# Patient Record
Sex: FEMALE | Race: WHITE | Employment: OTHER | URBAN - METROPOLITAN AREA
[De-identification: names, ages, dates, MRNs, and addresses within clinical notes are randomized per-mention and may not be internally consistent; named-entity substitution may affect disease eponyms.]

---

## 2017-03-03 ENCOUNTER — HOSPITAL ENCOUNTER (OUTPATIENT)
Age: 67
Setting detail: SPECIMEN
Discharge: HOME | End: 2017-03-03
Payer: MEDICARE

## 2017-03-03 LAB
ALBUMIN SERPL-MCNC: 4 G/DL (ref 3.5–5.2)
ALBUMIN/GLOBULIN RATIO: 1.1 (ref 1–2.5)
ALP BLD-CCNC: 161 U/L (ref 35–104)
ALT SERPL-CCNC: 22 U/L (ref 5–33)
ANION GAP SERPL CALCULATED.3IONS-SCNC: 20 MMOL/L (ref 9–17)
AST SERPL-CCNC: 31 U/L
BILIRUB SERPL-MCNC: 0.81 MG/DL (ref 0.3–1.2)
BUN BLDV-MCNC: 26 MG/DL (ref 8–23)
BUN/CREAT BLD: ABNORMAL (ref 9–20)
CALCIUM SERPL-MCNC: 9.8 MG/DL (ref 8.6–10.4)
CHLORIDE BLD-SCNC: 93 MMOL/L (ref 98–107)
CO2: 24 MMOL/L (ref 20–31)
CREAT SERPL-MCNC: 1.89 MG/DL (ref 0.5–0.9)
FOLATE: >20 NG/ML
GFR AFRICAN AMERICAN: 32 ML/MIN
GFR NON-AFRICAN AMERICAN: 26 ML/MIN
GFR SERPL CREATININE-BSD FRML MDRD: ABNORMAL ML/MIN/{1.73_M2}
GFR SERPL CREATININE-BSD FRML MDRD: ABNORMAL ML/MIN/{1.73_M2}
GLUCOSE BLD-MCNC: 158 MG/DL (ref 70–99)
POTASSIUM SERPL-SCNC: 4 MMOL/L (ref 3.7–5.3)
SODIUM BLD-SCNC: 137 MMOL/L (ref 135–144)
TOTAL PROTEIN: 7.6 G/DL (ref 6.4–8.3)
TSH SERPL DL<=0.05 MIU/L-ACNC: 2.68 MIU/L (ref 0.3–5)
VITAMIN B-12: 389 PG/ML (ref 211–946)

## 2017-03-28 ENCOUNTER — HOSPITAL ENCOUNTER (OUTPATIENT)
Age: 67
Setting detail: SPECIMEN
Discharge: HOME | End: 2017-03-28
Payer: MEDICARE

## 2017-03-28 LAB
-: NORMAL
ABSOLUTE EOS #: 0.3 K/UL (ref 0–0.4)
ABSOLUTE LYMPH #: 1.2 K/UL (ref 1–4.8)
ABSOLUTE MONO #: 1.3 K/UL (ref 0.1–1.2)
ALBUMIN SERPL-MCNC: 3.6 G/DL (ref 3.5–5.2)
ALBUMIN SERPL-MCNC: 3.7 G/DL (ref 3.5–5.2)
ALBUMIN/GLOBULIN RATIO: 1 (ref 1–2.5)
ALP BLD-CCNC: 141 U/L (ref 35–104)
ALT SERPL-CCNC: 18 U/L (ref 5–33)
AMORPHOUS: NORMAL
ANION GAP SERPL CALCULATED.3IONS-SCNC: 17 MMOL/L (ref 9–17)
ANION GAP SERPL CALCULATED.3IONS-SCNC: 18 MMOL/L (ref 9–17)
AST SERPL-CCNC: 25 U/L
BACTERIA: NORMAL
BASOPHILS # BLD: 1 % (ref 0–2)
BASOPHILS ABSOLUTE: 0.1 K/UL (ref 0–0.2)
BILIRUB SERPL-MCNC: 0.69 MG/DL (ref 0.3–1.2)
BILIRUBIN URINE: NEGATIVE
BNP INTERPRETATION: ABNORMAL
BUN BLDV-MCNC: 15 MG/DL (ref 8–23)
BUN BLDV-MCNC: 15 MG/DL (ref 8–23)
BUN/CREAT BLD: ABNORMAL (ref 9–20)
BUN/CREAT BLD: ABNORMAL (ref 9–20)
CALCIUM IONIZED: 1.2 MMOL/L (ref 1.13–1.33)
CALCIUM SERPL-MCNC: 8.7 MG/DL (ref 8.6–10.4)
CALCIUM SERPL-MCNC: 9 MG/DL (ref 8.6–10.4)
CASTS UA: NORMAL /LPF (ref 0–8)
CHLORIDE BLD-SCNC: 100 MMOL/L (ref 98–107)
CHLORIDE BLD-SCNC: 100 MMOL/L (ref 98–107)
CHOLESTEROL/HDL RATIO: 4.2
CHOLESTEROL: 183 MG/DL
CO2: 21 MMOL/L (ref 20–31)
CO2: 22 MMOL/L (ref 20–31)
COLOR: YELLOW
COMMENT UA: ABNORMAL
CREAT SERPL-MCNC: 1.5 MG/DL (ref 0.5–0.9)
CREAT SERPL-MCNC: 1.55 MG/DL (ref 0.5–0.9)
CREATININE URINE: 93.4 MG/DL (ref 28–217)
CRYSTALS, UA: NORMAL /HPF
DIFFERENTIAL TYPE: ABNORMAL
EOSINOPHILS RELATIVE PERCENT: 4 % (ref 1–4)
EPITHELIAL CELLS UA: NORMAL /HPF (ref 0–5)
ESTIMATED AVERAGE GLUCOSE: 131 MG/DL
GFR AFRICAN AMERICAN: 40 ML/MIN
GFR AFRICAN AMERICAN: 42 ML/MIN
GFR NON-AFRICAN AMERICAN: 33 ML/MIN
GFR NON-AFRICAN AMERICAN: 35 ML/MIN
GFR SERPL CREATININE-BSD FRML MDRD: ABNORMAL ML/MIN/{1.73_M2}
GLUCOSE BLD-MCNC: 178 MG/DL (ref 70–99)
GLUCOSE BLD-MCNC: 179 MG/DL (ref 70–99)
GLUCOSE URINE: NEGATIVE
HBA1C MFR BLD: 6.2 % (ref 4–6)
HCT VFR BLD CALC: 36.7 % (ref 36–46)
HCT VFR BLD CALC: 36.7 % (ref 36–46)
HDLC SERPL-MCNC: 44 MG/DL
HEMOGLOBIN: 12.4 G/DL (ref 12–16)
HEMOGLOBIN: 12.4 G/DL (ref 12–16)
INR BLD: 2
IRON SATURATION: 30 % (ref 20–55)
IRON: 74 UG/DL (ref 37–145)
KETONES, URINE: NEGATIVE
LDL CHOLESTEROL: 109 MG/DL (ref 0–130)
LEUKOCYTE ESTERASE, URINE: ABNORMAL
LYMPHOCYTES # BLD: 13 % (ref 24–44)
MAGNESIUM: 2.2 MG/DL (ref 1.6–2.6)
MCH RBC QN AUTO: 35 PG (ref 26–34)
MCH RBC QN AUTO: 35 PG (ref 26–34)
MCHC RBC AUTO-ENTMCNC: 34 G/DL (ref 31–37)
MCHC RBC AUTO-ENTMCNC: 34 G/DL (ref 31–37)
MCV RBC AUTO: 103 FL (ref 80–100)
MCV RBC AUTO: 103 FL (ref 80–100)
MICROALBUMIN/CREAT 24H UR: <12 MG/L
MICROALBUMIN/CREAT UR-RTO: 13 MCG/MG CREAT
MONOCYTES # BLD: 15 % (ref 2–11)
MUCUS: NORMAL
MYOGLOBIN: 41 NG/ML (ref 25–58)
NITRITE, URINE: NEGATIVE
OTHER OBSERVATIONS UA: NORMAL
PDW BLD-RTO: 16.9 % (ref 12.5–15.4)
PDW BLD-RTO: 16.9 % (ref 12.5–15.4)
PH UA: 6.5 (ref 5–8)
PHOSPHORUS: 2.6 MG/DL (ref 2.6–4.5)
PLATELET # BLD: 246 K/UL (ref 140–450)
PLATELET # BLD: 246 K/UL (ref 140–450)
PLATELET ESTIMATE: ABNORMAL
PMV BLD AUTO: 9.3 FL (ref 6–12)
PMV BLD AUTO: 9.3 FL (ref 6–12)
POTASSIUM SERPL-SCNC: 4.4 MMOL/L (ref 3.7–5.3)
POTASSIUM SERPL-SCNC: 4.6 MMOL/L (ref 3.7–5.3)
PRO-BNP: 339 PG/ML
PROTEIN UA: NEGATIVE
PROTHROMBIN TIME: 21.2 SEC (ref 9.4–12.6)
PTH INTACT: 136.9 PG/ML (ref 15–65)
RBC # BLD: 3.56 M/UL (ref 4–5.2)
RBC # BLD: 3.56 M/UL (ref 4–5.2)
RBC # BLD: ABNORMAL 10*6/UL
RBC UA: NORMAL /HPF (ref 0–4)
RENAL EPITHELIAL, UA: NORMAL /HPF
SEG NEUTROPHILS: 67 % (ref 36–66)
SEGMENTED NEUTROPHILS ABSOLUTE COUNT: 6 K/UL (ref 1.8–7.7)
SODIUM BLD-SCNC: 139 MMOL/L (ref 135–144)
SODIUM BLD-SCNC: 139 MMOL/L (ref 135–144)
SPECIFIC GRAVITY UA: 1.01 (ref 1–1.03)
THYROXINE, FREE: 1.34 NG/DL (ref 0.93–1.7)
TOTAL CK: 45 U/L (ref 26–192)
TOTAL IRON BINDING CAPACITY: 250 UG/DL (ref 250–450)
TOTAL PROTEIN: 7.3 G/DL (ref 6.4–8.3)
TRICHOMONAS: NORMAL
TRIGL SERPL-MCNC: 149 MG/DL
TSH SERPL DL<=0.05 MIU/L-ACNC: 1.62 MIU/L (ref 0.3–5)
TURBIDITY: CLEAR
UNSATURATED IRON BINDING CAPACITY: 176 UG/DL (ref 112–347)
URINE HGB: NEGATIVE
UROBILINOGEN, URINE: NORMAL
VITAMIN B-12: 375 PG/ML (ref 211–946)
VITAMIN D 25-HYDROXY: 48.3 NG/ML (ref 30–100)
VITAMIN D 25-HYDROXY: 48.7 NG/ML (ref 30–100)
VLDLC SERPL CALC-MCNC: NORMAL MG/DL (ref 1–30)
WBC # BLD: 8.9 K/UL (ref 3.5–11)
WBC # BLD: 8.9 K/UL (ref 3.5–11)
WBC # BLD: ABNORMAL 10*3/UL
WBC UA: NORMAL /HPF (ref 0–5)
YEAST: NORMAL

## 2017-04-05 ENCOUNTER — HOSPITAL ENCOUNTER (OUTPATIENT)
Dept: PHARMACY | Age: 67
Setting detail: THERAPIES SERIES
Discharge: HOME | End: 2017-04-05
Payer: MEDICARE

## 2017-04-05 LAB
INR BLD: 3.1
PROTIME: 37.2 SECONDS

## 2017-04-05 PROCEDURE — 85610 PROTHROMBIN TIME: CPT

## 2017-04-05 PROCEDURE — G0463 HOSPITAL OUTPT CLINIC VISIT: HCPCS

## 2017-05-03 ENCOUNTER — HOSPITAL ENCOUNTER (OUTPATIENT)
Dept: PHARMACY | Age: 67
Setting detail: THERAPIES SERIES
Discharge: HOME | End: 2017-05-03
Payer: MEDICARE

## 2017-05-03 DIAGNOSIS — I48.91 ATRIAL FIBRILLATION, UNSPECIFIED TYPE (HCC): ICD-10-CM

## 2017-05-03 LAB
INR BLD: 2.1
PROTIME: 25.6 SECONDS

## 2017-05-03 PROCEDURE — 85610 PROTHROMBIN TIME: CPT

## 2017-05-03 PROCEDURE — 99211 OFF/OP EST MAY X REQ PHY/QHP: CPT

## 2017-05-13 ENCOUNTER — APPOINTMENT (OUTPATIENT)
Dept: GENERAL RADIOLOGY | Age: 67
End: 2017-05-13
Payer: MEDICARE

## 2017-05-13 ENCOUNTER — HOSPITAL ENCOUNTER (EMERGENCY)
Age: 67
Discharge: HOME | End: 2017-05-13
Attending: EMERGENCY MEDICINE
Payer: MEDICARE

## 2017-05-13 ENCOUNTER — APPOINTMENT (OUTPATIENT)
Dept: CT IMAGING | Age: 67
End: 2017-05-13
Payer: MEDICARE

## 2017-05-13 VITALS
HEIGHT: 62 IN | RESPIRATION RATE: 20 BRPM | BODY MASS INDEX: 44.72 KG/M2 | DIASTOLIC BLOOD PRESSURE: 53 MMHG | HEART RATE: 86 BPM | WEIGHT: 243 LBS | TEMPERATURE: 98.1 F | OXYGEN SATURATION: 95 % | SYSTOLIC BLOOD PRESSURE: 149 MMHG

## 2017-05-13 DIAGNOSIS — M79.18 MUSCULOSKELETAL PAIN: Primary | ICD-10-CM

## 2017-05-13 LAB
ABSOLUTE EOS #: 0.29 K/UL (ref 0–0.4)
ABSOLUTE LYMPH #: 1.46 K/UL (ref 1–4.8)
ABSOLUTE MONO #: 0.97 K/UL (ref 0.2–0.8)
ANION GAP SERPL CALCULATED.3IONS-SCNC: 16 MMOL/L (ref 9–17)
BASOPHILS # BLD: 0 %
BASOPHILS ABSOLUTE: 0 K/UL (ref 0–0.2)
BUN BLDV-MCNC: 15 MG/DL (ref 8–23)
BUN/CREAT BLD: 9 (ref 9–20)
CALCIUM SERPL-MCNC: 9.2 MG/DL (ref 8.6–10.4)
CHLORIDE BLD-SCNC: 97 MMOL/L (ref 98–107)
CO2: 26 MMOL/L (ref 20–31)
CREAT SERPL-MCNC: 1.74 MG/DL (ref 0.5–0.9)
DIFFERENTIAL TYPE: ABNORMAL
EOSINOPHILS RELATIVE PERCENT: 3 %
GFR AFRICAN AMERICAN: 35 ML/MIN
GFR NON-AFRICAN AMERICAN: 29 ML/MIN
GFR SERPL CREATININE-BSD FRML MDRD: ABNORMAL ML/MIN/{1.73_M2}
GFR SERPL CREATININE-BSD FRML MDRD: ABNORMAL ML/MIN/{1.73_M2}
GLUCOSE BLD-MCNC: 117 MG/DL (ref 70–99)
HCT VFR BLD CALC: 40.4 % (ref 36–46)
HEMOGLOBIN: 13.6 G/DL (ref 12–16)
INR BLD: 2.7
LYMPHOCYTES # BLD: 15 %
MCH RBC QN AUTO: 34.3 PG (ref 26–34)
MCHC RBC AUTO-ENTMCNC: 33.7 G/DL (ref 31–37)
MCV RBC AUTO: 101.9 FL (ref 80–100)
MONOCYTES # BLD: 10 %
MORPHOLOGY: ABNORMAL
MYOGLOBIN: 46 NG/ML (ref 25–58)
PARTIAL THROMBOPLASTIN TIME: 34.2 SEC (ref 23–31)
PDW BLD-RTO: 14.7 % (ref 11.5–14.5)
PLATELET # BLD: 227 K/UL (ref 130–400)
PLATELET ESTIMATE: ABNORMAL
PMV BLD AUTO: ABNORMAL FL (ref 6–12)
POTASSIUM SERPL-SCNC: 4.2 MMOL/L (ref 3.7–5.3)
PROTHROMBIN TIME: 29.3 SEC (ref 9.7–11.6)
RBC # BLD: 3.97 M/UL (ref 4–5.2)
RBC # BLD: ABNORMAL 10*6/UL
SEG NEUTROPHILS: 72 %
SEGMENTED NEUTROPHILS ABSOLUTE COUNT: 6.98 K/UL (ref 1.8–7.7)
SODIUM BLD-SCNC: 139 MMOL/L (ref 135–144)
TROPONIN INTERP: NORMAL
TROPONIN T: <0.03 NG/ML
WBC # BLD: 9.7 K/UL (ref 3.5–11)
WBC # BLD: ABNORMAL 10*3/UL

## 2017-05-13 PROCEDURE — 85025 COMPLETE CBC W/AUTO DIFF WBC: CPT

## 2017-05-13 PROCEDURE — 72125 CT NECK SPINE W/O DYE: CPT

## 2017-05-13 PROCEDURE — 80048 BASIC METABOLIC PNL TOTAL CA: CPT

## 2017-05-13 PROCEDURE — 85610 PROTHROMBIN TIME: CPT

## 2017-05-13 PROCEDURE — 85730 THROMBOPLASTIN TIME PARTIAL: CPT

## 2017-05-13 PROCEDURE — 93005 ELECTROCARDIOGRAM TRACING: CPT

## 2017-05-13 PROCEDURE — 71010 XR CHEST PORTABLE: CPT

## 2017-05-13 PROCEDURE — 99284 EMERGENCY DEPT VISIT MOD MDM: CPT

## 2017-05-13 PROCEDURE — 84484 ASSAY OF TROPONIN QUANT: CPT

## 2017-05-13 PROCEDURE — 70450 CT HEAD/BRAIN W/O DYE: CPT

## 2017-05-13 PROCEDURE — 83874 ASSAY OF MYOGLOBIN: CPT

## 2017-05-13 RX ORDER — METHOCARBAMOL 750 MG/1
750 TABLET, FILM COATED ORAL 3 TIMES DAILY
Qty: 30 TABLET | Refills: 0 | Status: SHIPPED | OUTPATIENT
Start: 2017-05-13 | End: 2018-01-31 | Stop reason: ALTCHOICE

## 2017-05-13 RX ORDER — ROSUVASTATIN CALCIUM 40 MG/1
40 TABLET, COATED ORAL DAILY
COMMUNITY
End: 2018-12-19 | Stop reason: ALTCHOICE

## 2017-05-13 RX ORDER — NEBIVOLOL 5 MG/1
5 TABLET ORAL DAILY
Status: ON HOLD | COMMUNITY
End: 2018-03-27 | Stop reason: HOSPADM

## 2017-05-13 RX ORDER — HYDROCODONE BITARTRATE AND ACETAMINOPHEN 5; 325 MG/1; MG/1
1 TABLET ORAL EVERY 6 HOURS PRN
Qty: 20 TABLET | Refills: 0 | Status: SHIPPED | OUTPATIENT
Start: 2017-05-13 | End: 2018-01-31 | Stop reason: ALTCHOICE

## 2017-05-13 RX ORDER — CALCITRIOL 0.25 UG/1
0.25 CAPSULE, LIQUID FILLED ORAL
COMMUNITY
End: 2018-12-19 | Stop reason: ALTCHOICE

## 2017-05-13 RX ORDER — METOCLOPRAMIDE 5 MG/1
10 TABLET ORAL 2 TIMES DAILY
Status: ON HOLD | COMMUNITY
End: 2021-11-13 | Stop reason: SDUPTHER

## 2017-05-13 RX ORDER — GABAPENTIN 400 MG/1
400 CAPSULE ORAL 2 TIMES DAILY
COMMUNITY
End: 2017-05-15

## 2017-05-13 ASSESSMENT — ENCOUNTER SYMPTOMS
PHOTOPHOBIA: 0
SHORTNESS OF BREATH: 0
NAUSEA: 1
VOMITING: 0
SINUS PRESSURE: 0
SORE THROAT: 0
RHINORRHEA: 0
COUGH: 0
ABDOMINAL PAIN: 0
EYE PAIN: 0
COLOR CHANGE: 0
DIARRHEA: 0

## 2017-05-13 ASSESSMENT — PAIN SCALES - GENERAL: PAINLEVEL_OUTOF10: 4

## 2017-05-15 ENCOUNTER — TELEPHONE (OUTPATIENT)
Dept: PHARMACY | Age: 67
End: 2017-05-15

## 2017-05-15 RX ORDER — GABAPENTIN 400 MG/1
400 CAPSULE ORAL 2 TIMES DAILY
COMMUNITY
End: 2018-07-25 | Stop reason: ALTCHOICE

## 2017-05-15 RX ORDER — VALACYCLOVIR HYDROCHLORIDE 1 G/1
2000 TABLET, FILM COATED ORAL DAILY PRN
COMMUNITY
End: 2021-11-08

## 2017-05-16 LAB
EKG ATRIAL RATE: 77 BPM
EKG P AXIS: 55 DEGREES
EKG P-R INTERVAL: 150 MS
EKG Q-T INTERVAL: 392 MS
EKG QRS DURATION: 74 MS
EKG QTC CALCULATION (BAZETT): 443 MS
EKG R AXIS: 61 DEGREES
EKG T AXIS: 35 DEGREES
EKG VENTRICULAR RATE: 77 BPM

## 2017-05-31 ENCOUNTER — HOSPITAL ENCOUNTER (OUTPATIENT)
Dept: PHARMACY | Age: 67
Setting detail: THERAPIES SERIES
Discharge: HOME | End: 2017-05-31
Payer: MEDICARE

## 2017-05-31 DIAGNOSIS — I48.91 ATRIAL FIBRILLATION, UNSPECIFIED TYPE (HCC): ICD-10-CM

## 2017-05-31 LAB
INR BLD: 3.5
PROTIME: 41.5 SECONDS

## 2017-05-31 PROCEDURE — 99211 OFF/OP EST MAY X REQ PHY/QHP: CPT

## 2017-05-31 PROCEDURE — 85610 PROTHROMBIN TIME: CPT

## 2017-06-13 ENCOUNTER — HOSPITAL ENCOUNTER (OUTPATIENT)
Dept: PHARMACY | Age: 67
Setting detail: THERAPIES SERIES
Discharge: HOME | End: 2017-06-13
Payer: MEDICARE

## 2017-06-13 DIAGNOSIS — I48.91 ATRIAL FIBRILLATION, UNSPECIFIED TYPE (HCC): ICD-10-CM

## 2017-06-13 LAB
INR BLD: 2.2
PROTIME: 26.6 SECONDS

## 2017-06-13 PROCEDURE — 99211 OFF/OP EST MAY X REQ PHY/QHP: CPT

## 2017-06-13 PROCEDURE — 85610 PROTHROMBIN TIME: CPT

## 2017-06-26 ENCOUNTER — TELEPHONE (OUTPATIENT)
Dept: PHARMACY | Age: 67
End: 2017-06-26

## 2017-06-26 DIAGNOSIS — I48.91 ATRIAL FIBRILLATION, UNSPECIFIED TYPE (HCC): ICD-10-CM

## 2017-07-03 ENCOUNTER — HOSPITAL ENCOUNTER (OUTPATIENT)
Age: 67
Setting detail: SPECIMEN
Discharge: HOME | End: 2017-07-03
Payer: MEDICARE

## 2017-07-03 LAB
-: NORMAL
ALBUMIN SERPL-MCNC: 3.6 G/DL (ref 3.5–5.2)
AMORPHOUS: NORMAL
ANION GAP SERPL CALCULATED.3IONS-SCNC: 17 MMOL/L (ref 9–17)
BACTERIA: NORMAL
BILIRUBIN URINE: NEGATIVE
BUN BLDV-MCNC: 16 MG/DL (ref 8–23)
BUN/CREAT BLD: ABNORMAL (ref 9–20)
CALCIUM IONIZED: 1.28 MMOL/L (ref 1.13–1.33)
CALCIUM SERPL-MCNC: 9.2 MG/DL (ref 8.6–10.4)
CASTS UA: NORMAL /LPF (ref 0–8)
CHLORIDE BLD-SCNC: 105 MMOL/L (ref 98–107)
CO2: 20 MMOL/L (ref 20–31)
COLOR: YELLOW
COMMENT UA: ABNORMAL
CREAT SERPL-MCNC: 1.35 MG/DL (ref 0.5–0.9)
CRYSTALS, UA: NORMAL /HPF
EPITHELIAL CELLS UA: NORMAL /HPF (ref 0–5)
GFR AFRICAN AMERICAN: 47 ML/MIN
GFR NON-AFRICAN AMERICAN: 39 ML/MIN
GFR SERPL CREATININE-BSD FRML MDRD: ABNORMAL ML/MIN/{1.73_M2}
GFR SERPL CREATININE-BSD FRML MDRD: ABNORMAL ML/MIN/{1.73_M2}
GLUCOSE BLD-MCNC: 85 MG/DL (ref 70–99)
GLUCOSE URINE: NEGATIVE
HCT VFR BLD CALC: 38 % (ref 36–46)
HEMOGLOBIN: 12.8 G/DL (ref 12–16)
KETONES, URINE: NEGATIVE
LEUKOCYTE ESTERASE, URINE: ABNORMAL
MAGNESIUM: 2.2 MG/DL (ref 1.6–2.6)
MCH RBC QN AUTO: 34.2 PG (ref 26–34)
MCHC RBC AUTO-ENTMCNC: 33.7 G/DL (ref 31–37)
MCV RBC AUTO: 101.7 FL (ref 80–100)
MUCUS: NORMAL
NITRITE, URINE: NEGATIVE
OTHER OBSERVATIONS UA: NORMAL
PDW BLD-RTO: 14.9 % (ref 12.5–15.4)
PH UA: 6.5 (ref 5–8)
PHOSPHORUS: 2.7 MG/DL (ref 2.6–4.5)
PLATELET # BLD: 210 K/UL (ref 140–450)
PMV BLD AUTO: 9.6 FL (ref 6–12)
POTASSIUM SERPL-SCNC: 4.6 MMOL/L (ref 3.7–5.3)
PROTEIN UA: NEGATIVE
PTH INTACT: 74.01 PG/ML (ref 15–65)
RBC # BLD: 3.74 M/UL (ref 4–5.2)
RBC UA: NORMAL /HPF (ref 0–4)
RENAL EPITHELIAL, UA: NORMAL /HPF
SODIUM BLD-SCNC: 142 MMOL/L (ref 135–144)
SPECIFIC GRAVITY UA: 1.02 (ref 1–1.03)
TRICHOMONAS: NORMAL
TURBIDITY: CLEAR
URINE HGB: NEGATIVE
UROBILINOGEN, URINE: NORMAL
VITAMIN D 25-HYDROXY: 55.2 NG/ML (ref 30–100)
WBC # BLD: 8 K/UL (ref 3.5–11)
WBC UA: NORMAL /HPF (ref 0–5)
YEAST: NORMAL

## 2017-07-04 LAB
CREATININE URINE: 81.6 MG/DL (ref 28–217)
MICROALBUMIN/CREAT 24H UR: <12 MG/L
MICROALBUMIN/CREAT UR-RTO: 15 MCG/MG CREAT

## 2017-07-11 ENCOUNTER — HOSPITAL ENCOUNTER (OUTPATIENT)
Dept: PHARMACY | Age: 67
Setting detail: THERAPIES SERIES
Discharge: HOME | End: 2017-07-11
Payer: MEDICARE

## 2017-07-11 LAB
INR BLD: 1.7
PROTIME: 20.2 SECONDS

## 2017-07-11 PROCEDURE — 99211 OFF/OP EST MAY X REQ PHY/QHP: CPT

## 2017-07-11 PROCEDURE — 85610 PROTHROMBIN TIME: CPT

## 2017-07-25 ENCOUNTER — HOSPITAL ENCOUNTER (OUTPATIENT)
Dept: PHARMACY | Age: 67
Setting detail: THERAPIES SERIES
Discharge: HOME | End: 2017-07-25
Payer: MEDICARE

## 2017-07-25 LAB
INR BLD: 1.2
PROTIME: 14.5 SECONDS

## 2017-07-25 PROCEDURE — 99211 OFF/OP EST MAY X REQ PHY/QHP: CPT

## 2017-07-25 PROCEDURE — 85610 PROTHROMBIN TIME: CPT

## 2017-08-01 ENCOUNTER — HOSPITAL ENCOUNTER (OUTPATIENT)
Dept: PHARMACY | Age: 67
Setting detail: THERAPIES SERIES
Discharge: HOME | End: 2017-08-01
Payer: MEDICARE

## 2017-08-01 LAB
INR BLD: 1.9
PROTIME: 22.2 SECONDS

## 2017-08-01 PROCEDURE — 99211 OFF/OP EST MAY X REQ PHY/QHP: CPT

## 2017-08-01 PROCEDURE — 85610 PROTHROMBIN TIME: CPT

## 2017-08-21 ENCOUNTER — TELEPHONE (OUTPATIENT)
Dept: PHARMACY | Age: 67
End: 2017-08-21

## 2017-08-21 DIAGNOSIS — I48.91 ATRIAL FIBRILLATION, UNSPECIFIED TYPE (HCC): ICD-10-CM

## 2017-08-23 ENCOUNTER — HOSPITAL ENCOUNTER (OUTPATIENT)
Dept: PHYSICAL THERAPY | Facility: CLINIC | Age: 67
Setting detail: THERAPIES SERIES
Discharge: HOME | End: 2017-08-23
Payer: MEDICARE

## 2017-08-23 PROCEDURE — 97161 PT EVAL LOW COMPLEX 20 MIN: CPT

## 2017-08-23 PROCEDURE — G8979 MOBILITY GOAL STATUS: HCPCS

## 2017-08-23 PROCEDURE — 97110 THERAPEUTIC EXERCISES: CPT

## 2017-08-23 PROCEDURE — 97530 THERAPEUTIC ACTIVITIES: CPT

## 2017-08-23 PROCEDURE — G8978 MOBILITY CURRENT STATUS: HCPCS

## 2017-08-30 ENCOUNTER — HOSPITAL ENCOUNTER (OUTPATIENT)
Dept: PHARMACY | Age: 67
Setting detail: THERAPIES SERIES
Discharge: HOME | End: 2017-08-30
Payer: MEDICARE

## 2017-08-30 DIAGNOSIS — I48.91 ATRIAL FIBRILLATION, UNSPECIFIED TYPE (HCC): ICD-10-CM

## 2017-08-30 LAB
INR BLD: 1.8
PROTIME: 21.1 SECONDS

## 2017-08-30 PROCEDURE — 85610 PROTHROMBIN TIME: CPT

## 2017-08-30 PROCEDURE — 99211 OFF/OP EST MAY X REQ PHY/QHP: CPT

## 2017-08-31 ENCOUNTER — HOSPITAL ENCOUNTER (OUTPATIENT)
Dept: PHYSICAL THERAPY | Facility: CLINIC | Age: 67
Setting detail: THERAPIES SERIES
Discharge: HOME | End: 2017-08-31
Payer: MEDICARE

## 2017-08-31 PROCEDURE — 97110 THERAPEUTIC EXERCISES: CPT

## 2017-08-31 PROCEDURE — 97112 NEUROMUSCULAR REEDUCATION: CPT

## 2017-09-13 ENCOUNTER — HOSPITAL ENCOUNTER (OUTPATIENT)
Dept: PHARMACY | Age: 67
Setting detail: THERAPIES SERIES
Discharge: HOME | End: 2017-09-13
Payer: MEDICARE

## 2017-09-13 LAB
INR BLD: 2.1
PROTIME: 25.7 SECONDS

## 2017-09-13 PROCEDURE — 85610 PROTHROMBIN TIME: CPT

## 2017-09-13 PROCEDURE — 99211 OFF/OP EST MAY X REQ PHY/QHP: CPT

## 2017-09-14 ENCOUNTER — HOSPITAL ENCOUNTER (OUTPATIENT)
Dept: PHYSICAL THERAPY | Facility: CLINIC | Age: 67
Setting detail: THERAPIES SERIES
Discharge: HOME | End: 2017-09-14
Payer: MEDICARE

## 2017-09-14 ENCOUNTER — TELEPHONE (OUTPATIENT)
Dept: PHARMACY | Age: 67
End: 2017-09-14

## 2017-09-14 DIAGNOSIS — I48.91 ATRIAL FIBRILLATION, UNSPECIFIED TYPE (HCC): ICD-10-CM

## 2017-09-14 PROCEDURE — 97110 THERAPEUTIC EXERCISES: CPT

## 2017-09-14 PROCEDURE — 97112 NEUROMUSCULAR REEDUCATION: CPT

## 2017-09-16 ENCOUNTER — HOSPITAL ENCOUNTER (OUTPATIENT)
Dept: ULTRASOUND IMAGING | Age: 67
Discharge: HOME | End: 2017-09-16
Payer: MEDICARE

## 2017-09-16 DIAGNOSIS — K74.60 CIRRHOSIS OF LIVER WITHOUT ASCITES, UNSPECIFIED HEPATIC CIRRHOSIS TYPE (HCC): ICD-10-CM

## 2017-09-16 PROCEDURE — 76705 ECHO EXAM OF ABDOMEN: CPT

## 2017-09-25 ENCOUNTER — HOSPITAL ENCOUNTER (OUTPATIENT)
Age: 67
Setting detail: SPECIMEN
Discharge: HOME | End: 2017-09-25
Payer: MEDICARE

## 2017-09-25 ENCOUNTER — HOSPITAL ENCOUNTER (OUTPATIENT)
Dept: PHYSICAL THERAPY | Facility: CLINIC | Age: 67
Setting detail: THERAPIES SERIES
Discharge: HOME | End: 2017-09-25
Payer: MEDICARE

## 2017-09-25 LAB
ALBUMIN SERPL-MCNC: 4 G/DL (ref 3.5–5.2)
ALBUMIN/GLOBULIN RATIO: 1.3 (ref 1–2.5)
ALP BLD-CCNC: 125 U/L (ref 35–104)
ALT SERPL-CCNC: 20 U/L (ref 5–33)
ANION GAP SERPL CALCULATED.3IONS-SCNC: 18 MMOL/L (ref 9–17)
AST SERPL-CCNC: 21 U/L
BILIRUB SERPL-MCNC: 0.56 MG/DL (ref 0.3–1.2)
BNP INTERPRETATION: NORMAL
BUN BLDV-MCNC: 18 MG/DL (ref 8–23)
BUN/CREAT BLD: ABNORMAL (ref 9–20)
CALCIUM SERPL-MCNC: 10.2 MG/DL (ref 8.6–10.4)
CHLORIDE BLD-SCNC: 97 MMOL/L (ref 98–107)
CO2: 23 MMOL/L (ref 20–31)
CREAT SERPL-MCNC: 1.84 MG/DL (ref 0.5–0.9)
ESTIMATED AVERAGE GLUCOSE: 120 MG/DL
GFR AFRICAN AMERICAN: 33 ML/MIN
GFR NON-AFRICAN AMERICAN: 27 ML/MIN
GFR SERPL CREATININE-BSD FRML MDRD: ABNORMAL ML/MIN/{1.73_M2}
GFR SERPL CREATININE-BSD FRML MDRD: ABNORMAL ML/MIN/{1.73_M2}
GLUCOSE BLD-MCNC: 127 MG/DL (ref 70–99)
HBA1C MFR BLD: 5.8 % (ref 4–6)
HCT VFR BLD CALC: 41 % (ref 36–46)
HEMOGLOBIN: 13.7 G/DL (ref 12–16)
IRON SATURATION: 49 % (ref 20–55)
IRON: 131 UG/DL (ref 37–145)
MCH RBC QN AUTO: 33.3 PG (ref 26–34)
MCHC RBC AUTO-ENTMCNC: 33.5 G/DL (ref 31–37)
MCV RBC AUTO: 99.3 FL (ref 80–100)
MYOGLOBIN: 35 NG/ML (ref 25–58)
PDW BLD-RTO: 15.2 % (ref 12.5–15.4)
PLATELET # BLD: 248 K/UL (ref 140–450)
PMV BLD AUTO: 9.7 FL (ref 6–12)
POTASSIUM SERPL-SCNC: 4.6 MMOL/L (ref 3.7–5.3)
PRO-BNP: 159 PG/ML
RBC # BLD: 4.13 M/UL (ref 4–5.2)
SODIUM BLD-SCNC: 138 MMOL/L (ref 135–144)
THYROXINE, FREE: 1.54 NG/DL (ref 0.93–1.7)
TOTAL CK: 39 U/L (ref 26–192)
TOTAL IRON BINDING CAPACITY: 270 UG/DL (ref 250–450)
TOTAL PROTEIN: 7.2 G/DL (ref 6.4–8.3)
TSH SERPL DL<=0.05 MIU/L-ACNC: 1.44 MIU/L (ref 0.3–5)
UNSATURATED IRON BINDING CAPACITY: 139 UG/DL (ref 112–347)
VITAMIN B-12: 295 PG/ML (ref 211–946)
WBC # BLD: 7.9 K/UL (ref 3.5–11)

## 2017-09-25 PROCEDURE — G8985 CARRY GOAL STATUS: HCPCS

## 2017-09-25 PROCEDURE — G8984 CARRY CURRENT STATUS: HCPCS

## 2017-09-25 PROCEDURE — 97140 MANUAL THERAPY 1/> REGIONS: CPT

## 2017-09-25 PROCEDURE — 97162 PT EVAL MOD COMPLEX 30 MIN: CPT

## 2017-09-28 ENCOUNTER — HOSPITAL ENCOUNTER (OUTPATIENT)
Dept: PHYSICAL THERAPY | Facility: CLINIC | Age: 67
Setting detail: THERAPIES SERIES
Discharge: HOME | End: 2017-09-28
Payer: MEDICARE

## 2017-09-28 PROCEDURE — 97140 MANUAL THERAPY 1/> REGIONS: CPT

## 2017-10-02 ENCOUNTER — HOSPITAL ENCOUNTER (OUTPATIENT)
Dept: PHYSICAL THERAPY | Facility: CLINIC | Age: 67
Setting detail: THERAPIES SERIES
Discharge: HOME | End: 2017-10-02
Payer: MEDICARE

## 2017-10-02 PROCEDURE — 97110 THERAPEUTIC EXERCISES: CPT

## 2017-10-02 PROCEDURE — 97140 MANUAL THERAPY 1/> REGIONS: CPT

## 2017-10-02 NOTE — FLOWSHEET NOTE
[] [facility]       Outpatient Physical        Therapy       [address]       Phone: [phone]       Fax: [phone] [x] [facility] at [address]       Phone: [phone]       Fax: [phone] [] Bre. [facility] Promotion  [address] [place] [address]   Phone: [phone]   Fax:  [phone]     Physical Therapy Daily Treatment Note    Date:  10/2/2017  Patient Name:  Ligia Ding    :  1950  MRN: [id number]  Physician: Christina Garcia MD                                   Insurance: Medicare  Medical Diagnosis: R RC injury                                         Rehab Codes: Y10.595; R32.784; M54.2; M79.1  Onset Date: 2015                                   Next 's appt: 17  Visit# / total visits: 3/ 12 Cancels/No Shows: 0    Subjective:    Pain:  [x] Yes  [] No Location: R UE Pain Rating: (0-10 scale) 0/10 at rest with arm supported; 6/10 with moving the arm. Pain altered Tx:  [x] No  [] Yes  Action:  Comments: feels like she is sleeping much better; still unable to let arm hang at her side; patient with lasting relief; patient with improved neck symptoms; still pain with reaching across the body. Patient doing pulleys at home;  Patient had injection on Friday R shoulder.     Objective:   General listening: forward and L; quiet in sitting;   Modalities:   Precautions: blood thinners; hepatitis C   Exercises:  Exercise Reps/ Time Weight/ Level Comments    SHOULDER extendion 10 A Standing, bending over; started 10/2/17    rows 10 A Standing, bending over; started 10/2/17    shoulder flexion, abduction, IR,ER isometrics 5 ea gentle Started 10/2/17                       Other: prepped area with alcohol wipe, consent obtained, standard procedure followed: 30 total: 3 R RC insertion 1 \" ; 3 in long head of bicep tendon and proximal mm (1\"); 2\" in prox supraspinatus; ; 1 scapula 1\"; impingement and improve reaching ability for light objects  4. Improved scapular position within 2 cm of opposite side         Patient goals: \"decreased pain initially then better movement\"     G-CODE     Functional Limitation: carrying, moving and handling objects  Functional Assessment Used: UEFS  Current Status Modifier: CL   Goal Status Modifier: CK    Pt. Education:  [x] Yes  [] No  [x] Reviewed Prior HEP/Ed  Method of Education: [x] Verbal  [x] Demo  [x] Written: shoulder isometrics, scapular ex (start of)  Comprehension of Education:  [x] Verbalizes understanding. [x] Demonstrates understanding. [x] Needs review. [x] Demonstrates/verbalizes HEP/Ed previously given. Plan: [x] Continue per plan of care.    [] Other:      Time In: 8:00  am            Time Out: 9:10 am    Electronically signed by:  Yuli Magallanes, PT

## 2017-10-04 ENCOUNTER — HOSPITAL ENCOUNTER (OUTPATIENT)
Dept: PHYSICAL THERAPY | Facility: CLINIC | Age: 67
Setting detail: THERAPIES SERIES
Discharge: HOME | End: 2017-10-04
Payer: MEDICARE

## 2017-10-04 PROCEDURE — 97140 MANUAL THERAPY 1/> REGIONS: CPT

## 2017-10-04 NOTE — FLOWSHEET NOTE
degrees shoulder abduction to indicate decreased impingement and improve reaching ability for light objects  4. Improved scapular position within 2 cm of opposite side         Patient goals: \"decreased pain initially then better movement\"     G-CODE     Functional Limitation: carrying, moving and handling objects  Functional Assessment Used: UEFS  Current Status Modifier: CL   Goal Status Modifier: CK    Pt. Education:  [x] Yes  [] No  [x] Reviewed Prior HEP/Ed  Method of Education: [x] Verbal  [] Demo  [] Written: shoulder isometrics, scapular ex (start of)  Comprehension of Education:  [x] Verbalizes understanding. [] Demonstrates understanding. [] Needs review. [] Demonstrates/verbalizes HEP/Ed previously given. Plan: [x] Continue per plan of care.    [] Other:      Time In: 7:00  am            Time Out: 07:55 am    Electronically signed by:  Ivory Duque, PT

## 2017-10-05 ENCOUNTER — HOSPITAL ENCOUNTER (OUTPATIENT)
Dept: PHYSICAL THERAPY | Facility: CLINIC | Age: 67
Setting detail: THERAPIES SERIES
Discharge: HOME | End: 2017-10-05
Payer: MEDICARE

## 2017-10-09 ENCOUNTER — APPOINTMENT (OUTPATIENT)
Dept: PHYSICAL THERAPY | Facility: CLINIC | Age: 67
End: 2017-10-09
Payer: MEDICARE

## 2017-10-09 ENCOUNTER — HOSPITAL ENCOUNTER (OUTPATIENT)
Dept: PHYSICAL THERAPY | Facility: CLINIC | Age: 67
Setting detail: THERAPIES SERIES
Discharge: HOME | End: 2017-10-09
Payer: MEDICARE

## 2017-10-09 PROCEDURE — 97110 THERAPEUTIC EXERCISES: CPT

## 2017-10-09 PROCEDURE — 97112 NEUROMUSCULAR REEDUCATION: CPT

## 2017-10-11 ENCOUNTER — APPOINTMENT (OUTPATIENT)
Dept: PHARMACY | Age: 67
End: 2017-10-11
Payer: MEDICARE

## 2017-10-11 ENCOUNTER — HOSPITAL ENCOUNTER (OUTPATIENT)
Dept: PHARMACY | Age: 67
Setting detail: THERAPIES SERIES
Discharge: HOME | End: 2017-10-11
Payer: MEDICARE

## 2017-10-11 ENCOUNTER — HOSPITAL ENCOUNTER (OUTPATIENT)
Dept: PHYSICAL THERAPY | Facility: CLINIC | Age: 67
Setting detail: THERAPIES SERIES
Discharge: HOME | End: 2017-10-11
Payer: MEDICARE

## 2017-10-11 LAB
INR BLD: 2.5
PROTIME: 30 SECONDS

## 2017-10-11 PROCEDURE — 85610 PROTHROMBIN TIME: CPT

## 2017-10-11 PROCEDURE — 97140 MANUAL THERAPY 1/> REGIONS: CPT

## 2017-10-11 PROCEDURE — 99211 OFF/OP EST MAY X REQ PHY/QHP: CPT

## 2017-10-11 NOTE — PROGRESS NOTES
Patient states compliant all of the time with regimen. No bleeding or thromboembolic side effects noted. No significant med or dietary changes. No significant recent illness or disease state changes. PT/INR done in office per protocol. INR is 2.5 which is therapeutic. Warfarin regimen will be continued at current dose of 5mg Mon/Fri, 2.5mg all other days. Will retest in 4 weeks. Patient understands dosing directions and information discussed. Dosing schedule and follow up appointment given to patient. Progress note routed to referring physicians office.

## 2017-10-16 ENCOUNTER — APPOINTMENT (OUTPATIENT)
Dept: PHYSICAL THERAPY | Facility: CLINIC | Age: 67
End: 2017-10-16
Payer: MEDICARE

## 2017-10-18 ENCOUNTER — HOSPITAL ENCOUNTER (OUTPATIENT)
Dept: PHYSICAL THERAPY | Facility: CLINIC | Age: 67
Setting detail: THERAPIES SERIES
Discharge: HOME | End: 2017-10-18
Payer: MEDICARE

## 2017-10-18 PROCEDURE — 97140 MANUAL THERAPY 1/> REGIONS: CPT

## 2017-10-18 NOTE — FLOWSHEET NOTE
[] Lenita Bosworth       Outpatient Physical        Therapy       [address]       Phone: [phone]       Fax: [phone] [x] [facility] at [address]       Phone: [phone]       Fax: [phone] [] Bre. [address] [place] [address] Health Promotion  [address] [place], [place] [address]   Phone: [phone]   Fax:  [phone]     Physical Therapy Daily Treatment Note    Date:  10/18/2017  Patient Name:  Iván Pugh    :  1950  MRN: [id number]  Physician: Nikolai Guidry MD                                   Insurance: Medicare  Medical Diagnosis: R RC injury                                         Rehab Codes: T49.061; U54.826; M54.2; M79.1  Onset Date: 2015                                   Next 's appt: 17  Visit# / total visits:  Cancels/No Shows: 0    Subjective:    Pain:  [x] Yes  [] No Location: R UE Pain Rating: (0-10 scale) 3/10 at rest with arm supported; 7-10/10 with moving the arm into flexion really aggravates  Pain altered Tx:  [x] No  [] Yes  Action:  Comments: Patient feels she's had setback, not tolerating her new therex. States she stopped performing. Difficulting cooking, raising right shoulder. Difficulty with laying on right side and sleep is disrupted. Objective:   General listening: forward and L; quiet in sitting;   Modalities:   Precautions: blood thinners; hepatitis C      PROM:  Flexion 100, abduction 100 c pain over supraspinatus in each direction. IR/ER at 0 abduction WNL, 45 degrees abduction pain into IR/ER with passive ROM    Exercises: instructed to do them twice a day.   Exercise Reps/ Time Weight/ Level Comments    SHOULDER extension 10 A Standing, bending over; started 10/2/17    rows 10 A Standing, bending over; started 10/2/17    shoulder flexion, abduction, IR,ER isometrics 5 ea gentle Started 10/2/17             Side ER 20/8 A/1  added 10/11/17 (not today due to Pain: 7/10 R shoulder  [x] ? ROM: R shoulder abduction with pain  [x] ? Strength: mild loss of strength R shoulder; poor scapular stability  [x] ? Function:71% affected function (29% of normal)  [x] Other: mm tightness c-spine/traps; significant trigger points     STG: (to be met in 6 treatments)  1. ? Pain: below 6/10 R shoulder: ONGOING 10/11/17   2. ? ROM: at least 100 degrees shoulder abduction   3. ? Strength: be able to do scapular strength ex without exacerbating symptoms: MET 10/11/17  4. ? Function: modify activities to decrease stress to R shoulder: MET 10/11/17  5. Independent with Home Exercise Program: MET 10/11/17   6. Demonstrate Knowledge of fall prevention: MET 10/11/17     LTG: (to be met in 12 treatments)  1. Pain below 5/10  2. Decrease reliance on shoulder injections  3. Have at least 110 degrees shoulder abduction to indicate decreased impingement and improve reaching ability for light objects  4. Improved scapular position within 2 cm of opposite side         Patient goals: \"decreased pain initially then better movement\"     G-CODE     Functional Limitation: carrying, moving and handling objects  Functional Assessment Used: UEFS  Current Status Modifier: CL   Goal Status Modifier: CK    Pt. Education:  [x] Yes  [] No  [x] Reviewed Prior HEP/Ed  Method of Education: [x] Verbal  [] Demo  [x] Written: shoulder ER actively and kinesiotaping information  Comprehension of Education:  [x] Verbalizes understanding. [x] Demonstrates understanding. [x] Needs review. [] Demonstrates/verbalizes HEP/Ed previously given. Plan: [x] Continue per plan of care.    [] Other:      Time In:0800 am            Time Out: 0900    Electronically signed by:  Gilles Dela Cruz PT

## 2017-10-23 ENCOUNTER — APPOINTMENT (OUTPATIENT)
Dept: PHYSICAL THERAPY | Facility: CLINIC | Age: 67
End: 2017-10-23
Payer: MEDICARE

## 2017-10-25 ENCOUNTER — HOSPITAL ENCOUNTER (OUTPATIENT)
Dept: PHYSICAL THERAPY | Facility: CLINIC | Age: 67
Setting detail: THERAPIES SERIES
Discharge: HOME | End: 2017-10-25
Payer: MEDICARE

## 2017-10-25 PROCEDURE — 97140 MANUAL THERAPY 1/> REGIONS: CPT

## 2017-10-25 NOTE — FLOWSHEET NOTE
[] Danyel Nikolski       Outpatient Physical        Therapy       [address]       Phone: [phone]       Fax: [phone] [x] [facility] at [address] [facility]       Phone: [phone]       Fax: [phone] [] Bre. [facility]5 [facility] Health Promotion  [address] [place] [address]   Phone: [phone]   Fax:  [phone]     Physical Therapy Daily Treatment Note    Date:  10/25/2017  Patient Name:  Homero Aguilar    :  1950  MRN: [id number]  Physician: Antonella Qureshi MD                                   Insurance: Medicare  Medical Diagnosis: R RC injury                                         Rehab Codes: Z35.842; X53.386; M54.2; M79.1  Onset Date: 2015                                   Next 's appt: 17  Visit# / total visits:  Cancels/No Shows: 0    Subjective:    Pain:  [x] Yes  [] No Location: R UE Pain Rating: (0-10 scale) 8/10   Pain altered Tx:  [x] No  [] Yes  Action:  Comments: patient with pain in R shoulder down to hand and neck and L shoulder; patient with pain at rest and has severly decreased activity    Objective:   Not today: General listening: forward and L; quiet in sitting;   Modalities: none today  Precautions: blood thinners; hepatitis C 2008     PROM:  Flexion 100, abduction 100 c pain over supraspinatus in each direction. IR/ER at 0 abduction WNL, 45 degrees abduction pain into IR/ER with passive ROM    Exercises: instructed to do them twice a day.   Exercise Reps/ Time Weight/ Level Comments    SHOULDER extension 10 A Standing, bending over; started 10/2/17    rows 10 A Standing, bending over; started 10/2/17    shoulder flexion, abduction, IR,ER isometrics 5 ea gentle Started 10/2/17             Side ER 20/8 A/1  added 10/11/17 (not today due to increase symptoms recently)   Other:   DN:prepped area with alcohol wipe, consent obtained, standard procedure followed: 48 total: 4 B RC insertion 1 \"; 2\" in prox supraspinatus; ; 2 2\" bilat scapula longitudinally;  1\" suprascapular homeostatic point; 1-3 B 1\" in dorsal scapular ; 1\" needles in cervical and thoracic paraspinals plus greater occipital homeostatic points bilaterally to T6 level;  Mid upper trapezius B side with 1\" needles; Lateral antebrachial cutaneous, deep radial and superficial radial homeostatic points using 1\" needles; left in situ 15 min    Not today: MFR: in seated, MFR to bilat UTs, rhomboids, middle traps, infra and supraspinatus, proximal bicep, deltoid, cervical paraspinals     Kinesiotape: unloading B UT at the end of the session and mechanical correction of R proximal humerus    Not today: Discussed fall prevention: patient has Aneta/echo and is able to call anyone if she were to fall at home; patient also carries a phone with her at all times; Specific Instructions for next treatment: scapular strengthening; PROM R shoulder; DN R shoulder, UE, cervical, scapula; MET to thoracic region; modalities as needed         Treatment Charges: Mins Units   []  Modalities     []  Ther Exercise     [x]  Manual Therapy 50 3   []  Ther Activities     []  Aquatics     []  Vasocompression     []  Other     Total Treatment time 50 3   Medicare tracking PREVIOUS THERAPY FOR 2017: 1971.13(+ previous PT at [facility]. [id number]) (3CLI02.81) (3man73.19)(2man/ex75.55)(2manual aqyfzze13.01) as of 10/25/17 = $2774.95kx    Assessment: [x] Progressing toward goals:  Patient felt immediate relief with mechanical correction of R shoulder using kinesiotape. Assess effectiveness of DN next session. [] No change. [] Other:    Problems:    [x] ? Pain: 7/10 R shoulder  [x] ? ROM: R shoulder abduction with pain  [x] ? Strength: mild loss of strength R shoulder; poor scapular stability  [x] ? Function:71% affected function (29% of normal)  [x] Other: mm tightness c-spine/traps; significant trigger points     STG: (to be met in 6 treatments)  1. ?

## 2017-10-30 ENCOUNTER — HOSPITAL ENCOUNTER (OUTPATIENT)
Dept: PHYSICAL THERAPY | Facility: CLINIC | Age: 67
Setting detail: THERAPIES SERIES
Discharge: HOME | End: 2017-10-30
Payer: MEDICARE

## 2017-10-30 PROCEDURE — 97140 MANUAL THERAPY 1/> REGIONS: CPT

## 2017-11-01 ENCOUNTER — HOSPITAL ENCOUNTER (OUTPATIENT)
Dept: PHYSICAL THERAPY | Facility: CLINIC | Age: 67
Setting detail: THERAPIES SERIES
Discharge: HOME | End: 2017-11-01
Payer: MEDICARE

## 2017-11-01 PROCEDURE — 97140 MANUAL THERAPY 1/> REGIONS: CPT

## 2017-11-01 NOTE — FLOWSHEET NOTE
[] Trev Pimentel       Outpatient Physical        Therapy       [address]       Phone: [phone]       Fax: [phone] [x] [facility] at [address] [facility]       Phone: [phone]       Fax: [phone] [] [facility]. [address] [place] [address] Promotion  [address] [facility]   Phone: [phone]   Fax:  [phone]     Physical Therapy Daily Treatment Note    Date:  2017  Patient Name:  Jacobo Gomez    :  1950  MRN: [id number]  Physician: Linette Campbell MD                                   Insurance: Medicare  Medical Diagnosis: R RC injury                                         Rehab Codes: D92.172; J88.165; M54.2; M79.1  Onset Date: 2015                                   Next 's appt: 17  Visit# / total visits:  Cancels/No Shows: 0    Subjective:    Pain:  [x] Yes  [] No Location: R UE Pain Rating: (0-10 scale) 0/10   Pain altered Tx:  [x] No  [] Yes  Action:  Comments: patient has been doing well; patient unable to do ER more than 5 reps but able to complete all other exercises; patient had no pain until she opened the door too quickly; patient with pain with lifting, but none at rest at the moment. Patient able to sleep last night without a problem. No meds for pain control. Objective:   Not today: General listening: forward and L; quiet in sitting;   Modalities: none today  Precautions: blood thinners; hepatitis C 2008     Not today: PROM:  Flexion 100, abduction 100 c pain over supraspinatus in each direction.   IR/ER at 0 abduction WNL, 45 degrees abduction pain into IR/ER with passive ROM    Exercises: pt does what she can at home  Exercise Reps/ Time Weight/ Level Comments    SHOULDER extension 10 A Standing, bending over; started 10/2/17    rows 10 A Standing, bending over; started 10/2/17    shoulder flexion, abduction, IR,ER isometrics 5 ea gentle Started 10/2/17             Side ER 20/8 A/1  added 10/11/17 (not today due to increase symptoms recently)   Other:   DN:prepped area with alcohol wipe, consent obtained, standard procedure followed: 51 total: 5 R, 2 L RC insertion 0.5 \"; 0.5\" in prox R supraspinatus; ; 1 2\" bilat scapula longitudinally;  2\" B suprascapular homeostatic point; 3 in L and 2 R dorsal scapular homeostatic point  ; 0.5\" needles in cervical and thoracic paraspinals plus greater occipital homeostatic points bilaterally to T6 level; T6 homeostatic point with 2\" needle; left in situ 15 min; 1 2\" used for R UT with piston treatment    Not today: MFR: in seated, MFR to bilat UTs, rhomboids, middle traps, infra and supraspinatus, proximal bicep, deltoid, cervical paraspinals     Kinesiotape: unloading B UT at the end of the session and mechanical correction of R proximal humerus    Not today: Discussed fall prevention: patient has Aneat/echo and is able to call anyone if she were to fall at home; patient also carries a phone with her at all times; Specific Instructions for next treatment: G-CODES; scapular strengthening; PROM R shoulder; DN R shoulder, UE, cervical, scapula; MET to thoracic region; modalities as needed         Treatment Charges: Mins Units   []  Modalities     []  Ther Exercise     [x]  Manual Therapy 45 3   []  Ther Activities     []  Aquatics     []  Vasocompression     []  Other     Total Treatment time 45 3   Medicare tracking PREVIOUS THERAPY FOR 2017: 1971.13(+ previous PT at [facility]. GBZNV979.64) (9OXM97.07) (3man73.19)(2man/ex75.55)(2manual uapxelj71.01) as of 11/1/17 = $2921.33kx    Assessment: [x] Progressing toward goals:  Improved pain level; partly could be due to injection received on 10/30/17  [] No change. [] Other:    Problems:    [x] ? Pain: 7/10 R shoulder  [x] ? ROM: R shoulder abduction with pain  [x] ? Strength: mild loss of strength R shoulder; poor scapular stability  [x] ?  Function:71% affected function (29% of normal)  [x] Other:

## 2017-11-06 ENCOUNTER — APPOINTMENT (OUTPATIENT)
Dept: PHYSICAL THERAPY | Facility: CLINIC | Age: 67
End: 2017-11-06
Payer: MEDICARE

## 2017-11-08 ENCOUNTER — HOSPITAL ENCOUNTER (OUTPATIENT)
Dept: PHARMACY | Age: 67
Setting detail: THERAPIES SERIES
Discharge: HOME | End: 2017-11-08
Payer: MEDICARE

## 2017-11-08 ENCOUNTER — HOSPITAL ENCOUNTER (OUTPATIENT)
Dept: PHYSICAL THERAPY | Facility: CLINIC | Age: 67
Setting detail: THERAPIES SERIES
Discharge: HOME | End: 2017-11-08
Payer: MEDICARE

## 2017-11-08 ENCOUNTER — APPOINTMENT (OUTPATIENT)
Dept: PHARMACY | Age: 67
End: 2017-11-08
Payer: MEDICARE

## 2017-11-08 DIAGNOSIS — I48.91 ATRIAL FIBRILLATION, UNSPECIFIED TYPE (HCC): ICD-10-CM

## 2017-11-08 LAB
INR BLD: 3.2
PROTIME: 38.6 SECONDS

## 2017-11-08 PROCEDURE — G8984 CARRY CURRENT STATUS: HCPCS

## 2017-11-08 PROCEDURE — 97140 MANUAL THERAPY 1/> REGIONS: CPT

## 2017-11-08 PROCEDURE — G8985 CARRY GOAL STATUS: HCPCS

## 2017-11-08 PROCEDURE — 85610 PROTHROMBIN TIME: CPT

## 2017-11-08 PROCEDURE — 99211 OFF/OP EST MAY X REQ PHY/QHP: CPT

## 2017-11-08 NOTE — FLOWSHEET NOTE
RC insertion 0.5 \"; 2\" in prox R supraspinatus; ; 1 2\" R scapula longitudinally;  2\" B suprascapular homeostatic point ; 0.5\" needles in cervical and thoracic paraspinals plus greater occipital homeostatic points bilaterally to T6 level; R lateral antebrachial cutaneous and both radial nn homeostatic points;; left in situ 15 min; 1 2\" used for R UT with piston treatment    Not today: MFR: in seated, MFR to bilat UTs, rhomboids, middle traps, infra and supraspinatus, proximal bicep, deltoid, cervical paraspinals     Kinesiotape: unloading R UT at the end of the session and mechanical correction of R proximal humerus    Not today: Discussed fall prevention: patient has Aneta/echo and is able to call anyone if she were to fall at home; patient also carries a phone with her at all times; Specific Instructions for next treatment:     scapular strengthening; PROM R shoulder; DN R shoulder, UE, cervical, scapula; MET to thoracic region; modalities as needed         Treatment Charges: Mins Units   []  Modalities     []  Ther Exercise     [x]  Manual Therapy 45 3   []  Ther Activities     []  Aquatics     []  Vasocompression     []  Other     Total Treatment time 45 3   Medicare tracking PREVIOUS THERAPY FOR 2017: 1971.13(+ previous PT at [facility]. RUMFW775.21) (9ETY78.64) (3man73.19)(2man/ex75.55)(2manual mvhoqqw78.01) as of 11/8/17 = $2994.52kx    Assessment: [x] Progressing toward goals:does well with DN giving patient pain relief  [] No change. [] Other:    Problems:    [x] ? Pain: 7/10 R shoulder  [x] ? ROM: R shoulder abduction with pain  [x] ? Strength: mild loss of strength R shoulder; poor scapular stability  [x] ?  Function:71% affected function (29% of normal)  [x] Other: mm tightness c-spine/traps; significant trigger points     STG: (to be met in 6 treatments)  1. ? Pain: below 6/10 R shoulder: ONGOING 10/11/17   2. ? ROM: at least 100 degrees shoulder abduction   3. ? Strength: be able to do scapular strength

## 2017-11-08 NOTE — PROGRESS NOTES
Patient states may have missed some doses. No bleeding or thromboembolic side effects noted. No significant med or dietary changes. No significant recent illness or disease state changes. PT/INR done in office per protocol. INR is 3.2 which is just above goal.     Warfarin regimen will be continued at current dose of 5mg Mon/Fri, 2.5mg all other days. Will retest in 4 weeks. Patient understands dosing directions and information discussed. Dosing schedule and follow up appointment given to patient. Progress note routed to referring physicians office.

## 2017-11-13 ENCOUNTER — HOSPITAL ENCOUNTER (OUTPATIENT)
Dept: PHYSICAL THERAPY | Facility: CLINIC | Age: 67
Setting detail: THERAPIES SERIES
Discharge: HOME | End: 2017-11-13
Payer: MEDICARE

## 2017-11-13 PROCEDURE — 97140 MANUAL THERAPY 1/> REGIONS: CPT

## 2017-11-13 NOTE — PROGRESS NOTES
[] [facility]       Outpatient Physical                Therapy         [address]       Phone: [phone]       Fax: [phone] [x] [facility] at [address]       Phone: [phone]       Fax: [phone] [] [place]. [address] [place] [address]     [address] [facility]     Phone: [phone]     Fax:  [phone]     Physical Therapy Progress Note    Date: 2017      Patient: Nancy Quinteros  : 1950  MRN: [id number]    Physician: CADEN Hernandez                                   Insurance: Medicare  Medical Diagnosis: R RC injury                                         Rehab Codes: M25.511; U52.054; M54.2; M79.1  Onset Date: 2015                                   Next 's appt:   Visit# / total visits:         Cancels/No Shows: 0         Subjective:  Pain:  [x] Yes  [] No          Location: R UE            Pain Rating: (0-10 scale) 0-8/10 sharp pain if she reaches too fast  Pain altered Tx:  [x] No  [] Yes  Action:  Comments: this is the best it has felt in a long time; patient working on HEP; careful with \"can\" exercises as it can increase pain       Objective:  Test Measurements: R shoulder: AROM 17: 110 degrees flexion; abduction 105 degrees active; Function: UEFS: 30% of normal/70% affected    Assessment:  STG: (to be met in 6 treatments)  1. ? Pain: below 6/10 R shoulder: ONGOING 10/11/17   2. ? ROM: at least 100 degrees shoulder abduction   3. ? Strength: be able to do scapular strength ex without exacerbating symptoms: MET 10/11/17  4. ? Function: modify activities to decrease stress to R shoulder: MET 10/11/17  5. Independent with Home Exercise Program: MET 10/11/17   6. Demonstrate Knowledge of fall prevention: MET 10/11/17     LTG: (to be met in 12 treatments) ONGOING: continue goals x 12 visits  1. Pain below 5/10  2.  Decrease reliance on shoulder injections  3. Have at least 110 degrees shoulder abduction to indicate decreased impingement and improve reaching ability for light objects  4. Improved scapular position within 2 cm of opposite side         Patient goals: \"decreased pain initially then better movement\"    Treatment to Date:  [x] Therapeutic Exercise    [] Modalities:  [x] Therapeutic Activity    [] Ultrasound  [] Electrical Stimulation  [] Gait Training     [] Massage       [] Lumbar/Cervical Traction  [] Neuromuscular Re-education [] Cold/hotpack [] Iontophoresis: 4 mg/mL  [x] Instruction in Home Exercise Program                     Dexamethasone Sodium  [x] Manual Therapy             Phosphate 40-80 mAmin  [] Aquatic Therapy                   [] Vasocompression/    [x] Other: DRY NEEDLING            Game Ready    Patient Status:     [] Continue per initial plan of care. [x] Additional visits necessary. [] Other:     Requested Frequency/Duration: 2 times per week for 12 treatments. Electronically signed by Savage Francis PT on 11/13/2017 at 8:30 AM      If you have any questions or concerns, please don't hesitate to call. Thank you for your referral.    Physician Signature:________________________________Date:__________________  By signing above or cosigning this note, I have reviewed this plan of care and certify a need for medically necessary rehabilitation services.      *PLEASE SIGN ABOVE AND FAX BACK ALL PAGES*

## 2017-11-13 NOTE — FLOWSHEET NOTE
[] Tennis Broward       Outpatient Physical        Therapy       [address]       Phone: [phone]       Fax: [phone] [x] [facility] at [address] [facility]       Phone: [phone]       Fax: [phone] [] Susie Salvador [facility]otion  [address] [facility]   Phone: [phone]   Fax:  [phone]     Physical Therapy Daily Treatment Note    Date:  2017  Patient Name:  Hudson Joya    :  1950  MRN: [id number]  Physician: Lisa Castellanos MD                                   Insurance: Medicare  Medical Diagnosis: R RC injury                                         Rehab Codes: F59.522; E10.526; M54.2; M79.1  Onset Date: 2015                                   Next 's appt:   Visit# / total visits:  Cancels/No Shows: 0    Subjective:    Pain:  [x] Yes  [] No Location: R UE Pain Rating: (0-10 scale) 0-8/10 sharp pain if she reaches too fast  Pain altered Tx:  [x] No  [] Yes  Action:  Comments: this is the best it has felt in a long time; patient working on HEP; careful with \"can\" exercises as it can increase pain    Objective: patient with bruise R lateral elbow from last week's treatment (17)  Not today: General listening: forward and L; quiet in sitting;   Modalities: none today  Precautions: blood thinners; hepatitis C 2008     Not today: PROM:  Flexion 100, abduction 100 c pain over supraspinatus in each direction. IR/ER at 0 abduction WNL, 45 degrees abduction pain into IR/ER with passive ROM  AROM 17: R shoulder: 110 degrees flexion; abduction 105 degrees active;    Exercises: pt does what she can at home  Exercise Reps/ Time Weight/ Level Comments    SHOULDER extension 10 A Standing, bending over; started 10/2/17    rows 10 A Standing, bending over; started 10/2/17    shoulder flexion, abduction, IR,ER isometrics 5 ea gentle Started 10/2/17             Side ER 20/8 A/1  added 10/11/17 (not today due to increase symptoms recently)   Other:   11/8/17: UEFS: 30% of normal/70% affected  DN:prepped area with alcohol wipe, consent obtained, standard procedure followed: 34 total: 4 R, 3 L RC insertion 0.5 \";   2\" B suprascapular homeostatic point ; 0.5\" needles in cervical and thoracic paraspinals plus greater occipital homeostatic points bilaterally to T6 level; T6 homeostatic point with 2\"; bilateral greater auricular homeostatic points with 0.5\" needles;  left in situ 15 min; Not today: MFR: in seated, MFR to bilat UTs, rhomboids, middle traps, infra and supraspinatus, proximal bicep, deltoid, cervical paraspinals     Not today: Kinesiotape: unloading R UT at the end of the session and mechanical correction of R proximal humerus    Not today: Discussed fall prevention: patient has Aneta/echo and is able to call anyone if she were to fall at home; patient also carries a phone with her at all times; Specific Instructions for next treatment:     scapular strengthening; PROM R shoulder; DN R shoulder, UE, cervical, scapula; MET to thoracic region; modalities as needed         Treatment Charges: Mins Units   []  Modalities     []  Ther Exercise     [x]  Manual Therapy 30 2   []  Ther Activities     []  Aquatics     []  Vasocompression     []  Other     Total Treatment time 30 2   Medicare tracking PREVIOUS THERAPY FOR 2017: 1971.13(+ previous PT at [facility]. [id number]) (1UNO36.18) (3man73.19)(2man/ex75.55)(2manual qxmfcvd86.01) as of 11/13/17 = $3045.53kx    Assessment: [x] Progressing toward goals:does well with DN giving patient pain relief  [] No change. [] Other:    Problems:    [x] ? Pain: 7/10 R shoulder  [x] ? ROM: R shoulder abduction with pain  [x] ? Strength: mild loss of strength R shoulder; poor scapular stability  [x] ?  Function:71% affected function (29% of normal)  [x] Other: mm tightness c-spine/traps; significant trigger points     STG: (to be met in 6 treatments)  1. ? Pain: below 6/10 R shoulder: ONGOING 10/11/17   2. ? ROM: at least 100 degrees shoulder abduction   3. ? Strength: be able to do scapular strength ex without exacerbating symptoms: MET 10/11/17  4. ? Function: modify activities to decrease stress to R shoulder: MET 10/11/17  5. Independent with Home Exercise Program: MET 10/11/17   6. Demonstrate Knowledge of fall prevention: MET 10/11/17     LTG: (to be met in 12 treatments)  1. Pain below 5/10  2. Decrease reliance on shoulder injections  3. Have at least 110 degrees shoulder abduction to indicate decreased impingement and improve reaching ability for light objects  4. Improved scapular position within 2 cm of opposite side         Patient goals: \"decreased pain initially then better movement\"     G-Codes: set on 11/8/17, visit #10  Functional Limitation: UEFS  Functional Assessment Used:   Current Status Modifier: CL  Goal Status Modifier: CK      G-CODE     Functional Limitation: carrying, moving and handling objects  Functional Assessment Used: UEFS  Current Status Modifier: CL   Goal Status Modifier: CK    Pt. Education:  [x] Yes  [] No  [x] Reviewed Prior HEP/Ed: HOLD ex that are painful  Method of Education: [x] Verbal  [x] Demo  [] Written:   Comprehension of Education:  [x] Verbalizes understanding. [] Demonstrates understanding. [] Needs review. [] Demonstrates/verbalizes HEP/Ed previously given. Plan: [x] Continue per plan of care.    [x] Other: send MD note to continue therapy      Time In: 0800 am            Time Out: 9:00 am    Electronically signed by:  Laith Olson PT

## 2017-11-15 ENCOUNTER — HOSPITAL ENCOUNTER (OUTPATIENT)
Dept: PHYSICAL THERAPY | Facility: CLINIC | Age: 67
Setting detail: THERAPIES SERIES
Discharge: HOME | End: 2017-11-15
Payer: MEDICARE

## 2017-11-15 NOTE — FLOWSHEET NOTE
[] Kavya Conley        Outpatient Physical                Therapy       [address]       Phone: [phone]       Fax: [phone] [x] [facility] at [address] [facility]       Phone: [phone]       Fax: [phone] [] Bre.  [facility]      Phone: [phone]      Fax:  [phone]     Physical Therapy Cancel/No Show note    Date: 11/15/2017  Patient: Mingo Cherry  : 1950  MRN: [id number]    Cancels/No Shows to date: 1 cx    For today's appointment patient:  [x]  Cancelled  []  Rescheduled appointment  []  No-show     Reason given by patient:  [x]  Patient ill  []  Conflicting appointment  []  No transportation    []  Conflict with work  []  No reason given  []  Weather related  []  Other:      Comments:      [x]  Next appointment was confirmed 17 at 8:00 am    Electronically signed by: Kenia Hair, PT

## 2017-11-27 ENCOUNTER — HOSPITAL ENCOUNTER (OUTPATIENT)
Dept: PHYSICAL THERAPY | Facility: CLINIC | Age: 67
Setting detail: THERAPIES SERIES
Discharge: HOME | End: 2017-11-27
Payer: MEDICARE

## 2017-11-27 PROCEDURE — 97140 MANUAL THERAPY 1/> REGIONS: CPT

## 2017-11-27 NOTE — FLOWSHEET NOTE
flexion, abduction, IR,ER isometrics 5 ea gentle Started 10/2/17             Side ER 20/8 A/1  added 10/11/17 (not today due to increase symptoms recently)   Other:   11/8/17: UEFS: 30% of normal/70% affected  DN:prepped area with alcohol wipe, consent obtained, standard procedure followed: 28 total: 3 R, RC insertion with 0.5\" ;   1\" R suprascapular homeostatic point ; 1.0\" needles in cervical and thoracic paraspinalsbilaterally to T6 level;  2\" longitudinally in infraspinatus; 1\" in dorsal scapular homeostatic point. left in situ 15 min; Not today: MFR: in seated, MFR to bilat UTs, rhomboids, middle traps, infra and supraspinatus, proximal bicep, deltoid, cervical paraspinals      Kinesiotape: unloading R UT at the end of the session and mechanical correction of R proximal humerus    Not today: Discussed fall prevention: patient has Aneta/echo and is able to call anyone if she were to fall at home; patient also carries a phone with her at all times; Specific Instructions for next treatment:     scapular strengthening; PROM R shoulder; DN R shoulder, UE, cervical, scapula; MET to thoracic region; modalities as needed         Treatment Charges: Mins Units   []  Modalities     []  Ther Exercise     [x]  Manual Therapy 30 2   []  Ther Activities     []  Aquatics     []  Vasocompression     []  Other     Total Treatment time 30 2   Medicare tracking PREVIOUS THERAPY FOR 2017: 1971.13(+ previous PT at [facility]. [id number]) (4BBU74.60) (3man73.19)(2man/ex75.55)(2manual fjskxxq28.01) as of 11/27/17 = $0590.90KY    Assessment: [x] Progressing toward goals:does well with DN giving patient pain relief  [] No change. [] Other:    Problems:    [x] ? Pain: 7/10 R shoulder  [x] ? ROM: R shoulder abduction with pain  [x] ? Strength: mild loss of strength R shoulder; poor scapular stability  [x] ?  Function:71% affected function (29% of normal)  [x] Other: mm tightness c-spine/traps; significant trigger points     STG: (to

## 2017-11-29 ENCOUNTER — HOSPITAL ENCOUNTER (OUTPATIENT)
Dept: PHYSICAL THERAPY | Facility: CLINIC | Age: 67
Setting detail: THERAPIES SERIES
Discharge: HOME | End: 2017-11-29
Payer: MEDICARE

## 2017-11-29 PROCEDURE — 97140 MANUAL THERAPY 1/> REGIONS: CPT

## 2017-11-29 NOTE — FLOWSHEET NOTE
[] Anibal Garcia       Outpatient Physical        Therapy       [address]       Phone: [phone]       Fax: [phone] [x] [facility] at [address] [facility]       Phone: [phone]       Fax: [phone] [] Bre. [address] [place] [address] Health Promotion  [address] [place] [address]   Phone: [phone]   Fax:  [phone]     Physical Therapy Daily Treatment Note    Date:  2017  Patient Name:  Brenda Anderson    :  1950  MRN: [id number]  Physician: Fransisco Goldberg MD                                   Insurance: Medicare  Medical Diagnosis: R RC injury                                         Rehab Codes: N60.863; T46.596; M54.2; M79.1  Onset Date: 2015                                   Next 's appt:   Visit# / total visits: 13 Cancels/No Shows: 1 cx    Subjective:    Pain:  [x] Yes  [] No Location: R UE Pain Rating: (0-10 scale) 6/10 sharp pain if she reaches too fast  Pain altered Tx:  [x] No  [] Yes  Action:  Comments: pt with increased pain R shoulder to neck with movement; she is unsure why it is really bothering her. Patient with referred pain to elbow on R at night. Objective: patient with bruise R lateral elbow from last week's treatment (17)  Not today: General listening: forward and L; quiet in sitting;   Modalities: none today  Precautions: blood thinners; hepatitis C 2008     Not today: PROM:  Flexion 100, abduction 100 c pain over supraspinatus in each direction. IR/ER at 0 abduction WNL, 45 degrees abduction pain into IR/ER with passive ROM  AROM 17: R shoulder: 110 degrees flexion; abduction 105 degrees active;    Exercises: pt does what she can at home  Exercise Reps/ Time Weight/ Level Comments    SHOULDER extension 10 A Standing, bending over; started 10/2/17    rows 10 A Standing, bending over; started 10/2/17    shoulder flexion, abduction, IR,ER isometrics 5 ea gentle Started 10/2/17           Side ER 20/8 A/1  added 10/11/17 (not today due to increase symptoms recently)   Other:   11/8/17: UEFS: 30% of normal/70% affected  DN:prepped area with alcohol wipe, consent obtained, standard procedure followed: 44 total: 3 R, RC insertion with 1\", 2 in L; R 1\" in deltoid; 1\" R lateral cutaneous;,R 1\" radial ;   1\" R suprascapular homeostatic point ; 1.0\" needles in cervical and thoracic paraspinalsbilaterally bilaterally to T6 level;  2\" suprascapular bilaterally; 1\" in dorsal scapular homeostatic point. left in situ 15 min; 1 1\" in R UT with pistoning    Not today: MFR: in seated, MFR to bilat UTs, rhomboids, middle traps, infra and supraspinatus, proximal bicep, deltoid, cervical paraspinals     Kinesiotape: unloading R UT at the end of the session and mechanical correction of R proximal humerus; also Y strip support to c-spine this date    Not today: Discussed fall prevention: patient has Aneta/echo and is able to call anyone if she were to fall at home; patient also carries a phone with her at all times; Specific Instructions for next treatment:     scapular strengthening; PROM R shoulder; DN R shoulder, UE, cervical, scapula; MET to thoracic region; modalities as needed         Treatment Charges: Mins Units   []  Modalities     []  Ther Exercise     [x]  Manual Therapy 40 3   []  Ther Activities     []  Aquatics     []  Vasocompression     []  Other     Total Treatment time 40 3   Medicare tracking PREVIOUS THERAPY FOR 2017: 1971.13(+ previous PT at [facility]. [id number]) (6AIM78.47) (3man73.19)(2man/ex75.55)(2manual fnogwir76.01) as of 11/29/17 = $3169.73kx    Assessment: [] Progressing toward goals:  [x] No change. Having a flare-up; no ill-effects from DN     [] Other:    Problems:    [x] ? Pain: 7/10 R shoulder  [x] ? ROM: R shoulder abduction with pain  [x] ? Strength: mild loss of strength R shoulder; poor scapular stability  [x] ?  Function:71% affected function (29% of normal)  [x] Other: mm tightness c-spine/traps; significant trigger points     STG: (to be met in 6 treatments)  1. ? Pain: below 6/10 R shoulder: ONGOING 10/11/17   2. ? ROM: at least 100 degrees shoulder abduction   3. ? Strength: be able to do scapular strength ex without exacerbating symptoms: MET 10/11/17  4. ? Function: modify activities to decrease stress to R shoulder: MET 10/11/17  5. Independent with Home Exercise Program: MET 10/11/17   6. Demonstrate Knowledge of fall prevention: MET 10/11/17     LTG: (to be met in 12 treatments)  1. Pain below 5/10  2. Decrease reliance on shoulder injections  3. Have at least 110 degrees shoulder abduction to indicate decreased impingement and improve reaching ability for light objects  4. Improved scapular position within 2 cm of opposite side         Patient goals: \"decreased pain initially then better movement\"     G-Codes: set on 11/8/17, visit #10  Functional Limitation: UEFS  Functional Assessment Used:   Current Status Modifier: CL  Goal Status Modifier: CK      G-CODE     Functional Limitation: carrying, moving and handling objects  Functional Assessment Used: UEFS  Current Status Modifier: CL   Goal Status Modifier: CK    Pt. Education:  [] Yes  [] No  [x] Reviewed Prior HEP/Ed: HOLD ex that are painful  Method of Education: [x] Verbal  [] Demo  [] Written:   Comprehension of Education:  [x] Verbalizes understanding. [] Demonstrates understanding. [] Needs review. [] Demonstrates/verbalizes HEP/Ed previously given. Plan: [x] Continue per plan of care.    [x] Other: waiting for script to be returned to continue therapy      Time In: 8:00 am            Time Out: 8:55 am    Electronically signed by:  Tk Gautam PT

## 2017-12-04 ENCOUNTER — HOSPITAL ENCOUNTER (OUTPATIENT)
Dept: PHYSICAL THERAPY | Facility: CLINIC | Age: 67
Setting detail: THERAPIES SERIES
Discharge: HOME | End: 2017-12-04
Payer: MEDICARE

## 2017-12-04 PROCEDURE — 97140 MANUAL THERAPY 1/> REGIONS: CPT

## 2017-12-04 NOTE — FLOWSHEET NOTE
[] CHARISSE [facility]       Outpatient Physical        Therapy       [address]       Phone: [phone]       Fax: [phone] [x] [facility] at [address] [facility]       Phone: [phone]       Fax: [phone] [] Susie Salvador [facility] Health Promotion  [address] [facility]   Phone: [phone]   Fax:  [phone]     Physical Therapy Daily Treatment Note    Date:  2017  Patient Name:  Hudson Joya    :  1950  MRN: [id number]  Physician: Lisa Castellanos MD                                   Insurance: Medicare  Medical Diagnosis: R RC injury                                         Rehab Codes: Q28.054; D32.267; M54.2; M79.1  Onset Date: 2015                                   Next 's appt:   Visit# / total visits:  (with updated script) Cancels/No Shows: 1 cx    Subjective:    Pain:  [x] Yes  [] No Location: R UE Pain Rating: (0-10 scale) 2/10 with movement; 0/10 at rest  Pain altered Tx:  [x] No  [] Yes  Action:  Comments: pt had a really good weekend re pain; patient did take it easy. Taping does help with support. Patient doing HEP. Objective: patient with bruise R lateral elbow from last week's treatment (17)  Not today: General listening: forward and L; quiet in sitting;   Modalities: none today  Precautions: blood thinners; hepatitis C 2008     Not today: PROM:  Flexion 100, abduction 100 c pain over supraspinatus in each direction. IR/ER at 0 abduction WNL, 45 degrees abduction pain into IR/ER with passive ROM  AROM 17: R shoulder: 110 degrees flexion; abduction 105 degrees active;    Exercises: pt does what she can at home  Exercise Reps/ Time Weight/ Level Comments    SHOULDER extension 10 A Standing, bending over; started 10/2/17    rows 10 A Standing, bending over; started 10/2/17    shoulder flexion, abduction, IR,ER isometrics 5 ea gentle Started 10/2/17             Side ER

## 2017-12-05 ENCOUNTER — HOSPITAL ENCOUNTER (OUTPATIENT)
Dept: PHARMACY | Age: 67
Setting detail: THERAPIES SERIES
Discharge: HOME | End: 2017-12-05
Payer: MEDICARE

## 2017-12-05 DIAGNOSIS — I48.91 ATRIAL FIBRILLATION, UNSPECIFIED TYPE (HCC): ICD-10-CM

## 2017-12-05 LAB
INR BLD: 4.3
PROTIME: 51.1 SECONDS

## 2017-12-05 PROCEDURE — 99211 OFF/OP EST MAY X REQ PHY/QHP: CPT

## 2017-12-05 PROCEDURE — 85610 PROTHROMBIN TIME: CPT

## 2017-12-05 NOTE — PROGRESS NOTES
Patient states compliant all of the time with regimen. No bleeding or thromboembolic side effects noted. No significant recent illness or disease state changes. She states she had finished a course of antibiotic therapy for respiratory infection about a week but forgot to notify this clinic and couldn't remember the name of the medication. She states she is still having a productive cough and her appetite is less than usual.    PT/INR done in office per protocol. INR is 4.3 which is supratherapeutic. Warfarin regimen will be held for 2 days, then resume usual regimen of 5mg Mon/Fri, 2.5mg all other days. Will retest in 6 days. May need to decrease regimen at that time if her appetite is still decreased. Patient understands dosing directions and information discussed. Dosing schedule and follow up appointment given to patient. Progress note routed to referring physicians office.

## 2017-12-06 ENCOUNTER — HOSPITAL ENCOUNTER (OUTPATIENT)
Dept: PHYSICAL THERAPY | Facility: CLINIC | Age: 67
Setting detail: THERAPIES SERIES
Discharge: HOME | End: 2017-12-06
Payer: MEDICARE

## 2017-12-06 PROCEDURE — 97140 MANUAL THERAPY 1/> REGIONS: CPT

## 2017-12-06 NOTE — FLOWSHEET NOTE
[] [facility]       Outpatient Physical        Therapy       [address]       Phone: [phone]       Fax: [phone] [x] [facility] at [address]       Phone: [phone]       Fax: [phone] [] [facility]. [address] [place] [address] Promotion  [address] [place] [address]   Phone: [phone]   Fax:  [phone]     Physical Therapy Daily Treatment Note    Date:  2017  Patient Name:  Curt Shahid    :  1950  MRN: [id number]  Physician: Otto Palomino MD                                   Insurance: Medicare  Medical Diagnosis: R RC injury                                         Rehab Codes: B48.959; E14.934; M54.2; M79.1  Onset Date: 2015                                   Next 's appt:   Visit# / total visits: 15/24 (with updated script) Cancels/No Shows: 1 cx    Subjective:    Pain:  [x] Yes  [] No Location: R UE Pain Rating: (0-10 scale) 0/10 with movement; 0/10 at rest  Pain altered Tx:  [x] No  [] Yes  Action:  Comments: last night had a sharp pain from neck to R shoulder last night; woke up at night with same pain; lasted 20 minutes; patient without pain this morning, even with moving the R arm; patient has been very mindful of activity with using R arm. Objective: patient with bruise R lateral elbow from last week's treatment (17)  Not today: General listening: forward and L; quiet in sitting;   Modalities: none today  Precautions: blood thinners; hepatitis C 2008     Not today: PROM:  Flexion 100, abduction 100 c pain over supraspinatus in each direction. IR/ER at 0 abduction WNL, 45 degrees abduction pain into IR/ER with passive ROM  AROM 17: R shoulder: 110 degrees flexion; abduction 105 degrees active;    Exercises: pt does what she can at home  Exercise Reps/ Time Weight/ Level Comments    SHOULDER extension 10 A Standing, bending over; started 10/2/17    rows 10 A Standing, abduction with pain  [x] ? Strength: mild loss of strength R shoulder; poor scapular stability  [x] ? Function:71% affected function (29% of normal)  [x] Other: mm tightness c-spine/traps; significant trigger points     STG: (to be met in 6 treatments)  1. ? Pain: below 6/10 R shoulder: ONGOING 10/11/17   2. ? ROM: at least 100 degrees shoulder abduction   3. ? Strength: be able to do scapular strength ex without exacerbating symptoms: MET 10/11/17  4. ? Function: modify activities to decrease stress to R shoulder: MET 10/11/17  5. Independent with Home Exercise Program: MET 10/11/17   6. Demonstrate Knowledge of fall prevention: MET 10/11/17     LTG: (to be met in 12 treatments): as of 12/4/17 NOT YET MET; continue x 12 visits to #24  1. Pain below 5/10  2. Decrease reliance on shoulder injections  3. Have at least 110 degrees shoulder abduction to indicate decreased impingement and improve reaching ability for light objects  4. Improved scapular position within 2 cm of opposite side         Patient goals: \"decreased pain initially then better movement\"     G-Codes: set on 11/8/17, visit #10  Functional Limitation: carrying, moving and handling objects  Functional Assessment Used: UEFS  Current Status Modifier: CL  Goal Status Modifier: CK      G-CODE     Functional Limitation: carrying, moving and handling objects  Functional Assessment Used: UEFS  Current Status Modifier: CL   Goal Status Modifier: CK    Pt. Education:  [] Yes  [x] No  [] Reviewed Prior HEP/Ed: HOLD ex that are painful  Method of Education: [] Verbal  [] Demo  [] Written:   Comprehension of Education:  [] Verbalizes understanding. [] Demonstrates understanding. [] Needs review. [] Demonstrates/verbalizes HEP/Ed previously given. Plan: [x] Continue per plan of care.    [] Other:       Time In: 8:45 am            Time Out: 8:55 am    Electronically signed by:  Alonzo Moreira, PT

## 2017-12-11 ENCOUNTER — HOSPITAL ENCOUNTER (OUTPATIENT)
Dept: PHYSICAL THERAPY | Facility: CLINIC | Age: 67
Setting detail: THERAPIES SERIES
Discharge: HOME | End: 2017-12-11
Payer: MEDICARE

## 2017-12-11 ENCOUNTER — HOSPITAL ENCOUNTER (OUTPATIENT)
Dept: PHARMACY | Age: 67
Setting detail: THERAPIES SERIES
Discharge: HOME | End: 2017-12-11
Payer: MEDICARE

## 2017-12-11 DIAGNOSIS — I48.91 ATRIAL FIBRILLATION, UNSPECIFIED TYPE (HCC): ICD-10-CM

## 2017-12-11 LAB
INR BLD: 3.3
PROTIME: 39.8 SECONDS

## 2017-12-11 PROCEDURE — 85610 PROTHROMBIN TIME: CPT

## 2017-12-11 PROCEDURE — 99211 OFF/OP EST MAY X REQ PHY/QHP: CPT

## 2017-12-11 PROCEDURE — 97140 MANUAL THERAPY 1/> REGIONS: CPT

## 2017-12-11 NOTE — FLOWSHEET NOTE
Function:71% affected function (29% of normal)  [x] Other: mm tightness c-spine/traps; significant trigger points     STG: (to be met in 6 treatments)  1. ? Pain: below 6/10 R shoulder: ONGOING 10/11/17   2. ? ROM: at least 100 degrees shoulder abduction   3. ? Strength: be able to do scapular strength ex without exacerbating symptoms: MET 10/11/17  4. ? Function: modify activities to decrease stress to R shoulder: MET 10/11/17  5. Independent with Home Exercise Program: MET 10/11/17   6. Demonstrate Knowledge of fall prevention: MET 10/11/17     LTG: (to be met in 12 treatments): as of 12/4/17 NOT YET MET; continue x 12 visits to #24  1. Pain below 5/10  2. Decrease reliance on shoulder injections  3. Have at least 110 degrees shoulder abduction to indicate decreased impingement and improve reaching ability for light objects  4. Improved scapular position within 2 cm of opposite side         Patient goals: \"decreased pain initially then better movement\"     G-Codes: set on 11/8/17, visit #10  Functional Limitation: carrying, moving and handling objects  Functional Assessment Used: UEFS  Current Status Modifier: CL  Goal Status Modifier: CK      G-CODE     Functional Limitation: carrying, moving and handling objects  Functional Assessment Used: UEFS  Current Status Modifier: CL   Goal Status Modifier: CK    Pt. Education:  [] Yes  [x] No  [] Reviewed Prior HEP/Ed: HOLD ex that are painful  Method of Education: [] Verbal  [] Demo  [] Written:   Comprehension of Education:  [] Verbalizes understanding. [] Demonstrates understanding. [] Needs review. [] Demonstrates/verbalizes HEP/Ed previously given. Plan: [x] Continue per plan of care.    [] Other:       Time In: 8:00 am            Time Out: 8:55 am    Electronically signed by:  Diana Diop PT

## 2017-12-13 ENCOUNTER — APPOINTMENT (OUTPATIENT)
Dept: PHYSICAL THERAPY | Facility: CLINIC | Age: 67
End: 2017-12-13
Payer: MEDICARE

## 2017-12-18 ENCOUNTER — HOSPITAL ENCOUNTER (OUTPATIENT)
Dept: PHYSICAL THERAPY | Facility: CLINIC | Age: 67
Setting detail: THERAPIES SERIES
Discharge: HOME | End: 2017-12-18
Payer: MEDICARE

## 2017-12-18 PROCEDURE — 97140 MANUAL THERAPY 1/> REGIONS: CPT

## 2017-12-18 PROCEDURE — 97110 THERAPEUTIC EXERCISES: CPT

## 2017-12-18 NOTE — FLOWSHEET NOTE
procedure followed: 26 total: 3 R, RC insertion with 0.5\", 2 1\" in prox supraspinatus bilaterally;   2\" B suprascapular homeostatic point ; 1.0\" needles in cervical and thoracic paraspinalsbilaterally bilaterally to T6 level; 1\" in proximal R UT insertion and greater occipital occipital point. left in situ 15 min;     MFR: in seated, MFR to bilat UTs, rhomboids, middle traps, infra and supraspinatus, proximal bicep, deltoid, cervical paraspinals      Kinesiotape: unloading R UT at the end of the session and mechanical correction of R proximal humerus; not today: also Y strip support to c-spine this date    Not today: Discussed fall prevention: patient has Aneta/echo and is able to call anyone if she were to fall at home; patient also carries a phone with her at all times; Specific Instructions for next treatment: any treatment that will assist with pain control/manual; patient doing HEP - may need review    scapular strengthening; PROM R shoulder; DN R shoulder, UE, cervical, scapula; MET to thoracic region; modalities as needed         Treatment Charges: Mins Units   []  Modalities     [x]  Ther Exercise 15 1   [x]  Manual Therapy 30 2   []  Ther Activities     []  Aquatics     []  Vasocompression     []  Other     Total Treatment time 45 3   Medicare tracking PREVIOUS THERAPY FOR 2017: [place].13(+ [place] [facility] [id number] (4man95.37) (3man73.19)(2man/ex75.55)(2manual rwighjd15.01) (2 man44.36/ex31.19=75.55) as of 12/18/17 = $3398.31kx    Assessment:   [x] Progressing toward goals:  Good STM relief through right upper quarter with STM. Therex  As tolerated, pain free in available range. Reviewed as part of HEP. Pain right shoulder post tx; 0/10 at rest; 7/10 with active movement  [] No change. [] Other:    Problems:    [x] ? Pain: 7/10 R shoulder  [x] ? ROM: R shoulder abduction with pain  [x] ? Strength: mild loss of strength R shoulder; poor scapular stability  [x] ?  Function:71% affected function

## 2017-12-20 ENCOUNTER — HOSPITAL ENCOUNTER (OUTPATIENT)
Dept: PHARMACY | Age: 67
Setting detail: THERAPIES SERIES
Discharge: HOME | End: 2017-12-20
Payer: MEDICARE

## 2017-12-20 ENCOUNTER — HOSPITAL ENCOUNTER (OUTPATIENT)
Dept: PHYSICAL THERAPY | Facility: CLINIC | Age: 67
Setting detail: THERAPIES SERIES
Discharge: HOME | End: 2017-12-20
Payer: MEDICARE

## 2017-12-20 DIAGNOSIS — I48.91 ATRIAL FIBRILLATION, UNSPECIFIED TYPE (HCC): ICD-10-CM

## 2017-12-20 LAB
INR BLD: 1.7
PROTIME: 20.1 SECONDS

## 2017-12-20 PROCEDURE — 99211 OFF/OP EST MAY X REQ PHY/QHP: CPT

## 2017-12-20 PROCEDURE — 85610 PROTHROMBIN TIME: CPT

## 2017-12-20 PROCEDURE — 97110 THERAPEUTIC EXERCISES: CPT

## 2017-12-20 PROCEDURE — 97140 MANUAL THERAPY 1/> REGIONS: CPT

## 2017-12-20 NOTE — FLOWSHEET NOTE
total: 3 R, RC insertion with 0.5\", 2 1\" in prox supraspinatus bilaterally;   2\" B suprascapular homeostatic point ; 1.0\" needles in cervical and thoracic paraspinalsbilaterally bilaterally to T6 level; 1\" in proximal R UT insertion and greater occipital occipital point. left in situ 15 min;     MFR: in seated, MFR to bilat UTs, rhomboids, middle traps, infra and supraspinatus, proximal bicep, deltoid, cervical paraspinals      Kinesiotape: unloading R UT at the end of the session and mechanical correction of R proximal humerus; Not today: Discussed fall prevention: patient has Aneta/echo and is able to call anyone if she were to fall at home; patient also carries a phone with her at all times; Specific Instructions for next treatment: any treatment that will assist with pain control/manual; patient doing HEP - may need review    scapular strengthening; PROM R shoulder; DN R shoulder, UE, cervical, scapula; MET to thoracic region; modalities as needed         Treatment Charges: Mins Units   []  Modalities     [x]  Ther Exercise 10 1   [x]  Manual Therapy 30 2   []  Ther Activities     []  Aquatics     []  Vasocompression     []  Other     Total Treatment time 40 3   Medicare tracking PREVIOUS THERAPY FOR 2017: 1971.13(+ previous PT at [facility]. [id number]) (4man95.37) (3man73.19)(2man/ex75.55)(2manual dnnzjuh26.01) (2 man44.36/ex31.19=75.55) as of 12/18/17 = $3473.86 kx    Assessment:   [x] Progressing toward goals:  Reviewed HEP and posture this date as well as kinesiotaped patient post treatment. [] No change. [] Other:    Problems:    [x] ? Pain: 7/10 R shoulder  [x] ? ROM: R shoulder abduction with pain  [x] ? Strength: mild loss of strength R shoulder; poor scapular stability  [x] ?  Function:71% affected function (29% of normal)  [x] Other: mm tightness c-spine/traps; significant trigger points     STG: (to be met in 6 treatments)  1. ? Pain: below 6/10 R shoulder: ONGOING 10/11/17   2. ? ROM: at least

## 2017-12-27 ENCOUNTER — HOSPITAL ENCOUNTER (OUTPATIENT)
Dept: PHYSICAL THERAPY | Facility: CLINIC | Age: 67
Setting detail: THERAPIES SERIES
Discharge: HOME | End: 2017-12-27
Payer: MEDICARE

## 2017-12-27 PROCEDURE — 97140 MANUAL THERAPY 1/> REGIONS: CPT

## 2017-12-27 NOTE — FLOWSHEET NOTE
[] [facility]       Outpatient Physical        Therapy       [address]       Phone: [phone]       Fax: [phone] [x] [facility] at [address]       Phone: [phone]       Fax: [phone] [] [employer]. [address] [place] [address] [facility]  [address] [place] [address]   Phone: [phone]   Fax:  [phone]     Physical Therapy Daily Treatment Note    Date:  2017  Patient Name:  Everett Flores    :  1950  MRN: [id number]  Physician: Rashaad Randolph MD                                   Insurance: Medicare  Medical Diagnosis: R RC injury                                         Rehab Codes: D61.848; T80.827; M54.2; M79.1  Onset Date: 2015                                   Next 's appt:   Visit# / total visits:  (with updated script) Cancels/No Shows: 1 cx    Subjective:    Pain:  [x] Yes  [] No Location: R UE Pain Rating: (0-10 scale) 8/10   Pain altered Tx:  [x] No  [] Yes  Action:  Comments: patient sore in shoulder; patient also not feeling well overall and is to have rectocele repair and bladder reconstructive surgery 18. Very sore after manual massage and unable to do HEP for several days; patient wants to have shoulder surgery, but will have to wait until after she recovers from the surgery for her bladder. Objective:   Modalities: none today  Precautions: blood thinners; hepatitis C 2008     Not today: PROM:  Flexion 100, abduction 100 c pain over supraspinatus in each direction. IR/ER at 0 abduction WNL, 45 degrees abduction pain into IR/ER with passive ROM  AROM 17: R shoulder: 110 degrees flexion; abduction 105 degrees active;    Exercises: pt does what she can at home  Exercise Reps/ Time Weight/ Level Comments    SHOULDER extension 10 A Standing, bending over; started 10/2/17    rows 10 A Standing, bending over; started 10/2/17    shoulder flexion, abduction, IR,ER side paravertebral points were otherwise well tolerated   [] Other:    Problems:    [x] ? Pain: 7/10 R shoulder  [x] ? ROM: R shoulder abduction with pain  [x] ? Strength: mild loss of strength R shoulder; poor scapular stability  [x] ? Function:71% affected function (29% of normal)  [x] Other: mm tightness c-spine/traps; significant trigger points     STG: (to be met in 6 treatments)  1. ? Pain: below 6/10 R shoulder: ONGOING 10/11/17   2. ? ROM: at least 100 degrees shoulder abduction   3. ? Strength: be able to do scapular strength ex without exacerbating symptoms: MET 10/11/17  4. ? Function: modify activities to decrease stress to R shoulder: MET 10/11/17  5. Independent with Home Exercise Program: MET 10/11/17   6. Demonstrate Knowledge of fall prevention: MET 10/11/17     LTG: (to be met in 12 treatments): as of 12/4/17 NOT YET MET; continue x 12 visits to #24  1. Pain below 5/10  2. Decrease reliance on shoulder injections  3. Have at least 110 degrees shoulder abduction to indicate decreased impingement and improve reaching ability for light objects  4. Improved scapular position within 2 cm of opposite side         Patient goals: \"decreased pain initially then better movement\"     G-Codes: set on 11/8/17, visit #10  Functional Limitation: carrying, moving and handling objects  Functional Assessment Used: UEFS  Current Status Modifier: CL  Goal Status Modifier: CK      G-CODE     Functional Limitation: carrying, moving and handling objects  Functional Assessment Used: UEFS  Current Status Modifier: CL   Goal Status Modifier: CK    Pt. Education:  [] Yes  [x] No  [] Reviewed Prior HEP/Ed: HOLD ex that are painful  Method of Education: [] Verbal  [] Demo  [] Written:   Comprehension of Education:  [] Verbalizes understanding. [] Demonstrates understanding. [] Needs review. [] Demonstrates/verbalizes HEP/Ed previously given. Plan: [x] Continue per plan of care.    [] Other:       Time In: 8:00 am Time Out: 8:47 am    Electronically signed by:  Diana Diop PT

## 2018-01-03 ENCOUNTER — HOSPITAL ENCOUNTER (OUTPATIENT)
Dept: PHYSICAL THERAPY | Facility: CLINIC | Age: 68
Setting detail: THERAPIES SERIES
Discharge: HOME | End: 2018-01-03
Payer: MEDICARE

## 2018-01-03 ENCOUNTER — HOSPITAL ENCOUNTER (OUTPATIENT)
Age: 68
Setting detail: SPECIMEN
Discharge: HOME | End: 2018-01-03
Payer: MEDICARE

## 2018-01-03 ENCOUNTER — HOSPITAL ENCOUNTER (OUTPATIENT)
Dept: PHARMACY | Age: 68
Setting detail: THERAPIES SERIES
Discharge: HOME | End: 2018-01-03
Payer: MEDICARE

## 2018-01-03 DIAGNOSIS — I48.91 ATRIAL FIBRILLATION, UNSPECIFIED TYPE (HCC): ICD-10-CM

## 2018-01-03 LAB
-: NORMAL
ALBUMIN SERPL-MCNC: 3.9 G/DL (ref 3.5–5.2)
ALBUMIN SERPL-MCNC: 3.9 G/DL (ref 3.5–5.2)
ALBUMIN/GLOBULIN RATIO: 1 (ref 1–2.5)
ALP BLD-CCNC: 129 U/L (ref 35–104)
ALT SERPL-CCNC: 23 U/L (ref 5–33)
AMORPHOUS: NORMAL
ANION GAP SERPL CALCULATED.3IONS-SCNC: 19 MMOL/L (ref 9–17)
ANION GAP SERPL CALCULATED.3IONS-SCNC: 19 MMOL/L (ref 9–17)
AST SERPL-CCNC: 40 U/L
BACTERIA: NORMAL
BILIRUB SERPL-MCNC: 0.68 MG/DL (ref 0.3–1.2)
BILIRUBIN URINE: NEGATIVE
BNP INTERPRETATION: NORMAL
BUN BLDV-MCNC: 24 MG/DL (ref 8–23)
BUN BLDV-MCNC: 25 MG/DL (ref 8–23)
BUN/CREAT BLD: ABNORMAL (ref 9–20)
BUN/CREAT BLD: ABNORMAL (ref 9–20)
CALCIUM IONIZED: 1.25 MMOL/L (ref 1.13–1.33)
CALCIUM SERPL-MCNC: 10 MG/DL (ref 8.6–10.4)
CALCIUM SERPL-MCNC: 9.9 MG/DL (ref 8.6–10.4)
CASTS UA: NORMAL /LPF (ref 0–8)
CHLORIDE BLD-SCNC: 96 MMOL/L (ref 98–107)
CHLORIDE BLD-SCNC: 96 MMOL/L (ref 98–107)
CHOLESTEROL/HDL RATIO: 3.6
CHOLESTEROL: 190 MG/DL
CO2: 23 MMOL/L (ref 20–31)
CO2: 23 MMOL/L (ref 20–31)
COLOR: YELLOW
COMMENT UA: ABNORMAL
CREAT SERPL-MCNC: 1.75 MG/DL (ref 0.5–0.9)
CREAT SERPL-MCNC: 2.06 MG/DL (ref 0.5–0.9)
CREATININE URINE: 88.6 MG/DL (ref 28–217)
CRYSTALS, UA: NORMAL /HPF
EPITHELIAL CELLS UA: NORMAL /HPF (ref 0–5)
GFR AFRICAN AMERICAN: 29 ML/MIN
GFR AFRICAN AMERICAN: 35 ML/MIN
GFR NON-AFRICAN AMERICAN: 24 ML/MIN
GFR NON-AFRICAN AMERICAN: 29 ML/MIN
GFR SERPL CREATININE-BSD FRML MDRD: ABNORMAL ML/MIN/{1.73_M2}
GLUCOSE BLD-MCNC: 138 MG/DL (ref 70–99)
GLUCOSE BLD-MCNC: 140 MG/DL (ref 70–99)
GLUCOSE URINE: NEGATIVE
HCT VFR BLD CALC: 42.9 % (ref 36.3–47.1)
HCT VFR BLD CALC: 43.5 % (ref 36.3–47.1)
HDLC SERPL-MCNC: 53 MG/DL
HEMOGLOBIN: 14.1 G/DL (ref 11.9–15.1)
HEMOGLOBIN: 14.3 G/DL (ref 11.9–15.1)
INR BLD: 1.5
KETONES, URINE: NEGATIVE
LDL CHOLESTEROL: 100 MG/DL (ref 0–130)
LEUKOCYTE ESTERASE, URINE: NEGATIVE
MAGNESIUM: 2.7 MG/DL (ref 1.6–2.6)
MCH RBC QN AUTO: 33.2 PG (ref 25.2–33.5)
MCH RBC QN AUTO: 34.3 PG (ref 25.2–33.5)
MCHC RBC AUTO-ENTMCNC: 32.4 G/DL (ref 28.4–34.8)
MCHC RBC AUTO-ENTMCNC: 33.3 G/DL (ref 28.4–34.8)
MCV RBC AUTO: 102.4 FL (ref 82.6–102.9)
MCV RBC AUTO: 102.9 FL (ref 82.6–102.9)
MICROALBUMIN/CREAT 24H UR: 108 MG/L
MICROALBUMIN/CREAT UR-RTO: 122 MCG/MG CREAT
MUCUS: NORMAL
NITRITE, URINE: NEGATIVE
OTHER OBSERVATIONS UA: NORMAL
PDW BLD-RTO: 13.6 % (ref 11.8–14.4)
PDW BLD-RTO: 13.7 % (ref 11.8–14.4)
PH UA: 7 (ref 5–8)
PHOSPHORUS: 3.2 MG/DL (ref 2.6–4.5)
PLATELET # BLD: 254 K/UL (ref 138–453)
PLATELET # BLD: 266 K/UL (ref 138–453)
PMV BLD AUTO: 11.5 FL (ref 8.1–13.5)
PMV BLD AUTO: 11.6 FL (ref 8.1–13.5)
POTASSIUM SERPL-SCNC: 4.2 MMOL/L (ref 3.7–5.3)
POTASSIUM SERPL-SCNC: 4.3 MMOL/L (ref 3.7–5.3)
PRO-BNP: 114 PG/ML
PROTEIN UA: ABNORMAL
PROTIME: 17.9 SECONDS
PTH INTACT: 96.5 PG/ML (ref 15–65)
RBC # BLD: 4.17 M/UL (ref 3.95–5.11)
RBC # BLD: 4.25 M/UL (ref 3.95–5.11)
RBC UA: NORMAL /HPF (ref 0–4)
RENAL EPITHELIAL, UA: NORMAL /HPF
SODIUM BLD-SCNC: 138 MMOL/L (ref 135–144)
SODIUM BLD-SCNC: 138 MMOL/L (ref 135–144)
SPECIFIC GRAVITY UA: 1.01 (ref 1–1.03)
THYROXINE, FREE: 1.57 NG/DL (ref 0.93–1.7)
TOTAL CK: 53 U/L (ref 26–192)
TOTAL PROTEIN: 7.9 G/DL (ref 6.4–8.3)
TRICHOMONAS: NORMAL
TRIGL SERPL-MCNC: 185 MG/DL
TSH SERPL DL<=0.05 MIU/L-ACNC: 1.33 MIU/L (ref 0.3–5)
TURBIDITY: CLEAR
URINE HGB: NEGATIVE
UROBILINOGEN, URINE: NORMAL
VITAMIN D 25-HYDROXY: 50 NG/ML (ref 30–100)
VITAMIN D 25-HYDROXY: 53.2 NG/ML (ref 30–100)
VLDLC SERPL CALC-MCNC: ABNORMAL MG/DL (ref 1–30)
WBC # BLD: 9.6 K/UL (ref 3.5–11.3)
WBC # BLD: 9.6 K/UL (ref 3.5–11.3)
WBC UA: NORMAL /HPF (ref 0–5)
YEAST: NORMAL

## 2018-01-03 PROCEDURE — G8985 CARRY GOAL STATUS: HCPCS

## 2018-01-03 PROCEDURE — G8984 CARRY CURRENT STATUS: HCPCS

## 2018-01-03 PROCEDURE — 85610 PROTHROMBIN TIME: CPT

## 2018-01-03 PROCEDURE — 99211 OFF/OP EST MAY X REQ PHY/QHP: CPT

## 2018-01-03 PROCEDURE — 97140 MANUAL THERAPY 1/> REGIONS: CPT

## 2018-01-04 LAB
ESTIMATED AVERAGE GLUCOSE: 120 MG/DL
HBA1C MFR BLD: 5.8 % (ref 4–6)

## 2018-01-08 ENCOUNTER — HOSPITAL ENCOUNTER (OUTPATIENT)
Dept: PHYSICAL THERAPY | Facility: CLINIC | Age: 68
Setting detail: THERAPIES SERIES
Discharge: HOME | End: 2018-01-08
Payer: MEDICARE

## 2018-01-10 ENCOUNTER — HOSPITAL ENCOUNTER (OUTPATIENT)
Dept: PHARMACY | Age: 68
Setting detail: THERAPIES SERIES
Discharge: HOME | End: 2018-01-10
Payer: MEDICARE

## 2018-01-10 ENCOUNTER — HOSPITAL ENCOUNTER (OUTPATIENT)
Dept: PHYSICAL THERAPY | Facility: CLINIC | Age: 68
Setting detail: THERAPIES SERIES
Discharge: HOME | End: 2018-01-10
Payer: MEDICARE

## 2018-01-10 DIAGNOSIS — I48.91 ATRIAL FIBRILLATION, UNSPECIFIED TYPE (HCC): ICD-10-CM

## 2018-01-10 LAB
INR BLD: 4
PROTIME: 47.4 SECONDS

## 2018-01-10 PROCEDURE — 99211 OFF/OP EST MAY X REQ PHY/QHP: CPT

## 2018-01-10 PROCEDURE — 97140 MANUAL THERAPY 1/> REGIONS: CPT

## 2018-01-10 PROCEDURE — 85610 PROTHROMBIN TIME: CPT

## 2018-01-10 NOTE — FLOWSHEET NOTE
[] [facility]       Outpatient Physical        Therapy       [address]       Phone: [phone]       Fax: [phone] [x] [facility] at [address]       Phone: [phone]       Fax: [phone] [] [employer]. [address] [place] [address]   Phone: [phone]   Fax:  [phone]     Physical Therapy Daily Treatment Note    Date:  1/10/2018  Patient Name:  Justin King    :  1950  MRN: [id number]  Physician: Manoj Monteiro MD                                   Insurance: Medicare  Medical Diagnosis: R RC injury                                         Rehab Codes: I76.256; X79.351; M54.2; M79.1  Onset Date: 2015                                   Next 's appt:   Visit# / total visits:   Cancels/No Shows: 2 cx    Subjective:    Pain:  [x] Yes  [] No Location: R UE Pain Rating: (0-10 scale) 0-6-7/10   Pain altered Tx:  [x] No  [] Yes  Action:  Comments: did well for a few days after the last treatment and then shoulder pain has been basically the same;  Pt has been able to complete HEP. Objective:   Modalities: none today  Precautions: blood thinners; hepatitis C 2008     Not today: PROM:  Flexion 100, abduction 100 c pain over supraspinatus in each direction. IR/ER at 0 abduction WNL, 45 degrees abduction pain into IR/ER with passive ROM  AROM 17: R shoulder: 110 degrees flexion; abduction 105 degrees active;    Exercises: pt does what she can at home  Exercise Reps/ Time Weight/ Level Comments    SHOULDER extension 10 A Standing, bending over; started 10/2/17    rows 10 A Standing, bending over; started 10/2/17    shoulder flexion, abduction, IR,ER isometrics 5 ea gentle Started 10/2/17             Side ER /8 A/1  added 10/11/17 (not today due to increase symptoms recently)   Other:    17: UEFS: 30% of normal/70% affected  1/3/18: UEFS: 41.25% of normal/58.75% affected    DN:prepped area with alcohol wipe, consent obtained, standard procedure followed: 28 total: 3 R, RC insertion with 1\", 1 1\" in prox supraspinatus R;   2\" R suprascapular homeostatic point ;  1.0\" needles in cervical and thoracic paraspinalsbilaterally bilaterally to T5 level; 1\" in proximal R UT insertion and greater occipital occipital point. 1\" in L spinal accessory with removal ;  R Greater auricular with 0.5\" needle; lateral cutaneous antebrachial  left in situ 15 min; Not today: MFR: in seated, MFR to bilat UTs, rhomboids, middle traps, infra and supraspinatus, proximal bicep, deltoid, cervical paraspinals     Kinesiotape: unloading R UT at the end of the session and mechanical correction of R proximal humerus; Not today: Discussed fall prevention: patient has Aneta/echo and is able to call anyone if she were to fall at home; patient also carries a phone with her at all times; Specific Instructions for next treatment:     scapular strengthening; PROM R shoulder; DN R shoulder, UE, cervical, scapula; MET to thoracic region; modalities as needed         Treatment Charges: Mins Units   []  Modalities     []  Ther Exercise     [x]  Manual Therapy 30 2   []  Ther Activities     []  Aquatics     []  Vasocompression     []  Other     Total Treatment time 30 2   Medicare tracking  (7QGQ82.68) (3man73.19)(2man/ex75.55)(2manual kxypxyk60.01) as of 1/10/18: $102.02    Assessment:   [x] Progressing toward goals:   more controled pain, though still present   [] No change. [] Other:    Problems:    [x] ? Pain: 7/10 R shoulder  [x] ? ROM: R shoulder abduction with pain  [x] ? Strength: mild loss of strength R shoulder; poor scapular stability  [x] ?  Function:71% affected function (29% of normal)  [x] Other: mm tightness c-spine/traps; significant trigger points     STG: (to be met in 6 treatments)  1. ? Pain: below 6/10 R shoulder: ONGOING 10/11/17   2. ? ROM: at least 100 degrees shoulder abduction

## 2018-01-10 NOTE — PROGRESS NOTES
Patient states compliant all of the time with regimen. No bleeding or thromboembolic side effects noted. No significant dietary changes. Patient did have 2 additional doses of Fluconazole since last visit. No significant recent illness or disease state changes. PT/INR done in office per protocol. INR is 4.0 which is supratherapeutic likely from delayed reaction with Fluconazole. Warfarin regimen will be held for 1 day then continue 2.5mg daily except 5mg on Monday. Will retest in 1 week. Patient states she has upcoming genitourinary surgery with Dr. Chuy Alva on 2/12/18. She is having bladder reconstruction and rectocele repair and anticipates just an overnight stay. She has appt for cardiac clearance on 1/24/18 - will coordinate bridge if needed though I do not anticipate this being necessary. Will send fax to Dr. Deana Cifuentes regarding this. Patient understands dosing directions and information discussed. Dosing schedule and follow up appointment given to patient. Progress note routed to referring physicians office. Patient acknowledges working in consult agreement with pharmacist as referred by his/her physician.

## 2018-01-15 ENCOUNTER — HOSPITAL ENCOUNTER (OUTPATIENT)
Dept: PHYSICAL THERAPY | Facility: CLINIC | Age: 68
Setting detail: THERAPIES SERIES
Discharge: HOME | End: 2018-01-15
Payer: MEDICARE

## 2018-01-15 PROCEDURE — 97140 MANUAL THERAPY 1/> REGIONS: CPT

## 2018-01-15 NOTE — PROGRESS NOTES
[] [facility]       Outpatient Physical                Therapy         [address]       Phone: [phone]       Fax: [phone] [x] [facility]       Promotion at [address]       Phone: [phone]       Fax: [phone] [] [employer]. [address] [place] [address] [facility]     [address] [facility]     Phone: [phone]     Fax:  [phone]     Physical Therapy Progress Note    Date: 1/15/2018      Patient: Peter Oscar  : 1950  MRN: [id number]    Physician: ESEQUIEL Marshall                                   Insurance: Medicare  Medical Diagnosis: R RC injury                                         Rehab ATWRK: O63.651; R01.524; M54.2; M79.1  Onset Date: 2015                                   Next 's appt:   Visit# / total visits:       Cancels/No Shows: 2 cx        Subjective:  Pain:  [x] Yes  [] No          Location: R UE            Pain Rating: (0-10 scale) 0-6-7/10   Pain altered Tx:  [x] No  [] Yes  Action:  Comments: has had decent days, however, pain on Saturday all the way to the hand and was unable to peel potatoes. Assessment: dry needling helps control pain levels; still with pain that radiates distally R UE.    STG: (to be met in 6 treatments)  1. ? Pain: below 6/10 R shoulder: ONGOING 10/11/17   2. ? ROM: at least 100 degrees shoulder abduction   3. ? Strength: be able to do scapular strength ex without exacerbating symptoms: MET 10/11/17  4. ? Function: modify activities to decrease stress to R shoulder: MET 10/11/17  5. Independent with Home Exercise Program: MET 10/11/17   6. Demonstrate Knowledge of fall prevention: MET 10/11/17     LTG: (to be met in 12 treatments): as of 17 NOT YET MET; continue x 12 visits to #24  1. Pain below 5/10  2. Decrease reliance on shoulder injections  3.  Have at least 110 degrees shoulder abduction to indicate decreased impingement and improve reaching

## 2018-01-17 ENCOUNTER — APPOINTMENT (OUTPATIENT)
Dept: PHYSICAL THERAPY | Facility: CLINIC | Age: 68
End: 2018-01-17
Payer: MEDICARE

## 2018-01-17 ENCOUNTER — HOSPITAL ENCOUNTER (OUTPATIENT)
Dept: PHARMACY | Age: 68
Setting detail: THERAPIES SERIES
Discharge: HOME | End: 2018-01-17
Payer: MEDICARE

## 2018-01-17 DIAGNOSIS — I48.91 ATRIAL FIBRILLATION, UNSPECIFIED TYPE (HCC): ICD-10-CM

## 2018-01-17 LAB
INR BLD: 3.1
PROTIME: 37.7 SECONDS

## 2018-01-17 PROCEDURE — 85610 PROTHROMBIN TIME: CPT

## 2018-01-17 PROCEDURE — 99211 OFF/OP EST MAY X REQ PHY/QHP: CPT

## 2018-01-22 ENCOUNTER — HOSPITAL ENCOUNTER (OUTPATIENT)
Dept: PHYSICAL THERAPY | Facility: CLINIC | Age: 68
Setting detail: THERAPIES SERIES
Discharge: HOME | End: 2018-01-22
Payer: MEDICARE

## 2018-01-22 PROCEDURE — 97140 MANUAL THERAPY 1/> REGIONS: CPT

## 2018-01-22 NOTE — FLOWSHEET NOTE
[] [facility]       Outpatient Physical        Therapy       [address]       Phone: [phone]       Fax: [phone] [x] [facility] at [address] [facility]       Phone: [phone]       Fax: [phone] [] [facility]. [facility] for [facility]  [address] [facility]   Phone: [phone]   Fax:  [phone]     Physical Therapy Daily Treatment Note    Date:  2018  Patient Name:  Taurus Pollard    :  1950  MRN: [id number]  Physician: Jono Bukc MD                                   Insurance: Medicare  Medical Diagnosis: R RC injury                                         Rehab Codes: Y96.101; P42.019; M54.2; M79.1  Onset Date: 2015                                   Next 's appt:   Visit# / total visits:  (new script as of 18     Cancels/No Shows: 2 cx    Subjective:    Pain:  [x] Yes  [] No Location: R UE Pain Rating: (0-10 scale) 2/10   Pain altered Tx:  [x] No  [] Yes  Action:  Comments: the last week symptoms have not been bad. She has been sleeping better; she reports \"babying\" R shoulder and this helps; she does HEP but it is painful. Objective:   Modalities: none today  Precautions: blood thinners; hepatitis C 2008     Not today: PROM:  Flexion 100, abduction 100 c pain over supraspinatus in each direction. IR/ER at 0 abduction WNL, 45 degrees abduction pain into IR/ER with passive ROM  AROM 17: R shoulder: 110 degrees flexion; abduction 105 degrees active;    Exercises: pt does what she can at home  Exercise Reps/ Time Weight/ Level Comments    SHOULDER extension 10 A Standing, bending over; started 10/2/17    rows 10 A Standing, bending over; started 10/2/17    shoulder flexion, abduction, IR,ER isometrics 5 ea gentle Started 10/2/17             Side ER 20/8 A/1  added 10/11/17 (not today due to increase symptoms recently)   Other:    17: UEFS: 30% of normal/70% affected  1/3/18: UEFS: 41.25% of normal/58.75% affected    DN:prepped area with alcohol wipe, consent obtained, standard procedure followed: 40 total: 3 B, RC insertion with 1\", 1 1\" in prox supraspinatus B;   2\" B suprascapular homeostatic point ;  1 2\" horizontally in infraspinatus bilat; 1.0\" needles in cervical and thoracic paraspinalsbilaterally bilaterally to T5 level; 1\" in proximal R UT insertion and B greater occipital occipital point.;   R lateral cutaneous antebrachial; proximal and distal radial;  left in situ 20 min; Not today: MFR: in seated, MFR to bilat UTs, rhomboids, middle traps, infra and supraspinatus, proximal bicep, deltoid, cervical paraspinals     Not today: Kinesiotape: unloading R UT at the end of the session and mechanical correction of R proximal humerus; Not today: Discussed fall prevention: patient has Aneta/echo and is able to call anyone if she were to fall at home; patient also carries a phone with her at all times; Specific Instructions for next treatment:     scapular strengthening; PROM R shoulder; DN R shoulder, UE, cervical, scapula; MET to thoracic region; modalities as needed         Treatment Charges: Mins Units   []  Modalities     []  Ther Exercise     [x]  Manual Therapy 30 2   []  Ther Activities     []  Aquatics     []  Vasocompression     []  Other     Total Treatment time 30 2   Medicare tracking  (3TMN02.19) (3man73.19)(2man/ex75.55)(2manual rjralnp12.01) as of 1/22/18: $204.04    Assessment:   [x] Progressing toward goals:   trigger points bilateral UT insertion near occiput [] No change. [] Other:    Problems:    [x] ? Pain: 7/10 R shoulder  [x] ? ROM: R shoulder abduction with pain  [x] ? Strength: mild loss of strength R shoulder; poor scapular stability  [x] ?  Function:71% affected function (29% of normal)  [x] Other: mm tightness c-spine/traps; significant trigger points     STG: (to be met in 6 treatments)  1. ? Pain: below 6/10 R shoulder:

## 2018-01-24 ENCOUNTER — HOSPITAL ENCOUNTER (OUTPATIENT)
Age: 68
Setting detail: SPECIMEN
Discharge: HOME | End: 2018-01-24
Payer: MEDICARE

## 2018-01-24 ENCOUNTER — HOSPITAL ENCOUNTER (OUTPATIENT)
Dept: PHYSICAL THERAPY | Facility: CLINIC | Age: 68
Setting detail: THERAPIES SERIES
Discharge: HOME | End: 2018-01-24
Payer: MEDICARE

## 2018-01-24 LAB
ANION GAP SERPL CALCULATED.3IONS-SCNC: 14 MMOL/L (ref 9–17)
BUN BLDV-MCNC: 23 MG/DL (ref 8–23)
BUN/CREAT BLD: ABNORMAL (ref 9–20)
CALCIUM SERPL-MCNC: 9.3 MG/DL (ref 8.6–10.4)
CHLORIDE BLD-SCNC: 104 MMOL/L (ref 98–107)
CO2: 21 MMOL/L (ref 20–31)
CREAT SERPL-MCNC: 1.87 MG/DL (ref 0.5–0.9)
GFR AFRICAN AMERICAN: 32 ML/MIN
GFR NON-AFRICAN AMERICAN: 27 ML/MIN
GFR SERPL CREATININE-BSD FRML MDRD: ABNORMAL ML/MIN/{1.73_M2}
GFR SERPL CREATININE-BSD FRML MDRD: ABNORMAL ML/MIN/{1.73_M2}
GLUCOSE BLD-MCNC: 130 MG/DL (ref 70–99)
MAGNESIUM: 2.6 MG/DL (ref 1.6–2.6)
PHOSPHORUS: 3.1 MG/DL (ref 2.6–4.5)
POTASSIUM SERPL-SCNC: 5.1 MMOL/L (ref 3.7–5.3)
SODIUM BLD-SCNC: 139 MMOL/L (ref 135–144)

## 2018-01-24 PROCEDURE — 97140 MANUAL THERAPY 1/> REGIONS: CPT

## 2018-01-24 NOTE — FLOWSHEET NOTE
ex without exacerbating symptoms: MET 10/11/17  4. ? Function: modify activities to decrease stress to R shoulder: MET 10/11/17  5. Independent with Home Exercise Program: MET 10/11/17   6. Demonstrate Knowledge of fall prevention: MET 10/11/17     LTG: (to be met in 12 treatments): as of 12/4/17 NOT YET MET; continue x 12 visits to #24; continue towards goal #44  1. Pain below 5/10  2. Decrease reliance on shoulder injections  3. Have at least 110 degrees shoulder abduction to indicate decreased impingement and improve reaching ability for light objects  4. Improved scapular position within 2 cm of opposite side         Patient goals: \"decreased pain initially then better movement\"     G-Codes: set on 1/3/18, visit #20  Functional Limitation: carrying, moving and handling objects  Functional Assessment Used: UEFS   Current Status Modifier: CK  Goal Status Modifier: Gar Mariam      G-Codes: set on 11/8/17, visit #10  Functional Limitation: carrying, moving and handling objects  Functional Assessment Used: UEFS  Current Status Modifier: CL  Goal Status Modifier: CK      G-CODE     Functional Limitation: carrying, moving and handling objects  Functional Assessment Used: UEFS  Current Status Modifier: CL   Goal Status Modifier: CK    Pt. Education:  [] Yes  [x] No  [] Reviewed Prior HEP/Ed: HOLD ex that are painful  Method of Education: [] Verbal  [] Demo  [] Written:   Comprehension of Education:  [] Verbalizes understanding. [] Demonstrates understanding. [] Needs review. [] Demonstrates/verbalizes HEP/Ed previously given. Plan: [x] Continue per plan of care.    [] Other:       Time In: 7:00 am            Time Out: 7:55 am    Electronically signed by:  Jaren Bazan PT

## 2018-01-29 ENCOUNTER — HOSPITAL ENCOUNTER (OUTPATIENT)
Dept: PHARMACY | Age: 68
Setting detail: THERAPIES SERIES
Discharge: HOME | End: 2018-01-29
Payer: MEDICARE

## 2018-01-29 ENCOUNTER — HOSPITAL ENCOUNTER (OUTPATIENT)
Dept: PHYSICAL THERAPY | Facility: CLINIC | Age: 68
Setting detail: THERAPIES SERIES
Discharge: HOME | End: 2018-01-29
Payer: MEDICARE

## 2018-01-29 DIAGNOSIS — I48.91 ATRIAL FIBRILLATION, UNSPECIFIED TYPE (HCC): ICD-10-CM

## 2018-01-29 LAB
INR BLD: 1.8
PROTIME: 21.4 SECONDS

## 2018-01-29 PROCEDURE — 97140 MANUAL THERAPY 1/> REGIONS: CPT

## 2018-01-29 PROCEDURE — 99211 OFF/OP EST MAY X REQ PHY/QHP: CPT

## 2018-01-29 PROCEDURE — 85610 PROTHROMBIN TIME: CPT

## 2018-01-29 NOTE — FLOWSHEET NOTE
consent obtained, standard procedure followed: 36 total: 3 R, RC insertion with 1\", 1 1\" in prox supraspinatus B;   2\" B suprascapular homeostatic point;  1.0\" needles in cervical and thoracic paraspinalsbilaterally bilaterally to T5 level with proximal R UT and greater occipital homeostatic points being very tender; B greater occipital occipital point. ;   left in situ 20 min; Not today: MFR: in seated, MFR to bilat UTs, rhomboids, middle traps, infra and supraspinatus, proximal bicep, deltoid, cervical paraspinals    Kinesiotape: unloading R UT at the end of the session and mechanical correction of R proximal humerus; Not today: Discussed fall prevention: patient has Aneta/echo and is able to call anyone if she were to fall at home; patient also carries a phone with her at all times; Specific Instructions for next treatment:     scapular strengthening; PROM R shoulder; DN R shoulder, UE, cervical, scapula; MET to thoracic region; modalities as needed         Treatment Charges: Mins Units   []  Modalities     []  Ther Exercise     [x]  Manual Therapy 30 2   []  Ther Activities     []  Aquatics     []  Vasocompression     []  Other     Total Treatment time 30 2   Medicare tracking  (2YGG08.13) (3man73.19)(2man/ex75.55)(2manual oqhlixt13.01) as of 1/29/18: $306.06    Assessment:   [x] Progressing toward goals:   less triggers today, but most in R UT [] No change. [] Other:    Problems:    [x] ? Pain: 7/10 R shoulder  [x] ? ROM: R shoulder abduction with pain  [x] ? Strength: mild loss of strength R shoulder; poor scapular stability  [x] ?  Function:71% affected function (29% of normal)  [x] Other: mm tightness c-spine/traps; significant trigger points     STG: (to be met in 6 treatments)  1. ? Pain: below 6/10 R shoulder: ONGOING 10/11/17   2. ? ROM: at least 100 degrees shoulder abduction   3. ? Strength: be able to do scapular strength ex without exacerbating symptoms: MET 10/11/17  4. ? Function:

## 2018-01-31 ENCOUNTER — HOSPITAL ENCOUNTER (OUTPATIENT)
Dept: PREADMISSION TESTING | Age: 68
Discharge: HOME | End: 2018-02-04
Payer: MEDICARE

## 2018-01-31 ENCOUNTER — APPOINTMENT (OUTPATIENT)
Dept: PHYSICAL THERAPY | Facility: CLINIC | Age: 68
End: 2018-01-31
Payer: MEDICARE

## 2018-01-31 VITALS
DIASTOLIC BLOOD PRESSURE: 61 MMHG | WEIGHT: 237.12 LBS | RESPIRATION RATE: 22 BRPM | HEART RATE: 68 BPM | HEIGHT: 62 IN | BODY MASS INDEX: 43.64 KG/M2 | TEMPERATURE: 98 F | OXYGEN SATURATION: 95 % | SYSTOLIC BLOOD PRESSURE: 113 MMHG

## 2018-01-31 LAB
BUN BLDV-MCNC: 25 MG/DL (ref 8–23)
CREAT SERPL-MCNC: 2.13 MG/DL (ref 0.5–0.9)
ESTIMATED AVERAGE GLUCOSE: 131 MG/DL
GFR AFRICAN AMERICAN: 28 ML/MIN
GFR NON-AFRICAN AMERICAN: 23 ML/MIN
GFR SERPL CREATININE-BSD FRML MDRD: ABNORMAL ML/MIN/{1.73_M2}
GFR SERPL CREATININE-BSD FRML MDRD: ABNORMAL ML/MIN/{1.73_M2}
GLUCOSE BLD-MCNC: 95 MG/DL (ref 70–99)
HBA1C MFR BLD: 6.2 % (ref 4–6)
HCT VFR BLD CALC: 40.5 % (ref 36.3–47.1)
HEMOGLOBIN: 13.4 G/DL (ref 11.9–15.1)
INR BLD: 1.6
MCH RBC QN AUTO: 33.6 PG (ref 25.2–33.5)
MCHC RBC AUTO-ENTMCNC: 33.1 G/DL (ref 28.4–34.8)
MCV RBC AUTO: 101.5 FL (ref 82.6–102.9)
MRSA, DNA, NASAL: NORMAL
NRBC AUTOMATED: 0 PER 100 WBC
PARTIAL THROMBOPLASTIN TIME: 31.2 SEC (ref 21.3–31.3)
PDW BLD-RTO: 13.9 % (ref 11.8–14.4)
PLATELET # BLD: 230 K/UL (ref 138–453)
PMV BLD AUTO: 11.6 FL (ref 8.1–13.5)
PROTHROMBIN TIME: 17.4 SEC (ref 9.4–12.6)
RBC # BLD: 3.99 M/UL (ref 3.95–5.11)
SPECIMEN DESCRIPTION: NORMAL
WBC # BLD: 10.7 K/UL (ref 3.5–11.3)

## 2018-01-31 PROCEDURE — 82947 ASSAY GLUCOSE BLOOD QUANT: CPT

## 2018-01-31 PROCEDURE — 82565 ASSAY OF CREATININE: CPT

## 2018-01-31 PROCEDURE — 85730 THROMBOPLASTIN TIME PARTIAL: CPT

## 2018-01-31 PROCEDURE — 85027 COMPLETE CBC AUTOMATED: CPT

## 2018-01-31 PROCEDURE — 87641 MR-STAPH DNA AMP PROBE: CPT

## 2018-01-31 PROCEDURE — 85610 PROTHROMBIN TIME: CPT

## 2018-01-31 PROCEDURE — 36415 COLL VENOUS BLD VENIPUNCTURE: CPT

## 2018-01-31 PROCEDURE — 83036 HEMOGLOBIN GLYCOSYLATED A1C: CPT

## 2018-01-31 PROCEDURE — 84520 ASSAY OF UREA NITROGEN: CPT

## 2018-01-31 RX ORDER — POTASSIUM CHLORIDE 750 MG/1
1 TABLET, EXTENDED RELEASE ORAL 2 TIMES DAILY
COMMUNITY
End: 2019-02-11 | Stop reason: ALTCHOICE

## 2018-01-31 RX ORDER — SODIUM CHLORIDE, SODIUM LACTATE, POTASSIUM CHLORIDE, CALCIUM CHLORIDE 600; 310; 30; 20 MG/100ML; MG/100ML; MG/100ML; MG/100ML
1000 INJECTION, SOLUTION INTRAVENOUS CONTINUOUS
Status: CANCELLED | OUTPATIENT
Start: 2018-01-31

## 2018-01-31 RX ORDER — ESTRADIOL 0.1 MG/G
2 CREAM VAGINAL DAILY
Status: ON HOLD | COMMUNITY
End: 2018-03-27 | Stop reason: HOSPADM

## 2018-01-31 RX ORDER — TORSEMIDE 5 MG/1
5 TABLET ORAL DAILY PRN
Status: ON HOLD | COMMUNITY
End: 2018-03-27 | Stop reason: HOSPADM

## 2018-02-05 ENCOUNTER — HOSPITAL ENCOUNTER (OUTPATIENT)
Age: 68
Setting detail: SPECIMEN
Discharge: HOME | End: 2018-02-05
Payer: MEDICARE

## 2018-02-05 ENCOUNTER — HOSPITAL ENCOUNTER (OUTPATIENT)
Dept: PHYSICAL THERAPY | Facility: CLINIC | Age: 68
Setting detail: THERAPIES SERIES
Discharge: HOME | End: 2018-02-05
Payer: MEDICARE

## 2018-02-05 LAB
-: ABNORMAL
ABSOLUTE EOS #: 0.32 K/UL (ref 0–0.44)
ABSOLUTE IMMATURE GRANULOCYTE: 0.03 K/UL (ref 0–0.3)
ABSOLUTE LYMPH #: 1.52 K/UL (ref 1.1–3.7)
ABSOLUTE MONO #: 0.81 K/UL (ref 0.1–1.2)
ALBUMIN SERPL-MCNC: 3.7 G/DL (ref 3.5–5.2)
AMORPHOUS: ABNORMAL
ANION GAP SERPL CALCULATED.3IONS-SCNC: 17 MMOL/L (ref 9–17)
BACTERIA: ABNORMAL
BASOPHILS # BLD: 1 % (ref 0–2)
BASOPHILS ABSOLUTE: 0.09 K/UL (ref 0–0.2)
BILIRUBIN URINE: NEGATIVE
BUN BLDV-MCNC: 18 MG/DL (ref 8–23)
BUN/CREAT BLD: ABNORMAL (ref 9–20)
CALCIUM IONIZED: 1.26 MMOL/L (ref 1.13–1.33)
CALCIUM SERPL-MCNC: 9.1 MG/DL (ref 8.6–10.4)
CASTS UA: ABNORMAL /LPF (ref 0–2)
CHLORIDE BLD-SCNC: 102 MMOL/L (ref 98–107)
CO2: 20 MMOL/L (ref 20–31)
COLOR: YELLOW
COMMENT UA: ABNORMAL
CREAT SERPL-MCNC: 1.58 MG/DL (ref 0.5–0.9)
CREATININE URINE: 169.8 MG/DL (ref 28–217)
CRYSTALS, UA: ABNORMAL /HPF
DIFFERENTIAL TYPE: ABNORMAL
EOSINOPHILS RELATIVE PERCENT: 4 % (ref 1–4)
EPITHELIAL CELLS UA: ABNORMAL /HPF (ref 0–5)
GFR AFRICAN AMERICAN: 39 ML/MIN
GFR NON-AFRICAN AMERICAN: 32 ML/MIN
GFR SERPL CREATININE-BSD FRML MDRD: ABNORMAL ML/MIN/{1.73_M2}
GFR SERPL CREATININE-BSD FRML MDRD: ABNORMAL ML/MIN/{1.73_M2}
GLUCOSE BLD-MCNC: 134 MG/DL (ref 70–99)
GLUCOSE URINE: NEGATIVE
HCT VFR BLD CALC: 41.4 % (ref 36.3–47.1)
HEMOGLOBIN: 13.2 G/DL (ref 11.9–15.1)
IMMATURE GRANULOCYTES: 0 %
KETONES, URINE: NEGATIVE
LEUKOCYTE ESTERASE, URINE: NEGATIVE
LYMPHOCYTES # BLD: 17 % (ref 24–43)
MAGNESIUM: 2.4 MG/DL (ref 1.6–2.6)
MCH RBC QN AUTO: 33.6 PG (ref 25.2–33.5)
MCHC RBC AUTO-ENTMCNC: 31.9 G/DL (ref 28.4–34.8)
MCV RBC AUTO: 105.3 FL (ref 82.6–102.9)
MICROALBUMIN/CREAT 24H UR: 177 MG/L
MICROALBUMIN/CREAT UR-RTO: 104 MCG/MG CREAT
MONOCYTES # BLD: 9 % (ref 3–12)
MUCUS: ABNORMAL
NITRITE, URINE: NEGATIVE
NRBC AUTOMATED: 0 PER 100 WBC
OTHER OBSERVATIONS UA: ABNORMAL
PDW BLD-RTO: 14.3 % (ref 11.8–14.4)
PH UA: 6.5 (ref 5–8)
PHOSPHORUS: 3.4 MG/DL (ref 2.6–4.5)
PLATELET # BLD: 248 K/UL (ref 138–453)
PLATELET ESTIMATE: ABNORMAL
PMV BLD AUTO: 11.1 FL (ref 8.1–13.5)
POTASSIUM SERPL-SCNC: 4.5 MMOL/L (ref 3.7–5.3)
PROTEIN UA: ABNORMAL
PTH INTACT: 120.5 PG/ML (ref 15–65)
RBC # BLD: 3.93 M/UL (ref 3.95–5.11)
RBC # BLD: ABNORMAL 10*6/UL
RBC UA: ABNORMAL /HPF (ref 0–2)
RENAL EPITHELIAL, UA: ABNORMAL /HPF
SEG NEUTROPHILS: 70 % (ref 36–65)
SEGMENTED NEUTROPHILS ABSOLUTE COUNT: 6.39 K/UL (ref 1.5–8.1)
SODIUM BLD-SCNC: 139 MMOL/L (ref 135–144)
SPECIFIC GRAVITY UA: 1.03 (ref 1–1.03)
TRICHOMONAS: ABNORMAL
TURBIDITY: CLEAR
URINE HGB: NEGATIVE
UROBILINOGEN, URINE: NORMAL
VITAMIN D 25-HYDROXY: 39.6 NG/ML (ref 30–100)
WBC # BLD: 9.2 K/UL (ref 3.5–11.3)
WBC # BLD: ABNORMAL 10*3/UL
WBC UA: ABNORMAL /HPF (ref 0–5)
YEAST: ABNORMAL

## 2018-02-05 PROCEDURE — 97140 MANUAL THERAPY 1/> REGIONS: CPT

## 2018-02-05 NOTE — FLOWSHEET NOTE
11/8/17: UEFS: 30% of normal/70% affected  1/3/18: UEFS: 41.25% of normal/58.75% affected    DN:prepped area with alcohol wipe, consent obtained, standard procedure followed: 36 total: 3 R, RC insertion with 1\", 1 1\" in prox supraspinatus R;   2\" B suprascapular homeostatic point;  1.0\" needles in cervical and thoracic paraspinalsbilaterally bilaterally to T5 level with proximal R UT  being very tender; 2\" longitudinal in infraspinatus;   left in situ 20 min; Not today: MFR: in seated, MFR to bilat UTs, rhomboids, middle traps, infra and supraspinatus, proximal bicep, deltoid, cervical paraspinals    Kinesiotape: unloading R UT at the end of the session and mechanical correction of R proximal humerus; Not today: Discussed fall prevention: patient has Aneta/echo and is able to call anyone if she were to fall at home; patient also carries a phone with her at all times; Specific Instructions for next treatment:     scapular strengthening; PROM R shoulder; DN R shoulder, UE, cervical, scapula; MET to thoracic region; modalities as needed         Treatment Charges: Mins Units   []  Modalities     []  Ther Exercise     [x]  Manual Therapy 30 2   []  Ther Activities     []  Aquatics     []  Vasocompression     []  Other     Total Treatment time 30 2   Medicare tracking  (8BBC94.61) (3man73.19)(2man/ex75.55)(2manual rdgabew94.01) as of 2/5/18: $357.07    Assessment:   [x] Progressing toward goals:   less triggers today, but most in R UT [] No change. [] Other:    Problems:    [x] ? Pain: 7/10 R shoulder  [x] ? ROM: R shoulder abduction with pain  [x] ? Strength: mild loss of strength R shoulder; poor scapular stability  [x] ?  Function:71% affected function (29% of normal)  [x] Other: mm tightness c-spine/traps; significant trigger points     STG: (to be met in 6 treatments)  1. ? Pain: below 6/10 R shoulder: ONGOING 10/11/17   2. ? ROM: at least 100 degrees shoulder abduction   3. ? Strength: be able to do

## 2018-02-07 ENCOUNTER — HOSPITAL ENCOUNTER (OUTPATIENT)
Dept: PHYSICAL THERAPY | Facility: CLINIC | Age: 68
Setting detail: THERAPIES SERIES
Discharge: HOME | End: 2018-02-07
Payer: MEDICARE

## 2018-02-07 PROCEDURE — 97140 MANUAL THERAPY 1/> REGIONS: CPT

## 2018-02-07 NOTE — FLOWSHEET NOTE
normal/58.75% affected    DN:prepped area with alcohol wipe, consent obtained, standard procedure followed: 28 total: 3 R, RC insertion with 1\", 1 1\" in prox supraspinatus R;   2\" B suprascapular homeostatic point;  1.0\" needles in cervical and thoracic paraspinalsbilaterally bilaterally to T5 level with proximal R UT  being very tender; 2 1\" in R dorsal scapular and 1 on L side; 1\" in B spinal accessory with removal; R very tender;   left in situ 20 min; Not today: MFR: in seated, MFR to bilat UTs, rhomboids, middle traps, infra and supraspinatus, proximal bicep, deltoid, cervical paraspinals    Kinesiotape: unloading R UT at the end of the session and mechanical correction of R proximal humerus; Not today: Discussed fall prevention: patient has Aneta/echo and is able to call anyone if she were to fall at home; patient also carries a phone with her at all times; Specific Instructions for next treatment:     scapular strengthening; PROM R shoulder; DN R shoulder, UE, cervical, scapula; MET to thoracic region; modalities as needed         Treatment Charges: Mins Units   []  Modalities     []  Ther Exercise     [x]  Manual Therapy 30 2   []  Ther Activities     []  Aquatics     []  Vasocompression     []  Other     Total Treatment time 30 2   Medicare tracking  (1GTG53.47) (3man73.19)(2man/ex75.55)(2manual mbgvilp54.01) as of 2/7/18: $408.08    Assessment:   [x] Progressing toward goals:   less triggers today, but most in R UT [] No change. [] Other:    Problems:    [x] ? Pain: 7/10 R shoulder  [x] ? ROM: R shoulder abduction with pain  [x] ? Strength: mild loss of strength R shoulder; poor scapular stability  [x] ?  Function:71% affected function (29% of normal)  [x] Other: mm tightness c-spine/traps; significant trigger points     STG: (to be met in 6 treatments)  1. ? Pain: below 6/10 R shoulder: ONGOING 10/11/17   2. ? ROM: at least 100 degrees shoulder abduction   3. ? Strength: be able to do

## 2018-02-11 PROBLEM — I10 HYPERTENSION: Status: ACTIVE | Noted: 2018-02-11

## 2018-02-11 PROBLEM — N81.6 RECTOCELE: Status: ACTIVE | Noted: 2018-02-11

## 2018-02-11 PROBLEM — N81.10 CYSTOCELE, UNSPECIFIED (CODE): Status: ACTIVE | Noted: 2018-02-11

## 2018-02-11 PROBLEM — E03.9 HYPOTHYROIDISM: Status: ACTIVE | Noted: 2018-02-11

## 2018-02-11 PROBLEM — N39.46 URINARY INCONTINENCE, MIXED: Status: ACTIVE | Noted: 2018-02-11

## 2018-02-11 NOTE — H&P
Pre-operative History and Physical  2/12/2018 7:47 AM     Procedure: Anterior/posterior repair, Solyx sling and Cystoscopy     Subjective  HPI:The patient is a 69 y.o.  female who presents for elective surgery for rectocele, cystocele and mixed urinary incontinence. The patient is being admitted for a planned elective surgical procedure today. She has had symptoms, physical findings, and diagnostic testing that have an appropriate indication for todays planned procedure. The patient has been educated about their condition, conservative and surgical options was been offered to the patient, and the patient has decided to proceed with a surgical option. An informed consent has been signed under no duress or confusion. If indicated, the patient has been surgically cleared by her PCP and /or any pertinent specialist. All labs and testing have been reviewed. If of reproductive age and without permanent sterilization, a pregnancy test was confirmed as negative. The patient has satisfactorily completed any pre-operative preparations prior to surgery. If MRSA positive, she had completed the standard surgical preparation protocol. The patient took any required medicines prior to surgery with a sip of water but otherwise had taken nothing by mouth. The patient has discontinued any blood thinners as required to proceed with surgery. The patient denies chest pain, shortness of breath, calf pain, or open sores on the day of surgery. All dentures and body jewelry have been removed and / or taped.      Past Medical History:    Past Medical History:   Diagnosis Date    Asthma     Atrial fibrillation (HCC)     states no longer in afib    Cerebral artery occlusion with cerebral infarction ([address]     tia x2    Chronic back pain     Chronic kidney disease     chronic kidney disease dr Bayron Mon nephrologist waiting for clearance    Constipation     COPD [facility]) [facility]     Gastroparesis     GERD (gastroesophageal reflux disease)     H/O mastectomy     fibrocystic disease    Hepatitis     hep c, took meds for hep c    Hx of blood clots     states has had on multiple occassions    Hyperlipidemia     Hypertension     Hypothyroidism     Impaired mobility     uses walker    Incontinence     Incontinence of bowel     wears depends    Liver disease     hepatitis c    Osteoarthritis     PONV (postoperative nausea and vomiting)     Rectocele     Rotator cuff tear     right side, needs surgery    Snores     states has been tested for sleep apnea and does not have it    Type II or unspecified type diabetes mellitus without mention of complication, not stated as uncontrolled        Past Surgical History:    Past Surgical History:   Procedure Laterality Date    BREAST SURGERY Bilateral     mastectomy, fibrocystic breast, mother had passed from breast cancer    CHOLECYSTECTOMY      COLONOSCOPY      HYSTERECTOMY      UPPER GASTROINTESTINAL ENDOSCOPY           OB History    Para Term  AB Living   9 3 2 1 6 2   SAB TAB Ectopic Molar Multiple Live Births             2      # Outcome Date GA Lbr Ezekiel/2nd Weight Sex Delivery Anes PTL Lv   9          FD   8 Term      Vag-Spont   ALBA   7 Term      Vag-Spont   ALBA   6 AB            5 AB            4 AB            3 AB            2 AB            1 AB                   Current Facility-Administered Medications:     ceFAZolin (ANCEF) 2 g in sterile water 20 mL IV syringe, 2 g, Intravenous, On Call to OR, Blas Melo DO    0.9 % sodium chloride infusion, , Intravenous, Continuous, Blas Melo DO, Last Rate: 100 mL/hr at 18 0747    Facility-Administered Medications Ordered in Other Encounters:     sodium chloride 0.9 % solution 250 mL, 250 mL, Intravenous, Once, Francisca Tomlinson MD      Allergies:  Pcn [penicillins]     Social History:  Social History   Substance Use Topics    Smoking status: Former Smoker     Packs/day: 1.00     Years: 43.00     Quit date: 8/12/2008    Smokeless tobacco: Never Used    Alcohol use No           ROS:   14 point ROS negative except for above listed in HPI. General/Constitutional: Denies chills, fever, headaches, weight gain, weight loss  Ophthalmologic: Denies recent abrupt vision changes, blurry vision  ENT: Denies nasal discharge, congestion, sinus pain, sore throat, thyroid changes  Cardiovascular: Denies chest pain, exertional chest pain, heart murmurs, palpitations  Respiratory: Denies SOB, wheezing, coughing  Gastrointestinal: Denies abdominal distention, flank pain, blood in stools, constipation, diarrhea, nausea, vomiting  Genitourinary: see HPI  Musculoskeletal: Denies back problems, muscle aches, muscle weakness, painful joints, calf pain  Skin: Denies itching, rash, lesion(s), jaundice  Breast: Denies lumps, pain, nipple discharge, swelling  Neurologic: Denies difficulty with coordination, seizures, tingling / numbness, one sided weakness, generalized weakness  Psychiatric: Denies anxiety, depression, somatization  Endocrine: Denies night sweats, cold intolerance, excessive thirst, frequent urination, heat intolerance, hot flashes  Hematology: Denies easy bleeding, easy bruising, swollen glands    Objective  Physical Exam    Vitals:  BP (!) 148/58   Pulse 69   Temp 98.2 °F (36.8 °C) (Oral)   Resp 20   Ht 5' 2\" (1.575 m)   Wt 235 lb (106.6 kg) Comment: weighs self daily  SpO2 95%   BMI 42.98 kg/m²      General Examination:  General appearance: Well-developed, well-nourished, in no acute distress. Pleasant, alert and oriented to person place and time. Head/face: Normocephalic atraumatic, normal facial strength, no sinus tenderness, and no scars. Eyes: Extraocular movement intact and pupils equal round and reactive to light and accommodation. Neck/thyroid: Neck supple, thyroid nontender without masses, trachea midline.   Lymph nodes: No axillary, anterior and posterior cervical, femoral, supraclavicular, inguinal, umbilical, or pelvic adenopathy. Skin: No pallor, jaundice or cyanosis. No rashes or lesions present. If tattoos present - noted. Heart: Regular rate and rhythm, S1, S2 normal.  No murmurs. Lungs: Respiratory rhythm and depth normal, accessory muscles not use during expiration. Breasts: Deferred per patient. If breast exam completed, normal appearance, no masses or discharge bilaterally. Abdomen: Nontender, no masses palpable, no muscle rigidity, no hernias present, no hepatosplenomegaly, no ascitic fluid wave, normal bowel sounds present, all scars noted. Musculoskeletal: Full range of motion on all extremities, normal gait, strength and tone. Distal pulses 2 over 4×4 extremities. Neurologic: Alert and oriented to time, place and person. No apparent motor and sensory deficits. No effects/mood. Osteopathic: No cranial, cervical, thoracic or lumbosacral lesions or restrictions in motion. : See Above             Assessment   1. Rectocele  2. Cystocele  3. Mixed Urinary incontinence     Plan:   1. Anterior/posterior repair, Solyx sling and Cystoscopy     The patient is stable and wishes to proceed today with the planned operation. Her IV and if indicated, SCIP protocol antibiotic are being administered. If indicated, pneumatic compression hose have been placed and are functional. If a sided procedure is being entertained, the appropriate side has been marked. All questioned have been answered to the satisfaction of the patient and her family, if present.         Patient Active Problem List    Diagnosis Date Noted    Hepatitis C 02/12/2018    Cystocele 02/11/2018    Rectocele 02/11/2018    Urinary incontinence, mixed 02/11/2018    Hypertension 02/11/2018    Hypothyroidism 02/11/2018    Back pain 11/21/2013    COPD (chronic obstructive pulmonary disease) (HCC)     Atrial fibrillation (HCC)     Chronic kidney disease     Type 2 diabetes mellitus without

## 2018-02-12 ENCOUNTER — HOSPITAL ENCOUNTER (OUTPATIENT)
Age: 68
Setting detail: OUTPATIENT SURGERY
Discharge: HOME | End: 2018-02-12
Attending: OBSTETRICS & GYNECOLOGY | Admitting: OBSTETRICS & GYNECOLOGY
Payer: MEDICARE

## 2018-02-12 ENCOUNTER — ANESTHESIA EVENT (OUTPATIENT)
Dept: OPERATING ROOM | Age: 68
End: 2018-02-12
Payer: MEDICARE

## 2018-02-12 ENCOUNTER — ANESTHESIA (OUTPATIENT)
Dept: OPERATING ROOM | Age: 68
End: 2018-02-12
Payer: MEDICARE

## 2018-02-12 VITALS
DIASTOLIC BLOOD PRESSURE: 63 MMHG | OXYGEN SATURATION: 93 % | HEIGHT: 62 IN | TEMPERATURE: 96.8 F | SYSTOLIC BLOOD PRESSURE: 134 MMHG | WEIGHT: 235 LBS | HEART RATE: 72 BPM | RESPIRATION RATE: 15 BRPM | BODY MASS INDEX: 43.24 KG/M2

## 2018-02-12 VITALS — SYSTOLIC BLOOD PRESSURE: 213 MMHG | TEMPERATURE: 94.9 F | DIASTOLIC BLOOD PRESSURE: 107 MMHG | OXYGEN SATURATION: 98 %

## 2018-02-12 DIAGNOSIS — G89.18 POSTOPERATIVE PAIN: Primary | ICD-10-CM

## 2018-02-12 PROBLEM — B18.2 CHRONIC HEPATITIS C VIRUS INFECTION (HCC): Status: ACTIVE | Noted: 2018-02-12

## 2018-02-12 LAB
GLUCOSE BLD-MCNC: 163 MG/DL (ref 74–100)
INR BLD: 1.2
PARTIAL THROMBOPLASTIN TIME: 26.8 SEC (ref 21.3–31.3)
POC POTASSIUM: 3.1 MMOL/L (ref 3.5–4.5)
PROTHROMBIN TIME: 12.9 SEC (ref 9.4–12.6)

## 2018-02-12 PROCEDURE — 2580000003 HC RX 258: Performed by: OBSTETRICS & GYNECOLOGY

## 2018-02-12 PROCEDURE — 3600000014 HC SURGERY LEVEL 4 ADDTL 15MIN: Performed by: OBSTETRICS & GYNECOLOGY

## 2018-02-12 PROCEDURE — 7100000000 HC PACU RECOVERY - FIRST 15 MIN: Performed by: OBSTETRICS & GYNECOLOGY

## 2018-02-12 PROCEDURE — 6360000002 HC RX W HCPCS: Performed by: SPECIALIST

## 2018-02-12 PROCEDURE — 2500000003 HC RX 250 WO HCPCS: Performed by: OBSTETRICS & GYNECOLOGY

## 2018-02-12 PROCEDURE — 2500000003 HC RX 250 WO HCPCS: Performed by: SPECIALIST

## 2018-02-12 PROCEDURE — 85610 PROTHROMBIN TIME: CPT

## 2018-02-12 PROCEDURE — 7100000010 HC PHASE II RECOVERY - FIRST 15 MIN: Performed by: OBSTETRICS & GYNECOLOGY

## 2018-02-12 PROCEDURE — 3700000001 HC ADD 15 MINUTES (ANESTHESIA): Performed by: OBSTETRICS & GYNECOLOGY

## 2018-02-12 PROCEDURE — 84132 ASSAY OF SERUM POTASSIUM: CPT

## 2018-02-12 PROCEDURE — 7100000011 HC PHASE II RECOVERY - ADDTL 15 MIN: Performed by: OBSTETRICS & GYNECOLOGY

## 2018-02-12 PROCEDURE — 6360000002 HC RX W HCPCS: Performed by: ANESTHESIOLOGY

## 2018-02-12 PROCEDURE — 7100000001 HC PACU RECOVERY - ADDTL 15 MIN: Performed by: OBSTETRICS & GYNECOLOGY

## 2018-02-12 PROCEDURE — 3600000004 HC SURGERY LEVEL 4 BASE: Performed by: OBSTETRICS & GYNECOLOGY

## 2018-02-12 PROCEDURE — 6370000000 HC RX 637 (ALT 250 FOR IP): Performed by: ANESTHESIOLOGY

## 2018-02-12 PROCEDURE — 82947 ASSAY GLUCOSE BLOOD QUANT: CPT

## 2018-02-12 PROCEDURE — 88302 TISSUE EXAM BY PATHOLOGIST: CPT

## 2018-02-12 PROCEDURE — C1771 REP DEV, URINARY, W/SLING: HCPCS | Performed by: OBSTETRICS & GYNECOLOGY

## 2018-02-12 PROCEDURE — 6370000000 HC RX 637 (ALT 250 FOR IP): Performed by: OBSTETRICS & GYNECOLOGY

## 2018-02-12 PROCEDURE — 85730 THROMBOPLASTIN TIME PARTIAL: CPT

## 2018-02-12 PROCEDURE — 2580000003 HC RX 258: Performed by: SPECIALIST

## 2018-02-12 PROCEDURE — 87086 URINE CULTURE/COLONY COUNT: CPT

## 2018-02-12 PROCEDURE — 6360000002 HC RX W HCPCS: Performed by: OBSTETRICS & GYNECOLOGY

## 2018-02-12 PROCEDURE — 3700000000 HC ANESTHESIA ATTENDED CARE: Performed by: OBSTETRICS & GYNECOLOGY

## 2018-02-12 DEVICE — SYSTEM SLNG POLYPR SGL INCIS DEL DEV FOR STRESS URIN INCONT: Type: IMPLANTABLE DEVICE | Site: VAGINA | Status: FUNCTIONAL

## 2018-02-12 RX ORDER — FLUCONAZOLE 150 MG/1
150 TABLET ORAL ONCE
Qty: 1 TABLET | Refills: 2 | Status: SHIPPED | OUTPATIENT
Start: 2018-02-12 | End: 2018-02-12

## 2018-02-12 RX ORDER — METOCLOPRAMIDE HYDROCHLORIDE 5 MG/ML
10 INJECTION INTRAMUSCULAR; INTRAVENOUS ONCE
Status: COMPLETED | OUTPATIENT
Start: 2018-02-12 | End: 2018-02-12

## 2018-02-12 RX ORDER — ONDANSETRON 2 MG/ML
INJECTION INTRAMUSCULAR; INTRAVENOUS PRN
Status: DISCONTINUED | OUTPATIENT
Start: 2018-02-12 | End: 2018-02-12 | Stop reason: SDUPTHER

## 2018-02-12 RX ORDER — FENTANYL CITRATE 50 UG/ML
50 INJECTION, SOLUTION INTRAMUSCULAR; INTRAVENOUS EVERY 5 MIN PRN
Status: DISCONTINUED | OUTPATIENT
Start: 2018-02-12 | End: 2018-02-12 | Stop reason: HOSPADM

## 2018-02-12 RX ORDER — SCOLOPAMINE TRANSDERMAL SYSTEM 1 MG/1
1 PATCH, EXTENDED RELEASE TRANSDERMAL ONCE
Status: DISCONTINUED | OUTPATIENT
Start: 2018-02-12 | End: 2018-02-12 | Stop reason: HOSPADM

## 2018-02-12 RX ORDER — BUPIVACAINE HYDROCHLORIDE AND EPINEPHRINE 2.5; 5 MG/ML; UG/ML
INJECTION, SOLUTION EPIDURAL; INFILTRATION; INTRACAUDAL; PERINEURAL PRN
Status: DISCONTINUED | OUTPATIENT
Start: 2018-02-12 | End: 2018-02-12 | Stop reason: HOSPADM

## 2018-02-12 RX ORDER — ONDANSETRON 2 MG/ML
4 INJECTION INTRAMUSCULAR; INTRAVENOUS
Status: DISCONTINUED | OUTPATIENT
Start: 2018-02-12 | End: 2018-02-12 | Stop reason: HOSPADM

## 2018-02-12 RX ORDER — LIDOCAINE HYDROCHLORIDE 10 MG/ML
INJECTION, SOLUTION INFILTRATION; PERINEURAL PRN
Status: DISCONTINUED | OUTPATIENT
Start: 2018-02-12 | End: 2018-02-12 | Stop reason: SDUPTHER

## 2018-02-12 RX ORDER — SODIUM CHLORIDE, SODIUM LACTATE, POTASSIUM CHLORIDE, CALCIUM CHLORIDE 600; 310; 30; 20 MG/100ML; MG/100ML; MG/100ML; MG/100ML
1000 INJECTION, SOLUTION INTRAVENOUS CONTINUOUS
Status: DISCONTINUED | OUTPATIENT
Start: 2018-02-12 | End: 2018-02-12

## 2018-02-12 RX ORDER — SODIUM CHLORIDE, SODIUM LACTATE, POTASSIUM CHLORIDE, CALCIUM CHLORIDE 600; 310; 30; 20 MG/100ML; MG/100ML; MG/100ML; MG/100ML
INJECTION, SOLUTION INTRAVENOUS CONTINUOUS PRN
Status: DISCONTINUED | OUTPATIENT
Start: 2018-02-12 | End: 2018-02-12 | Stop reason: SDUPTHER

## 2018-02-12 RX ORDER — SODIUM CHLORIDE 9 MG/ML
INJECTION, SOLUTION INTRAVENOUS CONTINUOUS
Status: DISCONTINUED | OUTPATIENT
Start: 2018-02-12 | End: 2018-02-12 | Stop reason: HOSPADM

## 2018-02-12 RX ORDER — FENTANYL CITRATE 50 UG/ML
INJECTION, SOLUTION INTRAMUSCULAR; INTRAVENOUS PRN
Status: DISCONTINUED | OUTPATIENT
Start: 2018-02-12 | End: 2018-02-12 | Stop reason: SDUPTHER

## 2018-02-12 RX ORDER — PHENAZOPYRIDINE HYDROCHLORIDE 100 MG/1
200 TABLET, FILM COATED ORAL ONCE
Status: COMPLETED | OUTPATIENT
Start: 2018-02-12 | End: 2018-02-12

## 2018-02-12 RX ORDER — ACETAMINOPHEN 325 MG/1
650 TABLET ORAL EVERY 4 HOURS PRN
Status: DISCONTINUED | OUTPATIENT
Start: 2018-02-12 | End: 2018-02-12 | Stop reason: HOSPADM

## 2018-02-12 RX ORDER — CEPHALEXIN 500 MG/1
500 CAPSULE ORAL 2 TIMES DAILY
Qty: 10 CAPSULE | Refills: 0 | Status: SHIPPED | OUTPATIENT
Start: 2018-02-12 | End: 2018-02-17

## 2018-02-12 RX ORDER — MIDAZOLAM HYDROCHLORIDE 1 MG/ML
1 INJECTION INTRAMUSCULAR; INTRAVENOUS EVERY 5 MIN PRN
Status: COMPLETED | OUTPATIENT
Start: 2018-02-12 | End: 2018-02-12

## 2018-02-12 RX ORDER — PROPOFOL 10 MG/ML
INJECTION, EMULSION INTRAVENOUS PRN
Status: DISCONTINUED | OUTPATIENT
Start: 2018-02-12 | End: 2018-02-12 | Stop reason: SDUPTHER

## 2018-02-12 RX ORDER — FENTANYL CITRATE 50 UG/ML
25 INJECTION, SOLUTION INTRAMUSCULAR; INTRAVENOUS EVERY 5 MIN PRN
Status: DISCONTINUED | OUTPATIENT
Start: 2018-02-12 | End: 2018-02-12 | Stop reason: HOSPADM

## 2018-02-12 RX ORDER — ROCURONIUM BROMIDE 10 MG/ML
INJECTION, SOLUTION INTRAVENOUS PRN
Status: DISCONTINUED | OUTPATIENT
Start: 2018-02-12 | End: 2018-02-12 | Stop reason: SDUPTHER

## 2018-02-12 RX ORDER — DIPHENHYDRAMINE HYDROCHLORIDE 50 MG/ML
12.5 INJECTION INTRAMUSCULAR; INTRAVENOUS
Status: DISCONTINUED | OUTPATIENT
Start: 2018-02-12 | End: 2018-02-12 | Stop reason: HOSPADM

## 2018-02-12 RX ORDER — SENNA AND DOCUSATE SODIUM 50; 8.6 MG/1; MG/1
2 TABLET, FILM COATED ORAL NIGHTLY PRN
Qty: 60 TABLET | Refills: 1 | Status: ON HOLD | OUTPATIENT
Start: 2018-02-12 | End: 2018-03-27 | Stop reason: HOSPADM

## 2018-02-12 RX ORDER — ALBUTEROL SULFATE 2.5 MG/3ML
2.5 SOLUTION RESPIRATORY (INHALATION)
Status: COMPLETED | OUTPATIENT
Start: 2018-02-12 | End: 2018-02-12

## 2018-02-12 RX ORDER — HYDROCODONE BITARTRATE AND ACETAMINOPHEN 5; 325 MG/1; MG/1
1 TABLET ORAL
Qty: 20 TABLET | Refills: 0 | Status: SHIPPED | OUTPATIENT
Start: 2018-02-12 | End: 2018-02-15

## 2018-02-12 RX ORDER — GLYCOPYRROLATE 0.2 MG/ML
INJECTION INTRAMUSCULAR; INTRAVENOUS PRN
Status: DISCONTINUED | OUTPATIENT
Start: 2018-02-12 | End: 2018-02-12 | Stop reason: SDUPTHER

## 2018-02-12 RX ORDER — ONDANSETRON 4 MG/1
4 TABLET, FILM COATED ORAL EVERY 8 HOURS PRN
Qty: 10 TABLET | Refills: 0 | Status: ON HOLD | OUTPATIENT
Start: 2018-02-12 | End: 2018-03-21

## 2018-02-12 RX ADMIN — MIDAZOLAM HYDROCHLORIDE 1 MG: 1 INJECTION, SOLUTION INTRAMUSCULAR; INTRAVENOUS at 08:11

## 2018-02-12 RX ADMIN — FENTANYL CITRATE 50 MCG: 50 INJECTION INTRAMUSCULAR; INTRAVENOUS at 09:49

## 2018-02-12 RX ADMIN — MIDAZOLAM HYDROCHLORIDE 1 MG: 1 INJECTION, SOLUTION INTRAMUSCULAR; INTRAVENOUS at 08:16

## 2018-02-12 RX ADMIN — ROCURONIUM BROMIDE 50 MG: 10 INJECTION INTRAVENOUS at 08:32

## 2018-02-12 RX ADMIN — ACETAMINOPHEN 650 MG: 325 TABLET ORAL at 12:03

## 2018-02-12 RX ADMIN — FENTANYL CITRATE 50 MCG: 50 INJECTION INTRAMUSCULAR; INTRAVENOUS at 09:38

## 2018-02-12 RX ADMIN — FENTANYL CITRATE 50 MCG: 50 INJECTION INTRAMUSCULAR; INTRAVENOUS at 08:38

## 2018-02-12 RX ADMIN — SODIUM CHLORIDE: 9 INJECTION, SOLUTION INTRAVENOUS at 07:47

## 2018-02-12 RX ADMIN — SODIUM CHLORIDE, POTASSIUM CHLORIDE, SODIUM LACTATE AND CALCIUM CHLORIDE: 600; 310; 30; 20 INJECTION, SOLUTION INTRAVENOUS at 08:31

## 2018-02-12 RX ADMIN — Medication 2 G: at 08:50

## 2018-02-12 RX ADMIN — ONDANSETRON 4 MG: 2 INJECTION, SOLUTION INTRAMUSCULAR; INTRAVENOUS at 09:09

## 2018-02-12 RX ADMIN — FENTANYL CITRATE 50 MCG: 50 INJECTION INTRAMUSCULAR; INTRAVENOUS at 08:37

## 2018-02-12 RX ADMIN — LIDOCAINE HYDROCHLORIDE 50 MG: 10 INJECTION, SOLUTION INFILTRATION; PERINEURAL at 08:37

## 2018-02-12 RX ADMIN — FENTANYL CITRATE 50 MCG: 50 INJECTION INTRAMUSCULAR; INTRAVENOUS at 09:00

## 2018-02-12 RX ADMIN — PHENAZOPYRIDINE HYDROCHLORIDE 200 MG: 100 TABLET ORAL at 08:06

## 2018-02-12 RX ADMIN — NEOSTIGMINE METHYLSULFATE 2 MG: 1 INJECTION, SOLUTION INTRAMUSCULAR; INTRAVENOUS; SUBCUTANEOUS at 09:42

## 2018-02-12 RX ADMIN — PROPOFOL 200 MG: 10 INJECTION, EMULSION INTRAVENOUS at 08:37

## 2018-02-12 RX ADMIN — NEOSTIGMINE METHYLSULFATE 4 MG: 1 INJECTION, SOLUTION INTRAMUSCULAR; INTRAVENOUS; SUBCUTANEOUS at 09:27

## 2018-02-12 RX ADMIN — ALBUTEROL SULFATE 2.5 MG: 2.5 SOLUTION RESPIRATORY (INHALATION) at 10:58

## 2018-02-12 RX ADMIN — METOCLOPRAMIDE 10 MG: 5 INJECTION, SOLUTION INTRAMUSCULAR; INTRAVENOUS at 08:09

## 2018-02-12 RX ADMIN — GLYCOPYRROLATE 0.8 MG: 0.2 INJECTION INTRAMUSCULAR; INTRAVENOUS at 09:27

## 2018-02-12 ASSESSMENT — PULMONARY FUNCTION TESTS
PIF_VALUE: 27
PIF_VALUE: 25
PIF_VALUE: 25
PIF_VALUE: 8
PIF_VALUE: 27
PIF_VALUE: 27
PIF_VALUE: 26
PIF_VALUE: 27
PIF_VALUE: 27
PIF_VALUE: 25
PIF_VALUE: 2
PIF_VALUE: 25
PIF_VALUE: 4
PIF_VALUE: 26
PIF_VALUE: 29
PIF_VALUE: 28
PIF_VALUE: 27
PIF_VALUE: 25
PIF_VALUE: 0
PIF_VALUE: 27
PIF_VALUE: 25
PIF_VALUE: 25
PIF_VALUE: 2
PIF_VALUE: 28
PIF_VALUE: 27
PIF_VALUE: 25
PIF_VALUE: 27
PIF_VALUE: 9
PIF_VALUE: 26
PIF_VALUE: 27
PIF_VALUE: 26
PIF_VALUE: 25
PIF_VALUE: 27
PIF_VALUE: 23
PIF_VALUE: 28
PIF_VALUE: 26
PIF_VALUE: 25
PIF_VALUE: 28
PIF_VALUE: 0
PIF_VALUE: 27
PIF_VALUE: 25
PIF_VALUE: 25
PIF_VALUE: 27
PIF_VALUE: 28
PIF_VALUE: 28
PIF_VALUE: 4
PIF_VALUE: 27
PIF_VALUE: 5
PIF_VALUE: 27
PIF_VALUE: 27

## 2018-02-12 ASSESSMENT — PAIN SCALES - GENERAL
PAINLEVEL_OUTOF10: 4
PAINLEVEL_OUTOF10: 2
PAINLEVEL_OUTOF10: 5
PAINLEVEL_OUTOF10: 5

## 2018-02-12 ASSESSMENT — ENCOUNTER SYMPTOMS: STRIDOR: 0

## 2018-02-12 ASSESSMENT — PAIN - FUNCTIONAL ASSESSMENT: PAIN_FUNCTIONAL_ASSESSMENT: 0-10

## 2018-02-12 NOTE — BRIEF OP NOTE
Department of Gynecology   Brief Operative Report    Patient: Marta    : 1950  MRN: [id number]       Acct: [de-identified]   Date of Procedure: 18    Pre-operative Diagnosis: 69 y.o. female [id number] with mixed urinary incontinence, rectocele,     Post-operative Diagnosis: same     Procedure: Cystoscopy, Anterior/posterior colporrhaphy, solyx sling     Surgeon: Dr. Hong Tai DO     Assistant(s):    DUKE Upton DO    Anesthesia:   Type: general    Findings:  Grade 2 cystocele, grade 3 rectocele, Stress urinary incontinence       Total IV fluids:  1100ml    Urine Output:  100 ml      Estimated blood loss:  25ml     Blood Transfusion?:  No     Drains:  none    Specimens:  Vaginal mucosa     Instrument and Sponge Count:Correct    Complications:  none    See dictated operative report for full details.           Danni Lazaro DO  Ob/Gyn Resident  [facility], [address]  2018, 9:39 AM

## 2018-02-13 LAB
CULTURE: NO GROWTH
CULTURE: NORMAL
Lab: NORMAL
SPECIMEN DESCRIPTION: NORMAL
STATUS: NORMAL
SURGICAL PATHOLOGY REPORT: NORMAL

## 2018-02-19 ENCOUNTER — HOSPITAL ENCOUNTER (OUTPATIENT)
Dept: PHARMACY | Age: 68
Setting detail: THERAPIES SERIES
Discharge: HOME | End: 2018-02-19
Payer: MEDICARE

## 2018-02-19 DIAGNOSIS — I48.91 ATRIAL FIBRILLATION, UNSPECIFIED TYPE (HCC): ICD-10-CM

## 2018-02-19 LAB
INR BLD: 1.3
PROTIME: 15.8 SECONDS

## 2018-02-19 PROCEDURE — 99211 OFF/OP EST MAY X REQ PHY/QHP: CPT

## 2018-02-19 PROCEDURE — 85610 PROTHROMBIN TIME: CPT

## 2018-02-26 ENCOUNTER — HOSPITAL ENCOUNTER (OUTPATIENT)
Dept: PHARMACY | Age: 68
Setting detail: THERAPIES SERIES
Discharge: HOME | End: 2018-02-26
Payer: MEDICARE

## 2018-02-26 ENCOUNTER — HOSPITAL ENCOUNTER (OUTPATIENT)
Dept: PHYSICAL THERAPY | Facility: CLINIC | Age: 68
Setting detail: THERAPIES SERIES
Discharge: HOME | End: 2018-02-26
Payer: MEDICARE

## 2018-02-26 DIAGNOSIS — I48.91 ATRIAL FIBRILLATION, UNSPECIFIED TYPE (HCC): ICD-10-CM

## 2018-02-26 LAB
INR BLD: 2.9
PROTIME: 34.4 SECONDS

## 2018-02-26 PROCEDURE — 99211 OFF/OP EST MAY X REQ PHY/QHP: CPT

## 2018-02-26 PROCEDURE — 97140 MANUAL THERAPY 1/> REGIONS: CPT

## 2018-02-26 PROCEDURE — 85610 PROTHROMBIN TIME: CPT

## 2018-02-26 NOTE — FLOWSHEET NOTE
[] Debbie Howell       Outpatient Physical        Therapy       [address]       Phone: [phone]       Fax: [phone] [x] [facility] at [address] [facility]       Phone: [phone]       Fax: [phone] [] [facility]. [address] [place] [address] [employer]  [address] [facility]   Phone: [phone]   Fax:  [phone]     Physical Therapy Daily Treatment Note    Date:  2018  Patient Name:  Gaston Marks    :  1950  MRN: [id number]  Physician: Dania Campbell MD                                   Insurance: Medicare  Medical Diagnosis: R RC injury                                         Rehab Codes: G15.527; E47.159; M54.2; M79.1  Onset Date: 2015                                   Next 's appt:   Visit# / total visits: / (new script as of 18)     Cancels/No Shows: 2 cx    Subjective:    Pain:  [x] Yes  [] No Location: R UE Pain Rating: (0-10 scale) 0/10 at rest; 2-3 with movement  Pain altered Tx:  [x] No  [] Yes  Action:  Comments: shoulders did well until last Thursday and was unable to move neck/head; shoulders have been ok; pain from neck to shoulders; pt did warm compresses and tylenol and is a bit better but did no activity; symptoms are somewhat improved; pt still feeling not well post surgery for gynecological/urinary issues; today is the first day she is driving. Objective:   Modalities: none today  Precautions: blood thinners; hepatitis C 2008     Not today: PROM:  Flexion 100, abduction 100 c pain over supraspinatus in each direction. IR/ER at 0 abduction WNL, 45 degrees abduction pain into IR/ER with passive ROM  AROM 17: R shoulder: 110 degrees flexion; abduction 105 degrees active;    Exercises: pt does what she can at home  Exercise Reps/ Time Weight/ Level Comments    SHOULDER extension 10 A Standing, bending over; started 10/2/17    rows 10 A Standing, bending over; started 10/2/17    shoulder flexion, abduction, IR,ER isometrics 5 ea gentle Started 10/2/17             Side ER 20/8 A/1  added 10/11/17 (not today due to increase symptoms recently)   Other:    Therapeutic activities: discussed proper head support when sleeping in recliner; pt does have pillow that she has molded to head; pt advised to not allow head to alter from neutral position    11/8/17: UEFS: 30% of normal/70% affected  1/3/18: UEFS: 41.25% of normal/58.75% affected    DN:prepped area with alcohol wipe, consent obtained, standard procedure followed: 25 total: 3 B, RC insertion with 1\", 1 1\" in prox supraspinatus R;   2\" B suprascapular homeostatic point;  1.0\" needles in cervical and thoracic paraspinalsbilaterally bilaterally to T3 level with proximal R UT  being very tender; 2 1\" in R dorsal scapular and 1 on L side; 1\" in B spinal accessory with removal; 1\" in B greater occipital homeostatic point;   left in situ 20 min; Not today: MFR: in seated, MFR to bilat UTs, rhomboids, middle traps, infra and supraspinatus, proximal bicep, deltoid, cervical paraspinals    Kinesiotape: unloading R UT at the end of the session and mechanical correction of R proximal humerus; Not today: Discussed fall prevention: patient has Aneta/echo and is able to call anyone if she were to fall at home; patient also carries a phone with her at all times; Specific Instructions for next treatment:     scapular strengthening; PROM R shoulder; DN R shoulder, UE, cervical, scapula; MET to thoracic region; modalities as needed         Treatment Charges: Mins Units   []  Modalities     []  Ther Exercise     [x]  Manual Therapy 30 2   []  Ther Activities     []  Aquatics     []  Vasocompression     []  Other     Total Treatment time 30 2   Medicare tracking: (9YNQ81.90) as of 2/26/18: $456.01    Assessment:   [x] Progressing toward goals:   doing fairly well considering pt has not been here [] No change.     [] Other:    Problems: [x] ? Pain: 7/10 R shoulder  [x] ? ROM: R shoulder abduction with pain  [x] ? Strength: mild loss of strength R shoulder; poor scapular stability  [x] ? Function:71% affected function (29% of normal)  [x] Other: mm tightness c-spine/traps; significant trigger points     STG: (to be met in 6 treatments)  1. ? Pain: below 6/10 R shoulder: ONGOING 10/11/17   2. ? ROM: at least 100 degrees shoulder abduction   3. ? Strength: be able to do scapular strength ex without exacerbating symptoms: MET 10/11/17  4. ? Function: modify activities to decrease stress to R shoulder: MET 10/11/17  5. Independent with Home Exercise Program: MET 10/11/17   6. Demonstrate Knowledge of fall prevention: MET 10/11/17     LTG: (to be met in 12 treatments): as of 12/4/17 NOT YET MET; continue x 12 visits to #24; continue towards goal #44  1. Pain below 5/10  2. Decrease reliance on shoulder injections  3. Have at least 110 degrees shoulder abduction to indicate decreased impingement and improve reaching ability for light objects  4. Improved scapular position within 2 cm of opposite side         Patient goals: \"decreased pain initially then better movement\"     G-Codes: set on 1/3/18, visit #20  Functional Limitation: carrying, moving and handling objects  Functional Assessment Used: UEFS   Current Status Modifier: CK  Goal Status Modifier: Kyle Walkert      G-Codes: set on 11/8/17, visit #10  Functional Limitation: carrying, moving and handling objects  Functional Assessment Used: UEFS  Current Status Modifier: CL  Goal Status Modifier: CK      G-CODE     Functional Limitation: carrying, moving and handling objects  Functional Assessment Used: UEFS  Current Status Modifier: CL   Goal Status Modifier: CK    Pt. Education:  [x] Yes  [x] No  [] Reviewed Prior HEP/Ed: HOLD ex that are painful  Method of Education: [x] Verbal  [x] Demo : ther acitivities [] Written:   Comprehension of Education:  [x] Verbalizes understanding.   [] Demonstrates understanding. [] Needs review. [] Demonstrates/verbalizes HEP/Ed previously given. Plan: [x] Continue per plan of care.    [] Other:       Time In: 8:00 am            Time Out: 8:45 am    Electronically signed by:  Prakash German PT

## 2018-02-28 ENCOUNTER — HOSPITAL ENCOUNTER (OUTPATIENT)
Dept: PHYSICAL THERAPY | Facility: CLINIC | Age: 68
Setting detail: THERAPIES SERIES
Discharge: HOME | End: 2018-02-28
Payer: MEDICARE

## 2018-02-28 PROCEDURE — 97140 MANUAL THERAPY 1/> REGIONS: CPT

## 2018-03-05 ENCOUNTER — HOSPITAL ENCOUNTER (OUTPATIENT)
Dept: PHYSICAL THERAPY | Facility: CLINIC | Age: 68
Setting detail: THERAPIES SERIES
Discharge: HOME | End: 2018-03-05
Payer: MEDICARE

## 2018-03-05 PROCEDURE — 97140 MANUAL THERAPY 1/> REGIONS: CPT

## 2018-03-05 NOTE — FLOWSHEET NOTE
[] [facility]       Outpatient Physical        Therapy       [address]       Phone: [phone]       Fax: [phone] [x] [facility] at [address]       Phone: [phone]       Fax: [phone] [] [facility]. [address] [place] [address] [facility] [employer]  [address] [place] [address]   Phone: [phone]   Fax:  [phone]     Physical Therapy Daily Treatment Note    Date:  3/5/2018  Patient Name:  Taurus Pollard    :  1950  MRN: [id number]  Physician: Jono Buck MD                                   Insurance: Medicare  Medical Diagnosis: R RC injury                                         Rehab Codes: N30.397; F27.468; M54.2; M79.1  Onset Date: 2015                                   Next 's appt:   Visit# / total visits:  (new script as of 18)     Cancels/No Shows: 2 cx    Subjective:    Pain:  [x] Yes  [] No Location: R shoulder Pain Rating: (0-10 scale) 0/10 at rest; 4-5 with movement, overhead 8/10  Pain altered Tx:  [x] No  [] Yes  Action:  Comments: more sore anterior shoulder and around clavicle on R side; patient with decreasing ability to perform HEP; only able to do the pulleys for a minutes or 2 before stopping due to pain; she has not had an injection in several months, feels they were not helpful. Pt tries not to use her arms overhead at all and minimally in general.    Objective:   Modalities: none today  Precautions: blood thinners; hepatitis C 2008     Not today: PROM:  Flexion 100, abduction 100 c pain over supraspinatus in each direction. IR/ER at 0 abduction WNL, 45 degrees abduction pain into IR/ER with passive ROM  AROM 17: R shoulder: 110 degrees flexion; abduction 105 degrees active;    Exercises: pt does what she can at home  Exercise Reps/ Time Weight/ Level Comments    SHOULDER extension 10 A Standing, bending over; started 10/2/17    rows 10 A Standing, bending over; started

## 2018-03-07 ENCOUNTER — HOSPITAL ENCOUNTER (OUTPATIENT)
Dept: PHYSICAL THERAPY | Facility: CLINIC | Age: 68
Setting detail: THERAPIES SERIES
Discharge: HOME | End: 2018-03-07
Payer: MEDICARE

## 2018-03-07 ENCOUNTER — HOSPITAL ENCOUNTER (OUTPATIENT)
Dept: PHARMACY | Age: 68
Setting detail: THERAPIES SERIES
Discharge: HOME | End: 2018-03-07
Payer: MEDICARE

## 2018-03-07 DIAGNOSIS — I48.91 ATRIAL FIBRILLATION, UNSPECIFIED TYPE (HCC): ICD-10-CM

## 2018-03-07 LAB
INR BLD: 1.9
PROTIME: 22.8 SECONDS

## 2018-03-07 PROCEDURE — 97140 MANUAL THERAPY 1/> REGIONS: CPT

## 2018-03-07 PROCEDURE — 85610 PROTHROMBIN TIME: CPT

## 2018-03-07 PROCEDURE — 99211 OFF/OP EST MAY X REQ PHY/QHP: CPT

## 2018-03-07 PROCEDURE — G8985 CARRY GOAL STATUS: HCPCS

## 2018-03-07 PROCEDURE — G8984 CARRY CURRENT STATUS: HCPCS

## 2018-03-07 NOTE — PROGRESS NOTES
Patient states compliant all of the time with regimen. No bleeding or thromboembolic side effects noted. No significant med or dietary changes. No significant recent illness or disease state changes. PT/INR done in office per protocol. INR is 1.9 which is just below goal.     Warfarin regimen will be held for the next 5 days prior to endoscopy scheduled 3/13. .Advised restart 5mg x 2 then 2.5mg daily. Will retest in about 1 week after procedure. Patient understands dosing directions and information discussed. Dosing schedule and follow up appointment given to patient. Progress note routed to referring physicians office. Patient acknowledges working in consult agreement with pharmacist as referred by his/her physician.

## 2018-03-07 NOTE — FLOWSHEET NOTE
[] [facility]       Outpatient Physical        Therapy       [address]       Phone: [phone]       Fax: [phone] [x] [facility] at [address]       Phone: [phone]       Fax: [phone] [] [place]. [facility]  [address] [place] [address]   Phone: [phone]   Fax:  [phone]     Physical Therapy Daily Treatment Note    Date:  3/7/2018  Patient Name:  Justin King    :  1950  MRN: [id number]  Physician: Manoj Monteiro MD                                   Insurance: Medicare  Medical Diagnosis: R RC injury                                         Rehab Codes: J14.004; E90.084; M54.2; M79.1  Onset Date: 2015                                   Next 's appt:   Visit# / total visits: 41/75 (new script as of 18)     Cancels/No Shows: 2 cx    Subjective:    Pain:  [x] Yes  [] No Location: R shoulder Pain Rating: (0-10 scale) 5/10 at rest; 8 with movement, overhead 8/10  Pain altered Tx:  [x] No  [] Yes  Action:  Comments: anterior R clavicle not as bad but with severe pain since yesterday from R occiput to R deltoid; pain present even at rest  Objective:   Modalities: none today  Precautions: blood thinners; hepatitis C 2008     Not today: PROM:  Flexion 100, abduction 100 c pain over supraspinatus in each direction. IR/ER at 0 abduction WNL, 45 degrees abduction pain into IR/ER with passive ROM  AROM 17: R shoulder: 110 degrees flexion; abduction 105 degrees active;    Exercises: pt does what she can at home  Exercise Reps/ Time Weight/ Level Comments    SHOULDER extension 10 A Standing, bending over; started 10/2/17    rows 10 A Standing, bending over; started 10/2/17    shoulder flexion, abduction, IR,ER isometrics 5 ea gentle Started 10/2/17             Side ER 20/8 A/1  added 10/11/17 (not today due to increase symptoms recently)   Other:    Therapeutic activities: discussed proper head support when sleeping in recliner; pt does have pillow that she has molded to head; pt advised to not allow head to alter from neutral position    11/8/17: UEFS: 30% of normal/70% affected  1/3/18: UEFS: 41.25% of normal/58.75% affected    DN:prepped area with alcohol wipe, consent obtained, standard procedure followed: 44 total: 3 R, RC insertion with 1\", 1 1\" in prox supraspinatus B;   2\" B suprascapular homeostatic point plus 2\" longitudinally on B;  1.0\" needles in cervical and thoracic paraspinalsbilaterally bilaterally to T5 level 1\" B spinal accessory with proximal R,L  UT  being very tender; 2 1\" in B dorsal scapular l; 1\" in B greater occipital homeostatic point;  0.5\" in upper cervical paravertebral points with tenderness when we attempted 1\" but not bad of symptoms with 0.5\" needles; Not today: MFR: in seated, MFR to bilat UTs, rhomboids, middle traps, infra and supraspinatus, proximal bicep, deltoid, cervical paraspinals    Not today: Kinesiotape: unloading R UT at the end of the session and mechanical correction of R proximal humerus; Not today: Discussed fall prevention: patient has Aneta/echo and is able to call anyone if she were to fall at home; patient also carries a phone with her at all times; Specific Instructions for next treatment:     scapular strengthening; PROM R shoulder; DN R shoulder, UE, cervical, scapula; MET to thoracic region; modalities as needed         Treatment Charges: Mins Units   []  Modalities     []  Ther Exercise     [x]  Manual Therapy 30 2   []  Ther Activities     []  Aquatics     []  Vasocompression     []  Other     Total Treatment time 30 2   Medicare tracking: ([id number]) as of 3/7/18: $599.80    Assessment:   [] Progressing toward goals:      [x] No change. Aggravated symptoms possibly due to putting out trash   [] Other:    Problems:    [x] ? Pain: 7/10 R shoulder  [x] ? ROM: R shoulder abduction with pain  [x] ?  Strength: mild

## 2018-03-12 ENCOUNTER — HOSPITAL ENCOUNTER (OUTPATIENT)
Dept: PHYSICAL THERAPY | Facility: CLINIC | Age: 68
Setting detail: THERAPIES SERIES
Discharge: HOME | End: 2018-03-12
Payer: MEDICARE

## 2018-03-12 PROCEDURE — 97140 MANUAL THERAPY 1/> REGIONS: CPT

## 2018-03-12 NOTE — FLOWSHEET NOTE
pillow that she has molded to head; pt advised to not allow head to alter from neutral position    11/8/17: UEFS: 30% of normal/70% affected  1/3/18: UEFS: 41.25% of normal/58.75% affected    DN:prepped area with alcohol wipe, consent obtained, standard procedure followed: 35 total: 2 R, RC insertion with 1\", 1 1\" in prox supraspinatus B;   2\" B suprascapular homeostatic point plus 2\" longitudinally on R;  1.0\" needles in cervical and thoracic paraspinalsbilaterally bilaterally to T5 level 1\" B spinal accessory with proximal R,L  UT  being very tender; 2 1\" in B dorsal scapular l; 0.5\" in B greater occipital homeostatic point;  0.5\" in upper cervical paravertebral points as this worked well for her at the last visit; 0.5\" R greater auricular homeostatic point. Not today: MFR: in seated, MFR to bilat UTs, rhomboids, middle traps, infra and supraspinatus, proximal bicep, deltoid, cervical paraspinals    Not today: Kinesiotape: unloading R UT at the end of the session and mechanical correction of R proximal humerus; Not today: Discussed fall prevention: patient has Aneta/echo and is able to call anyone if she were to fall at home; patient also carries a phone with her at all times; Specific Instructions for next treatment:     scapular strengthening; PROM R shoulder; DN R shoulder, UE, cervical, scapula; MET to thoracic region; modalities as needed         Treatment Charges: Mins Units   []  Modalities     []  Ther Exercise     [x]  Manual Therapy 30 2   []  Ther Activities     []  Aquatics     []  Vasocompression     []  Other     Total Treatment time 30 2   Medicare tracking: ([id number]) as of 3/12/18: $120.42    Assessment:   [x] Progressing toward goals:   better tolerance today except she has unrelated health issues   [] No change. [] Other:    Problems:    [x] ? Pain: 7/10 R shoulder  [x] ? ROM: R shoulder abduction with pain  [x] ?  Strength: mild loss of strength R shoulder; poor scapular stability  [x] ? Function:71% affected function (29% of normal)  [x] Other: mm tightness c-spine/traps; significant trigger points     STG: (to be met in 6 treatments)  1. ? Pain: below 6/10 R shoulder: ONGOING 10/11/17   2. ? ROM: at least 100 degrees shoulder abduction   3. ? Strength: be able to do scapular strength ex without exacerbating symptoms: MET 10/11/17  4. ? Function: modify activities to decrease stress to R shoulder: MET 10/11/17  5. Independent with Home Exercise Program: MET 10/11/17   6. Demonstrate Knowledge of fall prevention: MET 10/11/17     LTG: (to be met in 12 treatments): as of 12/4/17 NOT YET MET; continue x 12 visits to #24; continue towards goal #44  1. Pain below 5/10  2. Decrease reliance on shoulder injections  3. Have at least 110 degrees shoulder abduction to indicate decreased impingement and improve reaching ability for light objects  4. Improved scapular position within 2 cm of opposite side         Patient goals: \"decreased pain initially then better movement\"     G-Codes: set on 3/7/18, visit #30  Functional Limitation: carrying, moving and handling objects  Functional Assessment Used: UEFS  Current Status Modifier: CK  Goal Status Modifier: Sadaf Ates      G-Codes: set on 1/3/18, visit #20  Functional Limitation: carrying, moving and handling objects  Functional Assessment Used: UEFS   Current Status Modifier: CK  Goal Status Modifier: Vertell Ates      G-Codes: set on 11/8/17, visit #10  Functional Limitation: carrying, moving and handling objects  Functional Assessment Used: UEFS  Current Status Modifier: CL  Goal Status Modifier: CK      G-CODE     Functional Limitation: carrying, moving and handling objects  Functional Assessment Used: UEFS  Current Status Modifier: CL   Goal Status Modifier: CK    Pt.  Education:  [] Yes  [x] No  [] Reviewed Prior HEP/Ed: HOLD ex that are painful  Method of Education: [] Verbal  [] Demo : ther acitivities [] Written:   Comprehension of Education:  [] Verbalizes understanding. [] Demonstrates understanding. [] Needs review. [] Demonstrates/verbalizes HEP/Ed previously given. Plan: [x] Continue per plan of care.    [] Other:       Time In: 8:00 am            Time Out: 8:53 am    Electronically signed by:  Raquel Forde PT

## 2018-03-14 ENCOUNTER — HOSPITAL ENCOUNTER (OUTPATIENT)
Dept: PHYSICAL THERAPY | Facility: CLINIC | Age: 68
Setting detail: THERAPIES SERIES
Discharge: HOME | End: 2018-03-14
Payer: MEDICARE

## 2018-03-14 NOTE — FLOWSHEET NOTE
[] [facility]        Outpatient Physical                Therapy       [address]       Phone: [phone]       Fax: [phone] [x] [facility] at [address]       Phone: [phone]       Fax: [phone] [] [place].  [address] [place] [address]     [address] [address]      Phone: [phone]      Fax:  [phone]     Physical Therapy Cancel/No Show note    Date: 3/14/2018  Patient: Rajwinder Duffy  : 1950  MRN: [id number]    Cancels/No Shows to date: 3 cx    For today's appointment patient:  [x]  Cancelled  []  Rescheduled appointment  []  No-show     Reason given by patient:  []  Patient ill  [x]  Conflicting appointment  []  No transportation    []  Conflict with work  []  No reason given  []  Weather related  []  Other:      Comments:      [x]  Next appointment was confirmed    Electronically signed by: Lin Gross, PT

## 2018-03-19 ENCOUNTER — HOSPITAL ENCOUNTER (OUTPATIENT)
Dept: PHYSICAL THERAPY | Facility: CLINIC | Age: 68
Setting detail: THERAPIES SERIES
Discharge: HOME | End: 2018-03-19
Payer: MEDICARE

## 2018-03-19 PROCEDURE — 97140 MANUAL THERAPY 1/> REGIONS: CPT

## 2018-03-21 ENCOUNTER — APPOINTMENT (OUTPATIENT)
Dept: ULTRASOUND IMAGING | Age: 68
DRG: 392 | End: 2018-03-21
Payer: MEDICARE

## 2018-03-21 ENCOUNTER — HOSPITAL ENCOUNTER (INPATIENT)
Age: 68
LOS: 5 days | Discharge: NURSING, REHAB OR LONG-TERM CARE | DRG: 392 | End: 2018-03-27
Attending: EMERGENCY MEDICINE | Admitting: INTERNAL MEDICINE
Payer: MEDICARE

## 2018-03-21 ENCOUNTER — APPOINTMENT (OUTPATIENT)
Dept: PHARMACY | Age: 68
End: 2018-03-21
Payer: MEDICARE

## 2018-03-21 ENCOUNTER — TELEPHONE (OUTPATIENT)
Dept: PHARMACY | Age: 68
End: 2018-03-21

## 2018-03-21 ENCOUNTER — HOSPITAL ENCOUNTER (OUTPATIENT)
Dept: PHYSICAL THERAPY | Facility: CLINIC | Age: 68
Setting detail: THERAPIES SERIES
Discharge: HOME | End: 2018-03-21
Payer: MEDICARE

## 2018-03-21 ENCOUNTER — APPOINTMENT (OUTPATIENT)
Dept: CT IMAGING | Age: 68
DRG: 392 | End: 2018-03-21
Payer: MEDICARE

## 2018-03-21 DIAGNOSIS — K57.32 DIVERTICULITIS OF COLON: Primary | ICD-10-CM

## 2018-03-21 DIAGNOSIS — R93.89 ABNORMAL CT SCAN: ICD-10-CM

## 2018-03-21 DIAGNOSIS — I48.91 ATRIAL FIBRILLATION, UNSPECIFIED TYPE (HCC): ICD-10-CM

## 2018-03-21 DIAGNOSIS — L23.9 CONTACT HYPERSENSITIVITY: ICD-10-CM

## 2018-03-21 PROBLEM — R10.11 RIGHT UPPER QUADRANT PAIN: Status: ACTIVE | Noted: 2018-03-21

## 2018-03-21 PROBLEM — N39.0 UTI (URINARY TRACT INFECTION): Status: ACTIVE | Noted: 2018-03-21

## 2018-03-21 PROBLEM — K31.84 GASTROPARESIS: Status: ACTIVE | Noted: 2018-03-21

## 2018-03-21 LAB
-: ABNORMAL
ABSOLUTE EOS #: 0.3 K/UL (ref 0–0.4)
ABSOLUTE IMMATURE GRANULOCYTE: ABNORMAL K/UL (ref 0–0.3)
ABSOLUTE LYMPH #: 1.2 K/UL (ref 1–4.8)
ABSOLUTE MONO #: 1 K/UL (ref 0.2–0.8)
ALBUMIN SERPL-MCNC: 3.9 G/DL (ref 3.5–5.2)
ALBUMIN/GLOBULIN RATIO: ABNORMAL (ref 1–2.5)
ALP BLD-CCNC: 161 U/L (ref 35–104)
ALT SERPL-CCNC: 15 U/L (ref 5–33)
AMORPHOUS: ABNORMAL
AMYLASE: 36 U/L (ref 28–100)
ANION GAP SERPL CALCULATED.3IONS-SCNC: 15 MMOL/L (ref 9–17)
AST SERPL-CCNC: 36 U/L
BACTERIA: ABNORMAL
BASOPHILS # BLD: 0 % (ref 0–2)
BASOPHILS ABSOLUTE: 0 K/UL (ref 0–0.2)
BILIRUB SERPL-MCNC: 0.88 MG/DL (ref 0.3–1.2)
BILIRUBIN DIRECT: 0.25 MG/DL
BILIRUBIN URINE: NEGATIVE
BILIRUBIN, INDIRECT: 0.63 MG/DL (ref 0–1)
BNP INTERPRETATION: ABNORMAL
BUN BLDV-MCNC: 13 MG/DL (ref 8–23)
BUN/CREAT BLD: 8 (ref 9–20)
CALCIUM SERPL-MCNC: 9 MG/DL (ref 8.6–10.4)
CASTS UA: ABNORMAL /LPF
CHLORIDE BLD-SCNC: 100 MMOL/L (ref 98–107)
CO2: 26 MMOL/L (ref 20–31)
COLOR: YELLOW
COMMENT UA: ABNORMAL
CREAT SERPL-MCNC: 1.63 MG/DL (ref 0.5–0.9)
CRYSTALS, UA: ABNORMAL /HPF
DIFFERENTIAL TYPE: ABNORMAL
EOSINOPHILS RELATIVE PERCENT: 4 % (ref 1–4)
EPITHELIAL CELLS UA: ABNORMAL /HPF (ref 0–5)
GFR AFRICAN AMERICAN: 38 ML/MIN
GFR NON-AFRICAN AMERICAN: 31 ML/MIN
GFR SERPL CREATININE-BSD FRML MDRD: ABNORMAL ML/MIN/{1.73_M2}
GFR SERPL CREATININE-BSD FRML MDRD: ABNORMAL ML/MIN/{1.73_M2}
GLOBULIN: ABNORMAL G/DL (ref 1.5–3.8)
GLUCOSE BLD-MCNC: 140 MG/DL (ref 70–99)
GLUCOSE URINE: NEGATIVE
HCT VFR BLD CALC: 38.7 % (ref 36–46)
HEMOGLOBIN: 12.9 G/DL (ref 12–16)
IMMATURE GRANULOCYTES: ABNORMAL %
INR BLD: 1.2
KETONES, URINE: NEGATIVE
LACTIC ACID: 2 MMOL/L (ref 0.5–2.2)
LEUKOCYTE ESTERASE, URINE: ABNORMAL
LIPASE: 45 U/L (ref 13–60)
LYMPHOCYTES # BLD: 12 % (ref 24–44)
MCH RBC QN AUTO: 33.5 PG (ref 26–34)
MCHC RBC AUTO-ENTMCNC: 33.4 G/DL (ref 31–37)
MCV RBC AUTO: 100.5 FL (ref 80–100)
MONOCYTES # BLD: 11 % (ref 1–7)
MUCUS: ABNORMAL
NITRITE, URINE: NEGATIVE
NRBC AUTOMATED: ABNORMAL PER 100 WBC
OTHER OBSERVATIONS UA: ABNORMAL
PDW BLD-RTO: 14.8 % (ref 11.5–14.5)
PH UA: 7.5 (ref 5–8)
PLATELET # BLD: 265 K/UL (ref 130–400)
PLATELET ESTIMATE: ABNORMAL
PMV BLD AUTO: 8.8 FL (ref 6–12)
POTASSIUM SERPL-SCNC: 3.5 MMOL/L (ref 3.7–5.3)
PRO-BNP: 310 PG/ML
PROTEIN UA: ABNORMAL
PROTHROMBIN TIME: 12.3 SEC (ref 9.7–11.6)
RBC # BLD: 3.85 M/UL (ref 4–5.2)
RBC # BLD: ABNORMAL 10*6/UL
RBC UA: ABNORMAL /HPF (ref 0–2)
RENAL EPITHELIAL, UA: ABNORMAL /HPF
SEG NEUTROPHILS: 73 % (ref 36–66)
SEGMENTED NEUTROPHILS ABSOLUTE COUNT: 7.1 K/UL (ref 1.8–7.7)
SODIUM BLD-SCNC: 141 MMOL/L (ref 135–144)
SPECIFIC GRAVITY UA: 1.02 (ref 1–1.03)
TOTAL PROTEIN: 7.4 G/DL (ref 6.4–8.3)
TRICHOMONAS: ABNORMAL
TURBIDITY: CLEAR
URINE HGB: ABNORMAL
UROBILINOGEN, URINE: NORMAL
WBC # BLD: 9.7 K/UL (ref 3.5–11)
WBC # BLD: ABNORMAL 10*3/UL
WBC UA: ABNORMAL /HPF (ref 0–5)
YEAST: ABNORMAL

## 2018-03-21 PROCEDURE — 6370000000 HC RX 637 (ALT 250 FOR IP): Performed by: NURSE PRACTITIONER

## 2018-03-21 PROCEDURE — 96367 TX/PROPH/DG ADDL SEQ IV INF: CPT

## 2018-03-21 PROCEDURE — 81001 URINALYSIS AUTO W/SCOPE: CPT

## 2018-03-21 PROCEDURE — 83605 ASSAY OF LACTIC ACID: CPT

## 2018-03-21 PROCEDURE — 96376 TX/PRO/DX INJ SAME DRUG ADON: CPT

## 2018-03-21 PROCEDURE — 99225 PR SBSQ OBSERVATION CARE/DAY 25 MINUTES: CPT | Performed by: INTERNAL MEDICINE

## 2018-03-21 PROCEDURE — 99285 EMERGENCY DEPT VISIT HI MDM: CPT

## 2018-03-21 PROCEDURE — 74176 CT ABD & PELVIS W/O CONTRAST: CPT

## 2018-03-21 PROCEDURE — 2580000003 HC RX 258: Performed by: NURSE PRACTITIONER

## 2018-03-21 PROCEDURE — 76705 ECHO EXAM OF ABDOMEN: CPT

## 2018-03-21 PROCEDURE — C9113 INJ PANTOPRAZOLE SODIUM, VIA: HCPCS | Performed by: EMERGENCY MEDICINE

## 2018-03-21 PROCEDURE — 6360000002 HC RX W HCPCS: Performed by: NURSE PRACTITIONER

## 2018-03-21 PROCEDURE — 80076 HEPATIC FUNCTION PANEL: CPT

## 2018-03-21 PROCEDURE — 96372 THER/PROPH/DIAG INJ SC/IM: CPT

## 2018-03-21 PROCEDURE — 83690 ASSAY OF LIPASE: CPT

## 2018-03-21 PROCEDURE — G0378 HOSPITAL OBSERVATION PER HR: HCPCS

## 2018-03-21 PROCEDURE — 80048 BASIC METABOLIC PNL TOTAL CA: CPT

## 2018-03-21 PROCEDURE — 87086 URINE CULTURE/COLONY COUNT: CPT

## 2018-03-21 PROCEDURE — 96366 THER/PROPH/DIAG IV INF ADDON: CPT

## 2018-03-21 PROCEDURE — 83880 ASSAY OF NATRIURETIC PEPTIDE: CPT

## 2018-03-21 PROCEDURE — 76775 US EXAM ABDO BACK WALL LIM: CPT

## 2018-03-21 PROCEDURE — 2580000003 HC RX 258: Performed by: EMERGENCY MEDICINE

## 2018-03-21 PROCEDURE — 87040 BLOOD CULTURE FOR BACTERIA: CPT

## 2018-03-21 PROCEDURE — 87088 URINE BACTERIA CULTURE: CPT

## 2018-03-21 PROCEDURE — 94664 DEMO&/EVAL PT USE INHALER: CPT

## 2018-03-21 PROCEDURE — 94640 AIRWAY INHALATION TREATMENT: CPT

## 2018-03-21 PROCEDURE — 85025 COMPLETE CBC W/AUTO DIFF WBC: CPT

## 2018-03-21 PROCEDURE — 85610 PROTHROMBIN TIME: CPT

## 2018-03-21 PROCEDURE — 87077 CULTURE AEROBIC IDENTIFY: CPT

## 2018-03-21 PROCEDURE — 96375 TX/PRO/DX INJ NEW DRUG ADDON: CPT

## 2018-03-21 PROCEDURE — 2500000003 HC RX 250 WO HCPCS: Performed by: EMERGENCY MEDICINE

## 2018-03-21 PROCEDURE — 87186 SC STD MICRODIL/AGAR DIL: CPT

## 2018-03-21 PROCEDURE — 96365 THER/PROPH/DIAG IV INF INIT: CPT

## 2018-03-21 PROCEDURE — 2580000003 HC RX 258: Performed by: INTERNAL MEDICINE

## 2018-03-21 PROCEDURE — 6360000002 HC RX W HCPCS: Performed by: EMERGENCY MEDICINE

## 2018-03-21 PROCEDURE — 2500000003 HC RX 250 WO HCPCS: Performed by: NURSE PRACTITIONER

## 2018-03-21 PROCEDURE — 6360000002 HC RX W HCPCS: Performed by: INTERNAL MEDICINE

## 2018-03-21 PROCEDURE — 82150 ASSAY OF AMYLASE: CPT

## 2018-03-21 RX ORDER — NICOTINE 21 MG/24HR
1 PATCH, TRANSDERMAL 24 HOURS TRANSDERMAL DAILY PRN
Status: DISCONTINUED | OUTPATIENT
Start: 2018-03-21 | End: 2018-03-27 | Stop reason: HOSPADM

## 2018-03-21 RX ORDER — CIPROFLOXACIN 2 MG/ML
400 INJECTION, SOLUTION INTRAVENOUS ONCE
Status: COMPLETED | OUTPATIENT
Start: 2018-03-21 | End: 2018-03-21

## 2018-03-21 RX ORDER — LEVOTHYROXINE SODIUM 112 UG/1
112 TABLET ORAL DAILY
Status: DISCONTINUED | OUTPATIENT
Start: 2018-03-21 | End: 2018-03-27 | Stop reason: HOSPADM

## 2018-03-21 RX ORDER — SODIUM CHLORIDE 0.9 % (FLUSH) 0.9 %
10 SYRINGE (ML) INJECTION EVERY 12 HOURS SCHEDULED
Status: DISCONTINUED | OUTPATIENT
Start: 2018-03-21 | End: 2018-03-27 | Stop reason: HOSPADM

## 2018-03-21 RX ORDER — SENNA PLUS 8.6 MG/1
1 TABLET ORAL NIGHTLY PRN
Status: DISCONTINUED | OUTPATIENT
Start: 2018-03-21 | End: 2018-03-27 | Stop reason: HOSPADM

## 2018-03-21 RX ORDER — NEBIVOLOL 5 MG/1
5 TABLET ORAL DAILY
Status: DISCONTINUED | OUTPATIENT
Start: 2018-03-21 | End: 2018-03-24

## 2018-03-21 RX ORDER — PANTOPRAZOLE SODIUM 40 MG/10ML
40 INJECTION, POWDER, LYOPHILIZED, FOR SOLUTION INTRAVENOUS ONCE
Status: COMPLETED | OUTPATIENT
Start: 2018-03-21 | End: 2018-03-21

## 2018-03-21 RX ORDER — ACETAMINOPHEN 325 MG/1
650 TABLET ORAL EVERY 4 HOURS PRN
Status: DISCONTINUED | OUTPATIENT
Start: 2018-03-21 | End: 2018-03-27 | Stop reason: HOSPADM

## 2018-03-21 RX ORDER — POTASSIUM CHLORIDE 7.45 MG/ML
10 INJECTION INTRAVENOUS PRN
Status: DISCONTINUED | OUTPATIENT
Start: 2018-03-21 | End: 2018-03-23

## 2018-03-21 RX ORDER — ONDANSETRON 2 MG/ML
4 INJECTION INTRAMUSCULAR; INTRAVENOUS EVERY 4 HOURS PRN
Status: DISCONTINUED | OUTPATIENT
Start: 2018-03-21 | End: 2018-03-27 | Stop reason: HOSPADM

## 2018-03-21 RX ORDER — GABAPENTIN 400 MG/1
400 CAPSULE ORAL 2 TIMES DAILY
Status: DISCONTINUED | OUTPATIENT
Start: 2018-03-21 | End: 2018-03-27 | Stop reason: HOSPADM

## 2018-03-21 RX ORDER — ATORVASTATIN CALCIUM 80 MG/1
80 TABLET, FILM COATED ORAL NIGHTLY
Status: DISCONTINUED | OUTPATIENT
Start: 2018-03-21 | End: 2018-03-27 | Stop reason: HOSPADM

## 2018-03-21 RX ORDER — FOLIC ACID 1 MG/1
1 TABLET ORAL DAILY
Status: DISCONTINUED | OUTPATIENT
Start: 2018-03-21 | End: 2018-03-27 | Stop reason: HOSPADM

## 2018-03-21 RX ORDER — ONDANSETRON 2 MG/ML
4 INJECTION INTRAMUSCULAR; INTRAVENOUS EVERY 6 HOURS PRN
Status: DISCONTINUED | OUTPATIENT
Start: 2018-03-21 | End: 2018-03-21

## 2018-03-21 RX ORDER — 0.9 % SODIUM CHLORIDE 0.9 %
10 VIAL (ML) INJECTION ONCE
Status: COMPLETED | OUTPATIENT
Start: 2018-03-21 | End: 2018-03-21

## 2018-03-21 RX ORDER — MAGNESIUM SULFATE 1 G/100ML
1 INJECTION INTRAVENOUS PRN
Status: DISCONTINUED | OUTPATIENT
Start: 2018-03-21 | End: 2018-03-27 | Stop reason: HOSPADM

## 2018-03-21 RX ORDER — HYDROXYZINE HYDROCHLORIDE 25 MG/1
25 TABLET, FILM COATED ORAL 3 TIMES DAILY PRN
Status: DISCONTINUED | OUTPATIENT
Start: 2018-03-21 | End: 2018-03-27 | Stop reason: HOSPADM

## 2018-03-21 RX ORDER — SPIRONOLACTONE 100 MG/1
100 TABLET, FILM COATED ORAL DAILY
Status: DISCONTINUED | OUTPATIENT
Start: 2018-03-21 | End: 2018-03-21

## 2018-03-21 RX ORDER — BISACODYL 10 MG
10 SUPPOSITORY, RECTAL RECTAL DAILY PRN
Status: DISCONTINUED | OUTPATIENT
Start: 2018-03-21 | End: 2018-03-22

## 2018-03-21 RX ORDER — CALCITRIOL 0.25 UG/1
0.25 CAPSULE, LIQUID FILLED ORAL
Status: DISCONTINUED | OUTPATIENT
Start: 2018-03-21 | End: 2018-03-21

## 2018-03-21 RX ORDER — SODIUM CHLORIDE 0.9 % (FLUSH) 0.9 %
10 SYRINGE (ML) INJECTION PRN
Status: DISCONTINUED | OUTPATIENT
Start: 2018-03-21 | End: 2018-03-27 | Stop reason: HOSPADM

## 2018-03-21 RX ORDER — ESTRADIOL 0.1 MG/G
2 CREAM VAGINAL DAILY
Status: DISCONTINUED | OUTPATIENT
Start: 2018-03-21 | End: 2018-03-21

## 2018-03-21 RX ORDER — ONDANSETRON 2 MG/ML
8 INJECTION INTRAMUSCULAR; INTRAVENOUS ONCE
Status: COMPLETED | OUTPATIENT
Start: 2018-03-21 | End: 2018-03-21

## 2018-03-21 RX ORDER — TORSEMIDE 10 MG/1
5 TABLET ORAL DAILY
Status: DISCONTINUED | OUTPATIENT
Start: 2018-03-21 | End: 2018-03-21

## 2018-03-21 RX ORDER — POTASSIUM CHLORIDE 20MEQ/15ML
40 LIQUID (ML) ORAL PRN
Status: DISCONTINUED | OUTPATIENT
Start: 2018-03-21 | End: 2018-03-23

## 2018-03-21 RX ORDER — MORPHINE SULFATE 2 MG/ML
2 INJECTION, SOLUTION INTRAMUSCULAR; INTRAVENOUS
Status: DISCONTINUED | OUTPATIENT
Start: 2018-03-21 | End: 2018-03-21

## 2018-03-21 RX ORDER — SODIUM CHLORIDE 9 MG/ML
INJECTION, SOLUTION INTRAVENOUS CONTINUOUS
Status: DISCONTINUED | OUTPATIENT
Start: 2018-03-21 | End: 2018-03-27 | Stop reason: HOSPADM

## 2018-03-21 RX ORDER — TORSEMIDE 10 MG/1
40 TABLET ORAL DAILY
Status: DISCONTINUED | OUTPATIENT
Start: 2018-03-22 | End: 2018-03-22

## 2018-03-21 RX ORDER — 0.9 % SODIUM CHLORIDE 0.9 %
1000 INTRAVENOUS SOLUTION INTRAVENOUS ONCE
Status: COMPLETED | OUTPATIENT
Start: 2018-03-21 | End: 2018-03-21

## 2018-03-21 RX ORDER — MORPHINE SULFATE 4 MG/ML
4 INJECTION, SOLUTION INTRAMUSCULAR; INTRAVENOUS
Status: DISCONTINUED | OUTPATIENT
Start: 2018-03-21 | End: 2018-03-21

## 2018-03-21 RX ORDER — PANTOPRAZOLE SODIUM 40 MG/1
40 TABLET, DELAYED RELEASE ORAL
Status: DISCONTINUED | OUTPATIENT
Start: 2018-03-21 | End: 2018-03-23

## 2018-03-21 RX ORDER — HYDROMORPHONE HCL 110MG/55ML
2 PATIENT CONTROLLED ANALGESIA SYRINGE INTRAVENOUS
Status: DISCONTINUED | OUTPATIENT
Start: 2018-03-21 | End: 2018-03-22

## 2018-03-21 RX ORDER — POTASSIUM CHLORIDE 20 MEQ/1
40 TABLET, EXTENDED RELEASE ORAL PRN
Status: DISCONTINUED | OUTPATIENT
Start: 2018-03-21 | End: 2018-03-23

## 2018-03-21 RX ORDER — CIPROFLOXACIN 2 MG/ML
400 INJECTION, SOLUTION INTRAVENOUS EVERY 12 HOURS
Status: DISCONTINUED | OUTPATIENT
Start: 2018-03-21 | End: 2018-03-26

## 2018-03-21 RX ORDER — WARFARIN SODIUM 2.5 MG/1
2.5 TABLET ORAL DAILY
Status: DISCONTINUED | OUTPATIENT
Start: 2018-03-21 | End: 2018-03-24

## 2018-03-21 RX ADMIN — MORPHINE SULFATE 4 MG: 4 INJECTION INTRAVENOUS at 07:27

## 2018-03-21 RX ADMIN — GABAPENTIN 400 MG: 400 CAPSULE ORAL at 20:47

## 2018-03-21 RX ADMIN — HYDROMORPHONE HYDROCHLORIDE 2 MG: 2 INJECTION INTRAMUSCULAR; INTRAVENOUS; SUBCUTANEOUS at 20:47

## 2018-03-21 RX ADMIN — HYDROMORPHONE HYDROCHLORIDE 2 MG: 2 INJECTION INTRAMUSCULAR; INTRAVENOUS; SUBCUTANEOUS at 16:23

## 2018-03-21 RX ADMIN — ONDANSETRON 8 MG: 2 INJECTION INTRAMUSCULAR; INTRAVENOUS at 04:44

## 2018-03-21 RX ADMIN — WARFARIN SODIUM 2.5 MG: 2.5 TABLET ORAL at 18:39

## 2018-03-21 RX ADMIN — MOMETASONE FUROATE AND FORMOTEROL FUMARATE DIHYDRATE 2 PUFF: 100; 5 AEROSOL RESPIRATORY (INHALATION) at 20:40

## 2018-03-21 RX ADMIN — POTASSIUM CHLORIDE 40 MEQ: 40 SOLUTION ORAL at 18:39

## 2018-03-21 RX ADMIN — ONDANSETRON 4 MG: 2 INJECTION INTRAMUSCULAR; INTRAVENOUS at 10:55

## 2018-03-21 RX ADMIN — PANTOPRAZOLE SODIUM 40 MG: 40 INJECTION, POWDER, FOR SOLUTION INTRAVENOUS at 04:45

## 2018-03-21 RX ADMIN — SENNOSIDES 8.6 MG: 8.6 TABLET, FILM COATED ORAL at 20:46

## 2018-03-21 RX ADMIN — CIPROFLOXACIN 400 MG: 2 INJECTION, SOLUTION INTRAVENOUS at 20:46

## 2018-03-21 RX ADMIN — SODIUM CHLORIDE 1000 ML: 9 INJECTION, SOLUTION INTRAVENOUS at 04:44

## 2018-03-21 RX ADMIN — Medication 1 MG: at 04:45

## 2018-03-21 RX ADMIN — SODIUM CHLORIDE: 9 INJECTION, SOLUTION INTRAVENOUS at 20:02

## 2018-03-21 RX ADMIN — ONDANSETRON 4 MG: 2 INJECTION INTRAMUSCULAR; INTRAVENOUS at 20:46

## 2018-03-21 RX ADMIN — SODIUM CHLORIDE: 9 INJECTION, SOLUTION INTRAVENOUS at 10:07

## 2018-03-21 RX ADMIN — Medication 10 ML: at 04:45

## 2018-03-21 RX ADMIN — PANTOPRAZOLE SODIUM 40 MG: 40 TABLET, DELAYED RELEASE ORAL at 16:23

## 2018-03-21 RX ADMIN — CIPROFLOXACIN 400 MG: 2 INJECTION, SOLUTION INTRAVENOUS at 08:01

## 2018-03-21 RX ADMIN — METRONIDAZOLE 500 MG: 500 INJECTION, SOLUTION INTRAVENOUS at 22:17

## 2018-03-21 RX ADMIN — HYDROMORPHONE HYDROCHLORIDE 2 MG: 2 INJECTION INTRAMUSCULAR; INTRAVENOUS; SUBCUTANEOUS at 10:52

## 2018-03-21 RX ADMIN — ENOXAPARIN SODIUM 40 MG: 40 INJECTION SUBCUTANEOUS at 16:24

## 2018-03-21 RX ADMIN — METRONIDAZOLE 500 MG: 500 INJECTION, SOLUTION INTRAVENOUS at 17:13

## 2018-03-21 RX ADMIN — MOMETASONE FUROATE AND FORMOTEROL FUMARATE DIHYDRATE 2 PUFF: 100; 5 AEROSOL RESPIRATORY (INHALATION) at 09:54

## 2018-03-21 RX ADMIN — DIBASIC SODIUM PHOSPHATE, MONOBASIC POTASSIUM PHOSPHATE AND MONOBASIC SODIUM PHOSPHATE 1 TABLET: 852; 155; 130 TABLET ORAL at 20:47

## 2018-03-21 RX ADMIN — METRONIDAZOLE 500 MG: 500 INJECTION, SOLUTION INTRAVENOUS at 06:26

## 2018-03-21 RX ADMIN — ATORVASTATIN CALCIUM 80 MG: 80 TABLET, FILM COATED ORAL at 20:47

## 2018-03-21 RX ADMIN — ONDANSETRON 4 MG: 2 INJECTION INTRAMUSCULAR; INTRAVENOUS at 16:23

## 2018-03-21 ASSESSMENT — PAIN DESCRIPTION - LOCATION
LOCATION: ABDOMEN

## 2018-03-21 ASSESSMENT — PAIN DESCRIPTION - DESCRIPTORS
DESCRIPTORS: ACHING;NAGGING
DESCRIPTORS: ACHING;NAGGING
DESCRIPTORS: CONSTANT;DISCOMFORT;STABBING

## 2018-03-21 ASSESSMENT — ENCOUNTER SYMPTOMS
ABDOMINAL DISTENTION: 1
RESPIRATORY NEGATIVE: 1
NAUSEA: 1
BLOOD IN STOOL: 0
DIARRHEA: 0
CONSTIPATION: 0
VOMITING: 0

## 2018-03-21 ASSESSMENT — PAIN DESCRIPTION - PAIN TYPE
TYPE: ACUTE PAIN

## 2018-03-21 ASSESSMENT — PAIN DESCRIPTION - ORIENTATION
ORIENTATION: RIGHT;LOWER
ORIENTATION: RIGHT;LOWER
ORIENTATION: LOWER;RIGHT

## 2018-03-21 ASSESSMENT — PAIN SCALES - GENERAL
PAINLEVEL_OUTOF10: 7
PAINLEVEL_OUTOF10: 8
PAINLEVEL_OUTOF10: 7
PAINLEVEL_OUTOF10: 10
PAINLEVEL_OUTOF10: 8
PAINLEVEL_OUTOF10: 10
PAINLEVEL_OUTOF10: 9
PAINLEVEL_OUTOF10: 5

## 2018-03-21 ASSESSMENT — PAIN DESCRIPTION - FREQUENCY
FREQUENCY: INTERMITTENT

## 2018-03-21 NOTE — ED NOTES
IV in left AC, pull back on tubing a small amount to flush medications. Catheter up against valve but no complications noted to IV placement. Flushes without complication.       Berto Fuller RN  03/21/18 4443

## 2018-03-21 NOTE — ED PROVIDER NOTES
by mouth daily 2.5 mg daily       PAST MEDICAL HISTORY         Diagnosis Date    Asthma     Atrial fibrillation (HCC)     states no longer in afib    Cerebral artery occlusion with cerebral infarction ([facility] [address]     tia x2    Chronic back pain     Chronic kidney disease     chronic kidney disease dr Juanita Dixon nephrologist waiting for clearance    Constipation     COPD (chronic obstructive pulmonary disease) (HCC)     Gastroparesis     GERD (gastroesophageal reflux disease)     H/O mastectomy     fibrocystic disease    Hepatitis     hep c, took meds for hep c    Hx of blood clots     states has had on multiple occassions    Hyperlipidemia     Hypertension     Hypothyroidism     Impaired mobility     uses walker    Incontinence     Incontinence of bowel     wears depends    Liver disease     hepatitis c    Osteoarthritis     PONV (postoperative nausea and vomiting)     Rectocele     Rotator cuff tear     right side, needs surgery    Snores     states has been tested for sleep apnea and does not have it    Type II or unspecified type diabetes mellitus without mention of complication, not stated as uncontrolled        SURGICAL HISTORY           Procedure Laterality Date    BREAST SURGERY Bilateral     mastectomy, fibrocystic breast, mother had passed from breast cancer    CHOLECYSTECTOMY      COLONOSCOPY      HYSTERECTOMY      OR CMBND ANTERPOST COLPORRAPHY W/CYSTO W/NTRCL RPR N/A 2018    VAGINAL REPAIR ANTERIOR AND POSTERIOR, SOLYX BLADDER SLING, CYSTO, performed by Shelli Pederson DO at [address] [place] [address]  2018    anterior and posterior bladder sling, cysto         FAMILY HISTORY           Problem Relation Age of Onset    Cancer Mother      breast    Heart Disease Father     Cancer Sister      lung    Cancer Maternal Aunt      cervical     Family Status   Relation Status    Mother     Father     Sister Alive    Brother Alive    Maternal Aunt     Maternal Grandmother     Maternal Grandfather     Paternal Grandmother     Paternal Grandfather         SOCIAL HISTORY      reports that she quit smoking about 9 years ago. She has a 43.00 pack-year smoking history. She has never used smokeless tobacco. She reports that she does not drink alcohol or use drugs. REVIEW OF SYSTEMS    (2-9 systems for level 4, 10 or more for level 5)     Review of Systems   Constitutional: Positive for activity change, appetite change and fatigue. HENT: Negative. Respiratory: Negative. Cardiovascular: Negative. Gastrointestinal: Positive for abdominal distention and nausea. Negative for blood in stool, constipation, diarrhea and vomiting. Genitourinary: Negative. Musculoskeletal: Negative. Skin: Negative. Neurological: Positive for weakness. Psychiatric/Behavioral: Positive for agitation. The patient is nervous/anxious. Except as noted above the remainder of the review of systems was reviewed and negative. PHYSICAL EXAM    (up to 7 for level 4, 8 or more for level 5)     Vitals:    18 0413   BP: 137/64   Pulse: 82   Resp: 18   Temp: 98.3 °F (36.8 °C)   TempSrc: Oral   SpO2: 93%   Weight: 243 lb (110.2 kg)   Height: 5' 2\" (1.575 m)       Physical exam reflects a morbidly obese female who is tearful and agitated. She is afebrile with stable vital signs to include pulse ox of 93% on room air. She is not hypoxic. She is alert conversive and appropriate behavior. Integument warm and dry. Oropharyngeal exam is without lesion. Heart regular rate and rhythm although tones are distant. Lungs are clear to auscultation. Abdomen is obese and exquisitely tender in the epigastrium and right upper quadrant. Guarding noted. Diminished bowel sounds noted. Extremities show no acute abnormality. Integument without rash or lesion.   No neurovascular deficits are PROTIME-INR - Abnormal; Notable for the following:     Protime 12.3 (*)     All other components within normal limits   URINE CULTURE   CULTURE BLOOD #1   CULTURE BLOOD #1   AMYLASE   LIPASE   LACTIC ACID   URINALYSIS     Results for Donna Taylor (MRN [id number]) as of 3/21/2018 05:45   Ref.  Range 3/21/2018 04:45 3/21/2018 05:04   Sodium Latest Ref Range: 135 - 144 mmol/L 141    Potassium Latest Ref Range: 3.7 - 5.3 mmol/L 3.5 (L)    Chloride Latest Ref Range: 98 - 107 mmol/L 100    CO2 Latest Ref Range: 20 - 31 mmol/L 26    BUN Latest Ref Range: 8 - 23 mg/dL 13    Creatinine Latest Ref Range: 0.50 - 0.90 mg/dL 1.63 (H)    Bun/Cre Ratio Latest Ref Range: 9 - 20  8 (L)    Anion Gap Latest Ref Range: 9 - 17 mmol/L 15    GFR Non- Latest Ref Range: >60 mL/min 31 (L)    GFR  Latest Ref Range: >60 mL/min 38 (L)    Lactic Acid Latest Ref Range: 0.5 - 2.2 mmol/L 2.0    Glucose Latest Ref Range: 70 - 99 mg/dL 140 (H)    Calcium Latest Ref Range: 8.6 - 10.4 mg/dL 9.0    Albumin/Globulin Ratio Latest Ref Range: 1.0 - 2.5  NOT REPORTED    Total Protein Latest Ref Range: 6.4 - 8.3 g/dL 7.4    GFR Comment Unknown Pend    GFR Staging Unknown NOT REPORTED    Pro-BNP Latest Ref Range: <300 pg/mL 310 (H)    BNP Interpretation Unknown Pend    Albumin Latest Ref Range: 3.5 - 5.2 g/dL 3.9    Globulin Latest Ref Range: 1.5 - 3.8 g/dL NOT REPORTED    Alk Phos Latest Ref Range: 35 - 104 U/L 161 (H)    ALT Latest Ref Range: 5 - 33 U/L 15    Amylase Latest Ref Range: 28 - 100 U/L 36    AST Latest Ref Range: <32 U/L 36 (H)    Bilirubin Latest Ref Range: 0.3 - 1.2 mg/dL 0.88    Bilirubin, Direct Latest Ref Range: <0.31 mg/dL 0.25    Bilirubin, Indirect Latest Ref Range: 0.00 - 1.00 mg/dL 0.63    Lipase Latest Ref Range: 13 - 60 U/L 45    WBC Latest Ref Range: 3.5 - 11.0 k/uL 9.7    RBC Latest Ref Range: 4.0 - 5.2 m/uL 3.85 (L)    Hemoglobin Quant Latest Ref Range: 12.0 - 16.0 g/dL 12.9    Hematocrit Latest Ref Range: 36 - 46 % 38.7    MCV Latest Ref Range: 80 - 100 fL 100.5 (H)    MCH Latest Ref Range: 26 - 34 pg 33.5    MCHC Latest Ref Range: 31 - 37 g/dL 33.4    MPV Latest Ref Range: 6.0 - 12.0 fL 8.8    RDW Latest Ref Range: 11.5 - 14.5 % 14.8 (H)    Platelet Count Latest Ref Range: 130 - 400 k/uL 265    Platelet Estimate Unknown NOT REPORTED    Absolute Mono # Latest Ref Range: 0.2 - 0.8 k/uL 1.00 (H)    Eosinophils % Latest Ref Range: 1 - 4 % 4    Basophils # Latest Ref Range: 0.0 - 0.2 k/uL 0.00    Differential Type Unknown NOT REPORTED    Seg Neutrophils Latest Ref Range: 36 - 66 % 73 (H)    Segs Absolute Latest Ref Range: 1.8 - 7.7 k/uL 7.10    Lymphocytes Latest Ref Range: 24 - 44 % 12 (L)    Absolute Lymph # Latest Ref Range: 1.0 - 4.8 k/uL 1.20    Monocytes Latest Ref Range: 1 - 7 % 11 (H)    Absolute Eos # Latest Ref Range: 0.0 - 0.4 k/uL 0.30    Basophils Latest Ref Range: 0 - 2 % 0    Immature Granulocytes Latest Ref Range: 0 % NOT REPORTED    WBC Morphology Unknown NOT REPORTED    RBC Morphology Unknown NOT REPORTED    Prothrombin Time Latest Ref Range: 9.7 - 11.6 sec 12.3 (H)    INR Unknown 1.2      All other labs were within normal range or not returned as of this dictation. EMERGENCY DEPARTMENT COURSE and DIFFERENTIAL DIAGNOSIS/MDM:   Vitals:    Vitals:    03/21/18 0413   BP: 137/64   Pulse: 82   Resp: 18   Temp: 98.3 °F (36.8 °C)   TempSrc: Oral   SpO2: 93%   Weight: 243 lb (110.2 kg)   Height: 5' 2\" (1.575 m)     Patient is evaluated. She is in significant discomfort on arrival.  IV access is established. She is treated with IV fluids and symptomatically medications for her discomfort. Laboratory and imaging studies are reviewed. She does have evidence of diverticulitis. I did discuss disposition with her. We did discuss discharged home on oral medications. Patient is afraid to go home at this point. She continues to have significant pain on reevaluation.   She is health compromised and

## 2018-03-21 NOTE — H&P
(postoperative nausea and vomiting)     Rectocele     Rotator cuff tear     right side, needs surgery    Snores     states has been tested for sleep apnea and does not have it    Type II or unspecified type diabetes mellitus without mention of complication, not stated as uncontrolled         Past Surgical History:     Past Surgical History:   Procedure Laterality Date    BREAST SURGERY Bilateral     mastectomy, fibrocystic breast, mother had passed from breast cancer    CHOLECYSTECTOMY      COLONOSCOPY      HYSTERECTOMY      [place] CMBND ANTERPOST COLPORRAPHY W/CYSTO W/NTRCL RPR N/A 2/12/2018    VAGINAL REPAIR ANTERIOR AND POSTERIOR, SOLYX BLADDER SLING, CYSTO, performed by Harjit Deng DO at [address] [place] [address]  02/12/2018    anterior and posterior bladder sling, cysto        Medications Prior to Admission:     Prior to Admission medications    Medication Sig Start Date End Date Taking?  Authorizing Provider   sennosides-docusate sodium (SENOKOT-S) 8.6-50 MG tablet Take 2 tablets by mouth nightly as needed for Constipation 2/12/18   Marilee Uptno DO   estradiol (ESTRACE) 0.1 MG/GM vaginal cream Place 2 g vaginally daily ON HOLD FOR NOW DUE TO RECENT SURGERY -Twice weekly    Historical Provider, MD   torsemide (DEMADEX) 5 MG tablet Take 5 mg by mouth daily as needed (days that she works)     Historical Provider, MD   potassium chloride (KLOR-CON M10) 10 MEQ extended release tablet Take 1 tablet by mouth 2 times daily     Historical Provider, MD   insulin NPH (NOVOLIN N) 100 UNIT/ML injection vial Inject 10 Units into the skin every morning Hold for blood sugar less than 120, took 5 units am of surgery 2/12 as instructed per Dr. Yaya Cota Provider, MD   insulin NPH (NOVOLIN N) 100 UNIT/ML injection vial Inject 50 Units into the skin Daily with supper Hold for blood sugar less than 120    Historical Provider, MD   valACYclovir (VALTREX) 1 G tablet Take Provider, MD   levothyroxine (SYNTHROID) 112 MCG tablet Take 112 mcg by mouth Daily     Historical Provider, MD   Cholecalciferol (VITAMIN D3) 1000 UNITS TABS Take 1,000 Units by mouth Five times weekly Indications: Takes 1000 units Mon-Fri only     Historical Provider, MD   magnesium oxide (MAG-OX) 400 MG tablet Take 400 mg by mouth 2 times daily. Historical Provider, MD        Allergies:     Pcn [penicillins]    Social History:     Tobacco:    reports that she quit smoking about 9 years ago. She has a 43.00 pack-year smoking history. She has never used smokeless tobacco.  Alcohol:      reports that she does not drink alcohol. Drug Use:  reports that she does not use drugs. Family History:     Family History   Problem Relation Age of Onset    Cancer Mother      breast    Heart Disease Father     Cancer Sister      lung    Cancer Maternal Aunt      cervical       Review of Systems:     Positive and Negative as described in HPI. CONSTITUTIONAL:  negative for fevers, chills, sweats, fatigue, weight loss  HEENT:  negative for vision, hearing changes, runny nose, throat pain  RESPIRATORY:  negative for shortness of breath, cough, congestion, wheezing. CARDIOVASCULAR:  negative for chest pain, palpitations.   GASTROINTESTINAL:  negative for nausea, vomiting, diarrhea, constipation, change in bowel habits, +abdominal pain More in the right upper quadrant  GENITOURINARY:  negative for difficulty of urination, burning with urination, frequency   INTEGUMENT:  negative for rash, skin lesions, easy bruising   HEMATOLOGIC/LYMPHATIC:  negative for swelling/edema   ALLERGIC/IMMUNOLOGIC:  negative for urticaria , itching  ENDOCRINE:  negative increase in drinking, increase in urination, hot or cold intolerance  MUSCULOSKELETAL:  negative joint pains, muscle aches, swelling of joints  NEUROLOGICAL:  negative for headaches, dizziness, lightheadedness, numbness, pain, tingling extremities  BEHAVIOR/PSYCH:  negative for depression, anxiety    Physical Exam:   BP (!) 134/54   Pulse 74   Temp 97.8 °F (36.6 °C) (Oral)   Resp 18   Ht 5' 2\" (1.575 m)   Wt 243 lb (110.2 kg)   SpO2 90%   BMI 44.45 kg/m²   Temp (24hrs), Av.3 °F (36.8 °C), Min:97.8 °F (36.6 °C), Max:98.8 °F (37.1 °C)    No results for input(s): POCGLU in the last 72 hours. Intake/Output Summary (Last 24 hours) at 18 1752  Last data filed at 18 1421   Gross per 24 hour   Intake                0 ml   Output              200 ml   Net             -200 ml       General Appearance:  alert, well appearing, and in no acute distress, anxious  Mental status: oriented to person, place, and time with normal affect  Head:  normocephalic, atraumatic. Eye: no icterus, redness, pupils equal and reactive, extraocular eye movements intact, conjunctiva clear  Ear: normal external ear, no discharge, hearing intact  Nose:  no drainage noted  Mouth: mucous membranes moist  Neck: supple, no carotid bruits, thyroid not palpable  Lungs: Bilateral equal air entry, clear to ausculation, no wheezing, rales or rhonchi, normal effort  Cardiovascular: normal rate, regular rhythm, no murmur, gallop, rub.   Abdomen: Soft, large cholecystectomy scar with discomfort on palpation on right upper quadrant and right lower part of her abdomen , slight discomfort in the descending colon area also, nondistended, normal bowel sounds, + hepatomegaly no splenomegaly  Neurologic: There are no new focal motor or sensory deficits, normal muscle tone and bulk, no abnormal sensation, normal speech, cranial nerves II through XII grossly intact  Skin: No gross lesions, rashes, bruising or bleeding on exposed skin area  Extremities:  peripheral pulses palpable, no pedal edema or calf pain with palpation  Psych: normal affect     Investigations:      Laboratory Testing:  Recent Results (from the past 24 hour(s))   Amylase    Collection Time: 18  4:45 AM   Result Value Ref Range    Amylase 36 28

## 2018-03-22 PROBLEM — K57.33 DIVERTICULITIS LARGE INTESTINE W/O PERFORATION OR ABSCESS W/BLEEDING: Status: ACTIVE | Noted: 2018-03-22

## 2018-03-22 LAB
ALBUMIN SERPL-MCNC: 3.8 G/DL (ref 3.5–5.2)
ALBUMIN/GLOBULIN RATIO: ABNORMAL (ref 1–2.5)
ALP BLD-CCNC: 145 U/L (ref 35–104)
ALT SERPL-CCNC: 13 U/L (ref 5–33)
AMYLASE: 37 U/L (ref 28–100)
ANION GAP SERPL CALCULATED.3IONS-SCNC: 15 MMOL/L (ref 9–17)
AST SERPL-CCNC: 22 U/L
BILIRUB SERPL-MCNC: 0.64 MG/DL (ref 0.3–1.2)
BUN BLDV-MCNC: 16 MG/DL (ref 8–23)
BUN/CREAT BLD: 10 (ref 9–20)
CALCIUM SERPL-MCNC: 9.4 MG/DL (ref 8.6–10.4)
CHLORIDE BLD-SCNC: 101 MMOL/L (ref 98–107)
CO2: 24 MMOL/L (ref 20–31)
CREAT SERPL-MCNC: 1.57 MG/DL (ref 0.5–0.9)
CULTURE: ABNORMAL
CULTURE: ABNORMAL
GFR AFRICAN AMERICAN: 40 ML/MIN
GFR NON-AFRICAN AMERICAN: 33 ML/MIN
GFR SERPL CREATININE-BSD FRML MDRD: ABNORMAL ML/MIN/{1.73_M2}
GFR SERPL CREATININE-BSD FRML MDRD: ABNORMAL ML/MIN/{1.73_M2}
GLUCOSE BLD-MCNC: 154 MG/DL (ref 70–99)
HCT VFR BLD CALC: 36.8 % (ref 36–46)
HEMOGLOBIN: 12.2 G/DL (ref 12–16)
INR BLD: 1.2
LIPASE: 67 U/L (ref 13–60)
Lab: ABNORMAL
MAGNESIUM: 2.5 MG/DL (ref 1.6–2.6)
MCH RBC QN AUTO: 33.4 PG (ref 26–34)
MCHC RBC AUTO-ENTMCNC: 33.2 G/DL (ref 31–37)
MCV RBC AUTO: 100.8 FL (ref 80–100)
NRBC AUTOMATED: ABNORMAL PER 100 WBC
ORGANISM: ABNORMAL
PDW BLD-RTO: 14.5 % (ref 11.5–14.5)
PHOSPHORUS: 3.5 MG/DL (ref 2.6–4.5)
PLATELET # BLD: 272 K/UL (ref 130–400)
PMV BLD AUTO: 9.2 FL (ref 6–12)
POTASSIUM SERPL-SCNC: 3.7 MMOL/L (ref 3.7–5.3)
PROTHROMBIN TIME: 12.5 SEC (ref 9.7–11.6)
RBC # BLD: 3.65 M/UL (ref 4–5.2)
SODIUM BLD-SCNC: 140 MMOL/L (ref 135–144)
SPECIMEN DESCRIPTION: ABNORMAL
SPECIMEN DESCRIPTION: ABNORMAL
STATUS: ABNORMAL
TOTAL PROTEIN: 7.3 G/DL (ref 6.4–8.3)
WBC # BLD: 11.3 K/UL (ref 3.5–11)

## 2018-03-22 PROCEDURE — 83690 ASSAY OF LIPASE: CPT

## 2018-03-22 PROCEDURE — 2500000003 HC RX 250 WO HCPCS: Performed by: NURSE PRACTITIONER

## 2018-03-22 PROCEDURE — 84100 ASSAY OF PHOSPHORUS: CPT

## 2018-03-22 PROCEDURE — 2700000000 HC OXYGEN THERAPY PER DAY

## 2018-03-22 PROCEDURE — 94640 AIRWAY INHALATION TREATMENT: CPT

## 2018-03-22 PROCEDURE — 6360000002 HC RX W HCPCS: Performed by: INTERNAL MEDICINE

## 2018-03-22 PROCEDURE — 96372 THER/PROPH/DIAG INJ SC/IM: CPT

## 2018-03-22 PROCEDURE — 94760 N-INVAS EAR/PLS OXIMETRY 1: CPT

## 2018-03-22 PROCEDURE — G0009 ADMIN PNEUMOCOCCAL VACCINE: HCPCS | Performed by: INTERNAL MEDICINE

## 2018-03-22 PROCEDURE — 6370000000 HC RX 637 (ALT 250 FOR IP): Performed by: NURSE PRACTITIONER

## 2018-03-22 PROCEDURE — 82150 ASSAY OF AMYLASE: CPT

## 2018-03-22 PROCEDURE — 90670 PCV13 VACCINE IM: CPT | Performed by: INTERNAL MEDICINE

## 2018-03-22 PROCEDURE — 6360000002 HC RX W HCPCS

## 2018-03-22 PROCEDURE — 96366 THER/PROPH/DIAG IV INF ADDON: CPT

## 2018-03-22 PROCEDURE — 1200000000 HC SEMI PRIVATE

## 2018-03-22 PROCEDURE — 85610 PROTHROMBIN TIME: CPT

## 2018-03-22 PROCEDURE — 2580000003 HC RX 258: Performed by: INTERNAL MEDICINE

## 2018-03-22 PROCEDURE — 99232 SBSQ HOSP IP/OBS MODERATE 35: CPT | Performed by: INTERNAL MEDICINE

## 2018-03-22 PROCEDURE — 80053 COMPREHEN METABOLIC PANEL: CPT

## 2018-03-22 PROCEDURE — 6360000002 HC RX W HCPCS: Performed by: NURSE PRACTITIONER

## 2018-03-22 PROCEDURE — 85027 COMPLETE CBC AUTOMATED: CPT

## 2018-03-22 PROCEDURE — 83735 ASSAY OF MAGNESIUM: CPT

## 2018-03-22 PROCEDURE — 96376 TX/PRO/DX INJ SAME DRUG ADON: CPT

## 2018-03-22 PROCEDURE — 36415 COLL VENOUS BLD VENIPUNCTURE: CPT

## 2018-03-22 RX ORDER — ALBUTEROL SULFATE 2.5 MG/3ML
SOLUTION RESPIRATORY (INHALATION)
Status: COMPLETED
Start: 2018-03-22 | End: 2018-03-22

## 2018-03-22 RX ORDER — PROMETHAZINE HYDROCHLORIDE 25 MG/ML
12.5 INJECTION, SOLUTION INTRAMUSCULAR; INTRAVENOUS EVERY 6 HOURS PRN
Status: DISCONTINUED | OUTPATIENT
Start: 2018-03-22 | End: 2018-03-27 | Stop reason: HOSPADM

## 2018-03-22 RX ORDER — IPRATROPIUM BROMIDE AND ALBUTEROL SULFATE 2.5; .5 MG/3ML; MG/3ML
1 SOLUTION RESPIRATORY (INHALATION)
Status: DISCONTINUED | OUTPATIENT
Start: 2018-03-23 | End: 2018-03-23

## 2018-03-22 RX ORDER — HEPARIN SODIUM 5000 [USP'U]/ML
5000 INJECTION, SOLUTION INTRAVENOUS; SUBCUTANEOUS EVERY 8 HOURS SCHEDULED
Status: DISCONTINUED | OUTPATIENT
Start: 2018-03-22 | End: 2018-03-24 | Stop reason: ALTCHOICE

## 2018-03-22 RX ORDER — ALBUTEROL SULFATE 2.5 MG/3ML
2.5 SOLUTION RESPIRATORY (INHALATION) EVERY 4 HOURS PRN
Status: DISCONTINUED | OUTPATIENT
Start: 2018-03-22 | End: 2018-03-27 | Stop reason: HOSPADM

## 2018-03-22 RX ADMIN — PNEUMOCOCCAL 13-VALENT CONJUGATE VACCINE 0.5 ML: 2.2; 2.2; 2.2; 2.2; 2.2; 4.4; 2.2; 2.2; 2.2; 2.2; 2.2; 2.2; 2.2 INJECTION, SUSPENSION INTRAMUSCULAR at 13:39

## 2018-03-22 RX ADMIN — Medication 1 MG: at 13:39

## 2018-03-22 RX ADMIN — ENOXAPARIN SODIUM 40 MG: 40 INJECTION SUBCUTANEOUS at 09:26

## 2018-03-22 RX ADMIN — CIPROFLOXACIN 400 MG: 2 INJECTION, SOLUTION INTRAVENOUS at 20:41

## 2018-03-22 RX ADMIN — ONDANSETRON 4 MG: 2 INJECTION INTRAMUSCULAR; INTRAVENOUS at 05:20

## 2018-03-22 RX ADMIN — METRONIDAZOLE 500 MG: 500 INJECTION, SOLUTION INTRAVENOUS at 05:11

## 2018-03-22 RX ADMIN — HEPARIN SODIUM 5000 UNITS: 5000 INJECTION, SOLUTION INTRAVENOUS; SUBCUTANEOUS at 20:41

## 2018-03-22 RX ADMIN — WARFARIN SODIUM 2.5 MG: 2.5 TABLET ORAL at 17:55

## 2018-03-22 RX ADMIN — METRONIDAZOLE 500 MG: 500 INJECTION, SOLUTION INTRAVENOUS at 14:42

## 2018-03-22 RX ADMIN — ALBUTEROL SULFATE 2.5 MG: 2.5 SOLUTION RESPIRATORY (INHALATION) at 22:40

## 2018-03-22 RX ADMIN — METRONIDAZOLE 500 MG: 500 INJECTION, SOLUTION INTRAVENOUS at 22:11

## 2018-03-22 RX ADMIN — Medication 1 MG: at 22:01

## 2018-03-22 RX ADMIN — Medication 1 MG: at 09:26

## 2018-03-22 RX ADMIN — ONDANSETRON 4 MG: 2 INJECTION INTRAMUSCULAR; INTRAVENOUS at 17:55

## 2018-03-22 RX ADMIN — SODIUM CHLORIDE: 9 INJECTION, SOLUTION INTRAVENOUS at 11:45

## 2018-03-22 RX ADMIN — CIPROFLOXACIN 400 MG: 2 INJECTION, SOLUTION INTRAVENOUS at 09:32

## 2018-03-22 RX ADMIN — HYDROMORPHONE HYDROCHLORIDE 2 MG: 2 INJECTION INTRAMUSCULAR; INTRAVENOUS; SUBCUTANEOUS at 05:19

## 2018-03-22 RX ADMIN — HYDROMORPHONE HYDROCHLORIDE 2 MG: 2 INJECTION INTRAMUSCULAR; INTRAVENOUS; SUBCUTANEOUS at 00:49

## 2018-03-22 RX ADMIN — ONDANSETRON 4 MG: 2 INJECTION INTRAMUSCULAR; INTRAVENOUS at 00:48

## 2018-03-22 RX ADMIN — ONDANSETRON 4 MG: 2 INJECTION INTRAMUSCULAR; INTRAVENOUS at 09:25

## 2018-03-22 RX ADMIN — ONDANSETRON 4 MG: 2 INJECTION INTRAMUSCULAR; INTRAVENOUS at 22:00

## 2018-03-22 RX ADMIN — ONDANSETRON 4 MG: 2 INJECTION INTRAMUSCULAR; INTRAVENOUS at 13:39

## 2018-03-22 ASSESSMENT — PAIN DESCRIPTION - PAIN TYPE
TYPE: ACUTE PAIN

## 2018-03-22 ASSESSMENT — PAIN DESCRIPTION - DESCRIPTORS
DESCRIPTORS: ACHING
DESCRIPTORS: ACHING;NAGGING

## 2018-03-22 ASSESSMENT — PAIN DESCRIPTION - FREQUENCY
FREQUENCY: INTERMITTENT
FREQUENCY: INTERMITTENT

## 2018-03-22 ASSESSMENT — PAIN SCALES - GENERAL
PAINLEVEL_OUTOF10: 3
PAINLEVEL_OUTOF10: 5
PAINLEVEL_OUTOF10: 8
PAINLEVEL_OUTOF10: 4
PAINLEVEL_OUTOF10: 8

## 2018-03-22 ASSESSMENT — PAIN DESCRIPTION - ORIENTATION
ORIENTATION: RIGHT;LOWER
ORIENTATION: RIGHT;LOWER

## 2018-03-22 ASSESSMENT — PAIN DESCRIPTION - LOCATION
LOCATION: ABDOMEN

## 2018-03-22 ASSESSMENT — PAIN DESCRIPTION - ONSET: ONSET: ON-GOING

## 2018-03-22 NOTE — CONSULTS
Reason for Consult: Renal failure  Requesting Physician:  Adam Mars MD    HISTORY OF PRESENT ILLNESS:    The patient is a 69 y.o. female who presents with abdominal pain, found to have diverticulitis, she has Hx of CKD3 with baseline Cr about 1.5, she had abdominal CT for abdominal pain eval that showed multiple renal cysts and a 2 cm lesion in the lower pole of the right kidney, this was not seen on renal US, A CT scan with IV contrast recommended by radiology for further eval.  She had a previous episode in 2015 when there was a suspicious renal lesion on the left side that was found to be a cyst.      Review Of Systems:   Constitutional: No fever,or  chills, Has  weakness. HEENT:  No headache, nasal discharge or sore throat. Cardiac:  No chest pain, dyspnea, orthopnea or PND. Chest:              No cough, phlegm or wheezing. Abdomen:  Has abdominal pain, nausea, and vomiting, No diarrhea. Neuro:             No gross focal weakness, numbness. No abnormal movements or seizure like activity. Skin:   No rashes or itching. :   No hematuria, pyuria, dysuria or flank pain. Extremities:  No swelling or joint pains.      Past Medical History:   Diagnosis Date    Asthma     Atrial fibrillation (HCC)     states no longer in afib    Cerebral artery occlusion with cerebral infarction ([address]     tia x2    Chronic back pain     Chronic kidney disease     chronic kidney disease dr Natalya Pool nephrologist waiting for clearance    Constipation     COPD ([facility]ronic obstructive [facility] disease) [facility] [address]     Gastroparesis     GERD (gastroesophageal reflux disease)     H/O mastectomy     fibrocystic disease    Hepatitis     hep c, took meds for hep c    Hx of blood clots     states has had on multiple occassions    Hyperlipidemia     Hypertension     Hypothyroidism     Impaired mobility     uses walker    Incontinence     Incontinence of bowel     wears depends    Liver disease (SYNTHROID) 112 MCG tablet Take 112 mcg by mouth Daily     Historical Provider, MD   Cholecalciferol (VITAMIN D3) 1000 UNITS TABS Take 1,000 Units by mouth Five times weekly Indications: Takes 1000 units Mon-Fri only     Historical Provider, MD   magnesium oxide (MAG-OX) 400 MG tablet Take 400 mg by mouth 2 times daily. Historical Provider, MD       Scheduled Meds:   heparin (porcine)  5,000 Units Subcutaneous 3 times per day    folic acid  1 mg Oral Daily    gabapentin  400 mg Oral BID    phosphorus  250 mg Oral BID    levothyroxine  112 mcg Oral Daily    mometasone-formoterol  2 puff Inhalation BID    nebivolol  5 mg Oral Daily    pantoprazole  40 mg Oral QAM AC    atorvastatin  80 mg Oral Nightly    warfarin  2.5 mg Oral Daily    sodium chloride flush  10 mL Intravenous 2 times per day    ciprofloxacin  400 mg Intravenous Q12H    metroNIDAZOLE  500 mg Intravenous Q8H    warfarin (COUMADIN) daily dosing (placeholder)   Other RX Placeholder     Continuous Infusions:   sodium chloride 100 mL/hr at 03/22/18 1145     PRN Meds:HYDROmorphone, promethazine, albuterol, hydrOXYzine, sodium chloride flush, potassium chloride **OR** potassium chloride **OR** potassium chloride, magnesium sulfate, acetaminophen, magnesium hydroxide, nicotine, ondansetron, senna    Allergies   Allergen Reactions    Pcn [Penicillins] Other (See Comments)     Unknown reaction, states can take Keflex       Social History     Social History    Marital status:      Spouse name: N/A    Number of children: N/A    Years of education: N/A     Occupational History    Not on file.      Social History Main Topics    Smoking status: Former Smoker     Packs/day: 1.00     Years: 43.00     Quit date: 8/12/2008    Smokeless tobacco: Never Used    Alcohol use No    Drug use: No    Sexual activity: Not on file     Other Topics Concern    Not on file     Social History Narrative    No narrative on file       Family History Problem Relation Age of Onset    Cancer Mother      breast    Heart Disease Father     Cancer Sister      lung    Cancer Maternal Aunt      cervical         Physical Exam:  Vitals:    03/22/18 1147 03/22/18 1557 03/22/18 1853 03/22/18 2236   BP: (!) 132/53 (!) 134/56 (!) 147/53    Pulse: 69 69 74    Resp: 16 16 16 18   Temp: 97.7 °F (36.5 °C) 98.2 °F (36.8 °C) 97.9 °F (36.6 °C)    TempSrc: Oral Oral Oral    SpO2: 90% 90% 91% 96%   Weight:       Height:         I/O last 3 completed shifts:  In: -   Out: 450 [Urine:400; Emesis/NG output:50]    General:  Awake, alert, not in distress. Appears to be stated age. HEENT: Atraumatic, normocephalic. Anicteric sclera. Pink and moist oral mucosa. Neck supple. No JVD. Chest: Bilateral air entry, clear to auscultation, no wheezing, rhonchi or rales. Cardiovascular: RRR, S1S2, no murmur, rub or gallop. No lower extremity edema. Abdomen: Soft, non tender to palpation. Musculoskeletal: No cyanosis or clubbing. Integumentary: Pink, warm and dry. Free from rash or lesions. CNS: Oriented to person, place and time. Speech clear. Face symmetrical. No tremor.        Data:  CBC:   Lab Results   Component Value Date    WBC 11.3 (H) 03/22/2018    HGB 12.2 03/22/2018    HCT 36.8 03/22/2018    .8 (H) 03/22/2018     03/22/2018     BMP:    Lab Results   Component Value Date     03/22/2018     03/21/2018     02/05/2018    K 3.7 03/22/2018    K 3.5 (L) 03/21/2018    K 4.5 02/05/2018     03/22/2018     03/21/2018     02/05/2018    CO2 24 03/22/2018    CO2 26 03/21/2018    CO2 20 02/05/2018    BUN 16 03/22/2018    BUN 13 03/21/2018    BUN 18 02/05/2018    CREATININE 1.57 (H) 03/22/2018    CREATININE 1.63 (H) 03/21/2018    CREATININE 1.58 (H) 02/05/2018    GLUCOSE 154 (H) 03/22/2018    GLUCOSE 140 (H) 03/21/2018    GLUCOSE 134 (H) 02/05/2018     CMP:   Lab Results   Component Value Date     03/22/2018    K 3.7 03/22/2018    CL Assessment:  1. CKD3, Cr close to baseline. 2. Diverticulitis   3. B/L renal cysts  4. Renal lesion of the  right lower pole suggestive of solid mass found on non contrast CT  5. UTI caused by gram negative rods    Plan:  Cr close to baseline  Continue IV Hydration  Will hold diuretics  Will hold off giving IV contrast for now and will repeat non contrast CT in 6 months   The patient has multiple cysts, she has previous findings that were suspicious for solid masses that was found to be renal cysts in 2015   Follow up chemistries ordered for AM.    Thank you for the consultation. Please do not hesitate to contact us for any further questions/concerns. We will continue to follow along with you.        Electronically signed by Darlin Forrest MD  on 3/22/2018 at 10:50 PM

## 2018-03-22 NOTE — PLAN OF CARE
Problem: Falls - Risk of  Goal: Absence of falls  Siderails up x 2  Hourly rounding  Call light in reach  Instructed to call for assist before attempting out of bed. Remains free from falls and accidental injury at this time   Floor free from obstacles  Bed is locked and in lowest position  Adequate lighting provided  Bed alarm on, [place] star and Stay with Me signs posted          Problem: Pain:  Goal: Pain level will decrease  Pain level will decrease   Outcome: Ongoing  . Pain level assessment complete. Patient educated on pain scale and control interventions  PRN pain medication given per patient request  Patient instructed to call out with new onset of pain or unrelieved pain      Goal: Control of chronic pain  Control of chronic pain   Outcome: Ongoing  Pain level assessment complete.    Patient educated on pain scale and control interventions  PRN pain medication given per patient request  Patient instructed to call out with new onset of pain or unrelieved pain

## 2018-03-23 LAB
GLUCOSE BLD-MCNC: 108 MG/DL (ref 65–105)
GLUCOSE BLD-MCNC: 121 MG/DL (ref 65–105)
GLUCOSE BLD-MCNC: 139 MG/DL (ref 65–105)
INR BLD: 1.3
PROTHROMBIN TIME: 13.1 SEC (ref 9.7–11.6)

## 2018-03-23 PROCEDURE — 97166 OT EVAL MOD COMPLEX 45 MIN: CPT

## 2018-03-23 PROCEDURE — 82947 ASSAY GLUCOSE BLOOD QUANT: CPT

## 2018-03-23 PROCEDURE — 6370000000 HC RX 637 (ALT 250 FOR IP): Performed by: NURSE PRACTITIONER

## 2018-03-23 PROCEDURE — 6360000002 HC RX W HCPCS: Performed by: INTERNAL MEDICINE

## 2018-03-23 PROCEDURE — 2500000003 HC RX 250 WO HCPCS: Performed by: INTERNAL MEDICINE

## 2018-03-23 PROCEDURE — 97530 THERAPEUTIC ACTIVITIES: CPT

## 2018-03-23 PROCEDURE — 2580000003 HC RX 258: Performed by: INTERNAL MEDICINE

## 2018-03-23 PROCEDURE — G8979 MOBILITY GOAL STATUS: HCPCS

## 2018-03-23 PROCEDURE — 94640 AIRWAY INHALATION TREATMENT: CPT

## 2018-03-23 PROCEDURE — C9113 INJ PANTOPRAZOLE SODIUM, VIA: HCPCS | Performed by: INTERNAL MEDICINE

## 2018-03-23 PROCEDURE — 85610 PROTHROMBIN TIME: CPT

## 2018-03-23 PROCEDURE — G8978 MOBILITY CURRENT STATUS: HCPCS

## 2018-03-23 PROCEDURE — 94760 N-INVAS EAR/PLS OXIMETRY 1: CPT

## 2018-03-23 PROCEDURE — 36415 COLL VENOUS BLD VENIPUNCTURE: CPT

## 2018-03-23 PROCEDURE — 97535 SELF CARE MNGMENT TRAINING: CPT

## 2018-03-23 PROCEDURE — 1200000000 HC SEMI PRIVATE

## 2018-03-23 PROCEDURE — G8987 SELF CARE CURRENT STATUS: HCPCS

## 2018-03-23 PROCEDURE — 97161 PT EVAL LOW COMPLEX 20 MIN: CPT

## 2018-03-23 PROCEDURE — G8988 SELF CARE GOAL STATUS: HCPCS

## 2018-03-23 PROCEDURE — 97116 GAIT TRAINING THERAPY: CPT

## 2018-03-23 PROCEDURE — 2500000003 HC RX 250 WO HCPCS: Performed by: NURSE PRACTITIONER

## 2018-03-23 PROCEDURE — 99232 SBSQ HOSP IP/OBS MODERATE 35: CPT | Performed by: INTERNAL MEDICINE

## 2018-03-23 PROCEDURE — 6360000002 HC RX W HCPCS: Performed by: NURSE PRACTITIONER

## 2018-03-23 RX ORDER — BISACODYL 10 MG
10 SUPPOSITORY, RECTAL RECTAL DAILY PRN
Status: DISCONTINUED | OUTPATIENT
Start: 2018-03-23 | End: 2018-03-23 | Stop reason: SDUPTHER

## 2018-03-23 RX ORDER — DEXTROSE MONOHYDRATE 50 MG/ML
100 INJECTION, SOLUTION INTRAVENOUS PRN
Status: DISCONTINUED | OUTPATIENT
Start: 2018-03-23 | End: 2018-03-27 | Stop reason: HOSPADM

## 2018-03-23 RX ORDER — DEXTROSE MONOHYDRATE 25 G/50ML
12.5 INJECTION, SOLUTION INTRAVENOUS PRN
Status: DISCONTINUED | OUTPATIENT
Start: 2018-03-23 | End: 2018-03-27 | Stop reason: HOSPADM

## 2018-03-23 RX ORDER — NICOTINE POLACRILEX 4 MG
15 LOZENGE BUCCAL PRN
Status: DISCONTINUED | OUTPATIENT
Start: 2018-03-23 | End: 2018-03-27 | Stop reason: HOSPADM

## 2018-03-23 RX ORDER — 0.9 % SODIUM CHLORIDE 0.9 %
10 VIAL (ML) INJECTION DAILY
Status: DISCONTINUED | OUTPATIENT
Start: 2018-03-23 | End: 2018-03-27 | Stop reason: HOSPADM

## 2018-03-23 RX ORDER — BISACODYL 10 MG
10 SUPPOSITORY, RECTAL RECTAL DAILY PRN
Status: DISCONTINUED | OUTPATIENT
Start: 2018-03-23 | End: 2018-03-27 | Stop reason: HOSPADM

## 2018-03-23 RX ORDER — METOPROLOL TARTRATE 5 MG/5ML
5 INJECTION INTRAVENOUS EVERY 6 HOURS PRN
Status: DISCONTINUED | OUTPATIENT
Start: 2018-03-23 | End: 2018-03-27 | Stop reason: HOSPADM

## 2018-03-23 RX ORDER — PANTOPRAZOLE SODIUM 40 MG/10ML
40 INJECTION, POWDER, LYOPHILIZED, FOR SOLUTION INTRAVENOUS DAILY
Status: DISCONTINUED | OUTPATIENT
Start: 2018-03-23 | End: 2018-03-27 | Stop reason: HOSPADM

## 2018-03-23 RX ADMIN — Medication 1 MG: at 02:07

## 2018-03-23 RX ADMIN — ONDANSETRON 4 MG: 2 INJECTION INTRAMUSCULAR; INTRAVENOUS at 14:51

## 2018-03-23 RX ADMIN — SODIUM CHLORIDE: 9 INJECTION, SOLUTION INTRAVENOUS at 19:24

## 2018-03-23 RX ADMIN — METOROPROLOL TARTRATE 5 MG: 5 INJECTION, SOLUTION INTRAVENOUS at 17:14

## 2018-03-23 RX ADMIN — ONDANSETRON 4 MG: 2 INJECTION INTRAMUSCULAR; INTRAVENOUS at 19:23

## 2018-03-23 RX ADMIN — SODIUM CHLORIDE: 9 INJECTION, SOLUTION INTRAVENOUS at 01:44

## 2018-03-23 RX ADMIN — Medication 1 MG: at 14:50

## 2018-03-23 RX ADMIN — METRONIDAZOLE 500 MG: 500 INJECTION, SOLUTION INTRAVENOUS at 22:40

## 2018-03-23 RX ADMIN — SODIUM CHLORIDE 10 ML: 9 INJECTION INTRAMUSCULAR; INTRAVENOUS; SUBCUTANEOUS at 14:50

## 2018-03-23 RX ADMIN — ONDANSETRON 4 MG: 2 INJECTION INTRAMUSCULAR; INTRAVENOUS at 02:01

## 2018-03-23 RX ADMIN — CIPROFLOXACIN 400 MG: 2 INJECTION, SOLUTION INTRAVENOUS at 08:06

## 2018-03-23 RX ADMIN — METRONIDAZOLE 500 MG: 500 INJECTION, SOLUTION INTRAVENOUS at 14:54

## 2018-03-23 RX ADMIN — ONDANSETRON 4 MG: 2 INJECTION INTRAMUSCULAR; INTRAVENOUS at 06:05

## 2018-03-23 RX ADMIN — HYDROXYZINE HYDROCHLORIDE 25 MG: 25 TABLET ORAL at 20:53

## 2018-03-23 RX ADMIN — HEPARIN SODIUM 5000 UNITS: 5000 INJECTION, SOLUTION INTRAVENOUS; SUBCUTANEOUS at 22:40

## 2018-03-23 RX ADMIN — Medication 1 MG: at 19:23

## 2018-03-23 RX ADMIN — METRONIDAZOLE 500 MG: 500 INJECTION, SOLUTION INTRAVENOUS at 05:27

## 2018-03-23 RX ADMIN — HEPARIN SODIUM 5000 UNITS: 5000 INJECTION, SOLUTION INTRAVENOUS; SUBCUTANEOUS at 14:50

## 2018-03-23 RX ADMIN — PANTOPRAZOLE SODIUM 40 MG: 40 INJECTION, POWDER, FOR SOLUTION INTRAVENOUS at 14:50

## 2018-03-23 RX ADMIN — ONDANSETRON 4 MG: 2 INJECTION INTRAMUSCULAR; INTRAVENOUS at 10:44

## 2018-03-23 RX ADMIN — MOMETASONE FUROATE AND FORMOTEROL FUMARATE DIHYDRATE 2 PUFF: 100; 5 AEROSOL RESPIRATORY (INHALATION) at 21:47

## 2018-03-23 RX ADMIN — ALBUTEROL SULFATE 2.5 MG: 2.5 SOLUTION RESPIRATORY (INHALATION) at 21:47

## 2018-03-23 RX ADMIN — HEPARIN SODIUM 5000 UNITS: 5000 INJECTION, SOLUTION INTRAVENOUS; SUBCUTANEOUS at 05:27

## 2018-03-23 RX ADMIN — GABAPENTIN 400 MG: 400 CAPSULE ORAL at 19:54

## 2018-03-23 RX ADMIN — Medication 1 MG: at 06:05

## 2018-03-23 RX ADMIN — Medication 1 MG: at 10:43

## 2018-03-23 RX ADMIN — CIPROFLOXACIN 400 MG: 2 INJECTION, SOLUTION INTRAVENOUS at 19:54

## 2018-03-23 ASSESSMENT — PAIN DESCRIPTION - LOCATION
LOCATION: ABDOMEN

## 2018-03-23 ASSESSMENT — PAIN SCALES - GENERAL
PAINLEVEL_OUTOF10: 3
PAINLEVEL_OUTOF10: 4
PAINLEVEL_OUTOF10: 5
PAINLEVEL_OUTOF10: 4
PAINLEVEL_OUTOF10: 2
PAINLEVEL_OUTOF10: 6
PAINLEVEL_OUTOF10: 4
PAINLEVEL_OUTOF10: 3
PAINLEVEL_OUTOF10: 5
PAINLEVEL_OUTOF10: 6
PAINLEVEL_OUTOF10: 5

## 2018-03-23 ASSESSMENT — PAIN DESCRIPTION - ORIENTATION
ORIENTATION: RIGHT;LOWER

## 2018-03-23 ASSESSMENT — PAIN DESCRIPTION - FREQUENCY
FREQUENCY: INTERMITTENT

## 2018-03-23 ASSESSMENT — PAIN DESCRIPTION - ONSET
ONSET: ON-GOING

## 2018-03-23 ASSESSMENT — PAIN DESCRIPTION - PAIN TYPE
TYPE: ACUTE PAIN

## 2018-03-23 ASSESSMENT — PAIN DESCRIPTION - DESCRIPTORS
DESCRIPTORS: ACHING

## 2018-03-23 NOTE — PROGRESS NOTES
19  Functional Limitation: Self care  Self Care Current Status (): At least 40 percent but less than 60 percent impaired, limited or restricted  Self Care Goal Status (): At least 1 percent but less than 20 percent impaired, limited or restricted  OutComes Score                                           AM-PAC Score             Goals  Short term goals  Time Frame for Short term goals: 8-10 visits  Short term goal 1: Pt. will be able to complete bathing/dressing at Mod-I level using A.E. as needed. Short term goal 2: Pt. will be able to complete functional transfers at Mod-I level using 4WW and appropriate DME. Short term goal 3: Pt. will be Mod-I with toileting routine using 4WW. Short term goal 4: Pt. will improve standing tolerance to 10-12 min for increased activity tolerance with functional mobility skills. Short term goal 5: Pt. will demo Good (Mod-I) standing balance during functional mobility using 4WW for safe item/clothing retrieval for ADL setup. Patient Goals   Patient goals :  \"To go home and care for myself\"       Therapy Time   Individual Concurrent Group Co-treatment   Time In 4018         Time Out 1402         Minutes [address] [facility]

## 2018-03-23 NOTE — PROGRESS NOTES
lines intact, call light within reach, and patient positioned comfortably at end of treatment. All patient needs addressed prior to ending therapy session. Assessment   Body structures, Functions, Activity limitations: Decreased functional mobility ; Decreased strength;Decreased endurance;Decreased balance  Assessment: Patient requires continued skilled PT & is appropriate to D/C to [address] to increase independence with functional mobility, balance, safety awareness & activity tolerance, & enable pt to return home safely. Prognosis: Good  Decision Making: High Complexity  Clinical Presentation: evolving  Patient Education: PT POC, functional mobility, safety awareness, prevention of sedentary complications  Exam:  ROM, MMT, functional mobility, activity tolerance, Balance, & [employer] AM-PAC 6 Clicks Basic Mobility     Barriers to Learning: none  REQUIRES PT FOLLOW UP: Yes  PT Equipment Recommendations  Equipment Needed: No     Discharge Recommendations:  Subacute/Skilled Nursing Facility;      Plan   Plan  Times per week: 1-2x/D, 5-6D/week  Current Treatment Recommendations: Strengthening, Balance Training, Functional Mobility Training, Gait Training, Endurance Training, Home Exercise Program, Safety Education & Training, Patient/Caregiver Education & Training  Safety Devices  Type of devices: Bed alarm in place, Gait belt, Left in chair, Nurse notified, Patient at risk for falls, Call light within reach    G-Code  PT G-Codes  Functional Assessment Tool Used: AM-PAC  Score: 16  Functional Limitation: Mobility: Walking and moving around  Mobility: Walking and Moving Around Current Status (): At least 40 percent but less than 60 percent impaired, limited or restricted  Mobility: Walking and Moving Around Goal Status ():  At least 1 percent but less than 20 percent impaired, limited or restricted    AM-PAC Score     AM-PAC Inpatient Mobility without Stair Climbing Raw

## 2018-03-23 NOTE — PROGRESS NOTES
times daily     Historical Provider, MD   calcitRIOL (ROCALTROL) 0.25 MCG capsule Take 0.25 mcg by mouth three times a week Indications: Monday, Wednesday & Friday     Historical Provider, MD   omeprazole (PRILOSEC) 20 MG capsule Take 20 mg by mouth 2 times daily     Historical Provider, MD LUONG Phos Chaffee-Sod Phos Di & Mono (PHOSPHA 250 NEUTRAL PO) Take 1 tablet by mouth 2 times daily    Historical Provider, MD   hydrOXYzine (ATARAX) 25 MG tablet Take 25 mg by mouth 3 times daily as needed     Historical Provider, MD   polyethylene glycol (GLYCOLAX) powder Take 8.5 g by mouth every 48 hours as needed     Historical Provider, MD   albuterol (PROVENTIL) (2.5 MG/3ML) 0.083% nebulizer solution Take 2.5 mg by nebulization 4 times daily as needed for Wheezing    Historical Provider, MD   torsemide (DEMADEX) 20 MG tablet Take 40 mg by mouth daily Indications: Takes 2 tabs (=40mg) daily     Historical Provider, MD   Insulin Aspart (NOVOLOG FLEXPEN SC) Inject into the skin 3 times daily as needed (sliding scale)     Historical Provider, MD   folic acid (FOLVITE) 1 MG tablet Take 1 tablet by mouth daily. 11/27/13   Maria Fernanda Davis MD   Warfarin Sodium (COUMADIN PO) Take 2.5 mg by mouth daily 2.5 mg daily    Historical Provider, MD   spironolactone (ALDACTONE) 100 MG tablet Take 100 mg by mouth daily Currently on hold per nephrologist    Historical Provider, MD   levothyroxine (SYNTHROID) 112 MCG tablet Take 112 mcg by mouth Daily     Historical Provider, MD   Cholecalciferol (VITAMIN D3) 1000 UNITS TABS Take 1,000 Units by mouth Five times weekly Indications: Takes 1000 units Mon-Fri only     Historical Provider, MD   magnesium oxide (MAG-OX) 400 MG tablet Take 400 mg by mouth 2 times daily.     Historical Provider, MD   .  Recent Surgical History: None = 0     Assessment     Peak Flow (asthma only)    Predicted: na  Personal Best: na  PEF na  % Predicted na  Peak Flow : not applicable = 0    WJY7/OGN    FEV1 Predicted na

## 2018-03-23 NOTE — PROGRESS NOTES
AEROSOL PROTOCOL ASSESSMENT      PEF  (For Asthmatics Only)  Unable to get good effort. (Pt nauseated.)    % PREDICTED  Unable to get a good effort. (Pt nauseated.)    FEV1/FVC    FEV1    Unable to get a good effort. (Pt nauseated.)       FEV1 % PREDICTED    Unable to get a good effort. (Pt nauseated.)    FVC  N/A    IS volume   N/A    IBW   N/A    FIO2%  Room Air    SPO2  96%    RR  16    Breath Sounds:  Few faint end exp wheezes with moderately diminished bases.       · Bronchodilator assessment at level    3  · Hyperinflation assessment at level   · Secretion Management assessment at level    ·   · [x]    Bronchodilator Assessment  BRONCHODILATOR ASSESSMENT SCORE  Score 0 1 2 3 4 5   Breath Sounds   []  Patient Baseline []  No Wheeze good aeration []  Faint, scattered wheezing, good aeration [x]  Expiratory Wheezing and or moderately diminished []  Insp/Exp wheeze and/or very diminished []  Insp/Exp and/ or marked distress   Respiratory Rate   []  Patient Baseline []  Less than 20 [x]  Less than 20 []  20-25 []  Greater than 25 []  Greater than 25   Peak flow % of Pred or PB []  NA   []  Greater than 90%  []  81-90% []  71-80% [x]  Less than or equal to 70%  or unable to perform []  Unable due to Respiratory Distress   Dyspnea re []  Patient Baseline []  No SOB []  No SOB [x]  SOB on exertion []  SOB min activity []  At rest/acute   e FEV% Predicted       []  NA []  Above 69%  []  Unable []  Above 60-69%  [x]  Unable []  Above 50-59%  []  Unable []  Above 35-49%  []  Unable []  Less than 35%  []  Unable                 []  Hyperinflation Assessment  Score 1 2 3   CXR and Breath Sounds   []  Clear []  No atelectasis  Basilar aeration []  Atelectasis or absent basilar breath sounds   Incentive Spirometry Volume  (Per IBW)   []  Greater than or equal to 15ml/Kg []  less than 15ml/Kg []  less than 15ml/Kg   Surgery within last 2 weeks []  None or general   []  Abdominal or thoracic surgery  []  Abdominal or

## 2018-03-23 NOTE — PROGRESS NOTES
[facility]    Progress Note    3/23/2018    2:47 PM    Name:   Peter Oscar  MRN:     [id number]     Acct:      [de-identified]   Room:   2022/2022-01  IP Day:  1  Admit Date:  3/21/2018  4:12 AM    PCP:   Liban Falcon  Code Status:  Full Code    Subjective:     C/C:   Chief Complaint   Patient presents with    Abdominal Pain     RLQ pain for 5 days     Interval History Status: . Pain in the right upper quadrant and left lower quadrant is Still there but better than before  Still Having nausea due to morphine  Urine culture shows MORGANELLA MORGANII 10 to 50,000 CFU/ML    Brief History:   Admitted through ER with following history     Sami Banegas is a 69 y.o. female who presents to the emergency department [facility] for evaluation of severe abdominal pain.  Patient states her abdominal pain started developing about 5 days ago. Caitlyn Zamorano presents in severe discomfort.  She is holding her right and upper abdomen and moaning in discomfort.  She endorses loss of appetite.  She denies vomiting.  She denies constipation or diarrhea.   she had BM on 3/20/18  States she had EGD last Tuesday at [facility] which was normal      Review of Systems:     Constitutional:  negative for chills, fevers, sweats  Respiratory:  negative for cough, dyspnea on exertion, shortness of breath, wheezing  Cardiovascular:  negative for chest pain, chest pressure/discomfort, lower extremity edema, palpitations  Gastrointestinal:  + for Right upper quadrant and left lower quadrant abdominal pain, +nausea, vomiting due to pain medicines  Neurological:  negative for dizziness, headache    Medications: Allergies:     Allergies   Allergen Reactions    Pcn [Penicillins] Other (See Comments)     Unknown reaction, states can take Keflex       Current Meds:   Scheduled Meds:    insulin lispro  0-12 Units Subcutaneous TID WC    insulin lispro  0-6 Units Subcutaneous Nightly    pantoprazole

## 2018-03-24 LAB
ABSOLUTE EOS #: 0.1 K/UL (ref 0–0.4)
ABSOLUTE IMMATURE GRANULOCYTE: ABNORMAL K/UL (ref 0–0.3)
ABSOLUTE LYMPH #: 1.1 K/UL (ref 1–4.8)
ABSOLUTE MONO #: 1.1 K/UL (ref 0.2–0.8)
ANION GAP SERPL CALCULATED.3IONS-SCNC: 11 MMOL/L (ref 9–17)
BASOPHILS # BLD: 1 % (ref 0–2)
BASOPHILS ABSOLUTE: 0.1 K/UL (ref 0–0.2)
BUN BLDV-MCNC: 14 MG/DL (ref 8–23)
BUN/CREAT BLD: 9 (ref 9–20)
CALCIUM SERPL-MCNC: 8.4 MG/DL (ref 8.6–10.4)
CHLORIDE BLD-SCNC: 101 MMOL/L (ref 98–107)
CO2: 25 MMOL/L (ref 20–31)
CREAT SERPL-MCNC: 1.55 MG/DL (ref 0.5–0.9)
DIFFERENTIAL TYPE: ABNORMAL
EKG ATRIAL RATE: 153 BPM
EKG Q-T INTERVAL: 352 MS
EKG QRS DURATION: 80 MS
EKG QTC CALCULATION (BAZETT): 471 MS
EKG R AXIS: 72 DEGREES
EKG T AXIS: 29 DEGREES
EKG VENTRICULAR RATE: 108 BPM
EOSINOPHILS RELATIVE PERCENT: 1 % (ref 1–4)
GFR AFRICAN AMERICAN: 40 ML/MIN
GFR NON-AFRICAN AMERICAN: 33 ML/MIN
GFR SERPL CREATININE-BSD FRML MDRD: ABNORMAL ML/MIN/{1.73_M2}
GFR SERPL CREATININE-BSD FRML MDRD: ABNORMAL ML/MIN/{1.73_M2}
GLUCOSE BLD-MCNC: 101 MG/DL (ref 65–105)
GLUCOSE BLD-MCNC: 112 MG/DL (ref 65–105)
GLUCOSE BLD-MCNC: 114 MG/DL (ref 65–105)
GLUCOSE BLD-MCNC: 115 MG/DL (ref 65–105)
GLUCOSE BLD-MCNC: 122 MG/DL (ref 70–99)
GLUCOSE BLD-MCNC: 124 MG/DL (ref 65–105)
HCT VFR BLD CALC: 34.1 % (ref 36–46)
HCT VFR BLD CALC: 40 % (ref 36–46)
HEMOGLOBIN: 11.2 G/DL (ref 12–16)
HEMOGLOBIN: 13.4 G/DL (ref 12–16)
IMMATURE GRANULOCYTES: ABNORMAL %
INR BLD: 1.4
LYMPHOCYTES # BLD: 11 % (ref 24–44)
MAGNESIUM: 2.2 MG/DL (ref 1.6–2.6)
MCH RBC QN AUTO: 32.9 PG (ref 26–34)
MCH RBC QN AUTO: 33.2 PG (ref 26–34)
MCHC RBC AUTO-ENTMCNC: 33 G/DL (ref 31–37)
MCHC RBC AUTO-ENTMCNC: 33.4 G/DL (ref 31–37)
MCV RBC AUTO: 100.5 FL (ref 80–100)
MCV RBC AUTO: 98.4 FL (ref 80–100)
MONOCYTES # BLD: 11 % (ref 1–7)
NRBC AUTOMATED: ABNORMAL PER 100 WBC
NRBC AUTOMATED: NORMAL PER 100 WBC
PARTIAL THROMBOPLASTIN TIME: 32.2 SEC (ref 23–31)
PARTIAL THROMBOPLASTIN TIME: 53.4 SEC (ref 23–31)
PDW BLD-RTO: 13.6 % (ref 11.5–14.5)
PDW BLD-RTO: 14.8 % (ref 11.5–14.5)
PHOSPHORUS: 1.9 MG/DL (ref 2.6–4.5)
PLATELET # BLD: 241 K/UL (ref 130–400)
PLATELET # BLD: 277 K/UL (ref 130–400)
PLATELET ESTIMATE: ABNORMAL
PMV BLD AUTO: 9 FL (ref 6–12)
PMV BLD AUTO: NORMAL FL (ref 6–12)
POTASSIUM SERPL-SCNC: 3.5 MMOL/L (ref 3.7–5.3)
PROTHROMBIN TIME: 14.4 SEC (ref 9.7–11.6)
RBC # BLD: 3.39 M/UL (ref 4–5.2)
RBC # BLD: 4.06 M/UL (ref 4–5.2)
RBC # BLD: ABNORMAL 10*6/UL
SEG NEUTROPHILS: 76 % (ref 36–66)
SEGMENTED NEUTROPHILS ABSOLUTE COUNT: 7.5 K/UL (ref 1.8–7.7)
SODIUM BLD-SCNC: 137 MMOL/L (ref 135–144)
VITAMIN D 25-HYDROXY: 27.9 NG/ML (ref 30–100)
WBC # BLD: 10.8 K/UL (ref 3.5–11)
WBC # BLD: 9.8 K/UL (ref 3.5–11)
WBC # BLD: ABNORMAL 10*3/UL

## 2018-03-24 PROCEDURE — 2500000003 HC RX 250 WO HCPCS: Performed by: NURSE PRACTITIONER

## 2018-03-24 PROCEDURE — 84100 ASSAY OF PHOSPHORUS: CPT

## 2018-03-24 PROCEDURE — 97535 SELF CARE MNGMENT TRAINING: CPT

## 2018-03-24 PROCEDURE — 2500000003 HC RX 250 WO HCPCS: Performed by: INTERNAL MEDICINE

## 2018-03-24 PROCEDURE — 85027 COMPLETE CBC AUTOMATED: CPT

## 2018-03-24 PROCEDURE — 82306 VITAMIN D 25 HYDROXY: CPT

## 2018-03-24 PROCEDURE — 97116 GAIT TRAINING THERAPY: CPT

## 2018-03-24 PROCEDURE — 97530 THERAPEUTIC ACTIVITIES: CPT

## 2018-03-24 PROCEDURE — 94640 AIRWAY INHALATION TREATMENT: CPT

## 2018-03-24 PROCEDURE — 85730 THROMBOPLASTIN TIME PARTIAL: CPT

## 2018-03-24 PROCEDURE — 6370000000 HC RX 637 (ALT 250 FOR IP): Performed by: INTERNAL MEDICINE

## 2018-03-24 PROCEDURE — 82947 ASSAY GLUCOSE BLOOD QUANT: CPT

## 2018-03-24 PROCEDURE — 2580000003 HC RX 258: Performed by: INTERNAL MEDICINE

## 2018-03-24 PROCEDURE — 6370000000 HC RX 637 (ALT 250 FOR IP): Performed by: NURSE PRACTITIONER

## 2018-03-24 PROCEDURE — 85610 PROTHROMBIN TIME: CPT

## 2018-03-24 PROCEDURE — 80048 BASIC METABOLIC PNL TOTAL CA: CPT

## 2018-03-24 PROCEDURE — 1200000000 HC SEMI PRIVATE

## 2018-03-24 PROCEDURE — 97110 THERAPEUTIC EXERCISES: CPT

## 2018-03-24 PROCEDURE — 85025 COMPLETE CBC W/AUTO DIFF WBC: CPT

## 2018-03-24 PROCEDURE — C9113 INJ PANTOPRAZOLE SODIUM, VIA: HCPCS | Performed by: INTERNAL MEDICINE

## 2018-03-24 PROCEDURE — 99232 SBSQ HOSP IP/OBS MODERATE 35: CPT | Performed by: INTERNAL MEDICINE

## 2018-03-24 PROCEDURE — 6360000002 HC RX W HCPCS: Performed by: INTERNAL MEDICINE

## 2018-03-24 PROCEDURE — 36415 COLL VENOUS BLD VENIPUNCTURE: CPT

## 2018-03-24 PROCEDURE — 93005 ELECTROCARDIOGRAM TRACING: CPT

## 2018-03-24 PROCEDURE — 6360000002 HC RX W HCPCS: Performed by: NURSE PRACTITIONER

## 2018-03-24 PROCEDURE — 83735 ASSAY OF MAGNESIUM: CPT

## 2018-03-24 RX ORDER — SUCRALFATE 1 G/1
1 TABLET ORAL EVERY 6 HOURS SCHEDULED
Status: DISCONTINUED | OUTPATIENT
Start: 2018-03-24 | End: 2018-03-27 | Stop reason: HOSPADM

## 2018-03-24 RX ORDER — METOPROLOL TARTRATE 50 MG/1
50 TABLET, FILM COATED ORAL 2 TIMES DAILY
Status: DISCONTINUED | OUTPATIENT
Start: 2018-03-24 | End: 2018-03-27 | Stop reason: HOSPADM

## 2018-03-24 RX ORDER — HEPARIN SODIUM 1000 [USP'U]/ML
4000 INJECTION, SOLUTION INTRAVENOUS; SUBCUTANEOUS PRN
Status: DISCONTINUED | OUTPATIENT
Start: 2018-03-24 | End: 2018-03-26

## 2018-03-24 RX ORDER — HEPARIN SODIUM 1000 [USP'U]/ML
4000 INJECTION, SOLUTION INTRAVENOUS; SUBCUTANEOUS ONCE
Status: COMPLETED | OUTPATIENT
Start: 2018-03-24 | End: 2018-03-24

## 2018-03-24 RX ORDER — HEPARIN SODIUM 10000 [USP'U]/100ML
1000 INJECTION, SOLUTION INTRAVENOUS CONTINUOUS
Status: DISCONTINUED | OUTPATIENT
Start: 2018-03-24 | End: 2018-03-26

## 2018-03-24 RX ORDER — HEPARIN SODIUM 1000 [USP'U]/ML
2000 INJECTION, SOLUTION INTRAVENOUS; SUBCUTANEOUS PRN
Status: DISCONTINUED | OUTPATIENT
Start: 2018-03-24 | End: 2018-03-26

## 2018-03-24 RX ADMIN — ONDANSETRON 4 MG: 2 INJECTION INTRAMUSCULAR; INTRAVENOUS at 10:53

## 2018-03-24 RX ADMIN — WARFARIN SODIUM 3.5 MG: 2.5 TABLET ORAL at 17:57

## 2018-03-24 RX ADMIN — Medication 1 MG: at 15:08

## 2018-03-24 RX ADMIN — METOPROLOL TARTRATE 50 MG: 50 TABLET ORAL at 20:52

## 2018-03-24 RX ADMIN — POTASSIUM PHOSPHATE, MONOBASIC AND POTASSIUM PHOSPHATE, DIBASIC 15 MMOL: 224; 236 INJECTION, SOLUTION, CONCENTRATE INTRAVENOUS at 10:44

## 2018-03-24 RX ADMIN — MOMETASONE FUROATE AND FORMOTEROL FUMARATE DIHYDRATE 2 PUFF: 100; 5 AEROSOL RESPIRATORY (INHALATION) at 10:33

## 2018-03-24 RX ADMIN — HEPARIN SODIUM AND DEXTROSE 8.8 UNITS/KG/HR: 10000; 5 INJECTION INTRAVENOUS at 13:06

## 2018-03-24 RX ADMIN — MOMETASONE FUROATE AND FORMOTEROL FUMARATE DIHYDRATE 2 PUFF: 100; 5 AEROSOL RESPIRATORY (INHALATION) at 20:34

## 2018-03-24 RX ADMIN — METRONIDAZOLE 500 MG: 500 INJECTION, SOLUTION INTRAVENOUS at 06:05

## 2018-03-24 RX ADMIN — CIPROFLOXACIN 400 MG: 2 INJECTION, SOLUTION INTRAVENOUS at 08:08

## 2018-03-24 RX ADMIN — NEBIVOLOL HYDROCHLORIDE 5 MG: 5 TABLET ORAL at 07:59

## 2018-03-24 RX ADMIN — ONDANSETRON 4 MG: 2 INJECTION INTRAMUSCULAR; INTRAVENOUS at 06:05

## 2018-03-24 RX ADMIN — PROMETHAZINE HYDROCHLORIDE 12.5 MG: 25 INJECTION INTRAMUSCULAR; INTRAVENOUS at 17:57

## 2018-03-24 RX ADMIN — LEVOTHYROXINE SODIUM 112 MCG: 112 TABLET ORAL at 06:30

## 2018-03-24 RX ADMIN — HEPARIN SODIUM 5000 UNITS: 5000 INJECTION, SOLUTION INTRAVENOUS; SUBCUTANEOUS at 06:05

## 2018-03-24 RX ADMIN — METRONIDAZOLE 500 MG: 500 INJECTION, SOLUTION INTRAVENOUS at 22:27

## 2018-03-24 RX ADMIN — SODIUM CHLORIDE 10 ML: 9 INJECTION INTRAMUSCULAR; INTRAVENOUS; SUBCUTANEOUS at 08:00

## 2018-03-24 RX ADMIN — PANTOPRAZOLE SODIUM 40 MG: 40 INJECTION, POWDER, FOR SOLUTION INTRAVENOUS at 07:59

## 2018-03-24 RX ADMIN — ATORVASTATIN CALCIUM 80 MG: 80 TABLET, FILM COATED ORAL at 20:46

## 2018-03-24 RX ADMIN — CIPROFLOXACIN 400 MG: 2 INJECTION, SOLUTION INTRAVENOUS at 20:35

## 2018-03-24 RX ADMIN — Medication 1 MG: at 06:05

## 2018-03-24 RX ADMIN — ONDANSETRON 4 MG: 2 INJECTION INTRAMUSCULAR; INTRAVENOUS at 15:08

## 2018-03-24 RX ADMIN — Medication 1 MG: at 01:19

## 2018-03-24 RX ADMIN — METRONIDAZOLE 500 MG: 500 INJECTION, SOLUTION INTRAVENOUS at 15:13

## 2018-03-24 RX ADMIN — HEPARIN SODIUM 4000 UNITS: 1000 INJECTION, SOLUTION INTRAVENOUS; SUBCUTANEOUS at 13:28

## 2018-03-24 RX ADMIN — SUCRALFATE 1 G: 1 TABLET ORAL at 12:53

## 2018-03-24 RX ADMIN — SUCRALFATE 1 G: 1 TABLET ORAL at 17:51

## 2018-03-24 RX ADMIN — ONDANSETRON 4 MG: 2 INJECTION INTRAMUSCULAR; INTRAVENOUS at 21:30

## 2018-03-24 RX ADMIN — DIBASIC SODIUM PHOSPHATE, MONOBASIC POTASSIUM PHOSPHATE AND MONOBASIC SODIUM PHOSPHATE 1 TABLET: 852; 155; 130 TABLET ORAL at 20:46

## 2018-03-24 RX ADMIN — GABAPENTIN 400 MG: 400 CAPSULE ORAL at 20:46

## 2018-03-24 RX ADMIN — METOPROLOL TARTRATE 50 MG: 50 TABLET ORAL at 12:53

## 2018-03-24 RX ADMIN — Medication 1 MG: at 10:53

## 2018-03-24 RX ADMIN — Medication 1 MG: at 18:02

## 2018-03-24 RX ADMIN — Medication 1 MG: at 21:30

## 2018-03-24 RX ADMIN — ONDANSETRON 4 MG: 2 INJECTION INTRAMUSCULAR; INTRAVENOUS at 01:19

## 2018-03-24 RX ADMIN — METOROPROLOL TARTRATE 5 MG: 5 INJECTION, SOLUTION INTRAVENOUS at 06:37

## 2018-03-24 ASSESSMENT — PAIN DESCRIPTION - LOCATION
LOCATION: ABDOMEN

## 2018-03-24 ASSESSMENT — PAIN SCALES - GENERAL
PAINLEVEL_OUTOF10: 8
PAINLEVEL_OUTOF10: 7
PAINLEVEL_OUTOF10: 7
PAINLEVEL_OUTOF10: 6
PAINLEVEL_OUTOF10: 0
PAINLEVEL_OUTOF10: 8
PAINLEVEL_OUTOF10: 6
PAINLEVEL_OUTOF10: 5

## 2018-03-24 ASSESSMENT — PAIN DESCRIPTION - PAIN TYPE
TYPE: ACUTE PAIN

## 2018-03-24 ASSESSMENT — PAIN DESCRIPTION - DESCRIPTORS
DESCRIPTORS: ACHING
DESCRIPTORS: ACHING

## 2018-03-24 ASSESSMENT — PAIN DESCRIPTION - ORIENTATION
ORIENTATION: RIGHT;UPPER
ORIENTATION: RIGHT
ORIENTATION: RIGHT;LOWER

## 2018-03-24 ASSESSMENT — PAIN DESCRIPTION - FREQUENCY: FREQUENCY: INTERMITTENT

## 2018-03-24 ASSESSMENT — PAIN DESCRIPTION - ONSET: ONSET: ON-GOING

## 2018-03-24 NOTE — PROGRESS NOTES
Standing - Dynamic Fair;+   Exercises   Comments Seated LE ex program x 10-20 reps, pt struggled with hip flexion due to abd pain   Patient Goals    Patient goals  regain independence & able to take care of myself again   Short term goals   Time Frame for Short term goals 12 visits   Short term goal 1 Inc bed-mobility & transfers to independent to enable pt to safely get in/OOB & return to PLOF & decrease risk for falls; Short term goal 2 Inc gait to amb 350ft or > indep w/ RW to enable pt to return to previous level of independence; Short term goal 3 Inc strength to [facility] to facilitate pt independence with functional mobility; Short term goal 4 Pt able to tolerate 40 min of activity to include 15-20 reps of ex & functional mobility to facilitate better activity tolerance;   Conditions Requiring Skilled Therapeutic Intervention   Body structures, Functions, Activity limitations Decreased functional mobility ; Decreased strength;Decreased endurance;Decreased balance   Assessment Patient requires continued skilled PT & is appropriate to D/C to [address] [facility] to increase independence with functional mobility, balance, safety awareness & activity tolerance, & enable pt to return home safely. Prognosis Good   Patient Education functional mobility, safety, circulation ex. REQUIRES PT FOLLOW UP Yes   Discharge Recommendations Subacute/Skilled Nursing Facility;Home with Home health PT   PT Equipment Recommendations   Equipment Needed No   Plan   Times per week 1-2x/D, 5-6D/week   Current Treatment Recommendations Strengthening;Balance Training;Functional Mobility Training;Gait Training; Endurance Training;Home Exercise Program;Safety Education & Training;Patient/Caregiver Education & Training   Safety Devices   Type of devices Bed alarm in place;Gait belt;Left in chair;Nurse notified; Patient at risk for falls;Call light within reach   Time: 8:53-9:31  [address]

## 2018-03-24 NOTE — PROGRESS NOTES
[facility]    Progress Note    3/24/2018    10:39 AM    Name:   Darren Meneses  MRN:     [id number]     Acct:      [de-identified]   Room:   2022/2022-01  IP Day:  2  Admit Date:  3/21/2018  4:12 AM    PCP:   Anson Jorgensen  Code Status:  Full Code    Subjective:     C/C:   Chief Complaint   Patient presents with    Abdominal Pain     RLQ pain for 5 days     Interval History Status: . Pain in the right upper quadrant and left lower quadrant is Still there but better than before  Also complains of pain/burning in epigastrium.     Had palpitation early morning today, tachycardic with heart rate currently ranging from 100 to 130s  Seen by urologist who will do further testing for right kidney mass after she is stable   Urine culture shows MORGANELLA MORGANII 10 to 50,000 CFU/ML    On Coumadin but INR is 1.4   Brief History:   Admitted through ER with following history     Eliseo Watts a 69 y.o. female who presents to the emergency department [facility] for evaluation of severe abdominal pain.  Patient states her abdominal pain started developing about 5 days ago. [facility] presents in severe discomfort.  She is holding her right and upper abdomen and moaning in discomfort.  She endorses loss of appetite.  She denies vomiting.  She denies constipation or diarrhea.   she had BM on 3/20/18  States she had EGD last Tuesday at [facility] which was normal      Review of Systems:     Constitutional:  negative for chills, fevers, sweats  Respiratory:  negative for cough, dyspnea on exertion, shortness of breath, wheezing  Cardiovascular:  negative for chest pain, chest pressure/discomfort, lower extremity edema, +palpitations today morning   Gastrointestinal:  + for Right upper quadrant and left lower quadrant abdominal pain, +nausea, vomiting due to pain medicines,+ heart burn and epigastric discomfort   Neurological:  negative for dizziness, headache    Medications: mention of complication, not stated as uncontrolled. Social History:   reports that she quit smoking about 9 years ago. She has a 43.00 pack-year smoking history. She has never used smokeless tobacco. She reports that she does not drink alcohol or use drugs. Family History:   Family History   Problem Relation Age of Onset   [facility] Cancer Mother      breast    Heart Disease Father     Cancer Sister      lung    Cancer Maternal Aunt      cervical       Vitals:  BP (!) 170/67   Pulse 129   Temp 98.4 °F (36.9 °C) (Oral)   Resp 16   Ht 5' 2\" (1.575 m)   Wt 250 lb 6.4 oz (113.6 kg)   SpO2 92%   BMI 45.80 kg/m²   Temp (24hrs), Av.3 °F (36.8 °C), Min:98.1 °F (36.7 °C), Max:98.5 °F (36.9 °C)    Recent Labs      18   1159  18   1658  18   2101  18   0640   POCGLU  121*  108*  124*  115*       I/O (24Hr):     Intake/Output Summary (Last 24 hours) at 18 1039  Last data filed at 18 0811   Gross per 24 hour   Intake             1161 ml   Output             1820 ml   Net             -659 ml       Labs:    Hematology:  Recent Labs      18   0706  18   0618  18   0335   WBC  11.3*   --   9.8   HGB  12.2   --   11.2*   HCT  36.8   --   34.1*   PLT  272   --   241   INR  1.2  1.3  1.4     Chemistry:  Recent Labs      18   0706  18   0335   NA  140  137   K  3.7  3.5*   CL  101  101   CO2  24  25   GLUCOSE  154*  122*   BUN  16  14   CREATININE  1.57*  1.55*   MG  2.5  2.2   ANIONGAP  15  11   LABGLOM  33*  33*   GFRAA  40*  40*   CALCIUM  9.4  8.4*   PHOS  3.5  1.9*     Recent Labs      18   0706   PROT  7.3   LABALBU  3.8   AST  22   ALT  13   ALKPHOS  145*   BILITOT  0.64   AMYLASE  37   LIPASE  67*         Lab Results   Component Value Date/Time    SPECIAL NOT REPORTED 2018 07:15 AM     Lab Results   Component Value Date/Time    CULTURE MORGANELLA MORGANII 10 to 50,000 CFU/ML (A) 2018 07:15 AM    CULTURE  2018 07:15 AM

## 2018-03-24 NOTE — PROGRESS NOTES
Right  Pain Descriptors: Aching  Pain Intervention(s): Medication;Distraction  Response to Pain Intervention: Patient Satisfied  Objective    ADL  Feeding: Independent  Grooming: Supervision;Setup (Seated in bed at tray table w/HOB elevated)  Toileting: Stand by assistance w/4ww     Balance  Sitting Balance: Stand by assistance  Standing Balance: Contact guard assistance  Standing Balance  Time: Pt stood for approx 5 min for transfers, func mob and mariana care after toileting  Sit to stand: Contact guard assistance  Stand to sit: Contact guard assistance  Comment: No LOB  Functional Mobility  Functional - Mobility Device: 4-Wheeled Walker  Assist Level: Contact guard assistance  Functional Mobility Comments: No LOB  Toilet Transfers  Toilet - Technique: Ambulating  Equipment Used: Standard toilet  Toilet Transfer: Stand by assistance  Bed mobility  Rolling to Left: Modified independent  Rolling to Right: Modified independent  Supine to Sit: Modified independent  Sit to Supine: Modified independent  Scooting: Stand by assistance  Comment: HOB elevated using bed rails  Transfers  Stand Step Transfers: Contact guard assistance  Sit to stand: Contact guard assistance  Stand to sit: Contact guard assistance  Transfer Comments: w/4WW      Assessment   Performance deficits / Impairments: Decreased functional mobility ; Decreased ADL status; Decreased ROM; Decreased strength;Decreased endurance;Decreased balance;Decreased high-level IADLs  Prognosis: Good  Patient Education: OT POC, d/c planning, safety with func mob and adls. Pt verbalized understanding. Discharge Recommendations: Subacute/Skilled Nursing Facility  REQUIRES OT FOLLOW UP: Yes  Safety Devices  Safety Devices in place: Yes  Type of devices: Call light within reach; Patient at risk for falls; Left in bed       Discharge Recommendations:  [address] [place] [address]  Times per week: 5-6x/week  Times per day: Daily  Current Treatment

## 2018-03-24 NOTE — CONSULTS
Interactions Other Anticoagulants (Y/N)   3/21 1.2  2.5mg  FLAGYL Lovenox   3/22 1.2 2.5mg Flagyl,cipro, levothyroxine,torsemide lovenox   3/23 1.3 Not given \" -torsemide heparin   3/24 1.4  \" \"       Plan: Coumadin dose not given yesterday. Dr. Catalina Falcon increased dose to 3.5mg daily starting today. Recheck inr tomorrow. Daily PT/INR while inpatient. Thank you for the consult. Will continue to follow.   Jay Sierra  3/24/2018  1:27 PM

## 2018-03-24 NOTE — PLAN OF CARE
Problem: Falls - Risk of  Goal: Absence of falls  Outcome: Met This Shift  Patient is a fall risk during this admission. Fall risk assessment was performed. Patient is absent of falls. Bed is in the lowest position. Wheels on the bed are locked. Call light and bed side table are within reach. Clutter is removed. Patient was educated to call out when needing assistance or wanting to get out of bed. Patient offered toileting assistance during rounding. Hourly rounds have been performed.        Problem: Pain:  Goal: Control of acute pain  Control of acute pain   Outcome: Ongoing  Patient continue to take pain medication frequently patient states medication is effective for pain control

## 2018-03-24 NOTE — PROGRESS NOTES
Reason for Follow up: CKD stage 3B. HISTORY:    C/O right flank pain that is sensitive to touch. Review Of Systems:   Constitutional: No fever, chills, lethargy, weakness or wt loss. Cardiac:  No chest pain, dyspnea, orthopnea or PND. Pulmonary:  No cough, phlegm or wheezing. Abdomen:  As above. :   No hematuria, pyuria, dysuria or flank pain. Extremities:  No swelling or joint pains. Scheduled Meds:   sucralfate  1 g Oral 4 times per day    metoprolol tartrate  50 mg Oral BID    warfarin  3.5 mg Oral Daily    insulin lispro  0-12 Units Subcutaneous TID WC    insulin lispro  0-6 Units Subcutaneous Nightly    pantoprazole  40 mg Intravenous Daily    And    sodium chloride (PF)  10 mL Intravenous Daily    folic acid  1 mg Oral Daily    gabapentin  400 mg Oral BID    phosphorus  250 mg Oral BID    levothyroxine  112 mcg Oral Daily    mometasone-formoterol  2 puff Inhalation BID    atorvastatin  80 mg Oral Nightly    sodium chloride flush  10 mL Intravenous 2 times per day    ciprofloxacin  400 mg Intravenous Q12H    metroNIDAZOLE  500 mg Intravenous Q8H    warfarin (COUMADIN) daily dosing (placeholder)   Other RX Placeholder   Continuous Infusions:   heparin (porcine) 8.803 Units/kg/hr (03/24/18 1306)    dextrose      sodium chloride 50 mL/hr at 03/23/18 1924     PRN Meds:heparin (porcine), heparin (porcine), glucose, dextrose, glucagon (rDNA), dextrose, bisacodyl, metoprolol, HYDROmorphone, promethazine, albuterol, hydrOXYzine, sodium chloride flush, magnesium sulfate, acetaminophen, nicotine, ondansetron, senna    Allergies   Allergen Reactions    Pcn [Penicillins] Other (See Comments)     Unknown reaction, states can take Keflex       Physical Exam:  Blood pressure 139/65, pulse 94, temperature 98.4 °F (36.9 °C), temperature source Oral, resp. rate 16, height 5' 2\" (1.575 m), weight 250 lb 6.4 oz (113.6 kg), SpO2 92 %.   In: 360 [P.O.:360]  Out: 1500   In: 360   Out: 1500 [Urine:1500]    General:  Awake, alert, not in distress. Appears to be stated age. HEENT: Atraumatic, normocephalic. Anicteric sclera. Pink and moist oral mucosa. No carotid bruit. No JVD. Chest: Bilateral air entry, clear to auscultation, no wheezing, rhonchi or rales. Cardiovascular: RRR, S1S2, no murmur, rub or gallop. No lower extremity edema. Abdomen: Soft, tender to touch. Active bowel sounds x 4 quadrants. Musculoskeletal: Active ROM x 4 extremities. No cyanosis or clubbing. Integumentary: Pink, warm and dry. Free from rash or lesions. Skin turgor normal.  CNS: Oriented to person, place and time. Speech clear. Face symmetrical. No tremor.      Data:  CBC:   Lab Results   Component Value Date    WBC 10.8 03/24/2018    HGB 13.4 03/24/2018    HCT 40.0 03/24/2018    MCV 98.4 03/24/2018     03/24/2018     BMP:    Lab Results   Component Value Date     03/24/2018    K 3.5 (L) 03/24/2018     03/24/2018    CO2 25 03/24/2018    BUN 14 03/24/2018    CREATININE 1.55 (H) 03/24/2018    GLUCOSE 122 (H) 03/24/2018     CMP:   Lab Results   Component Value Date     03/24/2018    K 3.5 (L) 03/24/2018     03/24/2018    CO2 25 03/24/2018    BUN 14 03/24/2018    CREATININE 1.55 (H) 03/24/2018    GLUCOSE 122 (H) 03/24/2018    CALCIUM 8.4 (L) 03/24/2018    PROT 7.3 03/22/2018    LABALBU 3.8 03/22/2018    BILITOT 0.64 03/22/2018    ALKPHOS 145 (H) 03/22/2018    AST 22 03/22/2018    ALT 13 03/22/2018      Hepatic:   Lab Results   Component Value Date    AST 22 03/22/2018    ALT 13 03/22/2018    BILITOT 0.64 03/22/2018    ALKPHOS 145 (H) 03/22/2018     BNP:   Lab Results   Component Value Date    BNP NOT REPORTED 08/19/2014     Lipids:   Lab Results   Component Value Date    CHOL 190 01/03/2018    HDL 53 01/03/2018     INR:   Lab Results   Component Value Date    INR 1.4 03/24/2018     PTH: No results found for: PTH  Phosphorus:    Lab Results   Component Value Date    PHOS 1.9 (L) 03/24/2018

## 2018-03-25 LAB
ABSOLUTE EOS #: 0.4 K/UL (ref 0–0.4)
ABSOLUTE IMMATURE GRANULOCYTE: ABNORMAL K/UL (ref 0–0.3)
ABSOLUTE LYMPH #: 1.4 K/UL (ref 1–4.8)
ABSOLUTE MONO #: 1.2 K/UL (ref 0.2–0.8)
AMMONIA: 29 UMOL/L (ref 11–51)
ANION GAP SERPL CALCULATED.3IONS-SCNC: 13 MMOL/L (ref 9–17)
BASOPHILS # BLD: 1 % (ref 0–2)
BASOPHILS ABSOLUTE: 0.1 K/UL (ref 0–0.2)
BUN BLDV-MCNC: 12 MG/DL (ref 8–23)
BUN/CREAT BLD: 8 (ref 9–20)
CALCIUM SERPL-MCNC: 8.4 MG/DL (ref 8.6–10.4)
CHLORIDE BLD-SCNC: 102 MMOL/L (ref 98–107)
CO2: 23 MMOL/L (ref 20–31)
CREAT SERPL-MCNC: 1.58 MG/DL (ref 0.5–0.9)
DIFFERENTIAL TYPE: ABNORMAL
EOSINOPHILS RELATIVE PERCENT: 4 % (ref 1–4)
GFR AFRICAN AMERICAN: 39 ML/MIN
GFR NON-AFRICAN AMERICAN: 32 ML/MIN
GFR SERPL CREATININE-BSD FRML MDRD: ABNORMAL ML/MIN/{1.73_M2}
GFR SERPL CREATININE-BSD FRML MDRD: ABNORMAL ML/MIN/{1.73_M2}
GLUCOSE BLD-MCNC: 114 MG/DL (ref 65–105)
GLUCOSE BLD-MCNC: 129 MG/DL (ref 70–99)
GLUCOSE BLD-MCNC: 159 MG/DL (ref 65–105)
GLUCOSE BLD-MCNC: 96 MG/DL (ref 65–105)
HCT VFR BLD CALC: 34 % (ref 36–46)
HEMOGLOBIN: 11.4 G/DL (ref 12–16)
IMMATURE GRANULOCYTES: ABNORMAL %
INR BLD: 1.4
LYMPHOCYTES # BLD: 13 % (ref 24–44)
MAGNESIUM: 2 MG/DL (ref 1.6–2.6)
MCH RBC QN AUTO: 33.2 PG (ref 26–34)
MCHC RBC AUTO-ENTMCNC: 33.5 G/DL (ref 31–37)
MCV RBC AUTO: 99.2 FL (ref 80–100)
MONOCYTES # BLD: 11 % (ref 1–7)
NRBC AUTOMATED: ABNORMAL PER 100 WBC
PARTIAL THROMBOPLASTIN TIME: 45.5 SEC (ref 23–31)
PARTIAL THROMBOPLASTIN TIME: 56.7 SEC (ref 23–31)
PARTIAL THROMBOPLASTIN TIME: 71.4 SEC (ref 23–31)
PDW BLD-RTO: 13.3 % (ref 11.5–14.5)
PHOSPHORUS: 2.1 MG/DL (ref 2.6–4.5)
PLATELET # BLD: 260 K/UL (ref 130–400)
PLATELET ESTIMATE: ABNORMAL
PMV BLD AUTO: ABNORMAL FL (ref 6–12)
POTASSIUM SERPL-SCNC: 3 MMOL/L (ref 3.7–5.3)
PROTHROMBIN TIME: 14.4 SEC (ref 9.7–11.6)
RBC # BLD: 3.42 M/UL (ref 4–5.2)
RBC # BLD: ABNORMAL 10*6/UL
SEG NEUTROPHILS: 71 % (ref 36–66)
SEGMENTED NEUTROPHILS ABSOLUTE COUNT: 7.7 K/UL (ref 1.8–7.7)
SODIUM BLD-SCNC: 138 MMOL/L (ref 135–144)
WBC # BLD: 10.8 K/UL (ref 3.5–11)
WBC # BLD: ABNORMAL 10*3/UL

## 2018-03-25 PROCEDURE — 2500000003 HC RX 250 WO HCPCS: Performed by: INTERNAL MEDICINE

## 2018-03-25 PROCEDURE — C9113 INJ PANTOPRAZOLE SODIUM, VIA: HCPCS | Performed by: INTERNAL MEDICINE

## 2018-03-25 PROCEDURE — 84100 ASSAY OF PHOSPHORUS: CPT

## 2018-03-25 PROCEDURE — 85730 THROMBOPLASTIN TIME PARTIAL: CPT

## 2018-03-25 PROCEDURE — 6370000000 HC RX 637 (ALT 250 FOR IP): Performed by: NURSE PRACTITIONER

## 2018-03-25 PROCEDURE — 80048 BASIC METABOLIC PNL TOTAL CA: CPT

## 2018-03-25 PROCEDURE — 2500000003 HC RX 250 WO HCPCS: Performed by: NURSE PRACTITIONER

## 2018-03-25 PROCEDURE — 82140 ASSAY OF AMMONIA: CPT

## 2018-03-25 PROCEDURE — 6360000002 HC RX W HCPCS: Performed by: NURSE PRACTITIONER

## 2018-03-25 PROCEDURE — 6360000002 HC RX W HCPCS: Performed by: INTERNAL MEDICINE

## 2018-03-25 PROCEDURE — 36415 COLL VENOUS BLD VENIPUNCTURE: CPT

## 2018-03-25 PROCEDURE — 6370000000 HC RX 637 (ALT 250 FOR IP): Performed by: INTERNAL MEDICINE

## 2018-03-25 PROCEDURE — 2580000003 HC RX 258: Performed by: INTERNAL MEDICINE

## 2018-03-25 PROCEDURE — 94640 AIRWAY INHALATION TREATMENT: CPT

## 2018-03-25 PROCEDURE — 1200000000 HC SEMI PRIVATE

## 2018-03-25 PROCEDURE — 85025 COMPLETE CBC W/AUTO DIFF WBC: CPT

## 2018-03-25 PROCEDURE — 82947 ASSAY GLUCOSE BLOOD QUANT: CPT

## 2018-03-25 PROCEDURE — 99232 SBSQ HOSP IP/OBS MODERATE 35: CPT | Performed by: INTERNAL MEDICINE

## 2018-03-25 PROCEDURE — 2580000003 HC RX 258: Performed by: NURSE PRACTITIONER

## 2018-03-25 PROCEDURE — 85610 PROTHROMBIN TIME: CPT

## 2018-03-25 PROCEDURE — 83735 ASSAY OF MAGNESIUM: CPT

## 2018-03-25 RX ORDER — WARFARIN SODIUM 5 MG/1
5 TABLET ORAL
Status: COMPLETED | OUTPATIENT
Start: 2018-03-25 | End: 2018-03-25

## 2018-03-25 RX ORDER — METOCLOPRAMIDE HYDROCHLORIDE 5 MG/ML
10 INJECTION INTRAMUSCULAR; INTRAVENOUS EVERY 6 HOURS
Status: DISCONTINUED | OUTPATIENT
Start: 2018-03-25 | End: 2018-03-27 | Stop reason: HOSPADM

## 2018-03-25 RX ORDER — LIDOCAINE 50 MG/G
1 PATCH TOPICAL DAILY
Status: DISCONTINUED | OUTPATIENT
Start: 2018-03-25 | End: 2018-03-27 | Stop reason: HOSPADM

## 2018-03-25 RX ORDER — POTASSIUM CHLORIDE 7.45 MG/ML
10 INJECTION INTRAVENOUS
Status: DISCONTINUED | OUTPATIENT
Start: 2018-03-25 | End: 2018-03-25

## 2018-03-25 RX ORDER — POTASSIUM CHLORIDE 20 MEQ/1
20 TABLET, EXTENDED RELEASE ORAL ONCE
Status: COMPLETED | OUTPATIENT
Start: 2018-03-25 | End: 2018-03-25

## 2018-03-25 RX ADMIN — CIPROFLOXACIN 400 MG: 2 INJECTION, SOLUTION INTRAVENOUS at 07:58

## 2018-03-25 RX ADMIN — ONDANSETRON 4 MG: 2 INJECTION INTRAMUSCULAR; INTRAVENOUS at 01:51

## 2018-03-25 RX ADMIN — Medication 1 MG: at 01:51

## 2018-03-25 RX ADMIN — WARFARIN SODIUM 5 MG: 5 TABLET ORAL at 17:09

## 2018-03-25 RX ADMIN — PROMETHAZINE HYDROCHLORIDE 12.5 MG: 25 INJECTION INTRAMUSCULAR; INTRAVENOUS at 20:28

## 2018-03-25 RX ADMIN — SUCRALFATE 1 G: 1 TABLET ORAL at 12:18

## 2018-03-25 RX ADMIN — METOCLOPRAMIDE 10 MG: 5 INJECTION, SOLUTION INTRAMUSCULAR; INTRAVENOUS at 21:28

## 2018-03-25 RX ADMIN — Medication 1 MG: at 17:09

## 2018-03-25 RX ADMIN — DIBASIC SODIUM PHOSPHATE, MONOBASIC POTASSIUM PHOSPHATE AND MONOBASIC SODIUM PHOSPHATE 1 TABLET: 852; 155; 130 TABLET ORAL at 21:28

## 2018-03-25 RX ADMIN — SODIUM CHLORIDE 10 ML: 9 INJECTION INTRAMUSCULAR; INTRAVENOUS; SUBCUTANEOUS at 07:42

## 2018-03-25 RX ADMIN — GABAPENTIN 400 MG: 400 CAPSULE ORAL at 07:48

## 2018-03-25 RX ADMIN — Medication 1 MG: at 12:48

## 2018-03-25 RX ADMIN — MOMETASONE FUROATE AND FORMOTEROL FUMARATE DIHYDRATE 2 PUFF: 100; 5 AEROSOL RESPIRATORY (INHALATION) at 09:22

## 2018-03-25 RX ADMIN — Medication 1 MG: at 20:30

## 2018-03-25 RX ADMIN — PANTOPRAZOLE SODIUM 40 MG: 40 INJECTION, POWDER, FOR SOLUTION INTRAVENOUS at 07:41

## 2018-03-25 RX ADMIN — METOCLOPRAMIDE 10 MG: 5 INJECTION, SOLUTION INTRAMUSCULAR; INTRAVENOUS at 09:44

## 2018-03-25 RX ADMIN — SUCRALFATE 1 G: 1 TABLET ORAL at 17:11

## 2018-03-25 RX ADMIN — SUCRALFATE 1 G: 1 TABLET ORAL at 23:48

## 2018-03-25 RX ADMIN — Medication 1 MG: at 07:43

## 2018-03-25 RX ADMIN — CIPROFLOXACIN 400 MG: 2 INJECTION, SOLUTION INTRAVENOUS at 20:18

## 2018-03-25 RX ADMIN — SODIUM CHLORIDE: 9 INJECTION, SOLUTION INTRAVENOUS at 06:44

## 2018-03-25 RX ADMIN — HEPARIN SODIUM 2000 UNITS: 1000 INJECTION, SOLUTION INTRAVENOUS; SUBCUTANEOUS at 18:09

## 2018-03-25 RX ADMIN — SUCRALFATE 1 G: 1 TABLET ORAL at 06:43

## 2018-03-25 RX ADMIN — ONDANSETRON 4 MG: 2 INJECTION INTRAMUSCULAR; INTRAVENOUS at 12:48

## 2018-03-25 RX ADMIN — INSULIN LISPRO 1 UNITS: 100 INJECTION, SOLUTION INTRAVENOUS; SUBCUTANEOUS at 21:39

## 2018-03-25 RX ADMIN — HEPARIN SODIUM AND DEXTROSE 770 UNITS/HR: 10000; 5 INJECTION INTRAVENOUS at 18:12

## 2018-03-25 RX ADMIN — Medication 10 ML: at 01:52

## 2018-03-25 RX ADMIN — METOPROLOL TARTRATE 50 MG: 50 TABLET ORAL at 07:48

## 2018-03-25 RX ADMIN — METRONIDAZOLE 500 MG: 500 INJECTION, SOLUTION INTRAVENOUS at 22:28

## 2018-03-25 RX ADMIN — MOMETASONE FUROATE AND FORMOTEROL FUMARATE DIHYDRATE 2 PUFF: 100; 5 AEROSOL RESPIRATORY (INHALATION) at 20:55

## 2018-03-25 RX ADMIN — POTASSIUM PHOSPHATE, MONOBASIC AND POTASSIUM PHOSPHATE, DIBASIC 15 MMOL: 224; 236 INJECTION, SOLUTION, CONCENTRATE INTRAVENOUS at 09:11

## 2018-03-25 RX ADMIN — METRONIDAZOLE 500 MG: 500 INJECTION, SOLUTION INTRAVENOUS at 06:43

## 2018-03-25 RX ADMIN — LEVOTHYROXINE SODIUM 112 MCG: 112 TABLET ORAL at 06:47

## 2018-03-25 RX ADMIN — METOCLOPRAMIDE 10 MG: 5 INJECTION, SOLUTION INTRAMUSCULAR; INTRAVENOUS at 14:29

## 2018-03-25 RX ADMIN — METRONIDAZOLE 500 MG: 500 INJECTION, SOLUTION INTRAVENOUS at 14:29

## 2018-03-25 RX ADMIN — Medication 10 ML: at 20:20

## 2018-03-25 RX ADMIN — GABAPENTIN 400 MG: 400 CAPSULE ORAL at 21:28

## 2018-03-25 RX ADMIN — POTASSIUM CHLORIDE 20 MEQ: 20 TABLET, EXTENDED RELEASE ORAL at 09:44

## 2018-03-25 RX ADMIN — Medication 10 ML: at 07:44

## 2018-03-25 RX ADMIN — ONDANSETRON 4 MG: 2 INJECTION INTRAMUSCULAR; INTRAVENOUS at 17:09

## 2018-03-25 RX ADMIN — ONDANSETRON 4 MG: 2 INJECTION INTRAMUSCULAR; INTRAVENOUS at 07:41

## 2018-03-25 RX ADMIN — METOPROLOL TARTRATE 50 MG: 50 TABLET ORAL at 21:33

## 2018-03-25 ASSESSMENT — PAIN DESCRIPTION - ORIENTATION
ORIENTATION: RIGHT;LOWER
ORIENTATION: RIGHT;UPPER

## 2018-03-25 ASSESSMENT — PAIN SCALES - GENERAL
PAINLEVEL_OUTOF10: 8
PAINLEVEL_OUTOF10: 6
PAINLEVEL_OUTOF10: 8
PAINLEVEL_OUTOF10: 4
PAINLEVEL_OUTOF10: 6
PAINLEVEL_OUTOF10: 6
PAINLEVEL_OUTOF10: 5

## 2018-03-25 ASSESSMENT — PAIN DESCRIPTION - PAIN TYPE
TYPE: ACUTE PAIN

## 2018-03-25 ASSESSMENT — PAIN DESCRIPTION - LOCATION
LOCATION: ABDOMEN

## 2018-03-25 ASSESSMENT — PAIN DESCRIPTION - DESCRIPTORS
DESCRIPTORS: ACHING
DESCRIPTORS: ACHING;SHARP

## 2018-03-25 ASSESSMENT — PAIN DESCRIPTION - ONSET: ONSET: ON-GOING

## 2018-03-25 NOTE — PROGRESS NOTES
for Right upper quadrant  abdominal pain, left lower quadrant pain is much better, mild nausea  Neurological:  negative for dizziness, headache    Medications: Allergies: Allergies   Allergen Reactions    Pcn [Penicillins] Other (See Comments)     Unknown reaction, states can take Keflex       Current Meds:   Scheduled Meds:    sucralfate  1 g Oral 4 times per day    metoprolol tartrate  50 mg Oral BID    warfarin  3.5 mg Oral Daily    insulin lispro  0-12 Units Subcutaneous TID WC    insulin lispro  0-6 Units Subcutaneous Nightly    pantoprazole  40 mg Intravenous Daily    And    sodium chloride (PF)  10 mL Intravenous Daily    folic acid  1 mg Oral Daily    gabapentin  400 mg Oral BID    phosphorus  250 mg Oral BID    levothyroxine  112 mcg Oral Daily    mometasone-formoterol  2 puff Inhalation BID    atorvastatin  80 mg Oral Nightly    sodium chloride flush  10 mL Intravenous 2 times per day    ciprofloxacin  400 mg Intravenous Q12H    metroNIDAZOLE  500 mg Intravenous Q8H    warfarin (COUMADIN) daily dosing (placeholder)   Other RX Placeholder     Continuous Infusions:    heparin (porcine) 6.8 Units/kg/hr (03/25/18 0146)    dextrose      sodium chloride 40 mL/hr at 03/25/18 0644     PRN Meds: heparin (porcine), heparin (porcine), glucose, dextrose, glucagon (rDNA), dextrose, bisacodyl, metoprolol, HYDROmorphone, promethazine, albuterol, hydrOXYzine, sodium chloride flush, magnesium sulfate, acetaminophen, nicotine, ondansetron, senna    Data:     Past Medical History:   has a past medical history of Asthma; Atrial fibrillation ([facility] Cerebral artery occlusion with cerebral infarction ([facility] Chronic back pain; Chronic kidney disease; Constipation; COPD (chronic obstructive pulmonary disease) ([facility] [facility] Gastroparesis; GERD (gastroesophageal reflux disease); H/O mastectomy; Hepatitis; Hx of blood clots; Hyperlipidemia; Hypertension; Hypothyroidism; Impaired mobility;  Incontinence; tenderness in the calves  Skin:  no gross lesions, rashes, induration    Assessment:        Primary Problem  Sigmoid diverticulitis-Pain improved with antibiotics  Right upper quadrant pain with hepatomegaly-cause still not clear  Nauseated with pain medicines-better now  Gastritis , status post EGD on 3/13/18 done at [facility], which showed varices only and no ulcers   UTI-MORGANELLA MORGANII 10 to 50,000 CFU/ML    Subtherapeutic INR   Active Hospital Problems    Diagnosis Date Noted    Sigmoid diverticulitis [K57.32] 03/21/2018     Priority: High    Right upper quadrant pain [R10.11] 03/21/2018     Priority: High    UTI (urinary tract infection) [N39.0] 03/21/2018     Priority: High    Diverticulitis large intestine w/o perforation or abscess w/bleeding [K57.33] 03/22/2018    Gastroparesis [K31.84] 03/21/2018    Hepatitis C [B18.2] 02/12/2018    Hypertension [I10] 02/11/2018    Hypothyroidism [E03.9] 02/11/2018    Asthma [J45.909]     Atrial fibrillation (HCC) [I48.91]     Chronic kidney disease [N18.9]     [facility] [J44.9]     GERD (gastroesophageal reflux disease) [K21.9]     Liver disease [K76.9]     Type 2 diabetes mellitus without complication ([facility] [U04.9]        Plan:        1. Decreased dose of morphine to 1 mg every 3 hours when necessary  2. Continue IV Cipro and Flagyl  3. Continue Cipro for UTI also  4. Nephrologyand urology  evaluated, concern about right kidney mass as solid mass,  further workup later   5. Dulcolax suppository when necessary for constipation  6. Continue heparin drip  low-dose and titrate Coumadin dose Till INR gets therapeutic  7. Phenergan when necessary in addition to Zofran for nausea  8. Carafate has been started for gastritis symptoms  9. Continue Lopressor 50 mg twice a day, keep IV Lopressor when necessary. [address] cardiology evaluation, will get echocardiogram  11.  Lidoderm patch for intractable right upper

## 2018-03-25 NOTE — PROGRESS NOTES
Reason for Follow up: CKD stage 3B. HISTORY:    C/O right flank pain that is sensitive to touch. Review Of Systems:   Constitutional: No fever, chills, lethargy, weakness or wt loss. Cardiac:  No chest pain, dyspnea, orthopnea or PND. Pulmonary:  No cough, phlegm or wheezing. Abdomen:  As above. :   No hematuria, pyuria, dysuria or flank pain. Extremities:  No swelling or joint pains.     Scheduled Meds:   potassium phosphate IVPB  15 mmol Intravenous Once    metoclopramide  10 mg Intravenous Q6H    lidocaine  1 patch Transdermal Daily    sucralfate  1 g Oral 4 times per day    metoprolol tartrate  50 mg Oral BID    warfarin  3.5 mg Oral Daily    insulin lispro  0-12 Units Subcutaneous TID WC    insulin lispro  0-6 Units Subcutaneous Nightly    pantoprazole  40 mg Intravenous Daily    And    sodium chloride (PF)  10 mL Intravenous Daily    folic acid  1 mg Oral Daily    gabapentin  400 mg Oral BID    phosphorus  250 mg Oral BID    levothyroxine  112 mcg Oral Daily    mometasone-formoterol  2 puff Inhalation BID    atorvastatin  80 mg Oral Nightly    sodium chloride flush  10 mL Intravenous 2 times per day    ciprofloxacin  400 mg Intravenous Q12H    metroNIDAZOLE  500 mg Intravenous Q8H    warfarin (COUMADIN) daily dosing (placeholder)   Other RX Placeholder   Continuous Infusions:   heparin (porcine) 6.8 Units/kg/hr (03/25/18 0146)    dextrose      sodium chloride 40 mL/hr at 03/25/18 0644     PRN Meds:heparin (porcine), heparin (porcine), glucose, dextrose, glucagon (rDNA), dextrose, bisacodyl, metoprolol, HYDROmorphone, promethazine, albuterol, hydrOXYzine, sodium chloride flush, magnesium sulfate, acetaminophen, nicotine, ondansetron, senna    Allergies   Allergen Reactions    Pcn [Penicillins] Other (See Comments)     Unknown reaction, states can take Keflex       Physical Exam:  Blood pressure (!) 120/49, pulse 66, temperature 98.4 °F (36.9 °C), temperature source Oral,

## 2018-03-25 NOTE — PROGRESS NOTES
Patient ate small portion of soft diet. Patient did not c/o increase pain to abdomen.  Continue to c/o same pain and nausea prior to meal. No emesis noted

## 2018-03-25 NOTE — CONSULTS
[facility]  In PatientCardiology Consult             Date:   3/25/2018  Patient name: Brigid Montgomery  Date of admission:  3/21/2018  4:12 AM  MRN:   [id number]  YOB: 1950      Date:   3/25/2018  Patient name: Brigid Montgoemry  Date of admission:  3/21/2018  4:12 AM  MRN:   [id number]  YOB: 1950    Reason for Admission:  Abdominal pain, atrial fibrillation. CHIEF COMPLAINT:  As above     History Obtained From:  Patient and records    HISTORY OF PRESENT ILLNESS:      Ole Nguyen a 69 y.o. female who presents to the emergency department [facility] for evaluation of severe abdominal pain.  Patient states her abdominal pain started developing about 5 days ago. Keshawn Pressley presents in severe discomfort.  She is holding her right and upper abdomen and moaning in discomfort.  She endorses loss of appetite.  She denies vomiting.  She denies constipation or diarrhea. Known with atrial fibrillation since 2005, and was on coumadin3. Yesterday she developed a fast response atrial fibrillation, started on BB and controlled overnight with reversal to NSR this morning. She denied any chest pain, she reported some SOB and also reported some chest discomfort. Past Medical History:   has a past medical history of Asthma; Atrial fibrillation ([address] Cerebral artery occlusion with cerebral infarction ([address] Chronic back pain; Chronic kidney disease; Constipation; COPD (chronic obstructive pulmonary disease) ([address] Gastroparesis; GERD (gastroesophageal reflux disease); H/O mastectomy; Hepatitis; Hx of blood clots; Hyperlipidemia; Hypertension; Hypothyroidism; Impaired mobility; Incontinence; Incontinence of bowel; Liver disease; Osteoarthritis; PONV (postoperative nausea and vomiting); Rectocele; Rotator cuff tear; Snores; and Type II or unspecified type diabetes mellitus without mention of complication, not stated as uncontrolled.     Past Surgical History:   has a past surgical Family History:   Positive for early CAD    REVIEW OF SYSTEMS:    · Constitutional: there has been no unanticipated weight loss. There's been No change in energy level, No change in activity level. · Eyes: No visual changes or diplopia. No scleral icterus. · ENT: No Headaches, hearing loss or vertigo. No mouth sores or sore throat. · Cardiovascular: Atrial fibrillation  · Respiratory: Some SOB  · Gastrointestinal: Abdominal pain with N/V  · Genitourinary: No dysuria, trouble voiding, or hematuria. · Musculoskeletal:  No gait disturbance, No weakness or joint complaints. · Integumentary: No rash or pruritis. · Neurological: No headache, diplopia, change in muscle strength, numbness or tingling. No change in gait, balance, coordination, mood, affect, memory, mentation, behavior. · Psychiatric: No anxiety, or depression. · Endocrine: No temperature intolerance. No excessive thirst, fluid intake, or urination. No tremor. · Hematologic/Lymphatic: No abnormal bruising or bleeding, blood clots or swollen lymph nodes. · Allergic/Immunologic: No nasal congestion or hives. PHYSICAL EXAM:    Physical Examination:    BP (!) 120/49   Pulse 66   Temp 98.4 °F (36.9 °C) (Oral)   Resp 18   Ht 5' 2\" (1.575 m)   Wt 251 lb 8 oz (114.1 kg)   SpO2 96%   BMI 46.00 kg/m²    Constitutional and General Appearance: alert, cooperative, no distress and appears stated age  HEENT: PERRL, no cervical lymphadenopathy. No masses palpable. Normal oral mucosa  Respiratory:  · Normal excursion and expansion without use of accessory muscles  · Resp Auscultation: Good respiratory effort. No for increased work of breathing. On auscultation: decreased BS with minimal rales left base.   Cardiovascular:  · The apical impulse is not displaced  · Heart tones are crisp and normal. regular S1 and S2. Murmurs: SM 2/6 apical area  · Jugular venous pulsation Normal  · The carotid upstroke is normal in amplitude and contour without delay or bruit  · Peripheral pulses are symmetrical and full   Abdomen:  · No masses or tenderness  · Bowel sounds present  Extremities:  ·  No Cyanosis or Clubbing  ·  Lower extremity edema: Yes  ·  Skin: Warm and dry  Neurological:  · Alert and oriented. · Moves all extremities well  · No abnormalities of mood, affect, memory, mentation, or behavior are noted    DATA:    Diagnostics:      EKG: Atrial fib with RVR, NS changes          Labs:     CBC:   Recent Labs      03/24/18   1232  03/25/18   0528   WBC  10.8  10.8   HGB  13.4  11.4*   HCT  40.0  34.0*   PLT  277  260     BMP:   Recent Labs      03/24/18   0335  03/25/18   0528   NA  137  138   K  3.5*  3.0*   CO2  25  23   BUN  14  12   CREATININE  1.55*  1.58*   LABGLOM  33*  32*   GLUCOSE  122*  129*     BNP: No results for input(s): BNP in the last 72 hours. PT/INR:   Recent Labs      03/24/18   0335  03/25/18   0528   PROTIME  14.4*  14.4*   INR  1.4  1.4     APTT:  Recent Labs      03/25/18   0044  03/25/18   0528   APTT  71.4*  56.7*     CARDIAC ENZYMES:No results for input(s): CKTOTAL, CKMB, CKMBINDEX, TROPONINI in the last 72 hours. FASTING LIPID PANEL:  Lab Results   Component Value Date    HDL 53 01/03/2018    TRIG 185 01/03/2018     LIVER PROFILE:No results for input(s): AST, ALT, LABALBU in the last 72 hours. IMPRESSION:    1. Atrial Fibrillation- paroxysmal: Recent episode most likely due to electrolytes unbalance and renal status  2. Abdominal pain, possible Diverticulosis  3. Renal insufficiency. 4. Electrolytes unbalance.     Patient Active Problem List   Diagnosis    [facility]) ([facility]    Atrial fibrillation (HCC)    Chronic kidney disease    Type 2 diabetes mellitus without complication (HCC)    GERD (gastroesophageal reflux disease)    Hyperlipidemia    Chronic back pain    Osteoarthritis    Liver disease    H/O mastectomy    Asthma    Back pain    Cystocele    Rectocele    Urinary incontinence, mixed    Hypertension    Hypothyroidism    Hepatitis C    Sigmoid diverticulitis    Contact hypersensitivity    Right upper quadrant pain    Gastroparesis    UTI (urinary tract infection)    Diverticulitis large intestine w/o perforation or abscess w/bleeding       RECOMMENDATIONS:  1. Agree with Metoprolol 50 BID for now. 2. Agree with resuming Coumadin. 3. Suggest echocardiogram, if normal will consider changing AC to Eliquis for safety and more effective treatment on the long run. Discussed with patient and nursing.     Electronically signed by Lorie Strauss MD on 3/25/2018 at [address]  cardiology Consultant

## 2018-03-25 NOTE — PLAN OF CARE
Problem: Falls - Risk of  Goal: Absence of falls  Outcome: Met This Shift  Patient is a fall risk during this admission. Fall risk assessment was performed. Patient is absent of falls. Bed is in the lowest position. Wheels on the bed are locked. Call light and bed side table are within reach. Clutter is removed. Patient was educated to call out when needing assistance or wanting to get out of bed. Patient offered toileting assistance during rounding. Hourly rounds have been performed.        Problem: Pain:  Goal: Control of acute pain  Control of acute pain   Outcome: Ongoing  Patient continue to c/o pain patient taking Dilaudid frequent. lidoderm patch started today

## 2018-03-26 ENCOUNTER — HOSPITAL ENCOUNTER (OUTPATIENT)
Dept: PHYSICAL THERAPY | Facility: CLINIC | Age: 68
Setting detail: THERAPIES SERIES
Discharge: HOME | End: 2018-03-26
Payer: MEDICARE

## 2018-03-26 ENCOUNTER — APPOINTMENT (OUTPATIENT)
Dept: CT IMAGING | Age: 68
DRG: 392 | End: 2018-03-26
Payer: MEDICARE

## 2018-03-26 LAB
ABSOLUTE EOS #: 0.4 K/UL (ref 0–0.4)
ABSOLUTE IMMATURE GRANULOCYTE: ABNORMAL K/UL (ref 0–0.3)
ABSOLUTE LYMPH #: 1.5 K/UL (ref 1–4.8)
ABSOLUTE MONO #: 1 K/UL (ref 0.2–0.8)
ANION GAP SERPL CALCULATED.3IONS-SCNC: 12 MMOL/L (ref 9–17)
ANION GAP SERPL CALCULATED.3IONS-SCNC: 13 MMOL/L (ref 9–17)
BASOPHILS # BLD: 1 % (ref 0–2)
BASOPHILS ABSOLUTE: 0.1 K/UL (ref 0–0.2)
BUN BLDV-MCNC: 12 MG/DL (ref 8–23)
BUN BLDV-MCNC: 12 MG/DL (ref 8–23)
BUN/CREAT BLD: 7 (ref 9–20)
BUN/CREAT BLD: 8 (ref 9–20)
CALCIUM SERPL-MCNC: 8.7 MG/DL (ref 8.6–10.4)
CALCIUM SERPL-MCNC: 8.7 MG/DL (ref 8.6–10.4)
CHLORIDE BLD-SCNC: 104 MMOL/L (ref 98–107)
CHLORIDE BLD-SCNC: 104 MMOL/L (ref 98–107)
CO2: 23 MMOL/L (ref 20–31)
CO2: 25 MMOL/L (ref 20–31)
CREAT SERPL-MCNC: 1.59 MG/DL (ref 0.5–0.9)
CREAT SERPL-MCNC: 1.68 MG/DL (ref 0.5–0.9)
DIFFERENTIAL TYPE: ABNORMAL
EOSINOPHILS RELATIVE PERCENT: 4 % (ref 1–4)
GFR AFRICAN AMERICAN: 37 ML/MIN
GFR AFRICAN AMERICAN: 39 ML/MIN
GFR NON-AFRICAN AMERICAN: 30 ML/MIN
GFR NON-AFRICAN AMERICAN: 32 ML/MIN
GFR SERPL CREATININE-BSD FRML MDRD: ABNORMAL ML/MIN/{1.73_M2}
GLUCOSE BLD-MCNC: 117 MG/DL (ref 65–105)
GLUCOSE BLD-MCNC: 119 MG/DL (ref 70–99)
GLUCOSE BLD-MCNC: 127 MG/DL (ref 65–105)
GLUCOSE BLD-MCNC: 148 MG/DL (ref 65–105)
GLUCOSE BLD-MCNC: 165 MG/DL (ref 65–105)
GLUCOSE BLD-MCNC: 185 MG/DL (ref 70–99)
HCT VFR BLD CALC: 35.9 % (ref 36–46)
HEMOGLOBIN: 12 G/DL (ref 12–16)
IMMATURE GRANULOCYTES: ABNORMAL %
INR BLD: 1.4
LV EF: 55 %
LVEF MODALITY: NORMAL
LYMPHOCYTES # BLD: 15 % (ref 24–44)
MAGNESIUM: 2.1 MG/DL (ref 1.6–2.6)
MCH RBC QN AUTO: 33.4 PG (ref 26–34)
MCHC RBC AUTO-ENTMCNC: 33.4 G/DL (ref 31–37)
MCV RBC AUTO: 100 FL (ref 80–100)
MONOCYTES # BLD: 10 % (ref 1–7)
NRBC AUTOMATED: ABNORMAL PER 100 WBC
PARTIAL THROMBOPLASTIN TIME: 32.4 SEC (ref 23–31)
PARTIAL THROMBOPLASTIN TIME: 52.7 SEC (ref 23–31)
PARTIAL THROMBOPLASTIN TIME: 58.5 SEC (ref 23–31)
PDW BLD-RTO: 14.1 % (ref 11.5–14.5)
PHOSPHORUS: 3.3 MG/DL (ref 2.6–4.5)
PLATELET # BLD: 239 K/UL (ref 130–400)
PLATELET ESTIMATE: ABNORMAL
PMV BLD AUTO: ABNORMAL FL (ref 6–12)
POTASSIUM SERPL-SCNC: 3.3 MMOL/L (ref 3.7–5.3)
POTASSIUM SERPL-SCNC: 3.4 MMOL/L (ref 3.7–5.3)
PROTHROMBIN TIME: 14.6 SEC (ref 9.7–11.6)
RBC # BLD: 3.59 M/UL (ref 4–5.2)
RBC # BLD: ABNORMAL 10*6/UL
SEG NEUTROPHILS: 70 % (ref 36–66)
SEGMENTED NEUTROPHILS ABSOLUTE COUNT: 7 K/UL (ref 1.8–7.7)
SODIUM BLD-SCNC: 140 MMOL/L (ref 135–144)
SODIUM BLD-SCNC: 141 MMOL/L (ref 135–144)
WBC # BLD: 10 K/UL (ref 3.5–11)
WBC # BLD: ABNORMAL 10*3/UL

## 2018-03-26 PROCEDURE — 1200000000 HC SEMI PRIVATE

## 2018-03-26 PROCEDURE — 2580000003 HC RX 258: Performed by: INTERNAL MEDICINE

## 2018-03-26 PROCEDURE — 83735 ASSAY OF MAGNESIUM: CPT

## 2018-03-26 PROCEDURE — 36415 COLL VENOUS BLD VENIPUNCTURE: CPT

## 2018-03-26 PROCEDURE — 97535 SELF CARE MNGMENT TRAINING: CPT

## 2018-03-26 PROCEDURE — 84100 ASSAY OF PHOSPHORUS: CPT

## 2018-03-26 PROCEDURE — 94640 AIRWAY INHALATION TREATMENT: CPT

## 2018-03-26 PROCEDURE — 94760 N-INVAS EAR/PLS OXIMETRY 1: CPT

## 2018-03-26 PROCEDURE — 96367 TX/PROPH/DG ADDL SEQ IV INF: CPT

## 2018-03-26 PROCEDURE — G8988 SELF CARE GOAL STATUS: HCPCS

## 2018-03-26 PROCEDURE — 97530 THERAPEUTIC ACTIVITIES: CPT

## 2018-03-26 PROCEDURE — 99232 SBSQ HOSP IP/OBS MODERATE 35: CPT | Performed by: INTERNAL MEDICINE

## 2018-03-26 PROCEDURE — 80048 BASIC METABOLIC PNL TOTAL CA: CPT

## 2018-03-26 PROCEDURE — 96366 THER/PROPH/DIAG IV INF ADDON: CPT

## 2018-03-26 PROCEDURE — 82947 ASSAY GLUCOSE BLOOD QUANT: CPT

## 2018-03-26 PROCEDURE — 6370000000 HC RX 637 (ALT 250 FOR IP): Performed by: NURSE PRACTITIONER

## 2018-03-26 PROCEDURE — 6370000000 HC RX 637 (ALT 250 FOR IP): Performed by: INTERNAL MEDICINE

## 2018-03-26 PROCEDURE — 6360000002 HC RX W HCPCS: Performed by: INTERNAL MEDICINE

## 2018-03-26 PROCEDURE — 72128 CT CHEST SPINE W/O DYE: CPT

## 2018-03-26 PROCEDURE — 85730 THROMBOPLASTIN TIME PARTIAL: CPT

## 2018-03-26 PROCEDURE — 96376 TX/PRO/DX INJ SAME DRUG ADON: CPT

## 2018-03-26 PROCEDURE — 85610 PROTHROMBIN TIME: CPT

## 2018-03-26 PROCEDURE — 2500000003 HC RX 250 WO HCPCS: Performed by: NURSE PRACTITIONER

## 2018-03-26 PROCEDURE — 6360000002 HC RX W HCPCS: Performed by: NURSE PRACTITIONER

## 2018-03-26 PROCEDURE — 93306 TTE W/DOPPLER COMPLETE: CPT

## 2018-03-26 PROCEDURE — G8987 SELF CARE CURRENT STATUS: HCPCS

## 2018-03-26 PROCEDURE — 96375 TX/PRO/DX INJ NEW DRUG ADDON: CPT

## 2018-03-26 PROCEDURE — 85025 COMPLETE CBC W/AUTO DIFF WBC: CPT

## 2018-03-26 PROCEDURE — C9113 INJ PANTOPRAZOLE SODIUM, VIA: HCPCS | Performed by: INTERNAL MEDICINE

## 2018-03-26 PROCEDURE — 97116 GAIT TRAINING THERAPY: CPT

## 2018-03-26 PROCEDURE — 96372 THER/PROPH/DIAG INJ SC/IM: CPT

## 2018-03-26 RX ORDER — WARFARIN SODIUM 7.5 MG/1
7.5 TABLET ORAL
Status: DISCONTINUED | OUTPATIENT
Start: 2018-03-26 | End: 2018-03-26

## 2018-03-26 RX ORDER — POTASSIUM CHLORIDE 20 MEQ/1
20 TABLET, EXTENDED RELEASE ORAL ONCE
Status: COMPLETED | OUTPATIENT
Start: 2018-03-26 | End: 2018-03-26

## 2018-03-26 RX ORDER — CIPROFLOXACIN 500 MG/1
500 TABLET, FILM COATED ORAL EVERY 12 HOURS SCHEDULED
Status: DISCONTINUED | OUTPATIENT
Start: 2018-03-26 | End: 2018-03-27 | Stop reason: HOSPADM

## 2018-03-26 RX ORDER — WARFARIN SODIUM 7.5 MG/1
7.5 TABLET ORAL
Status: DISCONTINUED | OUTPATIENT
Start: 2018-03-26 | End: 2018-03-26 | Stop reason: CLARIF

## 2018-03-26 RX ORDER — METRONIDAZOLE 500 MG/1
500 TABLET ORAL EVERY 8 HOURS SCHEDULED
Status: DISCONTINUED | OUTPATIENT
Start: 2018-03-26 | End: 2018-03-27 | Stop reason: HOSPADM

## 2018-03-26 RX ADMIN — ONDANSETRON 4 MG: 2 INJECTION INTRAMUSCULAR; INTRAVENOUS at 00:01

## 2018-03-26 RX ADMIN — LEVOTHYROXINE SODIUM 112 MCG: 112 TABLET ORAL at 06:18

## 2018-03-26 RX ADMIN — HEPARIN SODIUM 2000 UNITS: 1000 INJECTION, SOLUTION INTRAVENOUS; SUBCUTANEOUS at 07:07

## 2018-03-26 RX ADMIN — GABAPENTIN 400 MG: 400 CAPSULE ORAL at 21:48

## 2018-03-26 RX ADMIN — FOLIC ACID 1 MG: 1 TABLET ORAL at 07:24

## 2018-03-26 RX ADMIN — Medication 1 MG: at 12:32

## 2018-03-26 RX ADMIN — METRONIDAZOLE 500 MG: 500 INJECTION, SOLUTION INTRAVENOUS at 05:34

## 2018-03-26 RX ADMIN — Medication 1 MG: at 16:34

## 2018-03-26 RX ADMIN — PANTOPRAZOLE SODIUM 40 MG: 40 INJECTION, POWDER, FOR SOLUTION INTRAVENOUS at 07:20

## 2018-03-26 RX ADMIN — DIBASIC SODIUM PHOSPHATE, MONOBASIC POTASSIUM PHOSPHATE AND MONOBASIC SODIUM PHOSPHATE 1 TABLET: 852; 155; 130 TABLET ORAL at 21:48

## 2018-03-26 RX ADMIN — INSULIN LISPRO 1 UNITS: 100 INJECTION, SOLUTION INTRAVENOUS; SUBCUTANEOUS at 21:52

## 2018-03-26 RX ADMIN — METOCLOPRAMIDE 10 MG: 5 INJECTION, SOLUTION INTRAMUSCULAR; INTRAVENOUS at 16:26

## 2018-03-26 RX ADMIN — CIPROFLOXACIN 400 MG: 2 INJECTION, SOLUTION INTRAVENOUS at 07:39

## 2018-03-26 RX ADMIN — METRONIDAZOLE 500 MG: 500 INJECTION, SOLUTION INTRAVENOUS at 13:39

## 2018-03-26 RX ADMIN — POTASSIUM CHLORIDE 20 MEQ: 20 TABLET, EXTENDED RELEASE ORAL at 17:58

## 2018-03-26 RX ADMIN — Medication 1 MG: at 07:16

## 2018-03-26 RX ADMIN — SUCRALFATE 1 G: 1 TABLET ORAL at 06:18

## 2018-03-26 RX ADMIN — ONDANSETRON 4 MG: 2 INJECTION INTRAMUSCULAR; INTRAVENOUS at 07:17

## 2018-03-26 RX ADMIN — Medication 1 MG: at 03:15

## 2018-03-26 RX ADMIN — METOPROLOL TARTRATE 50 MG: 50 TABLET ORAL at 07:25

## 2018-03-26 RX ADMIN — Medication 1 MG: at 00:01

## 2018-03-26 RX ADMIN — ONDANSETRON 4 MG: 2 INJECTION INTRAMUSCULAR; INTRAVENOUS at 20:08

## 2018-03-26 RX ADMIN — APIXABAN 5 MG: 5 TABLET, FILM COATED ORAL at 21:48

## 2018-03-26 RX ADMIN — METOCLOPRAMIDE 10 MG: 5 INJECTION, SOLUTION INTRAMUSCULAR; INTRAVENOUS at 21:48

## 2018-03-26 RX ADMIN — METOPROLOL TARTRATE 50 MG: 50 TABLET ORAL at 21:48

## 2018-03-26 RX ADMIN — METOCLOPRAMIDE 10 MG: 5 INJECTION, SOLUTION INTRAMUSCULAR; INTRAVENOUS at 02:31

## 2018-03-26 RX ADMIN — METRONIDAZOLE 500 MG: 500 TABLET ORAL at 21:48

## 2018-03-26 RX ADMIN — ATORVASTATIN CALCIUM 80 MG: 80 TABLET, FILM COATED ORAL at 21:48

## 2018-03-26 RX ADMIN — CIPROFLOXACIN 500 MG: 500 TABLET ORAL at 21:47

## 2018-03-26 RX ADMIN — ALBUTEROL SULFATE 2.5 MG: 2.5 SOLUTION RESPIRATORY (INHALATION) at 21:45

## 2018-03-26 RX ADMIN — SUCRALFATE 1 G: 1 TABLET ORAL at 12:35

## 2018-03-26 RX ADMIN — PROMETHAZINE HYDROCHLORIDE 12.5 MG: 25 INJECTION INTRAMUSCULAR; INTRAVENOUS at 03:14

## 2018-03-26 RX ADMIN — SODIUM CHLORIDE 10 ML: 9 INJECTION INTRAMUSCULAR; INTRAVENOUS; SUBCUTANEOUS at 07:20

## 2018-03-26 RX ADMIN — DIBASIC SODIUM PHOSPHATE, MONOBASIC POTASSIUM PHOSPHATE AND MONOBASIC SODIUM PHOSPHATE 1 TABLET: 852; 155; 130 TABLET ORAL at 07:24

## 2018-03-26 RX ADMIN — METRONIDAZOLE 500 MG: 500 TABLET ORAL at 16:34

## 2018-03-26 RX ADMIN — Medication 1 MG: at 20:29

## 2018-03-26 RX ADMIN — GABAPENTIN 400 MG: 400 CAPSULE ORAL at 07:24

## 2018-03-26 RX ADMIN — SUCRALFATE 1 G: 1 TABLET ORAL at 17:59

## 2018-03-26 RX ADMIN — MOMETASONE FUROATE AND FORMOTEROL FUMARATE DIHYDRATE 2 PUFF: 100; 5 AEROSOL RESPIRATORY (INHALATION) at 09:21

## 2018-03-26 RX ADMIN — ONDANSETRON 4 MG: 2 INJECTION INTRAMUSCULAR; INTRAVENOUS at 16:34

## 2018-03-26 RX ADMIN — METOCLOPRAMIDE 10 MG: 5 INJECTION, SOLUTION INTRAMUSCULAR; INTRAVENOUS at 09:51

## 2018-03-26 RX ADMIN — MOMETASONE FUROATE AND FORMOTEROL FUMARATE DIHYDRATE 2 PUFF: 100; 5 AEROSOL RESPIRATORY (INHALATION) at 20:09

## 2018-03-26 RX ADMIN — ONDANSETRON 4 MG: 2 INJECTION INTRAMUSCULAR; INTRAVENOUS at 12:32

## 2018-03-26 RX ADMIN — POTASSIUM CHLORIDE 20 MEQ: 20 TABLET, EXTENDED RELEASE ORAL at 11:48

## 2018-03-26 RX ADMIN — INSULIN LISPRO 2 UNITS: 100 INJECTION, SOLUTION INTRAVENOUS; SUBCUTANEOUS at 16:29

## 2018-03-26 ASSESSMENT — PAIN DESCRIPTION - ORIENTATION
ORIENTATION: RIGHT;LOWER
ORIENTATION: RIGHT;UPPER
ORIENTATION: RIGHT;LOWER
ORIENTATION: RIGHT;UPPER
ORIENTATION: RIGHT;UPPER

## 2018-03-26 ASSESSMENT — PAIN DESCRIPTION - FREQUENCY
FREQUENCY: CONTINUOUS
FREQUENCY: CONTINUOUS

## 2018-03-26 ASSESSMENT — PAIN SCALES - GENERAL
PAINLEVEL_OUTOF10: 6
PAINLEVEL_OUTOF10: 6
PAINLEVEL_OUTOF10: 3
PAINLEVEL_OUTOF10: 6
PAINLEVEL_OUTOF10: 6
PAINLEVEL_OUTOF10: 3
PAINLEVEL_OUTOF10: 6
PAINLEVEL_OUTOF10: 8
PAINLEVEL_OUTOF10: 6
PAINLEVEL_OUTOF10: 5

## 2018-03-26 ASSESSMENT — PAIN DESCRIPTION - DESCRIPTORS
DESCRIPTORS: ACHING
DESCRIPTORS: ACHING;DISCOMFORT;SORE

## 2018-03-26 ASSESSMENT — PAIN DESCRIPTION - LOCATION
LOCATION: ABDOMEN

## 2018-03-26 ASSESSMENT — PAIN DESCRIPTION - PAIN TYPE
TYPE: ACUTE PAIN

## 2018-03-26 ASSESSMENT — PAIN DESCRIPTION - ONSET: ONSET: ON-GOING

## 2018-03-26 NOTE — PROGRESS NOTES
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 Pt using pulleys at home. Has PT for dry needling and tx via out patient                     Assessment   Performance deficits / Impairments: Decreased functional mobility ; Decreased ADL status; Decreased ROM; Decreased strength;Decreased endurance;Decreased balance;Decreased high-level IADLs  Prognosis: Good  Decision Making: Medium Complexity  Patient Education: safety with mobility and transfers and locking 4WW, need to complete AROM UE Indepdently. Pt verbalized understanding. Discharge Recommendations: Subacute/Skilled Nursing Facility  REQUIRES OT FOLLOW UP: Yes  Activity Tolerance  Activity Tolerance: Patient limited by fatigue;Patient limited by pain  Activity Tolerance: Fair. Standing tolerance limited to 90 seconds, func mobility greater. Pain in left knee, short of breath with standing   Safety Devices  Safety Devices in place: Yes  Type of devices: Call light within reach; Patient at risk for falls; Left in bed       Discharge Recommendations:  [facility]  Times per week: 5-6x/week  Times per day: Daily  Current Treatment Recommendations: Balance Training, Endurance Training, Functional Mobility Training, Safety Education & Training, Self-Care / ADL  G-Code  OT

## 2018-03-26 NOTE — PROGRESS NOTES
Matthias Lo                                                                                  Urology Progress Note            Subjective: Follow-up renal mass, without hydronephrosis    Patient Vitals for the past 24 hrs:   BP Temp Temp src Pulse Resp SpO2 Weight   03/26/18 0711 (!) 137/48 98.2 °F (36.8 °C) Oral 76 16 96 % -   03/26/18 0445 - - - - - - 250 lb 3.2 oz (113.5 kg)   03/25/18 2134 (!) 158/58 98.2 °F (36.8 °C) Oral 101 18 100 % -       Intake/Output Summary (Last 24 hours) at 03/26/18 0743  Last data filed at 03/26/18 4708   Gross per 24 hour   Intake          1654.78 ml   Output              825 ml   Net           829.78 ml       Recent Labs      03/24/18   1232  03/25/18   0528  03/26/18   0443   WBC  10.8  10.8  10.0   HGB  13.4  11.4*  12.0   HCT  40.0  34.0*  35.9*   MCV  98.4  99.2  100.0   PLT  277  260  239     Recent Labs      03/24/18   0335  03/25/18   0528  03/26/18   0443   NA  137  138  141   K  3.5*  3.0*  3.4*   CL  101  102  104   CO2  25  23  25   PHOS  1.9*  2.1*  3.3   BUN  14  12  12   CREATININE  1.55*  1.58*  1.68*       No results for input(s): COLORU, PHUR, LABCAST, WBCUA, RBCUA, MUCUS, TRICHOMONAS, YEAST, BACTERIA, CLARITYU, SPECGRAV, LEUKOCYTESUR, UROBILINOGEN, BILIRUBINUR, BLOODU in the last 72 hours.     Invalid input(s): NITRATE, GLUCOSEUKETONESUAMORPHOUS    Additional Lab/culture results:    Physical Exam: Patient not in acute distress since complaining of right upper quadrant pain lLocal therapy at this point is not helpful    Interval Imaging Findings:    Impression:  Bilateral renal cysts, lower pole renal mass  Patient Active Problem List   Diagnosis    [facility] obstructive [facility]) [facility]    Atrial fibrillation ([facility]    Chronic kidney disease    Type 2 diabetes mellitus without complication ([facility] [facility]    GERD (gastroesophageal reflux disease)    Hyperlipidemia    Chronic back pain    Osteoarthritis    Liver disease

## 2018-03-26 NOTE — PROGRESS NOTES
Physical Therapy  Facility/Department: [facility] MED SURG  Daily Treatment Note  NAME: Brigid Montgomery  : 1950  MRN: [id number]  Discharge Recommendation:   [address], Home with Home health PT    Date of Service: 3/26/2018    Patient Diagnosis(es): The primary encounter diagnosis was Diverticulitis of colon. A diagnosis of Abnormal CT scan was also pertinent to this visit. has a past medical history of Asthma; Atrial fibrillation ([address] Cerebral artery occlusion with cerebral infarction ([address] Chronic back pain; Chronic kidney disease; Constipation; COPD (chronic obstructive pulmonary disease) ([facility] [address] Gastroparesis; GERD (gastroesophageal reflux disease); H/O mastectomy; Hepatitis; Hx of blood clots; Hyperlipidemia; Hypertension; Hypothyroidism; Impaired mobility; Incontinence; Incontinence of bowel; Liver disease; Osteoarthritis; PONV (postoperative nausea and vomiting); Rectocele; Rotator cuff tear; Snores; and Type II or unspecified type diabetes mellitus without mention of complication, not stated as uncontrolled. has a past surgical history that includes Cholecystectomy; Upper gastrointestinal endoscopy; Colonoscopy; Hysterectomy; Breast surgery (Bilateral); Vagina surgery (2018); and pr cmbnd anterpost colporraphy w/cysto w/ntrcl rpr (N/A, 2018). Restrictions  Restrictions/Precautions  Restrictions/Precautions: General Precautions, Fall Risk  Required Braces or Orthoses?: No  Position Activity Restriction  Other position/activity restrictions: up with assist  Subjective   General  Chart Reviewed: Yes  Additional Pertinent Hx: R RC tear, OA, chronic back pain, atrial fibrillation, TIA, Hep C  Response To Previous Treatment: Not applicable;Patient with no complaints from previous session.   Family / Caregiver Present: No  Subjective  Subjective: Pt agreeable to PT  General Comment  Comments: RNEmilia PT  Pain Screening  Patient Currently in Pain: Yes  Pain

## 2018-03-26 NOTE — PROGRESS NOTES
Pharmacy Note -*Inpatient* Warfarin Consult daily follow-up    Pharmacy to Dose Inpatient Warfarin per Dr. Jean Paul Garcia  Indication: atrial fib      Recent Labs      03/24/18   0335  03/25/18   0528  03/26/18   0443   INR  1.4  1.4  1.4     Recent Labs      03/24/18   1232  03/25/18   0528  03/26/18   0443   HGB  13.4  11.4*  12.0   HCT  40.0  34.0*  35.9*   PLT  277  260  239     Significant drug interactions: Flagyl, Cipro  Active orders for other anticoagulants: heparin    Date INR Dose (mg) Significant Drug Interactions Other Anticoagulants (Y/N)   3/21 1.2  2.5mg  FLAGYL Lovenox   3/22 1.2 2.5mg Flagyl,cipro, levothyroxine,torsemide lovenox   3/23 1.3 Not given \" -torsemide heparin   3/24 1.4 3.5 mg Flagyl,cipro, levothyroxine,  \"   3/25 1.4 5 mg \" \"   3/26 1.4 7.5 mg  \"             \"    \"      \"        Plan:   Coumadin 7.5mg today. Daily PT/INR while inpatient. Thank you for the consult. Will continue to follow.   William Harris     3/26/2018 1:28 PM

## 2018-03-26 NOTE — PLAN OF CARE
Problem: Falls - Risk of  Goal: Absence of falls  Outcome: Met This Shift  Patient is a fall risk during this admission. Fall risk assessment was performed. Patient is absent of falls. Bed is in the lowest position. Wheels on the bed are locked. Call light and bed side table are within reach. Clutter is removed. Patient was educated to call out when needing assistance or wanting to get out of bed. Patient offered toileting assistance during rounding. Hourly rounds have been performed. Problem: Cardiac:  Goal: Ability to maintain an adequate cardiac output will improve  Ability to maintain an adequate cardiac output will improve   Outcome: Met This Shift  Patient currently in sinus rhythm. Heparin gtt stopped.  Patient changed to Elliquis from coumadin

## 2018-03-26 NOTE — PROGRESS NOTES
[facility] - Progress Note    3/26/2018   12:48 PM    Name:  Nayeli Krishna  :    1950  Age:  69 y.o. female  MRN:    [id number]     Acct:     [de-identified]   Room:     Day: Donato Guy 149: Madelaine Burch Date: 3/21/2018  4:12 AM  PCP: Zadie Opitz    Subjective:     C/C:   Chief Complaint   Patient presents with    Abdominal Pain     RLQ pain for 5 days       Interval History: Status: not changed. Patient is still complaining of right radicular pain from the lower thoracic region. I agree with the current working diagnosis is that this is probably the presentation of shingles without rash. For completeness noncontrast CT thoracic spine will be performed to rule out compression fracture. The patient tentatively will be scheduled to go to skilled nursing facility tomorrow if she continues to tolerate diet. She'll be switched to oral antibiotics today. Cardiology has been contacted and will begin Eliquis if the patient's insurance will cover this and discontinue her heparin. I have discussed with her the need for follow-up including colonoscopy in approximately 6 weeks and possible referral to surgery for consideration of laparoscopic diverticular resection and reanastomosis. She will prefer to follow with her gastroenterologist at [facility] and referrals will be made at that time if necessary.     History: \"Dede Banegas is a 69 y.o. female who presents to the emergency department [facility] for evaluation of severe abdominal pain.  Patient states her abdominal pain started developing about 5 days ago. Alexandria Major presents in severe discomfort. Alexandria Major is holding her right and upper abdomen and moaning in discomfort.  She endorses loss of appetite.  She denies vomiting.  She denies constipation or diarrhea.   she had BM on 3/20/18  States she had EGD last Tuesday at [facility] which was normal\"    ROS:  Constitutional: Negative for chills, diaphoresis, fever, malaise/fatigue and weight loss. HENT: Negative for ear pain, hearing loss, nosebleeds, sore throat and tinnitus. Eyes: Negative for blurred vision, double vision, photophobia and pain. Respiratory: Negative for cough, hemoptysis, sputum production, shortness of breath and wheezing. Cardiovascular: Negative for palpitations, orthopnea, claudication, leg swelling and PND. Gastrointestinal: Complaint of right radicular pain as noted above as well as left lower quadrant pain secondary to her diverticulitis. Genitourinary: Negative for dysuria, flank pain, frequency, hematuria and urgency. Musculoskeletal: Positive for back pain with right radicular pain as noted above across her abdomen. Skin: Negative for itching and rash. Neurological: Negative for dizziness, tingling, tremors, sensory change, focal weakness, seizures, weakness and headaches. Endo/Heme/Allergies: Does not bruise/bleed easily. Psychiatric/Behavioral: Negative for depression. The patient is not nervous/anxious. Medications: Allergies:    Allergies   Allergen Reactions    Pcn [Penicillins] Other (See Comments)     Unknown reaction, states can take Keflex       Current Meds:   metoclopramide (REGLAN) injection 10 mg Q6H   lidocaine (LIDODERM) 5 % 1 patch Daily   sucralfate (CARAFATE) tablet 1 g 4 times per day   metoprolol tartrate (LOPRESSOR) tablet 50 mg BID   heparin (porcine) injection 4,000 Units PRN   heparin (porcine) injection 2,000 Units PRN   heparin 25,000 units in dextrose 5% 250 mL infusion Continuous   insulin lispro (HUMALOG) injection vial 0-12 Units TID WC   insulin lispro (HUMALOG) injection vial 0-6 Units Nightly   glucose (GLUTOSE) 40 % oral gel 15 g PRN   dextrose 50 % solution 12.5 g PRN   glucagon (rDNA) injection 1 mg PRN   dextrose 5 % solution PRN   bisacodyl (DULCOLAX) suppository 10 mg Daily PRN   pantoprazole (PROTONIX) injection 40 mg Daily   And    sodium chloride (PF) 0.9 % injection 10 mL Daily   metoprolol

## 2018-03-26 NOTE — PROGRESS NOTES
Reason for Follow up: CKD stage 3B. HISTORY:    C/O right flank pain that is sensitive to touch. Her appetite is improving. Review Of Systems:   Constitutional: No fever, chills, lethargy, weakness or wt loss. Cardiac:  No chest pain, dyspnea, orthopnea or PND. Pulmonary:  No cough, phlegm or wheezing. Abdomen:  As above. :   No hematuria, pyuria, dysuria or flank pain. Extremities:  No swelling or joint pains. Scheduled Meds:   metoclopramide  10 mg Intravenous Q6H    lidocaine  1 patch Transdermal Daily    sucralfate  1 g Oral 4 times per day    metoprolol tartrate  50 mg Oral BID    insulin lispro  0-12 Units Subcutaneous TID WC    insulin lispro  0-6 Units Subcutaneous Nightly    pantoprazole  40 mg Intravenous Daily    And    sodium chloride (PF)  10 mL Intravenous Daily    folic acid  1 mg Oral Daily    gabapentin  400 mg Oral BID    phosphorus  250 mg Oral BID    levothyroxine  112 mcg Oral Daily    mometasone-formoterol  2 puff Inhalation BID    atorvastatin  80 mg Oral Nightly    sodium chloride flush  10 mL Intravenous 2 times per day    ciprofloxacin  400 mg Intravenous Q12H    metroNIDAZOLE  500 mg Intravenous Q8H    warfarin (COUMADIN) daily dosing (placeholder)   Other RX Placeholder   Continuous Infusions:   heparin (porcine) 1,230 Units/hr (03/26/18 0708)    dextrose      sodium chloride 20 mL/hr at 03/25/18 1432     PRN Meds:heparin (porcine), heparin (porcine), glucose, dextrose, glucagon (rDNA), dextrose, bisacodyl, metoprolol, HYDROmorphone, promethazine, albuterol, hydrOXYzine, sodium chloride flush, magnesium sulfate, acetaminophen, nicotine, ondansetron, senna    Allergies   Allergen Reactions    Pcn [Penicillins] Other (See Comments)     Unknown reaction, states can take Keflex       Physical Exam:  Blood pressure (!) 137/48, pulse 76, temperature 98.2 °F (36.8 °C), temperature source Oral, resp.  rate 16, height 5' 2\" (1.575 m), weight 250 lb 3.2 oz (113.5 kg), SpO2 96 %. In: 1414.8 [P.O.:740; I.V.:274.8]  Out: 1525   In: 1414.8   Out: 1525 [Urine:1525]    General:  Awake, alert, not in distress. Appears to be stated age. HEENT: Atraumatic, normocephalic. Anicteric sclera. Pink and moist oral mucosa. No carotid bruit. No JVD. Chest: Bilateral air entry, clear to auscultation, no wheezing, rhonchi or rales. Cardiovascular: RRR, S1S2, no murmur, rub or gallop. No lower extremity edema. Abdomen: Soft, tender to touch. Active bowel sounds x 4 quadrants. Musculoskeletal: Active ROM x 4 extremities. No cyanosis or clubbing. Integumentary: Pink, warm and dry. Free from rash or lesions. Skin turgor normal.  CNS: Oriented to person, place and time. Speech clear. Face symmetrical. No tremor.      Data:  CBC:   Lab Results   Component Value Date    WBC 10.0 03/26/2018    HGB 12.0 03/26/2018    HCT 35.9 (L) 03/26/2018    .0 03/26/2018     03/26/2018     BMP:    Lab Results   Component Value Date     03/26/2018    K 3.4 (L) 03/26/2018     03/26/2018    CO2 25 03/26/2018    BUN 12 03/26/2018    CREATININE 1.68 (H) 03/26/2018    GLUCOSE 119 (H) 03/26/2018     CMP:   Lab Results   Component Value Date     03/26/2018    K 3.4 (L) 03/26/2018     03/26/2018    CO2 25 03/26/2018    BUN 12 03/26/2018    CREATININE 1.68 (H) 03/26/2018    GLUCOSE 119 (H) 03/26/2018    CALCIUM 8.7 03/26/2018    PROT 7.3 03/22/2018    LABALBU 3.8 03/22/2018    BILITOT 0.64 03/22/2018    ALKPHOS 145 (H) 03/22/2018    AST 22 03/22/2018    ALT 13 03/22/2018      Hepatic:   Lab Results   Component Value Date    AST 22 03/22/2018    ALT 13 03/22/2018    BILITOT 0.64 03/22/2018    ALKPHOS 145 (H) 03/22/2018     BNP:   Lab Results   Component Value Date    BNP NOT REPORTED 08/19/2014     Lipids:   Lab Results   Component Value Date    CHOL 190 01/03/2018    HDL 53 01/03/2018     INR:   Lab Results   Component Value Date    INR 1.4 03/26/2018     PTH: No results

## 2018-03-27 VITALS
DIASTOLIC BLOOD PRESSURE: 48 MMHG | TEMPERATURE: 99 F | HEIGHT: 62 IN | RESPIRATION RATE: 18 BRPM | SYSTOLIC BLOOD PRESSURE: 138 MMHG | BODY MASS INDEX: 46.15 KG/M2 | OXYGEN SATURATION: 91 % | HEART RATE: 74 BPM | WEIGHT: 250.8 LBS

## 2018-03-27 LAB
ABSOLUTE EOS #: 0.4 K/UL (ref 0–0.4)
ABSOLUTE IMMATURE GRANULOCYTE: ABNORMAL K/UL (ref 0–0.3)
ABSOLUTE LYMPH #: 1.2 K/UL (ref 1–4.8)
ABSOLUTE MONO #: 1 K/UL (ref 0.2–0.8)
ANION GAP SERPL CALCULATED.3IONS-SCNC: 14 MMOL/L (ref 9–17)
BASOPHILS # BLD: 0 % (ref 0–2)
BASOPHILS ABSOLUTE: 0 K/UL (ref 0–0.2)
BUN BLDV-MCNC: 11 MG/DL (ref 8–23)
BUN/CREAT BLD: 8 (ref 9–20)
CALCIUM SERPL-MCNC: 8.6 MG/DL (ref 8.6–10.4)
CHLORIDE BLD-SCNC: 106 MMOL/L (ref 98–107)
CO2: 19 MMOL/L (ref 20–31)
CREAT SERPL-MCNC: 1.41 MG/DL (ref 0.5–0.9)
CULTURE: NORMAL
DIFFERENTIAL TYPE: ABNORMAL
EOSINOPHILS RELATIVE PERCENT: 4 % (ref 1–4)
GFR AFRICAN AMERICAN: 45 ML/MIN
GFR NON-AFRICAN AMERICAN: 37 ML/MIN
GFR SERPL CREATININE-BSD FRML MDRD: ABNORMAL ML/MIN/{1.73_M2}
GFR SERPL CREATININE-BSD FRML MDRD: ABNORMAL ML/MIN/{1.73_M2}
GLUCOSE BLD-MCNC: 105 MG/DL (ref 65–105)
GLUCOSE BLD-MCNC: 124 MG/DL (ref 70–99)
GLUCOSE BLD-MCNC: 146 MG/DL (ref 65–105)
GLUCOSE BLD-MCNC: 148 MG/DL (ref 65–105)
HCT VFR BLD CALC: 34.6 % (ref 36–46)
HEMOGLOBIN: 11.4 G/DL (ref 12–16)
IMMATURE GRANULOCYTES: ABNORMAL %
INR BLD: 1.6
LYMPHOCYTES # BLD: 12 % (ref 24–44)
Lab: NORMAL
Lab: NORMAL
MAGNESIUM: 2 MG/DL (ref 1.6–2.6)
MCH RBC QN AUTO: 33.5 PG (ref 26–34)
MCHC RBC AUTO-ENTMCNC: 33 G/DL (ref 31–37)
MCV RBC AUTO: 101.6 FL (ref 80–100)
MONOCYTES # BLD: 10 % (ref 1–7)
NRBC AUTOMATED: ABNORMAL PER 100 WBC
PDW BLD-RTO: 15.1 % (ref 11.5–14.5)
PHOSPHORUS: 3.2 MG/DL (ref 2.6–4.5)
PLATELET # BLD: 261 K/UL (ref 130–400)
PLATELET ESTIMATE: ABNORMAL
PMV BLD AUTO: 8 FL (ref 6–12)
POTASSIUM SERPL-SCNC: 3.6 MMOL/L (ref 3.7–5.3)
PROTHROMBIN TIME: 16.9 SEC (ref 9.7–11.6)
RBC # BLD: 3.41 M/UL (ref 4–5.2)
RBC # BLD: ABNORMAL 10*6/UL
SEG NEUTROPHILS: 74 % (ref 36–66)
SEGMENTED NEUTROPHILS ABSOLUTE COUNT: 6.9 K/UL (ref 1.8–7.7)
SODIUM BLD-SCNC: 139 MMOL/L (ref 135–144)
SPECIMEN DESCRIPTION: NORMAL
STATUS: NORMAL
STATUS: NORMAL
WBC # BLD: 9.4 K/UL (ref 3.5–11)
WBC # BLD: ABNORMAL 10*3/UL

## 2018-03-27 PROCEDURE — 6360000002 HC RX W HCPCS: Performed by: INTERNAL MEDICINE

## 2018-03-27 PROCEDURE — 84100 ASSAY OF PHOSPHORUS: CPT

## 2018-03-27 PROCEDURE — 96366 THER/PROPH/DIAG IV INF ADDON: CPT

## 2018-03-27 PROCEDURE — 6370000000 HC RX 637 (ALT 250 FOR IP): Performed by: INTERNAL MEDICINE

## 2018-03-27 PROCEDURE — 97530 THERAPEUTIC ACTIVITIES: CPT

## 2018-03-27 PROCEDURE — 99239 HOSP IP/OBS DSCHRG MGMT >30: CPT | Performed by: INTERNAL MEDICINE

## 2018-03-27 PROCEDURE — C9113 INJ PANTOPRAZOLE SODIUM, VIA: HCPCS | Performed by: INTERNAL MEDICINE

## 2018-03-27 PROCEDURE — 2580000003 HC RX 258: Performed by: NURSE PRACTITIONER

## 2018-03-27 PROCEDURE — 82947 ASSAY GLUCOSE BLOOD QUANT: CPT

## 2018-03-27 PROCEDURE — 85610 PROTHROMBIN TIME: CPT

## 2018-03-27 PROCEDURE — 36415 COLL VENOUS BLD VENIPUNCTURE: CPT

## 2018-03-27 PROCEDURE — 97535 SELF CARE MNGMENT TRAINING: CPT

## 2018-03-27 PROCEDURE — 96376 TX/PRO/DX INJ SAME DRUG ADON: CPT

## 2018-03-27 PROCEDURE — 6370000000 HC RX 637 (ALT 250 FOR IP): Performed by: NURSE PRACTITIONER

## 2018-03-27 PROCEDURE — 96372 THER/PROPH/DIAG INJ SC/IM: CPT

## 2018-03-27 PROCEDURE — 2580000003 HC RX 258: Performed by: INTERNAL MEDICINE

## 2018-03-27 PROCEDURE — 85025 COMPLETE CBC W/AUTO DIFF WBC: CPT

## 2018-03-27 PROCEDURE — 83735 ASSAY OF MAGNESIUM: CPT

## 2018-03-27 PROCEDURE — 80048 BASIC METABOLIC PNL TOTAL CA: CPT

## 2018-03-27 PROCEDURE — 94640 AIRWAY INHALATION TREATMENT: CPT

## 2018-03-27 RX ORDER — TORSEMIDE 20 MG/1
20 TABLET ORAL DAILY
Qty: 30 TABLET | Refills: 3 | DISCHARGE
Start: 2018-03-28 | End: 2018-07-25 | Stop reason: DRUGHIGH

## 2018-03-27 RX ORDER — HYDROCODONE BITARTRATE AND ACETAMINOPHEN 5; 325 MG/1; MG/1
2 TABLET ORAL EVERY 4 HOURS PRN
Qty: 20 TABLET | Refills: 0 | Status: SHIPPED | OUTPATIENT
Start: 2018-03-27 | End: 2018-03-30

## 2018-03-27 RX ORDER — HYDROCODONE BITARTRATE AND ACETAMINOPHEN 5; 325 MG/1; MG/1
2 TABLET ORAL EVERY 4 HOURS PRN
Status: DISCONTINUED | OUTPATIENT
Start: 2018-03-27 | End: 2018-03-27 | Stop reason: HOSPADM

## 2018-03-27 RX ORDER — METOPROLOL TARTRATE 50 MG/1
50 TABLET, FILM COATED ORAL 2 TIMES DAILY
Qty: 60 TABLET | Refills: 3 | DISCHARGE
Start: 2018-03-27 | End: 2018-06-20 | Stop reason: ALTCHOICE

## 2018-03-27 RX ORDER — HYDROCODONE BITARTRATE AND ACETAMINOPHEN 5; 325 MG/1; MG/1
1 TABLET ORAL EVERY 4 HOURS PRN
Status: DISCONTINUED | OUTPATIENT
Start: 2018-03-27 | End: 2018-03-27 | Stop reason: HOSPADM

## 2018-03-27 RX ORDER — ACETAMINOPHEN 325 MG/1
650 TABLET ORAL EVERY 4 HOURS PRN
Qty: 120 TABLET | Refills: 3 | DISCHARGE
Start: 2018-03-27 | End: 2020-10-07

## 2018-03-27 RX ORDER — SPIRONOLACTONE 100 MG/1
100 TABLET, FILM COATED ORAL DAILY
Status: DISCONTINUED | OUTPATIENT
Start: 2018-03-27 | End: 2018-03-27 | Stop reason: HOSPADM

## 2018-03-27 RX ORDER — CIPROFLOXACIN 500 MG/1
500 TABLET, FILM COATED ORAL EVERY 12 HOURS SCHEDULED
Qty: 20 TABLET | Refills: 0 | DISCHARGE
Start: 2018-03-27 | End: 2018-04-06

## 2018-03-27 RX ORDER — POTASSIUM CHLORIDE 20 MEQ/1
10 TABLET, EXTENDED RELEASE ORAL 2 TIMES DAILY
Status: DISCONTINUED | OUTPATIENT
Start: 2018-03-27 | End: 2018-03-27 | Stop reason: HOSPADM

## 2018-03-27 RX ORDER — SUCRALFATE 1 G/1
1 TABLET ORAL 4 TIMES DAILY
Qty: 120 TABLET | Refills: 3 | DISCHARGE
Start: 2018-03-27 | End: 2018-06-20 | Stop reason: ALTCHOICE

## 2018-03-27 RX ORDER — LIDOCAINE 50 MG/G
1 PATCH TOPICAL DAILY
Qty: 30 PATCH | Refills: 0 | DISCHARGE
Start: 2018-03-28 | End: 2018-06-20 | Stop reason: ALTCHOICE

## 2018-03-27 RX ORDER — ONDANSETRON 4 MG/1
4 TABLET, FILM COATED ORAL DAILY PRN
DISCHARGE
Start: 2018-03-27 | End: 2018-06-20 | Stop reason: ALTCHOICE

## 2018-03-27 RX ORDER — METRONIDAZOLE 500 MG/1
500 TABLET ORAL EVERY 8 HOURS SCHEDULED
Qty: 30 TABLET | Refills: 0 | DISCHARGE
Start: 2018-03-27 | End: 2018-04-06

## 2018-03-27 RX ORDER — TORSEMIDE 10 MG/1
20 TABLET ORAL DAILY
Status: DISCONTINUED | OUTPATIENT
Start: 2018-03-27 | End: 2018-03-27 | Stop reason: HOSPADM

## 2018-03-27 RX ORDER — HYDROXYZINE HYDROCHLORIDE 25 MG/1
25 TABLET, FILM COATED ORAL 3 TIMES DAILY PRN
Qty: 9 TABLET | Refills: 0 | Status: SHIPPED | OUTPATIENT
Start: 2018-03-27 | End: 2018-03-30

## 2018-03-27 RX ORDER — ALBUTEROL SULFATE 2.5 MG/3ML
2.5 SOLUTION RESPIRATORY (INHALATION) EVERY 4 HOURS PRN
Qty: 120 EACH | Refills: 3 | DISCHARGE
Start: 2018-03-27

## 2018-03-27 RX ADMIN — Medication 1 MG: at 00:17

## 2018-03-27 RX ADMIN — Medication 1 MG: at 12:07

## 2018-03-27 RX ADMIN — SUCRALFATE 1 G: 1 TABLET ORAL at 00:22

## 2018-03-27 RX ADMIN — SODIUM CHLORIDE 10 ML: 9 INJECTION INTRAMUSCULAR; INTRAVENOUS; SUBCUTANEOUS at 08:33

## 2018-03-27 RX ADMIN — ONDANSETRON 4 MG: 2 INJECTION INTRAMUSCULAR; INTRAVENOUS at 00:17

## 2018-03-27 RX ADMIN — METRONIDAZOLE 500 MG: 500 TABLET ORAL at 06:18

## 2018-03-27 RX ADMIN — SUCRALFATE 1 G: 1 TABLET ORAL at 06:18

## 2018-03-27 RX ADMIN — GABAPENTIN 400 MG: 400 CAPSULE ORAL at 08:32

## 2018-03-27 RX ADMIN — MOMETASONE FUROATE AND FORMOTEROL FUMARATE DIHYDRATE 2 PUFF: 100; 5 AEROSOL RESPIRATORY (INHALATION) at 09:37

## 2018-03-27 RX ADMIN — SUCRALFATE 1 G: 1 TABLET ORAL at 17:16

## 2018-03-27 RX ADMIN — LEVOTHYROXINE SODIUM 112 MCG: 112 TABLET ORAL at 06:18

## 2018-03-27 RX ADMIN — SPIRONOLACTONE 100 MG: 100 TABLET, FILM COATED ORAL at 13:56

## 2018-03-27 RX ADMIN — FOLIC ACID 1 MG: 1 TABLET ORAL at 08:33

## 2018-03-27 RX ADMIN — METOCLOPRAMIDE 10 MG: 5 INJECTION, SOLUTION INTRAMUSCULAR; INTRAVENOUS at 03:38

## 2018-03-27 RX ADMIN — POTASSIUM CHLORIDE 10 MEQ: 20 TABLET, EXTENDED RELEASE ORAL at 13:56

## 2018-03-27 RX ADMIN — TORSEMIDE 20 MG: 20 TABLET ORAL at 13:56

## 2018-03-27 RX ADMIN — Medication 1 MG: at 04:22

## 2018-03-27 RX ADMIN — ONDANSETRON 4 MG: 2 INJECTION INTRAMUSCULAR; INTRAVENOUS at 04:22

## 2018-03-27 RX ADMIN — Medication 1 MG: at 08:29

## 2018-03-27 RX ADMIN — INSULIN LISPRO 2 UNITS: 100 INJECTION, SOLUTION INTRAVENOUS; SUBCUTANEOUS at 17:14

## 2018-03-27 RX ADMIN — APIXABAN 5 MG: 5 TABLET, FILM COATED ORAL at 08:33

## 2018-03-27 RX ADMIN — METOPROLOL TARTRATE 50 MG: 50 TABLET ORAL at 08:32

## 2018-03-27 RX ADMIN — INSULIN LISPRO 2 UNITS: 100 INJECTION, SOLUTION INTRAVENOUS; SUBCUTANEOUS at 12:42

## 2018-03-27 RX ADMIN — CIPROFLOXACIN 500 MG: 500 TABLET ORAL at 08:32

## 2018-03-27 RX ADMIN — PROMETHAZINE HYDROCHLORIDE 12.5 MG: 25 INJECTION INTRAMUSCULAR; INTRAVENOUS at 12:07

## 2018-03-27 RX ADMIN — HYDROCODONE BITARTRATE AND ACETAMINOPHEN 2 TABLET: 5; 325 TABLET ORAL at 13:57

## 2018-03-27 RX ADMIN — SUCRALFATE 1 G: 1 TABLET ORAL at 12:02

## 2018-03-27 RX ADMIN — ONDANSETRON 4 MG: 2 INJECTION INTRAMUSCULAR; INTRAVENOUS at 08:29

## 2018-03-27 RX ADMIN — METRONIDAZOLE 500 MG: 500 TABLET ORAL at 14:30

## 2018-03-27 RX ADMIN — Medication 10 ML: at 08:35

## 2018-03-27 RX ADMIN — DIBASIC SODIUM PHOSPHATE, MONOBASIC POTASSIUM PHOSPHATE AND MONOBASIC SODIUM PHOSPHATE 1 TABLET: 852; 155; 130 TABLET ORAL at 10:11

## 2018-03-27 RX ADMIN — PANTOPRAZOLE SODIUM 40 MG: 40 INJECTION, POWDER, FOR SOLUTION INTRAVENOUS at 08:33

## 2018-03-27 RX ADMIN — METOCLOPRAMIDE 10 MG: 5 INJECTION, SOLUTION INTRAMUSCULAR; INTRAVENOUS at 08:33

## 2018-03-27 RX ADMIN — METOCLOPRAMIDE 10 MG: 5 INJECTION, SOLUTION INTRAMUSCULAR; INTRAVENOUS at 14:30

## 2018-03-27 ASSESSMENT — PAIN SCALES - GENERAL
PAINLEVEL_OUTOF10: 7
PAINLEVEL_OUTOF10: 8
PAINLEVEL_OUTOF10: 7
PAINLEVEL_OUTOF10: 8
PAINLEVEL_OUTOF10: 5
PAINLEVEL_OUTOF10: 8
PAINLEVEL_OUTOF10: 9

## 2018-03-27 ASSESSMENT — PAIN DESCRIPTION - ORIENTATION
ORIENTATION: RIGHT
ORIENTATION: RIGHT
ORIENTATION: RIGHT;UPPER
ORIENTATION: RIGHT;UPPER

## 2018-03-27 ASSESSMENT — PAIN DESCRIPTION - PAIN TYPE
TYPE: ACUTE PAIN

## 2018-03-27 ASSESSMENT — PAIN DESCRIPTION - FREQUENCY
FREQUENCY: CONTINUOUS
FREQUENCY: CONTINUOUS

## 2018-03-27 ASSESSMENT — PAIN DESCRIPTION - LOCATION
LOCATION: ABDOMEN

## 2018-03-27 ASSESSMENT — PAIN DESCRIPTION - DESCRIPTORS
DESCRIPTORS: ACHING;DISCOMFORT;SORE
DESCRIPTORS: SHARP;STABBING
DESCRIPTORS: ACHING;DISCOMFORT;SORE

## 2018-03-27 NOTE — PROGRESS NOTES
0-10  Pain Level: 7  Pain Location: Abdomen  Pain Orientation: Right  Vital Signs  Patient Currently in Pain: Yes       Orientation  Orientation  Overall Orientation Status: Within Functional Limits  Objective   Bed mobility  Scooting: Stand by assistance  Transfers  Sit to Stand: Contact guard assistance  Stand to sit: Contact guard assistance  Ambulation  Ambulation?: Yes  Ambulation 1  Surface: level tile  Device: Rolling Walker  Assistance: Contact guard assistance  Quality of Gait: step to pattern, slow & guarded  Distance: 80 ft     Balance  Posture: Fair  Sitting - Static: Good  Sitting - Dynamic: Good  Standing - Static: Good;-  Standing - Dynamic: Fair;+  Exercises  Comments: patient states she is in alot of pain and fatigued after amb, wanted to just lay down post walk                        Assessment   Body structures, Functions, Activity limitations: Decreased functional mobility ; Decreased strength;Decreased endurance;Decreased balance  Assessment: Patient requires continued skilled PT & is appropriate to D/C to [address] to increase independence with functional mobility, balance, safety awareness & activity tolerance, & enable pt to return home safely. Prognosis: Good  Patient Education: functional mobility, safety, circulation ex. Activity Tolerance  Activity Tolerance: Patient limited by fatigue;Patient limited by pain; Patient limited by endurance  PT Equipment Recommendations  Equipment Needed: No       Discharge Recommendations:  [address], Home with Home health PT    G-Code     OutComes Score                                                    AM-PAC Score             Goals  Short term goals  Time Frame for Short term goals: 12 visits  Short term goal 1: Inc bed-mobility & transfers to independent to enable pt to safely get in/OOB & return to PLOF & decrease risk for falls; Short term goal 2:  Inc gait to amb 350ft or > indep w/ RW to enable pt to

## 2018-03-27 NOTE — PROGRESS NOTES
Occupational Therapy  Facility/Department: [facility] MED SURG  Daily Treatment Note  NAME: Moise Sarmiento  : 1950  MRN: [id number]    Date of Service: 3/27/2018      Discharge Recommendation:   [address] (short term stay)    RN reports patient is medically stable for therapy treatment this date. Chart reviewed prior to treatment and patient is agreeable for therapy. All lines intact and patient positioned comfortably at end of treatment. All patient needs addressed prior to ending therapy session. Patient Diagnosis(es): The primary encounter diagnosis was Diverticulitis of colon. A diagnosis of Abnormal CT scan was also pertinent to this visit. has a past medical history of Asthma; Atrial fibrillation ([address] Cerebral artery occlusion with cerebral infarction ([address] Chronic back pain; Chronic kidney disease; Constipation; COPD (chronic obstructive pulmonary disease) ([address] Gastroparesis; GERD (gastroesophageal reflux disease); H/O mastectomy; Hepatitis; Hx of blood clots; Hyperlipidemia; Hypertension; Hypothyroidism; Impaired mobility; Incontinence; Incontinence of bowel; Liver disease; Osteoarthritis; PONV (postoperative nausea and vomiting); Rectocele; Rotator cuff tear; Snores; and Type II or unspecified type diabetes mellitus without mention of complication, not stated as uncontrolled. has a past surgical history that includes Cholecystectomy; Upper gastrointestinal endoscopy; Colonoscopy; Hysterectomy; Breast surgery (Bilateral); Vagina surgery (2018); and pr cmbnd anterpost colporraphy w/cysto w/ntrcl rpr (N/A, 2018).     Restrictions  Restrictions/Precautions  Restrictions/Precautions: General Precautions, Fall Risk  Required Braces or Orthoses?: No  Position Activity Restriction  Other position/activity restrictions: up with assist, multiple UE bruises, LUE IV     Subjective   General  Chart Reviewed: Yes  Patient assessed for rehabilitation services?: Yes  Family / Caregiver Present: No  Diagnosis: Pt. admitted for sigmoid diverticulitus  Subjective  Subjective: \"It is easier for me to walk then to stand\"  General Comment  Comments: Writer consulted Kaitlynn Kruse RN whom indicated pt. was medically stable for OT this date. Pre Treatment Pain Screening  Intervention List: Patient able to continue with treatment  Comments / Details: Pt had pain meds at 8 am this am   Pain Assessment  Patient Currently in Pain: Yes  Pain Assessment: 0-10  Pain Level: 9  Pain Type: Acute pain  Pain Location: Abdomen  Pain Orientation: Right  Pain Descriptors: Geralynn Roberto; Stabbing  Pain Frequency: Continuous  Vital Signs  Patient Currently in Pain: Yes     Orientation  Orientation  Overall Orientation Status: Within Functional Limits    Objective    ADL  UE Dressing: Minimal assistance (assist with tying hosp gown at top )  LE Dressing: Minimal assistance;Setup (Pt declined to wear hosp pants.  Pt was educated on sock aid use to sarah B socks and pt required min verbal instruction/min assist only to straighten socks at top; pt was able to doff B socks with use of reacher after OT education with SUP seated EOB )  Toileting: Modified independent  (good use of rail and pt was able to complete hygiene after urination and underwear down and up with use of grab bar )  Additional Comments: 4 WW for functional mob in room and assist needed with IV pole         Balance  Sitting Balance: Supervision  Standing Balance: Stand by assistance (with use of 4WW)  Standing Balance  Time: stand kell 1 min with use of 4WW   Activity: functional mobility and self care in room   Sit to stand: Stand by assistance  Stand to sit: Stand by assistance  Comment: 1 verbal cue to reach back to surface with stand to sits with use of 4WW   Functional Mobility  Functional - Mobility Device: 4-Wheeled Walker  Activity: To/from bathroom  Assist Level: Stand by assistance  Functional Mobility Comments: verbal instruction needed to

## 2018-03-27 NOTE — PROGRESS NOTES
Patient discharge to [address] [place] [address] with JOSH and all belongings( walker) script for Norco, Atarax

## 2018-03-27 NOTE — PROGRESS NOTES
PTH  Phosphorus:    Lab Results   Component Value Date    PHOS 3.2 03/27/2018     Ionized Calcium: No results found for: IONCA  Magnesium:   Lab Results   Component Value Date    MG 2.0 03/27/2018     Albumin:   Lab Results   Component Value Date    LABALBU 3.8 03/22/2018     Last 3 CK, CKMB, Troponin: @LABRCNT(CKTOTAL:3,CKMB:3,TROPONINI:3)       URINE:)No results found for: Andrés Velasco    Radiology:   [address] [[id number]] Collected: 03/26/18 1743   Updated: 03/26/18 2150    Narrative:     EXAMINATION:  CT OF THE THORACIC SPINE WITHOUT CONTRAST  3/26/2018 5:36 pm    TECHNIQUE:  CT of the thoracic spine was performed without the administration of  intravenous contrast. Multiplanar reformatted images are provided for review. Dose modulation, iterative reconstruction, and/or weight based adjustment of  the mA/kV was utilized to reduce the radiation dose to as low as reasonably  achievable. COMPARISON:  None. HISTORY:  ORDERING SYSTEM PROVIDED HISTORY: radicular pain    FINDINGS:  BONES/ALIGNMENT: There is normal alignment of the spine.  Vertebral body  heights are maintained.  No osseous destructive lesion is seen. DEGENERATIVE CHANGES: No gross spinal canal stenosis or bony neural foraminal  narrowing of the thoracic spine.  Diffuse mild degenerative and degenerative  disc changes are noted. SOFT TISSUES: A moderate right pleural effusion and small left pleural  effusion are noted with compressive atelectasis at the lung bases.  The  patient has a large granuloma in the right middle lobe and granulomatous  changes in the right hilum and mediastinum.  Aorta is calcified.  No  paraspinal masses noted. Estimated biologic radiation dose for this procedure:  1024.30 mGy/cm2. Impression:     No acute fracture or subluxation.  Moderate right pleural effusion and mild  left pleural effusion with compressive atelectasis of both lower lobes.   Granulomatous changes are present in the

## 2018-03-27 NOTE — CARE COORDINATION
Social Work-Pt will be discharged today to [facility] at [facility]. Discussed with pt. She is agreeable. She will notify her family. Orders were faxed. PRIMO completed. Nurse to call report to  [phone]. Ildefonso De La Garza

## 2018-03-27 NOTE — PROGRESS NOTES
[facility]                 Progress Note      Date:  3/27/2018  Patient: Fredrick Green  Admission:  3/21/2018  4:12 AM  Admit DX: Sigmoid diverticulitis [K57.32]  Diverticulitis large intestine w/o perforation or abscess w/bleeding [K57.33]  Age:  69 y.o., 1950     LOS: 5 days           SUBJECTIVE:     The patient was seen and examined. Notes and labs reviewed. Patient's cardiac review of systems: dyspnea with exertion. OBJECTIVE:    Telemetry: Atrial fibrillation  BP (!) 138/48   Pulse 74   Temp 99 °F (37.2 °C) (Oral)   Resp 18   Ht 5' 2\" (1.575 m)   Wt 250 lb 12.8 oz (113.8 kg)   SpO2 91%   BMI 45.87 kg/m²     EXAM:  CONSTITUTIONAL:  awake, alert, no apparent distress, and appears stated age. HEENT: Normal jugular venous pulsations, no carotid bruits. Head is atraumatic, normocephalic. Eyes and oral mucosa are normal.  LUNGS: Good respiratory effort. No for increased work of breathing. On auscultation: clear to auscultation bilaterally and rales bibasilar  CARDIOVASCULAR:  Normal apical impulse, irregular rate and rhythm,   ABDOMEN: Soft, nontender, nondistended. Bowel sounds present. No masses or tenderness. SKIN: Warm and dry. EXTREMITIES: 2+ lower extremity edema. Motor movement grossly intact. No cyanosis or clubbing.     Current Inpatient Medications:   ciprofloxacin  500 mg Oral 2 times per day    metroNIDAZOLE  500 mg Oral 3 times per day    apixaban  5 mg Oral BID    metoclopramide  10 mg Intravenous Q6H    lidocaine  1 patch Transdermal Daily    sucralfate  1 g Oral 4 times per day    metoprolol tartrate  50 mg Oral BID    insulin lispro  0-12 Units Subcutaneous TID WC    insulin lispro  0-6 Units Subcutaneous Nightly    pantoprazole  40 mg Intravenous Daily    And    sodium chloride (PF)  10 mL Intravenous Daily    folic acid  1 mg Oral Daily    gabapentin  400 mg Oral BID    phosphorus  250 mg Oral BID    levothyroxine  112 mcg Oral Daily    mometasone-formoterol  2 puff Inhalation BID    atorvastatin  80 mg Oral Nightly    sodium chloride flush  10 mL Intravenous 2 times per day       IV Infusions (if any):   dextrose      sodium chloride 20 mL/hr at 03/25/18 1432       Diagnostics:   ECHO 3/26/2018:  Summary  Technically difficult study. Left ventricle is normal in size and wall thickness. Global left ventricular systolic function is normal with an estimated  ejection fraction of 55 % . All segments not well visualized. Cannot rule-out abnormal segmental wall  motion. Mild pulmonary hypertension with an estimated right ventricular systolic  pressure of 46 mmHg. No significant pericardial effusion is seen. Labs:   CBC:   Recent Labs      03/26/18   0443  03/27/18   0610   WBC  10.0  9.4   HGB  12.0  11.4*   HCT  35.9*  34.6*   PLT  239  261     BMP:   Recent Labs      03/26/18   1546  03/27/18   0610   NA  140  139   K  3.3*  3.6*   CO2  23  19*   BUN  12  11   CREATININE  1.59*  1.41*   LABGLOM  32*  37*   GLUCOSE  185*  124*     BNP: No results for input(s): BNP in the last 72 hours. PT/INR:   Recent Labs      03/26/18   0443  03/27/18   0610   PROTIME  14.6*  16.9*   INR  1.4  1.6     APTT:  Recent Labs      03/26/18   0443  03/26/18   1246   APTT  32.4*  52.7*     CARDIAC ENZYMES:No results for input(s): CKTOTAL, CKMB, CKMBINDEX, TROPONINI in the last 72 hours. FASTING LIPID PANEL:  Lab Results   Component Value Date    HDL 53 01/03/2018    TRIG 185 01/03/2018     LIVER PROFILE:No results for input(s): AST, ALT, LABALBU in the last 72 hours.     ASSESSMENT:  HFpEF  Persistent atrial fibrillation  Mild PHT  obesity  Diverticular disease  CKD  Cirrhosis  Patient Active Problem List   Diagnosis    COPD (chronic obstructive pulmonary disease) ([facility] [address]    Atrial fibrillation (HCC)    Chronic kidney disease    Type 2 diabetes mellitus without complication (HCC)    GERD (gastroesophageal reflux disease)    Hyperlipidemia    Chronic back

## 2018-03-27 NOTE — PROGRESS NOTES
times per day   metoprolol tartrate (LOPRESSOR) tablet 50 mg BID   insulin lispro (HUMALOG) injection vial 0-12 Units TID WC   insulin lispro (HUMALOG) injection vial 0-6 Units Nightly   glucose (GLUTOSE) 40 % oral gel 15 g PRN   dextrose 50 % solution 12.5 g PRN   glucagon (rDNA) injection 1 mg PRN   dextrose 5 % solution PRN   bisacodyl (DULCOLAX) suppository 10 mg Daily PRN   pantoprazole (PROTONIX) injection 40 mg Daily   And    sodium chloride (PF) 0.9 % injection 10 mL Daily   metoprolol (LOPRESSOR) injection 5 mg Q6H PRN   HYDROmorphone (DILAUDID) injection 1 mg Q3H PRN   promethazine (PHENERGAN) injection 12.5 mg Q6H PRN   albuterol (PROVENTIL) nebulizer solution 2.5 mg B0I PRN   folic acid (FOLVITE) tablet 1 mg Daily   gabapentin (NEURONTIN) capsule 400 mg BID   hydrOXYzine (ATARAX) tablet 25 mg TID PRN   phosphorus (K PHOS NEUTRAL) tablet 1 tablet BID   levothyroxine (SYNTHROID) tablet 112 mcg Daily   mometasone-formoterol (DULERA) 100-5 MCG/ACT inhaler 2 puff BID   atorvastatin (LIPITOR) tablet 80 mg Nightly   sodium chloride flush 0.9 % injection 10 mL 2 times per day   sodium chloride flush 0.9 % injection 10 mL PRN   magnesium sulfate 1 g in dextrose 5% 100 mL IVPB PRN   acetaminophen (TYLENOL) tablet 650 mg Q4H PRN   nicotine (NICODERM CQ) 21 MG/24HR 1 patch Daily PRN   0.9 % sodium chloride infusion Continuous   ondansetron (ZOFRAN) injection 4 mg Q4H PRN   senna (SENOKOT) tablet 8.6 mg Nightly PRN     sodium chloride 0.9 % solution 250 mL Once       Data:     Code Status:  Full Code     CT thoracic spine without contrast: 3/26/2018  BONES/ALIGNMENT: There is normal alignment of the spine.  Vertebral body   heights are maintained.  No osseous destructive lesion is seen.       DEGENERATIVE CHANGES: No gross spinal canal stenosis or bony neural foraminal   narrowing of the thoracic spine.  Diffuse mild degenerative and degenerative   disc changes are noted.       SOFT TISSUES: A moderate right pleural 140  139   K  3.0*  3.4*  3.3*  3.6*   CL  102  104  104  106   CO2  23  25  23  19*   GLUCOSE  129*  119*  185*  124*   BUN  12  12  12  11   CREATININE  1.58*  1.68*  1.59*  1.41*   MG  2.0  2.1   --   2.0   ANIONGAP  13  12  13  14   LABGLOM  32*  30*  32*  37*   GFRAA  39*  37*  39*  45*   CALCIUM  8.4*  8.7  8.7  8.6   PHOS  2.1*  3.3   --   3.2     Recent Labs      03/25/18   0913   AMMONIA  29     POC Glucose:  Recent Labs      03/26/18   0647  03/26/18   1134  03/26/18   1626  03/26/18   2142  03/27/18   0600  03/27/18   1216   POCGLU  117*  127*  148*  165*  105  148*     Culture and Sensitivities:  Urine: MORGANELLA MORGANII 10 to 50,000 CFU/ML    Physical Examination:    BP (!) 138/48   Pulse 74   Temp 99 °F (37.2 °C) (Oral)   Resp 18   Ht 5' 2\" (1.575 m)   Wt 250 lb 12.8 oz (113.8 kg)   SpO2 91%   BMI 45.87 kg/m²     Intake/Output Summary (Last 24 hours) at 03/27/18 1348  Last data filed at 03/27/18 1202   Gross per 24 hour   Intake             1016 ml   Output              800 ml   Net              216 ml       General Appearance:    Alert, cooperative, Still having right sided distress, appears stated age   Head:    Normocephalic, without obvious abnormality, atraumatic   Eyes:    PERRL, conjunctiva/corneas clear, EOM's intact        Ears:    Normal external ear canals, both ears   Nose:   Nares normal, septum midline, mucosa normal, no drainage    or sinus tenderness   Throat:   Lips, mucosa, and tongue normal; teeth and gums normal   Neck:   Supple, symmetrical, trachea midline, no adenopathy;        thyroid:  No enlargement/tenderness/nodules; no carotid    bruit or JVD   Back:     Right-sided radicular pain at approximately T10/11    Lungs:     Clear to auscultation bilaterally, respirations unlabored   Chest wall:    No tenderness or deformity   Heart:    Regular rate and rhythm, S1 and S2 normal, no murmur, rub   or gallop - she is now in sinus rhythm    Abdomen:     Soft, Tender in left

## 2018-03-27 NOTE — DISCHARGE SUMMARY
resection and reanastomosis. She will prefer to follow with her gastroenterologist at [facility] and referrals will be made at that time if necessary.     History: \"Dede Banegas is a 69 y.o. female who presents to the emergency department [facility] for evaluation of severe abdominal pain.  Patient states her abdominal pain started developing about 5 days ago. Nina Vasquez presents in severe discomfort.  She is holding her right and upper abdomen and moaning in discomfort.  She endorses loss of appetite.  She denies vomiting.  She denies constipation or diarrhea.   she had BM on 3/20/18  States she had EGD last Tuesday at [facility] which was normal\"    Consults:  cardiology and nephrology    Significant Diagnostic Studies: as above, and as follows:     CT thoracic spine without contrast: 3/26/2018  BONES/ALIGNMENT: There is normal alignment of the spine.  Vertebral body   heights are maintained.  No osseous destructive lesion is seen.       DEGENERATIVE CHANGES: No gross spinal canal stenosis or bony neural foraminal   narrowing of the thoracic spine.  Diffuse mild degenerative and degenerative   disc changes are noted.       SOFT TISSUES: A moderate right pleural effusion and small left pleural   effusion are noted with compressive atelectasis at the lung bases.  The   patient has a large granuloma in the right middle lobe and granulomatous   changes in the right hilum and mediastinum.  Aorta is calcified.  No   paraspinal masses noted.       Estimated biologic radiation dose for this procedure:  2573.70 mGy/cm2.           Impression   No acute fracture or subluxation.  Moderate right pleural effusion and mild   left pleural effusion with compressive atelectasis of both lower lobes.    Granulomatous changes are present in the mediastinum and right hilar areas.      CT abdomen:  Somewhat nodular liver contour suggesting cirrhosis.       Trace ascites.       Sigmoid diverticulosis with stranding in the sigmoid mesenteries could Stable    Follow Up:  Gibran Edgar in three weeks    Discharge Medications:    Nichole Isidro   Home Medication Instructions VCO:[id number]    Printed on:03/27/18 1411   Medication Information                      acetaminophen (TYLENOL) 325 MG tablet  Take 2 tablets by mouth every 4 hours as needed for Pain or Fever             albuterol (PROVENTIL) (2.5 MG/3ML) 0.083% nebulizer solution  Take 2.5 mg by nebulization 4 times daily as needed for Wheezing             albuterol (PROVENTIL) (2.5 MG/3ML) 0.083% nebulizer solution  Take 3 mLs by nebulization every 4 hours as needed for Wheezing             apixaban (ELIQUIS) 5 MG TABS tablet  Take 1 tablet by mouth 2 times daily             calcitRIOL (ROCALTROL) 0.25 MCG capsule  Take 0.25 mcg by mouth three times a week Indications: Monday, Wednesday & Friday              Cholecalciferol (VITAMIN D3) 1000 UNITS TABS  Take 1,000 Units by mouth Five times weekly Indications: Takes 1000 units Mon-Fri only              ciprofloxacin (CIPRO) 500 MG tablet  Take 1 tablet by mouth every 12 hours for 10 days             folic acid (FOLVITE) 1 MG tablet  Take 1 tablet by mouth daily. gabapentin (NEURONTIN) 400 MG capsule  Take 400 mg by mouth 2 times daily . HYDROcodone-acetaminophen (NORCO) 5-325 MG per tablet  Take 2 tablets by mouth every 4 hours as needed for Pain for up to 3 days.              hydrOXYzine (ATARAX) 25 MG tablet  Take 1 tablet by mouth 3 times daily as needed for Itching or Anxiety             Insulin Aspart (NOVOLOG FLEXPEN SC)  Inject into the skin 3 times daily as needed (sliding scale)              insulin NPH (NOVOLIN N) 100 UNIT/ML injection vial  Inject 10 Units into the skin every morning Hold for blood sugar less than 120, took 5 units am of surgery 2/12 as instructed per Dr. Henri Lal             insulin NPH (NOVOLIN N) 100 UNIT/ML injection vial  Inject 50 Units into the skin Daily with supper Hold for blood sugar less than 120             levothyroxine (SYNTHROID) 112 MCG tablet  Take 112 mcg by mouth Daily              lidocaine (LIDODERM) 5 %  Place 1 patch onto the skin daily 12 hours on, 12 hours off.             metoclopramide (REGLAN) 5 MG tablet  Take 10 mg by mouth 2 times daily              metoprolol tartrate (LOPRESSOR) 50 MG tablet  Take 1 tablet by mouth 2 times daily             metroNIDAZOLE (FLAGYL) 500 MG tablet  Take 1 tablet by mouth every 8 hours for 10 days             mometasone-formoterol (DULERA) 100-5 MCG/ACT inhaler  Inhale 2 puffs into the lungs 2 times daily             omeprazole (PRILOSEC) 20 MG capsule  Take 20 mg by mouth 2 times daily              ondansetron (ZOFRAN) 4 MG tablet  Take 1 tablet by mouth daily as needed for Nausea or Vomiting             potassium chloride (KLOR-CON M10) 10 MEQ extended release tablet  Take 1 tablet by mouth 2 times daily              rosuvastatin (CRESTOR) 40 MG tablet  Take 40 mg by mouth daily              spironolactone (ALDACTONE) 100 MG tablet  Take 100 mg by mouth daily Currently on hold per nephrologist             sucralfate (CARAFATE) 1 GM tablet  Take 1 tablet by mouth 4 times daily             torsemide (DEMADEX) 20 MG tablet  Take 1 tablet by mouth daily             valACYclovir (VALTREX) 1 G tablet  Take 1,000 mg by mouth 2 times daily as needed Indications: 10 day course when needed                  Activity: activity as tolerated    Diet: renal diet    Time Spent on discharge is more than 45 minutes in the examination, evaluation, counseling and review of medications and discharge plan. Electronically signed by Meaghan Guadalupe DO on 3/27/2018 at 2:11 PM     Thank you Dr. Alisa Raphael for the opportunity to be involved in this patient's care.

## 2018-03-28 ENCOUNTER — APPOINTMENT (OUTPATIENT)
Dept: PHYSICAL THERAPY | Facility: CLINIC | Age: 68
End: 2018-03-28
Payer: MEDICARE

## 2018-03-29 ENCOUNTER — HOSPITAL ENCOUNTER (OUTPATIENT)
Age: 68
Setting detail: SPECIMEN
Discharge: HOME | End: 2018-03-29
Payer: MEDICARE

## 2018-03-29 LAB
ANION GAP SERPL CALCULATED.3IONS-SCNC: 13 MMOL/L (ref 9–17)
BUN BLDV-MCNC: 10 MG/DL (ref 8–23)
BUN/CREAT BLD: ABNORMAL (ref 9–20)
CALCIUM SERPL-MCNC: 9.4 MG/DL (ref 8.6–10.4)
CHLORIDE BLD-SCNC: 97 MMOL/L (ref 98–107)
CO2: 32 MMOL/L (ref 20–31)
CREAT SERPL-MCNC: 1.66 MG/DL (ref 0.5–0.9)
GFR AFRICAN AMERICAN: 37 ML/MIN
GFR NON-AFRICAN AMERICAN: 31 ML/MIN
GFR SERPL CREATININE-BSD FRML MDRD: ABNORMAL ML/MIN/{1.73_M2}
GFR SERPL CREATININE-BSD FRML MDRD: ABNORMAL ML/MIN/{1.73_M2}
GLUCOSE BLD-MCNC: 100 MG/DL (ref 70–99)
HCT VFR BLD CALC: 35.5 % (ref 36.3–47.1)
HEMOGLOBIN: 11.5 G/DL (ref 11.9–15.1)
MCH RBC QN AUTO: 32.7 PG (ref 25.2–33.5)
MCHC RBC AUTO-ENTMCNC: 32.4 G/DL (ref 28.4–34.8)
MCV RBC AUTO: 100.9 FL (ref 82.6–102.9)
NRBC AUTOMATED: 0 PER 100 WBC
PDW BLD-RTO: 15.1 % (ref 11.8–14.4)
PLATELET # BLD: 291 K/UL (ref 138–453)
PMV BLD AUTO: 10.8 FL (ref 8.1–13.5)
POTASSIUM SERPL-SCNC: 2.8 MMOL/L (ref 3.7–5.3)
RBC # BLD: 3.52 M/UL (ref 3.95–5.11)
SODIUM BLD-SCNC: 142 MMOL/L (ref 135–144)
WBC # BLD: 8.5 K/UL (ref 3.5–11.3)

## 2018-03-29 PROCEDURE — 85027 COMPLETE CBC AUTOMATED: CPT

## 2018-03-29 PROCEDURE — 36415 COLL VENOUS BLD VENIPUNCTURE: CPT

## 2018-03-29 PROCEDURE — P9603 ONE-WAY ALLOW PRORATED MILES: HCPCS

## 2018-03-29 PROCEDURE — 80048 BASIC METABOLIC PNL TOTAL CA: CPT

## 2018-03-30 ENCOUNTER — HOSPITAL ENCOUNTER (OUTPATIENT)
Age: 68
Setting detail: SPECIMEN
Discharge: HOME | End: 2018-03-30
Payer: MEDICARE

## 2018-03-30 LAB
ANION GAP SERPL CALCULATED.3IONS-SCNC: 17 MMOL/L (ref 9–17)
BUN BLDV-MCNC: 12 MG/DL (ref 8–23)
BUN/CREAT BLD: ABNORMAL (ref 9–20)
CALCIUM SERPL-MCNC: 9.6 MG/DL (ref 8.6–10.4)
CHLORIDE BLD-SCNC: 98 MMOL/L (ref 98–107)
CO2: 27 MMOL/L (ref 20–31)
CREAT SERPL-MCNC: 1.73 MG/DL (ref 0.5–0.9)
GFR AFRICAN AMERICAN: 35 ML/MIN
GFR NON-AFRICAN AMERICAN: 29 ML/MIN
GFR SERPL CREATININE-BSD FRML MDRD: ABNORMAL ML/MIN/{1.73_M2}
GFR SERPL CREATININE-BSD FRML MDRD: ABNORMAL ML/MIN/{1.73_M2}
GLUCOSE BLD-MCNC: 122 MG/DL (ref 70–99)
POTASSIUM SERPL-SCNC: 3.3 MMOL/L (ref 3.7–5.3)
SODIUM BLD-SCNC: 142 MMOL/L (ref 135–144)

## 2018-03-30 PROCEDURE — P9603 ONE-WAY ALLOW PRORATED MILES: HCPCS

## 2018-03-30 PROCEDURE — 36415 COLL VENOUS BLD VENIPUNCTURE: CPT

## 2018-03-30 PROCEDURE — 80048 BASIC METABOLIC PNL TOTAL CA: CPT

## 2018-04-02 ENCOUNTER — APPOINTMENT (OUTPATIENT)
Dept: PHYSICAL THERAPY | Facility: CLINIC | Age: 68
End: 2018-04-02
Payer: MEDICARE

## 2018-04-04 ENCOUNTER — HOSPITAL ENCOUNTER (OUTPATIENT)
Age: 68
Discharge: HOME | End: 2018-04-04
Payer: MEDICARE

## 2018-04-04 ENCOUNTER — APPOINTMENT (OUTPATIENT)
Dept: PHYSICAL THERAPY | Facility: CLINIC | Age: 68
End: 2018-04-04
Payer: MEDICARE

## 2018-04-04 ENCOUNTER — HOSPITAL ENCOUNTER (OUTPATIENT)
Dept: PHARMACY | Age: 68
Setting detail: THERAPIES SERIES
Discharge: HOME | End: 2018-04-04
Payer: MEDICARE

## 2018-04-04 LAB
ANION GAP SERPL CALCULATED.3IONS-SCNC: 11 MMOL/L (ref 9–17)
BUN BLDV-MCNC: 11 MG/DL (ref 8–23)
BUN/CREAT BLD: 7 (ref 9–20)
CALCIUM SERPL-MCNC: 8.9 MG/DL (ref 8.6–10.4)
CHLORIDE BLD-SCNC: 101 MMOL/L (ref 98–107)
CO2: 30 MMOL/L (ref 20–31)
CREAT SERPL-MCNC: 1.62 MG/DL (ref 0.5–0.9)
GFR AFRICAN AMERICAN: 38 ML/MIN
GFR NON-AFRICAN AMERICAN: 32 ML/MIN
GFR SERPL CREATININE-BSD FRML MDRD: ABNORMAL ML/MIN/{1.73_M2}
GFR SERPL CREATININE-BSD FRML MDRD: ABNORMAL ML/MIN/{1.73_M2}
GLUCOSE BLD-MCNC: 128 MG/DL (ref 70–99)
INR BLD: 1.3
MAGNESIUM: 1.6 MG/DL (ref 1.6–2.6)
POTASSIUM SERPL-SCNC: 3.6 MMOL/L (ref 3.7–5.3)
PROTIME: 15.6 SECONDS
SODIUM BLD-SCNC: 142 MMOL/L (ref 135–144)

## 2018-04-04 PROCEDURE — 80048 BASIC METABOLIC PNL TOTAL CA: CPT

## 2018-04-04 PROCEDURE — 99211 OFF/OP EST MAY X REQ PHY/QHP: CPT

## 2018-04-04 PROCEDURE — 83735 ASSAY OF MAGNESIUM: CPT

## 2018-04-04 PROCEDURE — 85610 PROTHROMBIN TIME: CPT

## 2018-04-04 PROCEDURE — 36415 COLL VENOUS BLD VENIPUNCTURE: CPT

## 2018-04-09 ENCOUNTER — APPOINTMENT (OUTPATIENT)
Dept: PHYSICAL THERAPY | Facility: CLINIC | Age: 68
End: 2018-04-09
Payer: MEDICARE

## 2018-04-10 ENCOUNTER — HOSPITAL ENCOUNTER (OUTPATIENT)
Dept: PHARMACY | Age: 68
Setting detail: THERAPIES SERIES
Discharge: HOME | End: 2018-04-10
Payer: MEDICARE

## 2018-04-10 DIAGNOSIS — I48.91 ATRIAL FIBRILLATION, UNSPECIFIED TYPE (HCC): ICD-10-CM

## 2018-04-10 LAB
INR BLD: 1.6
PROTIME: 18.7 SECONDS

## 2018-04-10 PROCEDURE — 85610 PROTHROMBIN TIME: CPT

## 2018-04-10 PROCEDURE — 99211 OFF/OP EST MAY X REQ PHY/QHP: CPT

## 2018-04-11 ENCOUNTER — APPOINTMENT (OUTPATIENT)
Dept: PHYSICAL THERAPY | Facility: CLINIC | Age: 68
End: 2018-04-11
Payer: MEDICARE

## 2018-04-11 ENCOUNTER — HOSPITAL ENCOUNTER (OUTPATIENT)
Dept: PHYSICAL THERAPY | Facility: CLINIC | Age: 68
Setting detail: THERAPIES SERIES
Discharge: HOME | End: 2018-04-11
Payer: MEDICARE

## 2018-04-11 PROCEDURE — 97140 MANUAL THERAPY 1/> REGIONS: CPT

## 2018-04-16 ENCOUNTER — APPOINTMENT (OUTPATIENT)
Dept: PHYSICAL THERAPY | Facility: CLINIC | Age: 68
End: 2018-04-16
Payer: MEDICARE

## 2018-04-17 ENCOUNTER — HOSPITAL ENCOUNTER (OUTPATIENT)
Dept: PHARMACY | Age: 68
Setting detail: THERAPIES SERIES
Discharge: HOME | End: 2018-04-17
Payer: MEDICARE

## 2018-04-17 DIAGNOSIS — I48.91 ATRIAL FIBRILLATION, UNSPECIFIED TYPE (HCC): ICD-10-CM

## 2018-04-17 LAB
INR BLD: 2.2
PROTIME: 26.9 SECONDS

## 2018-04-17 PROCEDURE — 99211 OFF/OP EST MAY X REQ PHY/QHP: CPT

## 2018-04-17 PROCEDURE — 85610 PROTHROMBIN TIME: CPT

## 2018-04-18 ENCOUNTER — APPOINTMENT (OUTPATIENT)
Dept: PHYSICAL THERAPY | Facility: CLINIC | Age: 68
End: 2018-04-18
Payer: MEDICARE

## 2018-04-20 PROBLEM — N39.0 UTI (URINARY TRACT INFECTION): Status: RESOLVED | Noted: 2018-03-21 | Resolved: 2018-04-20

## 2018-04-23 ENCOUNTER — APPOINTMENT (OUTPATIENT)
Dept: PHYSICAL THERAPY | Facility: CLINIC | Age: 68
End: 2018-04-23
Payer: MEDICARE

## 2018-04-25 ENCOUNTER — HOSPITAL ENCOUNTER (OUTPATIENT)
Dept: PHYSICAL THERAPY | Facility: CLINIC | Age: 68
Setting detail: THERAPIES SERIES
Discharge: HOME | End: 2018-04-25
Payer: MEDICARE

## 2018-04-25 ENCOUNTER — HOSPITAL ENCOUNTER (OUTPATIENT)
Age: 68
Setting detail: SPECIMEN
Discharge: HOME | End: 2018-04-25
Payer: MEDICARE

## 2018-04-25 LAB
ANION GAP SERPL CALCULATED.3IONS-SCNC: 15 MMOL/L (ref 9–17)
BUN BLDV-MCNC: 13 MG/DL (ref 8–23)
BUN/CREAT BLD: ABNORMAL (ref 9–20)
CALCIUM SERPL-MCNC: 9.3 MG/DL (ref 8.6–10.4)
CHLORIDE BLD-SCNC: 104 MMOL/L (ref 98–107)
CO2: 23 MMOL/L (ref 20–31)
CREAT SERPL-MCNC: 1.61 MG/DL (ref 0.5–0.9)
GFR AFRICAN AMERICAN: 38 ML/MIN
GFR NON-AFRICAN AMERICAN: 32 ML/MIN
GFR SERPL CREATININE-BSD FRML MDRD: ABNORMAL ML/MIN/{1.73_M2}
GFR SERPL CREATININE-BSD FRML MDRD: ABNORMAL ML/MIN/{1.73_M2}
GLUCOSE BLD-MCNC: 108 MG/DL (ref 70–99)
MAGNESIUM: 2.8 MG/DL (ref 1.6–2.6)
POTASSIUM SERPL-SCNC: 3.6 MMOL/L (ref 3.7–5.3)
SODIUM BLD-SCNC: 142 MMOL/L (ref 135–144)

## 2018-04-25 PROCEDURE — 97140 MANUAL THERAPY 1/> REGIONS: CPT

## 2018-04-30 ENCOUNTER — HOSPITAL ENCOUNTER (OUTPATIENT)
Dept: PHYSICAL THERAPY | Facility: CLINIC | Age: 68
Setting detail: THERAPIES SERIES
Discharge: HOME | End: 2018-04-30
Payer: MEDICARE

## 2018-04-30 PROCEDURE — 97140 MANUAL THERAPY 1/> REGIONS: CPT

## 2018-05-02 ENCOUNTER — HOSPITAL ENCOUNTER (OUTPATIENT)
Dept: PHYSICAL THERAPY | Facility: CLINIC | Age: 68
Setting detail: THERAPIES SERIES
Discharge: HOME | End: 2018-05-02
Payer: MEDICARE

## 2018-05-02 ENCOUNTER — HOSPITAL ENCOUNTER (OUTPATIENT)
Dept: PHARMACY | Age: 68
Setting detail: THERAPIES SERIES
Discharge: HOME | End: 2018-05-02
Payer: MEDICARE

## 2018-05-02 DIAGNOSIS — I48.91 ATRIAL FIBRILLATION, UNSPECIFIED TYPE (HCC): ICD-10-CM

## 2018-05-02 LAB
INR BLD: 2.4
PROTIME: 29 SECONDS

## 2018-05-02 PROCEDURE — 85610 PROTHROMBIN TIME: CPT

## 2018-05-02 PROCEDURE — 99211 OFF/OP EST MAY X REQ PHY/QHP: CPT

## 2018-05-02 PROCEDURE — G0283 ELEC STIM OTHER THAN WOUND: HCPCS

## 2018-05-02 PROCEDURE — 97140 MANUAL THERAPY 1/> REGIONS: CPT

## 2018-05-02 RX ORDER — MAGNESIUM OXIDE 400 MG/1
400 TABLET ORAL DAILY
COMMUNITY
End: 2019-02-11

## 2018-05-07 ENCOUNTER — HOSPITAL ENCOUNTER (OUTPATIENT)
Dept: PHYSICAL THERAPY | Facility: CLINIC | Age: 68
Setting detail: THERAPIES SERIES
Discharge: HOME | End: 2018-05-07
Payer: MEDICARE

## 2018-05-07 PROCEDURE — 97140 MANUAL THERAPY 1/> REGIONS: CPT

## 2018-05-09 ENCOUNTER — HOSPITAL ENCOUNTER (OUTPATIENT)
Age: 68
Setting detail: SPECIMEN
Discharge: HOME | End: 2018-05-09
Payer: MEDICARE

## 2018-05-09 ENCOUNTER — HOSPITAL ENCOUNTER (OUTPATIENT)
Dept: PHYSICAL THERAPY | Facility: CLINIC | Age: 68
Setting detail: THERAPIES SERIES
Discharge: HOME | End: 2018-05-09
Payer: MEDICARE

## 2018-05-09 LAB
-: NORMAL
ALBUMIN SERPL-MCNC: 4 G/DL (ref 3.5–5.2)
AMORPHOUS: NORMAL
ANION GAP SERPL CALCULATED.3IONS-SCNC: 16 MMOL/L (ref 9–17)
BACTERIA: NORMAL
BILIRUBIN URINE: NEGATIVE
BUN BLDV-MCNC: 16 MG/DL (ref 8–23)
BUN/CREAT BLD: ABNORMAL (ref 9–20)
CALCIUM SERPL-MCNC: 9.7 MG/DL (ref 8.6–10.4)
CASTS UA: NORMAL /LPF (ref 0–8)
CHLORIDE BLD-SCNC: 97 MMOL/L (ref 98–107)
CO2: 23 MMOL/L (ref 20–31)
COLOR: YELLOW
COMMENT UA: ABNORMAL
CREAT SERPL-MCNC: 1.61 MG/DL (ref 0.5–0.9)
CREATININE URINE: 81.5 MG/DL (ref 28–217)
CRYSTALS, UA: NORMAL /HPF
EPITHELIAL CELLS UA: NORMAL /HPF (ref 0–5)
GFR AFRICAN AMERICAN: 38 ML/MIN
GFR NON-AFRICAN AMERICAN: 32 ML/MIN
GFR SERPL CREATININE-BSD FRML MDRD: ABNORMAL ML/MIN/{1.73_M2}
GFR SERPL CREATININE-BSD FRML MDRD: ABNORMAL ML/MIN/{1.73_M2}
GLUCOSE FASTING: 134 MG/DL (ref 70–99)
GLUCOSE URINE: NEGATIVE
HCT VFR BLD CALC: 40.2 % (ref 36.3–47.1)
HEMOGLOBIN: 12.9 G/DL (ref 11.9–15.1)
KETONES, URINE: NEGATIVE
LEUKOCYTE ESTERASE, URINE: NEGATIVE
MAGNESIUM: 2.4 MG/DL (ref 1.6–2.6)
MCH RBC QN AUTO: 31.2 PG (ref 25.2–33.5)
MCHC RBC AUTO-ENTMCNC: 32.1 G/DL (ref 28.4–34.8)
MCV RBC AUTO: 97.3 FL (ref 82.6–102.9)
MICROALBUMIN/CREAT 24H UR: 44 MG/L
MICROALBUMIN/CREAT UR-RTO: 54 MCG/MG CREAT
MUCUS: NORMAL
NITRITE, URINE: NEGATIVE
NRBC AUTOMATED: 0 PER 100 WBC
OTHER OBSERVATIONS UA: NORMAL
PDW BLD-RTO: 13.3 % (ref 11.8–14.4)
PH UA: 5.5 (ref 5–8)
PHOSPHORUS: 3.2 MG/DL (ref 2.6–4.5)
PLATELET # BLD: 247 K/UL (ref 138–453)
PMV BLD AUTO: 12.1 FL (ref 8.1–13.5)
POTASSIUM SERPL-SCNC: 4.2 MMOL/L (ref 3.7–5.3)
PROTEIN UA: ABNORMAL
PTH INTACT: 76.08 PG/ML (ref 15–65)
RBC # BLD: 4.13 M/UL (ref 3.95–5.11)
RBC UA: NORMAL /HPF (ref 0–4)
RENAL EPITHELIAL, UA: NORMAL /HPF
SODIUM BLD-SCNC: 136 MMOL/L (ref 135–144)
SPECIFIC GRAVITY UA: 1.01 (ref 1–1.03)
TRICHOMONAS: NORMAL
TURBIDITY: CLEAR
URINE HGB: NEGATIVE
UROBILINOGEN, URINE: NORMAL
VITAMIN D 25-HYDROXY: 34.6 NG/ML (ref 30–100)
WBC # BLD: 7.8 K/UL (ref 3.5–11.3)
WBC UA: NORMAL /HPF (ref 0–5)
YEAST: NORMAL

## 2018-05-09 PROCEDURE — 97140 MANUAL THERAPY 1/> REGIONS: CPT

## 2018-05-14 ENCOUNTER — HOSPITAL ENCOUNTER (OUTPATIENT)
Dept: PHYSICAL THERAPY | Facility: CLINIC | Age: 68
Setting detail: THERAPIES SERIES
Discharge: HOME | End: 2018-05-14
Payer: MEDICARE

## 2018-05-14 PROCEDURE — 97140 MANUAL THERAPY 1/> REGIONS: CPT

## 2018-05-16 ENCOUNTER — HOSPITAL ENCOUNTER (OUTPATIENT)
Dept: PHYSICAL THERAPY | Facility: CLINIC | Age: 68
Setting detail: THERAPIES SERIES
Discharge: HOME | End: 2018-05-16
Payer: MEDICARE

## 2018-05-16 PROCEDURE — G8985 CARRY GOAL STATUS: HCPCS

## 2018-05-16 PROCEDURE — G8984 CARRY CURRENT STATUS: HCPCS

## 2018-05-16 PROCEDURE — 97140 MANUAL THERAPY 1/> REGIONS: CPT

## 2018-05-23 ENCOUNTER — HOSPITAL ENCOUNTER (OUTPATIENT)
Dept: PHYSICAL THERAPY | Facility: CLINIC | Age: 68
Setting detail: THERAPIES SERIES
Discharge: HOME | End: 2018-05-23
Payer: MEDICARE

## 2018-05-23 PROCEDURE — 97140 MANUAL THERAPY 1/> REGIONS: CPT

## 2018-05-30 ENCOUNTER — HOSPITAL ENCOUNTER (OUTPATIENT)
Dept: PHARMACY | Age: 68
Setting detail: THERAPIES SERIES
Discharge: HOME | End: 2018-05-30
Payer: MEDICARE

## 2018-05-30 ENCOUNTER — HOSPITAL ENCOUNTER (OUTPATIENT)
Dept: PHYSICAL THERAPY | Facility: CLINIC | Age: 68
Setting detail: THERAPIES SERIES
Discharge: HOME | End: 2018-05-30
Payer: MEDICARE

## 2018-05-30 DIAGNOSIS — I48.91 ATRIAL FIBRILLATION, UNSPECIFIED TYPE (HCC): ICD-10-CM

## 2018-05-30 LAB
INR BLD: 2.4
PROTIME: 28.6 SECONDS

## 2018-05-30 PROCEDURE — 97140 MANUAL THERAPY 1/> REGIONS: CPT

## 2018-05-30 PROCEDURE — 85610 PROTHROMBIN TIME: CPT

## 2018-05-30 PROCEDURE — 99211 OFF/OP EST MAY X REQ PHY/QHP: CPT

## 2018-06-04 ENCOUNTER — HOSPITAL ENCOUNTER (OUTPATIENT)
Dept: PHYSICAL THERAPY | Facility: CLINIC | Age: 68
Setting detail: THERAPIES SERIES
Discharge: HOME | End: 2018-06-04
Payer: MEDICARE

## 2018-06-04 PROCEDURE — 97140 MANUAL THERAPY 1/> REGIONS: CPT

## 2018-06-06 ENCOUNTER — HOSPITAL ENCOUNTER (OUTPATIENT)
Dept: PHYSICAL THERAPY | Facility: CLINIC | Age: 68
Setting detail: THERAPIES SERIES
Discharge: HOME | End: 2018-06-06
Payer: MEDICARE

## 2018-06-06 NOTE — FLOWSHEET NOTE
[] [facility]        Outpatient Physical                Therapy       [address]       Phone: [phone]       Fax: [phone] [x] [facility]       Promotion at [address]       Phone: [phone]       Fax: [phone] [] [facility]      for Health Promotion     [address]      Phone: [phone]      Fax:  [phone] [facility] Outpatient    [address],   [address]  Phone [phone]  Fax  [phone]     Physical Therapy Cancel/No Show note    Date: 2018  Patient: Debbie Berger  : 1950  MRN: [id number]    Cancels/No Shows to date: 3 cx/1 ns    For today's appointment patient:  [x]  Cancelled  []  Rescheduled appointment  []  No-show     Reason given by patient:  [x]  Patient ill  []  Conflicting appointment  []  No transportation    []  Conflict with work  []  No reason given  []  Weather related  []  Other:      Comments:      [x]  Pt is aware of schedule for next week.     Electronically signed by: Leeann Carbone PT

## 2018-06-11 ENCOUNTER — HOSPITAL ENCOUNTER (OUTPATIENT)
Dept: PHYSICAL THERAPY | Facility: CLINIC | Age: 68
Setting detail: THERAPIES SERIES
Discharge: HOME | End: 2018-06-11
Payer: MEDICARE

## 2018-06-11 PROCEDURE — 97140 MANUAL THERAPY 1/> REGIONS: CPT

## 2018-06-13 ENCOUNTER — HOSPITAL ENCOUNTER (OUTPATIENT)
Dept: PHYSICAL THERAPY | Facility: CLINIC | Age: 68
Setting detail: THERAPIES SERIES
Discharge: HOME | End: 2018-06-13
Payer: MEDICARE

## 2018-06-13 PROCEDURE — 97140 MANUAL THERAPY 1/> REGIONS: CPT

## 2018-06-18 ENCOUNTER — HOSPITAL ENCOUNTER (OUTPATIENT)
Dept: PHYSICAL THERAPY | Facility: CLINIC | Age: 68
Setting detail: THERAPIES SERIES
Discharge: HOME | End: 2018-06-18
Payer: MEDICARE

## 2018-06-18 PROCEDURE — 97140 MANUAL THERAPY 1/> REGIONS: CPT

## 2018-06-20 ENCOUNTER — APPOINTMENT (OUTPATIENT)
Dept: PHYSICAL THERAPY | Facility: CLINIC | Age: 68
End: 2018-06-20
Payer: MEDICARE

## 2018-06-21 ENCOUNTER — ANESTHESIA EVENT (OUTPATIENT)
Dept: MRI IMAGING | Age: 68
End: 2018-06-21

## 2018-06-21 ENCOUNTER — HOSPITAL ENCOUNTER (OUTPATIENT)
Dept: MRI IMAGING | Age: 68
Discharge: HOME | End: 2018-06-23
Payer: MEDICARE

## 2018-06-21 ENCOUNTER — ANESTHESIA (OUTPATIENT)
Dept: MRI IMAGING | Age: 68
End: 2018-06-21

## 2018-06-21 VITALS
DIASTOLIC BLOOD PRESSURE: 57 MMHG | HEART RATE: 73 BPM | SYSTOLIC BLOOD PRESSURE: 132 MMHG | TEMPERATURE: 97.5 F | BODY MASS INDEX: 41.73 KG/M2 | OXYGEN SATURATION: 96 % | RESPIRATION RATE: 20 BRPM | HEIGHT: 62 IN | WEIGHT: 226.8 LBS

## 2018-06-21 VITALS
RESPIRATION RATE: 20 BRPM | HEART RATE: 100 BPM | TEMPERATURE: 98.1 F | DIASTOLIC BLOOD PRESSURE: 44 MMHG | OXYGEN SATURATION: 95 % | SYSTOLIC BLOOD PRESSURE: 136 MMHG

## 2018-06-21 VITALS
DIASTOLIC BLOOD PRESSURE: 87 MMHG | RESPIRATION RATE: 25 BRPM | OXYGEN SATURATION: 90 % | SYSTOLIC BLOOD PRESSURE: 131 MMHG

## 2018-06-21 DIAGNOSIS — M75.01 ADHESIVE CAPSULITIS OF RIGHT SHOULDER: ICD-10-CM

## 2018-06-21 DIAGNOSIS — N25.9 DISORDER RESULTING FROM IMPAIRED RENAL FUNCTION: ICD-10-CM

## 2018-06-21 LAB
GFR NON-AFRICAN AMERICAN: 32 ML/MIN
GFR SERPL CREATININE-BSD FRML MDRD: 39 ML/MIN
GFR SERPL CREATININE-BSD FRML MDRD: ABNORMAL ML/MIN/{1.73_M2}
GLUCOSE BLD-MCNC: 138 MG/DL (ref 65–105)
GLUCOSE BLD-MCNC: 160 MG/DL (ref 74–100)
POC CHLORIDE: 105 MMOL/L (ref 98–107)
POC CREATININE: 1.6 MG/DL (ref 0.51–1.19)
POC POTASSIUM: 4.6 MMOL/L (ref 3.5–4.5)
POC SODIUM: 139 MMOL/L (ref 138–146)

## 2018-06-21 PROCEDURE — 7100000010 HC PHASE II RECOVERY - FIRST 15 MIN

## 2018-06-21 PROCEDURE — 7100000000 HC PACU RECOVERY - FIRST 15 MIN

## 2018-06-21 PROCEDURE — 82435 ASSAY OF BLOOD CHLORIDE: CPT

## 2018-06-21 PROCEDURE — 2580000003 HC RX 258: Performed by: ANESTHESIOLOGY

## 2018-06-21 PROCEDURE — 3700000000 HC ANESTHESIA ATTENDED CARE

## 2018-06-21 PROCEDURE — 2500000003 HC RX 250 WO HCPCS: Performed by: SPECIALIST

## 2018-06-21 PROCEDURE — 73221 MRI JOINT UPR EXTREM W/O DYE: CPT

## 2018-06-21 PROCEDURE — 7100000001 HC PACU RECOVERY - ADDTL 15 MIN

## 2018-06-21 PROCEDURE — 3700000001 HC ADD 15 MINUTES (ANESTHESIA)

## 2018-06-21 PROCEDURE — 82947 ASSAY GLUCOSE BLOOD QUANT: CPT

## 2018-06-21 PROCEDURE — 6360000002 HC RX W HCPCS: Performed by: SPECIALIST

## 2018-06-21 PROCEDURE — 74181 MRI ABDOMEN W/O CONTRAST: CPT

## 2018-06-21 PROCEDURE — 84132 ASSAY OF SERUM POTASSIUM: CPT

## 2018-06-21 PROCEDURE — 82565 ASSAY OF CREATININE: CPT

## 2018-06-21 PROCEDURE — 6360000002 HC RX W HCPCS: Performed by: ANESTHESIOLOGY

## 2018-06-21 PROCEDURE — 84295 ASSAY OF SERUM SODIUM: CPT

## 2018-06-21 RX ORDER — ROCURONIUM BROMIDE 10 MG/ML
INJECTION, SOLUTION INTRAVENOUS PRN
Status: DISCONTINUED | OUTPATIENT
Start: 2018-06-21 | End: 2018-06-21 | Stop reason: SDUPTHER

## 2018-06-21 RX ORDER — ALBUTEROL SULFATE 2.5 MG/3ML
2.5 SOLUTION RESPIRATORY (INHALATION)
Status: DISPENSED | OUTPATIENT
Start: 2018-06-21 | End: 2018-06-21

## 2018-06-21 RX ORDER — PROPOFOL 10 MG/ML
INJECTION, EMULSION INTRAVENOUS PRN
Status: DISCONTINUED | OUTPATIENT
Start: 2018-06-21 | End: 2018-06-21 | Stop reason: SDUPTHER

## 2018-06-21 RX ORDER — SODIUM CHLORIDE, SODIUM LACTATE, POTASSIUM CHLORIDE, CALCIUM CHLORIDE 600; 310; 30; 20 MG/100ML; MG/100ML; MG/100ML; MG/100ML
INJECTION, SOLUTION INTRAVENOUS CONTINUOUS
Status: DISCONTINUED | OUTPATIENT
Start: 2018-06-21 | End: 2018-06-24 | Stop reason: HOSPADM

## 2018-06-21 RX ORDER — MIDAZOLAM HYDROCHLORIDE 1 MG/ML
INJECTION INTRAMUSCULAR; INTRAVENOUS PRN
Status: DISCONTINUED | OUTPATIENT
Start: 2018-06-21 | End: 2018-06-21 | Stop reason: SDUPTHER

## 2018-06-21 RX ORDER — ONDANSETRON 2 MG/ML
INJECTION INTRAMUSCULAR; INTRAVENOUS PRN
Status: DISCONTINUED | OUTPATIENT
Start: 2018-06-21 | End: 2018-06-21 | Stop reason: SDUPTHER

## 2018-06-21 RX ORDER — ALBUTEROL SULFATE 2.5 MG/3ML
2.5 SOLUTION RESPIRATORY (INHALATION) EVERY 6 HOURS PRN
Status: DISCONTINUED | OUTPATIENT
Start: 2018-06-21 | End: 2018-06-24 | Stop reason: HOSPADM

## 2018-06-21 RX ORDER — ALBUTEROL SULFATE 2.5 MG/3ML
SOLUTION RESPIRATORY (INHALATION)
Status: DISPENSED
Start: 2018-06-21 | End: 2018-06-21

## 2018-06-21 RX ORDER — ALBUTEROL SULFATE 2.5 MG/3ML
SOLUTION RESPIRATORY (INHALATION)
Status: DISPENSED
Start: 2018-06-21 | End: 2018-06-22

## 2018-06-21 RX ORDER — LIDOCAINE HYDROCHLORIDE 10 MG/ML
INJECTION, SOLUTION INFILTRATION; PERINEURAL PRN
Status: DISCONTINUED | OUTPATIENT
Start: 2018-06-21 | End: 2018-06-21 | Stop reason: SDUPTHER

## 2018-06-21 RX ORDER — FENTANYL CITRATE 50 UG/ML
INJECTION, SOLUTION INTRAMUSCULAR; INTRAVENOUS PRN
Status: DISCONTINUED | OUTPATIENT
Start: 2018-06-21 | End: 2018-06-21 | Stop reason: SDUPTHER

## 2018-06-21 RX ORDER — PROPOFOL 10 MG/ML
INJECTION, EMULSION INTRAVENOUS CONTINUOUS PRN
Status: DISCONTINUED | OUTPATIENT
Start: 2018-06-21 | End: 2018-06-21 | Stop reason: SDUPTHER

## 2018-06-21 RX ADMIN — ONDANSETRON 4 MG: 2 INJECTION, SOLUTION INTRAMUSCULAR; INTRAVENOUS at 10:15

## 2018-06-21 RX ADMIN — ROCURONIUM BROMIDE 30 MG: 10 INJECTION INTRAVENOUS at 09:42

## 2018-06-21 RX ADMIN — ALBUTEROL SULFATE 2.5 MG: 2.5 SOLUTION RESPIRATORY (INHALATION) at 12:30

## 2018-06-21 RX ADMIN — SODIUM CHLORIDE, POTASSIUM CHLORIDE, SODIUM LACTATE AND CALCIUM CHLORIDE: 600; 310; 30; 20 INJECTION, SOLUTION INTRAVENOUS at 08:46

## 2018-06-21 RX ADMIN — PROPOFOL 200 MG: 10 INJECTION, EMULSION INTRAVENOUS at 09:42

## 2018-06-21 RX ADMIN — FENTANYL CITRATE 50 MCG: 50 INJECTION INTRAMUSCULAR; INTRAVENOUS at 09:42

## 2018-06-21 RX ADMIN — LIDOCAINE HYDROCHLORIDE 50 MG: 10 INJECTION, SOLUTION INFILTRATION; PERINEURAL at 09:42

## 2018-06-21 RX ADMIN — MIDAZOLAM HYDROCHLORIDE 2 MG: 1 INJECTION, SOLUTION INTRAMUSCULAR; INTRAVENOUS at 09:41

## 2018-06-21 RX ADMIN — PROPOFOL 50 MCG/KG/MIN: 10 INJECTION, EMULSION INTRAVENOUS at 09:45

## 2018-06-21 ASSESSMENT — PAIN DESCRIPTION - ORIENTATION: ORIENTATION_2: RIGHT

## 2018-06-21 ASSESSMENT — PAIN DESCRIPTION - INTENSITY: RATING_2: 8

## 2018-06-21 ASSESSMENT — PAIN DESCRIPTION - LOCATION
LOCATION: BACK
LOCATION_2: SHOULDER

## 2018-06-21 ASSESSMENT — PAIN SCALES - GENERAL
PAINLEVEL_OUTOF10: 0
PAINLEVEL_OUTOF10: 8

## 2018-06-21 ASSESSMENT — PAIN DESCRIPTION - PAIN TYPE
TYPE_2: ACUTE PAIN
TYPE: ACUTE PAIN

## 2018-06-21 ASSESSMENT — ENCOUNTER SYMPTOMS: STRIDOR: 0

## 2018-06-21 ASSESSMENT — PAIN - FUNCTIONAL ASSESSMENT: PAIN_FUNCTIONAL_ASSESSMENT: 0-10

## 2018-06-25 ENCOUNTER — HOSPITAL ENCOUNTER (OUTPATIENT)
Dept: PHYSICAL THERAPY | Facility: CLINIC | Age: 68
Setting detail: THERAPIES SERIES
Discharge: HOME | End: 2018-06-25
Payer: MEDICARE

## 2018-06-25 PROCEDURE — 97140 MANUAL THERAPY 1/> REGIONS: CPT

## 2018-06-27 ENCOUNTER — HOSPITAL ENCOUNTER (OUTPATIENT)
Dept: PHYSICAL THERAPY | Facility: CLINIC | Age: 68
Setting detail: THERAPIES SERIES
Discharge: HOME | End: 2018-06-27
Payer: MEDICARE

## 2018-06-27 ENCOUNTER — HOSPITAL ENCOUNTER (OUTPATIENT)
Age: 68
Discharge: HOME | End: 2018-06-27
Payer: MEDICARE

## 2018-06-27 ENCOUNTER — HOSPITAL ENCOUNTER (OUTPATIENT)
Dept: PHARMACY | Age: 68
Setting detail: THERAPIES SERIES
Discharge: HOME | End: 2018-06-27
Payer: MEDICARE

## 2018-06-27 DIAGNOSIS — I48.91 ATRIAL FIBRILLATION, UNSPECIFIED TYPE (HCC): ICD-10-CM

## 2018-06-27 LAB
INR BLD: 1.8
MAGNESIUM: 2.2 MG/DL (ref 1.6–2.6)
POTASSIUM SERPL-SCNC: 4.7 MMOL/L (ref 3.7–5.3)
PROTIME: 22 SECONDS

## 2018-06-27 PROCEDURE — 99211 OFF/OP EST MAY X REQ PHY/QHP: CPT

## 2018-06-27 PROCEDURE — 36415 COLL VENOUS BLD VENIPUNCTURE: CPT

## 2018-06-27 PROCEDURE — 97140 MANUAL THERAPY 1/> REGIONS: CPT

## 2018-06-27 PROCEDURE — 85610 PROTHROMBIN TIME: CPT

## 2018-06-27 PROCEDURE — 83735 ASSAY OF MAGNESIUM: CPT

## 2018-06-27 PROCEDURE — 84132 ASSAY OF SERUM POTASSIUM: CPT

## 2018-07-02 ENCOUNTER — HOSPITAL ENCOUNTER (OUTPATIENT)
Dept: PHYSICAL THERAPY | Facility: CLINIC | Age: 68
Setting detail: THERAPIES SERIES
Discharge: HOME | End: 2018-07-02
Payer: MEDICARE

## 2018-07-02 PROCEDURE — 97140 MANUAL THERAPY 1/> REGIONS: CPT

## 2018-07-02 NOTE — FLOWSHEET NOTE
[] [facility]       Outpatient Physical        Therapy       [address]       Phone: [phone]       Fax: [phone] [x] [facility] for Health Promotion at [address]       Phone: [phone]       Fax: [phone] [] [facility]  [address]   Phone: [phone]   Fax:  [phone]     Physical Therapy Daily Treatment Note    Date:  2018  Patient Name:  Anju Aden    :  1950  MRN: [id number]  Physician: Nigel Sanderson MD                                   Insurance: Medicare  Medical Diagnosis: R RC injury                                         Rehab Codes: I83.830; X16.379; M54.2; M79.1  Onset Date: 2015                                   Next 's appt:   Visit# / total visits: 49/64 (new script as of 18)  Cancels/No Shows: 3 cx/1 ns    Subjective:    Pain:  [x] Yes  [] No Location: neck/trapezius (R) and neck  Pain Rating: (0-10 scale) 0/10 at rest up to 7-8/10 with movement; L shoulder pain with reaching  Pain altered Tx:  [x] No  [] Yes  Action:   Comments: doing pretty well today. Did well from Wed to Saturday; shoulder started throbbing on Saturday all the way to the hand. Still with much pain with movement of hand. 18 MRI: ROTATOR CUFF: Small amount of fluid in the subacromial subdeltoid bursa.       Mild infraspinatus tendinopathy.       Low-grade partial-thickness articular surface tearing of the mid insertional   fibers of supraspinatus between the critical zone and footplate.  Moderate   underlying supraspinatus tendinopathy.       Subscapularis and teres minor muscles/tendons appear grossly intact without   evidence of tearing.        Objective:   Modalities: not today: e-stim with DN R and L from C6 on the respective side to supraspinatus insertion on same side  Precautions: blood thinners; hepatitis C       Not today:   18: shoulder flexion R 125, abduction 99, L shoulder flexion 126, abduction 99 AROM; IR/ER WNL AROM with arms at side; Not today: PROM:  Flexion 100, abduction 100 c pain over supraspinatus in each direction. IR/ER at 0 abduction WNL, 45 degrees abduction pain into IR/ER with passive ROM  AROM 11/13/17: R shoulder: 110 degrees flexion; abduction 105 degrees active; Exercises: reviewed ex 5/16/18, pt advised to also start scapular retraction  Exercise Reps/ Time Weight/ Level Comments    SHOULDER extension 10 A Standing, bending over; started 10/2/17    rows 10 A Standing, bending over; started 10/2/17    shoulder flexion, abduction, IR,ER isometrics 5 ea gentle Started 10/2/17             Side ER 20/8 A/1  added 10/11/17 (not today due to increase symptoms recently)   Other:    Not today: Therapeutic activities: discussed proper head support when sleeping in recliner; pt does have pillow that she has molded to head; pt advised to not allow head to alter from neutral position    11/8/17: UEFS: 30% of normal/70% affected  1/3/18: UEFS: 41.25% of normal/58.75% affected  5/16/18: UEFS:28.75% of normal/71.25% affected    MFR: UTs, c-paraspinals, scalenes, scapulae and shoulders with pt in seated position, after DN, 20 min    DN:prepped area with alcohol wipe, consent obtained, standard procedure followed: 55 total: B , RCinsertion with 0.5\" to 1\" B (4 total), ;   2\" B suprascapular homeostatic point longitudinally and perpendicularly;   1\" supraspinatus, dorsal scapular and spinal accessory B; 0.5\"-1.0\" needles in cervical and thoracic paraspinalsbilaterally bilaterally to T5  level 1\" B spinal accessory and     1\" in B dorsal scapular;  0.5\" in upper cervical paravertebral points; left in situ 15 min. Not today: Kinesiotape: unloading R UT, deltoid assist and infraspinatus assist; applied spray adhesive due to patient reporting tape does not last more than the day she is here.      Not today: Discussed fall prevention: movement\"     G-Codes: set on 5/16/18, visit #40  Functional Limitation: carrying, moving and handling objects  Functional Assessment Used: UEFS  Current Status Modifier: CL  Goal Status Modifier: CK      G-Codes: set on 3/7/18, visit #30  Functional Limitation: carrying, moving and handling objects  Functional Assessment Used: UEFS  Current Status Modifier: CK  Goal Status Modifier: Glen Rivera      G-Codes: set on 1/3/18, visit #20  Functional Limitation: carrying, moving and handling objects  Functional Assessment Used: UEFS   Current Status Modifier: CK  Goal Status Modifier: Glen Rivera      G-Codes: set on 11/8/17, visit #10  Functional Limitation: carrying, moving and handling objects  Functional Assessment Used: UEFS  Current Status Modifier: CL  Goal Status Modifier: CK      G-CODE     Functional Limitation: carrying, moving and handling objects  Functional Assessment Used: UEFS  Current Status Modifier: CL   Goal Status Modifier: CK    Pt. Education:  [] Yes  [x] No  [] Reviewed Prior HEP/Ed: HOLD ex that are painful  Method of Education: [] Verbal:HEP review and kinesiotaping info  [] Demo : HEP [] Written: scapular retraction   Comprehension of Education:  [] Verbalizes understanding. [] Demonstrates understanding. [] Needs review.   [] Demonstrates/verbalizes HEP/Ed previously given-issued written instructions as pt had question about appropriate completion of ex     Plan: [x] Continue per plan of care   [] Other:       Time In: 8:00 am            Time Out: 9:00 am    Electronically signed by:  Nasim Neal PT

## 2018-07-09 ENCOUNTER — HOSPITAL ENCOUNTER (OUTPATIENT)
Dept: PHYSICAL THERAPY | Facility: CLINIC | Age: 68
Setting detail: THERAPIES SERIES
Discharge: HOME | End: 2018-07-09
Payer: MEDICARE

## 2018-07-09 PROCEDURE — 97140 MANUAL THERAPY 1/> REGIONS: CPT

## 2018-07-09 PROCEDURE — G8985 CARRY GOAL STATUS: HCPCS

## 2018-07-09 PROCEDURE — G8984 CARRY CURRENT STATUS: HCPCS

## 2018-07-09 NOTE — FLOWSHEET NOTE
[] [facility]       Outpatient Physical        Therapy       [address]       Phone: [phone]       Fax: [phone] [x] [facility] at [address] [facility]       Phone: [phone]       Fax: [phone] [] [facility] [address]   Phone: [phone]   Fax:  [phone]     Physical Therapy Daily Treatment Note    Date:  2018  Patient Name:  Fahad Paredes    :  1950  MRN: [id number]  Physician: Estelle Crisostomo MD                                   Insurance: Medicare  Medical Diagnosis: R RC injury                                         Rehab Codes: B12.273; F36.342; M54.2; M79.1  Onset Date: 2015                                   Next 's appt:   Visit# / total visits: 41/37 (new script as of 18)  Cancels/No Shows: 3 cx/1 ns    Subjective:    Pain:  [x] Yes  [] No Location: neck/trapezius (R) and neck  Pain Rating: (0-10 scale) 0/10 at rest up to 7-8/10 with movement; L shoulder pain with reaching  Pain altered Tx:  [x] No  [] Yes  Action:   Comments: pt with difficulty performing pulleys now due to discomfort in shoulders; pt is getting more improvement in symptoms and lasting longer the last several sessions, however, pain in shoulders with use    18 MRI: ROTATOR CUFF: Small amount of fluid in the subacromial subdeltoid bursa.       Mild infraspinatus tendinopathy.       Low-grade partial-thickness articular surface tearing of the mid insertional   fibers of supraspinatus between the critical zone and footplate.  Moderate   underlying supraspinatus tendinopathy.       Subscapularis and teres minor muscles/tendons appear grossly intact without   evidence of tearing.        Objective:   Modalities: not today: e-stim with DN R and L from C6 on the respective side to supraspinatus insertion on same side  Precautions: blood thinners; hepatitis C  Not today:   7/9/18: R shoulder flexion: 135; abduction 121 degrees: IR bra line: ER:80 degrees, arm at side; pain with movement; L shoulder flexion: 144 degrees; abduction 143 degrees; IR: T5; L ER: 55 degrees  5/16/18: shoulder flexion R 125, abduction 99, L shoulder flexion 126, abduction 99 AROM; IR/ER WNL AROM with arms at side; Not today: PROM:  Flexion 100, abduction 100 c pain over supraspinatus in each direction. IR/ER at 0 abduction WNL, 45 degrees abduction pain into IR/ER with passive ROM  AROM 11/13/17: R shoulder: 110 degrees flexion; abduction 105 degrees active;    Exercises: reviewed ex 5/16/18, pt advised to also start scapular retraction  Exercise Reps/ Time Weight/ Level Comments    SHOULDER extension 10 A Standing, bending over; started 10/2/17    rows 10 A Standing, bending over; started 10/2/17    shoulder flexion, abduction, IR,ER isometrics 5 ea gentle Started 10/2/17             Side ER 20/8 A/1  added 10/11/17 (not today due to increase symptoms recently)   Other:    Not today: Therapeutic activities: discussed proper head support when sleeping in recliner; pt does have pillow that she has molded to head; pt advised to not allow head to alter from neutral position    11/8/17: UEFS: 30% of normal/70% affected  1/3/18: UEFS: 41.25% of normal/58.75% affected  5/16/18: UEFS:28.75% of normal/71.25% affected  7/9/18: UEFS: 45% OF NORMAL/55% AFFECTED    MFR: UTs, c-paraspinals, scalenes, scapulae and shoulders with pt in seated position, after DN, 20 min    DN:prepped area with alcohol wipe, consent obtained, standard procedure followed: 47 total: B , RCinsertion with 0.5\" to 1\" B (4 total), ;   2\" B suprascapular homeostatic point longitudinally and perpendicularly;   1\" supraspinatus, dorsal scapular and spinal accessory B; 0.5\"-1.0\" needles in cervical and thoracic paraspinalsbilaterally bilaterally to T5  level 1\" B spinal accessory and     1\" in B dorsal scapular;  0.5\" in upper cervical am            Time Out: 9:00 am    Electronically signed by:  Nikolay Haley PT

## 2018-07-11 ENCOUNTER — HOSPITAL ENCOUNTER (OUTPATIENT)
Dept: PHYSICAL THERAPY | Facility: CLINIC | Age: 68
Setting detail: THERAPIES SERIES
Discharge: HOME | End: 2018-07-11
Payer: MEDICARE

## 2018-07-11 PROCEDURE — 97140 MANUAL THERAPY 1/> REGIONS: CPT

## 2018-07-11 NOTE — FLOWSHEET NOTE
7/9/18, visit #50  Functional Limitation: carrying, moving and handling objects  Functional Assessment Used: UEFS  Current Status Modifier: CK  Goal Status Modifier: CJ     G-Codes: set on 5/16/18, visit #40  Functional Limitation: carrying, moving and handling objects  Functional Assessment Used: UEFS  Current Status Modifier: CL  Goal Status Modifier: CK      G-Codes: set on 3/7/18, visit #30  Functional Limitation: carrying, moving and handling objects  Functional Assessment Used: UEFS  Current Status Modifier: CK  Goal Status Modifier: Lennart Mealy      G-Codes: set on 1/3/18, visit #20  Functional Limitation: carrying, moving and handling objects  Functional Assessment Used: UEFS   Current Status Modifier: CK  Goal Status Modifier: Lenlalitot Mealy      G-Codes: set on 11/8/17, visit #10  Functional Limitation: carrying, moving and handling objects  Functional Assessment Used: UEFS  Current Status Modifier: CL  Goal Status Modifier: CK      G-CODE     Functional Limitation: carrying, moving and handling objects  Functional Assessment Used: UEFS  Current Status Modifier: CL   Goal Status Modifier: CK    Pt. Education:  [] Yes  [x] No  [] Reviewed Prior HEP/Ed: HOLD ex that are painful  Method of Education: [] Verbal:HEP review and kinesiotaping info  [] Demo : HEP [] Written: scapular retraction   Comprehension of Education:  [] Verbalizes understanding. [] Demonstrates understanding. [] Needs review.   [] Demonstrates/verbalizes HEP/Ed previously given-issued written instructions as pt had question about appropriate completion of ex     Plan: [x] Continue per plan of care   [] Other:       Time In: 8:00 am            Time Out: 8:55 am    Electronically signed by:  Danny Sims, PT

## 2018-07-16 ENCOUNTER — HOSPITAL ENCOUNTER (OUTPATIENT)
Dept: PHYSICAL THERAPY | Facility: CLINIC | Age: 68
Setting detail: THERAPIES SERIES
Discharge: HOME | End: 2018-07-16
Payer: MEDICARE

## 2018-07-16 PROCEDURE — 97140 MANUAL THERAPY 1/> REGIONS: CPT

## 2018-07-16 NOTE — FLOWSHEET NOTE
degrees; IR: T5; L ER: 55 degrees  5/16/18: shoulder flexion R 125, abduction 99, L shoulder flexion 126, abduction 99 AROM; IR/ER WNL AROM with arms at side; Not today: PROM:  Flexion 100, abduction 100 c pain over supraspinatus in each direction. IR/ER at 0 abduction WNL, 45 degrees abduction pain into IR/ER with passive ROM  AROM 11/13/17: R shoulder: 110 degrees flexion; abduction 105 degrees active; Exercises: reviewed ex 5/16/18, pt advised to also start scapular retraction  Exercise Reps/ Time Weight/ Level Comments    SHOULDER extension 10 A Standing, bending over; started 10/2/17    rows 10 A Standing, bending over; started 10/2/17    shoulder flexion, abduction, IR,ER isometrics 5 ea gentle Started 10/2/17             Side ER 20/8 A/1  added 10/11/17 (not today due to increase symptoms recently)   Other:    Not today: Therapeutic activities: discussed proper head support when sleeping in recliner; pt does have pillow that she has molded to head; pt advised to not allow head to alter from neutral position    11/8/17: UEFS: 30% of normal/70% affected  1/3/18: UEFS: 41.25% of normal/58.75% affected  5/16/18: UEFS:28.75% of normal/71.25% affected  7/9/18: UEFS: 45% OF NORMAL/55% AFFECTED    MFR: UTs, c-paraspinals, scalenes, scapulae and shoulders with pt in seated position, after DN, 5min    DN:prepped area with alcohol wipe, consent obtained, standard procedure followed: 42 total: B , RCinsertion with  0.5\" B (6 total), ;   2\" B suprascapular homeostatic point perpendicularly;   1\" supraspinatus, and spinal accessory B; 0.5\"-1.0\" needles in cervical and thoracic paraspinalsbilaterally bilaterally to T5  level  Posterior to t. Process; 0.5\" in upper cervical paravertebral points; left in situ 15 min. Not today: Kinesiotape: unloading R UT, deltoid assist and infraspinatus assist; applied spray adhesive due to patient reporting tape does not last more than the day she is here.      Not today: Discussed fall prevention: patient has Aneta/echo and is able to call anyone if she were to fall at home; patient also carries a phone with her at all times; Specific Instructions for next treatment: continue with DN    scapular strengthening; PROM R shoulder; DN R shoulder, UE, cervical, scapula; MET to thoracic region; modalities as needed         Treatment Charges: Mins Units   []  Modalities: stim     []  Ther Exercise     [x]  Manual Therapy 35 2   []  Ther Activities     []  Aquatics     []  Vasocompression     []  Other     Total Treatment time 35 2   Medicare tracking: (1XMN14.36) (2man/stim58.33) (3man69.05) as of 7/16/18: $1,801.78    Assessment:   [x] Progressing toward goals:  Continues to have minimal tightness in c-spine since starting MFR and adding needles more laterally. [] No change. [] Other:    Problems:    [x] ? Pain: 7/10 R shoulder  [x] ? ROM: R shoulder abduction with pain  [x] ? Strength: mild loss of strength R shoulder; poor scapular stability  [x] ? Function:71% affected function (29% of normal)  [x] Other: mm tightness c-spine/traps; significant trigger points     STG: (to be met in 6 treatments)  1. ? Pain: below 6/10 R shoulder: ONGOING 10/11/17   2. ? ROM: at least 100 degrees shoulder abduction   3. ? Strength: be able to do scapular strength ex without exacerbating symptoms: MET 10/11/17  4. ? Function: modify activities to decrease stress to R shoulder: MET 10/11/17  5. Independent with Home Exercise Program: MET 10/11/17   6. Demonstrate Knowledge of fall prevention: MET 10/11/17     LTG: (to be met in 12 treatments): as of 12/4/17 NOT YET MET; continue x 12 visits to #24; continue towards goal #44; continue to visit #64  1. Pain below 5/10  2. Decrease reliance on shoulder injections  3. Have at least 110 degrees shoulder abduction to indicate decreased impingement and improve reaching ability for light objects  4.  Improved scapular position within 2 cm of opposite

## 2018-07-18 ENCOUNTER — HOSPITAL ENCOUNTER (OUTPATIENT)
Dept: PHYSICAL THERAPY | Facility: CLINIC | Age: 68
Setting detail: THERAPIES SERIES
Discharge: HOME | End: 2018-07-18
Payer: MEDICARE

## 2018-07-18 PROCEDURE — 97140 MANUAL THERAPY 1/> REGIONS: CPT

## 2018-07-18 NOTE — FLOWSHEET NOTE
abduction 99, L shoulder flexion 126, abduction 99 AROM; IR/ER WNL AROM with arms at side; Not today: PROM:  Flexion 100, abduction 100 c pain over supraspinatus in each direction. IR/ER at 0 abduction WNL, 45 degrees abduction pain into IR/ER with passive ROM  AROM 11/13/17: R shoulder: 110 degrees flexion; abduction 105 degrees active; Exercises: reviewed ex 5/16/18, pt advised to also start scapular retraction  Exercise Reps/ Time Weight/ Level Comments    SHOULDER extension 10 A Standing, bending over; started 10/2/17    rows 10 A Standing, bending over; started 10/2/17    shoulder flexion, abduction, IR,ER isometrics 5 ea gentle Started 10/2/17             Side ER 20/8 A/1  added 10/11/17 (not today due to increase symptoms recently)   Other:    Not today: Therapeutic activities: discussed proper head support when sleeping in recliner; pt does have pillow that she has molded to head; pt advised to not allow head to alter from neutral position    11/8/17: UEFS: 30% of normal/70% affected  1/3/18: UEFS: 41.25% of normal/58.75% affected  5/16/18: UEFS:28.75% of normal/71.25% affected  7/9/18: UEFS: 45% OF NORMAL/55% AFFECTED    MFR: UTs, c-paraspinals, scalenes, scapulae and shoulders with pt in seated position, after DN, 5min    DN:prepped area with alcohol wipe, consent obtained, standard procedure followed: 42 total: B , RCinsertion with  0.5\" B (6 total), ;   2\" B suprascapular homeostatic point perpendicularly;   1\" supraspinatus, and spinal accessory B; 0.5\"-1.0\" needles in cervical and thoracic paraspinalsbilaterally bilaterally to T5  level  Posterior to t. Process; 0.5\" in upper cervical paravertebral points; left in situ 15 min. Not today: Kinesiotape: unloading R UT, deltoid assist and infraspinatus assist; applied spray adhesive due to patient reporting tape does not last more than the day she is here.      Not today: Discussed fall prevention: patient has Aneta/echo and is able to call anyone if she were to fall at home; patient also carries a phone with her at all times; Specific Instructions for next treatment: continue with DN    scapular strengthening; PROM R shoulder; DN R shoulder, UE, cervical, scapula; MET to thoracic region; modalities as needed         Treatment Charges: Mins Units   []  Modalities: stim     []  Ther Exercise     [x]  Manual Therapy 35 2   []  Ther Activities     []  Aquatics     []  Vasocompression     []  Other     Total Treatment time 35 2   Medicare tracking: (8WOF34.22) (2man/stim58.33) (3man69.05) as of 7/18/18: $1,849.71    Assessment:   [x] Progressing toward goals:  Continues to have minimal tightness in c-spine since starting MFR and adding needles more laterally. Increased tender area in upper cervical area. [] No change. [] Other:    Problems:    [x] ? Pain: 7/10 R shoulder  [x] ? ROM: R shoulder abduction with pain  [x] ? Strength: mild loss of strength R shoulder; poor scapular stability  [x] ? Function:71% affected function (29% of normal)  [x] Other: mm tightness c-spine/traps; significant trigger points     STG: (to be met in 6 treatments)  1. ? Pain: below 6/10 R shoulder: ONGOING 10/11/17   2. ? ROM: at least 100 degrees shoulder abduction   3. ? Strength: be able to do scapular strength ex without exacerbating symptoms: MET 10/11/17  4. ? Function: modify activities to decrease stress to R shoulder: MET 10/11/17  5. Independent with Home Exercise Program: MET 10/11/17   6. Demonstrate Knowledge of fall prevention: MET 10/11/17     LTG: (to be met in 12 treatments): as of 12/4/17 NOT YET MET; continue x 12 visits to #24; continue towards goal #44; continue to visit #64  1. Pain below 5/10  2. Decrease reliance on shoulder injections  3. Have at least 110 degrees shoulder abduction to indicate decreased impingement and improve reaching ability for light objects  4.  Improved scapular position within 2 cm of opposite side         Patient goals:

## 2018-07-23 ENCOUNTER — HOSPITAL ENCOUNTER (OUTPATIENT)
Dept: PHYSICAL THERAPY | Facility: CLINIC | Age: 68
Setting detail: THERAPIES SERIES
Discharge: HOME | End: 2018-07-23
Payer: MEDICARE

## 2018-07-23 PROCEDURE — 97140 MANUAL THERAPY 1/> REGIONS: CPT

## 2018-07-23 NOTE — FLOWSHEET NOTE
[] Angela Carrasquillo       Outpatient Physical        Therapy       [address]       Phone: [phone]       Fax: [phone] [x] [facility] at [address] [facility]       Phone: [phone]       Fax: [phone] [] [facility] [address] [place] [address]   Phone: [phone]   Fax:  [phone]     Physical Therapy Daily Treatment Note    Date:  2018  Patient Name:  Brianna Livingston    :  1950  MRN: [id number]  Physician: Keila Mayorga MD                                   Insurance: Medicare  Medical Diagnosis: R RC injury                                         Rehab Codes: E79.924; B72.977; M54.2; M79.1  Onset Date: 2015                                   Next 's appt:   Visit# / total visits: 91/05 (new script as of 18)  Cancels/No Shows: 3 cx/1 ns    Subjective:    Pain:  [x] Yes  [] No Location: neck/trapezius (R) and neck  Pain Rating: (0-10 scale) 0/10 at rest up to 7-8/10 with movement; L shoulder pain with reaching  Pain altered Tx:  [x] No  [] Yes  Action:   Comments: overall continuing to do well. Had an episode of R hand to shoulder pain when cutting out forms on Friday, but pain went away when she stopped the activity. 18 MRI: ROTATOR CUFF: Small amount of fluid in the subacromial subdeltoid bursa.       Mild infraspinatus tendinopathy.       Low-grade partial-thickness articular surface tearing of the mid insertional   fibers of supraspinatus between the critical zone and footplate.  Moderate   underlying supraspinatus tendinopathy.       Subscapularis and teres minor muscles/tendons appear grossly intact without   evidence of tearing.        Objective:   Modalities: not today: e-stim with DN   Precautions: blood thinners; hepatitis C       Not today:   18: R shoulder flexion: 135; abduction 121 degrees: IR bra line: ER:80 degrees, arm at side; pain with infraspinatus assist; applied spray adhesive due to patient reporting tape does not last more than the day she is here. Not today: Discussed fall prevention: patient has Aneta/echo and is able to call anyone if she were to fall at home; patient also carries a phone with her at all times; Specific Instructions for next treatment: continue with DN    scapular strengthening; PROM R shoulder; DN R shoulder, UE, cervical, scapula; MET to thoracic region; modalities as needed         Treatment Charges: Mins Units   []  Modalities: stim     []  Ther Exercise     [x]  Manual Therapy 35 2   []  Ther Activities     []  Aquatics     []  Vasocompression     []  Other     Total Treatment time 35 2   Medicare tracking: (8QVN82.62) (2man/stim58.33) (3man69.05) as of 7/23/18: $9,600.38    Assessment:   [x] Progressing toward goals: increased tightness in c-spine mm today at start of session; continuing to need small 0.5\" needles cervical spine to occiput; 1\" needles from T1 distally. Skilled PT intervention is helping her keep symptoms under control   [] No change. [] Other:    Problems:    [x] ? Pain: 7/10 R shoulder  [x] ? ROM: R shoulder abduction with pain  [x] ? Strength: mild loss of strength R shoulder; poor scapular stability  [x] ? Function:71% affected function (29% of normal)  [x] Other: mm tightness c-spine/traps; significant trigger points     STG: (to be met in 6 treatments)  1. ? Pain: below 6/10 R shoulder: ONGOING 10/11/17   2. ? ROM: at least 100 degrees shoulder abduction   3. ? Strength: be able to do scapular strength ex without exacerbating symptoms: MET 10/11/17  4. ? Function: modify activities to decrease stress to R shoulder: MET 10/11/17  5. Independent with Home Exercise Program: MET 10/11/17   6.  Demonstrate Knowledge of fall prevention: MET 10/11/17     LTG: (to be met in 12 treatments): as of 12/4/17 NOT YET MET; continue x 12 visits to #24; continue towards goal #44; continue to visit #64  1. Pain below 5/10  2. Decrease reliance on shoulder injections  3. Have at least 110 degrees shoulder abduction to indicate decreased impingement and improve reaching ability for light objects  4. Improved scapular position within 2 cm of opposite side         Patient goals: \"decreased pain initially then better movement\"    G-Codes: set on 7/9/18, visit #50  Functional Limitation: carrying, moving and handling objects  Functional Assessment Used: UEFS  Current Status Modifier: CK  Goal Status Modifier: CJ     G-Codes: set on 5/16/18, visit #40  Functional Limitation: carrying, moving and handling objects  Functional Assessment Used: UEFS  Current Status Modifier: CL  Goal Status Modifier: CK      G-Codes: set on 3/7/18, visit #30  Functional Limitation: carrying, moving and handling objects  Functional Assessment Used: UEFS  Current Status Modifier: CK  Goal Status Modifier: Jorge Deal      G-Codes: set on 1/3/18, visit #20  Functional Limitation: carrying, moving and handling objects  Functional Assessment Used: UEFS   Current Status Modifier: CK  Goal Status Modifier: Jorge Deal      G-Codes: set on 11/8/17, visit #10  Functional Limitation: carrying, moving and handling objects  Functional Assessment Used: UEFS  Current Status Modifier: CL  Goal Status Modifier: CK      G-CODE     Functional Limitation: carrying, moving and handling objects  Functional Assessment Used: UEFS  Current Status Modifier: CL   Goal Status Modifier: CK    Pt. Education:  [] Yes  [x] No  [] Reviewed Prior HEP/Ed: HOLD ex that are painful  Method of Education: [] Verbal:HEP review and kinesiotaping info  [] Demo : HEP [] Written: scapular retraction   Comprehension of Education:  [] Verbalizes understanding. [] Demonstrates understanding. [] Needs review.   [] Demonstrates/verbalizes HEP/Ed previously given-issued written instructions as pt had question about appropriate completion of ex     Plan: [x] Continue per plan of care   [] Other:

## 2018-07-25 ENCOUNTER — HOSPITAL ENCOUNTER (OUTPATIENT)
Dept: PHYSICAL THERAPY | Facility: CLINIC | Age: 68
Setting detail: THERAPIES SERIES
Discharge: HOME | End: 2018-07-25
Payer: MEDICARE

## 2018-07-25 ENCOUNTER — HOSPITAL ENCOUNTER (OUTPATIENT)
Dept: PHARMACY | Age: 68
Setting detail: THERAPIES SERIES
Discharge: HOME | End: 2018-07-25
Payer: MEDICARE

## 2018-07-25 DIAGNOSIS — I48.91 ATRIAL FIBRILLATION, UNSPECIFIED TYPE (HCC): ICD-10-CM

## 2018-07-25 LAB
INR BLD: 1.7
PROTIME: 20.9 SECONDS

## 2018-07-25 PROCEDURE — 85610 PROTHROMBIN TIME: CPT

## 2018-07-25 PROCEDURE — 97140 MANUAL THERAPY 1/> REGIONS: CPT

## 2018-07-25 PROCEDURE — 99212 OFFICE O/P EST SF 10 MIN: CPT

## 2018-07-25 RX ORDER — TORSEMIDE 5 MG/1
5 TABLET ORAL DAILY PRN
COMMUNITY
End: 2018-12-19

## 2018-07-25 RX ORDER — TORSEMIDE 20 MG/1
60 TABLET ORAL DAILY
Status: ON HOLD | COMMUNITY
End: 2019-02-14 | Stop reason: HOSPADM

## 2018-07-25 RX ORDER — PREGABALIN 25 MG/1
CAPSULE ORAL 2 TIMES DAILY
COMMUNITY
End: 2019-02-11 | Stop reason: ALTCHOICE

## 2018-07-25 NOTE — FLOWSHEET NOTE
[] [facility]       Outpatient Physical        Therapy       [address]       Phone: [phone]       Fax: [phone] [x] [facility] at [address] [facility]       Phone: [phone]       Fax: [phone] [] [place]. [address] [place] [address] [facility]  [address] [place] [address]   Phone: [phone]   Fax:  [phone]     Physical Therapy Daily Treatment Note    Date:  2018  Patient Name:  Gia Conteh    :  1950  MRN: [id number]  Physician: Jada Veronica MD                                   Insurance: Medicare  Medical Diagnosis: R RC injury                                         Rehab Codes: F81.778; P63.066; M54.2; M79.1  Onset Date: 2015                                   Next 's appt:   Visit# / total visits:  (new script as of 18)  Cancels/No Shows: 3 cx/1 ns    Subjective:    Pain:  [x] Yes  [] No Location: neck/trapezius (R) and neck  Pain Rating: (0-10 scale) 0/10 at rest up to 7-8/10 with movement; L shoulder pain with reaching  Pain altered Tx:  [] No  [x] Yes  Action: not as many needles today  Comments: overall continuing to do well.     18 MRI: ROTATOR CUFF: Small amount of fluid in the subacromial subdeltoid bursa.       Mild infraspinatus tendinopathy.       Low-grade partial-thickness articular surface tearing of the mid insertional   fibers of supraspinatus between the critical zone and footplate.  Moderate   underlying supraspinatus tendinopathy.       Subscapularis and teres minor muscles/tendons appear grossly intact without   evidence of tearing.        Objective:   Modalities: not today: e-stim with DN   Precautions: blood thinners; hepatitis C       Not today:   18: R shoulder flexion: 135; abduction 121 degrees: IR bra line: ER:80 degrees, arm at side; pain with movement; L shoulder flexion: 144 degrees; abduction 143 degrees; IR: T5; L ER: 55 degrees  18: shoulder flexion R 125, abduction 99, L shoulder flexion 126, abduction 99 AROM; IR/ER WNL AROM with arms at side; Not today: PROM:  Flexion 100, abduction 100 c pain over supraspinatus in each direction. IR/ER at 0 abduction WNL, 45 degrees abduction pain into IR/ER with passive ROM  AROM 11/13/17: R shoulder: 110 degrees flexion; abduction 105 degrees active; Exercises: reviewed ex 5/16/18, pt advised to also start scapular retraction  Exercise Reps/ Time Weight/ Level Comments    SHOULDER extension 10 A Standing, bending over; started 10/2/17    rows 10 A Standing, bending over; started 10/2/17    shoulder flexion, abduction, IR,ER isometrics 5 ea gentle Started 10/2/17             Side ER 20/8 A/1  added 10/11/17 (not today due to increase symptoms recently)   Other:    Not today: Therapeutic activities: discussed proper head support when sleeping in recliner; pt does have pillow that she has molded to head; pt advised to not allow head to alter from neutral position    11/8/17: UEFS: 30% of normal/70% affected  1/3/18: UEFS: 41.25% of normal/58.75% affected  5/16/18: UEFS:28.75% of normal/71.25% affected  7/9/18: UEFS: 45% OF NORMAL/55% AFFECTED    MFR: UTs, c-paraspinals, scalenes, scapulae and shoulders with pt in seated position, prior to DN, 15 min    DN:prepped area with alcohol wipe, consent obtained, standard procedure followed: 38 total: B , RCinsertion with  0.5\" B (6 total), ;   2\" B suprascapular homeostatic point horizontally and perpendicularly;    1\" supraspinatus, and spinal accessory B; 0.5\"-1.0\" needles in cervical and thoracic paraspinalsbilaterally bilaterally to T5  level  ; left in situ 15 min. Not today: Kinesiotape: unloading R UT, deltoid assist and infraspinatus assist; applied spray adhesive due to patient reporting tape does not last more than the day she is here.      Not today: Discussed fall prevention: patient has Aneta/echo and is able to call anyone if she were to fall at home; patient also carries a phone with her at all times; Specific Instructions for next treatment: continue with DN    scapular strengthening; PROM R shoulder; DN R shoulder, UE, cervical, scapula; MET to thoracic region; modalities as needed         Treatment Charges: Mins Units   []  Modalities: stim     []  Ther Exercise     [x]  Manual Therapy 35 2   []  Ther Activities     []  Aquatics     []  Vasocompression     []  Other     Total Treatment time 35 2   Medicare tracking: (3AUP91.74) (2man/stim58.33) (3man69.05) as of 7/25/18: $1,945.57    Assessment:   [x] Progressing toward goals: increased tenderness during DN today, unable to go up to occiput in cervical region. [] No change. [] Other:    Problems:    [x] ? Pain: 7/10 R shoulder  [x] ? ROM: R shoulder abduction with pain  [x] ? Strength: mild loss of strength R shoulder; poor scapular stability  [x] ? Function:71% affected function (29% of normal)  [x] Other: mm tightness c-spine/traps; significant trigger points     STG: (to be met in 6 treatments)  1. ? Pain: below 6/10 R shoulder: ONGOING 10/11/17   2. ? ROM: at least 100 degrees shoulder abduction   3. ? Strength: be able to do scapular strength ex without exacerbating symptoms: MET 10/11/17  4. ? Function: modify activities to decrease stress to R shoulder: MET 10/11/17  5. Independent with Home Exercise Program: MET 10/11/17   6. Demonstrate Knowledge of fall prevention: MET 10/11/17     LTG: (to be met in 12 treatments): as of 12/4/17 NOT YET MET; continue x 12 visits to #24; continue towards goal #44; continue to visit #64  1. Pain below 5/10  2. Decrease reliance on shoulder injections  3. Have at least 110 degrees shoulder abduction to indicate decreased impingement and improve reaching ability for light objects  4.  Improved scapular position within 2 cm of opposite side         Patient goals: \"decreased pain initially then better movement\"    G-Codes: set on 7/9/18, visit #50  Functional Limitation: carrying, moving and handling objects  Functional Assessment Used: UEFS  Current Status Modifier: CK  Goal Status Modifier: CJ     G-Codes: set on 5/16/18, visit #40  Functional Limitation: carrying, moving and handling objects  Functional Assessment Used: UEFS  Current Status Modifier: CL  Goal Status Modifier: CK      G-Codes: set on 3/7/18, visit #30  Functional Limitation: carrying, moving and handling objects  Functional Assessment Used: UEFS  Current Status Modifier: CK  Goal Status Modifier: Evalina Manuela      G-Codes: set on 1/3/18, visit #20  Functional Limitation: carrying, moving and handling objects  Functional Assessment Used: UEFS   Current Status Modifier: CK  Goal Status Modifier: Evalina Manuela      G-Codes: set on 11/8/17, visit #10  Functional Limitation: carrying, moving and handling objects  Functional Assessment Used: UEFS  Current Status Modifier: CL  Goal Status Modifier: CK      G-CODE     Functional Limitation: carrying, moving and handling objects  Functional Assessment Used: UEFS  Current Status Modifier: CL   Goal Status Modifier: CK    Pt. Education:  [] Yes  [x] No  [] Reviewed Prior HEP/Ed: HOLD ex that are painful  Method of Education: [] Verbal:HEP review and kinesiotaping info  [] Demo : HEP [] Written: scapular retraction   Comprehension of Education:  [] Verbalizes understanding. [] Demonstrates understanding. [] Needs review.   [] Demonstrates/verbalizes HEP/Ed previously given-issued written instructions as pt had question about appropriate completion of ex     Plan: [x] Continue per plan of care   [x] Other: decrease to weekly visits      Time In: 8:00 am            Time Out: 8:50 am    Electronically signed by:  Fernanda Bach, PT

## 2018-07-25 NOTE — PROGRESS NOTES
Patient states compliant all of the time with regimen. No bleeding or thromboembolic side effects noted. No significant med or dietary changes. No significant recent illness or disease state changes. PT/INR done in office per protocol. INR is 1.7 which is subtherapeutic. Warfarin regimen will be increased to 5mg Tues/Thurs and 2.5mg all other days. Will retest in 3 weeks to assess. Patient understands dosing directions and information discussed. Dosing schedule and follow up appointment given to patient. Progress note routed to referring physicians office. Patient acknowledges working in consult agreement with pharmacist as referred by his/her physician.

## 2018-07-30 ENCOUNTER — HOSPITAL ENCOUNTER (OUTPATIENT)
Dept: PHYSICAL THERAPY | Facility: CLINIC | Age: 68
Setting detail: THERAPIES SERIES
Discharge: HOME | End: 2018-07-30
Payer: MEDICARE

## 2018-07-30 PROCEDURE — 97140 MANUAL THERAPY 1/> REGIONS: CPT

## 2018-07-30 NOTE — FLOWSHEET NOTE
degrees  5/16/18: shoulder flexion R 125, abduction 99, L shoulder flexion 126, abduction 99 AROM; IR/ER WNL AROM with arms at side; Not today: PROM:  Flexion 100, abduction 100 c pain over supraspinatus in each direction. IR/ER at 0 abduction WNL, 45 degrees abduction pain into IR/ER with passive ROM  AROM 11/13/17: R shoulder: 110 degrees flexion; abduction 105 degrees active; Exercises: reviewed ex 5/16/18, pt advised to also start scapular retraction  Exercise Reps/ Time Weight/ Level Comments    SHOULDER extension 10 A Standing, bending over; started 10/2/17    rows 10 A Standing, bending over; started 10/2/17    shoulder flexion, abduction, IR,ER isometrics 5 ea gentle Started 10/2/17             Side ER 20/8 A/1  added 10/11/17 (not today due to increase symptoms recently)   Other:    Not today: Therapeutic activities: discussed proper head support when sleeping in recliner; pt does have pillow that she has molded to head; pt advised to not allow head to alter from neutral position    11/8/17: UEFS: 30% of normal/70% affected  1/3/18: UEFS: 41.25% of normal/58.75% affected  5/16/18: UEFS:28.75% of normal/71.25% affected  7/9/18: UEFS: 45% OF NORMAL/55% AFFECTED    MFR: UTs, c-paraspinals, scalenes, scapulae and shoulders with pt in seated position, prior to DN, 15 min    DN:prepped area with alcohol wipe, consent obtained, standard procedure followed: 54total: B , RCinsertion with  0.5\" B (8 total), ;   2\" B suprascapular homeostatic point horizontally ;    1\" supraspinatus, and spinal accessory and dorsal scapular B; 0.5\"-1.0\" needles in cervical and thoracic paraspinalsbilaterally bilaterally to T5  level  ; left in situ 15 min. Not today: Kinesiotape: unloading R UT, deltoid assist and infraspinatus assist; applied spray adhesive due to patient reporting tape does not last more than the day she is here.      Not today: Discussed fall prevention: patient has Aneta/echo and is able to call anyone if she were to fall at home; patient also carries a phone with her at all times; Specific Instructions for next treatment: continue with DN    scapular strengthening; PROM R shoulder; DN R shoulder, UE, cervical, scapula; MET to thoracic region; modalities as needed         Treatment Charges: Mins Units   []  Modalities: stim     []  Ther Exercise     [x]  Manual Therapy 37 2   []  Ther Activities     []  Aquatics     []  Vasocompression     []  Other     Total Treatment time 37 2   Medicare tracking: (1IKG43.38) (2man/stim58.33) (3man69.05) as of 7/30/18: $1,993.50    Assessment:   [x] Progressing toward goals: did better with treatment this date; will see how pt tolerates only coming 1 time per week    [] No change. [] Other:    Problems:    [x] ? Pain: 7/10 R shoulder  [x] ? ROM: R shoulder abduction with pain  [x] ? Strength: mild loss of strength R shoulder; poor scapular stability  [x] ? Function:71% affected function (29% of normal)  [x] Other: mm tightness c-spine/traps; significant trigger points     STG: (to be met in 6 treatments)  1. ? Pain: below 6/10 R shoulder: ONGOING 10/11/17   2. ? ROM: at least 100 degrees shoulder abduction   3. ? Strength: be able to do scapular strength ex without exacerbating symptoms: MET 10/11/17  4. ? Function: modify activities to decrease stress to R shoulder: MET 10/11/17  5. Independent with Home Exercise Program: MET 10/11/17   6. Demonstrate Knowledge of fall prevention: MET 10/11/17     LTG: (to be met in 12 treatments): as of 12/4/17 NOT YET MET; continue x 12 visits to #24; continue towards goal #44; continue to visit #64  1. Pain below 5/10  2. Decrease reliance on shoulder injections  3. Have at least 110 degrees shoulder abduction to indicate decreased impingement and improve reaching ability for light objects  4.  Improved scapular position within 2 cm of opposite side         Patient goals: \"decreased pain initially then better movement\"    G-Codes: set on

## 2018-08-02 ENCOUNTER — HOSPITAL ENCOUNTER (OUTPATIENT)
Age: 68
Setting detail: SPECIMEN
Discharge: HOME | End: 2018-08-02
Payer: MEDICARE

## 2018-08-02 LAB
ALBUMIN SERPL-MCNC: 4 G/DL (ref 3.5–5.2)
ALBUMIN SERPL-MCNC: 4 G/DL (ref 3.5–5.2)
ALBUMIN/GLOBULIN RATIO: 1.2 (ref 1–2.5)
ALBUMIN/GLOBULIN RATIO: 1.2 (ref 1–2.5)
ALP BLD-CCNC: 140 U/L (ref 35–104)
ALP BLD-CCNC: 140 U/L (ref 35–104)
ALT SERPL-CCNC: 21 U/L (ref 5–33)
ALT SERPL-CCNC: 21 U/L (ref 5–33)
ANION GAP SERPL CALCULATED.3IONS-SCNC: 12 MMOL/L (ref 9–17)
ANION GAP SERPL CALCULATED.3IONS-SCNC: 12 MMOL/L (ref 9–17)
AST SERPL-CCNC: 30 U/L
AST SERPL-CCNC: 30 U/L
BILIRUB SERPL-MCNC: 0.53 MG/DL (ref 0.3–1.2)
BILIRUB SERPL-MCNC: 0.53 MG/DL (ref 0.3–1.2)
BNP INTERPRETATION: ABNORMAL
BUN BLDV-MCNC: 22 MG/DL (ref 8–23)
BUN BLDV-MCNC: 22 MG/DL (ref 8–23)
BUN/CREAT BLD: ABNORMAL (ref 9–20)
BUN/CREAT BLD: ABNORMAL (ref 9–20)
CALCIUM SERPL-MCNC: 9.1 MG/DL (ref 8.6–10.4)
CALCIUM SERPL-MCNC: 9.1 MG/DL (ref 8.6–10.4)
CHLORIDE BLD-SCNC: 104 MMOL/L (ref 98–107)
CHLORIDE BLD-SCNC: 104 MMOL/L (ref 98–107)
CHOLESTEROL, FASTING: 204 MG/DL
CHOLESTEROL/HDL RATIO: 3.1
CO2: 24 MMOL/L (ref 20–31)
CO2: 24 MMOL/L (ref 20–31)
CREAT SERPL-MCNC: 1.6 MG/DL (ref 0.5–0.9)
CREAT SERPL-MCNC: 1.6 MG/DL (ref 0.5–0.9)
ESTIMATED AVERAGE GLUCOSE: 117 MG/DL
GFR AFRICAN AMERICAN: 39 ML/MIN
GFR AFRICAN AMERICAN: 39 ML/MIN
GFR NON-AFRICAN AMERICAN: 32 ML/MIN
GFR NON-AFRICAN AMERICAN: 32 ML/MIN
GFR SERPL CREATININE-BSD FRML MDRD: ABNORMAL ML/MIN/{1.73_M2}
GLUCOSE FASTING: 125 MG/DL (ref 70–99)
GLUCOSE FASTING: 125 MG/DL (ref 70–99)
HBA1C MFR BLD: 5.7 % (ref 4–6)
HCT VFR BLD CALC: 38.9 % (ref 36.3–47.1)
HCT VFR BLD CALC: 38.9 % (ref 36.3–47.1)
HDLC SERPL-MCNC: 66 MG/DL
HEMOGLOBIN: 12.2 G/DL (ref 11.9–15.1)
HEMOGLOBIN: 12.2 G/DL (ref 11.9–15.1)
INR BLD: 1.6
INR BLD: 1.6
IRON SATURATION: 41 % (ref 20–55)
IRON: 119 UG/DL (ref 37–145)
LDL CHOLESTEROL: 116 MG/DL (ref 0–130)
MCH RBC QN AUTO: 32 PG (ref 25.2–33.5)
MCH RBC QN AUTO: 32 PG (ref 25.2–33.5)
MCHC RBC AUTO-ENTMCNC: 31.4 G/DL (ref 28.4–34.8)
MCHC RBC AUTO-ENTMCNC: 31.4 G/DL (ref 28.4–34.8)
MCV RBC AUTO: 102.1 FL (ref 82.6–102.9)
MCV RBC AUTO: 102.1 FL (ref 82.6–102.9)
NRBC AUTOMATED: 0 PER 100 WBC
NRBC AUTOMATED: 0 PER 100 WBC
PDW BLD-RTO: 14.6 % (ref 11.8–14.4)
PDW BLD-RTO: 14.6 % (ref 11.8–14.4)
PLATELET # BLD: 172 K/UL (ref 138–453)
PLATELET # BLD: 172 K/UL (ref 138–453)
PMV BLD AUTO: 12.3 FL (ref 8.1–13.5)
PMV BLD AUTO: 12.3 FL (ref 8.1–13.5)
POTASSIUM SERPL-SCNC: 4.5 MMOL/L (ref 3.7–5.3)
POTASSIUM SERPL-SCNC: 4.5 MMOL/L (ref 3.7–5.3)
PRO-BNP: 356 PG/ML
PROTHROMBIN TIME: 16.7 SEC (ref 9–12)
PROTHROMBIN TIME: 16.7 SEC (ref 9–12)
RBC # BLD: 3.81 M/UL (ref 3.95–5.11)
RBC # BLD: 3.81 M/UL (ref 3.95–5.11)
SODIUM BLD-SCNC: 140 MMOL/L (ref 135–144)
SODIUM BLD-SCNC: 140 MMOL/L (ref 135–144)
THYROXINE, FREE: 1.28 NG/DL (ref 0.93–1.7)
TOTAL CK: 50 U/L (ref 26–192)
TOTAL IRON BINDING CAPACITY: 287 UG/DL (ref 250–450)
TOTAL PROTEIN: 7.3 G/DL (ref 6.4–8.3)
TOTAL PROTEIN: 7.3 G/DL (ref 6.4–8.3)
TRIGLYCERIDE, FASTING: 109 MG/DL
TSH SERPL DL<=0.05 MIU/L-ACNC: 2.68 MIU/L (ref 0.3–5)
UNSATURATED IRON BINDING CAPACITY: 168 UG/DL (ref 112–347)
VITAMIN B-12: 306 PG/ML (ref 232–1245)
VLDLC SERPL CALC-MCNC: ABNORMAL MG/DL (ref 1–30)
WBC # BLD: 7.8 K/UL (ref 3.5–11.3)
WBC # BLD: 7.8 K/UL (ref 3.5–11.3)

## 2018-08-06 ENCOUNTER — HOSPITAL ENCOUNTER (OUTPATIENT)
Dept: PHYSICAL THERAPY | Facility: CLINIC | Age: 68
Setting detail: THERAPIES SERIES
Discharge: HOME | End: 2018-08-06
Payer: MEDICARE

## 2018-08-06 PROCEDURE — 97140 MANUAL THERAPY 1/> REGIONS: CPT

## 2018-08-06 NOTE — FLOWSHEET NOTE
[] [facility]       Outpatient Physical        Therapy       [address]       Phone: [phone]       Fax: [phone] [x] [facility] at [address] [facility]       Phone: [phone]       Fax: [phone] [] [facility]. [address] [place] [address] [facility]  [address] [place] [address]   Phone: [phone]   Fax:  [phone]     Physical Therapy Daily Treatment Note    Date:  2018  Patient Name:  Gia Conteh    :  1950  MRN: [id number]  Physician: Jada Veronica MD                                   Insurance: Medicare  Medical Diagnosis: R RC injury                                         Rehab Codes: A80.174; C48.971; M54.2; M79.1  Onset Date: 2015                                   Next 's appt:   Visit# / total visits: 57/64 (new script as of 18)  Cancels/No Shows: 3 cx/1 ns    Subjective:    Pain:  [x] Yes  [] No Location: neck/trapezius (R) and neck  Pain Rating: (0-10 scale) 0/10 at rest up to 6/10 with movement; L shoulder pain with reaching  Pain altered Tx:  [x] No  [] Yes  Action:   Comments: still not doing too badly; feels \"way deep inside\" L shoulder is very sore; continues to work on [employer]; hasn't been aching at night; pt feels she will have R shoulder surgery in the Spring, 2019. Pt knows she will have to go to a nursing facility after surgery, due to it being her dominant arm, she lives alone, and has other medical difficulties that complicate the recovery process; pt also started with B knee injections last week.  This will continue until around 18.    18 MRI: ROTATOR CUFF: Small amount of fluid in the subacromial subdeltoid bursa.       Mild infraspinatus tendinopathy.       Low-grade partial-thickness articular surface tearing of the mid insertional   fibers of supraspinatus between the critical zone and footplate.  Moderate   underlying supraspinatus tendinopathy.       Subscapularis

## 2018-08-13 ENCOUNTER — HOSPITAL ENCOUNTER (OUTPATIENT)
Dept: PHARMACY | Age: 68
Setting detail: THERAPIES SERIES
Discharge: HOME | End: 2018-08-13
Payer: MEDICARE

## 2018-08-13 ENCOUNTER — HOSPITAL ENCOUNTER (OUTPATIENT)
Dept: PHYSICAL THERAPY | Facility: CLINIC | Age: 68
Setting detail: THERAPIES SERIES
Discharge: HOME | End: 2018-08-13
Payer: MEDICARE

## 2018-08-13 DIAGNOSIS — I48.91 ATRIAL FIBRILLATION, UNSPECIFIED TYPE (HCC): ICD-10-CM

## 2018-08-13 LAB
INR BLD: 1.8
PROTIME: 22.1 SECONDS

## 2018-08-13 PROCEDURE — 99211 OFF/OP EST MAY X REQ PHY/QHP: CPT

## 2018-08-13 PROCEDURE — 97140 MANUAL THERAPY 1/> REGIONS: CPT

## 2018-08-13 PROCEDURE — 85610 PROTHROMBIN TIME: CPT

## 2018-08-13 NOTE — FLOWSHEET NOTE
is able to call anyone if she were to fall at home; patient also carries a phone with her at all times; Specific Instructions for next treatment: continue with DN    scapular strengthening; PROM R shoulder; DN R shoulder, UE, cervical, scapula; MET to thoracic region; modalities as needed         Treatment Charges: Mins Units   []  Modalities: stim     []  Ther Exercise     [x]  Manual Therapy 37 2   []  Ther Activities     []  Aquatics     []  Vasocompression     []  Other     Total Treatment time 37 2   Medicare tracking: (5BNR32.39) (2man/stim58.33) (3man69.05) as of 8/13/18: $2,089.36 (KX)    Assessment:   [x] Progressing toward goals: symptoms are still being controlled; pt with increased tightness noted in cervical and scapular area   [] No change. [] Other:    Problems:    [x] ? Pain: 7/10 R shoulder  [x] ? ROM: R shoulder abduction with pain  [x] ? Strength: mild loss of strength R shoulder; poor scapular stability  [x] ? Function:71% affected function (29% of normal)  [x] Other: mm tightness c-spine/traps; significant trigger points     STG: (to be met in 6 treatments)  1. ? Pain: below 6/10 R shoulder: ONGOING 10/11/17   2. ? ROM: at least 100 degrees shoulder abduction   3. ? Strength: be able to do scapular strength ex without exacerbating symptoms: MET 10/11/17  4. ? Function: modify activities to decrease stress to R shoulder: MET 10/11/17  5. Independent with Home Exercise Program: MET 10/11/17   6. Demonstrate Knowledge of fall prevention: MET 10/11/17     LTG: (to be met in 12 treatments): as of 12/4/17 NOT YET MET; continue x 12 visits to #24; continue towards goal #44; continue to visit #64  1. Pain below 5/10  2. Decrease reliance on shoulder injections  3. Have at least 110 degrees shoulder abduction to indicate decreased impingement and improve reaching ability for light objects  4.  Improved scapular position within 2 cm of opposite side         Patient goals: \"decreased pain initially then better movement\"    G-Codes: set on 7/9/18, visit #50  Functional Limitation: carrying, moving and handling objects  Functional Assessment Used: UEFS  Current Status Modifier: CK  Goal Status Modifier: Neel Hilario     G-Codes: set on 5/16/18, visit #40  Functional Limitation: carrying, moving and handling objects  Functional Assessment Used: UEFS  Current Status Modifier: CL  Goal Status Modifier: CK      G-Codes: set on 3/7/18, visit #30  Functional Limitation: carrying, moving and handling objects  Functional Assessment Used: UEFS  Current Status Modifier: CK  Goal Status Modifier: Neel Hilario      G-Codes: set on 1/3/18, visit #20  Functional Limitation: carrying, moving and handling objects  Functional Assessment Used: UEFS   Current Status Modifier: CK  Goal Status Modifier: Neel Hilario      G-Codes: set on 11/8/17, visit #10  Functional Limitation: carrying, moving and handling objects  Functional Assessment Used: UEFS  Current Status Modifier: CL  Goal Status Modifier: CK      G-CODE     Functional Limitation: carrying, moving and handling objects  Functional Assessment Used: UEFS  Current Status Modifier: CL   Goal Status Modifier: CK    Pt. Education:  [] Yes  [x] No  [] Reviewed Prior HEP/Ed: HOLD ex that are painful  Method of Education: [] Verbal:HEP review and kinesiotaping info  [] Demo : HEP [] Written: scapular retraction   Comprehension of Education:  [] Verbalizes understanding. [] Demonstrates understanding. [] Needs review.   [] Demonstrates/verbalizes HEP/Ed previously given-issued written instructions as pt had question about appropriate completion of ex     Plan: [x] Continue per plan of care   [x] Other: continue weekly visits      Time In: 8:00 am            Time Out: 8:58 am    Electronically signed by:  Jesus Manuel Rene, PT

## 2018-08-20 ENCOUNTER — HOSPITAL ENCOUNTER (OUTPATIENT)
Dept: PHYSICAL THERAPY | Facility: CLINIC | Age: 68
Setting detail: THERAPIES SERIES
Discharge: HOME | End: 2018-08-20
Payer: MEDICARE

## 2018-08-20 PROCEDURE — 97140 MANUAL THERAPY 1/> REGIONS: CPT

## 2018-08-20 NOTE — FLOWSHEET NOTE
[] [facility]       Outpatient Physical        Therapy       [address]       Phone: [phone]       Fax: [phone] [x] [facility] at [address]       Phone: [phone]       Fax: [phone] [] [place]. [address] [place] [address]   Phone: [phone]   Fax:  [phone]     Physical Therapy Daily Treatment Note    Date:  2018  Patient Name:  Rai Martinez    :  1950  MRN: [id number]  Physician: Ervin Bedolla MD                                   Insurance: Medicare  Medical Diagnosis: R RC injury                                         Rehab Codes: M56.219; Y08.851; M54.2; M79.1  Onset Date: 2015                                   Next 's appt:   Visit# / total visits: 59/64 (new script as of 18)  Cancels/No Shows: 3 cx/1 ns    Subjective:    Pain:  [x] Yes  [] No Location: neck/trapezius (R) and neck  Pain Rating: (0-10 scale) 0/10 at rest up to 6/10 with movement; L shoulder pain with reaching  Pain altered Tx:  [x] No  [] Yes  Action:   Comments: still doing well; pain posterior R scapula in infraspinatus mm belly. 18 MRI: ROTATOR CUFF: Small amount of fluid in the subacromial subdeltoid bursa.       Mild infraspinatus tendinopathy.       Low-grade partial-thickness articular surface tearing of the mid insertional   fibers of supraspinatus between the critical zone and footplate.  Moderate   underlying supraspinatus tendinopathy.       Subscapularis and teres minor muscles/tendons appear grossly intact without   evidence of tearing.        Objective:   Modalities: not today: e-stim with DN   Precautions: blood thinners; hepatitis C       Not today:   18: R shoulder flexion: 135; abduction 121 degrees: IR bra line: ER:80 degrees, arm at side; pain with movement; L shoulder flexion: 144 degrees; abduction 143 degrees; IR: T5; L ER: 55 day she is here. Not today: Discussed fall prevention: patient has Aneta/echo and is able to call anyone if she were to fall at home; patient also carries a phone with her at all times; Specific Instructions for next treatment: continue with DN    scapular strengthening; PROM R shoulder; DN R shoulder, UE, cervical, scapula; MET to thoracic region; modalities as needed         Treatment Charges: Mins Units   []  Modalities: stim     []  Ther Exercise     [x]  Manual Therapy 37 2   []  Ther Activities     []  Aquatics     []  Vasocompression     []  Other     Total Treatment time 37 2   Medicare tracking: (9NUP66.26) (2man/stim58.33) (3man69.05) as of 8/20/18: $2,137.29 (KX)    Assessment:   [x] Progressing toward goals: more tightness noted this date with MFR; tolerated needles well, including 1\" in R lateral cervical spine, where she was only able to tolerate 0.5\" up until now. [] No change. [] Other:    Problems:    [x] ? Pain: 7/10 R shoulder  [x] ? ROM: R shoulder abduction with pain  [x] ? Strength: mild loss of strength R shoulder; poor scapular stability  [x] ? Function:71% affected function (29% of normal)  [x] Other: mm tightness c-spine/traps; significant trigger points     STG: (to be met in 6 treatments)  1. ? Pain: below 6/10 R shoulder: ONGOING 10/11/17   2. ? ROM: at least 100 degrees shoulder abduction   3. ? Strength: be able to do scapular strength ex without exacerbating symptoms: MET 10/11/17  4. ? Function: modify activities to decrease stress to R shoulder: MET 10/11/17  5. Independent with Home Exercise Program: MET 10/11/17   6. Demonstrate Knowledge of fall prevention: MET 10/11/17     LTG: (to be met in 12 treatments): as of 12/4/17 NOT YET MET; continue x 12 visits to #24; continue towards goal #44; continue to visit #64  1. Pain below 5/10  2. Decrease reliance on shoulder injections  3.  Have at least 110 degrees shoulder abduction to indicate decreased impingement and improve

## 2018-08-27 ENCOUNTER — HOSPITAL ENCOUNTER (OUTPATIENT)
Dept: PHARMACY | Age: 68
Setting detail: THERAPIES SERIES
Discharge: HOME | End: 2018-08-27
Payer: MEDICARE

## 2018-08-27 ENCOUNTER — HOSPITAL ENCOUNTER (OUTPATIENT)
Dept: PHYSICAL THERAPY | Facility: CLINIC | Age: 68
Setting detail: THERAPIES SERIES
Discharge: HOME | End: 2018-08-27
Payer: MEDICARE

## 2018-08-27 DIAGNOSIS — I48.91 ATRIAL FIBRILLATION, UNSPECIFIED TYPE (HCC): ICD-10-CM

## 2018-08-27 LAB
INR BLD: 2
PROTIME: 23.7 SECONDS

## 2018-08-27 PROCEDURE — 99211 OFF/OP EST MAY X REQ PHY/QHP: CPT

## 2018-08-27 PROCEDURE — G8984 CARRY CURRENT STATUS: HCPCS

## 2018-08-27 PROCEDURE — G8985 CARRY GOAL STATUS: HCPCS

## 2018-08-27 PROCEDURE — 97140 MANUAL THERAPY 1/> REGIONS: CPT

## 2018-08-27 PROCEDURE — 85610 PROTHROMBIN TIME: CPT

## 2018-08-27 NOTE — FLOWSHEET NOTE
reporting tape does not last more than the day she is here. Not today: Discussed fall prevention: patient has Aneta/echo and is able to call anyone if she were to fall at home; patient also carries a phone with her at all times; Specific Instructions for next treatment: continue with DN    scapular strengthening; PROM R shoulder; DN R shoulder, UE, cervical, scapula; MET to thoracic region; modalities as needed         Treatment Charges: Mins Units   []  Modalities: stim     []  Ther Exercise     [x]  Manual Therapy 40 3   []  Ther Activities     []  Aquatics     []  Vasocompression     []  Other     Total Treatment time 40 3   Medicare tracking: (9GCW60.53) (2man/stim58.33) (3man69.05) as of 8/27/18: $2,206.34 (KX)    Assessment:   [x] Progressing toward goals: more tightness noted this date   [] No change. [] Other:    Problems:    [x] ? Pain: 7/10 R shoulder  [x] ? ROM: R shoulder abduction with pain  [x] ? Strength: mild loss of strength R shoulder; poor scapular stability  [x] ? Function:71% affected function (29% of normal)  [x] Other: mm tightness c-spine/traps; significant trigger points     STG: (to be met in 6 treatments)  1. ? Pain: below 6/10 R shoulder: ONGOING 10/11/17   2. ? ROM: at least 100 degrees shoulder abduction   3. ? Strength: be able to do scapular strength ex without exacerbating symptoms: MET 10/11/17  4. ? Function: modify activities to decrease stress to R shoulder: MET 10/11/17  5. Independent with Home Exercise Program: MET 10/11/17   6. Demonstrate Knowledge of fall prevention: MET 10/11/17     LTG: (to be met in 12 treatments): as of 12/4/17 NOT YET MET; continue x 12 visits to #24; continue towards goal #44; continue to visit #64  1. Pain below 5/10  2. Decrease reliance on shoulder injections  3. Have at least 110 degrees shoulder abduction to indicate decreased impingement and improve reaching ability for light objects  4.  Improved scapular position within 2 cm of

## 2018-09-05 ENCOUNTER — HOSPITAL ENCOUNTER (OUTPATIENT)
Age: 68
Setting detail: SPECIMEN
Discharge: HOME | End: 2018-09-05
Payer: MEDICARE

## 2018-09-05 ENCOUNTER — HOSPITAL ENCOUNTER (OUTPATIENT)
Dept: PHYSICAL THERAPY | Facility: CLINIC | Age: 68
Setting detail: THERAPIES SERIES
Discharge: HOME | End: 2018-09-05
Payer: MEDICARE

## 2018-09-05 LAB
ANION GAP SERPL CALCULATED.3IONS-SCNC: 16 MMOL/L (ref 9–17)
BILIRUBIN URINE: NEGATIVE
BUN BLDV-MCNC: 34 MG/DL (ref 8–23)
BUN/CREAT BLD: ABNORMAL (ref 9–20)
CALCIUM SERPL-MCNC: 9.4 MG/DL (ref 8.6–10.4)
CHLORIDE BLD-SCNC: 100 MMOL/L (ref 98–107)
CO2: 25 MMOL/L (ref 20–31)
COLOR: YELLOW
COMMENT UA: NORMAL
CREAT SERPL-MCNC: 1.92 MG/DL (ref 0.5–0.9)
CREATININE URINE: 83.5 MG/DL (ref 28–217)
GFR AFRICAN AMERICAN: 31 ML/MIN
GFR NON-AFRICAN AMERICAN: 26 ML/MIN
GFR SERPL CREATININE-BSD FRML MDRD: ABNORMAL ML/MIN/{1.73_M2}
GFR SERPL CREATININE-BSD FRML MDRD: ABNORMAL ML/MIN/{1.73_M2}
GLUCOSE FASTING: 140 MG/DL (ref 70–99)
GLUCOSE URINE: NEGATIVE
HCT VFR BLD CALC: 43 % (ref 36.3–47.1)
HEMOGLOBIN: 13.7 G/DL (ref 11.9–15.1)
KETONES, URINE: NEGATIVE
LEUKOCYTE ESTERASE, URINE: NEGATIVE
MCH RBC QN AUTO: 32.5 PG (ref 25.2–33.5)
MCHC RBC AUTO-ENTMCNC: 31.9 G/DL (ref 28.4–34.8)
MCV RBC AUTO: 102.1 FL (ref 82.6–102.9)
MICROALBUMIN/CREAT 24H UR: <12 MG/L
MICROALBUMIN/CREAT UR-RTO: NORMAL MCG/MG CREAT
NITRITE, URINE: NEGATIVE
NRBC AUTOMATED: 0 PER 100 WBC
PDW BLD-RTO: 13.3 % (ref 11.8–14.4)
PH UA: 7.5 (ref 5–8)
PHOSPHORUS: 2.5 MG/DL (ref 2.6–4.5)
PLATELET # BLD: 208 K/UL (ref 138–453)
PMV BLD AUTO: 12.9 FL (ref 8.1–13.5)
POTASSIUM SERPL-SCNC: 4.3 MMOL/L (ref 3.7–5.3)
PROTEIN UA: NEGATIVE
PTH INTACT: 153 PG/ML (ref 15–65)
RBC # BLD: 4.21 M/UL (ref 3.95–5.11)
SODIUM BLD-SCNC: 141 MMOL/L (ref 135–144)
SPECIFIC GRAVITY UA: 1.02 (ref 1–1.03)
TURBIDITY: CLEAR
URINE HGB: NEGATIVE
UROBILINOGEN, URINE: NORMAL
WBC # BLD: 10.5 K/UL (ref 3.5–11.3)

## 2018-09-05 PROCEDURE — 97140 MANUAL THERAPY 1/> REGIONS: CPT

## 2018-09-05 NOTE — FLOWSHEET NOTE
deltoid assist and infraspinatus assist; applied spray adhesive due to patient reporting tape does not last more than the day she is here. Not today: Discussed fall prevention: patient has Aneta/echo and is able to call anyone if she were to fall at home; patient also carries a phone with her at all times; Specific Instructions for next treatment: continue with DN    scapular strengthening; PROM R shoulder; DN R shoulder, UE, cervical, scapula; MET to thoracic region; modalities as needed         Treatment Charges: Mins Units   []  Modalities: stim     []  Ther Exercise     [x]  Manual Therapy 40 3   []  Ther Activities     []  Aquatics     []  Vasocompression     []  Other     Total Treatment time 40 3   Medicare tracking: (4SZC59.71) (2man/stim58.33) (3man69.05) as of 9/5/18: $2,275.39 (KX)    Assessment:   [x] Progressing toward goals: will see if increased work to L infra and supraspinatus with MFR will help reduce L shoulder pain that has been elevated recently. [] No change. [] Other:    Problems:    [x] ? Pain: 7/10 R shoulder  [x] ? ROM: R shoulder abduction with pain  [x] ? Strength: mild loss of strength R shoulder; poor scapular stability  [x] ? Function:71% affected function (29% of normal)  [x] Other: mm tightness c-spine/traps; significant trigger points     STG: (to be met in 6 treatments)  1. ? Pain: below 6/10 R shoulder: ONGOING 10/11/17   2. ? ROM: at least 100 degrees shoulder abduction   3. ? Strength: be able to do scapular strength ex without exacerbating symptoms: MET 10/11/17  4. ? Function: modify activities to decrease stress to R shoulder: MET 10/11/17  5. Independent with Home Exercise Program: MET 10/11/17   6. Demonstrate Knowledge of fall prevention: MET 10/11/17     LTG: (to be met in 12 treatments): as of 12/4/17 NOT YET MET; continue x 12 visits to #24; continue towards goal #44; continue to visit #64  1. Pain below 5/10  2.  Decrease reliance on shoulder

## 2018-09-10 ENCOUNTER — HOSPITAL ENCOUNTER (OUTPATIENT)
Dept: PHYSICAL THERAPY | Facility: CLINIC | Age: 68
Setting detail: THERAPIES SERIES
Discharge: HOME | End: 2018-09-10
Payer: MEDICARE

## 2018-09-10 PROCEDURE — 97140 MANUAL THERAPY 1/> REGIONS: CPT

## 2018-09-10 NOTE — FLOWSHEET NOTE
tape does not last more than the day she is here. Not today: Discussed fall prevention: patient has Aneta/echo and is able to call anyone if she were to fall at home; patient also carries a phone with her at all times; Specific Instructions for next treatment: continue with DN    scapular strengthening; PROM R shoulder; DN R shoulder, UE, cervical, scapula; MET to thoracic region; modalities as needed         Treatment Charges: Mins Units   []  Modalities: stim     []  Ther Exercise     [x]  Manual Therapy 40 3   []  Ther Activities     []  Aquatics     []  Vasocompression     []  Other     Total Treatment time 40 3   Medicare tracking: (9ZXM85.92) (2man/stim58.33) (3man69.05) as of 9/10/18: $2,344.44 (KX)    Assessment:   [x] Progressing toward goals: continuing to do well; increased tenderness with DN this date at C6 level   [] No change. [] Other:    Problems:    [x] ? Pain: 7/10 R shoulder  [x] ? ROM: R shoulder abduction with pain  [x] ? Strength: mild loss of strength R shoulder; poor scapular stability  [x] ? Function:71% affected function (29% of normal)  [x] Other: mm tightness c-spine/traps; significant trigger points     STG: (to be met in 6 treatments)  1. ? Pain: below 6/10 R shoulder: ONGOING 10/11/17   2. ? ROM: at least 100 degrees shoulder abduction   3. ? Strength: be able to do scapular strength ex without exacerbating symptoms: MET 10/11/17  4. ? Function: modify activities to decrease stress to R shoulder: MET 10/11/17  5. Independent with Home Exercise Program: MET 10/11/17   6. Demonstrate Knowledge of fall prevention: MET 10/11/17     LTG: (to be met in 12 treatments): as of 12/4/17 NOT YET MET; continue x 12 visits to #24; continue towards goal #44; continue to visit #64  1. Pain below 5/10  2. Decrease reliance on shoulder injections  3. Have at least 110 degrees shoulder abduction to indicate decreased impingement and improve reaching ability for light objects  4.  Improved In: 7:45 am            Time Out: 8:45 am    Electronically signed by:  Bassem Quintanilla PT

## 2018-09-19 ENCOUNTER — HOSPITAL ENCOUNTER (OUTPATIENT)
Dept: PHYSICAL THERAPY | Facility: CLINIC | Age: 68
Setting detail: THERAPIES SERIES
Discharge: HOME | End: 2018-09-19
Payer: MEDICARE

## 2018-09-19 PROCEDURE — 97140 MANUAL THERAPY 1/> REGIONS: CPT

## 2018-09-19 NOTE — FLOWSHEET NOTE
Modalities: not today: e-stim with DN   Precautions: blood thinners; hepatitis C 2008      Not today:    9/19/18: active: R shoulder flexion 148; abduction: 140; IR T6: ER: 80 degrees; PROM WFL; strength flexion/abduction 4/5  L shoulder active flexion: 130; abduction: 98 degrees; IR T5; ER 80 degrees; PROM WFL; strength flexion/abduction 4/5  7/9/18: R shoulder flexion: 135; abduction 121 degrees: IR bra line: ER:80 degrees, arm at side; pain with movement; L shoulder flexion: 144 degrees; abduction 143 degrees; IR: T5; L ER: 55 degrees  5/16/18: shoulder flexion R 125, abduction 99, L shoulder flexion 126, abduction 99 AROM; IR/ER WNL AROM with arms at side; Not today: PROM:  Flexion 100, abduction 100 c pain over supraspinatus in each direction. IR/ER at 0 abduction WNL, 45 degrees abduction pain into IR/ER with passive ROM  AROM 11/13/17: R shoulder: 110 degrees flexion; abduction 105 degrees active;    Exercises: reviewed ex 5/16/18, pt advised to also start scapular retraction  Exercise Reps/ Time Weight/ Level Comments    SHOULDER extension 10 A Standing, bending over; started 10/2/17    rows 10 A Standing, bending over; started 10/2/17    shoulder flexion, abduction, IR,ER isometrics 5 ea gentle Started 10/2/17             Side ER 20/8 A/1  added 10/11/17 (not today due to increase symptoms recently)   Other:    Not today: Therapeutic activities: discussed proper head support when sleeping in recliner; pt does have pillow that she has molded to head; pt advised to not allow head to alter from neutral position    11/8/17: UEFS: 30% of normal/70% affected  1/3/18: UEFS: 41.25% of normal/58.75% affected  5/16/18: UEFS:28.75% of normal/71.25% affected  7/9/18: UEFS: 45% OF NORMAL/55% AFFECTED    MFR: UTs, c-paraspinals, scalenes, scapulae and shoulders with pt in seated position, prior to DN, 15 min    8/27/18: pt brought purchased theracane and PT instructed in its use.  8/20/18: reviewed gloria-pt ordered one and it is arriving today  8/13/18: instructed in use of theracane    DN:prepped area with alcohol wipe, consent obtained, standard procedure followed: 39 total: B , RCinsertion with  0.5\" B (2 total), ;   1\" B suprascapular homeostatic point perpendicularly ;  1\" supraspinatus, and spinal accessory and dorsal scapular B; 0.5\"-1.0\" needles in cervical and thoracic paraspinalsbilaterally bilaterally to T5  level  ; left in situ 15 min. Not today: Kinesiotape: unloading R UT, deltoid assist and infraspinatus assist; applied spray adhesive due to patient reporting tape does not last more than the day she is here. Not today: Discussed fall prevention: patient has Aneta/echo and is able to call anyone if she were to fall at home; patient also carries a phone with her at all times; Specific Instructions for next treatment: continue with DN    scapular strengthening; PROM R shoulder; DN R shoulder, UE, cervical, scapula; MET to thoracic region; modalities as needed         Treatment Charges: Mins Units   []  Modalities: stim     []  Ther Exercise     [x]  Manual Therapy 40 3   []  Ther Activities     []  Aquatics     []  Vasocompression     []  Other     Total Treatment time 40 3   Medicare tracking: (1LFF02.07) (2man/stim58.33) (3man69.05) as of 9/19/18: $2,413.49 (KX)    Assessment:   [x] Progressing toward goals: L shoulder lacking AROM, however, PROM still without deficit; pt with painful movement; R shoulder doing better   [] No change. [] Other:    Problems:    [x] ? Pain: 7/10 R shoulder  [x] ? ROM: R shoulder abduction with pain  [x] ? Strength: mild loss of strength R shoulder; poor scapular stability  [x] ?  Function:71% affected function (29% of normal)  [x] Other: mm tightness c-spine/traps; significant trigger points     STG: (to be met in 6 treatments)  1. ? Pain: below 6/10 R shoulder: ONGOING 10/11/17   2. ? ROM: at least 100 degrees shoulder abduction   3. ? Strength: be able to do scapular strength ex without exacerbating symptoms: MET 10/11/17  4. ? Function: modify activities to decrease stress to R shoulder: MET 10/11/17  5. Independent with Home Exercise Program: MET 10/11/17   6. Demonstrate Knowledge of fall prevention: MET 10/11/17     LTG: (to be met in 12 treatments): as of 12/4/17 NOT YET MET; continue x 12 visits to #24; continue towards goal #44; continue to visit #64  1. Pain below 5/10  2. Decrease reliance on shoulder injections: MEETING 9/19/18  3. Have at least 110 degrees shoulder abduction to indicate decreased impingement and improve reaching ability for light objects: MET 9/19/18   4.  Improved scapular position within 2 cm of opposite side         Patient goals: \"decreased pain initially then better movement\"    G-Codes: set on 8/27/18, visit #60  Functional Limitation: carrying, moving and handling objects  Functional Assessment Used:   Current Status Modifier: CK  Goal Status Modifier: Neel Shanelleow    G-Codes: set on 7/9/18, visit #50  Functional Limitation: carrying, moving and handling objects  Functional Assessment Used: UEFS  Current Status Modifier: CK  Goal Status Modifier: CJ     G-Codes: set on 5/16/18, visit #40  Functional Limitation: carrying, moving and handling objects  Functional Assessment Used: UEFS  Current Status Modifier: CL  Goal Status Modifier: CK      G-Codes: set on 3/7/18, visit #30  Functional Limitation: carrying, moving and handling objects  Functional Assessment Used: UEFS  Current Status Modifier: CK  Goal Status Modifier: Bettylogwendolyn Shanelleow      G-Codes: set on 1/3/18, visit #20  Functional Limitation: carrying, moving and handling objects  Functional Assessment Used: UEFS   Current Status Modifier: CK  Goal Status Modifier: Bettylou Mellow      G-Codes: set on 11/8/17, visit #10  Functional Limitation: carrying, moving and handling objects  Functional Assessment Used: UEFS  Current Status Modifier: CL  Goal Status Modifier: CK      G-CODE     Functional Limitation: carrying, moving and handling objects  Functional Assessment Used: UEFS  Current Status Modifier: CL   Goal Status Modifier: CK    Pt. Education:  [] Yes  [x] No  [] Reviewed Prior HEP/Ed: HOLD ex that are painful  Method of Education: [] Verbal:HEP review and kinesiotaping info  [] Demo : HEP [] Written: scapular retraction   Comprehension of Education:  [] Verbalizes understanding. [] Demonstrates understanding. [] Needs review.   [] Demonstrates/verbalizes HEP/Ed previously given-issued written instructions as pt had question about appropriate completion of ex     Plan: [x] Continue per plan of care   [x] Other: continue weekly visits      Time In: 7:45 am            Time Out: 8:55 am    Electronically signed by:  Sandra Mcdonald PT

## 2018-09-19 NOTE — PROGRESS NOTES
deficit; pt with painful movement; R shoulder doing better    STG: (to be met in 6 treatments)  1. ? Pain: below 6/10 R shoulder: ONGOING 10/11/17   2. ? ROM: at least 100 degrees shoulder abduction   3. ? Strength: be able to do scapular strength ex without exacerbating symptoms: MET 10/11/17  4. ? Function: modify activities to decrease stress to R shoulder: MET 10/11/17  5. Independent with Home Exercise Program: MET 10/11/17   6. Demonstrate Knowledge of fall prevention: MET 10/11/17     LTG: (to be met in 12 treatments): as of 12/4/17 NOT YET MET; continue x 12 visits to #24; continue towards goal #44; continue to visit #64  1. Pain below 5/10  2. Decrease reliance on shoulder injections: MEETING 9/19/18  3. Have at least 110 degrees shoulder abduction to indicate decreased impingement and improve reaching ability for light objects: MET 9/19/18   4. Improved scapular position within 2 cm of opposite side         Patient goals: \"decreased pain initially then better movement\"    Treatment to Date:  [x] Therapeutic Exercise    [] Modalities:  [x] Therapeutic Activity    [] Ultrasound  [] Electrical Stimulation  [] Gait Training     [] Massage       [] Lumbar/Cervical Traction  [] Neuromuscular Re-education [] Cold/hotpack [] Iontophoresis: 4 mg/mL  [x] Instruction in Home Exercise Program                     Dexamethasone Sodium  [x] Manual Therapy             Phosphate 40-80 mAmin  [] Aquatic Therapy                   [] Vasocompression/   [] Other:  [x] Dry Needling                                           Game Ready        Patient Status:     [] Continue per initial plan of care. [x] Additional visits necessary. [] Other:     Requested Frequency/Duration: 1 times per week for 20 treatments. Electronically signed by Kel Coffey PT on 9/19/2018 at 8:08 AM      If you have any questions or concerns, please don't hesitate to call.   Thank you for your referral.    Physician

## 2018-09-20 ENCOUNTER — HOSPITAL ENCOUNTER (OUTPATIENT)
Age: 68
Setting detail: SPECIMEN
Discharge: HOME | End: 2018-09-20
Payer: MEDICARE

## 2018-09-20 LAB
ANION GAP SERPL CALCULATED.3IONS-SCNC: 16 MMOL/L (ref 9–17)
BUN BLDV-MCNC: 27 MG/DL (ref 8–23)
BUN/CREAT BLD: ABNORMAL (ref 9–20)
CALCIUM SERPL-MCNC: 9.3 MG/DL (ref 8.6–10.4)
CHLORIDE BLD-SCNC: 102 MMOL/L (ref 98–107)
CO2: 23 MMOL/L (ref 20–31)
CREAT SERPL-MCNC: 1.76 MG/DL (ref 0.5–0.9)
GFR AFRICAN AMERICAN: 35 ML/MIN
GFR NON-AFRICAN AMERICAN: 29 ML/MIN
GFR SERPL CREATININE-BSD FRML MDRD: ABNORMAL ML/MIN/{1.73_M2}
GFR SERPL CREATININE-BSD FRML MDRD: ABNORMAL ML/MIN/{1.73_M2}
GLUCOSE FASTING: 119 MG/DL (ref 70–99)
PHOSPHORUS: 2.8 MG/DL (ref 2.6–4.5)
POTASSIUM SERPL-SCNC: 4.6 MMOL/L (ref 3.7–5.3)
SODIUM BLD-SCNC: 141 MMOL/L (ref 135–144)
VITAMIN D 25-HYDROXY: 29.1 NG/ML (ref 30–100)

## 2018-09-24 ENCOUNTER — HOSPITAL ENCOUNTER (OUTPATIENT)
Dept: PHARMACY | Age: 68
Setting detail: THERAPIES SERIES
Discharge: HOME | End: 2018-09-24
Payer: MEDICARE

## 2018-09-24 ENCOUNTER — HOSPITAL ENCOUNTER (OUTPATIENT)
Dept: PHYSICAL THERAPY | Facility: CLINIC | Age: 68
Setting detail: THERAPIES SERIES
Discharge: HOME | End: 2018-09-24
Payer: MEDICARE

## 2018-09-24 DIAGNOSIS — I48.91 ATRIAL FIBRILLATION, UNSPECIFIED TYPE (HCC): ICD-10-CM

## 2018-09-24 LAB
INR BLD: 2.4
PROTIME: 28.8 SECONDS

## 2018-09-24 PROCEDURE — 97140 MANUAL THERAPY 1/> REGIONS: CPT

## 2018-09-24 PROCEDURE — 85610 PROTHROMBIN TIME: CPT

## 2018-09-24 PROCEDURE — 99211 OFF/OP EST MAY X REQ PHY/QHP: CPT

## 2018-09-24 NOTE — FLOWSHEET NOTE
[] [facility]       Outpatient Physical        Therapy       [address]       Phone: [phone]       Fax: [phone] [x] [facility] at [address]       Phone: [phone]       Fax: [phone] [] [facility]  [address]   Phone: [phone]   Fax:  [phone]     Physical Therapy Daily Treatment Note    Date:  2018  Patient Name:  Julia Duque    :  1950  MRN: [id number]  Physician: Ismael Goss MD                                   Insurance: Medicare  Medical Diagnosis: R RC injury                                         Rehab Codes: T36.963; R75.745; M54.2; M79.1  Onset Date: 2015                                   Next 's appt:   Visit# / total visits: 64/64   Cancels/No Shows: 3 cx/1 ns    Subjective:    Pain:  [x] Yes  [] No Location: neck/trapezius (R) and neck  Pain Rating: (0-10 scale) 0/10 at rest up to 6/10 with movement; L shoulder pain with reaching  Pain altered Tx:  [x] No  [] Yes  Action:   Comments:   Doing well with minimal R shoulder and neck pain; L is more bothersome; still with limited ability to perform ex    18 MRI: ROTATOR CUFF: Small amount of fluid in the subacromial subdeltoid bursa.       Mild infraspinatus tendinopathy.       Low-grade partial-thickness articular surface tearing of the mid insertional   fibers of supraspinatus between the critical zone and footplate.  Moderate   underlying supraspinatus tendinopathy.       Subscapularis and teres minor muscles/tendons appear grossly intact without   evidence of tearing.        Objective:   Modalities: not today: e-stim with DN   Precautions: blood thinners; hepatitis C 2008      Not today:    18: active: R shoulder flexion 148; abduction: 140; IR T6: ER: 80 degrees; PROM WFL; strength flexion/abduction 4/5  L shoulder active flexion: 130; abduction: 98 degrees;

## 2018-10-01 ENCOUNTER — HOSPITAL ENCOUNTER (OUTPATIENT)
Dept: PHYSICAL THERAPY | Facility: CLINIC | Age: 68
Setting detail: THERAPIES SERIES
Discharge: HOME | End: 2018-10-01
Payer: MEDICARE

## 2018-10-01 PROCEDURE — 97140 MANUAL THERAPY 1/> REGIONS: CPT

## 2018-10-01 NOTE — FLOWSHEET NOTE
10/11/17  4. ? Function: modify activities to decrease stress to R shoulder: MET 10/11/17  5. Independent with Home Exercise Program: MET 10/11/17   6. Demonstrate Knowledge of fall prevention: MET 10/11/17     LTG: (to be met in 12 treatments): as of 12/4/17 NOT YET MET; continue x 12 visits to #24; continue towards goal #44; continue to visit #64; continue goals x 20 visits to visit #84  1. Pain below 5/10: NEARLY MET 10/1/18  2. Decrease reliance on shoulder injections: MEETING 9/19/18  3. Have at least 110 degrees shoulder abduction to indicate decreased impingement and improve reaching ability for light objects: MET 9/19/18   4.  Improved scapular position within 2 cm of opposite side         Patient goals: \"decreased pain initially then better movement\"    G-Codes: set on 8/27/18, visit #60  Functional Limitation: carrying, moving and handling objects  Functional Assessment Used:   Current Status Modifier: CK  Goal Status Modifier: Abimael Scott    G-Codes: set on 7/9/18, visit #50  Functional Limitation: carrying, moving and handling objects  Functional Assessment Used: UEFS  Current Status Modifier: CK  Goal Status Modifier: CJ     G-Codes: set on 5/16/18, visit #40  Functional Limitation: carrying, moving and handling objects  Functional Assessment Used: UEFS  Current Status Modifier: CL  Goal Status Modifier: CK      G-Codes: set on 3/7/18, visit #30  Functional Limitation: carrying, moving and handling objects  Functional Assessment Used: UEFS  Current Status Modifier: CK  Goal Status Modifier: Abimael Jamaln      G-Codes: set on 1/3/18, visit #20  Functional Limitation: carrying, moving and handling objects  Functional Assessment Used: UEFS   Current Status Modifier: CK  Goal Status Modifier: Abimael Leaven      G-Codes: set on 11/8/17, visit #10  Functional Limitation: carrying, moving and handling objects  Functional Assessment Used: UEFS  Current Status Modifier: CL  Goal Status Modifier: CK      G-CODE     Functional Limitation:

## 2018-10-10 ENCOUNTER — HOSPITAL ENCOUNTER (OUTPATIENT)
Dept: PHYSICAL THERAPY | Facility: CLINIC | Age: 68
Setting detail: THERAPIES SERIES
Discharge: HOME | End: 2018-10-10
Payer: MEDICARE

## 2018-10-10 PROCEDURE — 97140 MANUAL THERAPY 1/> REGIONS: CPT

## 2018-10-10 NOTE — FLOWSHEET NOTE
today  8/13/18: instructed in use of theracane    DN:prepped area with alcohol wipe, consent obtained, standard procedure followed: 42 total: B , RCinsertion with  0.5\" B (3 total EA), ;   ;  1\" B spinal accessory and dorsal scapular B; 0.5\"-1.0\" needles in cervical and thoracic paraspinalsbilaterally bilaterally to T4  Level with cervical points from t process posteriorly; 0.5\" B greater occipital homeostatic point; left in situ 12 min. Not today: Kinesiotape: unloading R UT, deltoid assist and infraspinatus assist; applied spray adhesive due to patient reporting tape does not last more than the day she is here. Not today: Discussed fall prevention: patient has Aneta/echo and is able to call anyone if she were to fall at home; patient also carries a phone with her at all times; Specific Instructions for next treatment: continue with DN, MFR        Treatment Charges: Mins Units   []  Modalities: stim     []  Ther Exercise     [x]  Manual Therapy 35 2   []  Ther Activities     []  Aquatics     []  Vasocompression     []  Other     Total Treatment time 35 2   Medicare tracking: (8IIE95.83) (2man/stim58.33) (3man69.05) as of 10/10/18: $9,349.61 (KX)    Assessment:   [x] Progressing toward goals: still with moderate cervical spine tightness that responds to MFR and DN; continuing to use superficial needling in cervical region due to tenderness   [] No change. [] Other:    Problems:    [x] ? Pain: 7/10 R shoulder  [x] ? ROM: R shoulder abduction with pain  [x] ? Strength: mild loss of strength R shoulder; poor scapular stability  [x] ?  Function:71% affected function (29% of normal)  [x] Other: mm tightness c-spine/traps; significant trigger points     STG: (to be met in 6 treatments)  1. ? Pain: below 6/10 R shoulder: ONGOING 10/11/17   2. ? ROM: at least 100 degrees shoulder abduction   3. ? Strength: be able to do scapular strength ex without exacerbating symptoms: MET 10/11/17  4. ? Function: modify objects  Functional Assessment Used: UEFS  Current Status Modifier: CL   Goal Status Modifier: CK    Pt. Education:  [] Yes  [x] No  [] Reviewed Prior HEP/Ed: HOLD ex that are painful  Method of Education: [] Verbal:HEP review and kinesiotaping info  [] Demo : HEP [] Written: scapular retraction   Comprehension of Education:  [] Verbalizes understanding. [] Demonstrates understanding. [] Needs review.   [] Demonstrates/verbalizes HEP/Ed previously given-issued written instructions as pt had question about appropriate completion of ex     Plan: [x] Continue per plan of care   [x] Other: continue weekly visits      Time In: 6:55 am            Time Out: 7:50 am    Electronically signed by:  Yung Sesay PT

## 2018-10-17 ENCOUNTER — HOSPITAL ENCOUNTER (OUTPATIENT)
Dept: PHYSICAL THERAPY | Facility: CLINIC | Age: 68
Setting detail: THERAPIES SERIES
Discharge: HOME | End: 2018-10-17
Payer: MEDICARE

## 2018-10-17 PROCEDURE — 97140 MANUAL THERAPY 1/> REGIONS: CPT

## 2018-10-17 NOTE — FLOWSHEET NOTE
B; 0.5\"-1.0\" needles in cervical and thoracic paraspinalsbilaterally bilaterally to T5  Level with cervical points; 2 2\" suprascapular homeostatic points bilaterally placed horizontally; 1\" B supraspinatus above spine of scapula horizontally;  left in situ 12 min. Not today: Kinesiotape: unloading R UT, deltoid assist and infraspinatus assist; applied spray adhesive due to patient reporting tape does not last more than the day she is here. Not today: Discussed fall prevention: patient has Aneta/echo and is able to call anyone if she were to fall at home; patient also carries a phone with her at all times; Specific Instructions for next treatment: continue with DN, MFR        Treatment Charges: Mins Units   []  Modalities: stim     []  Ther Exercise     [x]  Manual Therapy 35 2   []  Ther Activities     []  Aquatics     []  Vasocompression     []  Other     Total Treatment time 35 2   Medicare tracking: (5TLL05.31) (2man/stim58.33) (3man69.05) as of 10/17/18: $2,359.11 (KX)    Assessment:   [x] Progressing toward goals: concentrating on shoulder RC mm with MFR and DN this date; pt with much tenderness in these mm as well as pec minor bilaterally; will see if working on this area with deeper penetration will be beneficial with ADLs; pt with decreased tightness noted in c-spine with exception of scalenes. [] No change. [] Other:    Problems:    [x] ? Pain: 7/10 R shoulder  [x] ? ROM: R shoulder abduction with pain  [x] ? Strength: mild loss of strength R shoulder; poor scapular stability  [x] ?  Function:71% affected function (29% of normal)  [x] Other: mm tightness c-spine/traps; significant trigger points     STG: (to be met in 6 treatments)  1. ? Pain: below 6/10 R shoulder: ONGOING 10/11/17   2. ? ROM: at least 100 degrees shoulder abduction   3. ? Strength: be able to do scapular strength ex without exacerbating symptoms: MET 10/11/17  4. ? Function: modify activities to decrease stress to R

## 2018-10-22 ENCOUNTER — HOSPITAL ENCOUNTER (OUTPATIENT)
Dept: PHYSICAL THERAPY | Facility: CLINIC | Age: 68
Setting detail: THERAPIES SERIES
Discharge: HOME | End: 2018-10-22
Payer: MEDICARE

## 2018-10-22 ENCOUNTER — HOSPITAL ENCOUNTER (OUTPATIENT)
Dept: PHARMACY | Age: 68
Setting detail: THERAPIES SERIES
Discharge: HOME | End: 2018-10-22
Payer: MEDICARE

## 2018-10-22 DIAGNOSIS — I48.91 ATRIAL FIBRILLATION, UNSPECIFIED TYPE (HCC): ICD-10-CM

## 2018-10-22 LAB
INR BLD: 3
PROTIME: 35.8 SECONDS

## 2018-10-22 PROCEDURE — 99211 OFF/OP EST MAY X REQ PHY/QHP: CPT

## 2018-10-22 PROCEDURE — 85610 PROTHROMBIN TIME: CPT

## 2018-10-22 PROCEDURE — 97140 MANUAL THERAPY 1/> REGIONS: CPT

## 2018-10-22 NOTE — FLOWSHEET NOTE
shoulder: ONGOING 10/11/17   2. ? ROM: at least 100 degrees shoulder abduction   3. ? Strength: be able to do scapular strength ex without exacerbating symptoms: MET 10/11/17  4. ? Function: modify activities to decrease stress to R shoulder: MET 10/11/17  5. Independent with Home Exercise Program: MET 10/11/17   6. Demonstrate Knowledge of fall prevention: MET 10/11/17     LTG: (to be met in 12 treatments): as of 12/4/17 NOT YET MET; continue x 12 visits to #24; continue towards goal #44; continue to visit #64; continue goals x 20 visits to visit #84  1. Pain below 5/10: NEARLY MET 10/1/18  2. Decrease reliance on shoulder injections: MEETING 9/19/18  3. Have at least 110 degrees shoulder abduction to indicate decreased impingement and improve reaching ability for light objects: MET 9/19/18   4.  Improved scapular position within 2 cm of opposite side         Patient goals: \"decreased pain initially then better movement\"    G-Codes: set on 8/27/18, visit #60  Functional Limitation: carrying, moving and handling objects  Functional Assessment Used:   Current Status Modifier: CK  Goal Status Modifier: Kolby Combs    G-Codes: set on 7/9/18, visit #50  Functional Limitation: carrying, moving and handling objects  Functional Assessment Used: UEFS  Current Status Modifier: CK  Goal Status Modifier: CJ     G-Codes: set on 5/16/18, visit #40  Functional Limitation: carrying, moving and handling objects  Functional Assessment Used: UEFS  Current Status Modifier: CL  Goal Status Modifier: CK      G-Codes: set on 3/7/18, visit #30  Functional Limitation: carrying, moving and handling objects  Functional Assessment Used: UEFS  Current Status Modifier: CK  Goal Status Modifier: Kolby Combs      G-Codes: set on 1/3/18, visit #20  Functional Limitation: carrying, moving and handling objects  Functional Assessment Used: UEFS   Current Status Modifier: CK  Goal Status Modifier: Kolby Combs      G-Codes: set on 11/8/17, visit #10  Functional Limitation: carrying, moving and handling objects  Functional Assessment Used: UEFS  Current Status Modifier: CL  Goal Status Modifier: CK      G-CODE     Functional Limitation: carrying, moving and handling objects  Functional Assessment Used: UEFS  Current Status Modifier: CL   Goal Status Modifier: CK    Pt. Education:  [] Yes  [x] No  [] Reviewed Prior HEP/Ed: HOLD ex that are painful  Method of Education: [] Verbal:HEP review of HEP  [] Demo : HEP [] Written: scapular retraction   Comprehension of Education:  [x] Verbalizes understanding. [] Demonstrates understanding. [] Needs review.   [] Demonstrates/verbalizes HEP/Ed previously given-issued written instructions as pt had question about appropriate completion of ex     Plan: [x] Continue per plan of care   [x] Other: continue weekly visits      Time In: 7:05 am            Time Out: 8:00 am    Electronically signed by:  Ana Maria Rivera, PT

## 2018-10-29 ENCOUNTER — HOSPITAL ENCOUNTER (OUTPATIENT)
Dept: PHYSICAL THERAPY | Facility: CLINIC | Age: 68
Setting detail: THERAPIES SERIES
Discharge: HOME | End: 2018-10-29
Payer: MEDICARE

## 2018-10-29 PROCEDURE — 97140 MANUAL THERAPY 1/> REGIONS: CPT

## 2018-10-29 NOTE — FLOWSHEET NOTE
Modifier: CL   Goal Status Modifier: CK    Pt. Education:  [x] Yes  [] No  [] Reviewed Prior HEP/Ed: HOLD ex that are painful  Method of Education: [x] Verbal:HEP review of HEP  [x] Demo : HEP [x] Written: chest stretch  Comprehension of Education:  [x] Verbalizes understanding. [x] Demonstrates understanding. [] Needs review.   [] Demonstrates/verbalizes HEP/Ed previously given-issued written instructions as pt had question about appropriate completion of ex     Plan: [x] Continue per plan of care   [x] Other: continue weekly visits      Time In: 8:05 am            Time Out: 9:00 am    Electronically signed by:  Phyllis King, PT

## 2018-11-05 ENCOUNTER — HOSPITAL ENCOUNTER (OUTPATIENT)
Dept: PHYSICAL THERAPY | Facility: CLINIC | Age: 68
Setting detail: THERAPIES SERIES
Discharge: HOME | End: 2018-11-05
Payer: MEDICARE

## 2018-11-05 PROCEDURE — G8985 CARRY GOAL STATUS: HCPCS

## 2018-11-05 PROCEDURE — G8984 CARRY CURRENT STATUS: HCPCS

## 2018-11-05 PROCEDURE — 97140 MANUAL THERAPY 1/> REGIONS: CPT

## 2018-11-05 NOTE — FLOWSHEET NOTE
[] Randee Sandoval       Outpatient Physical        Therapy       [address]       Phone: [phone]       Fax: [phone] [x] [facility] at [address] [facility]       Phone: [phone]       Fax: [phone] [] [facility]   Phone: [phone]   Fax:  [phone]     Physical Therapy Daily Treatment Note    Date:  2018  Patient Name:  Ralf Joseph    :  1950  MRN: [id number]  Physician: Estuardo Barry MD                                   Insurance: Medicare  Medical Diagnosis: R RC injury                                         Rehab Codes: S12.019; R89.848; M54.2; M79.1  Onset Date: 2015                                   Next 's appt:   Visit# / total visits: 70/84 (new script starting 10/1/18)  Cancels/No Shows: 3 cx/1 ns     Subjective:    Pain:  [x] Yes  [] No Location: neck/trapezius (R) and neck  Pain Rating: (0-10 scale) 0/10 at rest up to 6/10 with movement; L shoulder pain with reaching  Pain altered Tx:  [x] No  [] Yes  Action:   Comments:  Pt with increased pain R lateral to C/T spine at C6-T3; started yesterday; has been doing chest stretch daily. Had injections of B shoulders on Friday, 18 and no change with pain levels yet.    18 MRI: ROTATOR CUFF: Small amount of fluid in the subacromial subdeltoid bursa.       Mild infraspinatus tendinopathy.       Low-grade partial-thickness articular surface tearing of the mid insertional   fibers of supraspinatus between the critical zone and footplate.  Moderate   underlying supraspinatus tendinopathy.       Subscapularis and teres minor muscles/tendons appear grossly intact without   evidence of tearing. Objective:   10/22/18: discussed with pt potential for overall body strengthening to help prevent falls and improve function due to overall weakness.     Modalities: not today: e-stim

## 2018-11-12 ENCOUNTER — HOSPITAL ENCOUNTER (OUTPATIENT)
Dept: PHYSICAL THERAPY | Facility: CLINIC | Age: 68
Setting detail: THERAPIES SERIES
Discharge: HOME | End: 2018-11-12
Payer: MEDICARE

## 2018-11-12 PROCEDURE — 97140 MANUAL THERAPY 1/> REGIONS: CPT

## 2018-11-12 NOTE — FLOWSHEET NOTE
[] Christina Torres       Outpatient Physical        Therapy       [address]       Phone: [phone]       Fax: [phone] [x] [facility] at [address]       Phone: [phone]       Fax: [phone] [] [facility]. [address] [place] [address] [facility]  [address] [place] [address]   Phone: [phone]   Fax:  [phone]     Physical Therapy Daily Treatment Note    Date:  2018  Patient Name:  Mimi Licona    :  1950  MRN: [id number]  Physician: Shelly Newberry MD                                   Insurance: Medicare  Medical Diagnosis: R RC injury                                         Rehab Codes: M47.074; N94.795; M54.2; M79.1  Onset Date: 2015                                   Next 's appt:   Visit# / total visits: 71/84 (new script starting 10/1/18)  Cancels/No Shows: 3 cx/1 ns     Subjective:    Pain:  [x] Yes  [] No Location: neck/trapezius (R) and neck  Pain Rating: (0-10 scale) 0/10 at rest up to 6/10 with movement; L shoulder pain with reaching  Pain altered Tx:  [x] No  [] Yes  Action:   Comments:  Pt with relatively stable symptoms the last week. 18 MRI: ROTATOR CUFF: Small amount of fluid in the subacromial subdeltoid bursa.       Mild infraspinatus tendinopathy.       Low-grade partial-thickness articular surface tearing of the mid insertional   fibers of supraspinatus between the critical zone and footplate.  Moderate   underlying supraspinatus tendinopathy.       Subscapularis and teres minor muscles/tendons appear grossly intact without   evidence of tearing. Objective:   10/22/18: discussed with pt potential for overall body strengthening to help prevent falls and improve function due to overall weakness.     Modalities: not today: e-stim with DN   Precautions: blood thinners; hepatitis C 2008      Not today:    18: active: R shoulder flexion 148; abduction: 140; IR T6: ER: 80 visits to #24; continue towards goal #44; continue to visit #64; continue goals x 20 visits to visit #84  1. Pain below 5/10: NEARLY MET 10/1/18  2. Decrease reliance on shoulder injections: MEETING 9/19/18  3. Have at least 110 degrees shoulder abduction to indicate decreased impingement and improve reaching ability for light objects: MET 9/19/18   4.  Improved scapular position within 2 cm of opposite side         Patient goals: \"decreased pain initially then better movement\"     G-Codes: set on 11/5/18, visit #70  Functional Limitation: carrying, moving and handling objects  Functional Assessment Used:   Current Status Modifier: CK  Goal Status Modifier: Batool Marshall    G-Codes: set on 8/27/18, visit #60  Functional Limitation: carrying, moving and handling objects  Functional Assessment Used:   Current Status Modifier:   Goal Status Modifier: Batool Marshall    G-Codes: set on 7/9/18, visit #50  Functional Limitation: carrying, moving and handling objects  Functional Assessment Used: UEFS  Current Status Modifier: CK  Goal Status Modifier: CJ     G-Codes: set on 5/16/18, visit #40  Functional Limitation: carrying, moving and handling objects  Functional Assessment Used: UEFS  Current Status Modifier: CL  Goal Status Modifier: CK      G-Codes: set on 3/7/18, visit #30  Functional Limitation: carrying, moving and handling objects  Functional Assessment Used: UEFS  Current Status Modifier: CK  Goal Status Modifier: Batool Marshall      G-Codes: set on 1/3/18, visit #20  Functional Limitation: carrying, moving and handling objects  Functional Assessment Used: UEFS   Current Status Modifier: CK  Goal Status Modifier: Batool Marshall      G-Codes: set on 11/8/17, visit #10  Functional Limitation: carrying, moving and handling objects  Functional Assessment Used: UEFS  Current Status Modifier: CL  Goal Status Modifier: CK      G-CODE     Functional Limitation: carrying, moving and handling objects  Functional Assessment Used: UEFS  Current Status Modifier: CL   Goal

## 2018-11-19 ENCOUNTER — HOSPITAL ENCOUNTER (OUTPATIENT)
Dept: PHYSICAL THERAPY | Facility: CLINIC | Age: 68
Setting detail: THERAPIES SERIES
Discharge: HOME | End: 2018-11-19
Payer: MEDICARE

## 2018-11-19 ENCOUNTER — HOSPITAL ENCOUNTER (OUTPATIENT)
Dept: PHARMACY | Age: 68
Setting detail: THERAPIES SERIES
Discharge: HOME | End: 2018-11-19
Payer: MEDICARE

## 2018-11-19 DIAGNOSIS — I48.91 ATRIAL FIBRILLATION, UNSPECIFIED TYPE (HCC): ICD-10-CM

## 2018-11-19 LAB
INR BLD: 2.9
PROTIME: 34.4 SECONDS

## 2018-11-19 PROCEDURE — 99211 OFF/OP EST MAY X REQ PHY/QHP: CPT

## 2018-11-19 PROCEDURE — 85610 PROTHROMBIN TIME: CPT

## 2018-11-19 PROCEDURE — 97140 MANUAL THERAPY 1/> REGIONS: CPT

## 2018-11-19 NOTE — FLOWSHEET NOTE
ER: 80 degrees; PROM WFL; strength flexion/abduction 4/5  L shoulder active flexion: 130; abduction: 98 degrees; IR T5; ER 80 degrees; PROM WFL; strength flexion/abduction 4/5  7/9/18: R shoulder flexion: 135; abduction 121 degrees: IR bra line: ER:80 degrees, arm at side; pain with movement; L shoulder flexion: 144 degrees; abduction 143 degrees; IR: T5; L ER: 55 degrees  5/16/18: shoulder flexion R 125, abduction 99, L shoulder flexion 126, abduction 99 AROM; IR/ER WNL AROM with arms at side; Not today: PROM:  Flexion 100, abduction 100 c pain over supraspinatus in each direction. IR/ER at 0 abduction WNL, 45 degrees abduction pain into IR/ER with passive ROM  AROM 11/13/17: R shoulder: 110 degrees flexion; abduction 105 degrees active;    Exercises: bolded 10/29/18  Exercise Reps/ Time Weight/ Level Comments   Chest stretches/various x  See HEP sheets 10/29/18    SHOULDER extension 10 A Standing, bending over; started 10/2/17    rows 10 A Standing, bending over; started 10/2/17    shoulder flexion, abduction, IR,ER isometrics 5 ea gentle Started 10/2/17             Side ER 20/8 A/1  added 10/11/17 (not today due to increase symptoms recently)   Other:    Not today: Therapeutic activities: discussed proper head support when sleeping in recliner; pt does have pillow that she has molded to head; pt advised to not allow head to alter from neutral position    11/8/17: UEFS: 30% of normal/70% affected  1/3/18: UEFS: 41.25% of normal/58.75% affected  5/16/18: UEFS:28.75% of normal/71.25% affected  7/9/18: UEFS: 45% OF NORMAL/55% AFFECTED    MFR: UTs, c-paraspinals, scalenes, scapulae concentrating on supra and infraspinatus, pec minors and shoulders with pt in seated position, prior to DN, 20 min    8/27/18: pt brought purchased theracane and PT instructed in its use.  8/20/18: reviewed theracane-pt ordered one and it is arriving today  8/13/18: instructed in use of theracane    DN:prepped area with alcohol wipe, consent obtained, standard procedure followed: 37 total: B , RCinsertion with  0.5\" B (3 total EA); 2 1\" dorsal scapular B; 0.5\"-1.0\" needles in cervical and thoracic paraspinalsbilaterally bilaterally to T6  Level with cervical points;   left in situ 12 min. Not today: Kinesiotape: unloading R UT, deltoid assist and infraspinatus assist; applied spray adhesive due to patient reporting tape does not last more than the day she is here. Not today: Discussed fall prevention: patient has Aneta/echo and is able to call anyone if she were to fall at home; patient also carries a phone with her at all times; Specific Instructions for next treatment: continue with DN, MFR        Treatment Charges: Mins Units   []  Modalities: stim     []  Ther Exercise     [x]  Manual Therapy 35 2   []  Ther Activities     []  Aquatics     []  Vasocompression     []  Other     Total Treatment time 35 2   Medicare tracking: (7VCO56.17) (2man/stim58.33) (3man69.05) as of 11/19/18: $2,887.10 (KX)    Assessment:   [x] Progressing toward goals: there has been good pain control with DN and MFR combination; pt continuing to work on ROM and strength (I). [] No change:    [] Other:    Problems:    [x] ? Pain: 7/10 R shoulder  [x] ? ROM: R shoulder abduction with pain  [x] ? Strength: mild loss of strength R shoulder; poor scapular stability  [x] ? Function:71% affected function (29% of normal)  [x] Other: mm tightness c-spine/traps; significant trigger points     STG: (to be met in 6 treatments)  1. ? Pain: below 6/10 R shoulder: ONGOING 10/11/17   2. ? ROM: at least 100 degrees shoulder abduction   3. ? Strength: be able to do scapular strength ex without exacerbating symptoms: MET 10/11/17  4. ? Function: modify activities to decrease stress to R shoulder: MET 10/11/17  5. Independent with Home Exercise Program: MET 10/11/17   6.  Demonstrate Knowledge of fall prevention: MET 10/11/17     LTG: (to be met in 12 treatments): as of 12/4/17 NOT YET MET; continue x 12 visits to #24; continue towards goal #44; continue to visit #64; continue goals x 20 visits to visit #84  1. Pain below 5/10: NEARLY MET 10/1/18  2. Decrease reliance on shoulder injections: MEETING 9/19/18  3. Have at least 110 degrees shoulder abduction to indicate decreased impingement and improve reaching ability for light objects: MET 9/19/18   4.  Improved scapular position within 2 cm of opposite side         Patient goals: \"decreased pain initially then better movement\"     G-Codes: set on 11/5/18, visit #70  Functional Limitation: carrying, moving and handling objects  Functional Assessment Used:   Current Status Modifier: CK  Goal Status Modifier: Annie Rene    G-Codes: set on 8/27/18, visit #60  Functional Limitation: carrying, moving and handling objects  Functional Assessment Used:   Current Status Modifier: CK  Goal Status Modifier: Annie Rene    G-Codes: set on 7/9/18, visit #50  Functional Limitation: carrying, moving and handling objects  Functional Assessment Used: UEFS  Current Status Modifier: CK  Goal Status Modifier: CJ     G-Codes: set on 5/16/18, visit #40  Functional Limitation: carrying, moving and handling objects  Functional Assessment Used: UEFS  Current Status Modifier: CL  Goal Status Modifier: CK      G-Codes: set on 3/7/18, visit #30  Functional Limitation: carrying, moving and handling objects  Functional Assessment Used: UEFS  Current Status Modifier: CK  Goal Status Modifier: Annie Rene      G-Codes: set on 1/3/18, visit #20  Functional Limitation: carrying, moving and handling objects  Functional Assessment Used: UEFS   Current Status Modifier: CK  Goal Status Modifier: Annie Rene      G-Codes: set on 11/8/17, visit #10  Functional Limitation: carrying, moving and handling objects  Functional Assessment Used: UEFS  Current Status Modifier: CL  Goal Status Modifier: CK      G-CODE     Functional Limitation: carrying, moving and handling objects  Functional Assessment Used: UEFS  Current

## 2018-11-26 ENCOUNTER — HOSPITAL ENCOUNTER (OUTPATIENT)
Dept: PHYSICAL THERAPY | Facility: CLINIC | Age: 68
Setting detail: THERAPIES SERIES
Discharge: HOME | End: 2018-11-26
Payer: MEDICARE

## 2018-11-26 PROCEDURE — 97140 MANUAL THERAPY 1/> REGIONS: CPT

## 2018-11-29 ENCOUNTER — ANESTHESIA EVENT (OUTPATIENT)
Dept: MRI IMAGING | Age: 68
End: 2018-11-29

## 2018-11-29 ENCOUNTER — HOSPITAL ENCOUNTER (OUTPATIENT)
Dept: MRI IMAGING | Age: 68
Discharge: HOME | End: 2018-12-01
Payer: MEDICARE

## 2018-11-29 ENCOUNTER — ANESTHESIA (OUTPATIENT)
Dept: MRI IMAGING | Age: 68
End: 2018-11-29

## 2018-11-29 VITALS
HEIGHT: 62 IN | HEART RATE: 75 BPM | SYSTOLIC BLOOD PRESSURE: 101 MMHG | TEMPERATURE: 97 F | WEIGHT: 255.73 LBS | BODY MASS INDEX: 47.06 KG/M2 | RESPIRATION RATE: 16 BRPM | DIASTOLIC BLOOD PRESSURE: 84 MMHG | OXYGEN SATURATION: 94 %

## 2018-11-29 VITALS
OXYGEN SATURATION: 95 % | RESPIRATION RATE: 17 BRPM | DIASTOLIC BLOOD PRESSURE: 55 MMHG | TEMPERATURE: 98.1 F | SYSTOLIC BLOOD PRESSURE: 126 MMHG | HEART RATE: 76 BPM

## 2018-11-29 VITALS
OXYGEN SATURATION: 90 % | TEMPERATURE: 97.7 F | DIASTOLIC BLOOD PRESSURE: 54 MMHG | SYSTOLIC BLOOD PRESSURE: 115 MMHG | RESPIRATION RATE: 11 BRPM

## 2018-11-29 DIAGNOSIS — M51.37 DEGENERATION OF LUMBOSACRAL INTERVERTEBRAL DISC: ICD-10-CM

## 2018-11-29 DIAGNOSIS — N25.9 RENAL TUBULAR DISORDER: ICD-10-CM

## 2018-11-29 LAB
EKG ATRIAL RATE: 67 BPM
EKG P AXIS: 53 DEGREES
EKG P-R INTERVAL: 176 MS
EKG Q-T INTERVAL: 418 MS
EKG QRS DURATION: 80 MS
EKG QTC CALCULATION (BAZETT): 441 MS
EKG R AXIS: 67 DEGREES
EKG T AXIS: 41 DEGREES
EKG VENTRICULAR RATE: 67 BPM
GFR NON-AFRICAN AMERICAN: 24 ML/MIN
GFR SERPL CREATININE-BSD FRML MDRD: 29 ML/MIN
GFR SERPL CREATININE-BSD FRML MDRD: ABNORMAL ML/MIN/{1.73_M2}
GLUCOSE BLD-MCNC: 138 MG/DL (ref 65–105)
GLUCOSE BLD-MCNC: 149 MG/DL (ref 74–100)
POC CHLORIDE: 107 MMOL/L (ref 98–107)
POC CREATININE: 2.04 MG/DL (ref 0.51–1.19)
POC HEMATOCRIT: 40 % (ref 36–46)
POC HEMOGLOBIN: 13.6 G/DL (ref 12–16)
POC IONIZED CALCIUM: 1.17 MMOL/L (ref 1.15–1.33)
POC POTASSIUM: 5.1 MMOL/L (ref 3.5–4.5)
POC SODIUM: 140 MMOL/L (ref 138–146)

## 2018-11-29 PROCEDURE — 74181 MRI ABDOMEN W/O CONTRAST: CPT

## 2018-11-29 PROCEDURE — 7100000001 HC PACU RECOVERY - ADDTL 15 MIN

## 2018-11-29 PROCEDURE — 76937 US GUIDE VASCULAR ACCESS: CPT

## 2018-11-29 PROCEDURE — 84295 ASSAY OF SERUM SODIUM: CPT

## 2018-11-29 PROCEDURE — 82330 ASSAY OF CALCIUM: CPT

## 2018-11-29 PROCEDURE — 82435 ASSAY OF BLOOD CHLORIDE: CPT

## 2018-11-29 PROCEDURE — 84132 ASSAY OF SERUM POTASSIUM: CPT

## 2018-11-29 PROCEDURE — 93005 ELECTROCARDIOGRAM TRACING: CPT

## 2018-11-29 PROCEDURE — 82565 ASSAY OF CREATININE: CPT

## 2018-11-29 PROCEDURE — 85014 HEMATOCRIT: CPT

## 2018-11-29 PROCEDURE — 6360000002 HC RX W HCPCS: Performed by: NURSE ANESTHETIST, CERTIFIED REGISTERED

## 2018-11-29 PROCEDURE — 2580000003 HC RX 258: Performed by: UROLOGY

## 2018-11-29 PROCEDURE — 3700000000 HC ANESTHESIA ATTENDED CARE

## 2018-11-29 PROCEDURE — 6360000002 HC RX W HCPCS: Performed by: ANESTHESIOLOGY

## 2018-11-29 PROCEDURE — 7100000010 HC PHASE II RECOVERY - FIRST 15 MIN

## 2018-11-29 PROCEDURE — 7100000000 HC PACU RECOVERY - FIRST 15 MIN

## 2018-11-29 PROCEDURE — 82947 ASSAY GLUCOSE BLOOD QUANT: CPT

## 2018-11-29 PROCEDURE — 2500000003 HC RX 250 WO HCPCS: Performed by: NURSE ANESTHETIST, CERTIFIED REGISTERED

## 2018-11-29 PROCEDURE — 3700000001 HC ADD 15 MINUTES (ANESTHESIA)

## 2018-11-29 PROCEDURE — 6360000002 HC RX W HCPCS

## 2018-11-29 PROCEDURE — 72148 MRI LUMBAR SPINE W/O DYE: CPT

## 2018-11-29 RX ORDER — PROPOFOL 10 MG/ML
INJECTION, EMULSION INTRAVENOUS PRN
Status: DISCONTINUED | OUTPATIENT
Start: 2018-11-29 | End: 2018-11-29 | Stop reason: SDUPTHER

## 2018-11-29 RX ORDER — SODIUM CHLORIDE, SODIUM LACTATE, POTASSIUM CHLORIDE, CALCIUM CHLORIDE 600; 310; 30; 20 MG/100ML; MG/100ML; MG/100ML; MG/100ML
INJECTION, SOLUTION INTRAVENOUS CONTINUOUS
Status: DISCONTINUED | OUTPATIENT
Start: 2018-11-29 | End: 2018-12-02 | Stop reason: HOSPADM

## 2018-11-29 RX ORDER — ALBUTEROL SULFATE 2.5 MG/3ML
2.5 SOLUTION RESPIRATORY (INHALATION) ONCE
Status: COMPLETED | OUTPATIENT
Start: 2018-11-29 | End: 2018-11-29

## 2018-11-29 RX ORDER — FENTANYL CITRATE 50 UG/ML
INJECTION, SOLUTION INTRAMUSCULAR; INTRAVENOUS PRN
Status: DISCONTINUED | OUTPATIENT
Start: 2018-11-29 | End: 2018-11-29 | Stop reason: SDUPTHER

## 2018-11-29 RX ORDER — LIDOCAINE HYDROCHLORIDE 10 MG/ML
INJECTION, SOLUTION EPIDURAL; INFILTRATION; INTRACAUDAL; PERINEURAL PRN
Status: DISCONTINUED | OUTPATIENT
Start: 2018-11-29 | End: 2018-11-29 | Stop reason: SDUPTHER

## 2018-11-29 RX ORDER — ALBUTEROL SULFATE 2.5 MG/3ML
SOLUTION RESPIRATORY (INHALATION)
Status: DISPENSED
Start: 2018-11-29 | End: 2018-11-29

## 2018-11-29 RX ORDER — PROPOFOL 10 MG/ML
INJECTION, EMULSION INTRAVENOUS CONTINUOUS PRN
Status: DISCONTINUED | OUTPATIENT
Start: 2018-11-29 | End: 2018-11-29 | Stop reason: SDUPTHER

## 2018-11-29 RX ORDER — ALBUTEROL SULFATE 2.5 MG/3ML
SOLUTION RESPIRATORY (INHALATION)
Status: COMPLETED
Start: 2018-11-29 | End: 2018-11-29

## 2018-11-29 RX ORDER — ONDANSETRON 2 MG/ML
INJECTION INTRAMUSCULAR; INTRAVENOUS PRN
Status: DISCONTINUED | OUTPATIENT
Start: 2018-11-29 | End: 2018-11-29 | Stop reason: SDUPTHER

## 2018-11-29 RX ORDER — MIDAZOLAM HYDROCHLORIDE 1 MG/ML
2 INJECTION INTRAMUSCULAR; INTRAVENOUS
Status: CANCELLED | OUTPATIENT
Start: 2018-11-29 | End: 2018-11-29

## 2018-11-29 RX ORDER — SODIUM CHLORIDE 0.9 % (FLUSH) 0.9 %
10 SYRINGE (ML) INJECTION EVERY 12 HOURS SCHEDULED
Status: CANCELLED | OUTPATIENT
Start: 2018-11-29

## 2018-11-29 RX ORDER — ONDANSETRON 2 MG/ML
4 INJECTION INTRAMUSCULAR; INTRAVENOUS ONCE
Status: CANCELLED | OUTPATIENT
Start: 2018-11-29 | End: 2018-11-29

## 2018-11-29 RX ORDER — MIDAZOLAM HYDROCHLORIDE 1 MG/ML
INJECTION INTRAMUSCULAR; INTRAVENOUS PRN
Status: DISCONTINUED | OUTPATIENT
Start: 2018-11-29 | End: 2018-11-29 | Stop reason: SDUPTHER

## 2018-11-29 RX ORDER — SODIUM CHLORIDE 0.9 % (FLUSH) 0.9 %
10 SYRINGE (ML) INJECTION PRN
Status: CANCELLED | OUTPATIENT
Start: 2018-11-29

## 2018-11-29 RX ADMIN — ONDANSETRON 4 MG: 2 INJECTION, SOLUTION INTRAMUSCULAR; INTRAVENOUS at 10:55

## 2018-11-29 RX ADMIN — FENTANYL CITRATE 50 MCG: 50 INJECTION INTRAMUSCULAR; INTRAVENOUS at 09:37

## 2018-11-29 RX ADMIN — PHENYLEPHRINE HYDROCHLORIDE 100 MCG: 10 INJECTION INTRAVENOUS at 10:03

## 2018-11-29 RX ADMIN — ALBUTEROL SULFATE 2.5 MG: 2.5 SOLUTION RESPIRATORY (INHALATION) at 11:50

## 2018-11-29 RX ADMIN — ALBUTEROL SULFATE 2.5 MG: 2.5 SOLUTION RESPIRATORY (INHALATION) at 11:20

## 2018-11-29 RX ADMIN — LIDOCAINE HYDROCHLORIDE 50 MG: 10 INJECTION, SOLUTION EPIDURAL; INFILTRATION; INTRACAUDAL at 09:37

## 2018-11-29 RX ADMIN — PROPOFOL 150 MCG/KG/MIN: 10 INJECTION, EMULSION INTRAVENOUS at 09:45

## 2018-11-29 RX ADMIN — MIDAZOLAM HYDROCHLORIDE 2 MG: 1 INJECTION, SOLUTION INTRAMUSCULAR; INTRAVENOUS at 09:30

## 2018-11-29 RX ADMIN — SODIUM CHLORIDE, POTASSIUM CHLORIDE, SODIUM LACTATE AND CALCIUM CHLORIDE: 600; 310; 30; 20 INJECTION, SOLUTION INTRAVENOUS at 08:31

## 2018-11-29 RX ADMIN — PROPOFOL 200 MG: 10 INJECTION, EMULSION INTRAVENOUS at 09:37

## 2018-11-29 RX ADMIN — SODIUM CHLORIDE, POTASSIUM CHLORIDE, SODIUM LACTATE AND CALCIUM CHLORIDE: 600; 310; 30; 20 INJECTION, SOLUTION INTRAVENOUS at 09:13

## 2018-11-29 RX ADMIN — FENTANYL CITRATE 50 MCG: 50 INJECTION INTRAMUSCULAR; INTRAVENOUS at 09:30

## 2018-11-29 ASSESSMENT — PAIN SCALES - GENERAL
PAINLEVEL_OUTOF10: 0

## 2018-11-29 ASSESSMENT — PAIN - FUNCTIONAL ASSESSMENT: PAIN_FUNCTIONAL_ASSESSMENT: 0-10

## 2018-11-29 ASSESSMENT — ENCOUNTER SYMPTOMS: STRIDOR: 0

## 2018-11-29 NOTE — H&P
mg by mouth 2 times daily. .   Yes Historical Provider, MD   torsemide (DEMADEX) 20 MG tablet Take 40 mg by mouth daily   Yes Historical Provider, MD   nebivolol (BYSTOLIC) 5 MG tablet Take 5 mg by mouth daily   Yes Historical Provider, MD   K Phos [place]-Sod Phos Di & Mono (PHOSPHA 250 NEUTRAL PO) Take 1 tablet by mouth 2 times daily   Yes Historical Provider, MD   warfarin (COUMADIN) 5 MG tablet Take 5 mg by mouth three times a week TAKES ON TUES,  THURS  AND SAT   Yes Historical Provider, MD   polyethylene glycol (GLYCOLAX) packet Take 17 g by mouth every 48 hours as needed for Constipation   Yes Historical Provider, MD   albuterol sulfate  (90 Base) MCG/ACT inhaler Inhale 2 puffs into the lungs every 6 hours as needed for Wheezing   Yes Historical Provider, MD   mometasone-formoterol (DULERA) 200-5 MCG/ACT inhaler Inhale 2 puffs into the lungs 2 times daily   Yes Historical Provider, MD   warfarin (COUMADIN) 2.5 MG tablet Take 2.5 mg by mouth Six times weekly MON, WEDS FRI, AND SUN   Yes Historical Provider, MD   acetaminophen (TYLENOL) 325 MG tablet Take 2 tablets by mouth every 4 hours as needed for Pain or Fever 3/27/18  Yes Kadi Florez,    potassium chloride (KLOR-CON M10) 10 MEQ extended release tablet Take 1 tablet by mouth 2 times daily    Yes Historical Provider, MD   insulin NPH (NOVOLIN N) 100 UNIT/ML injection vial Inject 10 Units into the skin every morning Hold for blood sugar less than 120, took 5 units am of surgery 2/12 as instructed per Dr. Elizabeth Prince   Yes Historical Provider, MD   insulin NPH (NOVOLIN N) 100 UNIT/ML injection vial Inject 50 Units into the skin Daily with supper Hold for blood sugar less than 120   Yes Historical Provider, MD   rosuvastatin (CRESTOR) 40 MG tablet Take 40 mg by mouth daily    Yes Historical Provider, MD   metoclopramide (REGLAN) 5 MG tablet Take 5 mg by mouth 4 times daily    Yes Historical Provider, MD   omeprazole (PRILOSEC) 20 MG capsule Take 20 mg by mouth 2 times daily    Yes Historical Provider, MD   Insulin Aspart (NOVOLOG FLEXPEN SC) Inject into the skin 3 times daily as needed (sliding scale)    Yes Historical Provider, MD   folic acid (FOLVITE) 1 MG tablet Take 1 tablet by mouth daily. 11/27/13  Yes Adams Matthew MD   spironolactone (ALDACTONE) 100 MG tablet Take 100 mg by mouth daily    Yes Historical Provider, MD   levothyroxine (SYNTHROID) 112 MCG tablet Take 112 mcg by mouth Daily    Yes Historical Provider, MD   torsemide (DEMADEX) 5 MG tablet Take 5 mg by mouth daily as needed    Historical Provider, MD   magnesium oxide (MAG-OX) 400 MG tablet Take 400 mg by mouth daily LAST DOSE 02/2018 ON HOLD AT THIS TIME    Historical Provider, MD   albuterol (PROVENTIL) (2.5 MG/3ML) 0.083% nebulizer solution Take 3 mLs by nebulization every 4 hours as needed for Wheezing 3/27/18   Suni Zacarias DO   valACYclovir (VALTREX) 1 G tablet Take 1,000 mg by mouth 2 times daily as needed Indications: 10 day course when needed    Historical Provider, MD   calcitRIOL (ROCALTROL) 0.25 MCG capsule Take 0.25 mcg by mouth three times a week LAST DOSE 02/2018    Historical Provider, MD   Cholecalciferol (VITAMIN D3) 1000 UNITS TABS Take 1,000 Units by mouth Five times weekly LAST DOSE 02/2018    Historical Provider, MD     Allergies  is allergic to pcn [penicillins]; nsaids; and prolia [denosumab]. Family History  family history includes Cancer in her maternal aunt, mother, and sister; Heart Disease in her father. Social History   reports that she quit smoking about 10 years ago. She has a 43.00 pack-year smoking history. She has never used smokeless tobacco.  1 ppd for 42 years, quit 8 years ago. reports that she drinks alcohol. Social/occasional.   reports that she does not use drugs. Marital Status   Occupation none    OBJECTIVE:   VITALS:  height is 5' 2\" (1.575 m) and weight is 255 lb 11.7 oz (116 kg). Her temporal temperature is 97.9 °F (36.6 °C).

## 2018-11-29 NOTE — ANESTHESIA PRE PROCEDURE
Department of Anesthesiology  Preprocedure Note       Name:  Serena Johnson   Age:  77 y.o.  :  1950                                          MRN:  [id number]         Date:  2018      Surgeon: * No surgeons listed *    Procedure: Procedure(s):  MRI ABDOMEN WO CONTRAST    Medications prior to admission:   Prior to Admission medications    Medication Sig Start Date End Date Taking? Authorizing Provider   pregabalin (LYRICA) 150 MG capsule Take 150 mg by mouth 2 times daily. Pilar Davila     Historical Provider, MD   torsemide (DEMADEX) 20 MG tablet Take 40 mg by mouth daily    Historical Provider, MD   torsemide (DEMADEX) 5 MG tablet Take 5 mg by mouth daily as needed    Historical Provider, MD   nebivolol (BYSTOLIC) 5 MG tablet Take 5 mg by mouth daily    Historical Provider, MD   K Phos [place]-Sod Phos Di & Mono (PHOSPHA 250 NEUTRAL PO) Take 1 tablet by mouth 2 times daily    Historical Provider, MD   warfarin (COUMADIN) 5 MG tablet Take 5 mg by mouth three times a week TAKES ON TUES,  THURS  AND SAT    Historical Provider, MD   polyethylene glycol (GLYCOLAX) packet Take 17 g by mouth every 48 hours as needed for Constipation    Historical Provider, MD   albuterol sulfate  (90 Base) MCG/ACT inhaler Inhale 2 puffs into the lungs every 6 hours as needed for Wheezing    Historical Provider, MD   mometasone-formoterol (DULERA) 200-5 MCG/ACT inhaler Inhale 2 puffs into the lungs 2 times daily    Historical Provider, MD   warfarin (COUMADIN) 2.5 MG tablet Take 2.5 mg by mouth Six times weekly MON,  FRI, AND SUN    Historical Provider, MD   magnesium oxide (MAG-OX) 400 MG tablet Take 400 mg by mouth daily LAST DOSE 2018 ON HOLD AT THIS TIME    Historical Provider, MD   acetaminophen (TYLENOL) 325 MG tablet Take 2 tablets by mouth every 4 hours as needed for Pain or Fever 3/27/18   Keven Brown DO   albuterol (PROVENTIL) (2.5 MG/3ML) 0.083% nebulizer solution Take 3 mLs by nebulization every 4 hours as needed 5 MG tablet Take 5 mg by mouth daily as needed      nebivolol (BYSTOLIC) 5 MG tablet Take 5 mg by mouth daily      K Phos [place]-Sod Phos Di & Mono (PHOSPHA 250 NEUTRAL PO) Take 1 tablet by mouth 2 times daily      warfarin (COUMADIN) 5 MG tablet Take 5 mg by mouth three times a week TAKES ON TUES,  THURS  AND SAT      polyethylene glycol (GLYCOLAX) packet Take 17 g by mouth every 48 hours as needed for Constipation      albuterol sulfate  (90 Base) MCG/ACT inhaler Inhale 2 puffs into the lungs every 6 hours as needed for Wheezing      mometasone-formoterol (DULERA) 200-5 MCG/ACT inhaler Inhale 2 puffs into the lungs 2 times daily      warfarin (COUMADIN) 2.5 MG tablet Take 2.5 mg by mouth Six times weekly MON, WEDS FRI, AND SUN      magnesium oxide (MAG-OX) 400 MG tablet Take 400 mg by mouth daily LAST DOSE 02/2018 ON HOLD AT THIS TIME      acetaminophen (TYLENOL) 325 MG tablet Take 2 tablets by mouth every 4 hours as needed for Pain or Fever 120 tablet 3    albuterol (PROVENTIL) (2.5 MG/3ML) 0.083% nebulizer solution Take 3 mLs by nebulization every 4 hours as needed for Wheezing 120 each 3    potassium chloride (KLOR-CON M10) 10 MEQ extended release tablet Take 1 tablet by mouth 2 times daily       insulin NPH (NOVOLIN N) 100 UNIT/ML injection vial Inject 10 Units into the skin every morning Hold for blood sugar less than 120, took 5 units am of surgery 2/12 as instructed per Dr. Ko Sims      insulin NPH (NOVOLIN N) 100 UNIT/ML injection vial Inject 50 Units into the skin Daily with supper Hold for blood sugar less than 120      valACYclovir (VALTREX) 1 G tablet Take 1,000 mg by mouth 2 times daily as needed Indications: 10 day course when needed      rosuvastatin (CRESTOR) 40 MG tablet Take 40 mg by mouth daily       metoclopramide (REGLAN) 5 MG tablet Take 5 mg by mouth 4 times daily       calcitRIOL (ROCALTROL) 0.25 MCG capsule Take 0.25 mcg by mouth three times a week LAST DOSE 02/2018

## 2018-12-03 ENCOUNTER — HOSPITAL ENCOUNTER (OUTPATIENT)
Dept: PHYSICAL THERAPY | Facility: CLINIC | Age: 68
Setting detail: THERAPIES SERIES
Discharge: HOME | End: 2018-12-03
Payer: MEDICARE

## 2018-12-03 PROCEDURE — 97140 MANUAL THERAPY 1/> REGIONS: CPT

## 2018-12-03 NOTE — FLOWSHEET NOTE
wipe, consent obtained, standard procedure followed: 53 total: B , RCinsertion with  0.5\" B (3 total EA);  1\" dorsal scapular B; 0.5\"-1.0\" needles in cervical and thoracic paraspinalsbilaterally bilaterally to T6  Level with cervical points; 1\" spinal accessory bilaterally; 5 0.5\" B lateral border of scapulae; 2\" R suprascapular homeostatic point   left in situ 12 min. Not today: Kinesiotape: unloading R UT, deltoid assist and infraspinatus assist; applied spray adhesive due to patient reporting tape does not last more than the day she is here. Not today: Discussed fall prevention: patient has Aneta/echo and is able to call anyone if she were to fall at home; patient also carries a phone with her at all times; Specific Instructions for next treatment: continue with ONELIA TAN        Treatment Charges: Mins Units   []  Modalities: stim     []  Ther Exercise     [x]  Manual Therapy 35 2   []  Ther Activities     []  Aquatics     []  Vasocompression     []  Other     Total Treatment time 35 2   Medicare tracking: (0YBL12.82) (2man/stim58.33) (3man69.05) as of 12/3/18: $2,982.96 (KX)    Assessment:   [] Progressing toward goals:   [x] No change: this date. no worsening of symptoms or increase in pain overall B shoulders except with reaching    [] Other:    Problems:    [x] ? Pain: 7/10 R shoulder  [x] ? ROM: R shoulder abduction with pain  [x] ? Strength: mild loss of strength R shoulder; poor scapular stability  [x] ? Function:71% affected function (29% of normal)  [x] Other: mm tightness c-spine/traps; significant trigger points     STG: (to be met in 6 treatments)  1. ? Pain: below 6/10 R shoulder: ONGOING 10/11/17   2. ? ROM: at least 100 degrees shoulder abduction   3. ? Strength: be able to do scapular strength ex without exacerbating symptoms: MET 10/11/17  4. ? Function: modify activities to decrease stress to R shoulder: MET 10/11/17  5.  Independent with Home Exercise Program: MET 10/11/17

## 2018-12-10 ENCOUNTER — HOSPITAL ENCOUNTER (OUTPATIENT)
Dept: PHYSICAL THERAPY | Facility: CLINIC | Age: 68
Setting detail: THERAPIES SERIES
Discharge: HOME | End: 2018-12-10
Payer: MEDICARE

## 2018-12-10 PROCEDURE — 97140 MANUAL THERAPY 1/> REGIONS: CPT

## 2018-12-10 NOTE — FLOWSHEET NOTE
Precautions: blood thinners; hepatitis C 2008      Not today:    9/19/18: active: R shoulder flexion 148; abduction: 140; IR T6: ER: 80 degrees; PROM WFL; strength flexion/abduction 4/5  L shoulder active flexion: 130; abduction: 98 degrees; IR T5; ER 80 degrees; PROM WFL; strength flexion/abduction 4/5  7/9/18: R shoulder flexion: 135; abduction 121 degrees: IR bra line: ER:80 degrees, arm at side; pain with movement; L shoulder flexion: 144 degrees; abduction 143 degrees; IR: T5; L ER: 55 degrees  5/16/18: shoulder flexion R 125, abduction 99, L shoulder flexion 126, abduction 99 AROM; IR/ER WNL AROM with arms at side; Not today: PROM:  Flexion 100, abduction 100 c pain over supraspinatus in each direction. IR/ER at 0 abduction WNL, 45 degrees abduction pain into IR/ER with passive ROM  AROM 11/13/17: R shoulder: 110 degrees flexion; abduction 105 degrees active;    Exercises:   Exercise Reps/ Time Weight/ Level Comments   Chest stretches/various x  See HEP sheets 10/29/18    SHOULDER extension 10 A Standing, bending over; started 10/2/17    rows 10 A Standing, bending over; started 10/2/17    shoulder flexion, abduction, IR,ER isometrics 5 ea gentle Started 10/2/17             Side ER 20/8 A/1  added 10/11/17 (not today due to increase symptoms recently)   Other:    Not today: Therapeutic activities: discussed proper head support when sleeping in recliner; pt does have pillow that she has molded to head; pt advised to not allow head to alter from neutral position    11/8/17: UEFS: 30% of normal/70% affected  1/3/18: UEFS: 41.25% of normal/58.75% affected  5/16/18: UEFS:28.75% of normal/71.25% affected  7/9/18: UEFS: 45% OF NORMAL/55% AFFECTED    MFR: UTs, c-paraspinals, scalenes, scapulae concentrating on supra and infraspinatus, pec minors and shoulders with pt in seated position, prior to DN, 15 min    8/27/18: pt brought purchased theracane and PT instructed in its use.  8/20/18: reviewed theracane-pt ordered one and it is arriving today  8/13/18: instructed in use of theracane    DN:prepped area with alcohol wipe, consent obtained, standard procedure followed: 58 total: B , RCinsertion with  0.5\" B (3 total EA);  1\" dorsal scapular B; 0.5\"-1.0\" needles in cervical and thoracic paraspinalsbilaterally bilaterally to T6  Level with cervical points; 1\" spinal accessory bilaterally; 5 0.5\" B lateral border of scapulae; 2 2\"  suprascapular homeostatic point  Perpendicular and horizontal left in situ 15 min. Not today: Kinesiotape: unloading R UT, deltoid assist and infraspinatus assist; applied spray adhesive due to patient reporting tape does not last more than the day she is here. Not today: Discussed fall prevention: patient has Aneta/echo and is able to call anyone if she were to fall at home; patient also carries a phone with her at all times; Specific Instructions for next treatment: continue with ONELIA TAN        Treatment Charges: Mins Units   []  Modalities: stim     []  Ther Exercise     [x]  Manual Therapy 35 2   []  Ther Activities     []  Aquatics     []  Vasocompression     []  Other     Total Treatment time 35 2   Medicare tracking: (2RQG11.78) (2man/stim58.33) (3man69.05) as of 12/10/18: $3,030.89 (KX)    Assessment:   [x] Progressing toward goals: preventing loss of function and sleep with pain control using the above treatment   [] No change:    [] Other:    Problems:    [x] ? Pain: 7/10 R shoulder  [x] ? ROM: R shoulder abduction with pain  [x] ? Strength: mild loss of strength R shoulder; poor scapular stability  [x] ?  Function:71% affected function (29% of normal)  [x] Other: mm tightness c-spine/traps; significant trigger points     STG: (to be met in 6 treatments)  1. ? Pain: below 6/10 R shoulder: ONGOING 10/11/17   2. ? ROM: at least 100 degrees shoulder abduction   3. ? Strength: be able to do scapular strength ex without exacerbating symptoms: MET 10/11/17  4. ? Function: modify

## 2018-12-19 ENCOUNTER — HOSPITAL ENCOUNTER (OUTPATIENT)
Dept: PHARMACY | Age: 68
Setting detail: THERAPIES SERIES
Discharge: HOME | End: 2018-12-19
Payer: MEDICARE

## 2018-12-19 ENCOUNTER — HOSPITAL ENCOUNTER (OUTPATIENT)
Dept: PHYSICAL THERAPY | Facility: CLINIC | Age: 68
Setting detail: THERAPIES SERIES
Discharge: HOME | End: 2018-12-19
Payer: MEDICARE

## 2018-12-19 DIAGNOSIS — I48.91 ATRIAL FIBRILLATION, UNSPECIFIED TYPE (HCC): ICD-10-CM

## 2018-12-19 LAB
INR BLD: 3.1
PROTIME: 37.4 SECONDS

## 2018-12-19 PROCEDURE — 99211 OFF/OP EST MAY X REQ PHY/QHP: CPT

## 2018-12-19 PROCEDURE — 85610 PROTHROMBIN TIME: CPT

## 2018-12-19 PROCEDURE — 97140 MANUAL THERAPY 1/> REGIONS: CPT

## 2018-12-26 ENCOUNTER — HOSPITAL ENCOUNTER (OUTPATIENT)
Dept: PHYSICAL THERAPY | Facility: CLINIC | Age: 68
Setting detail: THERAPIES SERIES
Discharge: HOME | End: 2018-12-26
Payer: MEDICARE

## 2018-12-26 PROCEDURE — 97140 MANUAL THERAPY 1/> REGIONS: CPT

## 2018-12-26 NOTE — FLOWSHEET NOTE
10/11/17  4. ? Function: modify activities to decrease stress to R shoulder: MET 10/11/17  5. Independent with Home Exercise Program: MET 10/11/17   6. Demonstrate Knowledge of fall prevention: MET 10/11/17     LTG: (to be met in 12 treatments): as of 12/4/17 NOT YET MET; continue x 12 visits to #24; continue towards goal #44; continue to visit #64; continue goals x 20 visits to visit #84  1. Pain below 5/10: NEARLY MET 10/1/18  2. Decrease reliance on shoulder injections: MEETING 9/19/18  3. Have at least 110 degrees shoulder abduction to indicate decreased impingement and improve reaching ability for light objects: MET 9/19/18   4.  Improved scapular position within 2 cm of opposite side         Patient goals: \"decreased pain initially then better movement\"     G-Codes: set on 11/5/18, visit #70  Functional Limitation: carrying, moving and handling objects  Functional Assessment Used:   Current Status Modifier: CK  Goal Status Modifier: Patrecia Agent    G-Codes: set on 8/27/18, visit #60  Functional Limitation: carrying, moving and handling objects  Functional Assessment Used:   Current Status Modifier: CK  Goal Status Modifier: Patrecia Agent    G-Codes: set on 7/9/18, visit #50  Functional Limitation: carrying, moving and handling objects  Functional Assessment Used: UEFS  Current Status Modifier: CK  Goal Status Modifier: CJ     G-Codes: set on 5/16/18, visit #40  Functional Limitation: carrying, moving and handling objects  Functional Assessment Used: UEFS  Current Status Modifier: CL  Goal Status Modifier: CK      G-Codes: set on 3/7/18, visit #30  Functional Limitation: carrying, moving and handling objects  Functional Assessment Used: UEFS  Current Status Modifier: CK  Goal Status Modifier: Patrecia Agent      G-Codes: set on 1/3/18, visit #20  Functional Limitation: carrying, moving and handling objects  Functional Assessment Used: UEFS   Current Status Modifier: CK  Goal Status Modifier: Patrecia Agent      G-Codes: set on 11/8/17, visit

## 2018-12-27 ENCOUNTER — HOSPITAL ENCOUNTER (OUTPATIENT)
Age: 68
Setting detail: SPECIMEN
Discharge: HOME | End: 2018-12-27
Payer: MEDICARE

## 2018-12-27 LAB
ALBUMIN SERPL-MCNC: 4.2 G/DL (ref 3.5–5.2)
ANION GAP SERPL CALCULATED.3IONS-SCNC: 16 MMOL/L (ref 9–17)
BILIRUBIN URINE: NEGATIVE
BUN BLDV-MCNC: 27 MG/DL (ref 8–23)
BUN/CREAT BLD: ABNORMAL (ref 9–20)
CALCIUM SERPL-MCNC: 9.9 MG/DL (ref 8.6–10.4)
CHLORIDE BLD-SCNC: 103 MMOL/L (ref 98–107)
CO2: 25 MMOL/L (ref 20–31)
COLOR: YELLOW
COMMENT UA: NORMAL
CREAT SERPL-MCNC: 1.88 MG/DL (ref 0.5–0.9)
CREATININE URINE: 91.1 MG/DL (ref 28–217)
GFR AFRICAN AMERICAN: 32 ML/MIN
GFR NON-AFRICAN AMERICAN: 26 ML/MIN
GFR SERPL CREATININE-BSD FRML MDRD: ABNORMAL ML/MIN/{1.73_M2}
GFR SERPL CREATININE-BSD FRML MDRD: ABNORMAL ML/MIN/{1.73_M2}
GLUCOSE BLD-MCNC: 123 MG/DL (ref 70–99)
GLUCOSE URINE: NEGATIVE
HCT VFR BLD CALC: 41 % (ref 36.3–47.1)
HEMOGLOBIN: 13.2 G/DL (ref 11.9–15.1)
KETONES, URINE: NEGATIVE
LEUKOCYTE ESTERASE, URINE: NEGATIVE
MAGNESIUM: 2.3 MG/DL (ref 1.6–2.6)
MCH RBC QN AUTO: 33.2 PG (ref 25.2–33.5)
MCHC RBC AUTO-ENTMCNC: 32.2 G/DL (ref 28.4–34.8)
MCV RBC AUTO: 103.3 FL (ref 82.6–102.9)
MICROALBUMIN/CREAT 24H UR: <12 MG/L
MICROALBUMIN/CREAT UR-RTO: NORMAL MCG/MG CREAT
NITRITE, URINE: NEGATIVE
NRBC AUTOMATED: 0 PER 100 WBC
PDW BLD-RTO: 15.2 % (ref 11.8–14.4)
PH UA: 7.5 (ref 5–8)
PHOSPHORUS: 3.2 MG/DL (ref 2.6–4.5)
PLATELET # BLD: 165 K/UL (ref 138–453)
PMV BLD AUTO: 12.4 FL (ref 8.1–13.5)
POTASSIUM SERPL-SCNC: 4.8 MMOL/L (ref 3.7–5.3)
PROTEIN UA: NEGATIVE
PTH INTACT: 188.9 PG/ML (ref 15–65)
RBC # BLD: 3.97 M/UL (ref 3.95–5.11)
SODIUM BLD-SCNC: 144 MMOL/L (ref 135–144)
SPECIFIC GRAVITY UA: 1.02 (ref 1–1.03)
TURBIDITY: CLEAR
URINE HGB: NEGATIVE
UROBILINOGEN, URINE: NORMAL
VITAMIN D 25-HYDROXY: 40.7 NG/ML (ref 30–100)
WBC # BLD: 7.3 K/UL (ref 3.5–11.3)

## 2018-12-28 LAB
THYROXINE, FREE: 1.22 NG/DL (ref 0.93–1.7)
TSH SERPL DL<=0.05 MIU/L-ACNC: 1.5 MIU/L (ref 0.3–5)

## 2018-12-31 ENCOUNTER — HOSPITAL ENCOUNTER (OUTPATIENT)
Dept: PHARMACY | Age: 68
Setting detail: THERAPIES SERIES
Discharge: HOME | End: 2018-12-31
Payer: MEDICARE

## 2018-12-31 ENCOUNTER — HOSPITAL ENCOUNTER (OUTPATIENT)
Dept: PHYSICAL THERAPY | Facility: CLINIC | Age: 68
Setting detail: THERAPIES SERIES
Discharge: HOME | End: 2018-12-31
Payer: MEDICARE

## 2018-12-31 DIAGNOSIS — I48.91 ATRIAL FIBRILLATION, UNSPECIFIED TYPE (HCC): ICD-10-CM

## 2018-12-31 LAB
INR BLD: 4.8
PROTIME: 57.6 SECONDS

## 2018-12-31 PROCEDURE — 97140 MANUAL THERAPY 1/> REGIONS: CPT

## 2018-12-31 PROCEDURE — 85610 PROTHROMBIN TIME: CPT

## 2018-12-31 PROCEDURE — 99212 OFFICE O/P EST SF 10 MIN: CPT

## 2019-01-07 ENCOUNTER — HOSPITAL ENCOUNTER (OUTPATIENT)
Dept: PHARMACY | Age: 69
Setting detail: THERAPIES SERIES
Discharge: HOME | End: 2019-01-07
Payer: MEDICARE

## 2019-01-07 ENCOUNTER — HOSPITAL ENCOUNTER (OUTPATIENT)
Dept: PHYSICAL THERAPY | Facility: CLINIC | Age: 69
Setting detail: THERAPIES SERIES
Discharge: HOME | End: 2019-01-07
Payer: MEDICARE

## 2019-01-07 DIAGNOSIS — I48.91 ATRIAL FIBRILLATION, UNSPECIFIED TYPE (HCC): ICD-10-CM

## 2019-01-07 LAB
INR BLD: 2.5
PROTIME: 29.5 SECONDS

## 2019-01-07 PROCEDURE — 85610 PROTHROMBIN TIME: CPT

## 2019-01-07 PROCEDURE — 97140 MANUAL THERAPY 1/> REGIONS: CPT

## 2019-01-07 PROCEDURE — 99211 OFF/OP EST MAY X REQ PHY/QHP: CPT

## 2019-01-08 ENCOUNTER — TELEPHONE (OUTPATIENT)
Dept: PHARMACY | Age: 69
End: 2019-01-08

## 2019-01-14 ENCOUNTER — HOSPITAL ENCOUNTER (OUTPATIENT)
Dept: PHARMACY | Age: 69
Setting detail: THERAPIES SERIES
Discharge: HOME | End: 2019-01-14
Payer: MEDICARE

## 2019-01-14 DIAGNOSIS — I48.91 ATRIAL FIBRILLATION, UNSPECIFIED TYPE (HCC): ICD-10-CM

## 2019-01-14 LAB
INR BLD: 2.5
PROTIME: 29.4 SECONDS

## 2019-01-14 PROCEDURE — 99211 OFF/OP EST MAY X REQ PHY/QHP: CPT

## 2019-01-14 PROCEDURE — 85610 PROTHROMBIN TIME: CPT

## 2019-01-16 ENCOUNTER — HOSPITAL ENCOUNTER (OUTPATIENT)
Dept: GENERAL RADIOLOGY | Facility: CLINIC | Age: 69
Discharge: HOME | End: 2019-01-18
Payer: MEDICARE

## 2019-01-16 ENCOUNTER — HOSPITAL ENCOUNTER (OUTPATIENT)
Facility: CLINIC | Age: 69
Discharge: HOME | End: 2019-01-18
Payer: MEDICARE

## 2019-01-16 DIAGNOSIS — R06.02 SOB (SHORTNESS OF BREATH): ICD-10-CM

## 2019-01-16 PROCEDURE — 71046 X-RAY EXAM CHEST 2 VIEWS: CPT

## 2019-01-21 ENCOUNTER — HOSPITAL ENCOUNTER (OUTPATIENT)
Dept: PHYSICAL THERAPY | Facility: CLINIC | Age: 69
Setting detail: THERAPIES SERIES
Discharge: HOME | End: 2019-01-21
Payer: MEDICARE

## 2019-01-28 ENCOUNTER — HOSPITAL ENCOUNTER (OUTPATIENT)
Dept: PHARMACY | Age: 69
Setting detail: THERAPIES SERIES
Discharge: HOME | End: 2019-01-28
Payer: MEDICARE

## 2019-01-28 ENCOUNTER — HOSPITAL ENCOUNTER (OUTPATIENT)
Dept: PHYSICAL THERAPY | Facility: CLINIC | Age: 69
Setting detail: THERAPIES SERIES
Discharge: HOME | End: 2019-01-28
Payer: MEDICARE

## 2019-01-28 DIAGNOSIS — I48.91 ATRIAL FIBRILLATION, UNSPECIFIED TYPE (HCC): ICD-10-CM

## 2019-01-28 LAB
INR BLD: 1.9
PROTIME: 22.8 SECONDS

## 2019-01-28 PROCEDURE — 97140 MANUAL THERAPY 1/> REGIONS: CPT

## 2019-01-28 PROCEDURE — 99211 OFF/OP EST MAY X REQ PHY/QHP: CPT

## 2019-01-28 PROCEDURE — 85610 PROTHROMBIN TIME: CPT

## 2019-02-04 ENCOUNTER — HOSPITAL ENCOUNTER (OUTPATIENT)
Age: 69
Setting detail: SPECIMEN
Discharge: HOME | End: 2019-02-04
Payer: MEDICARE

## 2019-02-04 ENCOUNTER — HOSPITAL ENCOUNTER (OUTPATIENT)
Dept: PHYSICAL THERAPY | Facility: CLINIC | Age: 69
Setting detail: THERAPIES SERIES
Discharge: HOME | End: 2019-02-04
Payer: MEDICARE

## 2019-02-04 LAB
-: NORMAL
ABSOLUTE EOS #: 0.17 K/UL (ref 0–0.44)
ABSOLUTE IMMATURE GRANULOCYTE: 0.06 K/UL (ref 0–0.3)
ABSOLUTE LYMPH #: 1.66 K/UL (ref 1.1–3.7)
ABSOLUTE MONO #: 1.24 K/UL (ref 0.1–1.2)
ALBUMIN SERPL-MCNC: 4.2 G/DL (ref 3.5–5.2)
AMORPHOUS: NORMAL
ANION GAP SERPL CALCULATED.3IONS-SCNC: 21 MMOL/L (ref 9–17)
BACTERIA: NORMAL
BASOPHILS # BLD: 1 % (ref 0–2)
BASOPHILS ABSOLUTE: 0.14 K/UL (ref 0–0.2)
BILIRUBIN URINE: NEGATIVE
BUN BLDV-MCNC: 33 MG/DL (ref 8–23)
BUN/CREAT BLD: ABNORMAL (ref 9–20)
CALCIUM SERPL-MCNC: 10 MG/DL (ref 8.6–10.4)
CASTS UA: NORMAL /LPF (ref 0–8)
CHLORIDE BLD-SCNC: 97 MMOL/L (ref 98–107)
CO2: 20 MMOL/L (ref 20–31)
COLOR: YELLOW
COMMENT UA: ABNORMAL
CREAT SERPL-MCNC: 3.06 MG/DL (ref 0.5–0.9)
CREATININE URINE: 147.5 MG/DL (ref 28–217)
CRYSTALS, UA: NORMAL /HPF
DIFFERENTIAL TYPE: ABNORMAL
EOSINOPHILS RELATIVE PERCENT: 2 % (ref 1–4)
EPITHELIAL CELLS UA: NORMAL /HPF (ref 0–5)
GFR AFRICAN AMERICAN: 18 ML/MIN
GFR NON-AFRICAN AMERICAN: 15 ML/MIN
GFR SERPL CREATININE-BSD FRML MDRD: ABNORMAL ML/MIN/{1.73_M2}
GFR SERPL CREATININE-BSD FRML MDRD: ABNORMAL ML/MIN/{1.73_M2}
GLUCOSE FASTING: 130 MG/DL (ref 70–99)
GLUCOSE URINE: NEGATIVE
HCT VFR BLD CALC: 47.5 % (ref 36.3–47.1)
HEMOGLOBIN: 15.7 G/DL (ref 11.9–15.1)
IMMATURE GRANULOCYTES: 1 %
KETONES, URINE: NEGATIVE
LEUKOCYTE ESTERASE, URINE: ABNORMAL
LYMPHOCYTES # BLD: 15 % (ref 24–43)
MAGNESIUM: 2.6 MG/DL (ref 1.6–2.6)
MCH RBC QN AUTO: 33.5 PG (ref 25.2–33.5)
MCHC RBC AUTO-ENTMCNC: 33.1 G/DL (ref 28.4–34.8)
MCV RBC AUTO: 101.5 FL (ref 82.6–102.9)
MICROALBUMIN/CREAT 24H UR: 24 MG/L
MICROALBUMIN/CREAT UR-RTO: 16 MCG/MG CREAT
MONOCYTES # BLD: 11 % (ref 3–12)
MUCUS: NORMAL
NITRITE, URINE: NEGATIVE
NRBC AUTOMATED: 0 PER 100 WBC
OTHER OBSERVATIONS UA: NORMAL
PDW BLD-RTO: 14.1 % (ref 11.8–14.4)
PH UA: 5.5 (ref 5–8)
PHOSPHORUS: 3.7 MG/DL (ref 2.6–4.5)
PLATELET # BLD: 241 K/UL (ref 138–453)
PLATELET ESTIMATE: ABNORMAL
PMV BLD AUTO: 11.8 FL (ref 8.1–13.5)
POTASSIUM SERPL-SCNC: 4.3 MMOL/L (ref 3.7–5.3)
PROTEIN UA: NEGATIVE
PTH INTACT: 284.2 PG/ML (ref 15–65)
RBC # BLD: 4.68 M/UL (ref 3.95–5.11)
RBC # BLD: ABNORMAL 10*6/UL
RBC UA: NORMAL /HPF (ref 0–4)
RENAL EPITHELIAL, UA: NORMAL /HPF
SEG NEUTROPHILS: 70 % (ref 36–65)
SEGMENTED NEUTROPHILS ABSOLUTE COUNT: 8 K/UL (ref 1.5–8.1)
SODIUM BLD-SCNC: 138 MMOL/L (ref 135–144)
SPECIFIC GRAVITY UA: 1.01 (ref 1–1.03)
TRICHOMONAS: NORMAL
TURBIDITY: CLEAR
URINE HGB: NEGATIVE
UROBILINOGEN, URINE: NORMAL
VITAMIN D 25-HYDROXY: 43.7 NG/ML (ref 30–100)
WBC # BLD: 11.3 K/UL (ref 3.5–11.3)
WBC # BLD: ABNORMAL 10*3/UL
WBC UA: NORMAL /HPF (ref 0–5)
YEAST: NORMAL

## 2019-02-04 PROCEDURE — 97140 MANUAL THERAPY 1/> REGIONS: CPT

## 2019-02-11 ENCOUNTER — HOSPITAL ENCOUNTER (OUTPATIENT)
Dept: PHYSICAL THERAPY | Facility: CLINIC | Age: 69
Setting detail: THERAPIES SERIES
Discharge: HOME | End: 2019-02-11
Payer: MEDICARE

## 2019-02-11 ENCOUNTER — APPOINTMENT (OUTPATIENT)
Dept: GENERAL RADIOLOGY | Age: 69
DRG: 683 | End: 2019-02-11
Payer: MEDICARE

## 2019-02-11 ENCOUNTER — HOSPITAL ENCOUNTER (INPATIENT)
Age: 69
LOS: 3 days | Discharge: NURSING, REHAB OR LONG-TERM CARE | DRG: 683 | End: 2019-02-14
Attending: EMERGENCY MEDICINE | Admitting: INTERNAL MEDICINE
Payer: MEDICARE

## 2019-02-11 DIAGNOSIS — Z86.718 HISTORY OF DVT (DEEP VEIN THROMBOSIS): ICD-10-CM

## 2019-02-11 DIAGNOSIS — N17.9 ACUTE KIDNEY INJURY (HCC): Primary | ICD-10-CM

## 2019-02-11 DIAGNOSIS — I48.91 ATRIAL FIBRILLATION, UNSPECIFIED TYPE (HCC): ICD-10-CM

## 2019-02-11 PROBLEM — M47.812 CERVICAL SPONDYLOSIS: Status: ACTIVE | Noted: 2018-09-27

## 2019-02-11 PROBLEM — E05.00 GRAVES' DISEASE: Status: ACTIVE | Noted: 2019-02-11

## 2019-02-11 PROBLEM — E66.01 MORBID OBESITY (HCC): Status: ACTIVE | Noted: 2019-02-11

## 2019-02-11 PROBLEM — I82.90 VENOUS THROMBOSIS OF LOWER EXTREMITY: Status: ACTIVE | Noted: 2019-02-11

## 2019-02-11 PROBLEM — K74.60 CIRRHOSIS OF LIVER (HCC): Status: ACTIVE | Noted: 2019-02-11

## 2019-02-11 PROBLEM — M75.00 ADHESIVE CAPSULITIS OF SHOULDER: Status: ACTIVE | Noted: 2018-01-18

## 2019-02-11 PROBLEM — I05.9 MITRAL VALVE DISEASE: Status: ACTIVE | Noted: 2019-02-11

## 2019-02-11 PROBLEM — N30.01 ACUTE CYSTITIS WITH HEMATURIA: Status: ACTIVE | Noted: 2019-02-11

## 2019-02-11 LAB
-: ABNORMAL
ABSOLUTE EOS #: 0.1 K/UL (ref 0–0.4)
ABSOLUTE IMMATURE GRANULOCYTE: ABNORMAL K/UL (ref 0–0.3)
ABSOLUTE LYMPH #: 1.4 K/UL (ref 1–4.8)
ABSOLUTE MONO #: 1.1 K/UL (ref 0.2–0.8)
AMORPHOUS: ABNORMAL
ANION GAP SERPL CALCULATED.3IONS-SCNC: 22 MMOL/L (ref 9–17)
BACTERIA: ABNORMAL
BASOPHILS # BLD: 1 % (ref 0–2)
BASOPHILS ABSOLUTE: 0.1 K/UL (ref 0–0.2)
BILIRUBIN URINE: ABNORMAL
BUN BLDV-MCNC: 56 MG/DL (ref 8–23)
BUN/CREAT BLD: 19 (ref 9–20)
CALCIUM SERPL-MCNC: 10.5 MG/DL (ref 8.6–10.4)
CASTS UA: ABNORMAL /LPF
CHLORIDE BLD-SCNC: 91 MMOL/L (ref 98–107)
CO2: 21 MMOL/L (ref 20–31)
COLOR: YELLOW
COMMENT UA: ABNORMAL
CREAT SERPL-MCNC: 2.93 MG/DL (ref 0.5–0.9)
CRYSTALS, UA: ABNORMAL /HPF
DIFFERENTIAL TYPE: ABNORMAL
EOSINOPHILS RELATIVE PERCENT: 1 % (ref 1–4)
EPITHELIAL CELLS UA: ABNORMAL /HPF (ref 0–5)
GFR AFRICAN AMERICAN: 19 ML/MIN
GFR NON-AFRICAN AMERICAN: 16 ML/MIN
GFR SERPL CREATININE-BSD FRML MDRD: ABNORMAL ML/MIN/{1.73_M2}
GFR SERPL CREATININE-BSD FRML MDRD: ABNORMAL ML/MIN/{1.73_M2}
GLUCOSE BLD-MCNC: 121 MG/DL (ref 65–105)
GLUCOSE BLD-MCNC: 124 MG/DL (ref 70–99)
GLUCOSE URINE: NEGATIVE
HCT VFR BLD CALC: 49.7 % (ref 36–46)
HEMOGLOBIN: 17.1 G/DL (ref 12–16)
IMMATURE GRANULOCYTES: ABNORMAL %
INR BLD: 2.8
KETONES, URINE: ABNORMAL
LEUKOCYTE ESTERASE, URINE: ABNORMAL
LYMPHOCYTES # BLD: 14 % (ref 24–44)
MCH RBC QN AUTO: 34 PG (ref 26–34)
MCHC RBC AUTO-ENTMCNC: 34.5 G/DL (ref 31–37)
MCV RBC AUTO: 98.7 FL (ref 80–100)
MONOCYTES # BLD: 10 % (ref 1–7)
MUCUS: ABNORMAL
MYOGLOBIN: 111 NG/ML (ref 25–58)
MYOGLOBIN: 139 NG/ML (ref 25–58)
NITRITE, URINE: NEGATIVE
NRBC AUTOMATED: ABNORMAL PER 100 WBC
OTHER OBSERVATIONS UA: ABNORMAL
PDW BLD-RTO: 14 % (ref 11.5–14.5)
PH UA: 5.5 (ref 5–8)
PLATELET # BLD: 253 K/UL (ref 130–400)
PLATELET ESTIMATE: ABNORMAL
PMV BLD AUTO: 9.9 FL (ref 6–12)
POTASSIUM SERPL-SCNC: 4.9 MMOL/L (ref 3.7–5.3)
PROTEIN UA: NEGATIVE
PROTHROMBIN TIME: 27.4 SEC (ref 9.7–11.6)
RBC # BLD: 5.03 M/UL (ref 4–5.2)
RBC # BLD: ABNORMAL 10*6/UL
RBC UA: ABNORMAL /HPF (ref 0–2)
RENAL EPITHELIAL, UA: ABNORMAL /HPF
SEG NEUTROPHILS: 74 % (ref 36–66)
SEGMENTED NEUTROPHILS ABSOLUTE COUNT: 7.9 K/UL (ref 1.8–7.7)
SODIUM BLD-SCNC: 134 MMOL/L (ref 135–144)
SPECIFIC GRAVITY UA: 1.02 (ref 1–1.03)
TRICHOMONAS: ABNORMAL
TROPONIN INTERP: ABNORMAL
TROPONIN INTERP: ABNORMAL
TROPONIN T: ABNORMAL NG/ML
TROPONIN T: ABNORMAL NG/ML
TROPONIN, HIGH SENSITIVITY: 14 NG/L (ref 0–14)
TROPONIN, HIGH SENSITIVITY: 19 NG/L (ref 0–14)
TURBIDITY: ABNORMAL
URINE HGB: NEGATIVE
UROBILINOGEN, URINE: NORMAL
WBC # BLD: 10.5 K/UL (ref 3.5–11)
WBC # BLD: ABNORMAL 10*3/UL
WBC UA: ABNORMAL /HPF (ref 0–5)
YEAST: ABNORMAL

## 2019-02-11 PROCEDURE — 6360000002 HC RX W HCPCS: Performed by: NURSE PRACTITIONER

## 2019-02-11 PROCEDURE — 6370000000 HC RX 637 (ALT 250 FOR IP): Performed by: CLINICAL NURSE SPECIALIST

## 2019-02-11 PROCEDURE — 99223 1ST HOSP IP/OBS HIGH 75: CPT | Performed by: NURSE PRACTITIONER

## 2019-02-11 PROCEDURE — 82947 ASSAY GLUCOSE BLOOD QUANT: CPT

## 2019-02-11 PROCEDURE — 81001 URINALYSIS AUTO W/SCOPE: CPT

## 2019-02-11 PROCEDURE — 84484 ASSAY OF TROPONIN QUANT: CPT

## 2019-02-11 PROCEDURE — 80048 BASIC METABOLIC PNL TOTAL CA: CPT

## 2019-02-11 PROCEDURE — 2580000003 HC RX 258: Performed by: CLINICAL NURSE SPECIALIST

## 2019-02-11 PROCEDURE — 1200000000 HC SEMI PRIVATE

## 2019-02-11 PROCEDURE — 93005 ELECTROCARDIOGRAM TRACING: CPT

## 2019-02-11 PROCEDURE — 2580000003 HC RX 258: Performed by: NURSE PRACTITIONER

## 2019-02-11 PROCEDURE — 6360000002 HC RX W HCPCS: Performed by: INTERNAL MEDICINE

## 2019-02-11 PROCEDURE — 85610 PROTHROMBIN TIME: CPT

## 2019-02-11 PROCEDURE — 99285 EMERGENCY DEPT VISIT HI MDM: CPT

## 2019-02-11 PROCEDURE — 87086 URINE CULTURE/COLONY COUNT: CPT

## 2019-02-11 PROCEDURE — 36415 COLL VENOUS BLD VENIPUNCTURE: CPT

## 2019-02-11 PROCEDURE — 96374 THER/PROPH/DIAG INJ IV PUSH: CPT

## 2019-02-11 PROCEDURE — 96375 TX/PRO/DX INJ NEW DRUG ADDON: CPT

## 2019-02-11 PROCEDURE — 71045 X-RAY EXAM CHEST 1 VIEW: CPT

## 2019-02-11 PROCEDURE — 83874 ASSAY OF MYOGLOBIN: CPT

## 2019-02-11 PROCEDURE — 94664 DEMO&/EVAL PT USE INHALER: CPT

## 2019-02-11 PROCEDURE — 85025 COMPLETE CBC W/AUTO DIFF WBC: CPT

## 2019-02-11 PROCEDURE — 94640 AIRWAY INHALATION TREATMENT: CPT

## 2019-02-11 RX ORDER — DEXTROSE MONOHYDRATE 50 MG/ML
100 INJECTION, SOLUTION INTRAVENOUS PRN
Status: DISCONTINUED | OUTPATIENT
Start: 2019-02-11 | End: 2019-02-14 | Stop reason: HOSPADM

## 2019-02-11 RX ORDER — ONDANSETRON 2 MG/ML
4 INJECTION INTRAMUSCULAR; INTRAVENOUS ONCE
Status: COMPLETED | OUTPATIENT
Start: 2019-02-11 | End: 2019-02-11

## 2019-02-11 RX ORDER — METOCLOPRAMIDE 5 MG/1
5 TABLET ORAL 4 TIMES DAILY
Status: DISCONTINUED | OUTPATIENT
Start: 2019-02-11 | End: 2019-02-11

## 2019-02-11 RX ORDER — SODIUM CHLORIDE 0.9 % (FLUSH) 0.9 %
10 SYRINGE (ML) INJECTION PRN
Status: DISCONTINUED | OUTPATIENT
Start: 2019-02-11 | End: 2019-02-14 | Stop reason: HOSPADM

## 2019-02-11 RX ORDER — ACETAMINOPHEN 325 MG/1
650 TABLET ORAL EVERY 4 HOURS PRN
Status: DISCONTINUED | OUTPATIENT
Start: 2019-02-11 | End: 2019-02-11 | Stop reason: SDUPTHER

## 2019-02-11 RX ORDER — WARFARIN SODIUM 2.5 MG/1
2.5 TABLET ORAL DAILY
Status: DISCONTINUED | OUTPATIENT
Start: 2019-02-11 | End: 2019-02-11 | Stop reason: SDUPTHER

## 2019-02-11 RX ORDER — DOCUSATE SODIUM 100 MG/1
100 CAPSULE, LIQUID FILLED ORAL 2 TIMES DAILY
Status: DISCONTINUED | OUTPATIENT
Start: 2019-02-11 | End: 2019-02-14 | Stop reason: HOSPADM

## 2019-02-11 RX ORDER — PROMETHAZINE HYDROCHLORIDE 25 MG/ML
12.5 INJECTION, SOLUTION INTRAMUSCULAR; INTRAVENOUS ONCE
Status: COMPLETED | OUTPATIENT
Start: 2019-02-11 | End: 2019-02-11

## 2019-02-11 RX ORDER — SODIUM CHLORIDE 9 MG/ML
INJECTION, SOLUTION INTRAVENOUS CONTINUOUS
Status: DISCONTINUED | OUTPATIENT
Start: 2019-02-11 | End: 2019-02-14

## 2019-02-11 RX ORDER — SODIUM CHLORIDE 0.9 % (FLUSH) 0.9 %
10 SYRINGE (ML) INJECTION EVERY 12 HOURS SCHEDULED
Status: DISCONTINUED | OUTPATIENT
Start: 2019-02-11 | End: 2019-02-14 | Stop reason: HOSPADM

## 2019-02-11 RX ORDER — WARFARIN SODIUM 2.5 MG/1
2.5 TABLET ORAL
Status: DISCONTINUED | OUTPATIENT
Start: 2019-02-11 | End: 2019-02-12

## 2019-02-11 RX ORDER — HEPARIN SODIUM 5000 [USP'U]/ML
5000 INJECTION, SOLUTION INTRAVENOUS; SUBCUTANEOUS EVERY 8 HOURS SCHEDULED
Status: DISCONTINUED | OUTPATIENT
Start: 2019-02-11 | End: 2019-02-11 | Stop reason: ALTCHOICE

## 2019-02-11 RX ORDER — SODIUM POLYSTYRENE SULFONATE 15 G/60ML
30 SUSPENSION ORAL; RECTAL
Status: ACTIVE | OUTPATIENT
Start: 2019-02-11 | End: 2019-02-11

## 2019-02-11 RX ORDER — ACETAMINOPHEN 325 MG/1
650 TABLET ORAL EVERY 4 HOURS PRN
Status: DISCONTINUED | OUTPATIENT
Start: 2019-02-11 | End: 2019-02-14 | Stop reason: HOSPADM

## 2019-02-11 RX ORDER — POLYETHYLENE GLYCOL 3350 17 G/17G
17 POWDER, FOR SOLUTION ORAL
Status: DISCONTINUED | OUTPATIENT
Start: 2019-02-11 | End: 2019-02-14 | Stop reason: HOSPADM

## 2019-02-11 RX ORDER — LEVOTHYROXINE SODIUM 112 UG/1
112 TABLET ORAL DAILY
Status: DISCONTINUED | OUTPATIENT
Start: 2019-02-12 | End: 2019-02-14 | Stop reason: HOSPADM

## 2019-02-11 RX ORDER — PROMETHAZINE HYDROCHLORIDE 25 MG/ML
12.5 INJECTION, SOLUTION INTRAMUSCULAR; INTRAVENOUS EVERY 6 HOURS PRN
Status: DISCONTINUED | OUTPATIENT
Start: 2019-02-11 | End: 2019-02-14

## 2019-02-11 RX ORDER — 0.9 % SODIUM CHLORIDE 0.9 %
500 INTRAVENOUS SOLUTION INTRAVENOUS ONCE
Status: COMPLETED | OUTPATIENT
Start: 2019-02-11 | End: 2019-02-11

## 2019-02-11 RX ORDER — SPIRONOLACTONE 100 MG/1
100 TABLET, FILM COATED ORAL DAILY
Status: DISCONTINUED | OUTPATIENT
Start: 2019-02-11 | End: 2019-02-11

## 2019-02-11 RX ORDER — ONDANSETRON 2 MG/ML
4 INJECTION INTRAMUSCULAR; INTRAVENOUS EVERY 6 HOURS PRN
Status: DISCONTINUED | OUTPATIENT
Start: 2019-02-11 | End: 2019-02-14 | Stop reason: HOSPADM

## 2019-02-11 RX ORDER — SODIUM POLYSTYRENE SULFONATE 15 G/60ML
15 SUSPENSION ORAL; RECTAL
Status: DISCONTINUED | OUTPATIENT
Start: 2019-02-11 | End: 2019-02-11

## 2019-02-11 RX ORDER — ONDANSETRON 4 MG/1
4 TABLET, ORALLY DISINTEGRATING ORAL EVERY 6 HOURS PRN
Status: DISCONTINUED | OUTPATIENT
Start: 2019-02-11 | End: 2019-02-14 | Stop reason: HOSPADM

## 2019-02-11 RX ORDER — ONDANSETRON 2 MG/ML
4 INJECTION INTRAMUSCULAR; INTRAVENOUS EVERY 6 HOURS PRN
Status: DISCONTINUED | OUTPATIENT
Start: 2019-02-11 | End: 2019-02-11 | Stop reason: CLARIF

## 2019-02-11 RX ORDER — MORPHINE SULFATE 2 MG/ML
2 INJECTION, SOLUTION INTRAMUSCULAR; INTRAVENOUS ONCE
Status: COMPLETED | OUTPATIENT
Start: 2019-02-11 | End: 2019-02-11

## 2019-02-11 RX ORDER — TORSEMIDE 10 MG/1
60 TABLET ORAL DAILY
Status: DISCONTINUED | OUTPATIENT
Start: 2019-02-11 | End: 2019-02-11

## 2019-02-11 RX ORDER — MORPHINE SULFATE 2 MG/ML
1 INJECTION, SOLUTION INTRAMUSCULAR; INTRAVENOUS ONCE
Status: COMPLETED | OUTPATIENT
Start: 2019-02-11 | End: 2019-02-11

## 2019-02-11 RX ORDER — FOLIC ACID 1 MG/1
1 TABLET ORAL DAILY
Status: DISCONTINUED | OUTPATIENT
Start: 2019-02-11 | End: 2019-02-14 | Stop reason: HOSPADM

## 2019-02-11 RX ORDER — POTASSIUM CHLORIDE 20 MEQ/1
20 TABLET, EXTENDED RELEASE ORAL 2 TIMES DAILY
Status: DISCONTINUED | OUTPATIENT
Start: 2019-02-11 | End: 2019-02-11

## 2019-02-11 RX ORDER — CEFTRIAXONE 1 G/1
1 INJECTION, POWDER, FOR SOLUTION INTRAMUSCULAR; INTRAVENOUS EVERY 24 HOURS
Status: DISCONTINUED | OUTPATIENT
Start: 2019-02-11 | End: 2019-02-11 | Stop reason: SDUPTHER

## 2019-02-11 RX ORDER — ALBUTEROL SULFATE 90 UG/1
2 AEROSOL, METERED RESPIRATORY (INHALATION) EVERY 6 HOURS PRN
Status: DISCONTINUED | OUTPATIENT
Start: 2019-02-11 | End: 2019-02-14 | Stop reason: HOSPADM

## 2019-02-11 RX ORDER — DEXTROSE MONOHYDRATE 25 G/50ML
12.5 INJECTION, SOLUTION INTRAVENOUS PRN
Status: DISCONTINUED | OUTPATIENT
Start: 2019-02-11 | End: 2019-02-14 | Stop reason: HOSPADM

## 2019-02-11 RX ORDER — NICOTINE POLACRILEX 4 MG
15 LOZENGE BUCCAL PRN
Status: DISCONTINUED | OUTPATIENT
Start: 2019-02-11 | End: 2019-02-14 | Stop reason: HOSPADM

## 2019-02-11 RX ORDER — NEBIVOLOL 5 MG/1
5 TABLET ORAL DAILY
Status: DISCONTINUED | OUTPATIENT
Start: 2019-02-11 | End: 2019-02-11 | Stop reason: CLARIF

## 2019-02-11 RX ORDER — POTASSIUM CHLORIDE 20 MEQ/1
20 TABLET, EXTENDED RELEASE ORAL 2 TIMES DAILY
Status: ON HOLD | COMMUNITY
End: 2019-02-14 | Stop reason: HOSPADM

## 2019-02-11 RX ORDER — PANTOPRAZOLE SODIUM 40 MG/1
40 TABLET, DELAYED RELEASE ORAL
Status: DISCONTINUED | OUTPATIENT
Start: 2019-02-12 | End: 2019-02-14 | Stop reason: HOSPADM

## 2019-02-11 RX ADMIN — ONDANSETRON 4 MG: 2 INJECTION INTRAMUSCULAR; INTRAVENOUS at 20:52

## 2019-02-11 RX ADMIN — CEFTRIAXONE SODIUM 1 G: 1 INJECTION, POWDER, FOR SOLUTION INTRAMUSCULAR; INTRAVENOUS at 22:50

## 2019-02-11 RX ADMIN — ACETAMINOPHEN 650 MG: 325 TABLET ORAL at 19:22

## 2019-02-11 RX ADMIN — SODIUM CHLORIDE 500 ML: 0.9 INJECTION, SOLUTION INTRAVENOUS at 09:54

## 2019-02-11 RX ADMIN — PROMETHAZINE HYDROCHLORIDE 12.5 MG: 25 INJECTION INTRAMUSCULAR; INTRAVENOUS at 22:50

## 2019-02-11 RX ADMIN — ONDANSETRON HYDROCHLORIDE 4 MG: 2 INJECTION, SOLUTION INTRAVENOUS at 09:54

## 2019-02-11 RX ADMIN — MORPHINE SULFATE 2 MG: 2 INJECTION, SOLUTION INTRAMUSCULAR; INTRAVENOUS at 15:01

## 2019-02-11 RX ADMIN — SODIUM CHLORIDE: 9 INJECTION, SOLUTION INTRAVENOUS at 18:36

## 2019-02-11 RX ADMIN — SODIUM CHLORIDE 500 ML: 0.9 INJECTION, SOLUTION INTRAVENOUS at 10:19

## 2019-02-11 RX ADMIN — Medication 1 MG: at 09:54

## 2019-02-11 RX ADMIN — Medication 10 ML: at 20:52

## 2019-02-11 RX ADMIN — PROMETHAZINE HYDROCHLORIDE 12.5 MG: 25 INJECTION INTRAMUSCULAR; INTRAVENOUS at 14:27

## 2019-02-11 ASSESSMENT — ENCOUNTER SYMPTOMS
DIARRHEA: 0
SHORTNESS OF BREATH: 0
SORE THROAT: 0
COUGH: 0
NAUSEA: 0
BACK PAIN: 0
VOMITING: 0
CHEST TIGHTNESS: 0
RHINORRHEA: 0
ABDOMINAL PAIN: 0
CONSTIPATION: 0
PHOTOPHOBIA: 0

## 2019-02-11 ASSESSMENT — PAIN DESCRIPTION - LOCATION
LOCATION: FOOT;BACK;HAND
LOCATION: BACK
LOCATION: HAND;FOOT;BACK

## 2019-02-11 ASSESSMENT — PAIN SCALES - GENERAL
PAINLEVEL_OUTOF10: 6
PAINLEVEL_OUTOF10: 6
PAINLEVEL_OUTOF10: 5
PAINLEVEL_OUTOF10: 8
PAINLEVEL_OUTOF10: 4
PAINLEVEL_OUTOF10: 5
PAINLEVEL_OUTOF10: 3

## 2019-02-11 ASSESSMENT — PAIN DESCRIPTION - ORIENTATION: ORIENTATION: LOWER

## 2019-02-11 ASSESSMENT — PAIN DESCRIPTION - PAIN TYPE: TYPE: ACUTE PAIN;NEUROPATHIC PAIN

## 2019-02-12 ENCOUNTER — APPOINTMENT (OUTPATIENT)
Dept: ULTRASOUND IMAGING | Age: 69
DRG: 683 | End: 2019-02-12
Payer: MEDICARE

## 2019-02-12 PROBLEM — E11.42 DIABETIC PERIPHERAL NEUROPATHY (HCC): Status: ACTIVE | Noted: 2019-02-12

## 2019-02-12 PROBLEM — Z79.01 ANTICOAGULATED ON COUMADIN: Status: ACTIVE | Noted: 2019-02-12

## 2019-02-12 PROBLEM — N30.00 ACUTE CYSTITIS WITHOUT HEMATURIA: Status: ACTIVE | Noted: 2019-02-11

## 2019-02-12 PROBLEM — E86.0 DEHYDRATION: Status: ACTIVE | Noted: 2019-02-12

## 2019-02-12 LAB
-: ABNORMAL
ALBUMIN SERPL-MCNC: 3.5 G/DL (ref 3.5–5.2)
ALBUMIN/GLOBULIN RATIO: ABNORMAL (ref 1–2.5)
ALP BLD-CCNC: 93 U/L (ref 35–104)
ALT SERPL-CCNC: 34 U/L (ref 5–33)
AMORPHOUS: ABNORMAL
ANION GAP SERPL CALCULATED.3IONS-SCNC: 17 MMOL/L (ref 9–17)
AST SERPL-CCNC: 33 U/L
BACTERIA: ABNORMAL
BILIRUB SERPL-MCNC: 1.35 MG/DL (ref 0.3–1.2)
BILIRUBIN URINE: NEGATIVE
BUN BLDV-MCNC: 47 MG/DL (ref 8–23)
BUN/CREAT BLD: 17 (ref 9–20)
CALCIUM SERPL-MCNC: 9.5 MG/DL (ref 8.6–10.4)
CASTS UA: ABNORMAL /LPF
CHLORIDE BLD-SCNC: 99 MMOL/L (ref 98–107)
CO2: 22 MMOL/L (ref 20–31)
COLOR: YELLOW
COMMENT UA: ABNORMAL
CREAT SERPL-MCNC: 2.73 MG/DL (ref 0.5–0.9)
CRYSTALS, UA: ABNORMAL /HPF
CULTURE: NORMAL
EKG ATRIAL RATE: 68 BPM
EKG P AXIS: 56 DEGREES
EKG P-R INTERVAL: 166 MS
EKG Q-T INTERVAL: 422 MS
EKG QRS DURATION: 72 MS
EKG QTC CALCULATION (BAZETT): 448 MS
EKG R AXIS: 72 DEGREES
EKG T AXIS: 63 DEGREES
EKG VENTRICULAR RATE: 68 BPM
EPITHELIAL CELLS UA: ABNORMAL /HPF (ref 0–5)
GFR AFRICAN AMERICAN: 21 ML/MIN
GFR NON-AFRICAN AMERICAN: 17 ML/MIN
GFR SERPL CREATININE-BSD FRML MDRD: ABNORMAL ML/MIN/{1.73_M2}
GFR SERPL CREATININE-BSD FRML MDRD: ABNORMAL ML/MIN/{1.73_M2}
GLUCOSE BLD-MCNC: 110 MG/DL (ref 65–105)
GLUCOSE BLD-MCNC: 111 MG/DL (ref 65–105)
GLUCOSE BLD-MCNC: 119 MG/DL (ref 70–99)
GLUCOSE BLD-MCNC: 121 MG/DL (ref 65–105)
GLUCOSE BLD-MCNC: 142 MG/DL (ref 65–105)
GLUCOSE URINE: NEGATIVE
HCT VFR BLD CALC: 40.5 % (ref 36–46)
HEMOGLOBIN: 14.3 G/DL (ref 12–16)
INR BLD: 2.7
KETONES, URINE: ABNORMAL
LEUKOCYTE ESTERASE, URINE: NEGATIVE
Lab: NORMAL
MAGNESIUM: 2.3 MG/DL (ref 1.6–2.6)
MCH RBC QN AUTO: 34.6 PG (ref 26–34)
MCHC RBC AUTO-ENTMCNC: 35.2 G/DL (ref 31–37)
MCV RBC AUTO: 98.3 FL (ref 80–100)
MUCUS: ABNORMAL
NITRITE, URINE: NEGATIVE
NRBC AUTOMATED: ABNORMAL PER 100 WBC
OTHER OBSERVATIONS UA: ABNORMAL
PDW BLD-RTO: 14 % (ref 11.5–14.5)
PH UA: 5.5 (ref 5–8)
PLATELET # BLD: 204 K/UL (ref 130–400)
PMV BLD AUTO: 10.1 FL (ref 6–12)
POTASSIUM SERPL-SCNC: 4.2 MMOL/L (ref 3.7–5.3)
PROTEIN UA: NEGATIVE
PROTHROMBIN TIME: 26.7 SEC (ref 9.7–11.6)
RBC # BLD: 4.13 M/UL (ref 4–5.2)
RBC UA: ABNORMAL /HPF (ref 0–2)
RENAL EPITHELIAL, UA: ABNORMAL /HPF
SODIUM BLD-SCNC: 138 MMOL/L (ref 135–144)
SODIUM,UR: 31 MMOL/L
SPECIFIC GRAVITY UA: 1.02 (ref 1–1.03)
SPECIMEN DESCRIPTION: NORMAL
TOTAL PROTEIN, URINE: 13 MG/DL
TOTAL PROTEIN: 6.7 G/DL (ref 6.4–8.3)
TRICHOMONAS: ABNORMAL
TURBIDITY: CLEAR
URINE HGB: NEGATIVE
UROBILINOGEN, URINE: NORMAL
WBC # BLD: 7.8 K/UL (ref 3.5–11)
WBC UA: ABNORMAL /HPF (ref 0–5)
YEAST: ABNORMAL

## 2019-02-12 PROCEDURE — 6370000000 HC RX 637 (ALT 250 FOR IP): Performed by: INTERNAL MEDICINE

## 2019-02-12 PROCEDURE — 36415 COLL VENOUS BLD VENIPUNCTURE: CPT

## 2019-02-12 PROCEDURE — 6370000000 HC RX 637 (ALT 250 FOR IP): Performed by: CLINICAL NURSE SPECIALIST

## 2019-02-12 PROCEDURE — 2580000003 HC RX 258: Performed by: SPECIALIST

## 2019-02-12 PROCEDURE — 80053 COMPREHEN METABOLIC PANEL: CPT

## 2019-02-12 PROCEDURE — 81001 URINALYSIS AUTO W/SCOPE: CPT

## 2019-02-12 PROCEDURE — 84300 ASSAY OF URINE SODIUM: CPT

## 2019-02-12 PROCEDURE — 82947 ASSAY GLUCOSE BLOOD QUANT: CPT

## 2019-02-12 PROCEDURE — 94760 N-INVAS EAR/PLS OXIMETRY 1: CPT

## 2019-02-12 PROCEDURE — 94640 AIRWAY INHALATION TREATMENT: CPT

## 2019-02-12 PROCEDURE — 85610 PROTHROMBIN TIME: CPT

## 2019-02-12 PROCEDURE — 6360000002 HC RX W HCPCS: Performed by: NURSE PRACTITIONER

## 2019-02-12 PROCEDURE — 85027 COMPLETE CBC AUTOMATED: CPT

## 2019-02-12 PROCEDURE — 1200000000 HC SEMI PRIVATE

## 2019-02-12 PROCEDURE — 83735 ASSAY OF MAGNESIUM: CPT

## 2019-02-12 PROCEDURE — 84156 ASSAY OF PROTEIN URINE: CPT

## 2019-02-12 PROCEDURE — 2580000003 HC RX 258: Performed by: CLINICAL NURSE SPECIALIST

## 2019-02-12 PROCEDURE — 76770 US EXAM ABDO BACK WALL COMP: CPT

## 2019-02-12 PROCEDURE — 99232 SBSQ HOSP IP/OBS MODERATE 35: CPT | Performed by: INTERNAL MEDICINE

## 2019-02-12 RX ORDER — WARFARIN SODIUM 2.5 MG/1
2.5 TABLET ORAL DAILY
Status: DISCONTINUED | OUTPATIENT
Start: 2019-02-12 | End: 2019-02-14 | Stop reason: HOSPADM

## 2019-02-12 RX ORDER — CEFUROXIME AXETIL 250 MG/1
250 TABLET ORAL EVERY 12 HOURS SCHEDULED
Status: DISCONTINUED | OUTPATIENT
Start: 2019-02-12 | End: 2019-02-13

## 2019-02-12 RX ADMIN — PANTOPRAZOLE SODIUM 40 MG: 40 TABLET, DELAYED RELEASE ORAL at 05:50

## 2019-02-12 RX ADMIN — PROMETHAZINE HYDROCHLORIDE 12.5 MG: 25 INJECTION INTRAMUSCULAR; INTRAVENOUS at 12:31

## 2019-02-12 RX ADMIN — PROMETHAZINE HYDROCHLORIDE 12.5 MG: 25 INJECTION INTRAMUSCULAR; INTRAVENOUS at 05:33

## 2019-02-12 RX ADMIN — ACETAMINOPHEN 650 MG: 325 TABLET ORAL at 19:55

## 2019-02-12 RX ADMIN — WARFARIN SODIUM 2.5 MG: 2.5 TABLET ORAL at 17:18

## 2019-02-12 RX ADMIN — PROMETHAZINE HYDROCHLORIDE 12.5 MG: 25 INJECTION INTRAMUSCULAR; INTRAVENOUS at 18:45

## 2019-02-12 RX ADMIN — INSULIN LISPRO 2 UNITS: 100 INJECTION, SOLUTION INTRAVENOUS; SUBCUTANEOUS at 17:18

## 2019-02-12 RX ADMIN — FOLIC ACID 1 MG: 1 TABLET ORAL at 09:19

## 2019-02-12 RX ADMIN — MOMETASONE FUROATE AND FORMOTEROL FUMARATE DIHYDRATE 2 PUFF: 200; 5 AEROSOL RESPIRATORY (INHALATION) at 09:19

## 2019-02-12 RX ADMIN — LEVOTHYROXINE SODIUM 112 MCG: 112 TABLET ORAL at 05:50

## 2019-02-12 RX ADMIN — INSULIN HUMAN 50 UNITS: 100 INJECTION, SUSPENSION SUBCUTANEOUS at 17:19

## 2019-02-12 RX ADMIN — SODIUM CHLORIDE: 9 INJECTION, SOLUTION INTRAVENOUS at 09:20

## 2019-02-12 RX ADMIN — SODIUM CHLORIDE: 9 INJECTION, SOLUTION INTRAVENOUS at 17:22

## 2019-02-12 RX ADMIN — METOPROLOL TARTRATE 25 MG: 25 TABLET ORAL at 19:54

## 2019-02-12 RX ADMIN — DIBASIC SODIUM PHOSPHATE, MONOBASIC POTASSIUM PHOSPHATE AND MONOBASIC SODIUM PHOSPHATE 1 TABLET: 852; 155; 130 TABLET ORAL at 09:19

## 2019-02-12 RX ADMIN — CEFUROXIME AXETIL 250 MG: 250 TABLET ORAL at 19:54

## 2019-02-12 ASSESSMENT — ENCOUNTER SYMPTOMS
VOMITING: 1
COUGH: 0
PHOTOPHOBIA: 0
WHEEZING: 0
SHORTNESS OF BREATH: 0
NAUSEA: 1
ABDOMINAL PAIN: 0
ABDOMINAL DISTENTION: 0
DIARRHEA: 1
BLOOD IN STOOL: 0

## 2019-02-12 ASSESSMENT — PAIN DESCRIPTION - DESCRIPTORS: DESCRIPTORS: ACHING

## 2019-02-12 ASSESSMENT — PAIN SCALES - GENERAL
PAINLEVEL_OUTOF10: 8
PAINLEVEL_OUTOF10: 6

## 2019-02-12 ASSESSMENT — PAIN - FUNCTIONAL ASSESSMENT: PAIN_FUNCTIONAL_ASSESSMENT: PREVENTS OR INTERFERES SOME ACTIVE ACTIVITIES AND ADLS

## 2019-02-12 ASSESSMENT — PAIN DESCRIPTION - LOCATION: LOCATION: FOOT;KNEE

## 2019-02-12 ASSESSMENT — PAIN DESCRIPTION - ORIENTATION: ORIENTATION: LEFT

## 2019-02-12 ASSESSMENT — PAIN DESCRIPTION - FREQUENCY: FREQUENCY: CONTINUOUS

## 2019-02-12 ASSESSMENT — PAIN DESCRIPTION - PAIN TYPE: TYPE: ACUTE PAIN;CHRONIC PAIN

## 2019-02-12 ASSESSMENT — PAIN DESCRIPTION - ONSET: ONSET: ON-GOING

## 2019-02-12 ASSESSMENT — PAIN DESCRIPTION - PROGRESSION: CLINICAL_PROGRESSION: NOT CHANGED

## 2019-02-13 LAB
ANION GAP SERPL CALCULATED.3IONS-SCNC: 14 MMOL/L (ref 9–17)
BUN BLDV-MCNC: 30 MG/DL (ref 8–23)
BUN/CREAT BLD: 13 (ref 9–20)
CALCIUM SERPL-MCNC: 8.8 MG/DL (ref 8.6–10.4)
CHLORIDE BLD-SCNC: 105 MMOL/L (ref 98–107)
CO2: 21 MMOL/L (ref 20–31)
CREAT SERPL-MCNC: 2.39 MG/DL (ref 0.5–0.9)
GFR AFRICAN AMERICAN: 24 ML/MIN
GFR NON-AFRICAN AMERICAN: 20 ML/MIN
GFR SERPL CREATININE-BSD FRML MDRD: ABNORMAL ML/MIN/{1.73_M2}
GFR SERPL CREATININE-BSD FRML MDRD: ABNORMAL ML/MIN/{1.73_M2}
GLUCOSE BLD-MCNC: 102 MG/DL (ref 65–105)
GLUCOSE BLD-MCNC: 107 MG/DL (ref 65–105)
GLUCOSE BLD-MCNC: 120 MG/DL (ref 65–105)
GLUCOSE BLD-MCNC: 91 MG/DL (ref 65–105)
GLUCOSE BLD-MCNC: 95 MG/DL (ref 70–99)
INR BLD: 2.2
POTASSIUM SERPL-SCNC: 3.9 MMOL/L (ref 3.7–5.3)
PROTHROMBIN TIME: 22.3 SEC (ref 9.7–11.6)
SODIUM BLD-SCNC: 140 MMOL/L (ref 135–144)

## 2019-02-13 PROCEDURE — 1200000000 HC SEMI PRIVATE

## 2019-02-13 PROCEDURE — 6370000000 HC RX 637 (ALT 250 FOR IP): Performed by: CLINICAL NURSE SPECIALIST

## 2019-02-13 PROCEDURE — 85610 PROTHROMBIN TIME: CPT

## 2019-02-13 PROCEDURE — 6360000002 HC RX W HCPCS: Performed by: INTERNAL MEDICINE

## 2019-02-13 PROCEDURE — 6370000000 HC RX 637 (ALT 250 FOR IP): Performed by: INTERNAL MEDICINE

## 2019-02-13 PROCEDURE — 99232 SBSQ HOSP IP/OBS MODERATE 35: CPT | Performed by: INTERNAL MEDICINE

## 2019-02-13 PROCEDURE — 94640 AIRWAY INHALATION TREATMENT: CPT

## 2019-02-13 PROCEDURE — 82947 ASSAY GLUCOSE BLOOD QUANT: CPT

## 2019-02-13 PROCEDURE — 80048 BASIC METABOLIC PNL TOTAL CA: CPT

## 2019-02-13 PROCEDURE — 36415 COLL VENOUS BLD VENIPUNCTURE: CPT

## 2019-02-13 PROCEDURE — 6360000002 HC RX W HCPCS: Performed by: NURSE PRACTITIONER

## 2019-02-13 PROCEDURE — 2580000003 HC RX 258: Performed by: SPECIALIST

## 2019-02-13 RX ORDER — DIPHENHYDRAMINE HYDROCHLORIDE 50 MG/ML
25 INJECTION INTRAMUSCULAR; INTRAVENOUS EVERY 6 HOURS PRN
Status: DISCONTINUED | OUTPATIENT
Start: 2019-02-13 | End: 2019-02-14 | Stop reason: HOSPADM

## 2019-02-13 RX ADMIN — LEVOTHYROXINE SODIUM 112 MCG: 112 TABLET ORAL at 05:44

## 2019-02-13 RX ADMIN — ACETAMINOPHEN 650 MG: 325 TABLET ORAL at 04:03

## 2019-02-13 RX ADMIN — DOCUSATE SODIUM 100 MG: 100 CAPSULE, LIQUID FILLED ORAL at 09:10

## 2019-02-13 RX ADMIN — PROMETHAZINE HYDROCHLORIDE 12.5 MG: 25 INJECTION INTRAMUSCULAR; INTRAVENOUS at 23:18

## 2019-02-13 RX ADMIN — VITAMIN D 1000 UNITS: 25 TAB ORAL at 09:10

## 2019-02-13 RX ADMIN — DOCUSATE SODIUM 100 MG: 100 CAPSULE, LIQUID FILLED ORAL at 20:22

## 2019-02-13 RX ADMIN — MOMETASONE FUROATE AND FORMOTEROL FUMARATE DIHYDRATE 2 PUFF: 200; 5 AEROSOL RESPIRATORY (INHALATION) at 09:29

## 2019-02-13 RX ADMIN — DIPHENHYDRAMINE HYDROCHLORIDE 25 MG: 50 INJECTION, SOLUTION INTRAMUSCULAR; INTRAVENOUS at 18:53

## 2019-02-13 RX ADMIN — FOLIC ACID 1 MG: 1 TABLET ORAL at 09:10

## 2019-02-13 RX ADMIN — METOPROLOL TARTRATE 25 MG: 25 TABLET ORAL at 09:10

## 2019-02-13 RX ADMIN — ONDANSETRON 4 MG: 2 INJECTION INTRAMUSCULAR; INTRAVENOUS at 01:20

## 2019-02-13 RX ADMIN — SODIUM CHLORIDE: 9 INJECTION, SOLUTION INTRAVENOUS at 01:26

## 2019-02-13 RX ADMIN — DIPHENHYDRAMINE HYDROCHLORIDE 25 MG: 50 INJECTION, SOLUTION INTRAMUSCULAR; INTRAVENOUS at 09:18

## 2019-02-13 RX ADMIN — PANTOPRAZOLE SODIUM 40 MG: 40 TABLET, DELAYED RELEASE ORAL at 05:44

## 2019-02-13 RX ADMIN — SODIUM CHLORIDE: 9 INJECTION, SOLUTION INTRAVENOUS at 15:00

## 2019-02-13 RX ADMIN — WARFARIN SODIUM 2.5 MG: 2.5 TABLET ORAL at 16:46

## 2019-02-13 RX ADMIN — METOPROLOL TARTRATE 25 MG: 25 TABLET ORAL at 20:22

## 2019-02-13 RX ADMIN — PROMETHAZINE HYDROCHLORIDE 12.5 MG: 25 INJECTION INTRAMUSCULAR; INTRAVENOUS at 16:46

## 2019-02-13 RX ADMIN — MOMETASONE FUROATE AND FORMOTEROL FUMARATE DIHYDRATE 2 PUFF: 200; 5 AEROSOL RESPIRATORY (INHALATION) at 20:15

## 2019-02-13 RX ADMIN — PROMETHAZINE HYDROCHLORIDE 12.5 MG: 25 INJECTION INTRAMUSCULAR; INTRAVENOUS at 04:04

## 2019-02-13 RX ADMIN — PROMETHAZINE HYDROCHLORIDE 12.5 MG: 25 INJECTION INTRAMUSCULAR; INTRAVENOUS at 10:31

## 2019-02-13 RX ADMIN — CEFUROXIME AXETIL 250 MG: 250 TABLET ORAL at 09:10

## 2019-02-13 ASSESSMENT — PAIN SCALES - GENERAL
PAINLEVEL_OUTOF10: 3
PAINLEVEL_OUTOF10: 4
PAINLEVEL_OUTOF10: 4

## 2019-02-13 ASSESSMENT — PAIN DESCRIPTION - PAIN TYPE
TYPE: CHRONIC PAIN
TYPE: CHRONIC PAIN

## 2019-02-13 ASSESSMENT — ENCOUNTER SYMPTOMS
SHORTNESS OF BREATH: 0
NAUSEA: 1
VOMITING: 1
DIARRHEA: 1
ABDOMINAL PAIN: 0
ABDOMINAL DISTENTION: 0
COUGH: 0
PHOTOPHOBIA: 0
WHEEZING: 0
BLOOD IN STOOL: 0

## 2019-02-13 ASSESSMENT — PAIN DESCRIPTION - LOCATION
LOCATION: BACK
LOCATION: KNEE

## 2019-02-13 ASSESSMENT — PAIN DESCRIPTION - ORIENTATION: ORIENTATION: LEFT

## 2019-02-14 VITALS
HEIGHT: 62 IN | TEMPERATURE: 98.2 F | DIASTOLIC BLOOD PRESSURE: 49 MMHG | WEIGHT: 251.3 LBS | OXYGEN SATURATION: 93 % | BODY MASS INDEX: 46.25 KG/M2 | SYSTOLIC BLOOD PRESSURE: 153 MMHG | RESPIRATION RATE: 16 BRPM | HEART RATE: 78 BPM

## 2019-02-14 LAB
ANION GAP SERPL CALCULATED.3IONS-SCNC: 15 MMOL/L (ref 9–17)
BUN BLDV-MCNC: 19 MG/DL (ref 8–23)
BUN/CREAT BLD: 9 (ref 9–20)
CALCIUM SERPL-MCNC: 8.7 MG/DL (ref 8.6–10.4)
CHLORIDE BLD-SCNC: 107 MMOL/L (ref 98–107)
CO2: 16 MMOL/L (ref 20–31)
CREAT SERPL-MCNC: 2.04 MG/DL (ref 0.5–0.9)
GFR AFRICAN AMERICAN: 29 ML/MIN
GFR NON-AFRICAN AMERICAN: 24 ML/MIN
GFR SERPL CREATININE-BSD FRML MDRD: ABNORMAL ML/MIN/{1.73_M2}
GFR SERPL CREATININE-BSD FRML MDRD: ABNORMAL ML/MIN/{1.73_M2}
GLUCOSE BLD-MCNC: 102 MG/DL (ref 65–105)
GLUCOSE BLD-MCNC: 109 MG/DL (ref 70–99)
GLUCOSE BLD-MCNC: 133 MG/DL (ref 65–105)
GLUCOSE BLD-MCNC: 86 MG/DL (ref 65–105)
INR BLD: 1.9
POTASSIUM SERPL-SCNC: 4 MMOL/L (ref 3.7–5.3)
PROTHROMBIN TIME: 19.4 SEC (ref 9.7–11.6)
SODIUM BLD-SCNC: 138 MMOL/L (ref 135–144)

## 2019-02-14 PROCEDURE — 2580000003 HC RX 258: Performed by: SPECIALIST

## 2019-02-14 PROCEDURE — 36415 COLL VENOUS BLD VENIPUNCTURE: CPT

## 2019-02-14 PROCEDURE — 6370000000 HC RX 637 (ALT 250 FOR IP): Performed by: CLINICAL NURSE SPECIALIST

## 2019-02-14 PROCEDURE — 82947 ASSAY GLUCOSE BLOOD QUANT: CPT

## 2019-02-14 PROCEDURE — 97535 SELF CARE MNGMENT TRAINING: CPT

## 2019-02-14 PROCEDURE — 2580000003 HC RX 258: Performed by: INTERNAL MEDICINE

## 2019-02-14 PROCEDURE — 97166 OT EVAL MOD COMPLEX 45 MIN: CPT

## 2019-02-14 PROCEDURE — 97530 THERAPEUTIC ACTIVITIES: CPT

## 2019-02-14 PROCEDURE — 97163 PT EVAL HIGH COMPLEX 45 MIN: CPT

## 2019-02-14 PROCEDURE — 6360000002 HC RX W HCPCS: Performed by: NURSE PRACTITIONER

## 2019-02-14 PROCEDURE — 94640 AIRWAY INHALATION TREATMENT: CPT

## 2019-02-14 PROCEDURE — 2500000003 HC RX 250 WO HCPCS: Performed by: INTERNAL MEDICINE

## 2019-02-14 PROCEDURE — 80048 BASIC METABOLIC PNL TOTAL CA: CPT

## 2019-02-14 PROCEDURE — 94760 N-INVAS EAR/PLS OXIMETRY 1: CPT

## 2019-02-14 PROCEDURE — 85610 PROTHROMBIN TIME: CPT

## 2019-02-14 PROCEDURE — 6370000000 HC RX 637 (ALT 250 FOR IP): Performed by: INTERNAL MEDICINE

## 2019-02-14 PROCEDURE — 99239 HOSP IP/OBS DSCHRG MGMT >30: CPT | Performed by: INTERNAL MEDICINE

## 2019-02-14 RX ORDER — PROMETHAZINE HYDROCHLORIDE 25 MG/1
25 TABLET ORAL 4 TIMES DAILY PRN
Qty: 20 TABLET | Refills: 0 | Status: SHIPPED | OUTPATIENT
Start: 2019-02-14 | End: 2019-02-21

## 2019-02-14 RX ORDER — ONDANSETRON 4 MG/1
4 TABLET, ORALLY DISINTEGRATING ORAL EVERY 6 HOURS PRN
Qty: 30 TABLET | Refills: 1 | Status: SHIPPED | OUTPATIENT
Start: 2019-02-14 | End: 2019-09-11 | Stop reason: ALTCHOICE

## 2019-02-14 RX ORDER — SODIUM BICARBONATE 325 MG/1
1300 TABLET ORAL 2 TIMES DAILY
Qty: 240 TABLET | Refills: 0
Start: 2019-02-15 | End: 2019-09-11 | Stop reason: ALTCHOICE

## 2019-02-14 RX ORDER — PROMETHAZINE HYDROCHLORIDE 25 MG/ML
25 INJECTION, SOLUTION INTRAMUSCULAR; INTRAVENOUS EVERY 6 HOURS PRN
Status: DISCONTINUED | OUTPATIENT
Start: 2019-02-14 | End: 2019-02-14 | Stop reason: HOSPADM

## 2019-02-14 RX ORDER — WARFARIN SODIUM 1 MG/1
1 TABLET ORAL
Status: COMPLETED | OUTPATIENT
Start: 2019-02-14 | End: 2019-02-14

## 2019-02-14 RX ADMIN — METOPROLOL TARTRATE 25 MG: 25 TABLET ORAL at 09:51

## 2019-02-14 RX ADMIN — PROMETHAZINE HYDROCHLORIDE 25 MG: 25 INJECTION INTRAMUSCULAR; INTRAVENOUS at 15:44

## 2019-02-14 RX ADMIN — PROMETHAZINE HYDROCHLORIDE 25 MG: 25 INJECTION INTRAMUSCULAR; INTRAVENOUS at 02:17

## 2019-02-14 RX ADMIN — WARFARIN SODIUM 2.5 MG: 2.5 TABLET ORAL at 18:11

## 2019-02-14 RX ADMIN — MOMETASONE FUROATE AND FORMOTEROL FUMARATE DIHYDRATE 2 PUFF: 200; 5 AEROSOL RESPIRATORY (INHALATION) at 09:19

## 2019-02-14 RX ADMIN — PROMETHAZINE HYDROCHLORIDE 25 MG: 25 INJECTION INTRAMUSCULAR; INTRAVENOUS at 09:50

## 2019-02-14 RX ADMIN — DOCUSATE SODIUM 100 MG: 100 CAPSULE, LIQUID FILLED ORAL at 09:51

## 2019-02-14 RX ADMIN — PANTOPRAZOLE SODIUM 40 MG: 40 TABLET, DELAYED RELEASE ORAL at 06:17

## 2019-02-14 RX ADMIN — LEVOTHYROXINE SODIUM 112 MCG: 112 TABLET ORAL at 06:17

## 2019-02-14 RX ADMIN — SODIUM CHLORIDE: 9 INJECTION, SOLUTION INTRAVENOUS at 02:21

## 2019-02-14 RX ADMIN — FOLIC ACID 1 MG: 1 TABLET ORAL at 09:51

## 2019-02-14 RX ADMIN — WARFARIN SODIUM 1 MG: 1 TABLET ORAL at 18:12

## 2019-02-14 RX ADMIN — Medication: at 11:47

## 2019-02-14 ASSESSMENT — ENCOUNTER SYMPTOMS
NAUSEA: 1
BLOOD IN STOOL: 0
WHEEZING: 0
COUGH: 0
PHOTOPHOBIA: 0
DIARRHEA: 0
SHORTNESS OF BREATH: 0
VOMITING: 0
ABDOMINAL PAIN: 0
ABDOMINAL DISTENTION: 0

## 2019-02-14 ASSESSMENT — PAIN SCALES - GENERAL
PAINLEVEL_OUTOF10: 4
PAINLEVEL_OUTOF10: 4

## 2019-02-14 ASSESSMENT — PAIN DESCRIPTION - LOCATION
LOCATION: KNEE;SHOULDER;BACK
LOCATION: KNEE

## 2019-02-14 ASSESSMENT — PAIN DESCRIPTION - PAIN TYPE: TYPE: CHRONIC PAIN

## 2019-02-14 ASSESSMENT — PAIN DESCRIPTION - ORIENTATION: ORIENTATION: LEFT

## 2019-02-18 ENCOUNTER — TELEPHONE (OUTPATIENT)
Dept: PHARMACY | Age: 69
End: 2019-02-18

## 2019-02-20 ENCOUNTER — HOSPITAL ENCOUNTER (OUTPATIENT)
Dept: PHYSICAL THERAPY | Facility: CLINIC | Age: 69
Setting detail: THERAPIES SERIES
Discharge: HOME | End: 2019-02-20
Payer: MEDICARE

## 2019-02-25 ENCOUNTER — APPOINTMENT (OUTPATIENT)
Dept: PHYSICAL THERAPY | Facility: CLINIC | Age: 69
End: 2019-02-25
Payer: MEDICARE

## 2019-03-13 ENCOUNTER — HOSPITAL ENCOUNTER (OUTPATIENT)
Dept: PHYSICAL THERAPY | Facility: CLINIC | Age: 69
Setting detail: THERAPIES SERIES
Discharge: HOME | End: 2019-03-13
Payer: MEDICARE

## 2019-03-13 ENCOUNTER — HOSPITAL ENCOUNTER (OUTPATIENT)
Dept: PHARMACY | Age: 69
Setting detail: THERAPIES SERIES
Discharge: HOME | End: 2019-03-13
Payer: MEDICARE

## 2019-03-13 DIAGNOSIS — I48.20 CHRONIC ATRIAL FIBRILLATION (HCC): ICD-10-CM

## 2019-03-13 LAB
INR BLD: 2.9
PROTIME: 35.3 SECONDS

## 2019-03-13 PROCEDURE — 99211 OFF/OP EST MAY X REQ PHY/QHP: CPT

## 2019-03-13 PROCEDURE — 85610 PROTHROMBIN TIME: CPT

## 2019-03-13 PROCEDURE — 97140 MANUAL THERAPY 1/> REGIONS: CPT

## 2019-03-14 PROBLEM — E86.0 DEHYDRATION: Status: RESOLVED | Noted: 2019-02-12 | Resolved: 2019-03-14

## 2019-03-20 ENCOUNTER — HOSPITAL ENCOUNTER (OUTPATIENT)
Dept: PHYSICAL THERAPY | Facility: CLINIC | Age: 69
Setting detail: THERAPIES SERIES
Discharge: HOME | End: 2019-03-20
Payer: MEDICARE

## 2019-03-20 PROCEDURE — 97140 MANUAL THERAPY 1/> REGIONS: CPT

## 2019-03-27 ENCOUNTER — HOSPITAL ENCOUNTER (OUTPATIENT)
Dept: PHARMACY | Age: 69
Setting detail: THERAPIES SERIES
Discharge: HOME | End: 2019-03-27
Payer: MEDICARE

## 2019-03-27 ENCOUNTER — HOSPITAL ENCOUNTER (OUTPATIENT)
Dept: PHYSICAL THERAPY | Facility: CLINIC | Age: 69
Setting detail: THERAPIES SERIES
Discharge: HOME | End: 2019-03-27
Payer: MEDICARE

## 2019-03-27 DIAGNOSIS — I48.20 CHRONIC ATRIAL FIBRILLATION (HCC): ICD-10-CM

## 2019-03-27 LAB
INR BLD: 3.5
PROTIME: 41.8 SECONDS

## 2019-03-27 PROCEDURE — 85610 PROTHROMBIN TIME: CPT

## 2019-03-27 PROCEDURE — 99212 OFFICE O/P EST SF 10 MIN: CPT

## 2019-03-27 PROCEDURE — 97140 MANUAL THERAPY 1/> REGIONS: CPT

## 2019-04-02 ENCOUNTER — HOSPITAL ENCOUNTER (OUTPATIENT)
Age: 69
Setting detail: SPECIMEN
Discharge: HOME | End: 2019-04-02
Payer: MEDICARE

## 2019-04-02 LAB
ABSOLUTE EOS #: 0.15 K/UL (ref 0–0.44)
ABSOLUTE IMMATURE GRANULOCYTE: <0.03 K/UL (ref 0–0.3)
ABSOLUTE LYMPH #: 1.34 K/UL (ref 1.1–3.7)
ABSOLUTE MONO #: 0.6 K/UL (ref 0.1–1.2)
ALBUMIN SERPL-MCNC: 4.1 G/DL (ref 3.5–5.2)
ANION GAP SERPL CALCULATED.3IONS-SCNC: 22 MMOL/L (ref 9–17)
BASOPHILS # BLD: 1 % (ref 0–2)
BASOPHILS ABSOLUTE: 0.06 K/UL (ref 0–0.2)
BILIRUBIN URINE: NEGATIVE
BUN BLDV-MCNC: 19 MG/DL (ref 8–23)
BUN/CREAT BLD: ABNORMAL (ref 9–20)
CALCIUM SERPL-MCNC: 9.9 MG/DL (ref 8.6–10.4)
CHLORIDE BLD-SCNC: 106 MMOL/L (ref 98–107)
CO2: 18 MMOL/L (ref 20–31)
COLOR: YELLOW
COMMENT UA: NORMAL
CREAT SERPL-MCNC: 1.69 MG/DL (ref 0.5–0.9)
CREATININE URINE: 160.3 MG/DL (ref 28–217)
DIFFERENTIAL TYPE: ABNORMAL
EOSINOPHILS RELATIVE PERCENT: 3 % (ref 1–4)
GFR AFRICAN AMERICAN: 36 ML/MIN
GFR NON-AFRICAN AMERICAN: 30 ML/MIN
GFR SERPL CREATININE-BSD FRML MDRD: ABNORMAL ML/MIN/{1.73_M2}
GFR SERPL CREATININE-BSD FRML MDRD: ABNORMAL ML/MIN/{1.73_M2}
GLUCOSE FASTING: 139 MG/DL (ref 70–99)
GLUCOSE URINE: NEGATIVE
HCT VFR BLD CALC: 42.3 % (ref 36.3–47.1)
HEMOGLOBIN: 14 G/DL (ref 11.9–15.1)
IMMATURE GRANULOCYTES: 0 %
KETONES, URINE: NEGATIVE
LEUKOCYTE ESTERASE, URINE: NEGATIVE
LYMPHOCYTES # BLD: 22 % (ref 24–43)
MAGNESIUM: 2.2 MG/DL (ref 1.6–2.6)
MCH RBC QN AUTO: 33.9 PG (ref 25.2–33.5)
MCHC RBC AUTO-ENTMCNC: 33.1 G/DL (ref 28.4–34.8)
MCV RBC AUTO: 102.4 FL (ref 82.6–102.9)
MICROALBUMIN/CREAT 24H UR: <12 MG/L
MICROALBUMIN/CREAT UR-RTO: NORMAL MCG/MG CREAT
MONOCYTES # BLD: 10 % (ref 3–12)
NITRITE, URINE: NEGATIVE
NRBC AUTOMATED: 0 PER 100 WBC
PDW BLD-RTO: 14.4 % (ref 11.8–14.4)
PH UA: 7 (ref 5–8)
PHOSPHORUS: 2.7 MG/DL (ref 2.6–4.5)
PLATELET # BLD: 191 K/UL (ref 138–453)
PLATELET ESTIMATE: ABNORMAL
PMV BLD AUTO: 12.3 FL (ref 8.1–13.5)
POTASSIUM SERPL-SCNC: 5.2 MMOL/L (ref 3.7–5.3)
PROTEIN UA: NEGATIVE
PTH INTACT: 119.1 PG/ML (ref 15–65)
RBC # BLD: 4.13 M/UL (ref 3.95–5.11)
RBC # BLD: ABNORMAL 10*6/UL
SEG NEUTROPHILS: 64 % (ref 36–65)
SEGMENTED NEUTROPHILS ABSOLUTE COUNT: 3.91 K/UL (ref 1.5–8.1)
SODIUM BLD-SCNC: 146 MMOL/L (ref 135–144)
SPECIFIC GRAVITY UA: 1.02 (ref 1–1.03)
TURBIDITY: CLEAR
URINE HGB: NEGATIVE
UROBILINOGEN, URINE: NORMAL
VITAMIN D 25-HYDROXY: 45.3 NG/ML (ref 30–100)
WBC # BLD: 6.1 K/UL (ref 3.5–11.3)
WBC # BLD: ABNORMAL 10*3/UL

## 2019-04-03 ENCOUNTER — HOSPITAL ENCOUNTER (OUTPATIENT)
Dept: PHYSICAL THERAPY | Facility: CLINIC | Age: 69
Setting detail: THERAPIES SERIES
Discharge: HOME | End: 2019-04-03
Payer: MEDICARE

## 2019-04-03 PROCEDURE — 97140 MANUAL THERAPY 1/> REGIONS: CPT

## 2019-04-03 NOTE — FLOWSHEET NOTE
[] [facility]       Outpatient Physical        Therapy       [address]       Phone: [phone]       Fax: [phone] [x] [facility] at [address] [facility]       Phone: [phone]       Fax: [phone] [] [facility]  [address] [facility]   Phone: [phone]   Fax:  [phone]     Physical Therapy Daily Treatment Note    Date:  4/3/2019  Patient Name:  Billie Sharma    :  1950  MRN: [id number]  Physician: Brannon Kaplan MD                                   Insurance: Medicare  Medical Diagnosis: R RC injury                                         Rehab Codes: X18.205; Z08.527; M54.2; M79.1  Onset Date: 2015                                   Next 's appt:   Visit# / total visits: 85/104 (updated script 4/3/19)  Cancels/No Shows: 6 cx/1 ns     Subjective:    Pain:  [x] Yes  [] No Location: neck/trapezius (R) and neck  Pain Rating: (0-10 scale) /10 cervical;  6/10 R shoulder  Pain altered Tx:  [] No  [x] Yes  Action: increase time with MFR; continue with superficial needling cervical region  Comments:  Pt with improving symptoms from last week 50%; sleep improving; R shoulder is painful today. 18 MRI: ROTATOR CUFF: Small amount of fluid in the subacromial subdeltoid bursa.       Mild infraspinatus tendinopathy.       Low-grade partial-thickness articular surface tearing of the mid insertional   fibers of supraspinatus between the critical zone and footplate.  Moderate   underlying supraspinatus tendinopathy.       Subscapularis and teres minor muscles/tendons appear grossly intact without   evidence of tearing.        Objective:    19: pt ambulating slowly and more guardedly due to pain in knees, back and shoulders (with rolling walker)  12/10/18: update: will discuss body strength and LBP in the new year with MD with probable new script for these issues  10/22/18: discussed with pt potential for overall body strengthening to help prevent falls and improve function due to overall weakness. Modalities: not today: e-stim with DN   Precautions: blood thinners; hepatitis C 2008 : Pt recently diagnosed with cardiac diastolic dysfunction and so cataract surgeries and shoulder surgeries have been postponed until her heart is stabilized with medication changes. She is going to be discontinuing lyrica as this is a possible contributor to her heart condition. She doesn't follow up with cardiologist until 4/26/19.        3/13/19: R shoulder flexion 145 degrees; abduction 135 degrees; L shoulder flexion: 157 degrees; abduction 125 degrees  9/19/18: active: R shoulder flexion 148; abduction: 140; IR T6: ER: 80 degrees; PROM WFL; strength flexion/abduction 4/5  L shoulder active flexion: 130; abduction: 98 degrees; IR T5; ER 80 degrees; PROM WFL; strength flexion/abduction 4/5  7/9/18: R shoulder flexion: 135; abduction 121 degrees: IR bra line: ER:80 degrees, arm at side; pain with movement; L shoulder flexion: 144 degrees; abduction 143 degrees; IR: T5; L ER: 55 degrees  5/16/18: shoulder flexion R 125, abduction 99, L shoulder flexion 126, abduction 99 AROM; IR/ER WNL AROM with arms at side; Not today: PROM:  Flexion 100, abduction 100 c pain over supraspinatus in each direction. IR/ER at 0 abduction WNL, 45 degrees abduction pain into IR/ER with passive ROM  AROM 11/13/17: R shoulder: 110 degrees flexion; abduction 105 degrees active;    Exercises: at home (I)  Exercise Reps/ Time Weight/ Level Comments   Chest stretches/various x  See HEP sheets 10/29/18    SHOULDER extension 10 A Standing, bending over; started 10/2/17    rows 10 A Standing, bending over; started 10/2/17    shoulder flexion, abduction, IR,ER isometrics 5 ea gentle Started 10/2/17             Side ER 20/8 A/1  added 10/11/17 (not today due to increase symptoms recently)   Other:    Not today: Therapeutic activities: discussed proper head support when sleeping in recliner; pt does have pillow that she has molded to head; pt advised to not allow head to alter from neutral position    11/8/17: UEFS: 30% of normal/70% affected  1/3/18: UEFS: 41.25% of normal/58.75% affected  5/16/18: UEFS:28.75% of normal/71.25% affected  7/9/18: UEFS: 45% OF NORMAL/55% AFFECTED  3/27/19: UEFS: 51.25% of normal/48.75% deficit    MFR: UTs, c-paraspinals, scalenes, scapulae concentrating on supra and infraspinatus, pec minors and shoulders cleopatra R with pt in seated position, 20 min    8/27/18: pt brought purchased theracane and PT instructed in its use.  8/20/18: reviewed theracane-pt ordered one and it is arriving today  8/13/18: instructed in use of theracane    DN:prepped area with alcohol wipe, consent obtained, standard procedure followed: 43 total: RCinsertion with  1\" R 1\" R RC in supraspinatus fossa;  0.5\" needles in cervical paraspinals bilaterally to C7 including posterior to transverse processes; 0.5\" greater occipital homeostatic points B; 1\" below to T6  Level  1\" spinal accessory B with pistonning;  B  2\"  suprascapular homeostatic points; additional 0.5\" needles in cervical spine; 1\" R axillary nn distribution; 1\" mid and anterior deltoid with pt experiencing nTP anterior deltoid left in situ 15 min. Not today per pt request: Kinesiotape: unloading R UT,     Not today: Discussed fall prevention: patient has Aneta/echo and is able to call anyone if she were to fall at home; patient also carries a phone with her at all times;      Specific Instructions for next treatment: continue with DN, MFR        Treatment Charges: Mins Units   []  Modalities: stim     []  Ther Exercise     [x]  Manual Therapy 35 2   []  Ther Activities     []  Aquatics     []  Vasocompression     []  Other     Total Treatment time 35 2   Medicare tracking: (4LGL30.38) (5PKD19.63) : as of 4/3/19: $359.33    Assessment:   [x] Progressing toward goals: multiple nTP on R side (UT, ant deltoid, suprascapular); this is related to increased pain R shoulder the pt has been experiencing; inc in pain today in R shoulder may be due to having to lift walker in/out of car and increase in exercises she has been doing since doing additional shoulder ex she learned in rehab. [x] No change:    [] Other:    Problems:    [x] ? Pain: 7/10 R shoulder  [x] ? ROM: R shoulder abduction with pain  [x] ? Strength: mild loss of strength R shoulder; poor scapular stability  [x] ? Function:71% affected function (29% of normal)  [x] Other: mm tightness c-spine/traps; significant trigger points     STG: (to be met in 6 treatments)  1. ? Pain: below 6/10 R shoulder: ONGOING 10/11/17   2. ? ROM: at least 100 degrees shoulder abduction   3. ? Strength: be able to do scapular strength ex without exacerbating symptoms: MET 10/11/17  4. ? Function: modify activities to decrease stress to R shoulder: MET 10/11/17  5. Independent with Home Exercise Program: MET 10/11/17   6. Demonstrate Knowledge of fall prevention: MET 10/11/17     LTG: (to be met in 12 treatments): as of 12/4/17 NOT YET MET; continue x 12 visits to #24; continue towards goal #44; continue to visit #64; continue goals x 20 visits to visit #84; continue goals to visit #104  1. Pain below 5/10: NOT MET YET 3/27/19  2. Decrease reliance on shoulder injections: MEETING 9/19/18, 3/27/19  3. Have at least 110 degrees shoulder abduction to indicate decreased impingement and improve reaching ability for light objects: MET 9/19/18   4. Improved scapular position within 2 cm of opposite side         Patient goals: \"decreased pain initially then better movement\"       Pt. Education:  [] Yes  [] No  [x] Reviewed Prior HEP/Ed: HOLD ex that are painful  Method of Education: [] Verbal:HEP review of HEP  [] Demo : HEP [] Written: chest stretch  Comprehension of Education:  [x] Verbalizes understanding.  Decrease ex at home - pt added ex she learned while in rehab and it may be too much;   [] Demonstrates understanding. [] Needs review.   [] Demonstrates/verbalizes HEP/Ed previously given-issued written instructions as pt had question about appropriate completion of ex     Plan: [x] Continue per plan of care   [x] Other: continue weekly visits      Time In: 8:10 am            Time Out: 9:00 am    Electronically signed by:  Lamar Dobbins PT

## 2019-04-09 ENCOUNTER — HOSPITAL ENCOUNTER (OUTPATIENT)
Dept: PHARMACY | Age: 69
Setting detail: THERAPIES SERIES
Discharge: HOME | End: 2019-04-09
Payer: MEDICARE

## 2019-04-09 DIAGNOSIS — I48.20 CHRONIC ATRIAL FIBRILLATION (HCC): ICD-10-CM

## 2019-04-09 LAB
INR BLD: 2.1
PROTIME: 25.4 SECONDS

## 2019-04-09 PROCEDURE — 85610 PROTHROMBIN TIME: CPT

## 2019-04-09 PROCEDURE — 99211 OFF/OP EST MAY X REQ PHY/QHP: CPT

## 2019-04-09 NOTE — PROGRESS NOTES
Patient states compliant all of the time with regimen. No bleeding or thromboembolic side effects noted. No significant med changes. No significant recent illness or disease state changes. Patient has been eating quite a bit of Vit K foods lately due to high INR last week. She plans on backing down a bit with the vegetable intake now that INR is back to normal.    PT/INR done in office per protocol. INR is 2.1 which is therapeutic. Warfarin regimen will be continued at current dose of 1.25mg wed/sat and 2.5mg all other days. Will retest in 2 weeks. Patient understands dosing directions and information discussed. Dosing schedule and follow up appointment given to patient. Progress note routed to referring physicians office. Patient acknowledges working in consult agreement with pharmacist as referred by his/her physician.       Kristen Felton, 4/9/2019 8:57 AM

## 2019-04-10 ENCOUNTER — HOSPITAL ENCOUNTER (OUTPATIENT)
Dept: PHYSICAL THERAPY | Facility: CLINIC | Age: 69
Setting detail: THERAPIES SERIES
Discharge: HOME | End: 2019-04-10
Payer: MEDICARE

## 2019-04-10 PROCEDURE — 97140 MANUAL THERAPY 1/> REGIONS: CPT

## 2019-04-10 NOTE — FLOWSHEET NOTE
[] [facility], [place], [facility]       Outpatient Physical        Therapy       [address]       Phone: [phone]       Fax: [phone] [x] [facility] at [address] [facility]       Phone: [phone]       Fax: [phone] [] [facility]  [address]   Phone: [phone]   Fax:  [phone]     Physical Therapy Daily Treatment Note    Date:  4/10/2019  Patient Name:  Amalia Andujar    :  1950  MRN: [id number]  Physician: Catina Bustos MD                                   Insurance: Medicare  Medical Diagnosis: R RC injury                                         Rehab Codes: O65.066; K39.638; M54.2; M79.1  Onset Date: 2015                                   Next 's appt:   Visit# / total visits: 86/104 (updated script 4/3/19)  Cancels/No Shows: 6 cx/1 ns     Subjective:    Pain:  [x] Yes  [] No Location: neck/trapezius (R) and neck  Pain Rating: (0-10 scale) 0-4-5/10 cervical;  4/10 R shoulder with movement  Pain altered Tx:  [x] No  [] Yes  Action:   Comments:  Much improved cervical region; pain controlled with tylenol at night; pain in R shoulder with reaching or increased activity    18 MRI: ROTATOR CUFF: Small amount of fluid in the subacromial subdeltoid bursa.       Mild infraspinatus tendinopathy.       Low-grade partial-thickness articular surface tearing of the mid insertional   fibers of supraspinatus between the critical zone and footplate.  Moderate   underlying supraspinatus tendinopathy.       Subscapularis and teres minor muscles/tendons appear grossly intact without   evidence of tearing.        Objective:    19: pt ambulating slowly and more guardedly due to pain in knees, back and shoulders (with rolling walker)  12/10/18: update: will discuss body strength and LBP in the new year with MD with probable new script for these issues  10/22/18: discussed with pt potential for overall body strengthening to help prevent falls and improve function due to overall weakness. Modalities: not today: e-stim with DN   Precautions: blood thinners; hepatitis C 2008 : Pt recently diagnosed with cardiac diastolic dysfunction and so cataract surgeries and shoulder surgeries have been postponed until her heart is stabilized with medication changes. She is going to be discontinuing lyrica as this is a possible contributor to her heart condition. She doesn't follow up with cardiologist until 4/26/19.        3/13/19: R shoulder flexion 145 degrees; abduction 135 degrees; L shoulder flexion: 157 degrees; abduction 125 degrees  9/19/18: active: R shoulder flexion 148; abduction: 140; IR T6: ER: 80 degrees; PROM WFL; strength flexion/abduction 4/5  L shoulder active flexion: 130; abduction: 98 degrees; IR T5; ER 80 degrees; PROM WFL; strength flexion/abduction 4/5  7/9/18: R shoulder flexion: 135; abduction 121 degrees: IR bra line: ER:80 degrees, arm at side; pain with movement; L shoulder flexion: 144 degrees; abduction 143 degrees; IR: T5; L ER: 55 degrees  5/16/18: shoulder flexion R 125, abduction 99, L shoulder flexion 126, abduction 99 AROM; IR/ER WNL AROM with arms at side; Not today: PROM:  Flexion 100, abduction 100 c pain over supraspinatus in each direction. IR/ER at 0 abduction WNL, 45 degrees abduction pain into IR/ER with passive ROM  AROM 11/13/17: R shoulder: 110 degrees flexion; abduction 105 degrees active;    Exercises: at home (I)  Exercise Reps/ Time Weight/ Level Comments   Chest stretches/various x  See HEP sheets 10/29/18    SHOULDER extension 10 A Standing, bending over; started 10/2/17    rows 10 A Standing, bending over; started 10/2/17    shoulder flexion, abduction, IR,ER isometrics 5 ea gentle Started 10/2/17             Side ER 20/8 A/1  added 10/11/17 (not today due to increase symptoms recently)   Other:    Not today: Therapeutic activities: discussed change:    [] Other:    Problems:    [x] ? Pain: 7/10 R shoulder  [x] ? ROM: R shoulder abduction with pain  [x] ? Strength: mild loss of strength R shoulder; poor scapular stability  [x] ? Function:71% affected function (29% of normal)  [x] Other: mm tightness c-spine/traps; significant trigger points     STG: (to be met in 6 treatments)  1. ? Pain: below 6/10 R shoulder: ONGOING 10/11/17   2. ? ROM: at least 100 degrees shoulder abduction   3. ? Strength: be able to do scapular strength ex without exacerbating symptoms: MET 10/11/17  4. ? Function: modify activities to decrease stress to R shoulder: MET 10/11/17  5. Independent with Home Exercise Program: MET 10/11/17   6. Demonstrate Knowledge of fall prevention: MET 10/11/17     LTG: (to be met in 12 treatments): as of 12/4/17 NOT YET MET; continue x 12 visits to #24; continue towards goal #44; continue to visit #64; continue goals x 20 visits to visit #84; continue goals to visit #104  1. Pain below 5/10: NOT MET YET 3/27/19  2. Decrease reliance on shoulder injections: MEETING 9/19/18, 3/27/19  3. Have at least 110 degrees shoulder abduction to indicate decreased impingement and improve reaching ability for light objects: MET 9/19/18   4. Improved scapular position within 2 cm of opposite side         Patient goals: \"decreased pain initially then better movement\"       Pt. Education:  [] Yes  [] No  [x] Reviewed Prior HEP/Ed: HOLD ex that are painful  Method of Education: [] Verbal:HEP review of HEP  [] Demo : HEP [] Written: chest stretch  Comprehension of Education:  [x] Verbalizes understanding. Decrease ex at home - pt added ex she learned while in rehab and it may be too much;   [] Demonstrates understanding. [] Needs review.   [] Demonstrates/verbalizes HEP/Ed previously given-issued written instructions as pt had question about appropriate completion of ex     Plan: [x] Continue per plan of care   [x] Other: continue weekly visits      Time In: 8:05 am Time Out: 9:00 am    Electronically signed by:  Rocky Gonzalez PT

## 2019-04-17 ENCOUNTER — HOSPITAL ENCOUNTER (OUTPATIENT)
Dept: PHYSICAL THERAPY | Facility: CLINIC | Age: 69
Setting detail: THERAPIES SERIES
Discharge: HOME | End: 2019-04-17
Payer: MEDICARE

## 2019-04-17 PROCEDURE — 97140 MANUAL THERAPY 1/> REGIONS: CPT

## 2019-04-17 NOTE — FLOWSHEET NOTE
[] [facility]       Outpatient Physical        Therapy       [address]       Phone: [phone]       Fax: [phone] [x] [facility] at [address]       Phone: [phone]       Fax: [phone] [] Bre. [address] [place] [address] [facility]  [address] [place] [address]   Phone: [phone]   Fax:  [phone]     Physical Therapy Daily Treatment Note    Date:  2019  Patient Name:  Chapincito Pandya    :  1950  MRN: [id number]  Physician: Davina Mejia MD                                   Insurance: Medicare  Medical Diagnosis: R RC injury                                         Rehab Codes: X47.103; N26.062; M54.2; M79.1  Onset Date: 2015                                   Next 's appt:   Visit# / total visits: 87/104 (updated script 4/3/19)  Cancels/No Shows: 6 cx/1 ns     Subjective:    Pain:  [x] Yes  [] No Location: neck/trapezius (R) and neck  Pain Rating: (0-10 scale) /10 cervical;  4/10 R shoulder with movement  Pain altered Tx:  [x] No  [] Yes  Action:   Comments:  R shoulder is painful at night and having increased difficulty sleeping; pt also with pain with movement; completing exercises but without weights due to discomfort with movement R UE.    18 MRI: ROTATOR CUFF: Small amount of fluid in the subacromial subdeltoid bursa.       Mild infraspinatus tendinopathy.       Low-grade partial-thickness articular surface tearing of the mid insertional   fibers of supraspinatus between the critical zone and footplate.  Moderate   underlying supraspinatus tendinopathy.       Subscapularis and teres minor muscles/tendons appear grossly intact without   evidence of tearing.        Objective:    19: pt ambulating slowly and more guardedly due to pain in knees, back and shoulders (with rolling walker)  12/10/18: update: will discuss body strength and LBP in the new year with MD with probable new script for these issues  10/22/18: discussed with pt potential for overall body strengthening to help prevent falls and improve function due to overall weakness. Modalities: not today: e-stim with DN   Precautions: blood thinners; hepatitis C 2008 : Pt recently diagnosed with cardiac diastolic dysfunction and so cataract surgeries and shoulder surgeries have been postponed until her heart is stabilized with medication changes. She is going to be discontinuing lyrica as this is a possible contributor to her heart condition. She doesn't follow up with cardiologist until 4/26/19.        3/13/19: R shoulder flexion 145 degrees; abduction 135 degrees; L shoulder flexion: 157 degrees; abduction 125 degrees  9/19/18: active: R shoulder flexion 148; abduction: 140; IR T6: ER: 80 degrees; PROM WFL; strength flexion/abduction 4/5  L shoulder active flexion: 130; abduction: 98 degrees; IR T5; ER 80 degrees; PROM WFL; strength flexion/abduction 4/5  7/9/18: R shoulder flexion: 135; abduction 121 degrees: IR bra line: ER:80 degrees, arm at side; pain with movement; L shoulder flexion: 144 degrees; abduction 143 degrees; IR: T5; L ER: 55 degrees  5/16/18: shoulder flexion R 125, abduction 99, L shoulder flexion 126, abduction 99 AROM; IR/ER WNL AROM with arms at side; Not today: PROM:  Flexion 100, abduction 100 c pain over supraspinatus in each direction. IR/ER at 0 abduction WNL, 45 degrees abduction pain into IR/ER with passive ROM  AROM 11/13/17: R shoulder: 110 degrees flexion; abduction 105 degrees active;    Exercises: at home (I)  Exercise Reps/ Time Weight/ Level Comments   Chest stretches/various x  See HEP sheets 10/29/18    SHOULDER extension 10 A Standing, bending over; started 10/2/17    rows 10 A Standing, bending over; started 10/2/17    shoulder flexion, abduction, IR,ER isometrics 5 ea gentle Started 10/2/17             Side ER 20/8 A/1  added 10/11/17 (not today due to increase symptoms recently) Other:    Not today: Therapeutic activities: discussed proper head support when sleeping in recliner; pt does have pillow that she has molded to head; pt advised to not allow head to alter from neutral position    11/8/17: UEFS: 30% of normal/70% affected  1/3/18: UEFS: 41.25% of normal/58.75% affected  5/16/18: UEFS:28.75% of normal/71.25% affected  7/9/18: UEFS: 45% OF NORMAL/55% AFFECTED  3/27/19: UEFS: 51.25% of normal/48.75% deficit    MFR: UTs, c-paraspinals, scalenes, scapulae concentrating on supra and infraspinatus, pec minors and shoulders cleopatra R with pt in seated position, 20 min    8/27/18: pt brought purchased theracane and PT instructed in its use.  8/20/18: reviewed theracane-pt ordered one and it is arriving today  8/13/18: instructed in use of theracane    DN:prepped area with alcohol wipe, consent obtained, standard procedure followed: 45 total: 3 RCinsertion with  1\" R 1\" R RC in supraspinatus fossa;  0.5\" needles in cervical paraspinals bilaterally to C7 including posterior to transverse processes; 0.5\" greater occipital homeostatic points B; 1\" below to T5  Level  1\" spinal accessory B with pistonning ;  B  2\"  suprascapular homeostatic points; additional 0.5\" needles in cervical spine; 2\" R middle axillary nn distribution;left in situ 10 min. Not today per pt request: Kinesiotape: unloading R UT,     Not today: Discussed fall prevention: patient has Aneta/echo and is able to call anyone if she were to fall at home; patient also carries a phone with her at all times;      Specific Instructions for next treatment: continue with DN, MFR        Treatment Charges: Mins Units   []  Modalities: stim     []  Ther Exercise     [x]  Manual Therapy 40 3   []  Ther Activities     []  Aquatics     []  Vasocompression     []  Other     Total Treatment time 40 3   Medicare tracking: (6GPI61.02) ([id number]) : as of 4/17/19: $498.51    Assessment:   [x] Progressing toward goals: tender T5 paravertebral points and R suprascapular point. [x] No change:    [] Other:    Problems:    [x] ? Pain: 7/10 R shoulder  [x] ? ROM: R shoulder abduction with pain  [x] ? Strength: mild loss of strength R shoulder; poor scapular stability  [x] ? Function:71% affected function (29% of normal)  [x] Other: mm tightness c-spine/traps; significant trigger points     STG: (to be met in 6 treatments)  1. ? Pain: below 6/10 R shoulder: ONGOING 10/11/17   2. ? ROM: at least 100 degrees shoulder abduction   3. ? Strength: be able to do scapular strength ex without exacerbating symptoms: MET 10/11/17  4. ? Function: modify activities to decrease stress to R shoulder: MET 10/11/17  5. Independent with Home Exercise Program: MET 10/11/17   6. Demonstrate Knowledge of fall prevention: MET 10/11/17     LTG: (to be met in 12 treatments): as of 12/4/17 NOT YET MET; continue x 12 visits to #24; continue towards goal #44; continue to visit #64; continue goals x 20 visits to visit #84; continue goals to visit #104  1. Pain below 5/10: NOT MET YET 3/27/19  2. Decrease reliance on shoulder injections: MEETING 9/19/18, 3/27/19  3. Have at least 110 degrees shoulder abduction to indicate decreased impingement and improve reaching ability for light objects: MET 9/19/18   4. Improved scapular position within 2 cm of opposite side         Patient goals: \"decreased pain initially then better movement\"       Pt. Education:  [] Yes  [] No  [x] Reviewed Prior HEP/Ed: HOLD ex that are painful  Method of Education: [] Verbal:HEP review of HEP  [] Demo : HEP [] Written: chest stretch  Comprehension of Education:  [x] Verbalizes understanding. Decrease ex at home - pt added ex she learned while in rehab and it may be too much;   [] Demonstrates understanding. [] Needs review.   [] Demonstrates/verbalizes HEP/Ed previously given-issued written instructions as pt had question about appropriate completion of ex     Plan: [x] Continue per plan of care   [x] Other: continue weekly visits      Time In: 7:50 am            Time Out: 8:44 am    Electronically signed by:  Indu Fried PT

## 2019-04-23 ENCOUNTER — HOSPITAL ENCOUNTER (OUTPATIENT)
Age: 69
Setting detail: SPECIMEN
Discharge: HOME | End: 2019-04-23
Payer: MEDICARE

## 2019-04-23 LAB
ANION GAP SERPL CALCULATED.3IONS-SCNC: 14 MMOL/L (ref 9–17)
BUN BLDV-MCNC: 13 MG/DL (ref 8–23)
BUN/CREAT BLD: ABNORMAL (ref 9–20)
CALCIUM SERPL-MCNC: 9.6 MG/DL (ref 8.6–10.4)
CHLORIDE BLD-SCNC: 102 MMOL/L (ref 98–107)
CO2: 23 MMOL/L (ref 20–31)
CREAT SERPL-MCNC: 1.35 MG/DL (ref 0.5–0.9)
GFR AFRICAN AMERICAN: 47 ML/MIN
GFR NON-AFRICAN AMERICAN: 39 ML/MIN
GFR SERPL CREATININE-BSD FRML MDRD: ABNORMAL ML/MIN/{1.73_M2}
GFR SERPL CREATININE-BSD FRML MDRD: ABNORMAL ML/MIN/{1.73_M2}
GLUCOSE FASTING: 134 MG/DL (ref 70–99)
POTASSIUM SERPL-SCNC: 4.8 MMOL/L (ref 3.7–5.3)
SODIUM BLD-SCNC: 139 MMOL/L (ref 135–144)

## 2019-04-24 ENCOUNTER — HOSPITAL ENCOUNTER (OUTPATIENT)
Dept: PHARMACY | Age: 69
Setting detail: THERAPIES SERIES
Discharge: HOME | End: 2019-04-24
Payer: MEDICARE

## 2019-04-24 ENCOUNTER — HOSPITAL ENCOUNTER (OUTPATIENT)
Dept: PHYSICAL THERAPY | Facility: CLINIC | Age: 69
Setting detail: THERAPIES SERIES
Discharge: HOME | End: 2019-04-24
Payer: MEDICARE

## 2019-04-24 DIAGNOSIS — I48.20 CHRONIC ATRIAL FIBRILLATION (HCC): ICD-10-CM

## 2019-04-24 LAB
INR BLD: 1.7
PROTIME: 20.8 SECONDS

## 2019-04-24 PROCEDURE — 97140 MANUAL THERAPY 1/> REGIONS: CPT

## 2019-04-24 PROCEDURE — 99212 OFFICE O/P EST SF 10 MIN: CPT

## 2019-04-24 PROCEDURE — 85610 PROTHROMBIN TIME: CPT

## 2019-04-24 NOTE — FLOWSHEET NOTE
year with MD with probable new script for these issues  10/22/18: discussed with pt potential for overall body strengthening to help prevent falls and improve function due to overall weakness. Modalities: not today: e-stim with DN   Precautions: blood thinners; hepatitis C 2008 : Pt recently diagnosed with cardiac diastolic dysfunction and so cataract surgeries and shoulder surgeries have been postponed until her heart is stabilized with medication changes. She is going to be discontinuing lyrica as this is a possible contributor to her heart condition. She doesn't follow up with cardiologist until 4/26/19.        3/13/19: R shoulder flexion 145 degrees; abduction 135 degrees; L shoulder flexion: 157 degrees; abduction 125 degrees  9/19/18: active: R shoulder flexion 148; abduction: 140; IR T6: ER: 80 degrees; PROM WFL; strength flexion/abduction 4/5  L shoulder active flexion: 130; abduction: 98 degrees; IR T5; ER 80 degrees; PROM WFL; strength flexion/abduction 4/5  7/9/18: R shoulder flexion: 135; abduction 121 degrees: IR bra line: ER:80 degrees, arm at side; pain with movement; L shoulder flexion: 144 degrees; abduction 143 degrees; IR: T5; L ER: 55 degrees  5/16/18: shoulder flexion R 125, abduction 99, L shoulder flexion 126, abduction 99 AROM; IR/ER WNL AROM with arms at side; Not today: PROM:  Flexion 100, abduction 100 c pain over supraspinatus in each direction. IR/ER at 0 abduction WNL, 45 degrees abduction pain into IR/ER with passive ROM  AROM 11/13/17: R shoulder: 110 degrees flexion; abduction 105 degrees active;    Exercises: at home (I)  Exercise Reps/ Time Weight/ Level Comments   Chest stretches/various x  See HEP sheets 10/29/18    SHOULDER extension 10 A Standing, bending over; started 10/2/17    rows 10 A Standing, bending over; started 10/2/17    shoulder flexion, abduction, IR,ER isometrics 5 ea gentle Started 10/2/17             Side ER 20/8 A/1  added 10/11/17 (not today due to increase symptoms recently)   Other:    Not today: Therapeutic activities: discussed proper head support when sleeping in recliner; pt does have pillow that she has molded to head; pt advised to not allow head to alter from neutral position    11/8/17: UEFS: 30% of normal/70% affected  1/3/18: UEFS: 41.25% of normal/58.75% affected  5/16/18: UEFS:28.75% of normal/71.25% affected  7/9/18: UEFS: 45% OF NORMAL/55% AFFECTED  3/27/19: UEFS: 51.25% of normal/48.75% deficit    MFR: UTs, c-paraspinals, scalenes, scapulae concentrating on supra and infraspinatus, pec minors and shoulders cleopatra R with pt in seated position, 20 min    8/27/18: pt brought purchased theracane and PT instructed in its use.  8/20/18: reviewed theracane-pt ordered one and it is arriving today  8/13/18: instructed in use of theracane    DN:prepped area with alcohol wipe, consent obtained, standard procedure followed: 42 total: 3 RCinsertion with  1\" R 1\" R RC in supraspinatus fossa;  0.5\" needles in cervical paraspinals bilaterally to C7 including posterior to transverse processes; 0.5\" greater occipital homeostatic points B; 1\" below to T5  Level  1\" spinal accessory B with pistonning ;  R  2\"  suprascapular homeostatic point with nTP elicited; additional 5.7\" needles in cervical spine; 1\" R middle axillary nn distribution;left in situ 10 min. Not today per pt request: Kinesiotape: unloading R UT,     Not today: Discussed fall prevention: patient has Aneta/echo and is able to call anyone if she were to fall at home; patient also carries a phone with her at all times;      Specific Instructions for next treatment: continue with ONELIA TAN        Treatment Charges: Mins Units   []  Modalities: stim     []  Ther Exercise     [x]  Manual Therapy 35 2   []  Ther Activities     []  Aquatics     []  Vasocompression     []  Other     Total Treatment time 35 2   Medicare tracking: (8POZ30.76) ([id number]) : as of 4/24/19: $546.80    Assessment:   [x] Progressing toward goals: R suprascapular point is most tender   [x] No change:    [] Other:    Problems:    [x] ? Pain: 7/10 R shoulder  [x] ? ROM: R shoulder abduction with pain  [x] ? Strength: mild loss of strength R shoulder; poor scapular stability  [x] ? Function:71% affected function (29% of normal)  [x] Other: mm tightness c-spine/traps; significant trigger points     STG: (to be met in 6 treatments)  1. ? Pain: below 6/10 R shoulder: ONGOING 10/11/17   2. ? ROM: at least 100 degrees shoulder abduction   3. ? Strength: be able to do scapular strength ex without exacerbating symptoms: MET 10/11/17  4. ? Function: modify activities to decrease stress to R shoulder: MET 10/11/17  5. Independent with Home Exercise Program: MET 10/11/17   6. Demonstrate Knowledge of fall prevention: MET 10/11/17     LTG: (to be met in 12 treatments): as of 12/4/17 NOT YET MET; continue x 12 visits to #24; continue towards goal #44; continue to visit #64; continue goals x 20 visits to visit #84; continue goals to visit #104  1. Pain below 5/10: NOT MET YET 3/27/19  2. Decrease reliance on shoulder injections: MEETING 9/19/18, 3/27/19  3. Have at least 110 degrees shoulder abduction to indicate decreased impingement and improve reaching ability for light objects: MET 9/19/18   4. Improved scapular position within 2 cm of opposite side         Patient goals: \"decreased pain initially then better movement\"       Pt. Education:  [] Yes  [x] No  [] Reviewed Prior HEP/Ed: HOLD ex that are painful  Method of Education: [] Verbal:HEP review of HEP  [] Demo : HEP [] Written: chest stretch  Comprehension of Education:  [] Verbalizes understanding. Decrease ex at home - pt added ex she learned while in rehab and it may be too much;   [] Demonstrates understanding. [] Needs review.   [] Demonstrates/verbalizes HEP/Ed previously given-issued written instructions as pt had question about appropriate completion of ex     Plan: [x] Continue per plan of care   [x] Other: continue weekly visits      Time In: 8:00 am            Time Out: 8:55 am    Electronically signed by:  Kirk Becerra PT

## 2019-05-01 ENCOUNTER — HOSPITAL ENCOUNTER (OUTPATIENT)
Dept: PHYSICAL THERAPY | Facility: CLINIC | Age: 69
Setting detail: THERAPIES SERIES
Discharge: HOME | End: 2019-05-01
Payer: MEDICARE

## 2019-05-01 PROCEDURE — 97140 MANUAL THERAPY 1/> REGIONS: CPT

## 2019-05-01 NOTE — FLOWSHEET NOTE
[] [facility]       Outpatient Physical        Therapy       [address]       Phone: [phone]       Fax: [phone] [x] [facility] at [address]       Phone: [phone]       Fax: [phone] [] Bre. [address] [place] [address] [facility]  [address]   Phone: [phone]   Fax:  [phone]     Physical Therapy Daily Treatment Note    Date:  2019  Patient Name:  Wilbert Flores    :  1950  MRN: [id number]  Physician: Osiel Nunez MD                                   Insurance: Medicare  Medical Diagnosis: R RC injury                                         Rehab Codes: G22.856; K12.077; M54.2; M79.1  Onset Date: 2015                                   Next 's appt:   Visit# / total visits: 3 (updated script 4/3/19)  Cancels/No Shows: 6 cx/1 ns     Subjective:    Pain:  [x] Yes  [] No Location: neck/trapezius (R) and neck  Pain Rating: (0-10 scale) /10 cervical;  0-5/10 R shoulder with movement; pain at night 5/10  Pain altered Tx:  [x] No  [] Yes  Action:   Comments:  R shoulder is still very painful; bra strap hurts; pain with sleeping and unable to get rid of it to sleep; pain with movement; at rest there is no pain    18 MRI: ROTATOR CUFF: Small amount of fluid in the subacromial subdeltoid bursa.       Mild infraspinatus tendinopathy.       Low-grade partial-thickness articular surface tearing of the mid insertional   fibers of supraspinatus between the critical zone and footplate.  Moderate   underlying supraspinatus tendinopathy.       Subscapularis and teres minor muscles/tendons appear grossly intact without   evidence of tearing.        Objective:    19: SOB with ambulation; relieved with rest.  19: pt ambulating slowly and more guardedly due to pain in knees, back and shoulders (with rolling walker)  12/10/18: update: will discuss body strength and LBP in the new year with MD with probable new script for these issues  10/22/18: discussed with pt potential for overall body strengthening to help prevent falls and improve function due to overall weakness. Modalities: not today: e-stim with DN   Precautions: blood thinners; hepatitis C 2008 : Pt recently diagnosed with cardiac diastolic dysfunction and so cataract surgeries and shoulder surgeries have been postponed until her heart is stabilized with medication changes. She is going to be discontinuing lyrica as this is a possible contributor to her heart condition. She doesn't follow up with cardiologist until 4/26/19.        3/13/19: R shoulder flexion 145 degrees; abduction 135 degrees; L shoulder flexion: 157 degrees; abduction 125 degrees  9/19/18: active: R shoulder flexion 148; abduction: 140; IR T6: ER: 80 degrees; PROM WFL; strength flexion/abduction 4/5  L shoulder active flexion: 130; abduction: 98 degrees; IR T5; ER 80 degrees; PROM WFL; strength flexion/abduction 4/5  7/9/18: R shoulder flexion: 135; abduction 121 degrees: IR bra line: ER:80 degrees, arm at side; pain with movement; L shoulder flexion: 144 degrees; abduction 143 degrees; IR: T5; L ER: 55 degrees  5/16/18: shoulder flexion R 125, abduction 99, L shoulder flexion 126, abduction 99 AROM; IR/ER WNL AROM with arms at side; Not today: PROM:  Flexion 100, abduction 100 c pain over supraspinatus in each direction. IR/ER at 0 abduction WNL, 45 degrees abduction pain into IR/ER with passive ROM  AROM 11/13/17: R shoulder: 110 degrees flexion; abduction 105 degrees active;    Exercises: at home (I)  Exercise Reps/ Time Weight/ Level Comments   Chest stretches/various x  See HEP sheets 10/29/18    SHOULDER extension 10 A Standing, bending over; started 10/2/17    rows 10 A Standing, bending over; started 10/2/17    shoulder flexion, abduction, IR,ER isometrics 5 ea gentle Started 10/2/17             Side ER 20/8 A/1  added 10/11/17 (not today due to increase symptoms recently)   Other:    Not today: Therapeutic activities: discussed proper head support when sleeping in recliner; pt does have pillow that she has molded to head; pt advised to not allow head to alter from neutral position    11/8/17: UEFS: 30% of normal/70% affected  1/3/18: UEFS: 41.25% of normal/58.75% affected  5/16/18: UEFS:28.75% of normal/71.25% affected  7/9/18: UEFS: 45% OF NORMAL/55% AFFECTED  3/27/19: UEFS: 51.25% of normal/48.75% deficit    MFR: UTs, c-paraspinals, scalenes, scapulae concentrating on supra and infraspinatus, pec minors and major and shoulders cleopatra R with pt in seated position, 20 min    8/27/18: pt brought purchased theracane and PT instructed in its use.  8/20/18: reviewed theracane-pt ordered one and it is arriving today  8/13/18: instructed in use of theracane    DN:prepped area with alcohol wipe, consent obtained, standard procedure followed: 37 total: 1\" R RC in supraspinatus fossa;  0.5\" needles in cervical paraspinals bilaterally to C7 including posterior to transverse processes; 0.5\" greater occipital homeostatic points B; 1\" below to T5  Level  1\" spinal accessory B with pistonning with tenderness R ;  R  2\"  suprascapular homeostatic point with nTP elicited and discomfort; additional 0.5\" needles in cervical spine; 2\" R middle axillary nn distribution;left in situ 10 min. Not today per pt request: Kinesiotape: unloading R UT,     Not today: Discussed fall prevention: patient has Aneta/echo and is able to call anyone if she were to fall at home; patient also carries a phone with her at all times;      Specific Instructions for next treatment: continue with DN, MFR        Treatment Charges: Mins Units   []  Modalities: stim     []  Ther Exercise     [x]  Manual Therapy 35 2   []  Ther Activities     []  Aquatics     []  Vasocompression     []  Other     Total Treatment time 35 2   Medicare tracking: (7SCN85.92) ([id number]) : as of 5/1/19: $595.09    Assessment:   [] Progressing toward goals:    [x] No change: increased tenderness today with DN and several needles needed to be removed    [] Other:    Problems:    [x] ? Pain: 7/10 R shoulder  [x] ? ROM: R shoulder abduction with pain  [x] ? Strength: mild loss of strength R shoulder; poor scapular stability  [x] ? Function:71% affected function (29% of normal)  [x] Other: mm tightness c-spine/traps; significant trigger points     STG: (to be met in 6 treatments)  1. ? Pain: below 6/10 R shoulder: ONGOING 10/11/17   2. ? ROM: at least 100 degrees shoulder abduction   3. ? Strength: be able to do scapular strength ex without exacerbating symptoms: MET 10/11/17  4. ? Function: modify activities to decrease stress to R shoulder: MET 10/11/17  5. Independent with Home Exercise Program: MET 10/11/17   6. Demonstrate Knowledge of fall prevention: MET 10/11/17     LTG: (to be met in 12 treatments): as of 12/4/17 NOT YET MET; continue x 12 visits to #24; continue towards goal #44; continue to visit #64; continue goals x 20 visits to visit #84; continue goals to visit #104  1. Pain below 5/10: NOT MET YET 3/27/19  2. Decrease reliance on shoulder injections: MEETING 9/19/18, 3/27/19  3. Have at least 110 degrees shoulder abduction to indicate decreased impingement and improve reaching ability for light objects: MET 9/19/18   4. Improved scapular position within 2 cm of opposite side         Patient goals: \"decreased pain initially then better movement\"       Pt. Education:  [] Yes  [x] No  [] Reviewed Prior HEP/Ed: HOLD ex that are painful  Method of Education: [] Verbal:HEP review of HEP  [] Demo : HEP [] Written: chest stretch  Comprehension of Education:  [] Verbalizes understanding. Decrease ex at home - pt added ex she learned while in rehab and it may be too much;   [] Demonstrates understanding. [] Needs review.   [] Demonstrates/verbalizes HEP/Ed previously given-issued written instructions as pt had question about appropriate completion of ex     Plan: [x] Continue per plan of care   [x] Other: continue weekly visits      Time In: 8:00 am            Time Out: 8:55 am    Electronically signed by:  Kaylin Magallanes PT

## 2019-05-08 ENCOUNTER — HOSPITAL ENCOUNTER (OUTPATIENT)
Dept: PHYSICAL THERAPY | Facility: CLINIC | Age: 69
Setting detail: THERAPIES SERIES
Discharge: HOME | End: 2019-05-08
Payer: MEDICARE

## 2019-05-08 ENCOUNTER — APPOINTMENT (OUTPATIENT)
Dept: PHARMACY | Age: 69
End: 2019-05-08
Payer: MEDICARE

## 2019-05-08 NOTE — FLOWSHEET NOTE
[] [facility]  [address]    [phone]  F: [phone]   [x] [address] [facility] [address]   [address]  P: [phone]  F: [phone]  [] [place]  [address] [facility]  P: [phone]  F: [phone]   [] [address] [facility] [address]   [place]: [phone]  F: [phone]  [] [facility]  [address] [facility] [address]  [place]: [phone]   F: [phone]     Physical Therapy Cancel/No Show note    Date: 2019  Patient: Billie Sharma  : 1950  MRN: [id number]    Cancels/No Shows to date: 9 cx/1 ns    For today's appointment patient:    [x]  Cancelled    [] Rescheduled appointment    [] No-show     Reason given by patient:    [x]  Patient ill    []  Conflicting appointment    [] No transportation      [] Conflict with work    [] No reason given    [] Weather related    [] Other:      Comments:        [x] pt is aware of next appointment    Electronically signed by: Mode Brown PT

## 2019-05-15 ENCOUNTER — HOSPITAL ENCOUNTER (OUTPATIENT)
Dept: PHARMACY | Age: 69
Setting detail: THERAPIES SERIES
Discharge: HOME | End: 2019-05-15
Payer: MEDICARE

## 2019-05-15 ENCOUNTER — HOSPITAL ENCOUNTER (OUTPATIENT)
Dept: PHYSICAL THERAPY | Facility: CLINIC | Age: 69
Setting detail: THERAPIES SERIES
Discharge: HOME | End: 2019-05-15
Payer: MEDICARE

## 2019-05-15 DIAGNOSIS — I48.20 CHRONIC ATRIAL FIBRILLATION (HCC): ICD-10-CM

## 2019-05-15 LAB
INR BLD: 2.5
PROTIME: 29.6 SECONDS

## 2019-05-15 PROCEDURE — 99211 OFF/OP EST MAY X REQ PHY/QHP: CPT

## 2019-05-15 PROCEDURE — 85610 PROTHROMBIN TIME: CPT

## 2019-05-15 PROCEDURE — 97140 MANUAL THERAPY 1/> REGIONS: CPT

## 2019-05-15 NOTE — PROGRESS NOTES
Patient states compliant all of the time with regimen. No bleeding or thromboembolic side effects noted. No significant med or dietary changes. No significant recent illness or disease state changes. PT/INR done in office per protocol. INR is 2.5 which is therapeutic. Warfarin regimen will be continued at current dose of 2.5mg daily. Will retest in 3 weeks. Patient understands dosing directions and information discussed. Dosing schedule and follow up appointment given to patient. Progress note routed to referring physicians office. Patient acknowledges working in consult agreement with pharmacist as referred by his/her physician.       [address], 5/15/2019 11:07 AM

## 2019-05-15 NOTE — FLOWSHEET NOTE
[] Alonzo Zamora       Outpatient Physical        Therapy       [address]       Phone: [phone]       Fax: [phone] [x] [facility] at [address] [facility]       Phone: [phone]       Fax: [phone] [] Bre. [facility]  [address]   Phone: [phone]   Fax:  [phone]     Physical Therapy Daily Treatment Note    Date:  5/15/2019  Patient Name:  Nelli Stephenson    :  1950  MRN: [id number]  Physician: Jacqueline Bhatti MD                                   Insurance: Medicare  Medical Diagnosis: R RC injury                                         Rehab Codes: E87.945; I93.492; M54.2; M79.1  Onset Date: 2015                                   Next 's appt:   Visit# / total visits: 90/104 (updated script 4/3/19)  Cancels/No Shows: 7 cx/1 ns     Subjective:    Pain:  [x] Yes  [] No Location: neck/trapezius (R) and neck  Pain Rating: (0-10 scale) /10 cervical;  0-5/10 R shoulder with movement; pain at night 5/10  Pain altered Tx:  [x] No  [] Yes  Action:   Comments:  R shoulder has improved since last treatment, especially the first week afterwards. Symptoms have increased gradually since, however, still better than they had been. Pt with minimal difficulty sleeping due to shoulder pain. 18 MRI: ROTATOR CUFF: Small amount of fluid in the subacromial subdeltoid bursa.       Mild infraspinatus tendinopathy.       Low-grade partial-thickness articular surface tearing of the mid insertional   fibers of supraspinatus between the critical zone and footplate.  Moderate   underlying supraspinatus tendinopathy.       Subscapularis and teres minor muscles/tendons appear grossly intact without   evidence of tearing.        Objective:    19: SOB with ambulation; relieved with rest.  19: pt ambulating slowly and more guardedly due to pain in knees, back and shoulders (with rolling walker)  12/10/18: update: will discuss body strength and LBP in the new year with MD with probable new script for these issues  10/22/18: discussed with pt potential for overall body strengthening to help prevent falls and improve function due to overall weakness. Modalities: not today: e-stim with DN   Precautions: blood thinners; hepatitis C 2008 : Pt recently diagnosed with cardiac diastolic dysfunction and so cataract surgeries and shoulder surgeries have been postponed until her heart is stabilized with medication changes. She is going to be discontinuing lyrica as this is a possible contributor to her heart condition. She doesn't follow up with cardiologist until 4/26/19.        3/13/19: R shoulder flexion 145 degrees; abduction 135 degrees; L shoulder flexion: 157 degrees; abduction 125 degrees  9/19/18: active: R shoulder flexion 148; abduction: 140; IR T6: ER: 80 degrees; PROM WFL; strength flexion/abduction 4/5  L shoulder active flexion: 130; abduction: 98 degrees; IR T5; ER 80 degrees; PROM WFL; strength flexion/abduction 4/5  7/9/18: R shoulder flexion: 135; abduction 121 degrees: IR bra line: ER:80 degrees, arm at side; pain with movement; L shoulder flexion: 144 degrees; abduction 143 degrees; IR: T5; L ER: 55 degrees  5/16/18: shoulder flexion R 125, abduction 99, L shoulder flexion 126, abduction 99 AROM; IR/ER WNL AROM with arms at side; Not today: PROM:  Flexion 100, abduction 100 c pain over supraspinatus in each direction. IR/ER at 0 abduction WNL, 45 degrees abduction pain into IR/ER with passive ROM  AROM 11/13/17: R shoulder: 110 degrees flexion; abduction 105 degrees active;    Exercises: at home (I)  Exercise Reps/ Time Weight/ Level Comments   Chest stretches/various x  See HEP sheets 10/29/18    SHOULDER extension 10 A Standing, bending over; started 10/2/17    rows 10 A Standing, bending over; started 10/2/17    shoulder flexion, abduction, IR,ER isometrics 5 ea gentle Started 10/2/17             Side ER 20/8 A/1  added 10/11/17 (not today due to increase symptoms recently)   Other:    Not today: Therapeutic activities: discussed proper head support when sleeping in recliner; pt does have pillow that she has molded to head; pt advised to not allow head to alter from neutral position    11/8/17: UEFS: 30% of normal/70% affected  1/3/18: UEFS: 41.25% of normal/58.75% affected  5/16/18: UEFS:28.75% of normal/71.25% affected  7/9/18: UEFS: 45% OF NORMAL/55% AFFECTED  3/27/19: UEFS: 51.25% of normal/48.75% deficit    MFR: UTs, c-paraspinals, scalenes, scapulae concentrating on supra and infraspinatus, pec minors and major and shoulders cleopatra R with pt in seated position, 20 min    8/27/18: pt brought purchased theracane and PT instructed in its use.  8/20/18: reviewed theracane-pt ordered one and it is arriving today  8/13/18: instructed in use of theracane    DN:prepped area with alcohol wipe, consent obtained, standard procedure followed: 47 total: 1\" R RC in supraspinatus fossa;  0.5\" needles in cervical paraspinals bilaterally to C7 including posterior to transverse processes; 0.5\" greater occipital homeostatic points B; 1\" below to T5  Level  1\" spinal accessory B with tenderness  ;  B  2\"  suprascapular homeostatic point  discomfort;  2\" R middle, anterior and posterior axillary nn distribution;left in situ 10 min. Not today per pt request: Kinesiotape: unloading R UT,     Not today: Discussed fall prevention: patient has Aneta/echo and is able to call anyone if she were to fall at home; patient also carries a phone with her at all times;      Specific Instructions for next treatment: continue with DN, MFR        Treatment Charges: Mins Units   []  Modalities: stim     []  Ther Exercise     [x]  Manual Therapy 40 3   []  Ther Activities     []  Aquatics     []  Vasocompression     []  Other     Total Treatment time 40 3   Medicare tracking: ([id number]) ([id number]) : as of as pt had question about appropriate completion of ex     Plan: [x] Continue per plan of care   [x] Other: continue weekly visits      Time In: 8:00 am            Time Out: 8:55 am    Electronically signed by:  Rober Cordoba PT

## 2019-05-22 ENCOUNTER — HOSPITAL ENCOUNTER (OUTPATIENT)
Dept: PHYSICAL THERAPY | Facility: CLINIC | Age: 69
Setting detail: THERAPIES SERIES
Discharge: HOME | End: 2019-05-22
Payer: MEDICARE

## 2019-05-22 PROCEDURE — 97140 MANUAL THERAPY 1/> REGIONS: CPT

## 2019-05-22 NOTE — FLOWSHEET NOTE
[] CHARISSE [facility]       Outpatient Physical        Therapy       [address]       Phone: [phone]       Fax: [phone] [x] [facility] at [address]       Phone: [phone]       Fax: [phone] [] Bre. [address] [place] [address] [facility]  [address] [place] [address]   Phone: [phone]   Fax:  [phone]     Physical Therapy Daily Treatment Note    Date:  2019  Patient Name:  Cindy Chisholm    :  1950  MRN: [id number]  Physician: Cecily Darling MD                                   Insurance: Medicare  Medical Diagnosis: R RC injury                                         Rehab Codes: O73.405; V19.824; M54.2; M79.1  Onset Date: 2015                                   Next 's appt:   Visit# / total visits: 91/104 (updated script 4/3/19)  Cancels/No Shows: 7 cx/1 ns     Subjective:    Pain:  [x] Yes  [] No Location: neck/trapezius (R) and neck  Pain Rating: (0-10 scale) /10 cervical;  0-5/10 R shoulder with movement; pain at night 5/10  Pain altered Tx:  [x] No  [] Yes  Action:   Comments:  R shoulder with continued aching R shoulder even at rest; pt is sleeping ok; pain with movement; also with pain from shoulder to cervical spine;    18 MRI: ROTATOR CUFF: Small amount of fluid in the subacromial subdeltoid bursa.       Mild infraspinatus tendinopathy.       Low-grade partial-thickness articular surface tearing of the mid insertional   fibers of supraspinatus between the critical zone and footplate.  Moderate   underlying supraspinatus tendinopathy.       Subscapularis and teres minor muscles/tendons appear grossly intact without   evidence of tearing.        Objective:    19: SOB with ambulation; relieved with rest.  19: pt ambulating slowly and more guardedly due to pain in knees, back and shoulders (with rolling walker)  12/10/18: update: will discuss body strength and LBP in the new year with MD with probable new script for these issues  10/22/18: discussed with pt potential for overall body strengthening to help prevent falls and improve function due to overall weakness. Modalities: not today: e-stim with DN   Precautions: blood thinners; hepatitis C 2008 : Pt recently diagnosed with cardiac diastolic dysfunction and so cataract surgeries and shoulder surgeries have been postponed until her heart is stabilized with medication changes. She is going to be discontinuing lyrica as this is a possible contributor to her heart condition. She doesn't follow up with cardiologist until 4/26/19.        3/13/19: R shoulder flexion 145 degrees; abduction 135 degrees; L shoulder flexion: 157 degrees; abduction 125 degrees  9/19/18: active: R shoulder flexion 148; abduction: 140; IR T6: ER: 80 degrees; PROM WFL; strength flexion/abduction 4/5  L shoulder active flexion: 130; abduction: 98 degrees; IR T5; ER 80 degrees; PROM WFL; strength flexion/abduction 4/5  7/9/18: R shoulder flexion: 135; abduction 121 degrees: IR bra line: ER:80 degrees, arm at side; pain with movement; L shoulder flexion: 144 degrees; abduction 143 degrees; IR: T5; L ER: 55 degrees  5/16/18: shoulder flexion R 125, abduction 99, L shoulder flexion 126, abduction 99 AROM; IR/ER WNL AROM with arms at side; Not today: PROM:  Flexion 100, abduction 100 c pain over supraspinatus in each direction. IR/ER at 0 abduction WNL, 45 degrees abduction pain into IR/ER with passive ROM  AROM 11/13/17: R shoulder: 110 degrees flexion; abduction 105 degrees active;    Exercises: at home (I)  Exercise Reps/ Time Weight/ Level Comments   Chest stretches/various x  See HEP sheets 10/29/18    SHOULDER extension 10 A Standing, bending over; started 10/2/17    rows 10 A Standing, bending over; started 10/2/17    shoulder flexion, abduction, IR,ER isometrics 5 ea gentle Started 10/2/17             Side ER 20/8 A/1  added 10/11/17 (not today due to increase symptoms recently)   Other:    Not today: Therapeutic activities: discussed proper head support when sleeping in recliner; pt does have pillow that she has molded to head; pt advised to not allow head to alter from neutral position    11/8/17: UEFS: 30% of normal/70% affected  1/3/18: UEFS: 41.25% of normal/58.75% affected  5/16/18: UEFS:28.75% of normal/71.25% affected  7/9/18: UEFS: 45% OF NORMAL/55% AFFECTED  3/27/19: UEFS: 51.25% of normal/48.75% deficit    MFR: UTs, c-paraspinals, scalenes, scapulae concentrating on supra and infraspinatus, pec minors and major and shoulders cleopatra R with pt in seated position, 20 min    Cupping: attempted light cupping with gliding along R UT, infra and supraspinatus in scapula, along shoulder;    8/27/18: pt brought purchased theracane and PT instructed in its use.  8/20/18: reviewed theracane-pt ordered one and it is arriving today  8/13/18: instructed in use of theracane    DN:prepped area with alcohol wipe, consent obtained, standard procedure followed: 47 total: 1\" R RC in supraspinatus fossa;  0.5\" needles in cervical paraspinals bilaterally to C7 including posterior to transverse processes; 0.5\" greater occipital homeostatic points B; 1\" below to T5  Level  1\" spinal accessory B with tenderness  ;  B  2\"  suprascapular homeostatic point  discomfort;  2\" R middle, anterior and posterior axillary nn distribution;left in situ 10 min. Not today per pt request: Kinesiotape: unloading R UT,     Not today: Discussed fall prevention: patient has Aneta/echo and is able to call anyone if she were to fall at home; patient also carries a phone with her at all times;      Specific Instructions for next treatment: continue with DN, MFR        Treatment Charges: Mins Units   []  Modalities: stim     []  Ther Exercise     [x]  Manual Therapy 40 3   []  Ther Activities     []  Aquatics     []  Vasocompression     []  Other     Total Treatment time 40 3   Medicare tracking: ([id number]) ([id number]) : as of 5/22/19: $734.27    Assessment:   [x] Progressing toward goals: light cupping was tolerated well, some discomfort if too deep/increased suction applied; no skin changes;    [] No change:     [] Other:    Problems:    [x] ? Pain: 7/10 R shoulder  [x] ? ROM: R shoulder abduction with pain  [x] ? Strength: mild loss of strength R shoulder; poor scapular stability  [x] ? Function:71% affected function (29% of normal)  [x] Other: mm tightness c-spine/traps; significant trigger points     STG: (to be met in 6 treatments)  1. ? Pain: below 6/10 R shoulder: ONGOING 10/11/17   2. ? ROM: at least 100 degrees shoulder abduction   3. ? Strength: be able to do scapular strength ex without exacerbating symptoms: MET 10/11/17  4. ? Function: modify activities to decrease stress to R shoulder: MET 10/11/17  5. Independent with Home Exercise Program: MET 10/11/17   6. Demonstrate Knowledge of fall prevention: MET 10/11/17     LTG: (to be met in 12 treatments): as of 12/4/17 NOT YET MET; continue x 12 visits to #24; continue towards goal #44; continue to visit #64; continue goals x 20 visits to visit #84; continue goals to visit #104  1. Pain below 5/10: NOT MET YET 3/27/19  2. Decrease reliance on shoulder injections: MEETING 9/19/18, 3/27/19  3. Have at least 110 degrees shoulder abduction to indicate decreased impingement and improve reaching ability for light objects: MET 9/19/18   4. Improved scapular position within 2 cm of opposite side         Patient goals: \"decreased pain initially then better movement\"       Pt. Education:  [] Yes  [x] No  [] Reviewed Prior HEP/Ed: HOLD ex that are painful  Method of Education: [] Verbal:HEP review of HEP  [] Demo : HEP [] Written: chest stretch  Comprehension of Education:  [] Verbalizes understanding. Decrease ex at home - pt added ex she learned while in rehab and it may be too much;   [] Demonstrates understanding. [] Needs review.   [] Demonstrates/verbalizes HEP/Ed previously given-issued written instructions as pt had question about appropriate completion of ex     Plan: [x] Continue per plan of care   [x] Other: continue weekly visits      Time In: 7:55 am            Time Out: 9:00 am    Electronically signed by:  Tito Peguero PT

## 2019-05-29 ENCOUNTER — HOSPITAL ENCOUNTER (OUTPATIENT)
Dept: PHYSICAL THERAPY | Facility: CLINIC | Age: 69
Setting detail: THERAPIES SERIES
Discharge: HOME | End: 2019-05-29
Payer: MEDICARE

## 2019-05-29 PROCEDURE — 97140 MANUAL THERAPY 1/> REGIONS: CPT

## 2019-05-29 NOTE — FLOWSHEET NOTE
[] Soledad Sanchez       Outpatient Physical        Therapy       [address]       Phone: [phone]       Fax: [phone] [x] [facility] at [address]       Phone: [phone]       Fax: [phone] [] [place]. [facility]  [address] [place] [address]   Phone: [phone]   Fax:  [phone]     Physical Therapy Daily Treatment Note    Date:  2019  Patient Name:  Luciano Proctor    :  1950  MRN: [id number]  Physician: Dominic Contreras MD                                   Insurance: Medicare  Medical Diagnosis: R RC injury                                         Rehab Codes: X47.512; C44.619; M54.2; M79.1  Onset Date: 2015                                   Next 's appt:   Visit# / total visits: 92/104 (updated script 4/3/19)  Cancels/No Shows: 7 cx/1 ns     Subjective:    Pain:  [x] Yes  [] No Location: neck/trapezius (R) and neck  Pain Rating: (0-10 scale) /10 cervical;  0-5/10 R shoulder with movement; pain at night 5/10  Pain altered Tx:  [x] No  [] Yes  Action:   Comments:  Pt with overall increased pain R shoulder; pt is to have surgery early to mid July for [facility] tear and adhesive capsulitis but does not have the final date yet.      18 MRI: ROTATOR CUFF: Small amount of fluid in the subacromial subdeltoid bursa.       Mild infraspinatus tendinopathy.       Low-grade partial-thickness articular surface tearing of the mid insertional   fibers of supraspinatus between the critical zone and footplate.  Moderate   underlying supraspinatus tendinopathy.       Subscapularis and teres minor muscles/tendons appear grossly intact without   evidence of tearing.        Objective:    19: SOB with ambulation; relieved with rest.  19: pt ambulating slowly and more guardedly due to pain in knees, back and shoulders (with rolling walker)  12/10/18: update: will discuss body strength and LBP in the new year with MD with probable new script for these issues  10/22/18: discussed with pt potential for overall body strengthening to help prevent falls and improve function due to overall weakness. Modalities: not today: e-stim with DN   Precautions: blood thinners; hepatitis C 2008 : Pt recently diagnosed with cardiac diastolic dysfunction and so cataract surgeries and shoulder surgeries have been postponed until her heart is stabilized with medication changes. She is going to be discontinuing lyrica as this is a possible contributor to her heart condition. She doesn't follow up with cardiologist until 4/26/19.        3/13/19: R shoulder flexion 145 degrees; abduction 135 degrees; L shoulder flexion: 157 degrees; abduction 125 degrees  9/19/18: active: R shoulder flexion 148; abduction: 140; IR T6: ER: 80 degrees; PROM WFL; strength flexion/abduction 4/5  L shoulder active flexion: 130; abduction: 98 degrees; IR T5; ER 80 degrees; PROM WFL; strength flexion/abduction 4/5  7/9/18: R shoulder flexion: 135; abduction 121 degrees: IR bra line: ER:80 degrees, arm at side; pain with movement; L shoulder flexion: 144 degrees; abduction 143 degrees; IR: T5; L ER: 55 degrees  5/16/18: shoulder flexion R 125, abduction 99, L shoulder flexion 126, abduction 99 AROM; IR/ER WNL AROM with arms at side; Not today: PROM:  Flexion 100, abduction 100 c pain over supraspinatus in each direction. IR/ER at 0 abduction WNL, 45 degrees abduction pain into IR/ER with passive ROM  AROM 11/13/17: R shoulder: 110 degrees flexion; abduction 105 degrees active;    Exercises: at home (I)  Exercise Reps/ Time Weight/ Level Comments   Chest stretches/various x  See HEP sheets 10/29/18    SHOULDER extension 10 A Standing, bending over; started 10/2/17    rows 10 A Standing, bending over; started 10/2/17    shoulder flexion, abduction, IR,ER isometrics 5 ea gentle Started 10/2/17             Side ER 20/8 A/1  added 10/11/17 (not ability for light objects: MET 9/19/18   4. Improved scapular position within 2 cm of opposite side         Patient goals: \"decreased pain initially then better movement\"       Pt. Education:  [x] Yes  [] No  [] Reviewed Prior HEP/Ed: HOLD ex that are painful  Method of Education: [x] Verbal:   [x] Demo : walking with quad cane (large based) and fitting it to her size  [] Written: chest stretch  Comprehension of Education:  [x] Verbalizes understanding. Decrease ex at home - pt added ex she learned while in rehab and it may be too much;   [x] Demonstrates understanding. [x] Needs review.   [] Demonstrates/verbalizes HEP/Ed previously given-issued written instructions as pt had question about appropriate completion of ex     Plan: [x] Continue per plan of care   [x] Other: continue weekly visits      Time In: 7:50 am            Time Out: 8:45 am    Electronically signed by:  Leticia Rhodes, PT

## 2019-06-05 ENCOUNTER — HOSPITAL ENCOUNTER (OUTPATIENT)
Dept: PHYSICAL THERAPY | Facility: CLINIC | Age: 69
Setting detail: THERAPIES SERIES
Discharge: HOME | End: 2019-06-05
Payer: MEDICARE

## 2019-06-05 ENCOUNTER — HOSPITAL ENCOUNTER (OUTPATIENT)
Dept: PHARMACY | Age: 69
Setting detail: THERAPIES SERIES
Discharge: HOME | End: 2019-06-05
Payer: MEDICARE

## 2019-06-05 DIAGNOSIS — I48.20 CHRONIC ATRIAL FIBRILLATION (HCC): ICD-10-CM

## 2019-06-05 LAB
INR BLD: 2.9
PROTIME: 34.2 SECONDS

## 2019-06-05 PROCEDURE — 97140 MANUAL THERAPY 1/> REGIONS: CPT

## 2019-06-05 PROCEDURE — 85610 PROTHROMBIN TIME: CPT

## 2019-06-05 PROCEDURE — 99211 OFF/OP EST MAY X REQ PHY/QHP: CPT

## 2019-06-05 NOTE — FLOWSHEET NOTE
[] Laya Newell       Outpatient Physical        Therapy       [address]       Phone: [phone]       Fax: [phone] [x] [facility] at [address] [facility]       Phone: [phone]       Fax: [phone] [] [facility]  [address]   Phone: [phone]   Fax:  [phone]     Physical Therapy Daily Treatment Note    Date:  2019  Patient Name:  Adrienne Man    :  1950  MRN: [id number]  Physician: Germain Leyden, MD                                   Insurance: Medicare  Medical Diagnosis: R RC injury                                         Rehab Codes: V93.802; X42.628; M54.2; M79.1  Onset Date: 2015                                   Next 's appt:   Visit# / total visits: 23/315 (updated script 4/3/19)  Cancels/No Shows: 7 cx/1 ns     Subjective:    Pain:  [x] Yes  [] No Location: neck/trapezius (R) and neck  Pain Rating: (0-10 scale) /10 cervical;  0-5/10 R shoulder with movement; pain at night 5/10  Pain altered Tx:  [x] No  [] Yes  Action:   Comments:  Pt improved this last week, noting fluctuating symptoms. 18 MRI: ROTATOR CUFF: Small amount of fluid in the subacromial subdeltoid bursa.       Mild infraspinatus tendinopathy.       Low-grade partial-thickness articular surface tearing of the mid insertional   fibers of supraspinatus between the critical zone and footplate.  Moderate   underlying supraspinatus tendinopathy.       Subscapularis and teres minor muscles/tendons appear grossly intact without   evidence of tearing.        Objective:    19: SOB with ambulation; relieved with rest.  19: pt ambulating slowly and more guardedly due to pain in knees, back and shoulders (with rolling walker)  12/10/18: update: will discuss body strength and LBP in the new year with MD with probable new script for these issues  10/22/18: discussed with pt potential for overall body strengthening to help prevent falls and improve function due to overall weakness. Modalities: not today: e-stim with DN   Precautions: blood thinners; hepatitis C 2008 : Pt recently diagnosed with cardiac diastolic dysfunction and so cataract surgeries and shoulder surgeries have been postponed until her heart is stabilized with medication changes. She is going to be discontinuing lyrica as this is a possible contributor to her heart condition. She doesn't follow up with cardiologist until 4/26/19.        3/13/19: R shoulder flexion 145 degrees; abduction 135 degrees; L shoulder flexion: 157 degrees; abduction 125 degrees  9/19/18: active: R shoulder flexion 148; abduction: 140; IR T6: ER: 80 degrees; PROM WFL; strength flexion/abduction 4/5  L shoulder active flexion: 130; abduction: 98 degrees; IR T5; ER 80 degrees; PROM WFL; strength flexion/abduction 4/5  7/9/18: R shoulder flexion: 135; abduction 121 degrees: IR bra line: ER:80 degrees, arm at side; pain with movement; L shoulder flexion: 144 degrees; abduction 143 degrees; IR: T5; L ER: 55 degrees  5/16/18: shoulder flexion R 125, abduction 99, L shoulder flexion 126, abduction 99 AROM; IR/ER WNL AROM with arms at side; Not today: PROM:  Flexion 100, abduction 100 c pain over supraspinatus in each direction. IR/ER at 0 abduction WNL, 45 degrees abduction pain into IR/ER with passive ROM  AROM 11/13/17: R shoulder: 110 degrees flexion; abduction 105 degrees active;    Exercises: at home (I)  Exercise Reps/ Time Weight/ Level Comments   Chest stretches/various x  See HEP sheets 10/29/18    SHOULDER extension 10 A Standing, bending over; started 10/2/17    rows 10 A Standing, bending over; started 10/2/17    shoulder flexion, abduction, IR,ER isometrics 5 ea gentle Started 10/2/17             Side ER 20/8 A/1  added 10/11/17 (not today due to increase symptoms recently)   Other:    Not today: Therapeutic activities: discussed proper head support when sleeping in recliner; pt does have pillow that she has molded to head; pt advised to not allow head to alter from neutral position    11/8/17: UEFS: 30% of normal/70% affected  1/3/18: UEFS: 41.25% of normal/58.75% affected  5/16/18: UEFS:28.75% of normal/71.25% affected  7/9/18: UEFS: 45% OF NORMAL/55% AFFECTED  3/27/19: UEFS: 51.25% of normal/48.75% deficit    MFR: UTs, c-paraspinals, scalenes, scapulae concentrating on supra and infraspinatus, pec minors and major and shoulders cleopatra R with pt in seated position, 20 min    Not today: Cupping: attempted light cupping with gliding along R UT, infra and supraspinatus in scapula, along shoulder;    5/29/19: instructed in L handed use of quad cane as pt will be NWB R UE after surgery: pt with good walking pattern, with some wobbling of cane and unfamiliarity with using it in L UE.  8/27/18: pt brought purchased theracane and PT instructed in its use.  8/20/18: reviewed theracane-pt ordered one and it is arriving today  8/13/18: instructed in use of theracane    DN:prepped area with alcohol wipe, consent obtained, standard procedure followed: 43 total: 3 1\" B RC in supraspinatus fossa;  0.5\" needles in cervical paraspinals bilaterally to C7 including posterior to transverse processes; ; 1\" below to T3  Level  1\" spinal accessory B with tenderness, pistoning  ; ; B  2\"  suprascapular homeostatic point  discomfort;  1\" R middle, anterior and posterior axillary nn distribution;left in situ 10 min. Not today per pt request: Kinesiotape: unloading R UT,     Not today: Discussed fall prevention: patient has Aneta/echo and is able to call anyone if she were to fall at home; patient also carries a phone with her at all times;      Specific Instructions for next treatment: continue with DN, MFR        Treatment Charges: Mins Units   []  Modalities: stim     []  Ther Exercise     [x]  Manual Therapy 35 2   []  Ther Activities     []  Aquatics     [] Vasocompression     []  Other     Total Treatment time 35 2   Medicare tracking: (2YWI29.98) (8XVA90.05) : as of 6/5/19: $830.85    Assessment:   [x] Progressing toward goals: there is more difficulty controlling pain R shoulder over the last couple of months. Pt will have difficulty with ambulation and caring for self post surgery due to normally relying on a walker and she will be NWB R UE and in a sling at that time. At home pt uses furniture and walls to ambulate and minimally uses walker. Pt was instructed in quad cane and shows some proficiency with it, though practice will improve her ability to walk with it.    [] No change:     [] Other:    Problems:    [x] ? Pain: 7/10 R shoulder  [x] ? ROM: R shoulder abduction with pain  [x] ? Strength: mild loss of strength R shoulder; poor scapular stability  [x] ? Function:71% affected function (29% of normal)  [x] Other: mm tightness c-spine/traps; significant trigger points     STG: (to be met in 6 treatments)  1. ? Pain: below 6/10 R shoulder: ONGOING 10/11/17   2. ? ROM: at least 100 degrees shoulder abduction   3. ? Strength: be able to do scapular strength ex without exacerbating symptoms: MET 10/11/17  4. ? Function: modify activities to decrease stress to R shoulder: MET 10/11/17  5. Independent with Home Exercise Program: MET 10/11/17   6. Demonstrate Knowledge of fall prevention: MET 10/11/17     LTG: (to be met in 12 treatments): as of 12/4/17 NOT YET MET; continue x 12 visits to #24; continue towards goal #44; continue to visit #64; continue goals x 20 visits to visit #84; continue goals to visit #104  1. Pain below 5/10: NOT MET YET 3/27/19  2. Decrease reliance on shoulder injections: MEETING 9/19/18, 3/27/19  3. Have at least 110 degrees shoulder abduction to indicate decreased impingement and improve reaching ability for light objects: MET 9/19/18   4.  Improved scapular position within 2 cm of opposite side         Patient goals: \"decreased pain initially then better movement\"       Pt. Education:  [x] Yes  [] No  [] Reviewed Prior HEP/Ed: HOLD ex that are painful  Method of Education: [x] Verbal:   [x] Demo : walking with quad cane (large based) and fitting it to her size  [] Written: chest stretch  Comprehension of Education:  [x] Verbalizes understanding. Decrease ex at home - pt added ex she learned while in rehab and it may be too much;   [x] Demonstrates understanding. [x] Needs review.   [] Demonstrates/verbalizes HEP/Ed previously given-issued written instructions as pt had question about appropriate completion of ex     Plan: [x] Continue per plan of care   [x] Other: continue weekly visits      Time In: 8:00 am            Time Out: 9\"00 am    Electronically signed by:  Kika Simmons, PT

## 2019-06-12 ENCOUNTER — HOSPITAL ENCOUNTER (OUTPATIENT)
Dept: PHYSICAL THERAPY | Facility: CLINIC | Age: 69
Setting detail: THERAPIES SERIES
Discharge: HOME | End: 2019-06-12
Payer: MEDICARE

## 2019-06-12 PROCEDURE — 97140 MANUAL THERAPY 1/> REGIONS: CPT

## 2019-06-12 NOTE — FLOWSHEET NOTE
[] [facility]       Outpatient Physical        Therapy       [address]       Phone: [phone]       Fax: [phone] [x] [facility] at [address] [facility]       Phone: [phone]       Fax: [phone] [] [facility]  [address] [facility]   Phone: [phone]   Fax:  [phone]     Physical Therapy Daily Treatment Note    Date:  2019  Patient Name:  Horacio Leonard    :  1950  MRN: [id number]  Physician: Natividad Oconnell MD                                   Insurance: Medicare  Medical Diagnosis: R RC injury                                         Rehab Codes: K38.778; N24.070; M54.2; M79.1  Onset Date: 2015                                   Next 's appt:   Visit# / total visits: 94/104 (updated script 4/3/19)  Cancels/No Shows: 7 cx/1 ns     Subjective:    Pain:  [x] Yes  [] No Location: neck/trapezius (R) and neck  Pain Rating: (0-10 scale) /10 cervical;  0-5/10 R shoulder with movement; pain at night 5/10  Pain altered Tx:  [x] No  [] Yes  Action:   Comments:  Pt with continued improved symptoms. Did not wake up last night with pain. 18 MRI: ROTATOR CUFF: Small amount of fluid in the subacromial subdeltoid bursa.       Mild infraspinatus tendinopathy.       Low-grade partial-thickness articular surface tearing of the mid insertional   fibers of supraspinatus between the critical zone and footplate.  Moderate   underlying supraspinatus tendinopathy.       Subscapularis and teres minor muscles/tendons appear grossly intact without   evidence of tearing.        Objective:    19: SOB with ambulation; relieved with rest.  19: pt ambulating slowly and more guardedly due to pain in knees, back and shoulders (with rolling walker)  12/10/18: update: will discuss body strength and LBP in the new year with MD with probable new script for these issues  10/22/18: Manual Therapy 40 3   []  Ther Activities     []  Aquatics     []  Vasocompression     []  Other     Total Treatment time 40 3   Medicare tracking: (6MPC52.97) (9SZN82.16) : as of 6/12/19: $900.44  Assessment:   [x] Progressing toward goals: improved this last week with less pain and easier time tolerating dry needling. [] No change:     [] Other:    Problems:    [x] ? Pain: 7/10 R shoulder  [x] ? ROM: R shoulder abduction with pain  [x] ? Strength: mild loss of strength R shoulder; poor scapular stability  [x] ? Function:71% affected function (29% of normal)  [x] Other: mm tightness c-spine/traps; significant trigger points     STG: (to be met in 6 treatments)  1. ? Pain: below 6/10 R shoulder: ONGOING 10/11/17   2. ? ROM: at least 100 degrees shoulder abduction   3. ? Strength: be able to do scapular strength ex without exacerbating symptoms: MET 10/11/17  4. ? Function: modify activities to decrease stress to R shoulder: MET 10/11/17  5. Independent with Home Exercise Program: MET 10/11/17   6. Demonstrate Knowledge of fall prevention: MET 10/11/17     LTG: (to be met in 12 treatments): as of 12/4/17 NOT YET MET; continue x 12 visits to #24; continue towards goal #44; continue to visit #64; continue goals x 20 visits to visit #84; continue goals to visit #104  1. Pain below 5/10: NOT MET YET 3/27/19  2. Decrease reliance on shoulder injections: MEETING 9/19/18, 3/27/19  3. Have at least 110 degrees shoulder abduction to indicate decreased impingement and improve reaching ability for light objects: MET 9/19/18   4. Improved scapular position within 2 cm of opposite side         Patient goals: \"decreased pain initially then better movement\"       Pt.  Education:  [] Yes  [x] No  [] Reviewed Prior HEP/Ed: HOLD ex that are painful  Method of Education: [] Verbal:   [] Demo : walking with quad cane (large based) and fitting it to her size  [] Written: chest stretch  Comprehension of Education:  [] [employer] understanding. Decrease ex at home - pt added ex she learned while in rehab and it may be too much;   [] Demonstrates understanding. [] Needs review.   [] Demonstrates/verbalizes HEP/Ed previously given-issued written instructions as pt had question about appropriate completion of ex     Plan: [x] Continue per plan of care   [x] Other: continue weekly visits      Time In: 8:00 am            Time Out: 9:00 am    Electronically signed by:  Kika Simmons, PT

## 2019-06-26 ENCOUNTER — HOSPITAL ENCOUNTER (OUTPATIENT)
Dept: PHYSICAL THERAPY | Facility: CLINIC | Age: 69
Setting detail: THERAPIES SERIES
Discharge: HOME | End: 2019-06-26
Payer: MEDICARE

## 2019-06-26 PROCEDURE — 97140 MANUAL THERAPY 1/> REGIONS: CPT

## 2019-06-26 NOTE — FLOWSHEET NOTE
over; started 10/2/17    shoulder flexion, abduction, IR,ER isometrics 5 ea gentle Started 10/2/17             Side ER 20/8 A/1  added 10/11/17 (not today due to increase symptoms recently)   Other:    Not today: Therapeutic activities: discussed proper head support when sleeping in recliner; pt does have pillow that she has molded to head; pt advised to not allow head to alter from neutral position    11/8/17: UEFS: 30% of normal/70% affected  1/3/18: UEFS: 41.25% of normal/58.75% affected  5/16/18: UEFS:28.75% of normal/71.25% affected  7/9/18: UEFS: 45% OF NORMAL/55% AFFECTED  3/27/19: UEFS: 51.25% of normal/48.75% deficit    MFR: UTs, c-paraspinals, scalenes, scapulae concentrating on supra and infraspinatus, pec minors and major and shoulders cleopatra R with pt in seated position, 20 min    Not today: Cupping: attempted light cupping with gliding along R UT, infra and supraspinatus in scapula, along shoulder;    5/29/19: instructed in L handed use of quad cane as pt will be NWB R UE after surgery: pt with good walking pattern, with some wobbling of cane and unfamiliarity with using it in L UE.  8/27/18: pt brought purchased theracane and PT instructed in its use.  8/20/18: reviewed theracane-pt ordered one and it is arriving today  8/13/18: instructed in use of theracane    DN:prepped area with alcohol wipe, consent obtained, standard procedure followed: 44 total: 3 1\" B RC in supraspinatus fossa;  0.5\" needles in cervical paraspinals bilaterally to C7 including posterior to transverse processes; 0.5\" greater occipital homeostatic points B; 1\" below to T3  Level  1\" spinal accessory B with tenderness, pistoning on R ; ; B  2\"  suprascapular homeostatic point  No discomfort;  2\" R middle, anterior and posterior axillary nn distribution;left in situ 10 min.     Not today per pt request: Kinesiotape: unloading R UT,     Not today: Discussed fall prevention: patient has Aneta/echo and is able to call anyone if she were to fall at home; patient also carries a phone with her at all times; Specific Instructions for next treatment: continue with ONELIA TAN        Treatment Charges: Mins Units   []  Modalities: stim     []  Ther Exercise     [x]  Manual Therapy 35 2   []  Ther Activities     []  Aquatics     []  Vasocompression     []  Other     Total Treatment time 35 2   Medicare tracking: (4XFX82.07) ([id number]) : as of 6/26/19: $138.13  Assessment:    [x] Progressing toward goals: improved this last week with less pain and easier time tolerating dry needling. [] No change:     [] Other:    Problems:    [x] ? Pain: 7/10 R shoulder  [x] ? ROM: R shoulder abduction with pain  [x] ? Strength: mild loss of strength R shoulder; poor scapular stability  [x] ? Function:71% affected function (29% of normal)  [x] Other: mm tightness c-spine/traps; significant trigger points     STG: (to be met in 6 treatments)  1. ? Pain: below 6/10 R shoulder: ONGOING 10/11/17   2. ? ROM: at least 100 degrees shoulder abduction   3. ? Strength: be able to do scapular strength ex without exacerbating symptoms: MET 10/11/17  4. ? Function: modify activities to decrease stress to R shoulder: MET 10/11/17  5. Independent with Home Exercise Program: MET 10/11/17   6. Demonstrate Knowledge of fall prevention: MET 10/11/17     LTG: (to be met in 12 treatments): as of 12/4/17 NOT YET MET; continue x 12 visits to #24; continue towards goal #44; continue to visit #64; continue goals x 20 visits to visit #84; continue goals to visit #104  1. Pain below 5/10: NOT MET YET 3/27/19  2. Decrease reliance on shoulder injections: MEETING 9/19/18, 3/27/19  3. Have at least 110 degrees shoulder abduction to indicate decreased impingement and improve reaching ability for light objects: MET 9/19/18   4. Improved scapular position within 2 cm of opposite side         Patient goals: \"decreased pain initially then better movement\"       Pt.  Education:  [] Yes  [x] No  [] Reviewed Prior HEP/Ed: HOLD ex that are painful  Method of Education: [] Verbal:   [] Demo : walking with quad cane (large based) and fitting it to her size  [] Written: chest stretch  Comprehension of Education:  [] Verbalizes understanding. Decrease ex at home - pt added ex she learned while in rehab and it may be too much;   [] Demonstrates understanding. [] Needs review.   [] Demonstrates/verbalizes HEP/Ed previously given-issued written instructions as pt had question about appropriate completion of ex     Plan: [x] Continue per plan of care   [x] Other: continue weekly visit until surgery      Time In: 8:00 am            Time Out: 9:00 am    Electronically signed by:  Armstead Blizzard, PT

## 2019-07-02 ENCOUNTER — HOSPITAL ENCOUNTER (OUTPATIENT)
Age: 69
Setting detail: SPECIMEN
Discharge: HOME | End: 2019-07-02
Payer: MEDICARE

## 2019-07-02 LAB
ANION GAP SERPL CALCULATED.3IONS-SCNC: 15 MMOL/L (ref 9–17)
BUN BLDV-MCNC: 14 MG/DL (ref 8–23)
BUN/CREAT BLD: ABNORMAL (ref 9–20)
CALCIUM SERPL-MCNC: 9.7 MG/DL (ref 8.6–10.4)
CHLORIDE BLD-SCNC: 106 MMOL/L (ref 98–107)
CO2: 19 MMOL/L (ref 20–31)
CREAT SERPL-MCNC: 1.82 MG/DL (ref 0.5–0.9)
GFR AFRICAN AMERICAN: 33 ML/MIN
GFR NON-AFRICAN AMERICAN: 27 ML/MIN
GFR SERPL CREATININE-BSD FRML MDRD: ABNORMAL ML/MIN/{1.73_M2}
GFR SERPL CREATININE-BSD FRML MDRD: ABNORMAL ML/MIN/{1.73_M2}
GLUCOSE FASTING: 114 MG/DL (ref 70–99)
POTASSIUM SERPL-SCNC: 4 MMOL/L (ref 3.7–5.3)
SODIUM BLD-SCNC: 140 MMOL/L (ref 135–144)

## 2019-07-03 ENCOUNTER — HOSPITAL ENCOUNTER (OUTPATIENT)
Dept: PHYSICAL THERAPY | Facility: CLINIC | Age: 69
Setting detail: THERAPIES SERIES
Discharge: HOME | End: 2019-07-03
Payer: MEDICARE

## 2019-07-03 NOTE — FLOWSHEET NOTE
[] [facility] [address]  [address]    [phone]  F: [phone]   [] [address]  [facility]   [address]  P: [phone]  F: [phone]  [] [place]  [address] [facility]  P: [phone]  F: [phone]   [] [address]  [facility] [address]   P: [phone]  F: [phone]  [] [place] [id number] [facility] [address]  [place]: [phone]   F: [phone]     Physical Therapy Cancel/No Show note    Date: 7/3/2019  Patient: Dariel Frazier  : 1950  MRN: [id number]    Cancels/No Shows to date: 8cx/1ns    For today's appointment patient:    [x]  Cancelled    [] Rescheduled appointment    [] No-show     Reason given by patient:    []  Patient ill    []  Conflicting appointment    [] No transportation      [] Conflict with work    [] No reason given    [] Weather related    [x] Other: home emergency      Comments: pt to call when ready to get back on schedule; surgery was supposed to be 19, however, some of her labs are \"not good\" so pt had repeat labs and is waiting for results to determine if surgery will proceed.        [] Next appointment was confirmed    Electronically signed by: Hien Jay, PT

## 2019-07-15 ENCOUNTER — TELEPHONE (OUTPATIENT)
Dept: PHARMACY | Age: 69
End: 2019-07-15

## 2019-07-15 DIAGNOSIS — I48.20 CHRONIC ATRIAL FIBRILLATION (HCC): ICD-10-CM

## 2019-08-07 ENCOUNTER — HOSPITAL ENCOUNTER (OUTPATIENT)
Age: 69
Discharge: HOME | End: 2019-08-07
Payer: MEDICARE

## 2019-08-07 ENCOUNTER — HOSPITAL ENCOUNTER (OUTPATIENT)
Dept: PHARMACY | Age: 69
Setting detail: THERAPIES SERIES
Discharge: HOME | End: 2019-08-07
Payer: MEDICARE

## 2019-08-07 DIAGNOSIS — I48.20 CHRONIC ATRIAL FIBRILLATION (HCC): ICD-10-CM

## 2019-08-07 LAB
-: ABNORMAL
ALBUMIN SERPL-MCNC: 3.8 G/DL (ref 3.5–5.2)
ALBUMIN SERPL-MCNC: 3.8 G/DL (ref 3.5–5.2)
ALBUMIN/GLOBULIN RATIO: 1.1 (ref 1–2.5)
ALP BLD-CCNC: 126 U/L (ref 35–104)
ALT SERPL-CCNC: 19 U/L (ref 5–33)
AMORPHOUS: ABNORMAL
ANION GAP SERPL CALCULATED.3IONS-SCNC: 11 MMOL/L (ref 9–17)
ANION GAP SERPL CALCULATED.3IONS-SCNC: 11 MMOL/L (ref 9–17)
AST SERPL-CCNC: 24 U/L
BACTERIA: ABNORMAL
BILIRUB SERPL-MCNC: 1.08 MG/DL (ref 0.3–1.2)
BILIRUBIN URINE: ABNORMAL
BNP INTERPRETATION: ABNORMAL
BUN BLDV-MCNC: 14 MG/DL (ref 8–23)
BUN BLDV-MCNC: 14 MG/DL (ref 8–23)
BUN/CREAT BLD: ABNORMAL (ref 9–20)
BUN/CREAT BLD: ABNORMAL (ref 9–20)
CALCIUM SERPL-MCNC: 9.6 MG/DL (ref 8.6–10.4)
CALCIUM SERPL-MCNC: 9.6 MG/DL (ref 8.6–10.4)
CASTS UA: ABNORMAL /LPF (ref 0–8)
CHLORIDE BLD-SCNC: 104 MMOL/L (ref 98–107)
CHLORIDE BLD-SCNC: 104 MMOL/L (ref 98–107)
CHOLESTEROL, FASTING: 146 MG/DL
CHOLESTEROL/HDL RATIO: 3.9
CO2: 23 MMOL/L (ref 20–31)
CO2: 23 MMOL/L (ref 20–31)
COLOR: ABNORMAL
COMMENT UA: ABNORMAL
CREAT SERPL-MCNC: 1.69 MG/DL (ref 0.5–0.9)
CREAT SERPL-MCNC: 1.69 MG/DL (ref 0.5–0.9)
CREATININE URINE: 124.7 MG/DL (ref 28–217)
CRYSTALS, UA: ABNORMAL /HPF
EPITHELIAL CELLS UA: ABNORMAL /HPF (ref 0–5)
ESTIMATED AVERAGE GLUCOSE: 114 MG/DL
ESTIMATED AVERAGE GLUCOSE: 117 MG/DL
GFR AFRICAN AMERICAN: 36 ML/MIN
GFR AFRICAN AMERICAN: 36 ML/MIN
GFR NON-AFRICAN AMERICAN: 30 ML/MIN
GFR NON-AFRICAN AMERICAN: 30 ML/MIN
GFR SERPL CREATININE-BSD FRML MDRD: ABNORMAL ML/MIN/{1.73_M2}
GLUCOSE BLD-MCNC: 117 MG/DL (ref 70–99)
GLUCOSE FASTING: 117 MG/DL (ref 70–99)
GLUCOSE URINE: NEGATIVE
HBA1C MFR BLD: 5.6 % (ref 4–6)
HBA1C MFR BLD: 5.7 % (ref 4–6)
HCT VFR BLD CALC: 42.6 % (ref 36.3–47.1)
HCT VFR BLD CALC: 42.6 % (ref 36.3–47.1)
HDLC SERPL-MCNC: 37 MG/DL
HEMOGLOBIN: 13.6 G/DL (ref 11.9–15.1)
HEMOGLOBIN: 13.6 G/DL (ref 11.9–15.1)
INR BLD: 2.1
INR BLD: 2.2
IRON SATURATION: 47 % (ref 20–55)
IRON: 114 UG/DL (ref 37–145)
KETONES, URINE: NEGATIVE
LDL CHOLESTEROL: 76 MG/DL (ref 0–130)
LEUKOCYTE ESTERASE, URINE: ABNORMAL
MCH RBC QN AUTO: 34.1 PG (ref 25.2–33.5)
MCH RBC QN AUTO: 34.1 PG (ref 25.2–33.5)
MCHC RBC AUTO-ENTMCNC: 31.9 G/DL (ref 28.4–34.8)
MCHC RBC AUTO-ENTMCNC: 31.9 G/DL (ref 28.4–34.8)
MCV RBC AUTO: 106.8 FL (ref 82.6–102.9)
MCV RBC AUTO: 106.8 FL (ref 82.6–102.9)
MICROALBUMIN/CREAT 24H UR: 331 MG/L
MICROALBUMIN/CREAT UR-RTO: 265 MCG/MG CREAT
MUCUS: ABNORMAL
NITRITE, URINE: NEGATIVE
NRBC AUTOMATED: 0 PER 100 WBC
NRBC AUTOMATED: 0 PER 100 WBC
OTHER OBSERVATIONS UA: ABNORMAL
PDW BLD-RTO: 13.5 % (ref 11.8–14.4)
PDW BLD-RTO: 13.5 % (ref 11.8–14.4)
PH UA: 7 (ref 5–8)
PHOSPHORUS: 2.7 MG/DL (ref 2.6–4.5)
PLATELET # BLD: 203 K/UL (ref 138–453)
PLATELET # BLD: 203 K/UL (ref 138–453)
PMV BLD AUTO: 11.7 FL (ref 8.1–13.5)
PMV BLD AUTO: 11.7 FL (ref 8.1–13.5)
POTASSIUM SERPL-SCNC: 4 MMOL/L (ref 3.7–5.3)
POTASSIUM SERPL-SCNC: 4 MMOL/L (ref 3.7–5.3)
PRO-BNP: 366 PG/ML
PROTEIN UA: ABNORMAL
PROTHROMBIN TIME: 20.9 SEC (ref 9.7–11.6)
PROTIME: 26.1 SECONDS
PTH INTACT: 96.46 PG/ML (ref 15–65)
RBC # BLD: 3.99 M/UL (ref 3.95–5.11)
RBC # BLD: 3.99 M/UL (ref 3.95–5.11)
RBC UA: ABNORMAL /HPF (ref 0–4)
RENAL EPITHELIAL, UA: ABNORMAL /HPF
SODIUM BLD-SCNC: 138 MMOL/L (ref 135–144)
SODIUM BLD-SCNC: 138 MMOL/L (ref 135–144)
SPECIFIC GRAVITY UA: 1.02 (ref 1–1.03)
THYROXINE, FREE: 1.83 NG/DL (ref 0.93–1.7)
TOTAL CK: 36 U/L (ref 26–192)
TOTAL IRON BINDING CAPACITY: 244 UG/DL (ref 250–450)
TOTAL PROTEIN: 7.4 G/DL (ref 6.4–8.3)
TRICHOMONAS: ABNORMAL
TRIGLYCERIDE, FASTING: 167 MG/DL
TSH SERPL DL<=0.05 MIU/L-ACNC: 1.36 MIU/L (ref 0.3–5)
TURBIDITY: ABNORMAL
UNSATURATED IRON BINDING CAPACITY: 130 UG/DL (ref 112–347)
URINE HGB: ABNORMAL
UROBILINOGEN, URINE: ABNORMAL
VITAMIN B-12: 507 PG/ML (ref 232–1245)
VITAMIN D 25-HYDROXY: 40.4 NG/ML (ref 30–100)
VLDLC SERPL CALC-MCNC: ABNORMAL MG/DL (ref 1–30)
WBC # BLD: 5.2 K/UL (ref 3.5–11.3)
WBC # BLD: 5.2 K/UL (ref 3.5–11.3)
WBC UA: ABNORMAL /HPF (ref 0–5)
YEAST: ABNORMAL

## 2019-08-07 PROCEDURE — 84439 ASSAY OF FREE THYROXINE: CPT

## 2019-08-07 PROCEDURE — 83880 ASSAY OF NATRIURETIC PEPTIDE: CPT

## 2019-08-07 PROCEDURE — 85027 COMPLETE CBC AUTOMATED: CPT

## 2019-08-07 PROCEDURE — 36415 COLL VENOUS BLD VENIPUNCTURE: CPT

## 2019-08-07 PROCEDURE — 82550 ASSAY OF CK (CPK): CPT

## 2019-08-07 PROCEDURE — 83036 HEMOGLOBIN GLYCOSYLATED A1C: CPT

## 2019-08-07 PROCEDURE — 83970 ASSAY OF PARATHORMONE: CPT

## 2019-08-07 PROCEDURE — 83550 IRON BINDING TEST: CPT

## 2019-08-07 PROCEDURE — 82043 UR ALBUMIN QUANTITATIVE: CPT

## 2019-08-07 PROCEDURE — 99211 OFF/OP EST MAY X REQ PHY/QHP: CPT

## 2019-08-07 PROCEDURE — 87086 URINE CULTURE/COLONY COUNT: CPT

## 2019-08-07 PROCEDURE — 82607 VITAMIN B-12: CPT

## 2019-08-07 PROCEDURE — 81001 URINALYSIS AUTO W/SCOPE: CPT

## 2019-08-07 PROCEDURE — 85610 PROTHROMBIN TIME: CPT

## 2019-08-07 PROCEDURE — 84443 ASSAY THYROID STIM HORMONE: CPT

## 2019-08-07 PROCEDURE — 87088 URINE BACTERIA CULTURE: CPT

## 2019-08-07 PROCEDURE — 82040 ASSAY OF SERUM ALBUMIN: CPT

## 2019-08-07 PROCEDURE — 87186 SC STD MICRODIL/AGAR DIL: CPT

## 2019-08-07 PROCEDURE — 84100 ASSAY OF PHOSPHORUS: CPT

## 2019-08-07 PROCEDURE — 82570 ASSAY OF URINE CREATININE: CPT

## 2019-08-07 PROCEDURE — 82306 VITAMIN D 25 HYDROXY: CPT

## 2019-08-07 PROCEDURE — 80053 COMPREHEN METABOLIC PANEL: CPT

## 2019-08-07 PROCEDURE — 83540 ASSAY OF IRON: CPT

## 2019-08-07 PROCEDURE — 80061 LIPID PANEL: CPT

## 2019-08-07 PROCEDURE — 80048 BASIC METABOLIC PNL TOTAL CA: CPT

## 2019-08-07 RX ORDER — HYDROCODONE BITARTRATE AND ACETAMINOPHEN 5; 325 MG/1; MG/1
TABLET ORAL
COMMUNITY
Start: 2019-08-05 | End: 2020-02-04

## 2019-08-07 NOTE — PROGRESS NOTES
Patient states compliant all of the time with regimen. No bleeding or thromboembolic side effects noted. No significant dietary changes. Patient had surgery for her shoulder; taking Norco for any pain. No significant recent illness or disease state changes. PT/INR done in office per protocol. INR is 2.2 which is therapeutic. Warfarin regimen will be continued at current dose of 2.5mg daily. Will retest in 5 weeks. Patient understands dosing directions and information discussed. Dosing schedule and follow up appointment given to patient. Progress note routed to referring physicians office. Patient acknowledges working in consult agreement with pharmacist as referred by his/her physician.       Nikki Garcia, 8/7/2019 9:45 AM

## 2019-08-08 LAB
CULTURE: ABNORMAL
Lab: ABNORMAL
SPECIMEN DESCRIPTION: ABNORMAL

## 2019-09-11 ENCOUNTER — HOSPITAL ENCOUNTER (OUTPATIENT)
Dept: PHARMACY | Age: 69
Setting detail: THERAPIES SERIES
Discharge: HOME | End: 2019-09-11
Payer: MEDICARE

## 2019-09-11 DIAGNOSIS — I48.20 CHRONIC ATRIAL FIBRILLATION (HCC): ICD-10-CM

## 2019-09-11 LAB
INR BLD: 2.8
PROTHROMBIN TIME: 33.1

## 2019-09-11 PROCEDURE — 99211 OFF/OP EST MAY X REQ PHY/QHP: CPT

## 2019-09-11 PROCEDURE — 85610 PROTHROMBIN TIME: CPT

## 2019-09-11 RX ORDER — AMMONIUM LACTATE 12 G/100G
LOTION TOPICAL 2 TIMES DAILY PRN
COMMUNITY
Start: 2019-09-10

## 2019-09-11 RX ORDER — ROSUVASTATIN CALCIUM 40 MG/1
1 TABLET, COATED ORAL DAILY
Status: ON HOLD | COMMUNITY
End: 2022-01-28 | Stop reason: HOSPADM

## 2019-09-11 NOTE — PROGRESS NOTES
Patient states compliant all of the time with regimen. No bleeding or thromboembolic side effects noted. No significant med or dietary changes. No significant recent illness or disease state changes. PT/INR done in office per protocol. INR is 2.8 which is therapeutic. Warfarin regimen will be continued at current dose of 2.5mg daily. Will retest in 5 weeks. Patient understands dosing directions and information discussed. Dosing schedule and follow up appointment given to patient. Progress note routed to referring physicians office. Patient acknowledges working in consult agreement with pharmacist as referred by his/her physician.       Elo Gaxiola, 9/11/2019 9:37 AM

## 2019-10-07 ENCOUNTER — HOSPITAL ENCOUNTER (OUTPATIENT)
Dept: PHYSICAL THERAPY | Facility: CLINIC | Age: 69
Setting detail: THERAPIES SERIES
Discharge: HOME | End: 2019-10-07
Payer: MEDICARE

## 2019-10-07 PROCEDURE — 97110 THERAPEUTIC EXERCISES: CPT

## 2019-10-07 PROCEDURE — 97161 PT EVAL LOW COMPLEX 20 MIN: CPT

## 2019-10-10 ENCOUNTER — HOSPITAL ENCOUNTER (OUTPATIENT)
Dept: PHYSICAL THERAPY | Facility: CLINIC | Age: 69
Setting detail: THERAPIES SERIES
Discharge: HOME | End: 2019-10-10
Payer: MEDICARE

## 2019-10-10 PROCEDURE — 97110 THERAPEUTIC EXERCISES: CPT

## 2019-10-15 ENCOUNTER — HOSPITAL ENCOUNTER (OUTPATIENT)
Dept: PHYSICAL THERAPY | Facility: CLINIC | Age: 69
Setting detail: THERAPIES SERIES
Discharge: HOME | End: 2019-10-15
Payer: MEDICARE

## 2019-10-15 PROCEDURE — 97110 THERAPEUTIC EXERCISES: CPT

## 2019-10-17 ENCOUNTER — HOSPITAL ENCOUNTER (OUTPATIENT)
Dept: PHARMACY | Age: 69
Setting detail: THERAPIES SERIES
Discharge: HOME | End: 2019-10-17
Payer: MEDICARE

## 2019-10-17 ENCOUNTER — HOSPITAL ENCOUNTER (OUTPATIENT)
Dept: PHYSICAL THERAPY | Facility: CLINIC | Age: 69
Setting detail: THERAPIES SERIES
Discharge: HOME | End: 2019-10-17
Payer: MEDICARE

## 2019-10-17 DIAGNOSIS — I48.11 LONGSTANDING PERSISTENT ATRIAL FIBRILLATION (HCC): ICD-10-CM

## 2019-10-17 LAB
INR BLD: 2.7
PROTHROMBIN TIME: 32.2

## 2019-10-17 PROCEDURE — 99211 OFF/OP EST MAY X REQ PHY/QHP: CPT

## 2019-10-17 PROCEDURE — 85610 PROTHROMBIN TIME: CPT

## 2019-10-17 PROCEDURE — 97110 THERAPEUTIC EXERCISES: CPT

## 2019-10-21 ENCOUNTER — HOSPITAL ENCOUNTER (OUTPATIENT)
Dept: PHYSICAL THERAPY | Facility: CLINIC | Age: 69
Setting detail: THERAPIES SERIES
Discharge: HOME | End: 2019-10-21
Payer: MEDICARE

## 2019-10-21 PROCEDURE — 97110 THERAPEUTIC EXERCISES: CPT

## 2019-10-24 ENCOUNTER — APPOINTMENT (OUTPATIENT)
Dept: CT IMAGING | Age: 69
End: 2019-10-24
Payer: MEDICARE

## 2019-10-24 ENCOUNTER — HOSPITAL ENCOUNTER (EMERGENCY)
Age: 69
Discharge: HOME | End: 2019-10-24
Attending: EMERGENCY MEDICINE
Payer: MEDICARE

## 2019-10-24 ENCOUNTER — HOSPITAL ENCOUNTER (OUTPATIENT)
Dept: PHYSICAL THERAPY | Facility: CLINIC | Age: 69
Setting detail: THERAPIES SERIES
Discharge: HOME | End: 2019-10-24
Payer: MEDICARE

## 2019-10-24 VITALS
OXYGEN SATURATION: 97 % | SYSTOLIC BLOOD PRESSURE: 140 MMHG | HEIGHT: 62 IN | DIASTOLIC BLOOD PRESSURE: 57 MMHG | WEIGHT: 219.1 LBS | RESPIRATION RATE: 16 BRPM | TEMPERATURE: 97.9 F | HEART RATE: 71 BPM | BODY MASS INDEX: 40.32 KG/M2

## 2019-10-24 DIAGNOSIS — N30.00 ACUTE CYSTITIS WITHOUT HEMATURIA: Primary | ICD-10-CM

## 2019-10-24 DIAGNOSIS — L02.215 PERINEAL ABSCESS: ICD-10-CM

## 2019-10-24 LAB
-: ABNORMAL
ABSOLUTE EOS #: 0.22 K/UL (ref 0–0.44)
ABSOLUTE IMMATURE GRANULOCYTE: 0.02 K/UL (ref 0–0.3)
ABSOLUTE LYMPH #: 1.35 K/UL (ref 1.1–3.7)
ABSOLUTE MONO #: 0.72 K/UL (ref 0.1–1.2)
ALBUMIN SERPL-MCNC: 3.5 G/DL (ref 3.5–5.2)
ALBUMIN/GLOBULIN RATIO: ABNORMAL (ref 1–2.5)
ALP BLD-CCNC: 133 U/L (ref 35–104)
ALT SERPL-CCNC: 11 U/L (ref 5–33)
AMORPHOUS: ABNORMAL
ANION GAP SERPL CALCULATED.3IONS-SCNC: 15 MMOL/L (ref 9–17)
AST SERPL-CCNC: 19 U/L
BACTERIA: ABNORMAL
BASOPHILS # BLD: 1 % (ref 0–2)
BASOPHILS ABSOLUTE: 0.09 K/UL (ref 0–0.2)
BILIRUB SERPL-MCNC: 0.68 MG/DL (ref 0.3–1.2)
BILIRUBIN URINE: NEGATIVE
BUN BLDV-MCNC: 9 MG/DL (ref 8–23)
BUN/CREAT BLD: 6 (ref 9–20)
CALCIUM SERPL-MCNC: 8.9 MG/DL (ref 8.6–10.4)
CASTS UA: ABNORMAL /LPF
CHLORIDE BLD-SCNC: 105 MMOL/L (ref 98–107)
CO2: 20 MMOL/L (ref 20–31)
COLOR: YELLOW
COMMENT UA: ABNORMAL
CREAT SERPL-MCNC: 1.6 MG/DL (ref 0.5–0.9)
CRYSTALS, UA: ABNORMAL /HPF
DIFFERENTIAL TYPE: ABNORMAL
EOSINOPHILS RELATIVE PERCENT: 3 % (ref 1–4)
EPITHELIAL CELLS UA: ABNORMAL /HPF (ref 0–5)
GFR AFRICAN AMERICAN: 39 ML/MIN
GFR NON-AFRICAN AMERICAN: 32 ML/MIN
GFR SERPL CREATININE-BSD FRML MDRD: ABNORMAL ML/MIN/{1.73_M2}
GFR SERPL CREATININE-BSD FRML MDRD: ABNORMAL ML/MIN/{1.73_M2}
GLUCOSE BLD-MCNC: 126 MG/DL (ref 70–99)
GLUCOSE URINE: NEGATIVE
HCT VFR BLD CALC: 37.6 % (ref 36.3–47.1)
HEMOGLOBIN: 12.3 G/DL (ref 11.9–15.1)
IMMATURE GRANULOCYTES: 0 %
KETONES, URINE: NEGATIVE
LEUKOCYTE ESTERASE, URINE: NEGATIVE
LIPASE: 21 U/L (ref 13–60)
LYMPHOCYTES # BLD: 19 % (ref 24–43)
MAGNESIUM: 2.3 MG/DL (ref 1.6–2.6)
MCH RBC QN AUTO: 33 PG (ref 25.2–33.5)
MCHC RBC AUTO-ENTMCNC: 32.7 G/DL (ref 28.4–34.8)
MCV RBC AUTO: 100.8 FL (ref 82.6–102.9)
MONOCYTES # BLD: 10 % (ref 3–12)
MUCUS: ABNORMAL
NITRITE, URINE: NEGATIVE
NRBC AUTOMATED: 0 PER 100 WBC
OTHER OBSERVATIONS UA: ABNORMAL
PDW BLD-RTO: 14.9 % (ref 11.8–14.4)
PH UA: 6.5 (ref 5–8)
PLATELET # BLD: 231 K/UL (ref 138–453)
PLATELET ESTIMATE: ABNORMAL
PMV BLD AUTO: 10.1 FL (ref 8.1–13.5)
POTASSIUM SERPL-SCNC: 3.2 MMOL/L (ref 3.7–5.3)
PROTEIN UA: ABNORMAL
RBC # BLD: 3.73 M/UL (ref 3.95–5.11)
RBC # BLD: ABNORMAL 10*6/UL
RBC UA: ABNORMAL /HPF (ref 0–2)
RENAL EPITHELIAL, UA: ABNORMAL /HPF
SEG NEUTROPHILS: 67 % (ref 36–65)
SEGMENTED NEUTROPHILS ABSOLUTE COUNT: 4.65 K/UL (ref 1.5–8.1)
SODIUM BLD-SCNC: 140 MMOL/L (ref 135–144)
SPECIFIC GRAVITY UA: 1.02 (ref 1–1.03)
TOTAL PROTEIN: 6.8 G/DL (ref 6.4–8.3)
TRICHOMONAS: ABNORMAL
TURBIDITY: ABNORMAL
URINE HGB: ABNORMAL
UROBILINOGEN, URINE: NORMAL
WBC # BLD: 7.1 K/UL (ref 3.5–11.3)
WBC # BLD: ABNORMAL 10*3/UL
WBC UA: ABNORMAL /HPF (ref 0–5)
YEAST: ABNORMAL

## 2019-10-24 PROCEDURE — 87086 URINE CULTURE/COLONY COUNT: CPT

## 2019-10-24 PROCEDURE — 85025 COMPLETE CBC W/AUTO DIFF WBC: CPT

## 2019-10-24 PROCEDURE — 83735 ASSAY OF MAGNESIUM: CPT

## 2019-10-24 PROCEDURE — 10060 I&D ABSCESS SIMPLE/SINGLE: CPT

## 2019-10-24 PROCEDURE — 74176 CT ABD & PELVIS W/O CONTRAST: CPT

## 2019-10-24 PROCEDURE — 97110 THERAPEUTIC EXERCISES: CPT

## 2019-10-24 PROCEDURE — 99284 EMERGENCY DEPT VISIT MOD MDM: CPT

## 2019-10-24 PROCEDURE — 83690 ASSAY OF LIPASE: CPT

## 2019-10-24 PROCEDURE — 80053 COMPREHEN METABOLIC PANEL: CPT

## 2019-10-24 PROCEDURE — 81001 URINALYSIS AUTO W/SCOPE: CPT

## 2019-10-24 RX ORDER — FLUCONAZOLE 100 MG/1
100 TABLET ORAL DAILY
Qty: 2 TABLET | Refills: 0 | Status: SHIPPED | OUTPATIENT
Start: 2019-10-24 | End: 2019-10-26

## 2019-10-24 RX ORDER — CEPHALEXIN 500 MG/1
500 CAPSULE ORAL 2 TIMES DAILY
Qty: 14 CAPSULE | Refills: 0 | Status: SHIPPED | OUTPATIENT
Start: 2019-10-24 | End: 2019-10-31

## 2019-10-24 RX ORDER — DOXYCYCLINE HYCLATE 100 MG
100 TABLET ORAL 2 TIMES DAILY
Qty: 20 TABLET | Refills: 0 | Status: SHIPPED | OUTPATIENT
Start: 2019-10-24 | End: 2019-11-03

## 2019-10-24 RX ORDER — CEPHALEXIN 500 MG/1
500 CAPSULE ORAL 3 TIMES DAILY
Qty: 21 CAPSULE | Refills: 0 | Status: SHIPPED | OUTPATIENT
Start: 2019-10-24 | End: 2019-10-24 | Stop reason: SDUPTHER

## 2019-10-24 RX ORDER — LIDOCAINE HYDROCHLORIDE 10 MG/ML
5 INJECTION, SOLUTION INFILTRATION; PERINEURAL ONCE
Status: DISCONTINUED | OUTPATIENT
Start: 2019-10-24 | End: 2019-10-24 | Stop reason: HOSPADM

## 2019-10-24 ASSESSMENT — PAIN DESCRIPTION - FREQUENCY: FREQUENCY: INTERMITTENT

## 2019-10-24 ASSESSMENT — PAIN DESCRIPTION - LOCATION: LOCATION: ABDOMEN;PELVIS

## 2019-10-24 ASSESSMENT — PAIN SCALES - GENERAL: PAINLEVEL_OUTOF10: 7

## 2019-10-24 ASSESSMENT — PAIN DESCRIPTION - DESCRIPTORS: DESCRIPTORS: SHARP;CRAMPING

## 2019-10-25 LAB
CULTURE: NORMAL
Lab: NORMAL
SPECIMEN DESCRIPTION: NORMAL

## 2019-10-28 ENCOUNTER — HOSPITAL ENCOUNTER (OUTPATIENT)
Dept: PHYSICAL THERAPY | Facility: CLINIC | Age: 69
Setting detail: THERAPIES SERIES
Discharge: HOME | End: 2019-10-28
Payer: MEDICARE

## 2019-10-28 PROCEDURE — 97110 THERAPEUTIC EXERCISES: CPT

## 2019-10-31 ENCOUNTER — HOSPITAL ENCOUNTER (OUTPATIENT)
Dept: PHYSICAL THERAPY | Facility: CLINIC | Age: 69
Setting detail: THERAPIES SERIES
Discharge: HOME | End: 2019-10-31
Payer: MEDICARE

## 2019-10-31 PROCEDURE — 97110 THERAPEUTIC EXERCISES: CPT

## 2019-11-04 ENCOUNTER — HOSPITAL ENCOUNTER (OUTPATIENT)
Dept: PHYSICAL THERAPY | Facility: CLINIC | Age: 69
Setting detail: THERAPIES SERIES
Discharge: HOME | End: 2019-11-04
Payer: MEDICARE

## 2019-11-04 PROCEDURE — 97110 THERAPEUTIC EXERCISES: CPT

## 2019-11-07 ENCOUNTER — HOSPITAL ENCOUNTER (OUTPATIENT)
Dept: PHYSICAL THERAPY | Facility: CLINIC | Age: 69
Setting detail: THERAPIES SERIES
Discharge: HOME | End: 2019-11-07
Payer: MEDICARE

## 2019-11-07 PROCEDURE — 97110 THERAPEUTIC EXERCISES: CPT

## 2019-11-11 ENCOUNTER — HOSPITAL ENCOUNTER (OUTPATIENT)
Dept: PHYSICAL THERAPY | Facility: CLINIC | Age: 69
Setting detail: THERAPIES SERIES
Discharge: HOME | End: 2019-11-11
Payer: MEDICARE

## 2019-11-11 PROCEDURE — 97110 THERAPEUTIC EXERCISES: CPT

## 2019-11-14 ENCOUNTER — HOSPITAL ENCOUNTER (OUTPATIENT)
Dept: PHYSICAL THERAPY | Facility: CLINIC | Age: 69
Setting detail: THERAPIES SERIES
Discharge: HOME | End: 2019-11-14
Payer: MEDICARE

## 2019-11-14 PROCEDURE — 97110 THERAPEUTIC EXERCISES: CPT

## 2019-11-18 ENCOUNTER — HOSPITAL ENCOUNTER (OUTPATIENT)
Dept: PHYSICAL THERAPY | Facility: CLINIC | Age: 69
Setting detail: THERAPIES SERIES
Discharge: HOME | End: 2019-11-18
Payer: MEDICARE

## 2019-11-18 PROCEDURE — 97110 THERAPEUTIC EXERCISES: CPT

## 2019-11-21 ENCOUNTER — HOSPITAL ENCOUNTER (OUTPATIENT)
Dept: PHYSICAL THERAPY | Facility: CLINIC | Age: 69
Setting detail: THERAPIES SERIES
Discharge: HOME | End: 2019-11-21
Payer: MEDICARE

## 2019-11-21 PROCEDURE — 97110 THERAPEUTIC EXERCISES: CPT

## 2019-11-25 ENCOUNTER — HOSPITAL ENCOUNTER (OUTPATIENT)
Dept: PHYSICAL THERAPY | Facility: CLINIC | Age: 69
Setting detail: THERAPIES SERIES
Discharge: HOME | End: 2019-11-25
Payer: MEDICARE

## 2019-11-25 PROCEDURE — 97110 THERAPEUTIC EXERCISES: CPT

## 2019-11-27 ENCOUNTER — HOSPITAL ENCOUNTER (OUTPATIENT)
Dept: PHARMACY | Age: 69
Setting detail: THERAPIES SERIES
Discharge: HOME | End: 2019-11-27
Payer: MEDICARE

## 2019-11-27 ENCOUNTER — HOSPITAL ENCOUNTER (OUTPATIENT)
Age: 69
Discharge: HOME | End: 2019-11-27
Payer: MEDICARE

## 2019-11-27 DIAGNOSIS — I48.11 LONGSTANDING PERSISTENT ATRIAL FIBRILLATION (HCC): ICD-10-CM

## 2019-11-27 LAB
-: NORMAL
ABSOLUTE EOS #: 0.27 K/UL (ref 0–0.44)
ABSOLUTE IMMATURE GRANULOCYTE: <0.03 K/UL (ref 0–0.3)
ABSOLUTE LYMPH #: 1 K/UL (ref 1.1–3.7)
ABSOLUTE MONO #: 0.5 K/UL (ref 0.1–1.2)
ALBUMIN SERPL-MCNC: 3.9 G/DL (ref 3.5–5.2)
ALBUMIN SERPL-MCNC: 3.9 G/DL (ref 3.5–5.2)
ALBUMIN/GLOBULIN RATIO: 1 (ref 1–2.5)
ALP BLD-CCNC: 126 U/L (ref 35–104)
ALT SERPL-CCNC: 17 U/L (ref 5–33)
AMORPHOUS: NORMAL
ANION GAP SERPL CALCULATED.3IONS-SCNC: 16 MMOL/L (ref 9–17)
ANION GAP SERPL CALCULATED.3IONS-SCNC: 16 MMOL/L (ref 9–17)
AST SERPL-CCNC: 25 U/L
BACTERIA: NORMAL
BASOPHILS # BLD: 1 % (ref 0–2)
BASOPHILS ABSOLUTE: 0.07 K/UL (ref 0–0.2)
BILIRUB SERPL-MCNC: 0.76 MG/DL (ref 0.3–1.2)
BILIRUBIN URINE: NEGATIVE
BNP INTERPRETATION: ABNORMAL
BUN BLDV-MCNC: 13 MG/DL (ref 8–23)
BUN BLDV-MCNC: 13 MG/DL (ref 8–23)
BUN/CREAT BLD: ABNORMAL (ref 9–20)
BUN/CREAT BLD: ABNORMAL (ref 9–20)
CALCIUM SERPL-MCNC: 9.6 MG/DL (ref 8.6–10.4)
CALCIUM SERPL-MCNC: 9.6 MG/DL (ref 8.6–10.4)
CASTS UA: NORMAL /LPF (ref 0–8)
CHLORIDE BLD-SCNC: 105 MMOL/L (ref 98–107)
CHLORIDE BLD-SCNC: 105 MMOL/L (ref 98–107)
CHOLESTEROL, FASTING: 174 MG/DL
CHOLESTEROL/HDL RATIO: 2.4
CO2: 23 MMOL/L (ref 20–31)
CO2: 23 MMOL/L (ref 20–31)
COLOR: YELLOW
COMMENT UA: ABNORMAL
CREAT SERPL-MCNC: 1.34 MG/DL (ref 0.5–0.9)
CREATININE CLEARANCE: 35.9 ML/MIN/BSA (ref 71–151)
CREATININE TIMED UR: NORMAL MG/ X H
CREATININE URINE: 79.3 MG/DL
CREATININE URINE: 79.3 MG/DL (ref 28–217)
CREATININE, 24H UR: 791 MG/24 H (ref 740–1570)
CRYSTALS, UA: NORMAL /HPF
DIFFERENTIAL TYPE: ABNORMAL
EOSINOPHILS RELATIVE PERCENT: 4 % (ref 1–4)
EPITHELIAL CELLS UA: NORMAL /HPF (ref 0–5)
ESTIMATED AVERAGE GLUCOSE: 103 MG/DL
FOLATE: >20 NG/ML
GFR AFRICAN AMERICAN: 47 ML/MIN
GFR AFRICAN AMERICAN: 47 ML/MIN
GFR NON-AFRICAN AMERICAN: 39 ML/MIN
GFR NON-AFRICAN AMERICAN: 39 ML/MIN
GFR SERPL CREATININE-BSD FRML MDRD: ABNORMAL ML/MIN/{1.73_M2}
GLUCOSE BLD-MCNC: 112 MG/DL (ref 70–99)
GLUCOSE FASTING: 112 MG/DL (ref 70–99)
GLUCOSE URINE: NEGATIVE
HBA1C MFR BLD: 5.2 % (ref 4–6)
HCT VFR BLD CALC: 39.7 % (ref 36.3–47.1)
HCT VFR BLD CALC: 39.7 % (ref 36.3–47.1)
HDLC SERPL-MCNC: 72 MG/DL
HEMOGLOBIN: 12.7 G/DL (ref 11.9–15.1)
HEMOGLOBIN: 12.7 G/DL (ref 11.9–15.1)
HOURS COLLECTED: 24 H
HOURS COLLECTED: 24 H
IMMATURE GRANULOCYTES: 0 %
INR BLD: 1.9
INR BLD: 2.1
IRON SATURATION: 43 % (ref 20–55)
IRON: 101 UG/DL (ref 37–145)
KETONES, URINE: NEGATIVE
LDL CHOLESTEROL: 70 MG/DL (ref 0–130)
LENGTH OF COLLECTION: 24 H
LEUKOCYTE ESTERASE, URINE: NEGATIVE
LYMPHOCYTES # BLD: 15 % (ref 24–43)
MAGNESIUM: 2.1 MG/DL (ref 1.6–2.6)
MAGNESIUM: 2.1 MG/DL (ref 1.6–2.6)
MCH RBC QN AUTO: 33.2 PG (ref 25.2–33.5)
MCH RBC QN AUTO: 33.2 PG (ref 25.2–33.5)
MCHC RBC AUTO-ENTMCNC: 32 G/DL (ref 28.4–34.8)
MCHC RBC AUTO-ENTMCNC: 32 G/DL (ref 28.4–34.8)
MCV RBC AUTO: 103.7 FL (ref 82.6–102.9)
MCV RBC AUTO: 103.7 FL (ref 82.6–102.9)
MONOCYTES # BLD: 8 % (ref 3–12)
MUCUS: NORMAL
NITRITE, URINE: NEGATIVE
NRBC AUTOMATED: 0 PER 100 WBC
NRBC AUTOMATED: 0 PER 100 WBC
OTHER OBSERVATIONS UA: NORMAL
PATIENT HEIGHT: 158 CM
PATIENT WEIGHT: 98 KG
PDW BLD-RTO: 14.1 % (ref 11.8–14.4)
PDW BLD-RTO: 14.1 % (ref 11.8–14.4)
PH UA: 6 (ref 5–8)
PHOSPHORUS: 2.6 MG/DL (ref 2.6–4.5)
PLATELET # BLD: 262 K/UL (ref 138–453)
PLATELET # BLD: 262 K/UL (ref 138–453)
PLATELET ESTIMATE: ABNORMAL
PMV BLD AUTO: 11.4 FL (ref 8.1–13.5)
PMV BLD AUTO: 11.4 FL (ref 8.1–13.5)
POTASSIUM SERPL-SCNC: 3.6 MMOL/L (ref 3.7–5.3)
POTASSIUM SERPL-SCNC: 3.6 MMOL/L (ref 3.7–5.3)
PRO-BNP: 325 PG/ML
PROTEIN 24 HOUR URINE: 419 MG/24 H
PROTEIN UA: ABNORMAL
PROTEIN,TOT TIMED UR: ABNORMAL MG/X H
PROTHROMBIN TIME: 19.1 SEC (ref 9.7–11.6)
PROTIME: 25 SECONDS
PTH INTACT: 82.38 PG/ML (ref 15–65)
RBC # BLD: 3.83 M/UL (ref 3.95–5.11)
RBC # BLD: 3.83 M/UL (ref 3.95–5.11)
RBC # BLD: ABNORMAL 10*6/UL
RBC UA: NORMAL /HPF (ref 0–4)
RENAL EPITHELIAL, UA: NORMAL /HPF
SEG NEUTROPHILS: 72 % (ref 36–65)
SEGMENTED NEUTROPHILS ABSOLUTE COUNT: 4.74 K/UL (ref 1.5–8.1)
SODIUM BLD-SCNC: 144 MMOL/L (ref 135–144)
SODIUM BLD-SCNC: 144 MMOL/L (ref 135–144)
SPECIFIC GRAVITY UA: 1.02 (ref 1–1.03)
THYROXINE, FREE: 1.62 NG/DL (ref 0.93–1.7)
TOTAL CK: 32 U/L (ref 26–192)
TOTAL IRON BINDING CAPACITY: 237 UG/DL (ref 250–450)
TOTAL PROTEIN: 7.9 G/DL (ref 6.4–8.3)
TRICHOMONAS: NORMAL
TRIGLYCERIDE, FASTING: 158 MG/DL
TSH SERPL DL<=0.05 MIU/L-ACNC: 2.27 MIU/L (ref 0.3–5)
TURBIDITY: CLEAR
UNSATURATED IRON BINDING CAPACITY: 136 UG/DL (ref 112–347)
URINE HGB: NEGATIVE
URINE TOTAL PROTEIN: 42 MG/DL
UROBILINOGEN, URINE: NORMAL
VITAMIN B-12: 437 PG/ML (ref 232–1245)
VITAMIN D 25-HYDROXY: 35.6 NG/ML (ref 30–100)
VITAMIN D 25-HYDROXY: 35.6 NG/ML (ref 30–100)
VLDLC SERPL CALC-MCNC: ABNORMAL MG/DL (ref 1–30)
VOLUME: 998 ML
WBC # BLD: 6.6 K/UL (ref 3.5–11.3)
WBC # BLD: 6.6 K/UL (ref 3.5–11.3)
WBC # BLD: ABNORMAL 10*3/UL
WBC UA: NORMAL /HPF (ref 0–5)
YEAST: NORMAL

## 2019-11-27 PROCEDURE — 85025 COMPLETE CBC W/AUTO DIFF WBC: CPT

## 2019-11-27 PROCEDURE — 82570 ASSAY OF URINE CREATININE: CPT

## 2019-11-27 PROCEDURE — 84439 ASSAY OF FREE THYROXINE: CPT

## 2019-11-27 PROCEDURE — 83970 ASSAY OF PARATHORMONE: CPT

## 2019-11-27 PROCEDURE — 83036 HEMOGLOBIN GLYCOSYLATED A1C: CPT

## 2019-11-27 PROCEDURE — 99211 OFF/OP EST MAY X REQ PHY/QHP: CPT

## 2019-11-27 PROCEDURE — 83540 ASSAY OF IRON: CPT

## 2019-11-27 PROCEDURE — 82040 ASSAY OF SERUM ALBUMIN: CPT

## 2019-11-27 PROCEDURE — 83550 IRON BINDING TEST: CPT

## 2019-11-27 PROCEDURE — 82306 VITAMIN D 25 HYDROXY: CPT

## 2019-11-27 PROCEDURE — 80061 LIPID PANEL: CPT

## 2019-11-27 PROCEDURE — 82575 CREATININE CLEARANCE TEST: CPT

## 2019-11-27 PROCEDURE — 84156 ASSAY OF PROTEIN URINE: CPT

## 2019-11-27 PROCEDURE — 83735 ASSAY OF MAGNESIUM: CPT

## 2019-11-27 PROCEDURE — 85610 PROTHROMBIN TIME: CPT

## 2019-11-27 PROCEDURE — 84100 ASSAY OF PHOSPHORUS: CPT

## 2019-11-27 PROCEDURE — 80053 COMPREHEN METABOLIC PANEL: CPT

## 2019-11-27 PROCEDURE — 83880 ASSAY OF NATRIURETIC PEPTIDE: CPT

## 2019-11-27 PROCEDURE — 82746 ASSAY OF FOLIC ACID SERUM: CPT

## 2019-11-27 PROCEDURE — 82550 ASSAY OF CK (CPK): CPT

## 2019-11-27 PROCEDURE — 84443 ASSAY THYROID STIM HORMONE: CPT

## 2019-11-27 PROCEDURE — 81001 URINALYSIS AUTO W/SCOPE: CPT

## 2019-11-27 PROCEDURE — 80048 BASIC METABOLIC PNL TOTAL CA: CPT

## 2019-11-27 PROCEDURE — 36415 COLL VENOUS BLD VENIPUNCTURE: CPT

## 2019-11-27 PROCEDURE — 82607 VITAMIN B-12: CPT

## 2019-12-02 ENCOUNTER — HOSPITAL ENCOUNTER (OUTPATIENT)
Dept: PHYSICAL THERAPY | Facility: CLINIC | Age: 69
Setting detail: THERAPIES SERIES
Discharge: HOME | End: 2019-12-02
Payer: MEDICARE

## 2019-12-02 PROCEDURE — 97110 THERAPEUTIC EXERCISES: CPT

## 2019-12-05 ENCOUNTER — HOSPITAL ENCOUNTER (OUTPATIENT)
Dept: PHYSICAL THERAPY | Facility: CLINIC | Age: 69
Setting detail: THERAPIES SERIES
Discharge: HOME | End: 2019-12-05
Payer: MEDICARE

## 2019-12-05 ENCOUNTER — HOSPITAL ENCOUNTER (OUTPATIENT)
Age: 69
Setting detail: SPECIMEN
Discharge: HOME | End: 2019-12-05
Payer: MEDICARE

## 2019-12-05 LAB
ANION GAP SERPL CALCULATED.3IONS-SCNC: 16 MMOL/L (ref 9–17)
BUN BLDV-MCNC: 16 MG/DL (ref 8–23)
BUN/CREAT BLD: ABNORMAL (ref 9–20)
CALCIUM SERPL-MCNC: 9.5 MG/DL (ref 8.6–10.4)
CHLORIDE BLD-SCNC: 110 MMOL/L (ref 98–107)
CO2: 18 MMOL/L (ref 20–31)
CREAT SERPL-MCNC: 1.61 MG/DL (ref 0.5–0.9)
GFR AFRICAN AMERICAN: 38 ML/MIN
GFR NON-AFRICAN AMERICAN: 32 ML/MIN
GFR SERPL CREATININE-BSD FRML MDRD: ABNORMAL ML/MIN/{1.73_M2}
GFR SERPL CREATININE-BSD FRML MDRD: ABNORMAL ML/MIN/{1.73_M2}
GLUCOSE FASTING: 124 MG/DL (ref 70–99)
HCT VFR BLD CALC: 38.8 % (ref 36.3–47.1)
HEMOGLOBIN: 12.6 G/DL (ref 11.9–15.1)
MCH RBC QN AUTO: 33.4 PG (ref 25.2–33.5)
MCHC RBC AUTO-ENTMCNC: 32.5 G/DL (ref 28.4–34.8)
MCV RBC AUTO: 102.9 FL (ref 82.6–102.9)
NRBC AUTOMATED: 0 PER 100 WBC
PDW BLD-RTO: 13.7 % (ref 11.8–14.4)
PLATELET # BLD: 240 K/UL (ref 138–453)
PMV BLD AUTO: 11.6 FL (ref 8.1–13.5)
POTASSIUM SERPL-SCNC: 4.5 MMOL/L (ref 3.7–5.3)
RBC # BLD: 3.77 M/UL (ref 3.95–5.11)
SODIUM BLD-SCNC: 144 MMOL/L (ref 135–144)
WBC # BLD: 8.5 K/UL (ref 3.5–11.3)

## 2019-12-05 PROCEDURE — 97110 THERAPEUTIC EXERCISES: CPT

## 2019-12-09 ENCOUNTER — APPOINTMENT (OUTPATIENT)
Dept: PHYSICAL THERAPY | Facility: CLINIC | Age: 69
End: 2019-12-09
Payer: MEDICARE

## 2019-12-12 ENCOUNTER — HOSPITAL ENCOUNTER (OUTPATIENT)
Dept: PHYSICAL THERAPY | Facility: CLINIC | Age: 69
Setting detail: THERAPIES SERIES
Discharge: HOME | End: 2019-12-12
Payer: MEDICARE

## 2019-12-12 PROCEDURE — 97110 THERAPEUTIC EXERCISES: CPT

## 2019-12-16 ENCOUNTER — APPOINTMENT (OUTPATIENT)
Dept: PHYSICAL THERAPY | Facility: CLINIC | Age: 69
End: 2019-12-16
Payer: MEDICARE

## 2019-12-17 ENCOUNTER — TELEPHONE (OUTPATIENT)
Dept: PHARMACY | Age: 69
End: 2019-12-17

## 2019-12-17 DIAGNOSIS — I48.11 LONGSTANDING PERSISTENT ATRIAL FIBRILLATION (HCC): ICD-10-CM

## 2019-12-19 ENCOUNTER — APPOINTMENT (OUTPATIENT)
Dept: PHYSICAL THERAPY | Facility: CLINIC | Age: 69
End: 2019-12-19
Payer: MEDICARE

## 2019-12-23 ENCOUNTER — APPOINTMENT (OUTPATIENT)
Dept: PHYSICAL THERAPY | Facility: CLINIC | Age: 69
End: 2019-12-23
Payer: MEDICARE

## 2019-12-30 ENCOUNTER — APPOINTMENT (OUTPATIENT)
Dept: PHYSICAL THERAPY | Facility: CLINIC | Age: 69
End: 2019-12-30
Payer: MEDICARE

## 2020-01-02 ENCOUNTER — HOSPITAL ENCOUNTER (OUTPATIENT)
Dept: PHARMACY | Age: 70
Setting detail: THERAPIES SERIES
Discharge: HOME | End: 2020-01-02
Payer: MEDICARE

## 2020-01-02 ENCOUNTER — HOSPITAL ENCOUNTER (OUTPATIENT)
Age: 70
Discharge: HOME | End: 2020-01-02
Payer: MEDICARE

## 2020-01-02 LAB
ANION GAP SERPL CALCULATED.3IONS-SCNC: 13 MMOL/L (ref 9–17)
BUN BLDV-MCNC: 18 MG/DL (ref 8–23)
BUN/CREAT BLD: ABNORMAL (ref 9–20)
CALCIUM SERPL-MCNC: 9.1 MG/DL (ref 8.6–10.4)
CHLORIDE BLD-SCNC: 104 MMOL/L (ref 98–107)
CO2: 22 MMOL/L (ref 20–31)
CREAT SERPL-MCNC: 1.72 MG/DL (ref 0.5–0.9)
GFR AFRICAN AMERICAN: 35 ML/MIN
GFR NON-AFRICAN AMERICAN: 29 ML/MIN
GFR SERPL CREATININE-BSD FRML MDRD: ABNORMAL ML/MIN/{1.73_M2}
GFR SERPL CREATININE-BSD FRML MDRD: ABNORMAL ML/MIN/{1.73_M2}
GLUCOSE BLD-MCNC: 120 MG/DL (ref 70–99)
HCT VFR BLD CALC: 39.8 % (ref 36.3–47.1)
HEMOGLOBIN: 13 G/DL (ref 11.9–15.1)
INR BLD: 3.3
MCH RBC QN AUTO: 33 PG (ref 25.2–33.5)
MCHC RBC AUTO-ENTMCNC: 32.7 G/DL (ref 28.4–34.8)
MCV RBC AUTO: 101 FL (ref 82.6–102.9)
NRBC AUTOMATED: 0 PER 100 WBC
PDW BLD-RTO: 14.5 % (ref 11.8–14.4)
PLATELET # BLD: 249 K/UL (ref 138–453)
PMV BLD AUTO: 11.1 FL (ref 8.1–13.5)
POTASSIUM SERPL-SCNC: 4.2 MMOL/L (ref 3.7–5.3)
PROTIME: 39 SECONDS
RBC # BLD: 3.94 M/UL (ref 3.95–5.11)
SODIUM BLD-SCNC: 139 MMOL/L (ref 135–144)
WBC # BLD: 7.7 K/UL (ref 3.5–11.3)

## 2020-01-02 PROCEDURE — 80048 BASIC METABOLIC PNL TOTAL CA: CPT

## 2020-01-02 PROCEDURE — 85027 COMPLETE CBC AUTOMATED: CPT

## 2020-01-02 PROCEDURE — 85610 PROTHROMBIN TIME: CPT

## 2020-01-02 PROCEDURE — 36415 COLL VENOUS BLD VENIPUNCTURE: CPT

## 2020-01-02 PROCEDURE — 99212 OFFICE O/P EST SF 10 MIN: CPT

## 2020-01-06 ENCOUNTER — TELEPHONE (OUTPATIENT)
Dept: PHARMACY | Age: 70
End: 2020-01-06

## 2020-01-06 NOTE — TELEPHONE ENCOUNTER
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 17th and will be sending clearance paperwork to her referring physician Dr. Ebonie Robertson. Will coordinate warfarin therapy as indicated.

## 2020-01-28 ENCOUNTER — HOSPITAL ENCOUNTER (OUTPATIENT)
Dept: PHARMACY | Age: 70
Setting detail: THERAPIES SERIES
Discharge: HOME | End: 2020-01-28
Payer: MEDICARE

## 2020-01-28 LAB
INR BLD: 1.8
PROTIME: 21.4 SECONDS

## 2020-01-28 PROCEDURE — 85610 PROTHROMBIN TIME: CPT

## 2020-01-28 PROCEDURE — 99211 OFF/OP EST MAY X REQ PHY/QHP: CPT

## 2020-01-28 NOTE — PROGRESS NOTES
Patient states compliant all of the time with regimen. No bleeding or thromboembolic side effects noted. No significant med changes. No significant recent illness or disease state changes. Patient has been eating more brussel sprouts and kale in salads since last INR was slightly elevated. PT/INR done in office per protocol. INR is 1.8 which is just below goal.     Warfarin regimen will be continued at current dose of 2.5mg daily as patient plans on eating a bit less Vit K in order to keep INR in goal.  Will retest in 2 weeks. Patient is having a perineal cyst removed by Dr. Shania Jane on 2/17/20 and needs to hold warfarin for 5 days prior. Per note from [facility] Dr. Shania Jane' office contacted Dr. Jose Caldwell to authorize hold. Patient has not required Lovenox bridge for procedures in the past. Will call Dr. Susie Treadwell office tomorrow (they are closed on Tuesdays) to determine if Dr. Myke Haro authorized hold. Patient understands dosing directions and information discussed. Dosing schedule and follow up appointment given to patient. Progress note routed to referring physicians office. Patient acknowledges working in consult agreement with pharmacist as referred by his/her physician.       Martín Gutierrez, 1/28/2020 9:34 AM

## 2020-02-04 ENCOUNTER — HOSPITAL ENCOUNTER (OUTPATIENT)
Dept: GENERAL RADIOLOGY | Age: 70
Discharge: HOME | End: 2020-02-06
Payer: MEDICARE

## 2020-02-04 ENCOUNTER — HOSPITAL ENCOUNTER (OUTPATIENT)
Dept: PREADMISSION TESTING | Age: 70
Discharge: HOME | End: 2020-02-08
Payer: MEDICARE

## 2020-02-04 VITALS
HEART RATE: 74 BPM | DIASTOLIC BLOOD PRESSURE: 58 MMHG | SYSTOLIC BLOOD PRESSURE: 148 MMHG | OXYGEN SATURATION: 94 % | BODY MASS INDEX: 38.38 KG/M2 | WEIGHT: 208.56 LBS | HEIGHT: 62 IN | RESPIRATION RATE: 16 BRPM

## 2020-02-04 LAB
ABSOLUTE EOS #: 0.19 K/UL (ref 0–0.44)
ABSOLUTE IMMATURE GRANULOCYTE: 0.05 K/UL (ref 0–0.3)
ABSOLUTE LYMPH #: 1.14 K/UL (ref 1.1–3.7)
ABSOLUTE MONO #: 0.68 K/UL (ref 0.1–1.2)
ALBUMIN SERPL-MCNC: 4.1 G/DL (ref 3.5–5.2)
ALBUMIN/GLOBULIN RATIO: ABNORMAL (ref 1–2.5)
ALP BLD-CCNC: 105 U/L (ref 35–104)
ALT SERPL-CCNC: 21 U/L (ref 5–33)
ANION GAP SERPL CALCULATED.3IONS-SCNC: 12 MMOL/L (ref 9–17)
AST SERPL-CCNC: 25 U/L
BASOPHILS # BLD: 1 % (ref 0–2)
BASOPHILS ABSOLUTE: 0.08 K/UL (ref 0–0.2)
BILIRUB SERPL-MCNC: 0.72 MG/DL (ref 0.3–1.2)
BILIRUBIN DIRECT: 0.24 MG/DL
BILIRUBIN, INDIRECT: 0.48 MG/DL (ref 0–1)
BUN BLDV-MCNC: 25 MG/DL (ref 8–23)
BUN/CREAT BLD: 14 (ref 9–20)
CALCIUM SERPL-MCNC: 9.7 MG/DL (ref 8.6–10.4)
CHLORIDE BLD-SCNC: 102 MMOL/L (ref 98–107)
CO2: 22 MMOL/L (ref 20–31)
CREAT SERPL-MCNC: 1.77 MG/DL (ref 0.5–0.9)
DIFFERENTIAL TYPE: ABNORMAL
EOSINOPHILS RELATIVE PERCENT: 2 % (ref 1–4)
GFR AFRICAN AMERICAN: 34 ML/MIN
GFR NON-AFRICAN AMERICAN: 28 ML/MIN
GFR SERPL CREATININE-BSD FRML MDRD: ABNORMAL ML/MIN/{1.73_M2}
GFR SERPL CREATININE-BSD FRML MDRD: ABNORMAL ML/MIN/{1.73_M2}
GLOBULIN: ABNORMAL G/DL (ref 1.5–3.8)
GLUCOSE BLD-MCNC: 127 MG/DL (ref 70–99)
HCT VFR BLD CALC: 41.1 % (ref 36.3–47.1)
HEMOGLOBIN: 13.6 G/DL (ref 11.9–15.1)
IMMATURE GRANULOCYTES: 1 %
LYMPHOCYTES # BLD: 13 % (ref 24–43)
MCH RBC QN AUTO: 33.6 PG (ref 25.2–33.5)
MCHC RBC AUTO-ENTMCNC: 33.1 G/DL (ref 28.4–34.8)
MCV RBC AUTO: 101.5 FL (ref 82.6–102.9)
MONOCYTES # BLD: 8 % (ref 3–12)
NRBC AUTOMATED: 0 PER 100 WBC
PDW BLD-RTO: 14.1 % (ref 11.8–14.4)
PLATELET # BLD: 225 K/UL (ref 138–453)
PLATELET ESTIMATE: ABNORMAL
PMV BLD AUTO: 10.6 FL (ref 8.1–13.5)
POTASSIUM SERPL-SCNC: 4.4 MMOL/L (ref 3.7–5.3)
RBC # BLD: 4.05 M/UL (ref 3.95–5.11)
RBC # BLD: ABNORMAL 10*6/UL
SEG NEUTROPHILS: 75 % (ref 36–65)
SEGMENTED NEUTROPHILS ABSOLUTE COUNT: 6.35 K/UL (ref 1.5–8.1)
SODIUM BLD-SCNC: 136 MMOL/L (ref 135–144)
TOTAL PROTEIN: 7.9 G/DL (ref 6.4–8.3)
WBC # BLD: 8.5 K/UL (ref 3.5–11.3)
WBC # BLD: ABNORMAL 10*3/UL

## 2020-02-04 PROCEDURE — 80076 HEPATIC FUNCTION PANEL: CPT

## 2020-02-04 PROCEDURE — 93005 ELECTROCARDIOGRAM TRACING: CPT | Performed by: ANESTHESIOLOGY

## 2020-02-04 PROCEDURE — 80048 BASIC METABOLIC PNL TOTAL CA: CPT

## 2020-02-04 PROCEDURE — 36415 COLL VENOUS BLD VENIPUNCTURE: CPT

## 2020-02-04 PROCEDURE — 71046 X-RAY EXAM CHEST 2 VIEWS: CPT

## 2020-02-04 PROCEDURE — 85025 COMPLETE CBC W/AUTO DIFF WBC: CPT

## 2020-02-04 RX ORDER — TORSEMIDE 20 MG/1
60 TABLET ORAL PRN
Status: ON HOLD | COMMUNITY
End: 2021-11-13 | Stop reason: HOSPADM

## 2020-02-04 RX ORDER — POTASSIUM CHLORIDE 750 MG/1
10 TABLET, EXTENDED RELEASE ORAL
COMMUNITY
End: 2020-10-07

## 2020-02-04 RX ORDER — SPIRONOLACTONE 100 MG/1
100 TABLET, FILM COATED ORAL DAILY
Status: ON HOLD | COMMUNITY
End: 2021-11-13 | Stop reason: HOSPADM

## 2020-02-04 ASSESSMENT — PAIN SCALES - GENERAL: PAINLEVEL_OUTOF10: 2

## 2020-02-04 ASSESSMENT — PAIN DESCRIPTION - DESCRIPTORS: DESCRIPTORS: SHARP;STABBING

## 2020-02-04 ASSESSMENT — PAIN DESCRIPTION - LOCATION: LOCATION: PERINEUM

## 2020-02-04 ASSESSMENT — PAIN DESCRIPTION - PAIN TYPE: TYPE: CHRONIC PAIN

## 2020-02-04 ASSESSMENT — PAIN DESCRIPTION - FREQUENCY: FREQUENCY: CONTINUOUS

## 2020-02-04 ASSESSMENT — PAIN DESCRIPTION - PROGRESSION: CLINICAL_PROGRESSION: GRADUALLY WORSENING

## 2020-02-04 ASSESSMENT — PAIN - FUNCTIONAL ASSESSMENT: PAIN_FUNCTIONAL_ASSESSMENT: PREVENTS OR INTERFERES SOME ACTIVE ACTIVITIES AND ADLS

## 2020-02-04 ASSESSMENT — PAIN DESCRIPTION - ONSET: ONSET: AWAKENED FROM SLEEP

## 2020-02-04 NOTE — PROGRESS NOTES
PAT Progress Note    Pt Name: Ryan Siddiqui  MRN: [id number]  YOB: 1950  Date of evaluation: 2020      [x] Called to PAT. I spoke to Ryan Siddiqui a 68 y.o. female who is scheduled for a perineal cyst excision by Dr. Liliya Gomez on 2020 at 0730 due to perineal cyst.  I reviewed the office note sent by hard copy and placed in the short chart dated 2020 by Dr. Liliya Gomez. This meets the criteria for an interval History and Physical and will be updated with a note on the day of the patient's surgery. History of diabetes (last A1C was 5.2% per Epic), treated hepatitis C, cirrhosis, hypothyroidism, hyperlipidemia, hypertension, A-fib, multiple blood clots, COPD, asthma, stage 3 CKD, TIA x 2 in , and prolonged emergence from general anesthesia. Pt denies history of a stroke. Pt states her asthma and COPD are well controlled. Alert and oriented without apparent distress. Denies chest pain, dyspnea at rest, dizziness, and palpitations. Pt follows-up with Dr. Alvaro Oconnell from cardiology and Dr. Lou Mooney from nephrology. Pt no longer follows-up with a pulmonologist.    Functional Capacity:   1) Pt is not able to walk 2 city blocks on level ground without SOB. Pt is able to do light household chores without SOB. 2) Pt is not able to climb 2 flights of stairs due to SOB and bilateral knee pain. EK2020. See Epic. Chest X-ray: Pending.      LAURA Amin  Electronically signed 2020 at 10:41 AM

## 2020-02-04 NOTE — PRE-PROCEDURE INSTRUCTIONS
and/or recent contact with someone who has a contagious disease (chicken pox, measles, etc.) Please call your doctor before coming to the hospital.     If your child is having surgery please make arrangements for any other children to be cared for at home on the day of surgery. Other children are not permitted in recovery room and we want you to be able to spend time with the patient. If other arrangements are not available then we suggest that you have a second adult to stay in the waiting room. Day of Surgery/Procedure:    As a patient at [facility] you can expect quality medical and nursing care that is centered on your individual needs. Our goal is to make your surgical experience as comfortable as possible    . Transportation After Your Surgery/Procedure: You will need a friend or family member to drive you home after your procedure. Your  must be 25years of age or older and able to sign off on your discharge instructions. A taxi cab or any other form of public transportation is not acceptable. Your friend or family member must stay at the hospital throughout your procedure. Someone must remain with you for the first 24 hours after your surgery if you receive anesthesia or medication. If you do not have someone to stay with you, your procedure may be cancelled.       If you have any other questions regarding your procedure or the day of surgery, please call [phone]      _________________________  ____________________________  Signature (Patient)              Signature (Provider) & date

## 2020-02-07 LAB
EKG ATRIAL RATE: 68 BPM
EKG P AXIS: 51 DEGREES
EKG P-R INTERVAL: 162 MS
EKG Q-T INTERVAL: 408 MS
EKG QRS DURATION: 82 MS
EKG QTC CALCULATION (BAZETT): 433 MS
EKG R AXIS: 66 DEGREES
EKG T AXIS: 55 DEGREES
EKG VENTRICULAR RATE: 68 BPM

## 2020-02-07 PROCEDURE — 93010 ELECTROCARDIOGRAM REPORT: CPT | Performed by: INTERNAL MEDICINE

## 2020-02-11 ENCOUNTER — HOSPITAL ENCOUNTER (OUTPATIENT)
Dept: PHARMACY | Age: 70
Setting detail: THERAPIES SERIES
Discharge: HOME | End: 2020-02-11
Payer: MEDICARE

## 2020-02-11 LAB
INR BLD: 1.9
PROTIME: 22.7 SECONDS

## 2020-02-11 PROCEDURE — 85610 PROTHROMBIN TIME: CPT

## 2020-02-11 PROCEDURE — 99212 OFFICE O/P EST SF 10 MIN: CPT

## 2020-02-14 ENCOUNTER — ANESTHESIA EVENT (OUTPATIENT)
Dept: OPERATING ROOM | Age: 70
End: 2020-02-14
Payer: MEDICARE

## 2020-02-17 ENCOUNTER — HOSPITAL ENCOUNTER (OUTPATIENT)
Age: 70
Setting detail: OUTPATIENT SURGERY
Discharge: HOME | End: 2020-02-17
Attending: SURGERY | Admitting: SURGERY
Payer: MEDICARE

## 2020-02-17 ENCOUNTER — ANESTHESIA (OUTPATIENT)
Dept: OPERATING ROOM | Age: 70
End: 2020-02-17
Payer: MEDICARE

## 2020-02-17 VITALS
TEMPERATURE: 96.8 F | OXYGEN SATURATION: 94 % | RESPIRATION RATE: 12 BRPM | HEART RATE: 70 BPM | DIASTOLIC BLOOD PRESSURE: 63 MMHG | HEIGHT: 62 IN | BODY MASS INDEX: 38.38 KG/M2 | SYSTOLIC BLOOD PRESSURE: 133 MMHG | WEIGHT: 208.56 LBS

## 2020-02-17 VITALS — TEMPERATURE: 96.3 F | SYSTOLIC BLOOD PRESSURE: 142 MMHG | OXYGEN SATURATION: 100 % | DIASTOLIC BLOOD PRESSURE: 62 MMHG

## 2020-02-17 LAB
GLUCOSE BLD-MCNC: 123 MG/DL (ref 65–105)
GLUCOSE BLD-MCNC: 131 MG/DL (ref 65–105)
INR BLD: 1.1
PARTIAL THROMBOPLASTIN TIME: 25 SEC (ref 23–31)
PROTHROMBIN TIME: 11.4 SEC (ref 9.7–11.6)

## 2020-02-17 PROCEDURE — 3700000001 HC ADD 15 MINUTES (ANESTHESIA): Performed by: SURGERY

## 2020-02-17 PROCEDURE — 6360000002 HC RX W HCPCS

## 2020-02-17 PROCEDURE — 7100000010 HC PHASE II RECOVERY - FIRST 15 MIN: Performed by: SURGERY

## 2020-02-17 PROCEDURE — 2580000003 HC RX 258

## 2020-02-17 PROCEDURE — 7100000000 HC PACU RECOVERY - FIRST 15 MIN: Performed by: SURGERY

## 2020-02-17 PROCEDURE — 2500000003 HC RX 250 WO HCPCS: Performed by: SURGERY

## 2020-02-17 PROCEDURE — 85610 PROTHROMBIN TIME: CPT

## 2020-02-17 PROCEDURE — 88304 TISSUE EXAM BY PATHOLOGIST: CPT

## 2020-02-17 PROCEDURE — 3600000002 HC SURGERY LEVEL 2 BASE: Performed by: SURGERY

## 2020-02-17 PROCEDURE — 2500000003 HC RX 250 WO HCPCS

## 2020-02-17 PROCEDURE — 85730 THROMBOPLASTIN TIME PARTIAL: CPT

## 2020-02-17 PROCEDURE — 2580000003 HC RX 258: Performed by: ANESTHESIOLOGY

## 2020-02-17 PROCEDURE — 7100000011 HC PHASE II RECOVERY - ADDTL 15 MIN: Performed by: SURGERY

## 2020-02-17 PROCEDURE — 2709999900 HC NON-CHARGEABLE SUPPLY: Performed by: SURGERY

## 2020-02-17 PROCEDURE — 82947 ASSAY GLUCOSE BLOOD QUANT: CPT

## 2020-02-17 PROCEDURE — 7100000001 HC PACU RECOVERY - ADDTL 15 MIN: Performed by: SURGERY

## 2020-02-17 PROCEDURE — 3600000012 HC SURGERY LEVEL 2 ADDTL 15MIN: Performed by: SURGERY

## 2020-02-17 PROCEDURE — 3700000000 HC ANESTHESIA ATTENDED CARE: Performed by: SURGERY

## 2020-02-17 RX ORDER — ONDANSETRON 4 MG/1
4 TABLET, FILM COATED ORAL EVERY 8 HOURS PRN
Qty: 30 TABLET | Refills: 0 | Status: SHIPPED | OUTPATIENT
Start: 2020-02-17 | End: 2020-05-11 | Stop reason: ALTCHOICE

## 2020-02-17 RX ORDER — PROPOFOL 10 MG/ML
INJECTION, EMULSION INTRAVENOUS PRN
Status: DISCONTINUED | OUTPATIENT
Start: 2020-02-17 | End: 2020-02-17 | Stop reason: SDUPTHER

## 2020-02-17 RX ORDER — SODIUM CHLORIDE, SODIUM LACTATE, POTASSIUM CHLORIDE, CALCIUM CHLORIDE 600; 310; 30; 20 MG/100ML; MG/100ML; MG/100ML; MG/100ML
INJECTION, SOLUTION INTRAVENOUS CONTINUOUS
Status: DISCONTINUED | OUTPATIENT
Start: 2020-02-18 | End: 2020-02-17 | Stop reason: HOSPADM

## 2020-02-17 RX ORDER — ONDANSETRON 2 MG/ML
INJECTION INTRAMUSCULAR; INTRAVENOUS PRN
Status: DISCONTINUED | OUTPATIENT
Start: 2020-02-17 | End: 2020-02-17 | Stop reason: SDUPTHER

## 2020-02-17 RX ORDER — CLINDAMYCIN PHOSPHATE 900 MG/50ML
INJECTION INTRAVENOUS PRN
Status: DISCONTINUED | OUTPATIENT
Start: 2020-02-17 | End: 2020-02-17 | Stop reason: SDUPTHER

## 2020-02-17 RX ORDER — LIDOCAINE HYDROCHLORIDE 10 MG/ML
1 INJECTION, SOLUTION EPIDURAL; INFILTRATION; INTRACAUDAL; PERINEURAL
Status: DISCONTINUED | OUTPATIENT
Start: 2020-02-18 | End: 2020-02-17 | Stop reason: HOSPADM

## 2020-02-17 RX ORDER — SODIUM CHLORIDE, SODIUM LACTATE, POTASSIUM CHLORIDE, CALCIUM CHLORIDE 600; 310; 30; 20 MG/100ML; MG/100ML; MG/100ML; MG/100ML
INJECTION, SOLUTION INTRAVENOUS CONTINUOUS PRN
Status: DISCONTINUED | OUTPATIENT
Start: 2020-02-17 | End: 2020-02-17 | Stop reason: SDUPTHER

## 2020-02-17 RX ORDER — SODIUM CHLORIDE 9 MG/ML
INJECTION, SOLUTION INTRAVENOUS CONTINUOUS
Status: DISCONTINUED | OUTPATIENT
Start: 2020-02-18 | End: 2020-02-17 | Stop reason: HOSPADM

## 2020-02-17 RX ORDER — LIDOCAINE HYDROCHLORIDE 20 MG/ML
INJECTION, SOLUTION EPIDURAL; INFILTRATION; INTRACAUDAL; PERINEURAL PRN
Status: DISCONTINUED | OUTPATIENT
Start: 2020-02-17 | End: 2020-02-17 | Stop reason: SDUPTHER

## 2020-02-17 RX ORDER — HYDROCODONE BITARTRATE AND ACETAMINOPHEN 5; 325 MG/1; MG/1
1 TABLET ORAL EVERY 6 HOURS PRN
Qty: 20 TABLET | Refills: 0 | Status: SHIPPED | OUTPATIENT
Start: 2020-02-17 | End: 2020-02-22

## 2020-02-17 RX ORDER — BUPIVACAINE HYDROCHLORIDE AND EPINEPHRINE 5; 5 MG/ML; UG/ML
INJECTION, SOLUTION EPIDURAL; INTRACAUDAL; PERINEURAL PRN
Status: DISCONTINUED | OUTPATIENT
Start: 2020-02-17 | End: 2020-02-17 | Stop reason: ALTCHOICE

## 2020-02-17 RX ORDER — HYDROMORPHONE HCL 110MG/55ML
0.25 PATIENT CONTROLLED ANALGESIA SYRINGE INTRAVENOUS EVERY 5 MIN PRN
Status: DISCONTINUED | OUTPATIENT
Start: 2020-02-17 | End: 2020-02-17 | Stop reason: HOSPADM

## 2020-02-17 RX ORDER — ONDANSETRON 2 MG/ML
4 INJECTION INTRAMUSCULAR; INTRAVENOUS
Status: DISCONTINUED | OUTPATIENT
Start: 2020-02-17 | End: 2020-02-17 | Stop reason: HOSPADM

## 2020-02-17 RX ORDER — ROCURONIUM BROMIDE 10 MG/ML
INJECTION, SOLUTION INTRAVENOUS PRN
Status: DISCONTINUED | OUTPATIENT
Start: 2020-02-17 | End: 2020-02-17 | Stop reason: SDUPTHER

## 2020-02-17 RX ORDER — FENTANYL CITRATE 50 UG/ML
25 INJECTION, SOLUTION INTRAMUSCULAR; INTRAVENOUS EVERY 5 MIN PRN
Status: DISCONTINUED | OUTPATIENT
Start: 2020-02-17 | End: 2020-02-17 | Stop reason: HOSPADM

## 2020-02-17 RX ORDER — HYDROMORPHONE HCL 110MG/55ML
0.5 PATIENT CONTROLLED ANALGESIA SYRINGE INTRAVENOUS EVERY 5 MIN PRN
Status: DISCONTINUED | OUTPATIENT
Start: 2020-02-17 | End: 2020-02-17 | Stop reason: HOSPADM

## 2020-02-17 RX ORDER — FENTANYL CITRATE 50 UG/ML
50 INJECTION, SOLUTION INTRAMUSCULAR; INTRAVENOUS EVERY 5 MIN PRN
Status: DISCONTINUED | OUTPATIENT
Start: 2020-02-17 | End: 2020-02-17 | Stop reason: HOSPADM

## 2020-02-17 RX ORDER — PHENYLEPHRINE HCL IN 0.9% NACL 1 MG/10 ML
SYRINGE (ML) INTRAVENOUS PRN
Status: DISCONTINUED | OUTPATIENT
Start: 2020-02-17 | End: 2020-02-17 | Stop reason: SDUPTHER

## 2020-02-17 RX ORDER — DOCUSATE SODIUM 100 MG/1
100 CAPSULE, LIQUID FILLED ORAL 2 TIMES DAILY PRN
Qty: 40 CAPSULE | Refills: 0 | Status: SHIPPED | OUTPATIENT
Start: 2020-02-17 | End: 2020-10-07

## 2020-02-17 RX ORDER — SODIUM CHLORIDE 0.9 % (FLUSH) 0.9 %
10 SYRINGE (ML) INJECTION PRN
Status: DISCONTINUED | OUTPATIENT
Start: 2020-02-17 | End: 2020-02-17 | Stop reason: HOSPADM

## 2020-02-17 RX ORDER — FENTANYL CITRATE 50 UG/ML
INJECTION, SOLUTION INTRAMUSCULAR; INTRAVENOUS PRN
Status: DISCONTINUED | OUTPATIENT
Start: 2020-02-17 | End: 2020-02-17 | Stop reason: SDUPTHER

## 2020-02-17 RX ORDER — SODIUM CHLORIDE 0.9 % (FLUSH) 0.9 %
10 SYRINGE (ML) INJECTION EVERY 12 HOURS SCHEDULED
Status: DISCONTINUED | OUTPATIENT
Start: 2020-02-17 | End: 2020-02-17 | Stop reason: HOSPADM

## 2020-02-17 RX ORDER — DEXAMETHASONE SODIUM PHOSPHATE 10 MG/ML
INJECTION INTRAMUSCULAR; INTRAVENOUS PRN
Status: DISCONTINUED | OUTPATIENT
Start: 2020-02-17 | End: 2020-02-17 | Stop reason: SDUPTHER

## 2020-02-17 RX ADMIN — Medication 100 MCG: at 07:22

## 2020-02-17 RX ADMIN — Medication 100 MCG: at 07:52

## 2020-02-17 RX ADMIN — SUGAMMADEX 200 MG: 100 INJECTION, SOLUTION INTRAVENOUS at 07:54

## 2020-02-17 RX ADMIN — Medication 100 MCG: at 07:46

## 2020-02-17 RX ADMIN — DEXAMETHASONE SODIUM PHOSPHATE 10 MG: 10 INJECTION INTRAMUSCULAR; INTRAVENOUS at 07:30

## 2020-02-17 RX ADMIN — ONDANSETRON 4 MG: 2 INJECTION, SOLUTION INTRAMUSCULAR; INTRAVENOUS at 07:30

## 2020-02-17 RX ADMIN — Medication 100 MCG: at 07:37

## 2020-02-17 RX ADMIN — LIDOCAINE HYDROCHLORIDE 100 MG: 20 INJECTION, SOLUTION EPIDURAL; INFILTRATION; INTRACAUDAL; PERINEURAL at 07:22

## 2020-02-17 RX ADMIN — SODIUM CHLORIDE, POTASSIUM CHLORIDE, SODIUM LACTATE AND CALCIUM CHLORIDE: 600; 310; 30; 20 INJECTION, SOLUTION INTRAVENOUS at 07:19

## 2020-02-17 RX ADMIN — SODIUM CHLORIDE, POTASSIUM CHLORIDE, SODIUM LACTATE AND CALCIUM CHLORIDE: 600; 310; 30; 20 INJECTION, SOLUTION INTRAVENOUS at 07:02

## 2020-02-17 RX ADMIN — CLINDAMYCIN PHOSPHATE 900 MG: 900 INJECTION, SOLUTION INTRAVENOUS at 07:30

## 2020-02-17 RX ADMIN — PROPOFOL 150 MG: 10 INJECTION, EMULSION INTRAVENOUS at 07:22

## 2020-02-17 RX ADMIN — ROCURONIUM BROMIDE 30 MG: 10 INJECTION, SOLUTION INTRAVENOUS at 07:22

## 2020-02-17 ASSESSMENT — PULMONARY FUNCTION TESTS
PIF_VALUE: 1
PIF_VALUE: 23
PIF_VALUE: 21
PIF_VALUE: 10
PIF_VALUE: 20
PIF_VALUE: 18
PIF_VALUE: 0
PIF_VALUE: 24
PIF_VALUE: 20
PIF_VALUE: 22
PIF_VALUE: 21
PIF_VALUE: 1
PIF_VALUE: 26
PIF_VALUE: 1
PIF_VALUE: 20
PIF_VALUE: 22
PIF_VALUE: 20
PIF_VALUE: 20
PIF_VALUE: 21
PIF_VALUE: 7
PIF_VALUE: 23
PIF_VALUE: 23
PIF_VALUE: 6
PIF_VALUE: 19
PIF_VALUE: 32
PIF_VALUE: 24
PIF_VALUE: 23
PIF_VALUE: 12
PIF_VALUE: 24
PIF_VALUE: 25
PIF_VALUE: 22
PIF_VALUE: 25
PIF_VALUE: 20
PIF_VALUE: 20
PIF_VALUE: 23
PIF_VALUE: 21
PIF_VALUE: 25
PIF_VALUE: 24
PIF_VALUE: 25
PIF_VALUE: 4
PIF_VALUE: 21
PIF_VALUE: 26
PIF_VALUE: 22
PIF_VALUE: 26
PIF_VALUE: 15
PIF_VALUE: 2
PIF_VALUE: 5
PIF_VALUE: 26
PIF_VALUE: 2
PIF_VALUE: 22

## 2020-02-17 ASSESSMENT — PAIN DESCRIPTION - PAIN TYPE: TYPE: SURGICAL PAIN

## 2020-02-17 ASSESSMENT — PAIN SCALES - GENERAL: PAINLEVEL_OUTOF10: 2

## 2020-02-17 ASSESSMENT — PAIN DESCRIPTION - LOCATION: LOCATION: PERINEUM

## 2020-02-17 ASSESSMENT — PAIN - FUNCTIONAL ASSESSMENT: PAIN_FUNCTIONAL_ASSESSMENT: 0-10

## 2020-02-17 NOTE — ANESTHESIA PRE PROCEDURE
Department of Anesthesiology  Preprocedure Note       Name:  Ja Jose   Age:  68 y.o.  :  1950                                          MRN:  [id number]         Date:  2020      Surgeon: Connor Haines): Nancy Gonzalez MD    Procedure: PERINEAL CYST EXCISION (N/A )    Medications prior to admission:   Prior to Admission medications    Medication Sig Start Date End Date Taking? Authorizing Provider   HYDROcodone-acetaminophen (NORCO) 5-325 MG per tablet Take 1 tablet by mouth every 6 hours as needed for Pain for up to 5 days.  20 Yes Scotty Melgar DO   docusate sodium (COLACE) 100 MG capsule Take 1 capsule by mouth 2 times daily as needed for Constipation 20  Yes Scotty Melgar DO   ondansetron (ZOFRAN) 4 MG tablet Take 1 tablet by mouth every 8 hours as needed for Nausea or Vomiting 20  Yes Scotty Melgar DO   torsemide (DEMADEX) 20 MG tablet Take 60 mg by mouth as needed Feet/ankle edema   Yes Historical Provider, MD   potassium chloride (KLOR-CON M) 10 MEQ extended release tablet Take 10 mEq by mouth three times a week Monday,Wednesday, and Friday   Yes Historical Provider, MD   spironolactone (ALDACTONE) 100 MG tablet Take 100 mg by mouth daily   Yes Historical Provider, MD   ammonium lactate (LAC-HYDRIN) 12 % lotion as needed  9/10/19  Yes Historical Provider, MD   rosuvastatin (CRESTOR) 40 MG tablet Take 1 tablet by mouth daily   Yes Historical Provider, MD   insulin aspart (NOVOLOG) 100 UNIT/ML injection vial Inject into the skin 3 times daily as needed for High Blood Sugar (sliding scale)   Yes Historical Provider, MD   nebivolol (BYSTOLIC) 5 MG tablet Take 2.5 mg by mouth daily On Monday, Wednesday, and Friday   Yes Historical Provider, MD   K Phos [place]-Sod Phos Di & Mono (PHOSPHA 250 NEUTRAL PO) Take 1 tablet by mouth 2 times daily   Yes Historical Provider, MD   polyethylene glycol (GLYCOLAX) packet Take 17 g by mouth every 48 hours as needed for Constipation   Yes Historical Provider, MD   albuterol sulfate  (90 Base) MCG/ACT inhaler Inhale 2 puffs into the lungs every 6 hours as needed for Wheezing   Yes Historical Provider, MD   mometasone-formoterol (DULERA) 200-5 MCG/ACT inhaler Inhale 2 puffs into the lungs 2 times daily   Yes Historical Provider, MD   albuterol (PROVENTIL) (2.5 MG/3ML) 0.083% nebulizer solution Take 3 mLs by nebulization every 4 hours as needed for Wheezing 3/27/18  Yes Aster Monahan DO   insulin NPH (NOVOLIN N) 100 UNIT/ML injection vial Inject 5 Units into the skin 2 times daily (before meals) Hold for blood sugar less than 120   Yes Historical Provider, MD   metoclopramide (REGLAN) 5 MG tablet Take 5 mg by mouth 4 times daily Indications: 2tabs am and 2 tabs hs    Yes Historical Provider, MD   omeprazole (PRILOSEC) 20 MG capsule Take 20 mg by mouth 2 times daily    Yes Historical Provider, MD   folic acid (FOLVITE) 1 MG tablet Take 1 tablet by mouth daily.  11/27/13  Yes Luther Kirk MD   levothyroxine (SYNTHROID) 100 MCG tablet Take 100 mcg by mouth Daily    Yes Historical Provider, MD   Cholecalciferol (VITAMIN D3) 1000 UNITS TABS Take 1,000 Units by mouth three times a week Mon/Wed/Fri   Yes Historical Provider, MD   warfarin (COUMADIN) 2.5 MG tablet Take 2.5 mg by mouth daily     Historical Provider, MD   acetaminophen (TYLENOL) 325 MG tablet Take 2 tablets by mouth every 4 hours as needed for Pain or Fever 3/27/18   Aster Monahan DO   valACYclovir (VALTREX) 1 G tablet Take 1,000 mg by mouth 2 times daily as needed 10 day course when needed for cold sores    Historical Provider, MD       Current medications:    Current Facility-Administered Medications   Medication Dose Route Frequency Provider Last Rate Last Dose    [START ON 2/18/2020] 0.9 % sodium chloride infusion   Intravenous Continuous Frantz Heath DO        [START ON 2/18/2020] lactated ringers infusion   Intravenous Continuous Magali Heath  mL/hr at 02/17/20 9774      sodium chloride flush 0.9 % injection 10 mL  10 mL Intravenous 2 times per day Frantz Heath DO        sodium chloride flush 0.9 % injection 10 mL  10 mL Intravenous PRN Ivonne Burton DO        [START ON 2/18/2020] lidocaine PF 1 % injection 1 mL  1 mL Intradermal Once PRN Ivonne Burton DO         Facility-Administered Medications Ordered in Other Encounters   Medication Dose Route Frequency Provider Last Rate Last Dose    sodium chloride 0.9 % solution 250 mL  250 mL Intravenous Once Colonel Jered MD           Allergies:     Allergies   Allergen Reactions    Pcn [Penicillins] Other (See Comments)     Unknown reaction, states can take Keflex STATES REACTION HAPPENED AS A CHILD AND WAS TOLD IT Was  LIFE THREATENING      Nsaids Other (See Comments)     PT HAS KIDNEY DISEASE AND CAN NOT TAKE NSAIDS      Cymbalta [Duloxetine Hcl]      intolerance    Lyrica [Pregabalin]      intolerance    Prolia [Denosumab] Other (See Comments)     TACHYCARDIA, FLUSHED       Problem List:    Patient Active Problem List   Diagnosis Code    COPD (chronic obstructive pulmonary disease) (HCC) J44.9    Atrial fibrillation (HCC) I48.91    Stage 4 chronic kidney disease (HCC) N18.4    Type 2 diabetes mellitus with kidney complication, with long-term current use of insulin (HCC) E11.29, Z79.4    Esophageal reflux K21.9    Hyperlipidemia E78.5    Chronic back pain M54.9, G89.29    Osteoarthritis M19.90    Liver disease K76.9    H/O mastectomy Z90.10    Asthma J45.909    Back pain M54.9    Cystocele N81.10    Rectocele N81.6    Urinary incontinence, mixed N39.46    Essential hypertension I10    Hypothyroidism E03.9    Chronic hepatitis C (HCC) B18.2    Sigmoid diverticulitis K57.32    Contact hypersensitivity L23.9    Right upper quadrant pain R10.11    Gastroparesis K31.84    Diverticulitis large intestine w/o perforation or abscess w/bleeding K57.33    Acute kidney injury ([address] N17.9    Adhesive capsulitis of shoulder M75.00    Cervical spondylosis M47.812    Cirrhosis of liver (HCC) K74.60    Graves' disease E05.00    Mitral valve disease I05.9    Morbid obesity (HCC) E66.01    Venous thrombosis of lower extremity I82.90    Acute cystitis without hematuria N30.00    History of DVT (deep vein thrombosis) Z86.718    Diabetic peripheral neuropathy (HCC) E11.42    Coumadin use Z79.01       Past Medical History:        Diagnosis Date    Anesthesia complication     DIFFICULT TIME WAKING UP    Asthma     INHALER USE DAILY AND AS NEEDED DX AS A CHILD    Atrial fibrillation ([facility] 2003    INTERMITTENT    Cerebral artery occlusion with cerebral infarction ([facility] 2006    tia x2. Pt denies history of a stroke (Written 02/04/2020).     Chronic back pain     Chronic kidney disease     chronic kidney disease dr Tereso Weinstein nephrologist   Stage 3    Cirrhosis ([facility]     Constipation     COPD (chronic obstructive pulmonary disease) ([facility] 2003    INHALER USE DAILY AND AS NEEDED    Dental bridge present     PERMANENT UPPER    Difficult intravenous access     USUALLY USE ULTRASOUND ON ANESTHESIA STARTS VEINS ARE SMALL AND DEEP DO NOT USE HANDS    Diverticulitis     Gastroparesis     GERD (gastroesophageal reflux disease)     ON RX    H/O mastectomy     fibrocystic disease    Hepatitis 2008    hep c, took meds for hep c    History of blood transfusion     2008    History of Coumadin therapy     on coumadin daily    History of DVT (deep vein thrombosis)     Several years ago (Written 02/04/2020)    Hx of blood clots     states has had on multiple occassions    Hyperlipidemia     Hypertension     Hypothyroidism     Impaired mobility     uses walker    Incontinence     wears a pad    Incontinence of bowel     wears depends    Liver disease     hepatitis c-RESOLVED 2008 WITH TREATMENT    Lumbar disc disease     Osteoarthritis     PONV (postoperative nausea and vomiting)     Prolonged emergence from general anesthesia     Rectocele     Rotator cuff tear     right side, needs surgery    Snores     states has been tested for sleep apnea and does not have it    Type II or unspecified type diabetes mellitus without mention of complication, not stated as uncontrolled     last A1C was 5.2 2019    Wears glasses     READING       Past Surgical History:        Procedure Laterality Date    APPENDECTOMY  1964    BREAST SURGERY Bilateral 1982    mastectomy, fibrocystic breast, mother had passed from breast cancer   [address]   [address]    PARTIAL    KNEE SURGERY Left 2013    ARTHROSCOPY    [place] CMBND ANTERPOST COLPORRAPHY W/CYSTO W/NTRCL RPR N/A 2018    VAGINAL REPAIR ANTERIOR AND POSTERIOR, SOLYX BLADDER SLING, CYSTO, performed by Hardik Villar DO at [address] Right 2019    UPPER GASTROINTESTINAL ENDOSCOPY      VAGINA SURGERY  2018    anterior and posterior bladder sling, cysto       Social History:    Social History     Tobacco Use    Smoking status: Former Smoker     Packs/day: 1.00     Years: 43.00     Pack years: 43.00     Last attempt to quit: 2008     Years since quittin.5    Smokeless tobacco: Never Used   Substance Use Topics    Alcohol use: No     Comment: social                                Counseling given: Not Answered      Vital Signs (Current):   Vitals:    20 0616 20 0624 20 0625   BP:   (!) 150/46   Pulse:   75   Resp:  16 16   Temp:   98.1 °F (36.7 °C)   SpO2:  96% 94%   Weight: 208 lb 8.9 oz (94.6 kg)     Height: 5' 2\" (1.575 m)                                                BP Readings from Last 3 Encounters:   20 (!) 150/46   20 (!) 148/58   10/24/19 (!) 140/57       NPO Status: Time of last liquid consumption:  GI/Hepatic/Renal:   (+) GERD:, hepatitis: C, renal disease: CRI,           Endo/Other:    (+) DiabetesType II DM, , hypothyroidism::., .                 Abdominal:           Vascular:                                        Anesthesia Plan      general     ASA 3       Induction: intravenous. Anesthetic plan and risks discussed with patient.                       Ameena Corral MD   2/17/2020

## 2020-02-17 NOTE — ANESTHESIA POSTPROCEDURE EVALUATION
Department of Anesthesiology  Postprocedure Note    Patient: Luista Agent  MRN: [id number]  [place]: 1950  Date of evaluation: 2/17/2020  Time:  9:42 AM     Procedure Summary     Date:  02/17/20 Room / Location:  [address] [place] [address] / [facility] - INPATIENT    Anesthesia Start:  Dianae Adas Anesthesia Stop:  6443    Procedure:  EXCISION PERIVAGINAL CYST (N/A Perineum) Diagnosis:  (DX PERINEAL CYST)    Surgeon:  Bisi Harrison MD Responsible Provider:  Ameena Corral MD    Anesthesia Type:  general ASA Status:  3          Anesthesia Type: general    Jack Phase I: Jack Score: 10    Jack Phase II:      Last vitals: Reviewed and per EMR flowsheets.        Anesthesia Post Evaluation    Patient location during evaluation: PACU  Level of consciousness: awake and alert  Complications: no

## 2020-02-17 NOTE — H&P
K Phos [place]-Sod Phos Di & Mono (PHOSPHA 250 NEUTRAL PO) Take 1 tablet by mouth 2 times daily    Historical Provider, MD   polyethylene glycol (GLYCOLAX) packet Take 17 g by mouth every 48 hours as needed for Constipation    Historical Provider, MD   albuterol sulfate  (90 Base) MCG/ACT inhaler Inhale 2 puffs into the lungs every 6 hours as needed for Wheezing    Historical Provider, MD   mometasone-formoterol (DULERA) 200-5 MCG/ACT inhaler Inhale 2 puffs into the lungs 2 times daily    Historical Provider, MD   warfarin (COUMADIN) 2.5 MG tablet Take 2.5 mg by mouth daily     Historical Provider, MD   acetaminophen (TYLENOL) 325 MG tablet Take 2 tablets by mouth every 4 hours as needed for Pain or Fever 3/27/18   Roger Tenorio DO   albuterol (PROVENTIL) (2.5 MG/3ML) 0.083% nebulizer solution Take 3 mLs by nebulization every 4 hours as needed for Wheezing 3/27/18   Colin Brown DO   insulin NPH (NOVOLIN N) 100 UNIT/ML injection vial Inject 5 Units into the skin 2 times daily (before meals) Hold for blood sugar less than 120    Historical Provider, MD   valACYclovir (VALTREX) 1 G tablet Take 1,000 mg by mouth 2 times daily as needed 10 day course when needed for cold sores    Historical Provider, MD   metoclopramide (REGLAN) 5 MG tablet Take 5 mg by mouth 4 times daily Indications: 2tabs am and 2 tabs hs     Historical Provider, MD   omeprazole (PRILOSEC) 20 MG capsule Take 20 mg by mouth 2 times daily     Historical Provider, MD   folic acid (FOLVITE) 1 MG tablet Take 1 tablet by mouth daily. 11/27/13   Ellyn Wesley MD   levothyroxine (SYNTHROID) 100 MCG tablet Take 100 mcg by mouth Daily     Historical Provider, MD   Cholecalciferol (VITAMIN D3) 1000 UNITS TABS Take 1,000 Units by mouth three times a week Mon/Wed/Fri    Historical Provider, MD       This is a 68 y.o. female who is pleasant, cooperative, alert and oriented x3, in no acute distress.     Heart: Heart sounds are normal.  HR regular rate and rhythm without murmur, gallop or rub. Lungs: Normal respiratory effort with good air exchange and clear to auscultation without wheezes or rales bilaterally   Abdomen: soft, nontender, nondistended with bowel sounds .        Labs:  Recent Labs     02/11/20 0927 02/11/20  0926 02/04/20  1032   HGB  --   --  13.6   HCT  --   --  41.1   WBC  --   --  8.5   MCV  --   --  101.5   PLT  --   --  225   NA  --   --  136   K  --   --  4.4   CL  --   --  102   CO2  --   --  22   BUN  --   --  25*   CREATININE  --   --  1.77*   GLUCOSE  --   --  127*   INR  --  1.9  --    PROTIME 22.7  --   --    AST  --   --  25   ALT  --   --  21   LABALBU  --   --  4.1       KERRI STYLES ANP-BC  Electronically signed 2/17/2020 at 6:25 AM

## 2020-02-18 LAB — SURGICAL PATHOLOGY REPORT: NORMAL

## 2020-02-18 NOTE — OP NOTE
[address] [address]                                OPERATIVE REPORT    PATIENT NAME: Aditya Butler                    :        1950  MED REC NO:   [id number]                             ROOM:  ACCOUNT NO:   [de-identified]                           ADMIT DATE: 2020  PROVIDER:     Roma Campos    DATE OF PROCEDURE:  2020    PREOPERATIVE DIAGNOSIS:  Right perianal/perivaginal cyst.    POSTOPERATIVE DIAGNOSIS:  Right perivaginal cystic mass. PROCEDURE PERFORMED:  Excision of the right perivaginal cystic mass. SURGEON:  Dr. Queen Daily. ASSISTANT:  Dr. Trev Bell. ANESTHESIA:  General.    ESTIMATED BLOOD LOSS:  10 mL. FLUIDS:  500 mL of crystalloids. DRAINS:  None. INDICATIONS:  This is a 64-year-old female with recurrent episodes of  perianal/perivaginal drainage of bloody purulent fluid. Finding was  suggestive of possibly a cyst/mass. The patient has been on  anticoagulants. The patient comes in for excision. OPERATIVE PROCEDURE:  The patient was brought to the operating room and  was put in supine position. After induction of general anesthesia, the  patient was put in the lithotomy position and the perianal and  perivaginal area was prepped and draped in the usual sterile fashion. Attention was paid to the patient's right perivaginal area where a large  palpable mass with some purplish discoloration noted measuring about 3  cm x 4-5 cm. The elliptical incision set was marked. A 0.5% Marcaine  with epinephrine was circumferentially injected. A 15 blade was used to  make the incision, which was taken around this mass in question. Significant bleeding from the subcutaneous tissue was encountered.   Some  larger blood vessels appear to be going into this lesion, which was  excised and after excision and after hemostasis, 3-0 Vicryl was used to  approximate the subcutaneous tissue and 4-0 nylon was used to  approximate the skin. Dry dressing was applied. The patient was  brought to the recovery room in stable condition.         Casimiro Fitch    D: 02/17/2020 11:34:24       T: 02/17/2020 12:01:44     [email or web address]  Job#: [id number]     Doc#: [id number]    CC:

## 2020-02-25 ENCOUNTER — HOSPITAL ENCOUNTER (OUTPATIENT)
Dept: PHARMACY | Age: 70
Setting detail: THERAPIES SERIES
Discharge: HOME | End: 2020-02-25
Payer: MEDICARE

## 2020-02-25 LAB
INR BLD: 1.4
PROTIME: 17 SECONDS

## 2020-02-25 PROCEDURE — 85610 PROTHROMBIN TIME: CPT

## 2020-02-25 PROCEDURE — 99212 OFFICE O/P EST SF 10 MIN: CPT

## 2020-03-04 ENCOUNTER — HOSPITAL ENCOUNTER (OUTPATIENT)
Age: 70
Discharge: HOME | End: 2020-03-04
Payer: MEDICARE

## 2020-03-04 ENCOUNTER — HOSPITAL ENCOUNTER (OUTPATIENT)
Dept: ULTRASOUND IMAGING | Age: 70
Discharge: HOME | End: 2020-03-06
Payer: MEDICARE

## 2020-03-04 ENCOUNTER — HOSPITAL ENCOUNTER (OUTPATIENT)
Dept: PHARMACY | Age: 70
Setting detail: THERAPIES SERIES
Discharge: HOME | End: 2020-03-04
Payer: MEDICARE

## 2020-03-04 LAB
-: NORMAL
ABSOLUTE EOS #: 0.2 K/UL (ref 0–0.44)
ABSOLUTE IMMATURE GRANULOCYTE: <0.03 K/UL (ref 0–0.3)
ABSOLUTE LYMPH #: 1.06 K/UL (ref 1.1–3.7)
ABSOLUTE MONO #: 0.51 K/UL (ref 0.1–1.2)
ALBUMIN SERPL-MCNC: 3.9 G/DL (ref 3.5–5.2)
AMORPHOUS: NORMAL
ANION GAP SERPL CALCULATED.3IONS-SCNC: 13 MMOL/L (ref 9–17)
BACTERIA: NORMAL
BASOPHILS # BLD: 1 % (ref 0–2)
BASOPHILS ABSOLUTE: 0.09 K/UL (ref 0–0.2)
BILIRUBIN URINE: NEGATIVE
BUN BLDV-MCNC: 14 MG/DL (ref 8–23)
BUN/CREAT BLD: ABNORMAL (ref 9–20)
CALCIUM SERPL-MCNC: 9.5 MG/DL (ref 8.6–10.4)
CASTS UA: NORMAL /LPF (ref 0–8)
CHLORIDE BLD-SCNC: 103 MMOL/L (ref 98–107)
CO2: 22 MMOL/L (ref 20–31)
COLOR: YELLOW
COMMENT UA: ABNORMAL
CREAT SERPL-MCNC: 1.59 MG/DL (ref 0.5–0.9)
CREATININE URINE: 159.6 MG/DL (ref 28–217)
CRYSTALS, UA: NORMAL /HPF
DIFFERENTIAL TYPE: ABNORMAL
EOSINOPHILS RELATIVE PERCENT: 3 % (ref 1–4)
EPITHELIAL CELLS UA: NORMAL /HPF (ref 0–5)
GFR AFRICAN AMERICAN: 39 ML/MIN
GFR NON-AFRICAN AMERICAN: 32 ML/MIN
GFR SERPL CREATININE-BSD FRML MDRD: ABNORMAL ML/MIN/{1.73_M2}
GFR SERPL CREATININE-BSD FRML MDRD: ABNORMAL ML/MIN/{1.73_M2}
GLUCOSE BLD-MCNC: 123 MG/DL (ref 70–99)
GLUCOSE URINE: NEGATIVE
HCT VFR BLD CALC: 44.1 % (ref 36.3–47.1)
HEMOGLOBIN: 14.3 G/DL (ref 11.9–15.1)
IMMATURE GRANULOCYTES: 0 %
INR BLD: 1.6
KETONES, URINE: NEGATIVE
LEUKOCYTE ESTERASE, URINE: NEGATIVE
LYMPHOCYTES # BLD: 15 % (ref 24–43)
MAGNESIUM: 2.3 MG/DL (ref 1.6–2.6)
MCH RBC QN AUTO: 33.5 PG (ref 25.2–33.5)
MCHC RBC AUTO-ENTMCNC: 32.4 G/DL (ref 28.4–34.8)
MCV RBC AUTO: 103.3 FL (ref 82.6–102.9)
MICROALBUMIN/CREAT 24H UR: 213 MG/L
MICROALBUMIN/CREAT UR-RTO: 133 MCG/MG CREAT
MONOCYTES # BLD: 7 % (ref 3–12)
MUCUS: NORMAL
NITRITE, URINE: NEGATIVE
NRBC AUTOMATED: 0 PER 100 WBC
OTHER OBSERVATIONS UA: NORMAL
PDW BLD-RTO: 13.7 % (ref 11.8–14.4)
PH UA: 6 (ref 5–8)
PHOSPHORUS: 2.9 MG/DL (ref 2.6–4.5)
PLATELET # BLD: 249 K/UL (ref 138–453)
PLATELET ESTIMATE: ABNORMAL
PMV BLD AUTO: 11 FL (ref 8.1–13.5)
POTASSIUM SERPL-SCNC: 3.9 MMOL/L (ref 3.7–5.3)
PROTEIN UA: ABNORMAL
PROTIME: 18.6 SECONDS
PTH INTACT: 97.93 PG/ML (ref 15–65)
RBC # BLD: 4.27 M/UL (ref 3.95–5.11)
RBC # BLD: ABNORMAL 10*6/UL
RBC UA: NORMAL /HPF (ref 0–4)
RENAL EPITHELIAL, UA: NORMAL /HPF
SEG NEUTROPHILS: 74 % (ref 36–65)
SEGMENTED NEUTROPHILS ABSOLUTE COUNT: 5.28 K/UL (ref 1.5–8.1)
SODIUM BLD-SCNC: 138 MMOL/L (ref 135–144)
SPECIFIC GRAVITY UA: 1.02 (ref 1–1.03)
TRICHOMONAS: NORMAL
TURBIDITY: CLEAR
URINE HGB: NEGATIVE
UROBILINOGEN, URINE: NORMAL
VITAMIN D 25-HYDROXY: 36.2 NG/ML (ref 30–100)
WBC # BLD: 7.2 K/UL (ref 3.5–11.3)
WBC # BLD: ABNORMAL 10*3/UL
WBC UA: NORMAL /HPF (ref 0–5)
YEAST: NORMAL

## 2020-03-04 PROCEDURE — 76770 US EXAM ABDO BACK WALL COMP: CPT

## 2020-03-04 PROCEDURE — 80048 BASIC METABOLIC PNL TOTAL CA: CPT

## 2020-03-04 PROCEDURE — 81001 URINALYSIS AUTO W/SCOPE: CPT

## 2020-03-04 PROCEDURE — 85025 COMPLETE CBC W/AUTO DIFF WBC: CPT

## 2020-03-04 PROCEDURE — 82306 VITAMIN D 25 HYDROXY: CPT

## 2020-03-04 PROCEDURE — 85610 PROTHROMBIN TIME: CPT

## 2020-03-04 PROCEDURE — 82040 ASSAY OF SERUM ALBUMIN: CPT

## 2020-03-04 PROCEDURE — 84100 ASSAY OF PHOSPHORUS: CPT

## 2020-03-04 PROCEDURE — 83735 ASSAY OF MAGNESIUM: CPT

## 2020-03-04 PROCEDURE — 99212 OFFICE O/P EST SF 10 MIN: CPT

## 2020-03-04 PROCEDURE — 82043 UR ALBUMIN QUANTITATIVE: CPT

## 2020-03-04 PROCEDURE — 82570 ASSAY OF URINE CREATININE: CPT

## 2020-03-04 PROCEDURE — 83970 ASSAY OF PARATHORMONE: CPT

## 2020-03-04 PROCEDURE — 36415 COLL VENOUS BLD VENIPUNCTURE: CPT

## 2020-03-11 ENCOUNTER — HOSPITAL ENCOUNTER (OUTPATIENT)
Dept: PHARMACY | Age: 70
Setting detail: THERAPIES SERIES
Discharge: HOME | End: 2020-03-11
Payer: MEDICARE

## 2020-03-11 LAB
INR BLD: 2
PROTIME: 23.8 SECONDS

## 2020-03-11 PROCEDURE — 99212 OFFICE O/P EST SF 10 MIN: CPT

## 2020-03-11 PROCEDURE — 85610 PROTHROMBIN TIME: CPT

## 2020-03-23 ENCOUNTER — HOSPITAL ENCOUNTER (OUTPATIENT)
Dept: PHARMACY | Age: 70
Setting detail: THERAPIES SERIES
Discharge: HOME | End: 2020-03-23
Payer: MEDICARE

## 2020-03-23 LAB
INR BLD: 3
PROTIME: 36.2 SECONDS

## 2020-03-23 PROCEDURE — 85610 PROTHROMBIN TIME: CPT

## 2020-03-23 PROCEDURE — 99211 OFF/OP EST MAY X REQ PHY/QHP: CPT

## 2020-04-06 ENCOUNTER — HOSPITAL ENCOUNTER (OUTPATIENT)
Dept: PHARMACY | Age: 70
Setting detail: THERAPIES SERIES
Discharge: HOME | End: 2020-04-06
Payer: MEDICARE

## 2020-04-06 LAB
INR BLD: 3
PROTIME: 35.4 SECONDS

## 2020-04-06 PROCEDURE — 99211 OFF/OP EST MAY X REQ PHY/QHP: CPT

## 2020-04-06 PROCEDURE — 85610 PROTHROMBIN TIME: CPT

## 2020-05-11 ENCOUNTER — HOSPITAL ENCOUNTER (OUTPATIENT)
Dept: PHARMACY | Age: 70
Setting detail: THERAPIES SERIES
Discharge: HOME | End: 2020-05-11
Payer: MEDICARE

## 2020-05-11 LAB
INR BLD: 5.2
PROTIME: 62.4 SECONDS

## 2020-05-11 PROCEDURE — 99212 OFFICE O/P EST SF 10 MIN: CPT

## 2020-05-11 PROCEDURE — 85610 PROTHROMBIN TIME: CPT

## 2020-05-19 ENCOUNTER — HOSPITAL ENCOUNTER (OUTPATIENT)
Age: 70
Discharge: HOME | End: 2020-05-19
Payer: MEDICARE

## 2020-05-19 ENCOUNTER — HOSPITAL ENCOUNTER (OUTPATIENT)
Dept: PHARMACY | Age: 70
Setting detail: THERAPIES SERIES
Discharge: HOME | End: 2020-05-19
Payer: MEDICARE

## 2020-05-19 LAB
ALBUMIN SERPL-MCNC: 4.1 G/DL (ref 3.5–5.2)
ALBUMIN/GLOBULIN RATIO: 1.1 (ref 1–2.5)
ALP BLD-CCNC: 114 U/L (ref 35–104)
ALT SERPL-CCNC: 19 U/L (ref 5–33)
ANION GAP SERPL CALCULATED.3IONS-SCNC: 17 MMOL/L (ref 9–17)
AST SERPL-CCNC: 25 U/L
BILIRUB SERPL-MCNC: 0.66 MG/DL (ref 0.3–1.2)
BUN BLDV-MCNC: 20 MG/DL (ref 8–23)
BUN/CREAT BLD: ABNORMAL (ref 9–20)
CALCIUM SERPL-MCNC: 9.2 MG/DL (ref 8.6–10.4)
CHLORIDE BLD-SCNC: 103 MMOL/L (ref 98–107)
CHOLESTEROL, FASTING: 185 MG/DL
CHOLESTEROL/HDL RATIO: 3
CO2: 18 MMOL/L (ref 20–31)
CREAT SERPL-MCNC: 2.06 MG/DL (ref 0.5–0.9)
ESTIMATED AVERAGE GLUCOSE: 117 MG/DL
GFR AFRICAN AMERICAN: 29 ML/MIN
GFR NON-AFRICAN AMERICAN: 24 ML/MIN
GFR SERPL CREATININE-BSD FRML MDRD: ABNORMAL ML/MIN/{1.73_M2}
GFR SERPL CREATININE-BSD FRML MDRD: ABNORMAL ML/MIN/{1.73_M2}
GLUCOSE FASTING: 131 MG/DL (ref 70–99)
HBA1C MFR BLD: 5.7 % (ref 4–6)
HCT VFR BLD CALC: 44.1 % (ref 36.3–47.1)
HDLC SERPL-MCNC: 62 MG/DL
HEMOGLOBIN: 14.4 G/DL (ref 11.9–15.1)
INR BLD: 2.5
LDL CHOLESTEROL: 91 MG/DL (ref 0–130)
MCH RBC QN AUTO: 34.3 PG (ref 25.2–33.5)
MCHC RBC AUTO-ENTMCNC: 32.7 G/DL (ref 28.4–34.8)
MCV RBC AUTO: 105 FL (ref 82.6–102.9)
NRBC AUTOMATED: 0 PER 100 WBC
PDW BLD-RTO: 14.6 % (ref 11.8–14.4)
PLATELET # BLD: 238 K/UL (ref 138–453)
PMV BLD AUTO: 11.5 FL (ref 8.1–13.5)
POTASSIUM SERPL-SCNC: 4.4 MMOL/L (ref 3.7–5.3)
PROTHROMBIN TIME: 26.8 SEC (ref 11.5–14.2)
RBC # BLD: 4.2 M/UL (ref 3.95–5.11)
SODIUM BLD-SCNC: 138 MMOL/L (ref 135–144)
THYROXINE, FREE: 1.42 NG/DL (ref 0.93–1.7)
TOTAL PROTEIN: 7.8 G/DL (ref 6.4–8.3)
TRIGLYCERIDE, FASTING: 160 MG/DL
TSH SERPL DL<=0.05 MIU/L-ACNC: 1.73 MIU/L (ref 0.3–5)
VLDLC SERPL CALC-MCNC: ABNORMAL MG/DL (ref 1–30)
WBC # BLD: 7.8 K/UL (ref 3.5–11.3)

## 2020-05-19 PROCEDURE — 84443 ASSAY THYROID STIM HORMONE: CPT

## 2020-05-19 PROCEDURE — 99212 OFFICE O/P EST SF 10 MIN: CPT

## 2020-05-19 PROCEDURE — 80061 LIPID PANEL: CPT

## 2020-05-19 PROCEDURE — 85027 COMPLETE CBC AUTOMATED: CPT

## 2020-05-19 PROCEDURE — 80053 COMPREHEN METABOLIC PANEL: CPT

## 2020-05-19 PROCEDURE — 83036 HEMOGLOBIN GLYCOSYLATED A1C: CPT

## 2020-05-19 PROCEDURE — 85610 PROTHROMBIN TIME: CPT

## 2020-05-19 PROCEDURE — 84439 ASSAY OF FREE THYROXINE: CPT

## 2020-05-19 PROCEDURE — 36415 COLL VENOUS BLD VENIPUNCTURE: CPT

## 2020-06-03 ENCOUNTER — HOSPITAL ENCOUNTER (OUTPATIENT)
Dept: PHARMACY | Age: 70
Setting detail: THERAPIES SERIES
Discharge: HOME | End: 2020-06-03
Payer: MEDICARE

## 2020-06-03 ENCOUNTER — HOSPITAL ENCOUNTER (OUTPATIENT)
Age: 70
Setting detail: SPECIMEN
Discharge: HOME | End: 2020-06-03
Payer: MEDICARE

## 2020-06-03 VITALS — TEMPERATURE: 97.3 F

## 2020-06-03 LAB
-: ABNORMAL
ABSOLUTE EOS #: 0.23 K/UL (ref 0–0.44)
ABSOLUTE IMMATURE GRANULOCYTE: <0.03 K/UL (ref 0–0.3)
ABSOLUTE LYMPH #: 1.38 K/UL (ref 1.1–3.7)
ABSOLUTE MONO #: 0.66 K/UL (ref 0.1–1.2)
ALBUMIN SERPL-MCNC: 3.9 G/DL (ref 3.5–5.2)
AMORPHOUS: ABNORMAL
ANION GAP SERPL CALCULATED.3IONS-SCNC: 18 MMOL/L (ref 9–17)
BACTERIA: ABNORMAL
BASOPHILS # BLD: 1 % (ref 0–2)
BASOPHILS ABSOLUTE: 0.07 K/UL (ref 0–0.2)
BILIRUBIN URINE: NEGATIVE
BUN BLDV-MCNC: 21 MG/DL (ref 8–23)
BUN/CREAT BLD: ABNORMAL (ref 9–20)
CALCIUM SERPL-MCNC: 9.5 MG/DL (ref 8.6–10.4)
CASTS UA: ABNORMAL /LPF (ref 0–8)
CHLORIDE BLD-SCNC: 103 MMOL/L (ref 98–107)
CO2: 19 MMOL/L (ref 20–31)
COLOR: YELLOW
COMMENT UA: ABNORMAL
CREAT SERPL-MCNC: 2.06 MG/DL (ref 0.5–0.9)
CREATININE URINE: 141.1 MG/DL (ref 28–217)
CRYSTALS, UA: ABNORMAL /HPF
DIFFERENTIAL TYPE: ABNORMAL
EOSINOPHILS RELATIVE PERCENT: 3 % (ref 1–4)
EPITHELIAL CELLS UA: ABNORMAL /HPF (ref 0–5)
GFR AFRICAN AMERICAN: 29 ML/MIN
GFR NON-AFRICAN AMERICAN: 24 ML/MIN
GFR SERPL CREATININE-BSD FRML MDRD: ABNORMAL ML/MIN/{1.73_M2}
GFR SERPL CREATININE-BSD FRML MDRD: ABNORMAL ML/MIN/{1.73_M2}
GLUCOSE BLD-MCNC: 122 MG/DL (ref 70–99)
GLUCOSE URINE: NEGATIVE
HCT VFR BLD CALC: 42.5 % (ref 36.3–47.1)
HEMOGLOBIN: 13.6 G/DL (ref 11.9–15.1)
IMMATURE GRANULOCYTES: 0 %
INR BLD: 3
KETONES, URINE: NEGATIVE
LEUKOCYTE ESTERASE, URINE: ABNORMAL
LYMPHOCYTES # BLD: 19 % (ref 24–43)
MAGNESIUM: 2.3 MG/DL (ref 1.6–2.6)
MCH RBC QN AUTO: 33.9 PG (ref 25.2–33.5)
MCHC RBC AUTO-ENTMCNC: 32 G/DL (ref 28.4–34.8)
MCV RBC AUTO: 106 FL (ref 82.6–102.9)
MICROALBUMIN/CREAT 24H UR: 386 MG/L
MICROALBUMIN/CREAT UR-RTO: 274 MCG/MG CREAT
MONOCYTES # BLD: 9 % (ref 3–12)
MUCUS: ABNORMAL
NITRITE, URINE: NEGATIVE
NRBC AUTOMATED: 0 PER 100 WBC
OTHER OBSERVATIONS UA: ABNORMAL
PDW BLD-RTO: 14.5 % (ref 11.8–14.4)
PH UA: 6 (ref 5–8)
PHOSPHORUS: 3.2 MG/DL (ref 2.6–4.5)
PLATELET # BLD: 224 K/UL (ref 138–453)
PLATELET ESTIMATE: ABNORMAL
PMV BLD AUTO: 11.9 FL (ref 8.1–13.5)
POTASSIUM SERPL-SCNC: 4.6 MMOL/L (ref 3.7–5.3)
PROTEIN UA: ABNORMAL
PROTIME: 35.8 SECONDS
PTH INTACT: 98.1 PG/ML (ref 15–65)
RBC # BLD: 4.01 M/UL (ref 3.95–5.11)
RBC # BLD: ABNORMAL 10*6/UL
RBC UA: ABNORMAL /HPF (ref 0–4)
RENAL EPITHELIAL, UA: ABNORMAL /HPF
SEG NEUTROPHILS: 68 % (ref 36–65)
SEGMENTED NEUTROPHILS ABSOLUTE COUNT: 4.76 K/UL (ref 1.5–8.1)
SODIUM BLD-SCNC: 140 MMOL/L (ref 135–144)
SPECIFIC GRAVITY UA: 1.02 (ref 1–1.03)
TRICHOMONAS: ABNORMAL
TURBIDITY: ABNORMAL
URINE HGB: ABNORMAL
UROBILINOGEN, URINE: NORMAL
VITAMIN D 25-HYDROXY: 33.5 NG/ML (ref 30–100)
WBC # BLD: 7.1 K/UL (ref 3.5–11.3)
WBC # BLD: ABNORMAL 10*3/UL
WBC UA: ABNORMAL /HPF (ref 0–5)
YEAST: ABNORMAL

## 2020-06-03 PROCEDURE — 82043 UR ALBUMIN QUANTITATIVE: CPT

## 2020-06-03 PROCEDURE — 83970 ASSAY OF PARATHORMONE: CPT

## 2020-06-03 PROCEDURE — 84100 ASSAY OF PHOSPHORUS: CPT

## 2020-06-03 PROCEDURE — 82306 VITAMIN D 25 HYDROXY: CPT

## 2020-06-03 PROCEDURE — 80048 BASIC METABOLIC PNL TOTAL CA: CPT

## 2020-06-03 PROCEDURE — 85025 COMPLETE CBC W/AUTO DIFF WBC: CPT

## 2020-06-03 PROCEDURE — 83735 ASSAY OF MAGNESIUM: CPT

## 2020-06-03 PROCEDURE — 82040 ASSAY OF SERUM ALBUMIN: CPT

## 2020-06-03 PROCEDURE — 82570 ASSAY OF URINE CREATININE: CPT

## 2020-06-03 PROCEDURE — 36415 COLL VENOUS BLD VENIPUNCTURE: CPT

## 2020-06-03 PROCEDURE — 81001 URINALYSIS AUTO W/SCOPE: CPT

## 2020-06-03 PROCEDURE — 99212 OFFICE O/P EST SF 10 MIN: CPT

## 2020-06-03 PROCEDURE — 85610 PROTHROMBIN TIME: CPT

## 2020-06-03 RX ORDER — AMIODARONE HYDROCHLORIDE 200 MG/1
200 TABLET ORAL
COMMUNITY
Start: 2020-05-31

## 2020-06-03 RX ORDER — EZETIMIBE 10 MG/1
10 TABLET ORAL DAILY
COMMUNITY
Start: 2020-05-31

## 2020-06-11 ENCOUNTER — HOSPITAL ENCOUNTER (OUTPATIENT)
Age: 70
Setting detail: SPECIMEN
Discharge: HOME | End: 2020-06-11
Payer: MEDICARE

## 2020-06-11 ENCOUNTER — HOSPITAL ENCOUNTER (OUTPATIENT)
Dept: PHARMACY | Age: 70
Setting detail: THERAPIES SERIES
Discharge: HOME | End: 2020-06-11
Payer: MEDICARE

## 2020-06-11 VITALS — TEMPERATURE: 97.5 F

## 2020-06-11 LAB
ANION GAP SERPL CALCULATED.3IONS-SCNC: 14 MMOL/L (ref 9–17)
BUN BLDV-MCNC: 20 MG/DL (ref 8–23)
BUN/CREAT BLD: ABNORMAL (ref 9–20)
CALCIUM SERPL-MCNC: 9.3 MG/DL (ref 8.6–10.4)
CHLORIDE BLD-SCNC: 103 MMOL/L (ref 98–107)
CO2: 23 MMOL/L (ref 20–31)
CREAT SERPL-MCNC: 1.77 MG/DL (ref 0.5–0.9)
GFR AFRICAN AMERICAN: 34 ML/MIN
GFR NON-AFRICAN AMERICAN: 28 ML/MIN
GFR SERPL CREATININE-BSD FRML MDRD: ABNORMAL ML/MIN/{1.73_M2}
GFR SERPL CREATININE-BSD FRML MDRD: ABNORMAL ML/MIN/{1.73_M2}
GLUCOSE BLD-MCNC: 122 MG/DL (ref 70–99)
INR BLD: 1.8
POTASSIUM SERPL-SCNC: 4.3 MMOL/L (ref 3.7–5.3)
PROTIME: 21.3 SECONDS
SODIUM BLD-SCNC: 140 MMOL/L (ref 135–144)

## 2020-06-11 PROCEDURE — 80048 BASIC METABOLIC PNL TOTAL CA: CPT

## 2020-06-11 PROCEDURE — 85610 PROTHROMBIN TIME: CPT

## 2020-06-11 PROCEDURE — 36415 COLL VENOUS BLD VENIPUNCTURE: CPT

## 2020-06-11 PROCEDURE — 99211 OFF/OP EST MAY X REQ PHY/QHP: CPT

## 2020-06-18 ENCOUNTER — HOSPITAL ENCOUNTER (OUTPATIENT)
Dept: PHARMACY | Age: 70
Setting detail: THERAPIES SERIES
Discharge: HOME | End: 2020-06-18
Payer: MEDICARE

## 2020-06-18 VITALS — TEMPERATURE: 97.4 F

## 2020-06-18 LAB
INR BLD: 1.7
PROTIME: 20 SECONDS

## 2020-06-18 PROCEDURE — 99212 OFFICE O/P EST SF 10 MIN: CPT

## 2020-06-18 PROCEDURE — 85610 PROTHROMBIN TIME: CPT

## 2020-06-25 ENCOUNTER — HOSPITAL ENCOUNTER (OUTPATIENT)
Dept: PHARMACY | Age: 70
Setting detail: THERAPIES SERIES
Discharge: HOME | End: 2020-06-25
Payer: MEDICARE

## 2020-06-25 VITALS — TEMPERATURE: 97.7 F

## 2020-06-25 LAB
INR BLD: 2.3
PROTIME: 28.1 SECONDS

## 2020-06-25 PROCEDURE — 99211 OFF/OP EST MAY X REQ PHY/QHP: CPT

## 2020-06-25 PROCEDURE — 85610 PROTHROMBIN TIME: CPT

## 2020-07-08 ENCOUNTER — HOSPITAL ENCOUNTER (OUTPATIENT)
Dept: ULTRASOUND IMAGING | Age: 70
Discharge: HOME | End: 2020-07-10
Payer: MEDICARE

## 2020-07-08 ENCOUNTER — HOSPITAL ENCOUNTER (OUTPATIENT)
Age: 70
Discharge: HOME | End: 2020-07-08
Payer: MEDICARE

## 2020-07-08 ENCOUNTER — HOSPITAL ENCOUNTER (OUTPATIENT)
Dept: PHARMACY | Age: 70
Setting detail: THERAPIES SERIES
Discharge: HOME | End: 2020-07-08
Payer: MEDICARE

## 2020-07-08 VITALS — TEMPERATURE: 97.4 F

## 2020-07-08 LAB
AFP: 8.2 UG/L
ALBUMIN SERPL-MCNC: 3.8 G/DL (ref 3.5–5.2)
ALBUMIN/GLOBULIN RATIO: 1.2 (ref 1–2.5)
ALP BLD-CCNC: 85 U/L (ref 35–104)
ALT SERPL-CCNC: 20 U/L (ref 5–33)
ANION GAP SERPL CALCULATED.3IONS-SCNC: 12 MMOL/L (ref 9–17)
AST SERPL-CCNC: 31 U/L
BILIRUB SERPL-MCNC: 0.75 MG/DL (ref 0.3–1.2)
BUN BLDV-MCNC: 16 MG/DL (ref 8–23)
BUN/CREAT BLD: ABNORMAL (ref 9–20)
CALCIUM SERPL-MCNC: 9.2 MG/DL (ref 8.6–10.4)
CHLORIDE BLD-SCNC: 103 MMOL/L (ref 98–107)
CO2: 24 MMOL/L (ref 20–31)
CREAT SERPL-MCNC: 1.5 MG/DL (ref 0.5–0.9)
GFR AFRICAN AMERICAN: 41 ML/MIN
GFR NON-AFRICAN AMERICAN: 34 ML/MIN
GFR SERPL CREATININE-BSD FRML MDRD: ABNORMAL ML/MIN/{1.73_M2}
GFR SERPL CREATININE-BSD FRML MDRD: ABNORMAL ML/MIN/{1.73_M2}
GLUCOSE BLD-MCNC: 126 MG/DL (ref 70–99)
INR BLD: 2.3
INR BLD: 2.6
POTASSIUM SERPL-SCNC: 4 MMOL/L (ref 3.7–5.3)
PROTHROMBIN TIME: 25.5 SEC (ref 11.5–14.2)
PROTIME: 31.5 SECONDS
SODIUM BLD-SCNC: 139 MMOL/L (ref 135–144)
TOTAL PROTEIN: 7 G/DL (ref 6.4–8.3)

## 2020-07-08 PROCEDURE — 80053 COMPREHEN METABOLIC PANEL: CPT

## 2020-07-08 PROCEDURE — 85610 PROTHROMBIN TIME: CPT

## 2020-07-08 PROCEDURE — 99211 OFF/OP EST MAY X REQ PHY/QHP: CPT

## 2020-07-08 PROCEDURE — 36415 COLL VENOUS BLD VENIPUNCTURE: CPT

## 2020-07-08 PROCEDURE — 82105 ALPHA-FETOPROTEIN SERUM: CPT

## 2020-07-08 PROCEDURE — 76705 ECHO EXAM OF ABDOMEN: CPT

## 2020-07-08 NOTE — PROGRESS NOTES
Patient states compliant all of the time with regimen. No bleeding or thromboembolic side effects noted. No significant med or dietary changes. No significant recent illness or disease state changes. Patient is going to need an endoscopy and is to hold warfarin 5 days prior per Dr. Peyton Buckley at [facility]. Patient is waiting on clearance from Dr. Christelle Blunt to schedule a date for this procedure. PT/INR done in office per protocol. INR is 2.6 which is therapeutic. Warfarin regimen will be continued at current dose of 1.25mg Wednesday and 2.5mg all other days. Will retest in 4 weeks. Patient understands dosing directions and information discussed. Dosing schedule and follow up appointment given to patient. Progress note routed to referring physicians office. Discussed with patient the Pharmacist Collaborative Practice Agreement. Patient provided verbal and/or electronic (ex. [employer]) consent to participate in the collaborative practice agreement between the pharmacist and referred patient. This is in lieu of paper consent due to COVID-19 precautions and the use of remote/virtual visits.        CLINICAL PHARMACY CONSULT: MED RECONCILIATION/REVIEW ADDENDUM    For Pharmacy Admin Tracking Only    PHSO: No  Total # of Interventions Recommended: 0  - Maintenance Safety Lab Monitoring #: 1  Total Interventions Accepted: 0  Time Spent (min): [address] PharmD

## 2020-08-05 ENCOUNTER — HOSPITAL ENCOUNTER (OUTPATIENT)
Dept: PHARMACY | Age: 70
Setting detail: THERAPIES SERIES
Discharge: HOME | End: 2020-08-05
Payer: MEDICARE

## 2020-08-05 VITALS — TEMPERATURE: 97.3 F

## 2020-08-05 LAB
INR BLD: 2.2
PROTIME: 26.9 SECONDS

## 2020-08-05 PROCEDURE — 99211 OFF/OP EST MAY X REQ PHY/QHP: CPT

## 2020-08-05 PROCEDURE — 85610 PROTHROMBIN TIME: CPT

## 2020-08-05 NOTE — PROGRESS NOTES
Patient states compliant all of the time with regimen. No bleeding or thromboembolic side effects noted. No significant med or dietary changes. No significant recent illness or disease state changes. PT/INR done in office per protocol. INR is 2.2 which is therapeutic. Warfarin regimen will be continued at current dose of 1.25mg Wednesday and 2.5mg all other days. Will retest in 4 weeks. Patient understands dosing directions and information discussed. Dosing schedule and follow up appointment given to patient. Progress note routed to referring physicians office. Discussed with patient the Pharmacist Collaborative Practice Agreement. Patient provided verbal and/or electronic (ex. [employer]) consent to participate in the collaborative practice agreement between the pharmacist and referred patient. This is in lieu of paper consent due to COVID-19 precautions and the use of remote/virtual visits.        CLINICAL PHARMACY CONSULT: MED RECONCILIATION/REVIEW ADDENDUM    For Pharmacy Admin Tracking Only    PHSO: No  Total # of Interventions Recommended: 0  - Maintenance Safety Lab Monitoring #: 1  Total Interventions Accepted: 0  Time Spent (min): [address] PharmD

## 2020-09-02 ENCOUNTER — HOSPITAL ENCOUNTER (OUTPATIENT)
Dept: PHARMACY | Age: 70
Setting detail: THERAPIES SERIES
Discharge: HOME | End: 2020-09-02
Payer: MEDICARE

## 2020-09-02 ENCOUNTER — HOSPITAL ENCOUNTER (OUTPATIENT)
Age: 70
Setting detail: SPECIMEN
Discharge: HOME | End: 2020-09-02
Payer: MEDICARE

## 2020-09-02 VITALS — TEMPERATURE: 97.2 F

## 2020-09-02 LAB
-: NORMAL
ABSOLUTE EOS #: 0.24 K/UL (ref 0–0.44)
ABSOLUTE IMMATURE GRANULOCYTE: <0.03 K/UL (ref 0–0.3)
ABSOLUTE LYMPH #: 1.42 K/UL (ref 1.1–3.7)
ABSOLUTE MONO #: 0.58 K/UL (ref 0.1–1.2)
ALBUMIN SERPL-MCNC: 3.9 G/DL (ref 3.5–5.2)
ALBUMIN SERPL-MCNC: 3.9 G/DL (ref 3.5–5.2)
ALBUMIN/GLOBULIN RATIO: 1.2 (ref 1–2.5)
ALP BLD-CCNC: 95 U/L (ref 35–104)
ALT SERPL-CCNC: 26 U/L (ref 5–33)
AMORPHOUS: NORMAL
ANION GAP SERPL CALCULATED.3IONS-SCNC: 13 MMOL/L (ref 9–17)
ANION GAP SERPL CALCULATED.3IONS-SCNC: 19 MMOL/L (ref 9–17)
AST SERPL-CCNC: 36 U/L
BACTERIA: NORMAL
BASOPHILS # BLD: 1 % (ref 0–2)
BASOPHILS ABSOLUTE: 0.07 K/UL (ref 0–0.2)
BILIRUB SERPL-MCNC: 0.78 MG/DL (ref 0.3–1.2)
BILIRUBIN URINE: NEGATIVE
BNP INTERPRETATION: NORMAL
BUN BLDV-MCNC: 18 MG/DL (ref 8–23)
BUN BLDV-MCNC: 18 MG/DL (ref 8–23)
BUN/CREAT BLD: ABNORMAL (ref 9–20)
BUN/CREAT BLD: ABNORMAL (ref 9–20)
CALCIUM SERPL-MCNC: 9 MG/DL (ref 8.6–10.4)
CALCIUM SERPL-MCNC: 9.5 MG/DL (ref 8.6–10.4)
CASTS UA: NORMAL /LPF (ref 0–2)
CHLORIDE BLD-SCNC: 102 MMOL/L (ref 98–107)
CHLORIDE BLD-SCNC: 106 MMOL/L (ref 98–107)
CHOLESTEROL, FASTING: 149 MG/DL
CHOLESTEROL/HDL RATIO: 2.3
CO2: 18 MMOL/L (ref 20–31)
CO2: 20 MMOL/L (ref 20–31)
COLOR: YELLOW
COMMENT UA: ABNORMAL
CREAT SERPL-MCNC: 1.4 MG/DL (ref 0.5–0.9)
CREAT SERPL-MCNC: 1.54 MG/DL (ref 0.5–0.9)
CREATININE URINE: 116.1 MG/DL (ref 28–217)
CRYSTALS, UA: NORMAL /HPF
DIFFERENTIAL TYPE: ABNORMAL
EOSINOPHILS RELATIVE PERCENT: 3 % (ref 1–4)
EPITHELIAL CELLS UA: NORMAL /HPF (ref 0–5)
ESTIMATED AVERAGE GLUCOSE: 117 MG/DL
GFR AFRICAN AMERICAN: 40 ML/MIN
GFR AFRICAN AMERICAN: 45 ML/MIN
GFR NON-AFRICAN AMERICAN: 33 ML/MIN
GFR NON-AFRICAN AMERICAN: 37 ML/MIN
GFR SERPL CREATININE-BSD FRML MDRD: ABNORMAL ML/MIN/{1.73_M2}
GLUCOSE BLD-MCNC: 123 MG/DL (ref 70–99)
GLUCOSE BLD-MCNC: 124 MG/DL (ref 70–99)
GLUCOSE URINE: NEGATIVE
HBA1C MFR BLD: 5.7 % (ref 4–6)
HCT VFR BLD CALC: 40.7 % (ref 36.3–47.1)
HCT VFR BLD CALC: 41.2 % (ref 36.3–47.1)
HDLC SERPL-MCNC: 64 MG/DL
HEMOGLOBIN: 13.1 G/DL (ref 11.9–15.1)
HEMOGLOBIN: 13.2 G/DL (ref 11.9–15.1)
IMMATURE GRANULOCYTES: 0 %
INR BLD: 2.2
IRON SATURATION: 45 % (ref 20–55)
IRON: 117 UG/DL (ref 37–145)
KETONES, URINE: NEGATIVE
LDL CHOLESTEROL: 53 MG/DL (ref 0–130)
LEUKOCYTE ESTERASE, URINE: ABNORMAL
LYMPHOCYTES # BLD: 18 % (ref 24–43)
MAGNESIUM: 2 MG/DL (ref 1.6–2.6)
MCH RBC QN AUTO: 33.7 PG (ref 25.2–33.5)
MCH RBC QN AUTO: 34 PG (ref 25.2–33.5)
MCHC RBC AUTO-ENTMCNC: 32 G/DL (ref 28.4–34.8)
MCHC RBC AUTO-ENTMCNC: 32.2 G/DL (ref 28.4–34.8)
MCV RBC AUTO: 105.1 FL (ref 82.6–102.9)
MCV RBC AUTO: 105.7 FL (ref 82.6–102.9)
MICROALBUMIN/CREAT 24H UR: 90 MG/L
MICROALBUMIN/CREAT UR-RTO: 78 MCG/MG CREAT
MONOCYTES # BLD: 8 % (ref 3–12)
MUCUS: NORMAL
NITRITE, URINE: NEGATIVE
NRBC AUTOMATED: 0 PER 100 WBC
NRBC AUTOMATED: 0 PER 100 WBC
OTHER OBSERVATIONS UA: NORMAL
PDW BLD-RTO: 13.1 % (ref 11.8–14.4)
PDW BLD-RTO: 13.1 % (ref 11.8–14.4)
PH UA: 6.5 (ref 5–8)
PHOSPHORUS: 2.7 MG/DL (ref 2.6–4.5)
PLATELET # BLD: 206 K/UL (ref 138–453)
PLATELET # BLD: 209 K/UL (ref 138–453)
PLATELET ESTIMATE: ABNORMAL
PMV BLD AUTO: 11.6 FL (ref 8.1–13.5)
PMV BLD AUTO: 11.8 FL (ref 8.1–13.5)
POTASSIUM SERPL-SCNC: 4.5 MMOL/L (ref 3.7–5.3)
POTASSIUM SERPL-SCNC: 4.8 MMOL/L (ref 3.7–5.3)
PRO-BNP: 237 PG/ML
PROTEIN UA: ABNORMAL
PROTIME: 27 SECONDS
PTH INTACT: 92.79 PG/ML (ref 15–65)
RBC # BLD: 3.85 M/UL (ref 3.95–5.11)
RBC # BLD: 3.92 M/UL (ref 3.95–5.11)
RBC # BLD: ABNORMAL 10*6/UL
RBC UA: NORMAL /HPF (ref 0–2)
RENAL EPITHELIAL, UA: NORMAL /HPF
SEG NEUTROPHILS: 70 % (ref 36–65)
SEGMENTED NEUTROPHILS ABSOLUTE COUNT: 5.41 K/UL (ref 1.5–8.1)
SODIUM BLD-SCNC: 135 MMOL/L (ref 135–144)
SODIUM BLD-SCNC: 143 MMOL/L (ref 135–144)
SPECIFIC GRAVITY UA: 1.02 (ref 1–1.03)
THYROXINE, FREE: 1.67 NG/DL (ref 0.93–1.7)
TOTAL CK: 58 U/L (ref 26–192)
TOTAL IRON BINDING CAPACITY: 260 UG/DL (ref 250–450)
TOTAL PROTEIN: 7.2 G/DL (ref 6.4–8.3)
TRICHOMONAS: NORMAL
TRIGLYCERIDE, FASTING: 160 MG/DL
TSH SERPL DL<=0.05 MIU/L-ACNC: 2.34 MIU/L (ref 0.3–5)
TURBIDITY: ABNORMAL
UNSATURATED IRON BINDING CAPACITY: 143 UG/DL (ref 112–347)
URINE HGB: NEGATIVE
UROBILINOGEN, URINE: NORMAL
VITAMIN B-12: 500 PG/ML (ref 232–1245)
VITAMIN D 25-HYDROXY: 31 NG/ML (ref 30–100)
VITAMIN D 25-HYDROXY: 31.6 NG/ML (ref 30–100)
VLDLC SERPL CALC-MCNC: ABNORMAL MG/DL (ref 1–30)
WBC # BLD: 7.2 K/UL (ref 3.5–11.3)
WBC # BLD: 7.7 K/UL (ref 3.5–11.3)
WBC # BLD: ABNORMAL 10*3/UL
WBC UA: NORMAL /HPF (ref 0–5)
YEAST: NORMAL

## 2020-09-02 PROCEDURE — 82043 UR ALBUMIN QUANTITATIVE: CPT

## 2020-09-02 PROCEDURE — 81001 URINALYSIS AUTO W/SCOPE: CPT

## 2020-09-02 PROCEDURE — 83970 ASSAY OF PARATHORMONE: CPT

## 2020-09-02 PROCEDURE — 82607 VITAMIN B-12: CPT

## 2020-09-02 PROCEDURE — 83735 ASSAY OF MAGNESIUM: CPT

## 2020-09-02 PROCEDURE — 83036 HEMOGLOBIN GLYCOSYLATED A1C: CPT

## 2020-09-02 PROCEDURE — 84100 ASSAY OF PHOSPHORUS: CPT

## 2020-09-02 PROCEDURE — 99211 OFF/OP EST MAY X REQ PHY/QHP: CPT

## 2020-09-02 PROCEDURE — 84439 ASSAY OF FREE THYROXINE: CPT

## 2020-09-02 PROCEDURE — 80053 COMPREHEN METABOLIC PANEL: CPT

## 2020-09-02 PROCEDURE — 85610 PROTHROMBIN TIME: CPT

## 2020-09-02 PROCEDURE — 84443 ASSAY THYROID STIM HORMONE: CPT

## 2020-09-02 PROCEDURE — 82550 ASSAY OF CK (CPK): CPT

## 2020-09-02 PROCEDURE — 83880 ASSAY OF NATRIURETIC PEPTIDE: CPT

## 2020-09-02 PROCEDURE — 83550 IRON BINDING TEST: CPT

## 2020-09-02 PROCEDURE — 80061 LIPID PANEL: CPT

## 2020-09-02 PROCEDURE — 82306 VITAMIN D 25 HYDROXY: CPT

## 2020-09-02 PROCEDURE — 85027 COMPLETE CBC AUTOMATED: CPT

## 2020-09-02 PROCEDURE — 83540 ASSAY OF IRON: CPT

## 2020-09-02 PROCEDURE — 82570 ASSAY OF URINE CREATININE: CPT

## 2020-09-02 PROCEDURE — 85025 COMPLETE CBC W/AUTO DIFF WBC: CPT

## 2020-09-02 PROCEDURE — 36415 COLL VENOUS BLD VENIPUNCTURE: CPT

## 2020-09-02 NOTE — PROGRESS NOTES
Patient states compliant all of the time with regimen. No bleeding or thromboembolic side effects noted. No significant med or dietary changes. No significant recent illness or disease state changes. PT/INR done in office per protocol. INR is 2.2 which is therapeutic. Warfarin regimen will be continued at current dose of 1.25mg Wed and 2.5mg. Will retest in 5 weeks. Patient understands dosing directions and information discussed. Dosing schedule and follow up appointment given to patient. Progress note routed to referring physicians office. Discussed with patient the Pharmacist Collaborative Practice Agreement. Patient provided verbal and/or electronic (ex. [employer]) consent to participate in the collaborative practice agreement between the pharmacist and referred patient. This is in lieu of paper consent due to COVID-19 precautions and the use of remote/virtual visits.        CLINICAL PHARMACY CONSULT: MED RECONCILIATION/REVIEW ADDENDUM    For Pharmacy Admin Tracking Only    PHSO: No  Total # of Interventions Recommended: 0  - Maintenance Safety Lab Monitoring #: 1  Total Interventions Accepted: 0  Time Spent (min): [address] PharmD

## 2020-09-11 ENCOUNTER — HOSPITAL ENCOUNTER (OUTPATIENT)
Age: 70
Discharge: HOME | End: 2020-09-11
Payer: MEDICARE

## 2020-09-11 LAB
ANION GAP SERPL CALCULATED.3IONS-SCNC: 13 MMOL/L (ref 9–17)
BUN BLDV-MCNC: 16 MG/DL (ref 8–23)
BUN/CREAT BLD: ABNORMAL (ref 9–20)
CALCIUM SERPL-MCNC: 9.3 MG/DL (ref 8.6–10.4)
CHLORIDE BLD-SCNC: 103 MMOL/L (ref 98–107)
CO2: 24 MMOL/L (ref 20–31)
CREAT SERPL-MCNC: 1.36 MG/DL (ref 0.5–0.9)
GFR AFRICAN AMERICAN: 46 ML/MIN
GFR NON-AFRICAN AMERICAN: 38 ML/MIN
GFR SERPL CREATININE-BSD FRML MDRD: ABNORMAL ML/MIN/{1.73_M2}
GFR SERPL CREATININE-BSD FRML MDRD: ABNORMAL ML/MIN/{1.73_M2}
GLUCOSE BLD-MCNC: 118 MG/DL (ref 70–99)
POTASSIUM SERPL-SCNC: 4.1 MMOL/L (ref 3.7–5.3)
SODIUM BLD-SCNC: 140 MMOL/L (ref 135–144)

## 2020-09-11 PROCEDURE — 80048 BASIC METABOLIC PNL TOTAL CA: CPT

## 2020-09-11 PROCEDURE — 36415 COLL VENOUS BLD VENIPUNCTURE: CPT

## 2020-10-07 ENCOUNTER — HOSPITAL ENCOUNTER (OUTPATIENT)
Dept: PHARMACY | Age: 70
Setting detail: THERAPIES SERIES
Discharge: HOME | End: 2020-10-07
Payer: MEDICARE

## 2020-10-07 LAB
INR BLD: 2.6
PROTIME: 30.7 SECONDS

## 2020-10-07 PROCEDURE — 99212 OFFICE O/P EST SF 10 MIN: CPT

## 2020-10-07 PROCEDURE — 85610 PROTHROMBIN TIME: CPT

## 2020-10-07 NOTE — PROGRESS NOTES
Patient states compliant all of the time with regimen. No bleeding or thromboembolic side effects noted. No significant med or dietary changes. No significant recent illness or disease state changes. PT/INR done in office per protocol. INR is 2.6 which is therapeutic. Warfarin regimen will be continued at current dose of 1.25mg wed and 2.5mg AOD. Will retest in 3 weeks. Patient is having an implant in her bladder on 10/23 by Dr. Jonh Smith and needs to hold warfarin. I faxed over a recommendation to Dr. Anjel Pinto for her to hold coumadin for 5 days prior to procedure then restart the night after procedure at 5mg x 2 and then continue maintenance dose of 1.25mg wed and 2.5mg AOD. - I am boosting more aggressively as her INR took a while to become therapeutic after her last procedure in February. Patient understands dosing directions and information discussed. Dosing schedule and follow up appointment given to patient. Progress note routed to referring physicians office. Discussed with patient the Pharmacist Collaborative Practice Agreement. Patient provided verbal and/or electronic (ex. [email or web address]) consent to participate in the collaborative practice agreement between the pharmacist and referred patient. This is in lieu of paper consent due to COVID-19 precautions and the use of remote/virtual visits.        CLINICAL PHARMACY CONSULT: MED RECONCILIATION/REVIEW ADDENDUM    For Pharmacy Admin Tracking Only    PHSO: No  Total # of Interventions Recommended: 1  - Increased Dose #: 1  - Maintenance Safety Lab Monitoring #: 1  Total Interventions Accepted: 1  Time Spent (min): [address] [facility], [address] [place] [address]

## 2020-10-08 RX ORDER — SODIUM CHLORIDE, SODIUM LACTATE, POTASSIUM CHLORIDE, CALCIUM CHLORIDE 600; 310; 30; 20 MG/100ML; MG/100ML; MG/100ML; MG/100ML
1000 INJECTION, SOLUTION INTRAVENOUS CONTINUOUS
Status: CANCELLED | OUTPATIENT
Start: 2020-10-08

## 2020-10-09 ENCOUNTER — HOSPITAL ENCOUNTER (OUTPATIENT)
Dept: PREADMISSION TESTING | Age: 70
Discharge: HOME | End: 2020-10-13
Payer: MEDICARE

## 2020-10-09 VITALS
HEART RATE: 63 BPM | BODY MASS INDEX: 42.23 KG/M2 | WEIGHT: 229.5 LBS | SYSTOLIC BLOOD PRESSURE: 143 MMHG | DIASTOLIC BLOOD PRESSURE: 65 MMHG | HEIGHT: 62 IN | RESPIRATION RATE: 16 BRPM | TEMPERATURE: 98.4 F | OXYGEN SATURATION: 96 %

## 2020-10-09 LAB
ANION GAP SERPL CALCULATED.3IONS-SCNC: 15 MMOL/L (ref 9–17)
BUN BLDV-MCNC: 21 MG/DL (ref 8–23)
CHLORIDE BLD-SCNC: 103 MMOL/L (ref 98–107)
CO2: 23 MMOL/L (ref 20–31)
CREAT SERPL-MCNC: 1.6 MG/DL (ref 0.5–0.9)
GFR AFRICAN AMERICAN: 38 ML/MIN
GFR NON-AFRICAN AMERICAN: 32 ML/MIN
GFR SERPL CREATININE-BSD FRML MDRD: ABNORMAL ML/MIN/{1.73_M2}
GFR SERPL CREATININE-BSD FRML MDRD: ABNORMAL ML/MIN/{1.73_M2}
GLUCOSE BLD-MCNC: 113 MG/DL (ref 70–99)
HCT VFR BLD CALC: 41.6 % (ref 36.3–47.1)
HEMOGLOBIN: 14 G/DL (ref 11.9–15.1)
MCH RBC QN AUTO: 33.8 PG (ref 25.2–33.5)
MCHC RBC AUTO-ENTMCNC: 33.7 G/DL (ref 28.4–34.8)
MCV RBC AUTO: 100.5 FL (ref 82.6–102.9)
NRBC AUTOMATED: 0 PER 100 WBC
PDW BLD-RTO: 13.3 % (ref 11.8–14.4)
PLATELET # BLD: 220 K/UL (ref 138–453)
PMV BLD AUTO: 10.8 FL (ref 8.1–13.5)
POTASSIUM SERPL-SCNC: 4 MMOL/L (ref 3.7–5.3)
RBC # BLD: 4.14 M/UL (ref 3.95–5.11)
SODIUM BLD-SCNC: 141 MMOL/L (ref 135–144)
WBC # BLD: 7.7 K/UL (ref 3.5–11.3)

## 2020-10-09 PROCEDURE — 87086 URINE CULTURE/COLONY COUNT: CPT

## 2020-10-09 PROCEDURE — 82947 ASSAY GLUCOSE BLOOD QUANT: CPT

## 2020-10-09 PROCEDURE — 80051 ELECTROLYTE PANEL: CPT

## 2020-10-09 PROCEDURE — 93005 ELECTROCARDIOGRAM TRACING: CPT | Performed by: ANESTHESIOLOGY

## 2020-10-09 PROCEDURE — 85027 COMPLETE CBC AUTOMATED: CPT

## 2020-10-09 PROCEDURE — 36415 COLL VENOUS BLD VENIPUNCTURE: CPT

## 2020-10-09 PROCEDURE — 84520 ASSAY OF UREA NITROGEN: CPT

## 2020-10-09 PROCEDURE — 82565 ASSAY OF CREATININE: CPT

## 2020-10-09 NOTE — PROGRESS NOTES
Anesthesia Focused Assessment    STOP-BANG Sleep Apnea Questionnaire    SNORE loudly (heard through closed doors)? Yes  TIRED, fatigued, sleepy during daytime? No  OBSERVED stopping breathing during sleep? No  High blood PRESSURE being treated? Yes    BMI over 35? Yes  AGE over 48? Yes  NECK circumference over 16\"? Yes  GENDER (male)? No             Total 5  High risk 5-8  Intermediate risk 3-4  Low risk 0-2    Obstructive Sleep Apnea: snores but says she tested negative for apnea  If YES, machine used: no cpap     Type 1 DM:   no  T2DM:  Yes but is diet controlled-no medication. Coronary Artery Disease:  Denies, sees Dr. Kady Cochran for atrial fibrillation. Hypertension:  yes    Active smoker:  1 ppd for 43 years, quit 2008. Drinks Alcohol:  socially    Dentition: upper permanent bridge    Defib / AICD / Pacemaker: no      Renal Failure/dialysis:  no    Patient was evaluated in PAT & anesthesia guidelines were applied. NPO guidelines, medication instructions and scheduled arrival time were reviewed with patient. Hx of anesthesia complications:  PONV, prolonged emergence from general anesthesia, difficult IV start. Family hx of anesthesia complications:  no                                                                                                                     Anesthesia contacted:   no  Medical or cardiac clearance ordered: clearance appointment with Dr. Kady Cochran is on 10/16/2020.     Kimberly Whitaker PA-C  10/9/20  2:02 PM

## 2020-10-09 NOTE — H&P
History and Physical    Pt Name: Torin Anderson  MRN: [id number]  YOB: 1950  Date of evaluation: 10/9/2020    SUBJECTIVE:   History of Chief Complaint:    Patient complains of urinary urge incontinence. She has had symptoms despite conservative treatment over the years. She has been following with urology, scheduled for sacral nerve stimulator implant stages I and II. Past Medical History    has a past medical history of Anesthesia complication, Asthma, Atrial fibrillation ([facility] [address] Cerebral artery occlusion with cerebral infarction [facility]), Chronic back pain, Chronic kidney disease, Cirrhosis ([facility] [address] Constipation, COPD (chronic obstructive pulmonary disease) ([facility] [address] Dental bridge present, Diet-controlled diabetes mellitus ([facility] [address] Difficult intravenous access, Diverticulitis, Gastroparesis, GERD (gastroesophageal reflux disease), H/O mastectomy, Hepatitis, History of blood transfusion, History of Coumadin therapy, History of DVT (deep vein thrombosis), History of echocardiogram, History of stress test, Hx of blood clots, Hyperlipidemia, Hypertension, Hypothyroidism, Impaired mobility, Incontinence, Incontinence of bowel, Liver disease, Lower extremity neuropathy, Lumbar disc disease, OAB (overactive bladder), Osteoarthritis, PONV (postoperative nausea and vomiting), Prolonged emergence from general anesthesia, Rectocele, Rotator cuff tear, Snores, Type II or unspecified type diabetes mellitus without mention of complication, not stated as uncontrolled, Use of cane as ambulatory aid, Uses roller walker, and Wears glasses. Past Surgical History   has a past surgical history that includes Cholecystectomy; Upper gastrointestinal endoscopy; Colonoscopy; Vagina surgery (02/12/2018); pr cmbnd anterpost colporraphy w/cysto w/ntrcl rpr (N/A, 2/12/2018); Appendectomy (1964); Hysterectomy (1978);  Breast surgery (Bilateral, 1982); shoulder surgery (Left, 2005); eye surgery (Left, 2006); knee surgery (Left, 2013); Cholecystectomy (1976); shoulder surgery (Right, 07/08/2019); and Rectal surgery (N/A, 2/17/2020). Medications  Prior to Admission medications    Medication Sig Start Date End Date Taking? Authorizing Provider   warfarin (COUMADIN) 1.25 mg TABS Take 1.25 mg by mouth Once ONLY TAKES ON WEDNESDAYS   Yes Historical Provider, MD   Polyethylene Glycol 3350 (MIRALAX PO) Take by mouth daily   Yes Historical Provider, MD   amiodarone (CORDARONE) 200 MG tablet Take 200 mg by mouth three times a week TAKES ON MONDAYS, [id number] Deb Santana. 5/31/20  Yes Historical Provider, MD   ezetimibe (ZETIA) 10 MG tablet Take 10 mg by mouth daily  5/31/20  Yes Historical Provider, MD   torsemide (DEMADEX) 20 MG tablet Take 60 mg by mouth as needed Feet/ankle edema   Yes Historical Provider, MD   spironolactone (ALDACTONE) 100 MG tablet Take 100 mg by mouth daily    Yes Historical Provider, MD   ammonium lactate (LAC-HYDRIN) 12 % lotion Apply topically 2 times daily as needed  9/10/19  Yes Historical Provider, MD   rosuvastatin (CRESTOR) 40 MG tablet Take 1 tablet by mouth daily   Yes Historical Provider, MD   nebivolol (BYSTOLIC) 5 MG tablet Take 2.5 mg by mouth daily On Monday, Wednesday, and Friday   Yes Historical Provider, MD   K Phos [place]-Sod Phos Di & Mono (PHOSPHA 250 NEUTRAL PO) Take 1 tablet by mouth 2 times daily   Yes Historical Provider, MD   albuterol sulfate  (90 Base) MCG/ACT inhaler Inhale 2 puffs into the lungs every 6 hours as needed for Wheezing   Yes Historical Provider, MD   mometasone-formoterol (DULERA) 200-5 MCG/ACT inhaler Inhale 2 puffs into the lungs 2 times daily   Yes Historical Provider, MD   warfarin (COUMADIN) 2.5 MG tablet Take 2.5 mg by mouth daily EVERY DAY BUT Wednesday.  CARDIOLOGIST DR Remington Javed   Yes Historical Provider, MD   albuterol (PROVENTIL) (2.5 MG/3ML) 0.083% nebulizer solution Take 3 mLs by nebulization every 4 hours as needed for Wheezing 3/27/18  Yes Mike Coffey,  valACYclovir (VALTREX) 1 G tablet Take 2,000 mg by mouth daily as needed 10 day course when needed for cold sores. 2,000 MG FOR 3 DAYS PRN   Yes Historical Provider, MD   metoclopramide (REGLAN) 5 MG tablet Take 10 mg by mouth 4 times daily Indications: 2tabs am and 2 tabs hs    Yes Historical Provider, MD   omeprazole (PRILOSEC) 20 MG capsule Take 20 mg by mouth 2 times daily    Yes Historical Provider, MD   folic acid (FOLVITE) 1 MG tablet Take 1 tablet by mouth daily. 11/27/13  Yes Rae MD Daxa   levothyroxine (SYNTHROID) 112 MCG tablet Take 112 mcg by mouth Daily    Yes Historical Provider, MD   Cholecalciferol (VITAMIN D3) 1000 UNITS TABS Take 1,000 Units by mouth Five times weekly Indications: Mon-Fri    Yes Historical Provider, MD     Allergies  is allergic to lyrica [pregabalin]; pcn [penicillins]; nsaids; codeine; cymbalta [duloxetine hcl]; prolia [denosumab]; and seasonal.  Family History  family history includes Cancer in her maternal aunt, mother, and sister; Heart Disease in her father. Social History   reports that she quit smoking about 12 years ago. Her smoking use included cigarettes. She has a 43.00 pack-year smoking history. She has never used smokeless tobacco.  1 ppd for 43 years, quit 2008. reports current alcohol use of about 1.0 standard drinks of alcohol per week. Socially. reports no history of drug use. Marital Status   Occupation works for [employer] part time    OBJECTIVE:   VITALS:  height is 5' 2\" (1.575 m) and weight is 229 lb 8 oz (104.1 kg). Her temporal temperature is 98.4 °F (36.9 °C). Her blood pressure is 143/65 (abnormal) and her pulse is 63. Her respiration is 16 and oxygen saturation is 96%. CONSTITUTIONAL:alert & oriented x 3, no acute distress. Pleasant. Overweight. SKIN:  Warm and dry, no rashes on exposed areas of skin. HEAD:  Normocephalic, atraumatic. EYES: PERRL. EOMs intact. EARS:  Hearing grossly WNL.    NOSE: Nares patent. No rhinorrhea. MOUTH/THROAT:  benign  NECK:supple, no lymphadenopathy. Thick neck. LUNGS: Clear to auscultation bilaterally, no wheezes. Limited exam due to body habitus. CARDIOVASCULAR: Heart sounds are normal.  Regular rate and rhythm without murmur. Limited exam due to body habitus. ABDOMEN: soft, non tender, non distended. Rotund. EXTREMITIES: mild edema bilateral lower extremities. Testing:   EKG: 10/09/20  Labs pending: drawn 10/9/2020     IMPRESSIONS:   1. Urge incontinence  2.  has a past medical history of Anesthesia complication, Asthma, Atrial fibrillation ([address] (2003), Cerebral artery occlusion with cerebral infarction ([address] (2006), Chronic back pain, Chronic kidney disease, Cirrhosis ([address] Constipation, COPD (chronic obstructive pulmonary disease) ([address] (2003), Dental bridge present, Diet-controlled diabetes mellitus ([facility] [address] Difficult intravenous access, Diverticulitis, Gastroparesis, GERD (gastroesophageal reflux disease), H/O mastectomy, Hepatitis (2008), History of blood transfusion, History of Coumadin therapy, History of DVT (deep vein thrombosis), History of echocardiogram (03/2018), History of stress test (2015), blood clots, Hyperlipidemia, Hypertension, Hypothyroidism, Impaired mobility, Incontinence, Incontinence of bowel, Liver disease, Lower extremity neuropathy, Lumbar disc disease, OAB (overactive bladder), Osteoarthritis, PONV (postoperative nausea and vomiting), Prolonged emergence from general anesthesia, Rectocele, Rotator cuff tear, Snores, Type II or unspecified type diabetes mellitus without mention of complication, not stated as uncontrolled, Use of cane as ambulatory aid, Uses roller walker, and Wears glasses. PLANS:   1.  SNS stages I and II    JOVANY SPRING PA-C  Electronically signed 10/9/2020 at 2:05 PM

## 2020-10-09 NOTE — H&P (VIEW-ONLY)
History and Physical    Pt Name: Ghulam Goins  MRN: [id number]  YOB: 1950  Date of evaluation: 10/9/2020    SUBJECTIVE:   History of Chief Complaint:    Patient complains of urinary urge incontinence. She has had symptoms despite conservative treatment over the years. She has been following with urology, scheduled for sacral nerve stimulator implant stages I and II. Past Medical History    has a past medical history of Anesthesia complication, Asthma, Atrial fibrillation ([facility] [address] Cerebral artery occlusion with cerebral infarction [facility]), Chronic back pain, Chronic kidney disease, Cirrhosis ([facility] [address] Constipation, COPD (chronic obstructive pulmonary disease) ([facility] [address] Dental bridge present, Diet-controlled diabetes mellitus ([facility] [address] Difficult intravenous access, Diverticulitis, Gastroparesis, GERD (gastroesophageal reflux disease), H/O mastectomy, Hepatitis, History of blood transfusion, History of Coumadin therapy, History of DVT (deep vein thrombosis), History of echocardiogram, History of stress test, Hx of blood clots, Hyperlipidemia, Hypertension, Hypothyroidism, Impaired mobility, Incontinence, Incontinence of bowel, Liver disease, Lower extremity neuropathy, Lumbar disc disease, OAB (overactive bladder), Osteoarthritis, PONV (postoperative nausea and vomiting), Prolonged emergence from general anesthesia, Rectocele, Rotator cuff tear, Snores, Type II or unspecified type diabetes mellitus without mention of complication, not stated as uncontrolled, Use of cane as ambulatory aid, Uses roller walker, and Wears glasses. Past Surgical History   has a past surgical history that includes Cholecystectomy; Upper gastrointestinal endoscopy; Colonoscopy; Vagina surgery (02/12/2018); pr cmbnd anterpost colporraphy w/cysto w/ntrcl rpr (N/A, 2/12/2018); Appendectomy (1964); Hysterectomy (1978);  Breast surgery (Bilateral, 1982); shoulder surgery (Left, 2005); eye surgery (Left, 2006); knee surgery (Left, 2013); Cholecystectomy (1976); shoulder surgery (Right, 07/08/2019); and Rectal surgery (N/A, 2/17/2020). Medications  Prior to Admission medications    Medication Sig Start Date End Date Taking? Authorizing Provider   warfarin (COUMADIN) 1.25 mg TABS Take 1.25 mg by mouth Once ONLY TAKES ON WEDNESDAYS   Yes Historical Provider, MD   Polyethylene Glycol 3350 (MIRALAX PO) Take by mouth daily   Yes Historical Provider, MD   amiodarone (CORDARONE) 200 MG tablet Take 200 mg by mouth three times a week TAKES ON MONDAYS, [id number] Deb Santana. 5/31/20  Yes Historical Provider, MD   ezetimibe (ZETIA) 10 MG tablet Take 10 mg by mouth daily  5/31/20  Yes Historical Provider, MD   torsemide (DEMADEX) 20 MG tablet Take 60 mg by mouth as needed Feet/ankle edema   Yes Historical Provider, MD   spironolactone (ALDACTONE) 100 MG tablet Take 100 mg by mouth daily    Yes Historical Provider, MD   ammonium lactate (LAC-HYDRIN) 12 % lotion Apply topically 2 times daily as needed  9/10/19  Yes Historical Provider, MD   rosuvastatin (CRESTOR) 40 MG tablet Take 1 tablet by mouth daily   Yes Historical Provider, MD   nebivolol (BYSTOLIC) 5 MG tablet Take 2.5 mg by mouth daily On Monday, Wednesday, and Friday   Yes Historical Provider, MD   K Phos [place]-Sod Phos Di & Mono (PHOSPHA 250 NEUTRAL PO) Take 1 tablet by mouth 2 times daily   Yes Historical Provider, MD   albuterol sulfate  (90 Base) MCG/ACT inhaler Inhale 2 puffs into the lungs every 6 hours as needed for Wheezing   Yes Historical Provider, MD   mometasone-formoterol (DULERA) 200-5 MCG/ACT inhaler Inhale 2 puffs into the lungs 2 times daily   Yes Historical Provider, MD   warfarin (COUMADIN) 2.5 MG tablet Take 2.5 mg by mouth daily EVERY DAY BUT Wednesday.  CARDIOLOGIST DR Onel Waite   Yes Historical Provider, MD   albuterol (PROVENTIL) (2.5 MG/3ML) 0.083% nebulizer solution Take 3 mLs by nebulization every 4 hours as needed for Wheezing 3/27/18  Yes Sara Meyers, DO valACYclovir (VALTREX) 1 G tablet Take 2,000 mg by mouth daily as needed 10 day course when needed for cold sores. 2,000 MG FOR 3 DAYS PRN   Yes Historical Provider, MD   metoclopramide (REGLAN) 5 MG tablet Take 10 mg by mouth 4 times daily Indications: 2tabs am and 2 tabs hs    Yes Historical Provider, MD   omeprazole (PRILOSEC) 20 MG capsule Take 20 mg by mouth 2 times daily    Yes Historical Provider, MD   folic acid (FOLVITE) 1 MG tablet Take 1 tablet by mouth daily. 11/27/13  Yes Breezy Gooden MD   levothyroxine (SYNTHROID) 112 MCG tablet Take 112 mcg by mouth Daily    Yes Historical Provider, MD   Cholecalciferol (VITAMIN D3) 1000 UNITS TABS Take 1,000 Units by mouth Five times weekly Indications: Mon-Fri    Yes Historical Provider, MD     Allergies  is allergic to lyrica [pregabalin]; pcn [penicillins]; nsaids; codeine; cymbalta [duloxetine hcl]; prolia [denosumab]; and seasonal.  Family History  family history includes Cancer in her maternal aunt, mother, and sister; Heart Disease in her father. Social History   reports that she quit smoking about 12 years ago. Her smoking use included cigarettes. She has a 43.00 pack-year smoking history. She has never used smokeless tobacco.  1 ppd for 43 years, quit 2008. reports current alcohol use of about 1.0 standard drinks of alcohol per week. Socially. reports no history of drug use. Marital Status   Occupation works for [employer] company part time    OBJECTIVE:   VITALS:  height is 5' 2\" (1.575 m) and weight is 229 lb 8 oz (104.1 kg). Her temporal temperature is 98.4 °F (36.9 °C). Her blood pressure is 143/65 (abnormal) and her pulse is 63. Her respiration is 16 and oxygen saturation is 96%. CONSTITUTIONAL:alert & oriented x 3, no acute distress. Pleasant. Overweight. SKIN:  Warm and dry, no rashes on exposed areas of skin. HEAD:  Normocephalic, atraumatic. EYES: PERRL. EOMs intact. EARS:  Hearing grossly WNL.    NOSE:

## 2020-10-10 LAB
CULTURE: NORMAL
Lab: NORMAL
SPECIMEN DESCRIPTION: NORMAL

## 2020-10-12 LAB
EKG ATRIAL RATE: 66 BPM
EKG P AXIS: 67 DEGREES
EKG P-R INTERVAL: 188 MS
EKG Q-T INTERVAL: 428 MS
EKG QRS DURATION: 84 MS
EKG QTC CALCULATION (BAZETT): 448 MS
EKG R AXIS: 73 DEGREES
EKG T AXIS: 44 DEGREES
EKG VENTRICULAR RATE: 66 BPM

## 2020-10-12 PROCEDURE — 93010 ELECTROCARDIOGRAM REPORT: CPT | Performed by: INTERNAL MEDICINE

## 2020-10-19 ENCOUNTER — HOSPITAL ENCOUNTER (OUTPATIENT)
Dept: PREADMISSION TESTING | Age: 70
Setting detail: SPECIMEN
Discharge: HOME | End: 2020-10-23
Payer: MEDICARE

## 2020-10-19 PROCEDURE — U0003 INFECTIOUS AGENT DETECTION BY NUCLEIC ACID (DNA OR RNA); SEVERE ACUTE RESPIRATORY SYNDROME CORONAVIRUS 2 (SARS-COV-2) (CORONAVIRUS DISEASE [COVID-19]), AMPLIFIED PROBE TECHNIQUE, MAKING USE OF HIGH THROUGHPUT TECHNOLOGIES AS DESCRIBED BY CMS-2020-01-R: HCPCS

## 2020-10-20 LAB — SARS-COV-2, NAA: NOT DETECTED

## 2020-10-21 ENCOUNTER — TELEPHONE (OUTPATIENT)
Dept: PHARMACY | Age: 70
End: 2020-10-21

## 2020-10-21 ENCOUNTER — TELEPHONE (OUTPATIENT)
Dept: PRIMARY CARE CLINIC | Age: 70
End: 2020-10-21

## 2020-10-21 NOTE — TELEPHONE ENCOUNTER
Called patient to let her know Dr. Sandy Cao did agree with plan to hold warfarin 5 days prior to procedure without the need to bridge. Patient states she saw Dr. Sandy Cao on Friday 10/16 and was informed he did also give clearance for endoscopy but had sent to the wrong office before. He has since sent to correct office and patient is scheduled for Friday November 6th and will need to hold warfarin prior to procedure without bridge.

## 2020-10-22 ENCOUNTER — ANESTHESIA EVENT (OUTPATIENT)
Dept: OPERATING ROOM | Age: 70
End: 2020-10-22
Payer: MEDICARE

## 2020-10-23 ENCOUNTER — APPOINTMENT (OUTPATIENT)
Dept: GENERAL RADIOLOGY | Age: 70
End: 2020-10-23
Attending: UROLOGY
Payer: MEDICARE

## 2020-10-23 ENCOUNTER — ANESTHESIA (OUTPATIENT)
Dept: OPERATING ROOM | Age: 70
End: 2020-10-23
Payer: MEDICARE

## 2020-10-23 ENCOUNTER — HOSPITAL ENCOUNTER (OUTPATIENT)
Age: 70
Setting detail: OUTPATIENT SURGERY
Discharge: HOME | End: 2020-10-23
Attending: UROLOGY | Admitting: UROLOGY
Payer: MEDICARE

## 2020-10-23 VITALS — OXYGEN SATURATION: 95 % | TEMPERATURE: 97.1 F | DIASTOLIC BLOOD PRESSURE: 62 MMHG | SYSTOLIC BLOOD PRESSURE: 106 MMHG

## 2020-10-23 VITALS
HEART RATE: 69 BPM | DIASTOLIC BLOOD PRESSURE: 58 MMHG | RESPIRATION RATE: 16 BRPM | BODY MASS INDEX: 42.42 KG/M2 | HEIGHT: 62 IN | TEMPERATURE: 97.2 F | OXYGEN SATURATION: 93 % | WEIGHT: 230.5 LBS | SYSTOLIC BLOOD PRESSURE: 124 MMHG

## 2020-10-23 LAB
GFR NON-AFRICAN AMERICAN: 38 ML/MIN
GFR SERPL CREATININE-BSD FRML MDRD: 46 ML/MIN
GFR SERPL CREATININE-BSD FRML MDRD: ABNORMAL ML/MIN/{1.73_M2}
GLUCOSE BLD-MCNC: 118 MG/DL (ref 65–105)
GLUCOSE BLD-MCNC: 120 MG/DL (ref 74–100)
POC CREATININE: 1.38 MG/DL (ref 0.51–1.19)
POC INR: 1.3
POC POTASSIUM: 4.1 MMOL/L (ref 3.5–4.5)
PROTHROMBIN TIME, POC: 15.5 SEC (ref 10.4–14.2)

## 2020-10-23 PROCEDURE — C1894 INTRO/SHEATH, NON-LASER: HCPCS | Performed by: UROLOGY

## 2020-10-23 PROCEDURE — 2580000003 HC RX 258: Performed by: NURSE ANESTHETIST, CERTIFIED REGISTERED

## 2020-10-23 PROCEDURE — 6360000002 HC RX W HCPCS: Performed by: ANESTHESIOLOGY

## 2020-10-23 PROCEDURE — 3600000003 HC SURGERY LEVEL 3 BASE: Performed by: UROLOGY

## 2020-10-23 PROCEDURE — 2580000003 HC RX 258: Performed by: UROLOGY

## 2020-10-23 PROCEDURE — 6360000002 HC RX W HCPCS: Performed by: PHYSICIAN ASSISTANT

## 2020-10-23 PROCEDURE — 84132 ASSAY OF SERUM POTASSIUM: CPT

## 2020-10-23 PROCEDURE — 85610 PROTHROMBIN TIME: CPT

## 2020-10-23 PROCEDURE — 7100000001 HC PACU RECOVERY - ADDTL 15 MIN: Performed by: UROLOGY

## 2020-10-23 PROCEDURE — 7100000010 HC PHASE II RECOVERY - FIRST 15 MIN: Performed by: UROLOGY

## 2020-10-23 PROCEDURE — 6370000000 HC RX 637 (ALT 250 FOR IP): Performed by: ANESTHESIOLOGY

## 2020-10-23 PROCEDURE — 7100000011 HC PHASE II RECOVERY - ADDTL 15 MIN: Performed by: UROLOGY

## 2020-10-23 PROCEDURE — 2580000003 HC RX 258: Performed by: ANESTHESIOLOGY

## 2020-10-23 PROCEDURE — 3600000013 HC SURGERY LEVEL 3 ADDTL 15MIN: Performed by: UROLOGY

## 2020-10-23 PROCEDURE — 2500000003 HC RX 250 WO HCPCS: Performed by: UROLOGY

## 2020-10-23 PROCEDURE — 6360000002 HC RX W HCPCS: Performed by: NURSE ANESTHETIST, CERTIFIED REGISTERED

## 2020-10-23 PROCEDURE — 3209999900 FLUORO FOR SURGICAL PROCEDURES

## 2020-10-23 PROCEDURE — 2709999900 HC NON-CHARGEABLE SUPPLY: Performed by: UROLOGY

## 2020-10-23 PROCEDURE — 3700000001 HC ADD 15 MINUTES (ANESTHESIA): Performed by: UROLOGY

## 2020-10-23 PROCEDURE — 82565 ASSAY OF CREATININE: CPT

## 2020-10-23 PROCEDURE — 2500000003 HC RX 250 WO HCPCS: Performed by: NURSE ANESTHETIST, CERTIFIED REGISTERED

## 2020-10-23 PROCEDURE — 3700000000 HC ANESTHESIA ATTENDED CARE: Performed by: UROLOGY

## 2020-10-23 PROCEDURE — 2720000010 HC SURG SUPPLY STERILE: Performed by: UROLOGY

## 2020-10-23 PROCEDURE — C1767 GENERATOR, NEURO NON-RECHARG: HCPCS | Performed by: UROLOGY

## 2020-10-23 PROCEDURE — 82947 ASSAY GLUCOSE BLOOD QUANT: CPT

## 2020-10-23 PROCEDURE — C1778 LEAD, NEUROSTIMULATOR: HCPCS | Performed by: UROLOGY

## 2020-10-23 PROCEDURE — 7100000000 HC PACU RECOVERY - FIRST 15 MIN: Performed by: UROLOGY

## 2020-10-23 DEVICE — KIT LEAD SURE SCAN INTERSTIM MRI: Type: IMPLANTABLE DEVICE | Status: FUNCTIONAL

## 2020-10-23 DEVICE — Z DUP USE 2628873 GENERATOR NEUROSTIMULATOR H1.7XL2IN THK3IN TORQ WRNCH PROD: Type: IMPLANTABLE DEVICE | Status: FUNCTIONAL

## 2020-10-23 RX ORDER — MAGNESIUM HYDROXIDE 1200 MG/15ML
LIQUID ORAL PRN
Status: DISCONTINUED | OUTPATIENT
Start: 2020-10-23 | End: 2020-10-23 | Stop reason: ALTCHOICE

## 2020-10-23 RX ORDER — FENTANYL CITRATE 50 UG/ML
50 INJECTION, SOLUTION INTRAMUSCULAR; INTRAVENOUS EVERY 5 MIN PRN
Status: DISCONTINUED | OUTPATIENT
Start: 2020-10-23 | End: 2020-10-23 | Stop reason: HOSPADM

## 2020-10-23 RX ORDER — PHENYLEPHRINE HCL IN 0.9% NACL 1 MG/10 ML
SYRINGE (ML) INTRAVENOUS PRN
Status: DISCONTINUED | OUTPATIENT
Start: 2020-10-23 | End: 2020-10-23 | Stop reason: SDUPTHER

## 2020-10-23 RX ORDER — PROMETHAZINE HYDROCHLORIDE 25 MG/ML
6.25 INJECTION, SOLUTION INTRAMUSCULAR; INTRAVENOUS ONCE
Status: COMPLETED | OUTPATIENT
Start: 2020-10-23 | End: 2020-10-23

## 2020-10-23 RX ORDER — CEPHALEXIN 500 MG/1
500 CAPSULE ORAL 3 TIMES DAILY
Qty: 15 CAPSULE | Refills: 0 | Status: SHIPPED | OUTPATIENT
Start: 2020-10-23 | End: 2020-10-23 | Stop reason: SDUPTHER

## 2020-10-23 RX ORDER — SODIUM CHLORIDE, SODIUM LACTATE, POTASSIUM CHLORIDE, CALCIUM CHLORIDE 600; 310; 30; 20 MG/100ML; MG/100ML; MG/100ML; MG/100ML
1000 INJECTION, SOLUTION INTRAVENOUS CONTINUOUS
Status: DISCONTINUED | OUTPATIENT
Start: 2020-10-23 | End: 2020-10-23 | Stop reason: HOSPADM

## 2020-10-23 RX ORDER — BUPIVACAINE HYDROCHLORIDE AND EPINEPHRINE 2.5; 5 MG/ML; UG/ML
INJECTION, SOLUTION EPIDURAL; INFILTRATION; INTRACAUDAL; PERINEURAL PRN
Status: DISCONTINUED | OUTPATIENT
Start: 2020-10-23 | End: 2020-10-23 | Stop reason: ALTCHOICE

## 2020-10-23 RX ORDER — ONDANSETRON 2 MG/ML
INJECTION INTRAMUSCULAR; INTRAVENOUS PRN
Status: DISCONTINUED | OUTPATIENT
Start: 2020-10-23 | End: 2020-10-23 | Stop reason: SDUPTHER

## 2020-10-23 RX ORDER — SUCCINYLCHOLINE/SOD CL,ISO/PF 100 MG/5ML
SYRINGE (ML) INTRAVENOUS PRN
Status: DISCONTINUED | OUTPATIENT
Start: 2020-10-23 | End: 2020-10-23 | Stop reason: SDUPTHER

## 2020-10-23 RX ORDER — FENTANYL CITRATE 50 UG/ML
25 INJECTION, SOLUTION INTRAMUSCULAR; INTRAVENOUS EVERY 5 MIN PRN
Status: DISCONTINUED | OUTPATIENT
Start: 2020-10-23 | End: 2020-10-23 | Stop reason: HOSPADM

## 2020-10-23 RX ORDER — PROPOFOL 10 MG/ML
INJECTION, EMULSION INTRAVENOUS PRN
Status: DISCONTINUED | OUTPATIENT
Start: 2020-10-23 | End: 2020-10-23 | Stop reason: SDUPTHER

## 2020-10-23 RX ORDER — ONDANSETRON 2 MG/ML
4 INJECTION INTRAMUSCULAR; INTRAVENOUS
Status: DISCONTINUED | OUTPATIENT
Start: 2020-10-23 | End: 2020-10-23 | Stop reason: HOSPADM

## 2020-10-23 RX ORDER — OXYCODONE HYDROCHLORIDE AND ACETAMINOPHEN 5; 325 MG/1; MG/1
1 TABLET ORAL EVERY 6 HOURS PRN
Qty: 16 TABLET | Refills: 0 | Status: SHIPPED | OUTPATIENT
Start: 2020-10-23 | End: 2020-10-27

## 2020-10-23 RX ORDER — SODIUM CHLORIDE, SODIUM LACTATE, POTASSIUM CHLORIDE, CALCIUM CHLORIDE 600; 310; 30; 20 MG/100ML; MG/100ML; MG/100ML; MG/100ML
INJECTION, SOLUTION INTRAVENOUS CONTINUOUS PRN
Status: DISCONTINUED | OUTPATIENT
Start: 2020-10-23 | End: 2020-10-23 | Stop reason: SDUPTHER

## 2020-10-23 RX ORDER — FENTANYL CITRATE 50 UG/ML
INJECTION, SOLUTION INTRAMUSCULAR; INTRAVENOUS PRN
Status: DISCONTINUED | OUTPATIENT
Start: 2020-10-23 | End: 2020-10-23 | Stop reason: SDUPTHER

## 2020-10-23 RX ORDER — OXYCODONE HYDROCHLORIDE AND ACETAMINOPHEN 5; 325 MG/1; MG/1
1 TABLET ORAL EVERY 6 HOURS PRN
Qty: 16 TABLET | Refills: 0 | Status: SHIPPED | OUTPATIENT
Start: 2020-10-23 | End: 2020-10-23 | Stop reason: SDUPTHER

## 2020-10-23 RX ORDER — SCOLOPAMINE TRANSDERMAL SYSTEM 1 MG/1
1 PATCH, EXTENDED RELEASE TRANSDERMAL ONCE
Status: DISCONTINUED | OUTPATIENT
Start: 2020-10-23 | End: 2020-10-23 | Stop reason: HOSPADM

## 2020-10-23 RX ORDER — SODIUM CHLORIDE, SODIUM LACTATE, POTASSIUM CHLORIDE, CALCIUM CHLORIDE 600; 310; 30; 20 MG/100ML; MG/100ML; MG/100ML; MG/100ML
INJECTION, SOLUTION INTRAVENOUS CONTINUOUS
Status: CANCELLED | OUTPATIENT
Start: 2020-10-23

## 2020-10-23 RX ORDER — CEPHALEXIN 500 MG/1
500 CAPSULE ORAL 3 TIMES DAILY
Qty: 15 CAPSULE | Refills: 0 | Status: SHIPPED | OUTPATIENT
Start: 2020-10-23 | End: 2020-10-28

## 2020-10-23 RX ADMIN — ONDANSETRON 4 MG: 2 INJECTION, SOLUTION INTRAMUSCULAR; INTRAVENOUS at 14:41

## 2020-10-23 RX ADMIN — FENTANYL CITRATE 25 MCG: 50 INJECTION, SOLUTION INTRAMUSCULAR; INTRAVENOUS at 15:35

## 2020-10-23 RX ADMIN — Medication 100 MCG: at 14:58

## 2020-10-23 RX ADMIN — SODIUM CHLORIDE, POTASSIUM CHLORIDE, SODIUM LACTATE AND CALCIUM CHLORIDE 1000 ML: 600; 310; 30; 20 INJECTION, SOLUTION INTRAVENOUS at 16:29

## 2020-10-23 RX ADMIN — Medication 100 MG: at 13:53

## 2020-10-23 RX ADMIN — Medication 200 MCG: at 14:28

## 2020-10-23 RX ADMIN — PROMETHAZINE HYDROCHLORIDE 6.25 MG: 25 INJECTION INTRAMUSCULAR; INTRAVENOUS at 16:42

## 2020-10-23 RX ADMIN — SODIUM CHLORIDE, POTASSIUM CHLORIDE, SODIUM LACTATE AND CALCIUM CHLORIDE 1000 ML: 600; 310; 30; 20 INJECTION, SOLUTION INTRAVENOUS at 13:15

## 2020-10-23 RX ADMIN — PROPOFOL 200 MG: 10 INJECTION, EMULSION INTRAVENOUS at 13:53

## 2020-10-23 RX ADMIN — Medication 100 MCG: at 14:14

## 2020-10-23 RX ADMIN — SODIUM CHLORIDE, POTASSIUM CHLORIDE, SODIUM LACTATE AND CALCIUM CHLORIDE: 600; 310; 30; 20 INJECTION, SOLUTION INTRAVENOUS at 13:15

## 2020-10-23 RX ADMIN — Medication 100 MCG: at 14:12

## 2020-10-23 RX ADMIN — CEFAZOLIN 2 G: 10 INJECTION, POWDER, FOR SOLUTION INTRAVENOUS at 14:10

## 2020-10-23 RX ADMIN — FENTANYL CITRATE 50 MCG: 50 INJECTION, SOLUTION INTRAMUSCULAR; INTRAVENOUS at 15:52

## 2020-10-23 RX ADMIN — Medication 200 MCG: at 14:20

## 2020-10-23 RX ADMIN — Medication 200 MCG: at 14:37

## 2020-10-23 RX ADMIN — FENTANYL CITRATE 50 MCG: 50 INJECTION INTRAMUSCULAR; INTRAVENOUS at 13:57

## 2020-10-23 RX ADMIN — Medication 100 MCG: at 14:45

## 2020-10-23 ASSESSMENT — PULMONARY FUNCTION TESTS
PIF_VALUE: 5
PIF_VALUE: 24
PIF_VALUE: 25
PIF_VALUE: 25
PIF_VALUE: 24
PIF_VALUE: 25
PIF_VALUE: 24
PIF_VALUE: 27
PIF_VALUE: 24
PIF_VALUE: 25
PIF_VALUE: 25
PIF_VALUE: 24
PIF_VALUE: 25
PIF_VALUE: 22
PIF_VALUE: 25
PIF_VALUE: 7
PIF_VALUE: 25
PIF_VALUE: 0
PIF_VALUE: 6
PIF_VALUE: 24
PIF_VALUE: 25
PIF_VALUE: 26
PIF_VALUE: 19
PIF_VALUE: 29
PIF_VALUE: 1
PIF_VALUE: 0
PIF_VALUE: 25
PIF_VALUE: 24
PIF_VALUE: 24
PIF_VALUE: 28
PIF_VALUE: 25
PIF_VALUE: 24
PIF_VALUE: 28
PIF_VALUE: 24
PIF_VALUE: 24
PIF_VALUE: 25
PIF_VALUE: 24
PIF_VALUE: 1
PIF_VALUE: 24
PIF_VALUE: 24
PIF_VALUE: 25
PIF_VALUE: 14
PIF_VALUE: 24
PIF_VALUE: 3
PIF_VALUE: 0
PIF_VALUE: 25
PIF_VALUE: 0
PIF_VALUE: 24
PIF_VALUE: 3
PIF_VALUE: 27
PIF_VALUE: 25
PIF_VALUE: 25
PIF_VALUE: 24
PIF_VALUE: 25
PIF_VALUE: 25
PIF_VALUE: 24
PIF_VALUE: 37
PIF_VALUE: 24
PIF_VALUE: 18
PIF_VALUE: 17
PIF_VALUE: 24
PIF_VALUE: 24
PIF_VALUE: 0
PIF_VALUE: 1
PIF_VALUE: 25
PIF_VALUE: 22
PIF_VALUE: 0
PIF_VALUE: 25
PIF_VALUE: 25
PIF_VALUE: 24
PIF_VALUE: 1
PIF_VALUE: 25
PIF_VALUE: 25
PIF_VALUE: 24
PIF_VALUE: 24
PIF_VALUE: 25
PIF_VALUE: 17
PIF_VALUE: 1
PIF_VALUE: 2
PIF_VALUE: 24
PIF_VALUE: 25
PIF_VALUE: 9
PIF_VALUE: 24
PIF_VALUE: 24
PIF_VALUE: 25
PIF_VALUE: 25
PIF_VALUE: 26
PIF_VALUE: 24
PIF_VALUE: 24
PIF_VALUE: 25
PIF_VALUE: 25
PIF_VALUE: 8
PIF_VALUE: 24

## 2020-10-23 ASSESSMENT — PAIN SCALES - GENERAL
PAINLEVEL_OUTOF10: 4
PAINLEVEL_OUTOF10: 7
PAINLEVEL_OUTOF10: 6
PAINLEVEL_OUTOF10: 7
PAINLEVEL_OUTOF10: 0
PAINLEVEL_OUTOF10: 4

## 2020-10-23 ASSESSMENT — PAIN - FUNCTIONAL ASSESSMENT: PAIN_FUNCTIONAL_ASSESSMENT: 0-10

## 2020-10-23 NOTE — OP NOTE
on the left upper buttocks. A pouch was created about 1 cm beneath the subcutaneous tissue where the lead was and generator was brought onto the field. Lead was placed into generator and a hex wrench was used to secure the lead. Attention was made to ensure that the lead was all the way in into the InterStim IPG. We then placed the IPG within the subcutaneous pouch appropriately and intraoperative programming showed good impedances and function. The needle site was closed using Dermabond. The pouch was closed using 4-0 Vicryl and the subcutaneous tissue with 4-0 Monocryl. Dermabond was applied. The procedure was then terminated. Patient tolerated procedure well and there were no complications.  She was awakened without issue and transferred to PACU in good condition.      Dr. Jonh Smith was present and scrubbed for critical portions of the procedure.      Findings: Good placement of left-sided InterStim IPG     Specimens: None     Implants/Drains:  1) Interstim permanent IPG     Complications: None     Fluids: Crystalloid     Estimated blood loss: 5 cc      Disposition: PACU     Plan: Patient will be discharged and was instructed to follow-up in 2 to 3 weeks to assess urinary symptoms    Princess Lee MD  PGY-4, [facility] Department of Urology   10/23/20

## 2020-10-23 NOTE — ANESTHESIA PRE PROCEDURE
Department of Anesthesiology  Preprocedure Note       Name:  Darryl Trevino   Age:  65 y.o.  :  1950                                          MRN:  [id number]         Date:  10/23/2020      Surgeon: Micheline Perea):  Geovanny Fairchild MD    Procedure: Procedure(s):  SACRAL NERVE STIMULATOR IMPLANT STAGE 1 AND STAGE 2, C-ARM    Department of Anesthesiology  Pre-Anesthesia Evaluation/Consultation         Name:  Darryl Trevino                                         Age:  65 y.o. MRN:  [id number]             Medications  Current Facility-Administered Medications   Medication Dose Route Frequency Provider Last Rate Last Dose    ceFAZolin (ANCEF) 2 g in dextrose 5 % 50 mL IVPB  2 g Intravenous On Call to [address] [facility],[address], [place]-        lactated ringers infusion 1,000 mL  1,000 mL Intravenous Continuous Pratik Jose MD         Facility-Administered Medications Ordered in Other Encounters   Medication Dose Route Frequency Provider Last Rate Last Dose    sodium chloride 0.9 % solution 250 mL  250 mL Intravenous Once Sunshine Lombardi MD           Allergies   Allergen Reactions    Lyrica [Pregabalin] Nausea Only and Other (See Comments)     . FACE GETS BRIGHT RED. PAIN AND SEVERE SWELLING IN BILATERAL WRISTS, ANKLES AND KNEES    Pcn [Penicillins] Other (See Comments)     Unknown reaction, states can take Keflex STATES REACTION HAPPENED AS A CHILD AND WAS TOLD IT Was  LIFE THREATENING      Nsaids Other (See Comments)     PT HAS KIDNEY DISEASE AND CAN NOT TAKE NSAIDS      Codeine Nausea And Vomiting     +N/V. KEEPS PATIENT AWAKE AND WIRED FOR SEVERAL DAYS.  Cymbalta [Duloxetine Hcl] Diarrhea, Nausea And Vomiting and Other (See Comments)     Intolerance. ABDOMINAL BLOATING    Prolia [Denosumab] Other (See Comments)     TACHYCARDIA, FLUSHED    Seasonal Other (See Comments)     RUNNY NOSE, WATERY ITCHY EYES, SNEEZING.      Patient Active Problem List   Diagnosis    COPD (chronic obstructive pulmonary disease) ([address]    Atrial fibrillation ([address]    Stage 4 chronic kidney disease (HCC)    Type 2 diabetes mellitus with kidney complication, with long-term current use of insulin (HCC)    Esophageal reflux    Hyperlipidemia    Chronic back pain    Osteoarthritis    Liver disease    H/O mastectomy    Asthma    Back pain    Cystocele    Rectocele    Urinary incontinence, mixed    Essential hypertension    Hypothyroidism    Chronic hepatitis C (HCC)    Sigmoid diverticulitis    Contact hypersensitivity    Right upper quadrant pain    Gastroparesis    Diverticulitis large intestine w/o perforation or abscess w/bleeding    Acute kidney injury ([address]    Adhesive capsulitis of shoulder    Cervical spondylosis    Cirrhosis of liver ([address]    Graves' disease    Mitral valve disease    Morbid obesity ([address]    Venous thrombosis of lower extremity    Acute cystitis without hematuria    History of DVT (deep vein thrombosis)    Diabetic peripheral neuropathy (HCC)    Coumadin use     Past Medical History:   Diagnosis Date    Anesthesia complication     DIFFICULT TIME WAKING UP    Asthma     INHALER USE DAILY AND AS NEEDED DX AS A CHILD    Atrial fibrillation ([address] 2003    INTERMITTENT (DR. Eleni Mallory)    Cerebral artery occlusion with cerebral infarction ([address] 2006    tia x2. Pt denies history of a stroke (Written 02/04/2020).     Chronic back pain     Chronic kidney disease     chronic kidney disease dr Shayla Mcfadden nephrologist   Stage 3    Cirrhosis ([address]     Constipation     COPD (chronic obstructive pulmonary disease) ([address] 2003    INHALER USE DAILY AND AS NEEDED    Dental bridge present     PERMANENT UPPER X 2 BRIDGES UPPER FRONT    Diet-controlled diabetes mellitus (HCC)     Difficult intravenous access     USUALLY USE ULTRASOUND ON ANESTHESIA STARTS VEINS ARE SMALL AND DEEP DO NOT USE HANDS    Diverticulitis     Gastroparesis     GERD (gastroesophageal reflux disease)     ON RX    H/O mastectomy     fibrocystic disease.  BILATERAL MASTECTOMY    Hepatitis 2008    hep c, took meds for hep c    History of blood transfusion     2008    History of Coumadin therapy     on coumadin daily    History of DVT (deep vein thrombosis)     Several years ago (Written 02/04/2020)    History of echocardiogram 03/2018    EF 55%    History of stress test 2015    EF > 70%    Hx of blood clots     states has had on multiple occassions    Hyperlipidemia     Hypertension     Hypothyroidism     Impaired mobility     uses walker    Incontinence     wears a pad    Incontinence of bowel     wears depends    Liver disease     hepatitis c-RESOLVED 2008 WITH TREATMENT    Lower extremity neuropathy     Lumbar disc disease     OAB (overactive bladder)     Osteoarthritis     PONV (postoperative nausea and vomiting)     Prolonged emergence from general anesthesia     Rectocele     Rotator cuff tear     right side, needs surgery    Snores     states has been tested for sleep apnea and does not have it    Type II or unspecified type diabetes mellitus without mention of complication, not stated as uncontrolled     last A1C was 5.2 11/27/2019    Use of cane as ambulatory aid     Uses roller walker     Wears glasses     READING     Past Surgical History:   Procedure Laterality Date    APPENDECTOMY  1964    BREAST SURGERY Bilateral 1982    mastectomy, fibrocystic breast, mother had passed from breast cancer    CHOLECYSTECTOMY      CHOLECYSTECTOMY  1976    COLONOSCOPY      EYE SURGERY Left 2006   [address]    PARTIAL    KNEE SURGERY Left 2013    ARTHROSCOPY    UT CMBND ANTERPOST COLPORRAPHY W/CYSTO W/NTRCL RPR N/A 2/12/2018    VAGINAL REPAIR ANTERIOR AND POSTERIOR, SOLYX BLADDER SLING, CYSTO, performed by Marli Leiva DO at [address] N/A 2/17/2020    EXCISION PERIVAGINAL CYST performed by Ozzy Jara MD at [address] Left 2005    ROTATOR CUFF    SHOULDER SURGERY Right 2019    UPPER GASTROINTESTINAL ENDOSCOPY      VAGINA SURGERY  2018    anterior and posterior bladder sling, cysto     Social History     Tobacco Use    Smoking status: Former Smoker     Packs/day: 1.00     Years: 43.00     Pack years: 43.00     Types: Cigarettes     Last attempt to quit: 2008     Years since quittin.2    Smokeless tobacco: Never Used   Substance Use Topics    Alcohol use: Yes     Alcohol/week: 1.0 standard drinks     Types: 1 Glasses of wine per week     Comment: HOLIDAYS    Drug use: No         Vital Signs (Current) There were no vitals filed for this visit. Vital Signs Statistics (for past 48 hrs)     No data recorded  BP Readings from Last 3 Encounters:   10/09/20 (!) 143/65   20 (!) 142/62   20 133/63       BMI  There is no height or weight on file to calculate BMI.     CBC   Lab Results   Component Value Date    WBC 7.7 10/09/2020    RBC 4.14 10/09/2020    RBC 3.98 2011    HGB 14.0 10/09/2020    HCT 41.6 10/09/2020    .5 10/09/2020    RDW 13.3 10/09/2020     10/09/2020     2011       CMP    Lab Results   Component Value Date     10/09/2020    K 4.0 10/09/2020     10/09/2020    CO2 23 10/09/2020    BUN 21 10/09/2020    CREATININE 1.60 10/09/2020    GFRAA 38 10/09/2020    LABGLOM 32 10/09/2020    GLUCOSE 113 10/09/2020    GLUCOSE 123 2011    PROT 7.2 2020    CALCIUM 9.3 2020    BILITOT 0.78 2020    ALKPHOS 95 2020    AST 36 2020    ALT 26 2020       BMP    Lab Results   Component Value Date     10/09/2020    K 4.0 10/09/2020     10/09/2020    CO2 23 10/09/2020    BUN 21 10/09/2020    CREATININE 1.60 10/09/2020    CALCIUM 9.3 2020    GFRAA 38 10/09/2020    LABGLOM 32 10/09/2020    GLUCOSE 113 10/09/2020    GLUCOSE 123 2011       POC Testing  No results for input(s): POCGLU, POCNA, POCK, POCCL, POCBUN, POCHEMO, POCHCT in the last 72 hours.    Coa    Lab Results   Component Value Date    PROTIME 30.7 10/07/2020    INR 2.6 10/07/2020    APTT 25.0 02/17/2020       HCG (If Applicable) No results found for: PREGTESTUR, PREGSERUM, HCG, HCGQUANT     ABGs No results found for: PHART, PO2ART, PCI4VYQ, EFX9NTP, BEART, M9GUJEAD     Type & Screen (If Applicable)  No results found for: LABABO, [address]    Radiology (If Applicable)    Cardiac Testing (If Applicable) cardiac notr reviewed    EKG (If Applicable) NSR          Medications prior to admission:   Prior to Admission medications    Medication Sig Start Date End Date Taking? Authorizing Provider   Polyethylene Glycol 3350 (MIRALAX PO) Take by mouth daily   Yes Historical Provider, MD   amiodarone (CORDARONE) 200 MG tablet Take 200 mg by mouth three times a week TAKES ON MONDAYS, [address] [facility].  5/31/20  Yes Historical Provider, MD   ezetimibe (ZETIA) 10 MG tablet Take 10 mg by mouth daily  5/31/20  Yes Historical Provider, MD   torsemide (DEMADEX) 20 MG tablet Take 60 mg by mouth as needed Feet/ankle edema   Yes Historical Provider, MD   spironolactone (ALDACTONE) 100 MG tablet Take 100 mg by mouth daily    Yes Historical Provider, MD   ammonium lactate (LAC-HYDRIN) 12 % lotion Apply topically 2 times daily as needed  9/10/19  Yes Historical Provider, MD   rosuvastatin (CRESTOR) 40 MG tablet Take 1 tablet by mouth daily   Yes Historical Provider, MD   nebivolol (BYSTOLIC) 5 MG tablet Take 2.5 mg by mouth daily On Monday, Wednesday, and Friday   Yes Historical Provider, MD   albuterol sulfate  (90 Base) MCG/ACT inhaler Inhale 2 puffs into the lungs every 6 hours as needed for Wheezing   Yes Historical Provider, MD   mometasone-formoterol (DULERA) 200-5 MCG/ACT inhaler Inhale 2 puffs into the lungs 2 times daily   Yes Historical Provider, MD   metoclopramide (REGLAN) 5 MG tablet Take 10 mg by mouth 4 times daily Indications: 2tabs am and 2 tabs hs    Yes Historical Provider, MD omeprazole (PRILOSEC) 20 MG capsule Take 20 mg by mouth 2 times daily    Yes Historical Provider, MD   folic acid (FOLVITE) 1 MG tablet Take 1 tablet by mouth daily. 11/27/13  Yes Oli Gresham MD   levothyroxine (SYNTHROID) 112 MCG tablet Take 112 mcg by mouth Daily    Yes Historical Provider, MD   Cholecalciferol (VITAMIN D3) 1000 UNITS TABS Take 1,000 Units by mouth Five times weekly Indications: Mon-Fri    Yes Historical Provider, MD   warfarin (COUMADIN) 1.25 mg TABS Take 1.25 mg by mouth Once [address]    Historical Provider, MD   K Phos [place]isher-Sod Phos Di & Mono (PHOSPHA 250 NEUTRAL PO) Take 1 tablet by mouth 2 times daily    Historical Provider, MD   warfarin (COUMADIN) 2.5 MG tablet Take 2.5 mg by mouth daily EVERY DAY BUT Wednesday. CARDIOLOGIST DR Amna Vee    Historical Provider, MD   albuterol (PROVENTIL) (2.5 MG/3ML) 0.083% nebulizer solution Take 3 mLs by nebulization every 4 hours as needed for Wheezing 3/27/18   Sandra Pinedo DO   valACYclovir (VALTREX) 1 G tablet Take 2,000 mg by mouth daily as needed 10 day course when needed for cold sores. 2,000 MG FOR 3 DAYS PRN    Historical Provider, MD       Current medications:    Current Facility-Administered Medications   Medication Dose Route Frequency Provider Last Rate Last Dose    ceFAZolin (ANCEF) 2 g in dextrose 5 % 50 mL IVPB  2 g Intravenous On Call to [address] [facility],[address], [place]        lactated ringers infusion 1,000 mL  1,000 mL Intravenous Continuous Pratik Jose MD         Facility-Administered Medications Ordered in Other Encounters   Medication Dose Route Frequency Provider Last Rate Last Dose    sodium chloride 0.9 % solution 250 mL  250 mL Intravenous Once Ivory Soni MD           Allergies: Allergies   Allergen Reactions    Lyrica [Pregabalin] Nausea Only and Other (See Comments)     . FACE GETS BRIGHT RED.  PAIN AND SEVERE SWELLING IN BILATERAL WRISTS, ANKLES AND KNEES    Pcn [Penicillins] Other (See Comments)     Unknown reaction, states can take Keflex STATES REACTION HAPPENED AS A CHILD AND WAS TOLD IT Was  LIFE THREATENING      Nsaids Other (See Comments)     PT HAS KIDNEY DISEASE AND CAN NOT TAKE NSAIDS      Codeine Nausea And Vomiting     +N/V. KEEPS PATIENT AWAKE AND WIRED FOR SEVERAL DAYS.  Cymbalta [Duloxetine Hcl] Diarrhea, Nausea And Vomiting and Other (See Comments)     Intolerance. ABDOMINAL BLOATING    Prolia [Denosumab] Other (See Comments)     TACHYCARDIA, FLUSHED    Seasonal Other (See Comments)     RUNNY NOSE, WATERY ITCHY EYES, SNEEZING.        Problem List:    Patient Active Problem List   Diagnosis Code    COPD (chronic obstructive pulmonary disease) ([facility] J44.9    Atrial fibrillation (HCC) I48.91    Stage 4 chronic kidney disease (HCC) N18.4    Type 2 diabetes mellitus with kidney complication, with long-term current use of insulin (HCC) E11.29, Z79.4    Esophageal reflux K21.9    Hyperlipidemia E78.5    Chronic back pain M54.9, G89.29    Osteoarthritis M19.90    Liver disease K76.9    H/O mastectomy Z90.10    Asthma J45.909    Back pain M54.9    Cystocele N81.10    Rectocele N81.6    Urinary incontinence, mixed N39.46    Essential hypertension I10    Hypothyroidism E03.9    Chronic hepatitis C ([facility] B18.2    Sigmoid diverticulitis K57.32    Contact hypersensitivity L23.9    Right upper quadrant pain R10.11    Gastroparesis K31.84    Diverticulitis large intestine w/o perforation or abscess w/bleeding K57.33    Acute kidney injury ([facility] N17.9    Adhesive capsulitis of shoulder M75.00    Cervical spondylosis M47.812    Cirrhosis of liver (HCC) K74.60    Graves' disease E05.00    Mitral valve disease I05.9    Morbid obesity (HCC) E66.01    Venous thrombosis of lower extremity I82.90    Acute cystitis without hematuria N30.00    History of DVT (deep vein thrombosis) Z86.718    Diabetic peripheral neuropathy (HCC) E11.42    for sleep apnea and does not have it    Type II or unspecified type diabetes mellitus without mention of complication, not stated as uncontrolled     last A1C was 5.2 2019    Use of cane as ambulatory aid     Uses roller walker     Wears glasses     READING       Past Surgical History:        Procedure Laterality Date    APPENDECTOMY  1964    BREAST SURGERY Bilateral 1982    mastectomy, fibrocystic breast, mother had passed from breast cancer   [address] [facility] [address] [address] Left [address]   [address]    PARTIAL    KNEE SURGERY Left 2013    ARTHROSCOPY    [place] CMBND ANTERPOST COLPORRAPHY W/CYSTO W/NTRCL RPR N/A 2018    VAGINAL REPAIR ANTERIOR AND POSTERIOR, SOLYX BLADDER SLING, CYSTO, performed by Winifred Martinez DO at [address] N/A 2020    EXCISION PERIVAGINAL CYST performed by Cheryl Mims MD at [address] Left 2005    ROTATOR CUFF    SHOULDER SURGERY Right 2019    UPPER GASTROINTESTINAL ENDOSCOPY      VAGINA SURGERY  2018    anterior and posterior bladder sling, cysto       Social History:    Social History     Tobacco Use    Smoking status: Former Smoker     Packs/day: 1.00     Years: 43.00     Pack years: 43.00     Types: Cigarettes     Last attempt to quit: 2008     Years since quittin.2    Smokeless tobacco: Never Used   Substance Use Topics    Alcohol use: Yes     Alcohol/week: 1.0 standard drinks     Types: 1 Glasses of wine per week     Comment: HOLIDAYS                                Counseling given: Not Answered      Vital Signs (Current): There were no vitals filed for this visit.                                            BP Readings from Last 3 Encounters:   10/09/20 (!) 143/65   20 (!) 142/62   20 133/63       NPO Status: Time of last liquid consumption: 2355MN                        Time of last solid consumption: 1800                        Date of last liquid consumption: 10/22/20                        Date of last solid food consumption: 10/22/20    BMI:   Wt Readings from Last 3 Encounters:   10/09/20 229 lb 8 oz (104.1 kg)   02/17/20 208 lb 8.9 oz (94.6 kg)   02/04/20 208 lb 8.9 oz (94.6 kg)     There is no height or weight on file to calculate BMI.    CBC:   Lab Results   Component Value Date    WBC 7.7 10/09/2020    RBC 4.14 10/09/2020    RBC 3.98 12/19/2011    HGB 14.0 10/09/2020    HCT 41.6 10/09/2020    .5 10/09/2020    RDW 13.3 10/09/2020     10/09/2020     12/19/2011       CMP:   Lab Results   Component Value Date     10/09/2020    K 4.0 10/09/2020     10/09/2020    CO2 23 10/09/2020    BUN 21 10/09/2020    CREATININE 1.60 10/09/2020    GFRAA 38 10/09/2020    LABGLOM 32 10/09/2020    GLUCOSE 113 10/09/2020    GLUCOSE 123 12/19/2011    PROT 7.2 09/02/2020    CALCIUM 9.3 09/11/2020    BILITOT 0.78 09/02/2020    ALKPHOS 95 09/02/2020    AST 36 09/02/2020    ALT 26 09/02/2020       POC Tests: No results for input(s): POCGLU, POCNA, POCK, POCCL, POCBUN, POCHEMO, POCHCT in the last 72 hours. Coags:   Lab Results   Component Value Date    PROTIME 30.7 10/07/2020    INR 2.6 10/07/2020    APTT 25.0 02/17/2020       HCG (If Applicable): No results found for: PREGTESTUR, PREGSERUM, HCG, HCGQUANT     ABGs: No results found for: PHART, PO2ART, QZD1QOX, XKA3VNV, BEART, G1OBIOMT     Type & Screen (If Applicable):  No results found for: LABABO, LABRH    Drug/Infectious Status (If Applicable):  Lab Results   Component Value Date    HEPCAB REACTIVE 10/17/2013       COVID-19 Screening (If Applicable):   Lab Results   Component Value Date    COVID19 Not Detected 10/19/2020         Anesthesia Evaluation     history of anesthetic complications: PONV.   Airway: Mallampati: III        Dental:    (+) implants      Pulmonary:   (+) COPD:  asthma:                           ROS comment: Hx DVT   Cardiovascular:  Exercise tolerance: poor (<4 METS),   (+) hypertension:, dysrhythmias: atrial fibrillation,                   Neuro/Psych:   (+) CVA:, neuromuscular disease:,    (-) seizures            ROS comment: neuropathy GI/Hepatic/Renal:   (+) GERD:, hepatitis: C, liver disease:,          ROS comment: cirrhosis. Endo/Other:    (+) DiabetesType II DM, , hypothyroidism::., .                 Abdominal:           Vascular:                                      Anesthesia Plan      MAC     ASA 4     (Asa 4)  Induction: intravenous.                       Prolonged emergence    Curt Johnson MD   10/23/2020

## 2020-10-23 NOTE — INTERVAL H&P NOTE
Update History & Physical    The patient's History and Physical of October 9, 2020 was reviewed with the patient and I examined the patient. There was no change. The surgical site was confirmed by the patient and me. Plan: Sacral nerve stimulator implant stage 1&2    The risks, benefits, expected outcome, and alternative to the recommended procedure have been discussed with the patient. Patient understands and wants to proceed with the procedure.      Electronically signed by Beryl Guzman PA-C on 10/23/2020 at 12:33 PM

## 2020-10-23 NOTE — FLOWSHEET NOTE
Pres[facility] for keflex [facility] [address] [facility] @ [phone]. Call placed to pharmacy @ [phone] to confirm prescription received. Writer spoke with Bret Rodriguez confirmed prescription was received.  3 Barron Contreras RN

## 2020-10-24 NOTE — ANESTHESIA POSTPROCEDURE EVALUATION
Department of Anesthesiology  Postprocedure Note    Patient: Nicolasa Bhatia  MRN: [id number]  YOB: 1950  Date of evaluation: 10/23/2020  Time:  10:20 PM     Procedure Summary     Date:  10/23/20 Room / Location:  [address]    Anesthesia Start:  9591 Anesthesia Stop:  8306    Procedure:  SACRAL NERVE STIMULATOR IMPLANT STAGE 1 AND STAGE 2, C-ARM (N/A Back) Diagnosis:  (URGE INCONTINENCE)    Surgeon:  Berhane Mahajan MD Responsible Provider:  Brandy Watts MD    Anesthesia Type:  MAC ASA Status:  4          Anesthesia Type: MAC    Jack Phase I: Jack Score: 10    Jack Phase II: Jack Score: 10    Last vitals: Reviewed and per EMR flowsheets.        Anesthesia Post Evaluation    Patient location during evaluation: PACU  Patient participation: complete - patient participated  Level of consciousness: awake  Pain score: 3  Airway patency: fixed obstruction  Nausea & Vomiting: no vomiting and no nausea  Complications: no  Cardiovascular status: blood pressure returned to baseline  Respiratory status: acceptable  Hydration status: euvolemic

## 2020-10-29 ENCOUNTER — HOSPITAL ENCOUNTER (OUTPATIENT)
Dept: PHARMACY | Age: 70
Setting detail: THERAPIES SERIES
Discharge: HOME | End: 2020-10-29
Payer: MEDICARE

## 2020-10-29 VITALS — TEMPERATURE: 97.3 F

## 2020-10-29 LAB
INR BLD: 1.3
PROTIME: 16 SECONDS

## 2020-10-29 PROCEDURE — 85610 PROTHROMBIN TIME: CPT

## 2020-10-29 PROCEDURE — 99212 OFFICE O/P EST SF 10 MIN: CPT

## 2020-10-29 NOTE — PROGRESS NOTES
Patient seen in clinic for warfarin management due to atrial fibrillation with an INR goal of 2.0-3.0. Estimated duration of therapy is indefinite. Patient states she was not allowed to resume warfarin after procedure until Monday Oct 26. No bleeding or thromboembolic side effects noted. No significant med or dietary changes. No significant recent illness or disease state changes. PT/INR done in office per protocol. INR is 1.3 which is subtherapeutic from recent restart. Warfarin regimen will be continued at current dose for the next 3 days as she will begin her next 5 day hold prior to endoscopy. Advised restart 5mg x 2 then usual regimen. Will retest 11/16/20 after patient has been back on therapy over 1 week. Patient understands dosing directions and information discussed. Dosing schedule and follow up appointment given to patient. Progress note routed to referring physicians office. Discussed with patient the Pharmacist Collaborative Practice Agreement. Patient provided verbal and/or electronic (ex. [email or web address]) consent to participate in the collaborative practice agreement between the pharmacist and referred patient. This is in lieu of paper consent due to COVID-19 precautions and the use of remote/virtual visits.        CLINICAL PHARMACY CONSULT: MED RECONCILIATION/REVIEW ADDENDUM    For Pharmacy Admin Tracking Only    PHSO: No  Total # of Interventions Recommended: 1  - Increased Dose #: 1  - Maintenance Safety Lab Monitoring #: 1  Total Interventions Accepted: 1  Time Spent (min): [address] PharmD

## 2020-11-16 ENCOUNTER — HOSPITAL ENCOUNTER (OUTPATIENT)
Dept: PHARMACY | Age: 70
Setting detail: THERAPIES SERIES
Discharge: HOME | End: 2020-11-16
Payer: MEDICARE

## 2020-11-16 VITALS — TEMPERATURE: 96.9 F

## 2020-11-16 LAB
INR BLD: 1.9
PROTIME: 22.8 SECONDS

## 2020-11-16 PROCEDURE — 85610 PROTHROMBIN TIME: CPT

## 2020-11-16 PROCEDURE — 99212 OFFICE O/P EST SF 10 MIN: CPT

## 2020-11-16 NOTE — PROGRESS NOTES
Patient seen in clinic for warfarin management due to atrial fibrillation with an INR goal of 2.0-3.0. Estimated duration of therapy is indefinite. Patient states compliant all of the time with regimen. No bleeding or thromboembolic side effects noted. No significant med or dietary changes. No significant recent illness or disease state changes. PT/INR done in office per protocol. INR is 1.9 which is just below goal for unidentifiable reason. Warfarin regimen will be increased to 3.75 mg x 1 dose today, then resume regimen of 1.25 mg Wednesday, 2.5 mg all other days. Will retest in 2 weeks. Patient understands dosing directions and information discussed. Dosing schedule and follow up appointment given to patient. Progress note routed to referring physicians office. Discussed with patient the Pharmacist Collaborative Practice Agreement. Patient provided verbal and/or electronic (ex. [email or web address]) consent to participate in the collaborative practice agreement between the pharmacist and referred patient. This is in lieu of paper consent due to COVID-19 precautions and the use of remote/virtual visits.        CLINICAL PHARMACY CONSULT: MED RECONCILIATION/REVIEW ADDENDUM    For Pharmacy Admin Tracking Only    PHSO: No  Total # of Interventions Recommended: 1  - Increased Dose #: 1  - Maintenance Safety Lab Monitoring #: 1  Total Interventions Accepted: 1  Time Spent (min): [address], PharmD Candidate 4149

## 2020-12-02 ENCOUNTER — HOSPITAL ENCOUNTER (OUTPATIENT)
Dept: PHARMACY | Age: 70
Setting detail: THERAPIES SERIES
Discharge: HOME | End: 2020-12-02
Payer: MEDICARE

## 2020-12-02 ENCOUNTER — HOSPITAL ENCOUNTER (OUTPATIENT)
Age: 70
Discharge: HOME | End: 2020-12-04
Payer: MEDICARE

## 2020-12-02 ENCOUNTER — HOSPITAL ENCOUNTER (OUTPATIENT)
Dept: GENERAL RADIOLOGY | Age: 70
Discharge: HOME | End: 2020-12-04
Payer: MEDICARE

## 2020-12-02 ENCOUNTER — HOSPITAL ENCOUNTER (OUTPATIENT)
Age: 70
Discharge: HOME | End: 2020-12-02
Payer: MEDICARE

## 2020-12-02 VITALS — TEMPERATURE: 97.3 F

## 2020-12-02 LAB
-: ABNORMAL
ABSOLUTE EOS #: 0.13 K/UL (ref 0–0.44)
ABSOLUTE IMMATURE GRANULOCYTE: <0.03 K/UL (ref 0–0.3)
ABSOLUTE LYMPH #: 1.2 K/UL (ref 1.1–3.7)
ABSOLUTE MONO #: 0.44 K/UL (ref 0.1–1.2)
ALBUMIN SERPL-MCNC: 3.8 G/DL (ref 3.5–5.2)
AMORPHOUS: ABNORMAL
ANION GAP SERPL CALCULATED.3IONS-SCNC: 12 MMOL/L (ref 9–17)
BACTERIA: ABNORMAL
BASOPHILS # BLD: 1 % (ref 0–2)
BASOPHILS ABSOLUTE: 0.04 K/UL (ref 0–0.2)
BILIRUBIN URINE: NEGATIVE
BUN BLDV-MCNC: 18 MG/DL (ref 8–23)
BUN/CREAT BLD: ABNORMAL (ref 9–20)
CALCIUM SERPL-MCNC: 9 MG/DL (ref 8.6–10.4)
CASTS UA: ABNORMAL /LPF (ref 0–2)
CHLORIDE BLD-SCNC: 103 MMOL/L (ref 98–107)
CO2: 23 MMOL/L (ref 20–31)
COLOR: YELLOW
COMMENT UA: ABNORMAL
CREAT SERPL-MCNC: 1.52 MG/DL (ref 0.5–0.9)
CREATININE URINE: 85.2 MG/DL (ref 28–217)
CRYSTALS, UA: ABNORMAL /HPF
DIFFERENTIAL TYPE: ABNORMAL
EOSINOPHILS RELATIVE PERCENT: 3 % (ref 1–4)
EPITHELIAL CELLS UA: ABNORMAL /HPF (ref 0–5)
GFR AFRICAN AMERICAN: 41 ML/MIN
GFR NON-AFRICAN AMERICAN: 34 ML/MIN
GFR SERPL CREATININE-BSD FRML MDRD: ABNORMAL ML/MIN/{1.73_M2}
GFR SERPL CREATININE-BSD FRML MDRD: ABNORMAL ML/MIN/{1.73_M2}
GLUCOSE BLD-MCNC: 127 MG/DL (ref 70–99)
GLUCOSE URINE: NEGATIVE
HCT VFR BLD CALC: 39.7 % (ref 36.3–47.1)
HEMOGLOBIN: 12.7 G/DL (ref 11.9–15.1)
IMMATURE GRANULOCYTES: 0 %
INR BLD: 2.1
KETONES, URINE: NEGATIVE
LEUKOCYTE ESTERASE, URINE: ABNORMAL
LYMPHOCYTES # BLD: 24 % (ref 24–43)
MAGNESIUM: 1.9 MG/DL (ref 1.6–2.6)
MCH RBC QN AUTO: 33.1 PG (ref 25.2–33.5)
MCHC RBC AUTO-ENTMCNC: 32 G/DL (ref 28.4–34.8)
MCV RBC AUTO: 103.4 FL (ref 82.6–102.9)
MICROALBUMIN/CREAT 24H UR: 149 MG/L
MICROALBUMIN/CREAT UR-RTO: 175 MCG/MG CREAT
MONOCYTES # BLD: 9 % (ref 3–12)
MUCUS: ABNORMAL
NITRITE, URINE: NEGATIVE
NRBC AUTOMATED: 0 PER 100 WBC
OTHER OBSERVATIONS UA: ABNORMAL
PDW BLD-RTO: 13.3 % (ref 11.8–14.4)
PH UA: 7 (ref 5–8)
PHOSPHORUS: 2.6 MG/DL (ref 2.6–4.5)
PLATELET # BLD: 167 K/UL (ref 138–453)
PLATELET ESTIMATE: ABNORMAL
PMV BLD AUTO: 11.2 FL (ref 8.1–13.5)
POTASSIUM SERPL-SCNC: 4.4 MMOL/L (ref 3.7–5.3)
PROTEIN UA: ABNORMAL
PROTIME: 25.7 SECONDS
PTH INTACT: 95.23 PG/ML (ref 15–65)
RBC # BLD: 3.84 M/UL (ref 3.95–5.11)
RBC # BLD: ABNORMAL 10*6/UL
RBC UA: ABNORMAL /HPF (ref 0–2)
RENAL EPITHELIAL, UA: ABNORMAL /HPF
SEG NEUTROPHILS: 63 % (ref 36–65)
SEGMENTED NEUTROPHILS ABSOLUTE COUNT: 3.25 K/UL (ref 1.5–8.1)
SODIUM BLD-SCNC: 138 MMOL/L (ref 135–144)
SPECIFIC GRAVITY UA: 1.02 (ref 1–1.03)
TRICHOMONAS: ABNORMAL
TURBIDITY: CLEAR
URINE HGB: NEGATIVE
UROBILINOGEN, URINE: NORMAL
VITAMIN D 25-HYDROXY: 40.4 NG/ML (ref 30–100)
WBC # BLD: 5.1 K/UL (ref 3.5–11.3)
WBC # BLD: ABNORMAL 10*3/UL
WBC UA: ABNORMAL /HPF (ref 0–5)
YEAST: ABNORMAL

## 2020-12-02 PROCEDURE — 83735 ASSAY OF MAGNESIUM: CPT

## 2020-12-02 PROCEDURE — 81001 URINALYSIS AUTO W/SCOPE: CPT

## 2020-12-02 PROCEDURE — 99211 OFF/OP EST MAY X REQ PHY/QHP: CPT

## 2020-12-02 PROCEDURE — 82306 VITAMIN D 25 HYDROXY: CPT

## 2020-12-02 PROCEDURE — 82570 ASSAY OF URINE CREATININE: CPT

## 2020-12-02 PROCEDURE — 71046 X-RAY EXAM CHEST 2 VIEWS: CPT

## 2020-12-02 PROCEDURE — 82043 UR ALBUMIN QUANTITATIVE: CPT

## 2020-12-02 PROCEDURE — 85610 PROTHROMBIN TIME: CPT

## 2020-12-02 PROCEDURE — 82040 ASSAY OF SERUM ALBUMIN: CPT

## 2020-12-02 PROCEDURE — 80048 BASIC METABOLIC PNL TOTAL CA: CPT

## 2020-12-02 PROCEDURE — 85025 COMPLETE CBC W/AUTO DIFF WBC: CPT

## 2020-12-02 PROCEDURE — 83970 ASSAY OF PARATHORMONE: CPT

## 2020-12-02 PROCEDURE — 36415 COLL VENOUS BLD VENIPUNCTURE: CPT

## 2020-12-02 PROCEDURE — 84100 ASSAY OF PHOSPHORUS: CPT

## 2020-12-02 NOTE — PROGRESS NOTES
Patient seen in clinic for warfarin management due to atrial fibrillation with an INR goal of 2.0-3.0. Estimated duration of therapy is indefinite. Patient states compliant all of the time with regimen. No bleeding or thromboembolic side effects noted. No significant med or dietary changes. No significant recent illness or disease state changes. PT/INR done in office per protocol. INR is 2.1 which is therapeutic. Warfarin regimen will be continued at current dose of 1.25mg Wednesday and 2.5mg all other days. Will retest in 5 weeks. Patient understands dosing directions and information discussed. Dosing schedule and follow up appointment given to patient. Progress note routed to referring physicians office. Discussed with patient the Pharmacist Collaborative Practice Agreement. Patient provided verbal and/or electronic (ex. [employer]) consent to participate in the collaborative practice agreement between the pharmacist and referred patient. This is in lieu of paper consent due to COVID-19 precautions and the use of remote/virtual visits.        CLINICAL PHARMACY CONSULT: MED RECONCILIATION/REVIEW ADDENDUM    For Pharmacy Admin Tracking Only    PHSO: No  Total # of Interventions Recommended: 0  - Maintenance Safety Lab Monitoring #: 1  Total Interventions Accepted: 0  Time Spent (min): [address] PharmD

## 2020-12-14 ENCOUNTER — HOSPITAL ENCOUNTER (OUTPATIENT)
Dept: ULTRASOUND IMAGING | Age: 70
Discharge: HOME | End: 2020-12-16
Payer: MEDICARE

## 2020-12-14 ENCOUNTER — HOSPITAL ENCOUNTER (OUTPATIENT)
Age: 70
Discharge: HOME | End: 2020-12-14
Payer: MEDICARE

## 2020-12-14 LAB
AFP: 7.5 UG/L
ALBUMIN SERPL-MCNC: 3.8 G/DL (ref 3.5–5.2)
ALBUMIN/GLOBULIN RATIO: ABNORMAL (ref 1–2.5)
ALP BLD-CCNC: 106 U/L (ref 35–104)
ALT SERPL-CCNC: 33 U/L (ref 5–33)
ANION GAP SERPL CALCULATED.3IONS-SCNC: 12 MMOL/L (ref 9–17)
AST SERPL-CCNC: 39 U/L
BILIRUB SERPL-MCNC: 0.84 MG/DL (ref 0.3–1.2)
BILIRUBIN DIRECT: 0.35 MG/DL
BILIRUBIN, INDIRECT: 0.49 MG/DL (ref 0–1)
BUN BLDV-MCNC: 19 MG/DL (ref 8–23)
BUN/CREAT BLD: 13 (ref 9–20)
CALCIUM SERPL-MCNC: 9.4 MG/DL (ref 8.6–10.4)
CHLORIDE BLD-SCNC: 102 MMOL/L (ref 98–107)
CO2: 23 MMOL/L (ref 20–31)
CREAT SERPL-MCNC: 1.47 MG/DL (ref 0.5–0.9)
GFR AFRICAN AMERICAN: 42 ML/MIN
GFR NON-AFRICAN AMERICAN: 35 ML/MIN
GFR SERPL CREATININE-BSD FRML MDRD: ABNORMAL ML/MIN/{1.73_M2}
GFR SERPL CREATININE-BSD FRML MDRD: ABNORMAL ML/MIN/{1.73_M2}
GLOBULIN: ABNORMAL G/DL (ref 1.5–3.8)
GLUCOSE BLD-MCNC: 124 MG/DL (ref 70–99)
INR BLD: 2.1
POTASSIUM SERPL-SCNC: 4.3 MMOL/L (ref 3.7–5.3)
PROTHROMBIN TIME: 23.6 SEC (ref 11.5–14.2)
SODIUM BLD-SCNC: 137 MMOL/L (ref 135–144)
TOTAL PROTEIN: 7.4 G/DL (ref 6.4–8.3)

## 2020-12-14 PROCEDURE — 80076 HEPATIC FUNCTION PANEL: CPT

## 2020-12-14 PROCEDURE — 76705 ECHO EXAM OF ABDOMEN: CPT

## 2020-12-14 PROCEDURE — 85610 PROTHROMBIN TIME: CPT

## 2020-12-14 PROCEDURE — 82105 ALPHA-FETOPROTEIN SERUM: CPT

## 2020-12-14 PROCEDURE — 36415 COLL VENOUS BLD VENIPUNCTURE: CPT

## 2020-12-14 PROCEDURE — 80048 BASIC METABOLIC PNL TOTAL CA: CPT

## 2021-01-06 ENCOUNTER — HOSPITAL ENCOUNTER (OUTPATIENT)
Dept: PHARMACY | Age: 71
Setting detail: THERAPIES SERIES
Discharge: HOME | End: 2021-01-06
Payer: MEDICARE

## 2021-01-06 VITALS — TEMPERATURE: 97.7 F

## 2021-01-06 DIAGNOSIS — I48.91 ATRIAL FIBRILLATION, UNSPECIFIED TYPE (HCC): ICD-10-CM

## 2021-01-06 LAB
INR BLD: 2.2
PROTIME: 27 SECONDS

## 2021-01-06 PROCEDURE — 85610 PROTHROMBIN TIME: CPT

## 2021-01-06 PROCEDURE — 99211 OFF/OP EST MAY X REQ PHY/QHP: CPT

## 2021-01-06 NOTE — PROGRESS NOTES
Patient seen in clinic for warfarin management due to atrial fibrillation with an INR goal of 2.0-3.0. Estimated duration of therapy is indefinite. Patient states compliant all of the time with regimen. No bleeding or thromboembolic side effects noted. No significant med or dietary changes. No significant recent illness or disease state changes. PT/INR done in office per protocol. INR is 2.2 which is therapeutic. Warfarin regimen will be continued at current dose of 1.25mg Wed and 2.5mg all other days. Will retest in 5 weeks. Patient understands dosing directions and information discussed. Dosing schedule and follow up appointment given to patient. Progress note routed to referring physicians office. Discussed with patient the Pharmacist Collaborative Practice Agreement. Patient provided verbal and/or electronic (ex. [employer]) consent to participate in the collaborative practice agreement between the pharmacist and referred patient. This is in lieu of paper consent due to COVID-19 precautions and the use of remote/virtual visits.        CLINICAL PHARMACY CONSULT: MED RECONCILIATION/REVIEW ADDENDUM    For Pharmacy Admin Tracking Only    PHSO: No  Total # of Interventions Recommended: 0  - Maintenance Safety Lab Monitoring #: 1  Total Interventions Accepted: 1  Time Spent (min): 15    Rebecca WynneD

## 2021-02-10 ENCOUNTER — HOSPITAL ENCOUNTER (OUTPATIENT)
Dept: PHARMACY | Age: 71
Setting detail: THERAPIES SERIES
Discharge: HOME | End: 2021-02-10
Payer: MEDICARE

## 2021-02-10 DIAGNOSIS — I48.91 ATRIAL FIBRILLATION, UNSPECIFIED TYPE (HCC): ICD-10-CM

## 2021-02-10 LAB
INR BLD: 2.3
PROTIME: 28.1 SECONDS

## 2021-02-10 PROCEDURE — 99211 OFF/OP EST MAY X REQ PHY/QHP: CPT

## 2021-02-10 PROCEDURE — 85610 PROTHROMBIN TIME: CPT

## 2021-02-10 RX ORDER — BUDESONIDE AND FORMOTEROL FUMARATE DIHYDRATE 160; 4.5 UG/1; UG/1
2 AEROSOL RESPIRATORY (INHALATION) EVERY MORNING
COMMUNITY

## 2021-02-10 RX ORDER — WARFARIN SODIUM 2.5 MG/1
1.25-2.5 TABLET ORAL
COMMUNITY

## 2021-02-10 NOTE — PROGRESS NOTES
Patient seen in clinic for warfarin management due to atrial fibrillation with an INR goal of 2.0-3.0. Estimated duration of therapy is indefinite. Patient states compliant all of the time with regimen. No bleeding or thromboembolic side effects noted. No significant med or dietary changes. No significant recent illness or disease state changes. PT/INR done in office per protocol. INR is 2.3 which is therapeutic. Warfarin regimen will be continued at current dose of 1.25mg Wed and 2.5mg all other days. Will retest in 5 weeks. Patient understands dosing directions and information discussed. Dosing schedule and follow up appointment given to patient. Progress note routed to referring physicians office. Discussed with patient the Pharmacist Collaborative Practice Agreement. Patient provided verbal and/or electronic (ex. [employer]) consent to participate in the collaborative practice agreement between the pharmacist and referred patient. This is in lieu of paper consent due to COVID-19 precautions and the use of remote/virtual visits.        CLINICAL PHARMACY CONSULT: MED RECONCILIATION/REVIEW ADDENDUM    For Pharmacy Admin Tracking Only    PHSO: No  Total # of Interventions Recommended: 0  - Maintenance Safety Lab Monitoring #: 1  Total Interventions Accepted: 0  Time Spent (min): [address] PharmD

## 2021-03-03 ENCOUNTER — HOSPITAL ENCOUNTER (OUTPATIENT)
Age: 71
Discharge: HOME | End: 2021-03-03
Payer: MEDICARE

## 2021-03-03 LAB
-: NORMAL
ABSOLUTE EOS #: 0.23 K/UL (ref 0–0.44)
ABSOLUTE IMMATURE GRANULOCYTE: <0.03 K/UL (ref 0–0.3)
ABSOLUTE LYMPH #: 1.07 K/UL (ref 1.1–3.7)
ABSOLUTE MONO #: 0.61 K/UL (ref 0.1–1.2)
ALBUMIN SERPL-MCNC: 4 G/DL (ref 3.5–5.2)
ALBUMIN SERPL-MCNC: 4 G/DL (ref 3.5–5.2)
ALBUMIN/GLOBULIN RATIO: 1.1 (ref 1–2.5)
ALP BLD-CCNC: 87 U/L (ref 35–104)
ALT SERPL-CCNC: 41 U/L (ref 5–33)
AMORPHOUS: NORMAL
ANION GAP SERPL CALCULATED.3IONS-SCNC: 12 MMOL/L (ref 9–17)
ANION GAP SERPL CALCULATED.3IONS-SCNC: 13 MMOL/L (ref 9–17)
AST SERPL-CCNC: 54 U/L
BACTERIA: NORMAL
BASOPHILS # BLD: 1 % (ref 0–2)
BASOPHILS ABSOLUTE: 0.08 K/UL (ref 0–0.2)
BILIRUB SERPL-MCNC: 0.79 MG/DL (ref 0.3–1.2)
BILIRUBIN URINE: NEGATIVE
BNP INTERPRETATION: ABNORMAL
BUN BLDV-MCNC: 30 MG/DL (ref 8–23)
BUN BLDV-MCNC: 31 MG/DL (ref 8–23)
BUN/CREAT BLD: ABNORMAL (ref 9–20)
BUN/CREAT BLD: ABNORMAL (ref 9–20)
CALCIUM SERPL-MCNC: 9 MG/DL (ref 8.6–10.4)
CALCIUM SERPL-MCNC: 9.1 MG/DL (ref 8.6–10.4)
CASTS UA: NORMAL /LPF (ref 0–8)
CHLORIDE BLD-SCNC: 103 MMOL/L (ref 98–107)
CHLORIDE BLD-SCNC: 103 MMOL/L (ref 98–107)
CHOLESTEROL, FASTING: 152 MG/DL
CHOLESTEROL/HDL RATIO: 1.9
CO2: 23 MMOL/L (ref 20–31)
CO2: 23 MMOL/L (ref 20–31)
COLOR: YELLOW
COMMENT UA: ABNORMAL
CREAT SERPL-MCNC: 1.68 MG/DL (ref 0.5–0.9)
CREAT SERPL-MCNC: 1.74 MG/DL (ref 0.5–0.9)
CREATININE URINE: 73.5 MG/DL (ref 28–217)
CRYSTALS, UA: NORMAL /HPF
DIFFERENTIAL TYPE: ABNORMAL
EOSINOPHILS RELATIVE PERCENT: 3 % (ref 1–4)
EPITHELIAL CELLS UA: NORMAL /HPF (ref 0–5)
ESTIMATED AVERAGE GLUCOSE: 126 MG/DL
GFR AFRICAN AMERICAN: 35 ML/MIN
GFR AFRICAN AMERICAN: 36 ML/MIN
GFR NON-AFRICAN AMERICAN: 29 ML/MIN
GFR NON-AFRICAN AMERICAN: 30 ML/MIN
GFR SERPL CREATININE-BSD FRML MDRD: ABNORMAL ML/MIN/{1.73_M2}
GLUCOSE BLD-MCNC: 131 MG/DL (ref 70–99)
GLUCOSE FASTING: 128 MG/DL (ref 70–99)
GLUCOSE URINE: NEGATIVE
HBA1C MFR BLD: 6 % (ref 4–6)
HCT VFR BLD CALC: 38.5 % (ref 36.3–47.1)
HCT VFR BLD CALC: 38.9 % (ref 36.3–47.1)
HDLC SERPL-MCNC: 78 MG/DL
HEMOGLOBIN: 12.7 G/DL (ref 11.9–15.1)
HEMOGLOBIN: 12.8 G/DL (ref 11.9–15.1)
IMMATURE GRANULOCYTES: 0 %
INR BLD: 1.9
IRON SATURATION: 58 % (ref 20–55)
IRON: 159 UG/DL (ref 37–145)
KETONES, URINE: NEGATIVE
LDL CHOLESTEROL: 51 MG/DL (ref 0–130)
LEUKOCYTE ESTERASE, URINE: ABNORMAL
LYMPHOCYTES # BLD: 15 % (ref 24–43)
MAGNESIUM: 2.2 MG/DL (ref 1.6–2.6)
MAGNESIUM: 2.2 MG/DL (ref 1.6–2.6)
MCH RBC QN AUTO: 34.5 PG (ref 25.2–33.5)
MCH RBC QN AUTO: 34.8 PG (ref 25.2–33.5)
MCHC RBC AUTO-ENTMCNC: 32.9 G/DL (ref 28.4–34.8)
MCHC RBC AUTO-ENTMCNC: 33 G/DL (ref 28.4–34.8)
MCV RBC AUTO: 104.9 FL (ref 82.6–102.9)
MCV RBC AUTO: 105.5 FL (ref 82.6–102.9)
MICROALBUMIN/CREAT 24H UR: 201 MG/L
MICROALBUMIN/CREAT UR-RTO: 273 MCG/MG CREAT
MONOCYTES # BLD: 9 % (ref 3–12)
MUCUS: NORMAL
NITRITE, URINE: NEGATIVE
NRBC AUTOMATED: 0 PER 100 WBC
NRBC AUTOMATED: 0 PER 100 WBC
OTHER OBSERVATIONS UA: NORMAL
PDW BLD-RTO: 14.1 % (ref 11.8–14.4)
PDW BLD-RTO: 14.3 % (ref 11.8–14.4)
PH UA: 6 (ref 5–8)
PHOSPHORUS: 1.9 MG/DL (ref 2.6–4.5)
PLATELET # BLD: 177 K/UL (ref 138–453)
PLATELET # BLD: 179 K/UL (ref 138–453)
PLATELET ESTIMATE: ABNORMAL
PMV BLD AUTO: 11.2 FL (ref 8.1–13.5)
PMV BLD AUTO: 11.5 FL (ref 8.1–13.5)
POTASSIUM SERPL-SCNC: 4.4 MMOL/L (ref 3.7–5.3)
POTASSIUM SERPL-SCNC: 4.4 MMOL/L (ref 3.7–5.3)
PRO-BNP: 371 PG/ML
PROTEIN UA: ABNORMAL
PROTHROMBIN TIME: 21.7 SEC (ref 11.5–14.2)
PTH INTACT: 165.7 PG/ML (ref 15–65)
RBC # BLD: 3.65 M/UL (ref 3.95–5.11)
RBC # BLD: 3.71 M/UL (ref 3.95–5.11)
RBC # BLD: ABNORMAL 10*6/UL
RBC UA: NORMAL /HPF (ref 0–4)
RENAL EPITHELIAL, UA: NORMAL /HPF
SEG NEUTROPHILS: 72 % (ref 36–65)
SEGMENTED NEUTROPHILS ABSOLUTE COUNT: 4.94 K/UL (ref 1.5–8.1)
SODIUM BLD-SCNC: 138 MMOL/L (ref 135–144)
SODIUM BLD-SCNC: 139 MMOL/L (ref 135–144)
SPECIFIC GRAVITY UA: 1.02 (ref 1–1.03)
THYROXINE, FREE: 1.41 NG/DL (ref 0.93–1.7)
TOTAL CK: 60 U/L (ref 26–192)
TOTAL IRON BINDING CAPACITY: 274 UG/DL (ref 250–450)
TOTAL PROTEIN: 7.6 G/DL (ref 6.4–8.3)
TRICHOMONAS: NORMAL
TRIGLYCERIDE, FASTING: 113 MG/DL
TSH SERPL DL<=0.05 MIU/L-ACNC: 2.58 MIU/L (ref 0.3–5)
TURBIDITY: CLEAR
UNSATURATED IRON BINDING CAPACITY: 115 UG/DL (ref 112–347)
URINE HGB: NEGATIVE
UROBILINOGEN, URINE: NORMAL
VITAMIN B-12: 466 PG/ML (ref 232–1245)
VITAMIN D 25-HYDROXY: 43.4 NG/ML (ref 30–100)
VITAMIN D 25-HYDROXY: 44 NG/ML (ref 30–100)
VLDLC SERPL CALC-MCNC: NORMAL MG/DL (ref 1–30)
WBC # BLD: 6.8 K/UL (ref 3.5–11.3)
WBC # BLD: 7 K/UL (ref 3.5–11.3)
WBC # BLD: ABNORMAL 10*3/UL
WBC UA: NORMAL /HPF (ref 0–5)
YEAST: NORMAL

## 2021-03-03 PROCEDURE — 82550 ASSAY OF CK (CPK): CPT

## 2021-03-03 PROCEDURE — 84443 ASSAY THYROID STIM HORMONE: CPT

## 2021-03-03 PROCEDURE — 82306 VITAMIN D 25 HYDROXY: CPT

## 2021-03-03 PROCEDURE — 84439 ASSAY OF FREE THYROXINE: CPT

## 2021-03-03 PROCEDURE — 84100 ASSAY OF PHOSPHORUS: CPT

## 2021-03-03 PROCEDURE — 81001 URINALYSIS AUTO W/SCOPE: CPT

## 2021-03-03 PROCEDURE — 82607 VITAMIN B-12: CPT

## 2021-03-03 PROCEDURE — 85025 COMPLETE CBC W/AUTO DIFF WBC: CPT

## 2021-03-03 PROCEDURE — 83036 HEMOGLOBIN GLYCOSYLATED A1C: CPT

## 2021-03-03 PROCEDURE — 85610 PROTHROMBIN TIME: CPT

## 2021-03-03 PROCEDURE — 83540 ASSAY OF IRON: CPT

## 2021-03-03 PROCEDURE — 83970 ASSAY OF PARATHORMONE: CPT

## 2021-03-03 PROCEDURE — 83550 IRON BINDING TEST: CPT

## 2021-03-03 PROCEDURE — 83880 ASSAY OF NATRIURETIC PEPTIDE: CPT

## 2021-03-03 PROCEDURE — 80061 LIPID PANEL: CPT

## 2021-03-03 PROCEDURE — 82570 ASSAY OF URINE CREATININE: CPT

## 2021-03-03 PROCEDURE — 80053 COMPREHEN METABOLIC PANEL: CPT

## 2021-03-03 PROCEDURE — 82043 UR ALBUMIN QUANTITATIVE: CPT

## 2021-03-03 PROCEDURE — 83735 ASSAY OF MAGNESIUM: CPT

## 2021-03-03 PROCEDURE — 36415 COLL VENOUS BLD VENIPUNCTURE: CPT

## 2021-03-18 ENCOUNTER — HOSPITAL ENCOUNTER (OUTPATIENT)
Dept: PHARMACY | Age: 71
Setting detail: THERAPIES SERIES
Discharge: HOME | End: 2021-03-18
Payer: MEDICARE

## 2021-03-18 DIAGNOSIS — I48.91 ATRIAL FIBRILLATION, UNSPECIFIED TYPE (HCC): ICD-10-CM

## 2021-03-18 LAB
INR BLD: 3.1
PROTIME: 36.8 SECONDS

## 2021-03-18 PROCEDURE — 85610 PROTHROMBIN TIME: CPT

## 2021-03-18 PROCEDURE — 99211 OFF/OP EST MAY X REQ PHY/QHP: CPT

## 2021-03-18 NOTE — PROGRESS NOTES
Patient seen in clinic for warfarin management due to atrial fibrillation with an INR goal of 2.0-3.0. Estimated duration of therapy is indefinite. Patient states compliant all of the time with regimen. No bleeding or thromboembolic side effects noted. No significant med or dietary changes. No significant recent illness or disease state changes. Patient states pain is worse in knee for the last couple weeks. PT/INR done in office per protocol. INR is 3.1 which is just above goal.     Warfarin regimen will be continued at current dose of 1.25mg wed and 2.5mg all other days. Will retest in 4 weeks. Patient understands dosing directions and information discussed. Dosing schedule and follow up appointment given to patient. Progress note routed to referring physicians office. Discussed with patient the Pharmacist Collaborative Practice Agreement. Patient provided verbal and/or electronic (ex. [employer]) consent to participate in the collaborative practice agreement between the pharmacist and referred patient. This is in lieu of paper consent due to COVID-19 precautions and the use of remote/virtual visits.        CLINICAL PHARMACY CONSULT: MED RECONCILIATION/REVIEW ADDENDUM    For Pharmacy Admin Tracking Only    PHSO: No  Total # of Interventions Recommended: 0  - Maintenance Safety Lab Monitoring #: 1  Total Interventions Accepted: 0  Time Spent (min): [address], [address] [place] [address]

## 2021-04-15 ENCOUNTER — HOSPITAL ENCOUNTER (OUTPATIENT)
Dept: PHARMACY | Age: 71
Setting detail: THERAPIES SERIES
Discharge: HOME | End: 2021-04-15
Payer: MEDICARE

## 2021-04-15 DIAGNOSIS — I48.91 ATRIAL FIBRILLATION, UNSPECIFIED TYPE (HCC): ICD-10-CM

## 2021-04-15 LAB
INR BLD: 3.5
PROTIME: 41.7 SECONDS

## 2021-04-15 PROCEDURE — 85610 PROTHROMBIN TIME: CPT

## 2021-04-15 PROCEDURE — 99212 OFFICE O/P EST SF 10 MIN: CPT

## 2021-04-15 NOTE — PROGRESS NOTES
Patient seen in clinic for warfarin management due to atrial fibrillation with an INR goal of 2.0-3.0. Estimated duration of therapy is indefinite. Patient states compliant all of the time with regimen. No bleeding or thromboembolic side effects noted. No significant med or dietary changes. No significant recent illness or disease state changes. She still continues to have same level of knee pain since last visit. She also recently had a stress fracture in her foot who she will be seeing a doctor for today, which may be contributing to an additional increase in pain recently. PT/INR done in office per protocol. INR is 3.5 which is supratherapeutic. Warfarin regimen will be held for today's dose and then decreased to 1.25 mg every Wed, Sat; then 2.5 mg all other days . Will retest in 2 weeks. Patient understands dosing directions and information discussed. Dosing schedule and follow up appointment given to patient. Progress note routed to referring physicians office. Discussed with patient the Pharmacist Collaborative Practice Agreement. Patient provided verbal and/or electronic (ex. V[employer]hart) consent to participate in the collaborative practice agreement between the pharmacist and referred patient. This is in lieu of paper consent due to COVID-19 precautions and the use of remote/virtual visits.        CLINICAL PHARMACY CONSULT: MED RECONCILIATION/REVIEW ADDENDUM    For Pharmacy Admin Tracking Only    PHSO: No  Total # of Interventions Recommended: 1  - Decreased Dose #: 1  - Maintenance Safety Lab Monitoring #: 1  Total Interventions Accepted: 1  Time Spent (min): 15    Noel Guillen, RebeccaD student

## 2021-04-16 ENCOUNTER — HOSPITAL ENCOUNTER (OUTPATIENT)
Dept: GENERAL RADIOLOGY | Age: 71
Discharge: HOME | End: 2021-04-18
Payer: MEDICARE

## 2021-04-16 ENCOUNTER — HOSPITAL ENCOUNTER (OUTPATIENT)
Age: 71
Discharge: HOME | End: 2021-04-18
Payer: MEDICARE

## 2021-04-16 DIAGNOSIS — M17.0 BILATERAL PRIMARY OSTEOARTHRITIS OF KNEE: ICD-10-CM

## 2021-04-16 DIAGNOSIS — I50.9 CONGESTIVE HEART FAILURE, UNSPECIFIED HF CHRONICITY, UNSPECIFIED HEART FAILURE TYPE (HCC): ICD-10-CM

## 2021-04-16 DIAGNOSIS — J84.10 PULMONARY FIBROSIS (HCC): ICD-10-CM

## 2021-04-16 PROCEDURE — 73562 X-RAY EXAM OF KNEE 3: CPT

## 2021-04-16 PROCEDURE — 71046 X-RAY EXAM CHEST 2 VIEWS: CPT

## 2021-04-27 ENCOUNTER — HOSPITAL ENCOUNTER (OUTPATIENT)
Dept: PHARMACY | Age: 71
Setting detail: THERAPIES SERIES
Discharge: HOME | End: 2021-04-27
Payer: MEDICARE

## 2021-04-27 DIAGNOSIS — I48.91 ATRIAL FIBRILLATION, UNSPECIFIED TYPE (HCC): ICD-10-CM

## 2021-04-27 LAB
INR BLD: 3
PROTIME: 35.6 SECONDS

## 2021-04-27 PROCEDURE — 85610 PROTHROMBIN TIME: CPT

## 2021-04-27 PROCEDURE — 99211 OFF/OP EST MAY X REQ PHY/QHP: CPT

## 2021-04-27 NOTE — PROGRESS NOTES
Patient seen in clinic for warfarin management due to atrial fibrillation with an INR goal of 2.0-3.0. Estimated duration of therapy is indefinite. Patient states compliant all of the time with regimen. No bleeding or thromboembolic side effects noted. No significant med or dietary changes. No significant recent illness or disease state changes. Patient did fall and hurt her tailbone this last Friday, so she has had an increase in pain since her last visit. Patient currently takes tylenol for the pain with no relief. PT/INR done in office per protocol. INR is 3.0 which is therapeutic. Warfarin regimen will be continued at current dose 1.25 mg on Wed, Sat; then 2.5 mg on all other days. Will retest in 2 weeks. Patient understands dosing directions and information discussed. Dosing schedule and follow up appointment given to patient. Progress note routed to referring physicians office. Discussed with patient the Pharmacist Collaborative Practice Agreement. Patient provided verbal and/or electronic (ex. [employer]) consent to participate in the collaborative practice agreement between the pharmacist and referred patient. This is in lieu of paper consent due to COVID-19 precautions and the use of remote/virtual visits.        For Pharmacy Admin Tracking Only     Intervention Detail: Adherence Monitorin   Total # of Interventions Recommended: 0   Total # of Interventions Accepted: 0   Time Spent (min): 15

## 2021-05-11 ENCOUNTER — HOSPITAL ENCOUNTER (OUTPATIENT)
Dept: PHARMACY | Age: 71
Setting detail: THERAPIES SERIES
Discharge: HOME | End: 2021-05-11
Payer: MEDICARE

## 2021-05-11 DIAGNOSIS — I48.91 ATRIAL FIBRILLATION, UNSPECIFIED TYPE (HCC): ICD-10-CM

## 2021-05-11 LAB
INR BLD: 2.9
PROTIME: 35 SECONDS

## 2021-05-11 PROCEDURE — 85610 PROTHROMBIN TIME: CPT

## 2021-05-11 PROCEDURE — 99211 OFF/OP EST MAY X REQ PHY/QHP: CPT

## 2021-05-11 NOTE — PROGRESS NOTES
Patient seen in clinic for warfarin management due to atrial fibrillation with an INR goal of 2.0-3.0. Estimated duration of therapy is indefinite. Patient states compliant all of the time with regimen. No bleeding or thromboembolic side effects noted. No significant med or dietary changes. No significant recent illness or disease state changes. PT/INR done in office per protocol. INR is 2.9 which is therapeutic. Warfarin regimen will be continued at current dose 1.25mg Wed/Sat, 2.5mg all other days. Will retest in 4 weeks. Patient understands dosing directions and information discussed. Dosing schedule and follow up appointment given to patient. Progress note routed to referring physicians office. Discussed with patient the Pharmacist Collaborative Practice Agreement. Patient provided verbal and/or electronic (ex. "[employer]hart) consent to participate in the collaborative practice agreement between the pharmacist and referred patient. This is in lieu of paper consent due to COVID-19 precautions and the use of remote/virtual visits.        For Pharmacy Admin Tracking Only     Intervention Detail:    Total # of Interventions Recommended: 0   Total # of Interventions Accepted: 0   Time Spent (min): 15

## 2021-06-01 ENCOUNTER — HOSPITAL ENCOUNTER (OUTPATIENT)
Age: 71
Discharge: HOME | End: 2021-06-01
Payer: MEDICARE

## 2021-06-01 LAB
-: ABNORMAL
ABSOLUTE EOS #: 0.11 K/UL (ref 0–0.44)
ABSOLUTE IMMATURE GRANULOCYTE: <0.03 K/UL (ref 0–0.3)
ABSOLUTE LYMPH #: 1.15 K/UL (ref 1.1–3.7)
ABSOLUTE MONO #: 0.8 K/UL (ref 0.1–1.2)
ALBUMIN SERPL-MCNC: 3.7 G/DL (ref 3.5–5.2)
AMORPHOUS: ABNORMAL
ANION GAP SERPL CALCULATED.3IONS-SCNC: 15 MMOL/L (ref 9–17)
BACTERIA: ABNORMAL
BASOPHILS # BLD: 1 % (ref 0–2)
BASOPHILS ABSOLUTE: 0.07 K/UL (ref 0–0.2)
BILIRUBIN URINE: NEGATIVE
BUN BLDV-MCNC: 22 MG/DL (ref 8–23)
BUN/CREAT BLD: ABNORMAL (ref 9–20)
CALCIUM SERPL-MCNC: 9.4 MG/DL (ref 8.6–10.4)
CASTS UA: ABNORMAL /LPF (ref 0–2)
CASTS UA: ABNORMAL /LPF (ref 0–2)
CHLORIDE BLD-SCNC: 105 MMOL/L (ref 98–107)
CO2: 18 MMOL/L (ref 20–31)
COLOR: YELLOW
COMMENT UA: ABNORMAL
CREAT SERPL-MCNC: 2.27 MG/DL (ref 0.5–0.9)
CREATININE URINE: 92.1 MG/DL (ref 28–217)
CRYSTALS, UA: ABNORMAL /HPF
DIFFERENTIAL TYPE: ABNORMAL
EOSINOPHILS RELATIVE PERCENT: 1 % (ref 1–4)
EPITHELIAL CELLS UA: ABNORMAL /HPF (ref 0–5)
GFR AFRICAN AMERICAN: 26 ML/MIN
GFR NON-AFRICAN AMERICAN: 21 ML/MIN
GFR SERPL CREATININE-BSD FRML MDRD: ABNORMAL ML/MIN/{1.73_M2}
GFR SERPL CREATININE-BSD FRML MDRD: ABNORMAL ML/MIN/{1.73_M2}
GLUCOSE BLD-MCNC: 149 MG/DL (ref 70–99)
GLUCOSE URINE: NEGATIVE
HCT VFR BLD CALC: 42.1 % (ref 36.3–47.1)
HEMOGLOBIN: 13.6 G/DL (ref 11.9–15.1)
IMMATURE GRANULOCYTES: 0 %
KETONES, URINE: NEGATIVE
LEUKOCYTE ESTERASE, URINE: ABNORMAL
LYMPHOCYTES # BLD: 14 % (ref 24–43)
MAGNESIUM: 2.3 MG/DL (ref 1.6–2.6)
MCH RBC QN AUTO: 34.7 PG (ref 25.2–33.5)
MCHC RBC AUTO-ENTMCNC: 32.3 G/DL (ref 28.4–34.8)
MCV RBC AUTO: 107.4 FL (ref 82.6–102.9)
MICROALBUMIN/CREAT 24H UR: 354 MG/L
MICROALBUMIN/CREAT UR-RTO: 384 MCG/MG CREAT
MONOCYTES # BLD: 10 % (ref 3–12)
MUCUS: ABNORMAL
NITRITE, URINE: NEGATIVE
NRBC AUTOMATED: 0 PER 100 WBC
OTHER OBSERVATIONS UA: ABNORMAL
PDW BLD-RTO: 13.8 % (ref 11.8–14.4)
PH UA: 6.5 (ref 5–8)
PHOSPHORUS: 2.9 MG/DL (ref 2.6–4.5)
PLATELET # BLD: 183 K/UL (ref 138–453)
PLATELET ESTIMATE: ABNORMAL
PMV BLD AUTO: 11.9 FL (ref 8.1–13.5)
POTASSIUM SERPL-SCNC: 4.8 MMOL/L (ref 3.7–5.3)
PROTEIN UA: ABNORMAL
PTH INTACT: 101.3 PG/ML (ref 15–65)
RBC # BLD: 3.92 M/UL (ref 3.95–5.11)
RBC # BLD: ABNORMAL 10*6/UL
RBC UA: ABNORMAL /HPF (ref 0–2)
RENAL EPITHELIAL, UA: ABNORMAL /HPF
SEG NEUTROPHILS: 74 % (ref 36–65)
SEGMENTED NEUTROPHILS ABSOLUTE COUNT: 5.98 K/UL (ref 1.5–8.1)
SODIUM BLD-SCNC: 138 MMOL/L (ref 135–144)
SPECIFIC GRAVITY UA: 1.02 (ref 1–1.03)
TRICHOMONAS: ABNORMAL
TURBIDITY: ABNORMAL
URINE HGB: ABNORMAL
UROBILINOGEN, URINE: NORMAL
VITAMIN D 25-HYDROXY: 38.3 NG/ML (ref 30–100)
WBC # BLD: 8.1 K/UL (ref 3.5–11.3)
WBC # BLD: ABNORMAL 10*3/UL
WBC UA: ABNORMAL /HPF (ref 0–5)
YEAST: ABNORMAL

## 2021-06-01 PROCEDURE — 82306 VITAMIN D 25 HYDROXY: CPT

## 2021-06-01 PROCEDURE — 85025 COMPLETE CBC W/AUTO DIFF WBC: CPT

## 2021-06-01 PROCEDURE — 84100 ASSAY OF PHOSPHORUS: CPT

## 2021-06-01 PROCEDURE — 83970 ASSAY OF PARATHORMONE: CPT

## 2021-06-01 PROCEDURE — 82040 ASSAY OF SERUM ALBUMIN: CPT

## 2021-06-01 PROCEDURE — 83735 ASSAY OF MAGNESIUM: CPT

## 2021-06-01 PROCEDURE — 82043 UR ALBUMIN QUANTITATIVE: CPT

## 2021-06-01 PROCEDURE — 80048 BASIC METABOLIC PNL TOTAL CA: CPT

## 2021-06-01 PROCEDURE — 82570 ASSAY OF URINE CREATININE: CPT

## 2021-06-01 PROCEDURE — 81001 URINALYSIS AUTO W/SCOPE: CPT

## 2021-06-01 PROCEDURE — 36415 COLL VENOUS BLD VENIPUNCTURE: CPT

## 2021-06-08 ENCOUNTER — HOSPITAL ENCOUNTER (OUTPATIENT)
Dept: PHARMACY | Age: 71
Setting detail: THERAPIES SERIES
Discharge: HOME | End: 2021-06-08
Payer: MEDICARE

## 2021-06-08 ENCOUNTER — HOSPITAL ENCOUNTER (OUTPATIENT)
Age: 71
Discharge: HOME | End: 2021-06-08
Payer: MEDICARE

## 2021-06-08 DIAGNOSIS — I48.91 ATRIAL FIBRILLATION, UNSPECIFIED TYPE (HCC): Primary | ICD-10-CM

## 2021-06-08 LAB
ANION GAP SERPL CALCULATED.3IONS-SCNC: 17 MMOL/L (ref 9–17)
BUN BLDV-MCNC: 16 MG/DL (ref 8–23)
BUN/CREAT BLD: ABNORMAL (ref 9–20)
CALCIUM SERPL-MCNC: 9.3 MG/DL (ref 8.6–10.4)
CHLORIDE BLD-SCNC: 102 MMOL/L (ref 98–107)
CO2: 20 MMOL/L (ref 20–31)
CREAT SERPL-MCNC: 2 MG/DL (ref 0.5–0.9)
GFR AFRICAN AMERICAN: 30 ML/MIN
GFR NON-AFRICAN AMERICAN: 24 ML/MIN
GFR SERPL CREATININE-BSD FRML MDRD: ABNORMAL ML/MIN/{1.73_M2}
GFR SERPL CREATININE-BSD FRML MDRD: ABNORMAL ML/MIN/{1.73_M2}
GLUCOSE BLD-MCNC: 144 MG/DL (ref 70–99)
INR BLD: 3.2
POTASSIUM SERPL-SCNC: 4.8 MMOL/L (ref 3.7–5.3)
PROTIME: 38.6 SECONDS
SODIUM BLD-SCNC: 139 MMOL/L (ref 135–144)

## 2021-06-08 PROCEDURE — 80048 BASIC METABOLIC PNL TOTAL CA: CPT

## 2021-06-08 PROCEDURE — 36415 COLL VENOUS BLD VENIPUNCTURE: CPT

## 2021-06-08 PROCEDURE — 85610 PROTHROMBIN TIME: CPT

## 2021-06-08 PROCEDURE — 99212 OFFICE O/P EST SF 10 MIN: CPT

## 2021-06-08 NOTE — PROGRESS NOTES
Patient seen in clinic for warfarin management due to atrial fibrillation with an INR goal of 2.0-3.0. Estimated duration of therapy is indefinite. Patient states compliant all of the time with regimen. No bleeding or thromboembolic side effects noted. No significant med or dietary changes. No significant recent illness or disease state changes. PT/INR done in office per protocol. INR is 3.2 which is just above goal.     Warfarin regimen will be decreased to 1.25mg mon/wed/sat and 2.5mg all other days as INR has been on the high end of goal for the last few visits. Will retest in 2 weeks. Patient understands dosing directions and information discussed. Dosing schedule and follow up appointment given to patient. Progress note routed to referring physicians office. Discussed with patient the Pharmacist Collaborative Practice Agreement. Patient provided verbal and/or electronic (ex. [employer]hart) consent to participate in the collaborative practice agreement between the pharmacist and referred patient. This is in lieu of paper consent due to COVID-19 precautions and the use of remote/virtual visits.        For Pharmacy Admin Tracking Only     Intervention Detail: Dose Adjustment: 1: reason: Therapy De-escalation   Total # of Interventions Recommended: 1   Total # of Interventions Accepted: 1   Time Spent (min): 15

## 2021-06-23 ENCOUNTER — HOSPITAL ENCOUNTER (OUTPATIENT)
Age: 71
Setting detail: SPECIMEN
Discharge: HOME | End: 2021-06-23
Payer: MEDICARE

## 2021-06-23 ENCOUNTER — HOSPITAL ENCOUNTER (OUTPATIENT)
Dept: PHARMACY | Age: 71
Setting detail: THERAPIES SERIES
Discharge: HOME | End: 2021-06-23
Payer: MEDICARE

## 2021-06-23 DIAGNOSIS — I48.91 ATRIAL FIBRILLATION, UNSPECIFIED TYPE (HCC): Primary | ICD-10-CM

## 2021-06-23 LAB
ABSOLUTE EOS #: 0.12 K/UL (ref 0–0.44)
ABSOLUTE IMMATURE GRANULOCYTE: 0.03 K/UL (ref 0–0.3)
ABSOLUTE LYMPH #: 0.87 K/UL (ref 1.1–3.7)
ABSOLUTE MONO #: 0.49 K/UL (ref 0.1–1.2)
AFP: 8.4 UG/L
ALBUMIN SERPL-MCNC: 3.8 G/DL (ref 3.5–5.2)
ALBUMIN/GLOBULIN RATIO: 1.2 (ref 1–2.5)
ALP BLD-CCNC: 72 U/L (ref 35–104)
ALT SERPL-CCNC: 27 U/L (ref 5–33)
ANION GAP SERPL CALCULATED.3IONS-SCNC: 9 MMOL/L (ref 9–17)
ANION GAP SERPL CALCULATED.3IONS-SCNC: 9 MMOL/L (ref 9–17)
AST SERPL-CCNC: 38 U/L
BASOPHILS # BLD: 1 % (ref 0–2)
BASOPHILS ABSOLUTE: 0.07 K/UL (ref 0–0.2)
BILIRUB SERPL-MCNC: 0.92 MG/DL (ref 0.3–1.2)
BILIRUBIN DIRECT: 0.29 MG/DL
BILIRUBIN, INDIRECT: 0.63 MG/DL (ref 0–1)
BUN BLDV-MCNC: 17 MG/DL (ref 8–23)
BUN BLDV-MCNC: 17 MG/DL (ref 8–23)
BUN/CREAT BLD: ABNORMAL (ref 9–20)
BUN/CREAT BLD: ABNORMAL (ref 9–20)
CALCIUM SERPL-MCNC: 9.5 MG/DL (ref 8.6–10.4)
CALCIUM SERPL-MCNC: 9.5 MG/DL (ref 8.6–10.4)
CHLORIDE BLD-SCNC: 100 MMOL/L (ref 98–107)
CHLORIDE BLD-SCNC: 100 MMOL/L (ref 98–107)
CO2: 26 MMOL/L (ref 20–31)
CO2: 26 MMOL/L (ref 20–31)
CREAT SERPL-MCNC: 1.74 MG/DL (ref 0.5–0.9)
CREAT SERPL-MCNC: 1.74 MG/DL (ref 0.5–0.9)
DIFFERENTIAL TYPE: ABNORMAL
EOSINOPHILS RELATIVE PERCENT: 2 % (ref 1–4)
GFR AFRICAN AMERICAN: 35 ML/MIN
GFR AFRICAN AMERICAN: 35 ML/MIN
GFR NON-AFRICAN AMERICAN: 29 ML/MIN
GFR NON-AFRICAN AMERICAN: 29 ML/MIN
GFR SERPL CREATININE-BSD FRML MDRD: ABNORMAL ML/MIN/{1.73_M2}
GLOBULIN: ABNORMAL G/DL (ref 1.5–3.8)
GLUCOSE BLD-MCNC: 133 MG/DL (ref 70–99)
GLUCOSE BLD-MCNC: 133 MG/DL (ref 70–99)
HCT VFR BLD CALC: 41.9 % (ref 36.3–47.1)
HEMOGLOBIN: 13.6 G/DL (ref 11.9–15.1)
IMMATURE GRANULOCYTES: 1 %
INR BLD: 2.4
LYMPHOCYTES # BLD: 15 % (ref 24–43)
MCH RBC QN AUTO: 35.4 PG (ref 25.2–33.5)
MCHC RBC AUTO-ENTMCNC: 32.5 G/DL (ref 28.4–34.8)
MCV RBC AUTO: 109.1 FL (ref 82.6–102.9)
MONOCYTES # BLD: 9 % (ref 3–12)
NRBC AUTOMATED: 0 PER 100 WBC
PDW BLD-RTO: 14.2 % (ref 11.8–14.4)
PLATELET # BLD: 171 K/UL (ref 138–453)
PLATELET ESTIMATE: ABNORMAL
PMV BLD AUTO: 11.4 FL (ref 8.1–13.5)
POTASSIUM SERPL-SCNC: 5 MMOL/L (ref 3.7–5.3)
POTASSIUM SERPL-SCNC: 5 MMOL/L (ref 3.7–5.3)
PROTIME: 29 SECONDS
RBC # BLD: 3.84 M/UL (ref 3.95–5.11)
RBC # BLD: ABNORMAL 10*6/UL
SEG NEUTROPHILS: 72 % (ref 36–65)
SEGMENTED NEUTROPHILS ABSOLUTE COUNT: 4.14 K/UL (ref 1.5–8.1)
SODIUM BLD-SCNC: 135 MMOL/L (ref 135–144)
SODIUM BLD-SCNC: 135 MMOL/L (ref 135–144)
TOTAL PROTEIN: 7.1 G/DL (ref 6.4–8.3)
WBC # BLD: 5.7 K/UL (ref 3.5–11.3)
WBC # BLD: ABNORMAL 10*3/UL

## 2021-06-23 PROCEDURE — 85610 PROTHROMBIN TIME: CPT

## 2021-06-23 PROCEDURE — 82105 ALPHA-FETOPROTEIN SERUM: CPT

## 2021-06-23 PROCEDURE — 99211 OFF/OP EST MAY X REQ PHY/QHP: CPT

## 2021-06-23 PROCEDURE — 80076 HEPATIC FUNCTION PANEL: CPT

## 2021-06-23 PROCEDURE — 85025 COMPLETE CBC W/AUTO DIFF WBC: CPT

## 2021-06-23 PROCEDURE — 80048 BASIC METABOLIC PNL TOTAL CA: CPT

## 2021-06-29 ENCOUNTER — HOSPITAL ENCOUNTER (OUTPATIENT)
Dept: ULTRASOUND IMAGING | Age: 71
Discharge: HOME | End: 2021-07-01
Payer: MEDICARE

## 2021-06-29 DIAGNOSIS — K74.60 HEPATIC CIRRHOSIS, UNSPECIFIED HEPATIC CIRRHOSIS TYPE, UNSPECIFIED WHETHER ASCITES PRESENT (HCC): ICD-10-CM

## 2021-06-29 PROCEDURE — 76705 ECHO EXAM OF ABDOMEN: CPT

## 2021-07-21 ENCOUNTER — HOSPITAL ENCOUNTER (OUTPATIENT)
Dept: PHARMACY | Age: 71
Setting detail: THERAPIES SERIES
Discharge: HOME | End: 2021-07-21
Payer: MEDICARE

## 2021-07-21 DIAGNOSIS — I48.91 ATRIAL FIBRILLATION, UNSPECIFIED TYPE (HCC): Primary | ICD-10-CM

## 2021-07-21 LAB
INR BLD: 1.7
PROTIME: 20.7 SECONDS

## 2021-07-21 PROCEDURE — 99212 OFFICE O/P EST SF 10 MIN: CPT

## 2021-07-21 PROCEDURE — 85610 PROTHROMBIN TIME: CPT

## 2021-07-21 RX ORDER — LEVOTHYROXINE SODIUM 0.1 MG/1
TABLET ORAL
COMMUNITY
Start: 2021-04-25 | End: 2021-10-26

## 2021-07-21 NOTE — PROGRESS NOTES
Patient seen in clinic for warfarin management due to atrial fibrillation with an INR goal of 2.0-3.0. Estimated duration of therapy is indefinite. Patient states compliant all of the time with regimen. No bleeding or thromboembolic side effects noted. No significant med changes - patient did have more veggies than usual as well as a high protein steak. No significant recent illness or disease state changes. PT/INR done in office per protocol. INR is 1.7 which is subtherapeutic possibly from recent dietary intake. Warfarin regimen will be increased to 2.5mg x 1 then continue 1.25mg Mon/Wed/Sat and 2.5mg all other days. Will retest in 2 weeks to assess. Patient understands dosing directions and information discussed. Dosing schedule and follow up appointment given to patient. Progress note routed to referring physicians office. Discussed with patient the Pharmacist Collaborative Practice Agreement. Patient provided verbal and/or electronic (ex. Wild Brainhart) consent to participate in the collaborative practice agreement between the pharmacist and referred patient. This is in lieu of paper consent due to COVID-19 precautions and the use of remote/virtual visits.        For Pharmacy Admin Tracking Only     Intervention Detail: Dose Adjustment: 1, reason: Therapy Optimization   Total # of Interventions Recommended: 1   Total # of Interventions Accepted: 1   Time Spent (min): 15

## 2021-08-04 ENCOUNTER — HOSPITAL ENCOUNTER (OUTPATIENT)
Dept: PHARMACY | Age: 71
Setting detail: THERAPIES SERIES
Discharge: HOME | End: 2021-08-04
Payer: MEDICARE

## 2021-08-04 ENCOUNTER — HOSPITAL ENCOUNTER (OUTPATIENT)
Age: 71
Setting detail: SPECIMEN
Discharge: HOME | End: 2021-08-04
Payer: MEDICARE

## 2021-08-04 DIAGNOSIS — I48.91 ATRIAL FIBRILLATION, UNSPECIFIED TYPE (HCC): Primary | ICD-10-CM

## 2021-08-04 LAB
ALBUMIN SERPL-MCNC: 3.7 G/DL (ref 3.5–5.2)
ALBUMIN/GLOBULIN RATIO: 1.2 (ref 1–2.5)
ALP BLD-CCNC: 82 U/L (ref 35–104)
ALT SERPL-CCNC: 26 U/L (ref 5–33)
ANION GAP SERPL CALCULATED.3IONS-SCNC: 15 MMOL/L (ref 9–17)
AST SERPL-CCNC: 37 U/L
BILIRUB SERPL-MCNC: 1.03 MG/DL (ref 0.3–1.2)
BUN BLDV-MCNC: 14 MG/DL (ref 8–23)
BUN/CREAT BLD: ABNORMAL (ref 9–20)
CALCIUM SERPL-MCNC: 9.4 MG/DL (ref 8.6–10.4)
CHLORIDE BLD-SCNC: 103 MMOL/L (ref 98–107)
CHOLESTEROL, FASTING: 153 MG/DL
CHOLESTEROL/HDL RATIO: 2
CO2: 23 MMOL/L (ref 20–31)
CREAT SERPL-MCNC: 1.62 MG/DL (ref 0.5–0.9)
GFR AFRICAN AMERICAN: 38 ML/MIN
GFR NON-AFRICAN AMERICAN: 31 ML/MIN
GFR SERPL CREATININE-BSD FRML MDRD: ABNORMAL ML/MIN/{1.73_M2}
GFR SERPL CREATININE-BSD FRML MDRD: ABNORMAL ML/MIN/{1.73_M2}
GLUCOSE BLD-MCNC: 136 MG/DL (ref 70–99)
HCT VFR BLD CALC: 41.8 % (ref 36.3–47.1)
HDLC SERPL-MCNC: 76 MG/DL
HEMOGLOBIN: 13.5 G/DL (ref 11.9–15.1)
INR BLD: 2.2
INR BLD: 2.3
LDL CHOLESTEROL: 57 MG/DL (ref 0–130)
MCH RBC QN AUTO: 35.2 PG (ref 25.2–33.5)
MCHC RBC AUTO-ENTMCNC: 32.3 G/DL (ref 28.4–34.8)
MCV RBC AUTO: 109.1 FL (ref 82.6–102.9)
NRBC AUTOMATED: 0 PER 100 WBC
PDW BLD-RTO: 13.5 % (ref 11.8–14.4)
PLATELET # BLD: 159 K/UL (ref 138–453)
PMV BLD AUTO: 11.4 FL (ref 8.1–13.5)
POTASSIUM SERPL-SCNC: 4.4 MMOL/L (ref 3.7–5.3)
PROTHROMBIN TIME: 23.9 SEC (ref 11.5–14.2)
PROTIME: 27.1 SECONDS
RBC # BLD: 3.83 M/UL (ref 3.95–5.11)
SODIUM BLD-SCNC: 141 MMOL/L (ref 135–144)
TOTAL PROTEIN: 6.9 G/DL (ref 6.4–8.3)
TRIGLYCERIDE, FASTING: 99 MG/DL
VLDLC SERPL CALC-MCNC: NORMAL MG/DL (ref 1–30)
WBC # BLD: 5.9 K/UL (ref 3.5–11.3)

## 2021-08-04 PROCEDURE — 85610 PROTHROMBIN TIME: CPT

## 2021-08-04 PROCEDURE — 85027 COMPLETE CBC AUTOMATED: CPT

## 2021-08-04 PROCEDURE — 80053 COMPREHEN METABOLIC PANEL: CPT

## 2021-08-04 PROCEDURE — 99211 OFF/OP EST MAY X REQ PHY/QHP: CPT

## 2021-08-04 PROCEDURE — 36415 COLL VENOUS BLD VENIPUNCTURE: CPT

## 2021-08-04 PROCEDURE — 80061 LIPID PANEL: CPT

## 2021-09-01 ENCOUNTER — HOSPITAL ENCOUNTER (OUTPATIENT)
Age: 71
Setting detail: SPECIMEN
Discharge: HOME | End: 2021-09-01
Payer: MEDICARE

## 2021-09-01 ENCOUNTER — HOSPITAL ENCOUNTER (OUTPATIENT)
Dept: PHARMACY | Age: 71
Setting detail: THERAPIES SERIES
Discharge: HOME | End: 2021-09-01
Payer: MEDICARE

## 2021-09-01 DIAGNOSIS — I48.91 ATRIAL FIBRILLATION, UNSPECIFIED TYPE (HCC): Primary | ICD-10-CM

## 2021-09-01 LAB
-: ABNORMAL
ABSOLUTE EOS #: 0.1 K/UL (ref 0–0.44)
ABSOLUTE IMMATURE GRANULOCYTE: 0.01 K/UL (ref 0–0.3)
ABSOLUTE LYMPH #: 1.07 K/UL (ref 1.1–3.7)
ABSOLUTE MONO #: 0.68 K/UL (ref 0.1–1.2)
ALBUMIN SERPL-MCNC: 3.6 G/DL (ref 3.5–5.2)
AMORPHOUS: ABNORMAL
ANION GAP SERPL CALCULATED.3IONS-SCNC: 15 MMOL/L (ref 9–17)
BACTERIA: ABNORMAL
BASOPHILS # BLD: 0 % (ref 0–2)
BASOPHILS ABSOLUTE: <0.03 K/UL (ref 0–0.2)
BILIRUBIN URINE: NEGATIVE
BUN BLDV-MCNC: 18 MG/DL (ref 8–23)
BUN/CREAT BLD: ABNORMAL (ref 9–20)
CALCIUM SERPL-MCNC: 9.1 MG/DL (ref 8.6–10.4)
CASTS UA: ABNORMAL /LPF (ref 0–2)
CHLORIDE BLD-SCNC: 110 MMOL/L (ref 98–107)
CO2: 16 MMOL/L (ref 20–31)
COLOR: YELLOW
COMMENT UA: ABNORMAL
CREAT SERPL-MCNC: 2.23 MG/DL (ref 0.5–0.9)
CREATININE URINE: 174.5 MG/DL (ref 28–217)
CRYSTALS, UA: ABNORMAL /HPF
DIFFERENTIAL TYPE: ABNORMAL
EOSINOPHILS RELATIVE PERCENT: 2 % (ref 1–4)
EPITHELIAL CELLS UA: ABNORMAL /HPF (ref 0–5)
GFR AFRICAN AMERICAN: 26 ML/MIN
GFR NON-AFRICAN AMERICAN: 22 ML/MIN
GFR SERPL CREATININE-BSD FRML MDRD: ABNORMAL ML/MIN/{1.73_M2}
GFR SERPL CREATININE-BSD FRML MDRD: ABNORMAL ML/MIN/{1.73_M2}
GLUCOSE BLD-MCNC: 141 MG/DL (ref 70–99)
GLUCOSE URINE: NEGATIVE
HCT VFR BLD CALC: 40.5 % (ref 36.3–47.1)
HEMOGLOBIN: 13.1 G/DL (ref 11.9–15.1)
IMMATURE GRANULOCYTES: 0 %
INR BLD: 2.6
KETONES, URINE: NEGATIVE
LEUKOCYTE ESTERASE, URINE: ABNORMAL
LYMPHOCYTES # BLD: 16 % (ref 24–43)
MAGNESIUM: 2.2 MG/DL (ref 1.6–2.6)
MCH RBC QN AUTO: 35.1 PG (ref 25.2–33.5)
MCHC RBC AUTO-ENTMCNC: 32.3 G/DL (ref 28.4–34.8)
MCV RBC AUTO: 108.6 FL (ref 82.6–102.9)
MICROALBUMIN/CREAT 24H UR: 507 MG/L
MICROALBUMIN/CREAT UR-RTO: 291 MCG/MG CREAT
MONOCYTES # BLD: 10 % (ref 3–12)
MUCUS: ABNORMAL
NITRITE, URINE: NEGATIVE
NRBC AUTOMATED: ABNORMAL PER 100 WBC
OTHER OBSERVATIONS UA: ABNORMAL
PDW BLD-RTO: 13.5 % (ref 11.8–14.4)
PH UA: 5.5 (ref 5–8)
PHOSPHORUS: 3.1 MG/DL (ref 2.6–4.5)
PLATELET # BLD: 150 K/UL (ref 138–453)
PLATELET ESTIMATE: ABNORMAL
PMV BLD AUTO: 10.9 FL (ref 8.1–13.5)
POTASSIUM SERPL-SCNC: 3.8 MMOL/L (ref 3.7–5.3)
PROTEIN UA: ABNORMAL
PROTIME: 31.2 SECONDS
PTH INTACT: 131 PG/ML (ref 15–65)
RBC # BLD: 3.73 M/UL (ref 3.95–5.11)
RBC # BLD: ABNORMAL 10*6/UL
RBC UA: ABNORMAL /HPF (ref 0–2)
RENAL EPITHELIAL, UA: ABNORMAL /HPF
SEG NEUTROPHILS: 71 % (ref 36–65)
SEGMENTED NEUTROPHILS ABSOLUTE COUNT: 4.65 K/UL (ref 1.5–8.1)
SODIUM BLD-SCNC: 141 MMOL/L (ref 135–144)
SPECIFIC GRAVITY UA: 1.02 (ref 1–1.03)
TRICHOMONAS: ABNORMAL
TURBIDITY: ABNORMAL
URINE HGB: ABNORMAL
UROBILINOGEN, URINE: NORMAL
VITAMIN D 25-HYDROXY: 38.1 NG/ML (ref 30–100)
WBC # BLD: 6.5 K/UL (ref 3.5–11.3)
WBC # BLD: ABNORMAL 10*3/UL
WBC UA: ABNORMAL /HPF (ref 0–5)
YEAST: ABNORMAL

## 2021-09-01 PROCEDURE — 85025 COMPLETE CBC W/AUTO DIFF WBC: CPT

## 2021-09-01 PROCEDURE — 82040 ASSAY OF SERUM ALBUMIN: CPT

## 2021-09-01 PROCEDURE — 82306 VITAMIN D 25 HYDROXY: CPT

## 2021-09-01 PROCEDURE — 82570 ASSAY OF URINE CREATININE: CPT

## 2021-09-01 PROCEDURE — 85610 PROTHROMBIN TIME: CPT

## 2021-09-01 PROCEDURE — 36415 COLL VENOUS BLD VENIPUNCTURE: CPT

## 2021-09-01 PROCEDURE — 81001 URINALYSIS AUTO W/SCOPE: CPT

## 2021-09-01 PROCEDURE — 84100 ASSAY OF PHOSPHORUS: CPT

## 2021-09-01 PROCEDURE — 82043 UR ALBUMIN QUANTITATIVE: CPT

## 2021-09-01 PROCEDURE — 99211 OFF/OP EST MAY X REQ PHY/QHP: CPT

## 2021-09-01 PROCEDURE — 83735 ASSAY OF MAGNESIUM: CPT

## 2021-09-01 PROCEDURE — 80048 BASIC METABOLIC PNL TOTAL CA: CPT

## 2021-09-01 PROCEDURE — 83970 ASSAY OF PARATHORMONE: CPT

## 2021-09-08 ENCOUNTER — HOSPITAL ENCOUNTER (OUTPATIENT)
Age: 71
Setting detail: SPECIMEN
Discharge: HOME | End: 2021-09-08
Payer: MEDICARE

## 2021-09-08 LAB
ANION GAP SERPL CALCULATED.3IONS-SCNC: 17 MMOL/L (ref 9–17)
BUN BLDV-MCNC: 21 MG/DL (ref 8–23)
BUN/CREAT BLD: ABNORMAL (ref 9–20)
CALCIUM SERPL-MCNC: 9.5 MG/DL (ref 8.6–10.4)
CHLORIDE BLD-SCNC: 101 MMOL/L (ref 98–107)
CO2: 21 MMOL/L (ref 20–31)
CREAT SERPL-MCNC: 2.89 MG/DL (ref 0.5–0.9)
GFR AFRICAN AMERICAN: 19 ML/MIN
GFR NON-AFRICAN AMERICAN: 16 ML/MIN
GFR SERPL CREATININE-BSD FRML MDRD: ABNORMAL ML/MIN/{1.73_M2}
GFR SERPL CREATININE-BSD FRML MDRD: ABNORMAL ML/MIN/{1.73_M2}
GLUCOSE BLD-MCNC: 151 MG/DL (ref 70–99)
POTASSIUM SERPL-SCNC: 3.9 MMOL/L (ref 3.7–5.3)
SODIUM BLD-SCNC: 139 MMOL/L (ref 135–144)

## 2021-09-08 PROCEDURE — 36415 COLL VENOUS BLD VENIPUNCTURE: CPT

## 2021-09-08 PROCEDURE — 80048 BASIC METABOLIC PNL TOTAL CA: CPT

## 2021-09-21 ENCOUNTER — HOSPITAL ENCOUNTER (OUTPATIENT)
Age: 71
Discharge: HOME | End: 2021-09-21
Payer: MEDICARE

## 2021-09-21 LAB
ANION GAP SERPL CALCULATED.3IONS-SCNC: 18 MMOL/L (ref 9–17)
BUN BLDV-MCNC: 25 MG/DL (ref 8–23)
BUN/CREAT BLD: ABNORMAL (ref 9–20)
CALCIUM SERPL-MCNC: 9.3 MG/DL (ref 8.6–10.4)
CHLORIDE BLD-SCNC: 103 MMOL/L (ref 98–107)
CO2: 16 MMOL/L (ref 20–31)
CREAT SERPL-MCNC: 2.8 MG/DL (ref 0.5–0.9)
GFR AFRICAN AMERICAN: 20 ML/MIN
GFR NON-AFRICAN AMERICAN: 17 ML/MIN
GFR SERPL CREATININE-BSD FRML MDRD: ABNORMAL ML/MIN/{1.73_M2}
GFR SERPL CREATININE-BSD FRML MDRD: ABNORMAL ML/MIN/{1.73_M2}
GLUCOSE BLD-MCNC: 130 MG/DL (ref 70–99)
POTASSIUM SERPL-SCNC: 4.2 MMOL/L (ref 3.7–5.3)
SODIUM BLD-SCNC: 137 MMOL/L (ref 135–144)

## 2021-09-21 PROCEDURE — 80048 BASIC METABOLIC PNL TOTAL CA: CPT

## 2021-09-21 PROCEDURE — 36415 COLL VENOUS BLD VENIPUNCTURE: CPT

## 2021-09-29 ENCOUNTER — HOSPITAL ENCOUNTER (OUTPATIENT)
Age: 71
Setting detail: SPECIMEN
Discharge: HOME | End: 2021-09-29
Payer: MEDICARE

## 2021-09-29 ENCOUNTER — HOSPITAL ENCOUNTER (OUTPATIENT)
Dept: PHARMACY | Age: 71
Setting detail: THERAPIES SERIES
Discharge: HOME | End: 2021-09-29
Payer: MEDICARE

## 2021-09-29 DIAGNOSIS — I48.91 ATRIAL FIBRILLATION, UNSPECIFIED TYPE (HCC): Primary | ICD-10-CM

## 2021-09-29 LAB
ALBUMIN SERPL-MCNC: 3.9 G/DL (ref 3.5–5.2)
ALBUMIN/GLOBULIN RATIO: 1.1 (ref 1–2.5)
ALP BLD-CCNC: 83 U/L (ref 35–104)
ALT SERPL-CCNC: 20 U/L (ref 5–33)
ANION GAP SERPL CALCULATED.3IONS-SCNC: 15 MMOL/L (ref 9–17)
AST SERPL-CCNC: 30 U/L
BILIRUB SERPL-MCNC: 0.72 MG/DL (ref 0.3–1.2)
BNP INTERPRETATION: ABNORMAL
BUN BLDV-MCNC: 17 MG/DL (ref 8–23)
BUN/CREAT BLD: ABNORMAL (ref 9–20)
CALCIUM SERPL-MCNC: 9.6 MG/DL (ref 8.6–10.4)
CHLORIDE BLD-SCNC: 106 MMOL/L (ref 98–107)
CO2: 15 MMOL/L (ref 20–31)
CREAT SERPL-MCNC: 2.61 MG/DL (ref 0.5–0.9)
FERRITIN: 163 UG/L (ref 13–150)
FOLATE: >20 NG/ML
GFR AFRICAN AMERICAN: 22 ML/MIN
GFR NON-AFRICAN AMERICAN: 18 ML/MIN
GFR SERPL CREATININE-BSD FRML MDRD: ABNORMAL ML/MIN/{1.73_M2}
GFR SERPL CREATININE-BSD FRML MDRD: ABNORMAL ML/MIN/{1.73_M2}
GLUCOSE BLD-MCNC: 141 MG/DL (ref 70–99)
HCT VFR BLD CALC: 40.9 % (ref 36.3–47.1)
HEMOGLOBIN: 13.9 G/DL (ref 11.9–15.1)
INR BLD: 2
IRON SATURATION: 41 % (ref 20–55)
IRON: 107 UG/DL (ref 37–145)
MCH RBC QN AUTO: 34 PG (ref 25.2–33.5)
MCHC RBC AUTO-ENTMCNC: 34 G/DL (ref 28.4–34.8)
MCV RBC AUTO: 100 FL (ref 82.6–102.9)
NRBC AUTOMATED: 0 PER 100 WBC
PDW BLD-RTO: 12.9 % (ref 11.8–14.4)
PLATELET # BLD: 214 K/UL (ref 138–453)
PMV BLD AUTO: 11.5 FL (ref 8.1–13.5)
POTASSIUM SERPL-SCNC: 4.2 MMOL/L (ref 3.7–5.3)
PRO-BNP: 507 PG/ML
PROTIME: 23.4 SECONDS
RBC # BLD: 4.09 M/UL (ref 3.95–5.11)
SODIUM BLD-SCNC: 136 MMOL/L (ref 135–144)
THYROXINE, FREE: 1.62 NG/DL (ref 0.93–1.7)
TOTAL IRON BINDING CAPACITY: 263 UG/DL (ref 250–450)
TOTAL PROTEIN: 7.6 G/DL (ref 6.4–8.3)
TSH SERPL DL<=0.05 MIU/L-ACNC: 0.28 MIU/L (ref 0.3–5)
UNSATURATED IRON BINDING CAPACITY: 156 UG/DL (ref 112–347)
VITAMIN B-12: 762 PG/ML (ref 232–1245)
VITAMIN D 25-HYDROXY: 38.4 NG/ML (ref 30–100)
WBC # BLD: 11.8 K/UL (ref 3.5–11.3)

## 2021-09-29 PROCEDURE — 99211 OFF/OP EST MAY X REQ PHY/QHP: CPT

## 2021-09-29 PROCEDURE — 82746 ASSAY OF FOLIC ACID SERUM: CPT

## 2021-09-29 PROCEDURE — 82728 ASSAY OF FERRITIN: CPT

## 2021-09-29 PROCEDURE — 36415 COLL VENOUS BLD VENIPUNCTURE: CPT

## 2021-09-29 PROCEDURE — 80053 COMPREHEN METABOLIC PANEL: CPT

## 2021-09-29 PROCEDURE — 85610 PROTHROMBIN TIME: CPT

## 2021-09-29 PROCEDURE — 83550 IRON BINDING TEST: CPT

## 2021-09-29 PROCEDURE — 83880 ASSAY OF NATRIURETIC PEPTIDE: CPT

## 2021-09-29 PROCEDURE — 83540 ASSAY OF IRON: CPT

## 2021-09-29 PROCEDURE — 85027 COMPLETE CBC AUTOMATED: CPT

## 2021-09-29 PROCEDURE — 84443 ASSAY THYROID STIM HORMONE: CPT

## 2021-09-29 PROCEDURE — 82607 VITAMIN B-12: CPT

## 2021-09-29 PROCEDURE — 84439 ASSAY OF FREE THYROXINE: CPT

## 2021-09-29 PROCEDURE — 82306 VITAMIN D 25 HYDROXY: CPT

## 2021-09-29 NOTE — PROGRESS NOTES
Patient seen in clinic for warfarin management due to atrial fibrillation with an INR goal of 2.0-3.0.  Estimated duration of therapy is indefinite. Patient states compliant all of the time with regimen. No bleeding or thromboembolic side effects noted. No significant med or dietary changes. No significant recent illness or disease state changes. Patient reports leg and back pain due to a fall a couple weeks ago. She did not hit her head. PT/INR done in office per protocol. INR is 2.0 which is therapeutic. Warfarin regimen will be continued at current dose of 1.25mg mon/wed/sat and 2.5mg all other days. Will retest in 4 weeks. Patient understands dosing directions and information discussed. Dosing schedule and follow up appointment given to patient. Progress note routed to referring physicians office. Discussed with patient the Pharmacist Collaborative Practice Agreement. Patient provided verbal and/or electronic (ex. [employer]) consent to participate in the collaborative practice agreement between the pharmacist and referred patient. This is in lieu of paper consent due to COVID-19 precautions and the use of remote/virtual visits.        For Pharmacy Admin Tracking Only     Intervention Detail:    Total # of Interventions Recommended: 0   Total # of Interventions Accepted: 0   Time Spent (min): 15

## 2021-10-18 ENCOUNTER — HOSPITAL ENCOUNTER (OUTPATIENT)
Dept: GENERAL RADIOLOGY | Age: 71
Discharge: HOME | End: 2021-10-20
Payer: MEDICARE

## 2021-10-18 ENCOUNTER — HOSPITAL ENCOUNTER (OUTPATIENT)
Age: 71
Discharge: HOME | End: 2021-10-20
Payer: MEDICARE

## 2021-10-18 DIAGNOSIS — J84.10 POSTINFLAMMATORY PULMONARY FIBROSIS (HCC): ICD-10-CM

## 2021-10-18 DIAGNOSIS — I50.9 CONGESTIVE HEART FAILURE, UNSPECIFIED HF CHRONICITY, UNSPECIFIED HEART FAILURE TYPE (HCC): ICD-10-CM

## 2021-10-18 DIAGNOSIS — J44.9 CHRONIC OBSTRUCTIVE PULMONARY DISEASE, UNSPECIFIED COPD TYPE (HCC): ICD-10-CM

## 2021-10-18 DIAGNOSIS — J69.0 ASPIRATION PNEUMONIA, UNSPECIFIED ASPIRATION PNEUMONIA TYPE, UNSPECIFIED LATERALITY, UNSPECIFIED PART OF LUNG (HCC): ICD-10-CM

## 2021-10-18 PROCEDURE — 71046 X-RAY EXAM CHEST 2 VIEWS: CPT

## 2021-10-26 ENCOUNTER — HOSPITAL ENCOUNTER (OUTPATIENT)
Dept: PHARMACY | Age: 71
Setting detail: THERAPIES SERIES
Discharge: HOME | End: 2021-10-26
Payer: MEDICARE

## 2021-10-26 DIAGNOSIS — I48.91 ATRIAL FIBRILLATION, UNSPECIFIED TYPE (HCC): Primary | ICD-10-CM

## 2021-10-26 LAB
INR BLD: 5.3
PROTIME: 63.6 SECONDS

## 2021-10-26 PROCEDURE — 99212 OFFICE O/P EST SF 10 MIN: CPT

## 2021-10-26 PROCEDURE — 85610 PROTHROMBIN TIME: CPT

## 2021-10-26 PROCEDURE — G0008 ADMIN INFLUENZA VIRUS VAC: HCPCS | Performed by: INTERNAL MEDICINE

## 2021-10-26 PROCEDURE — 6360000002 HC RX W HCPCS: Performed by: INTERNAL MEDICINE

## 2021-10-26 PROCEDURE — 90686 IIV4 VACC NO PRSV 0.5 ML IM: CPT | Performed by: INTERNAL MEDICINE

## 2021-10-26 RX ORDER — LEVOTHYROXINE SODIUM 88 UG/1
TABLET ORAL
COMMUNITY
Start: 2021-10-15 | End: 2021-11-08 | Stop reason: DRUGHIGH

## 2021-10-26 RX ADMIN — INFLUENZA A VIRUS A/SINGAPORE/GP1908/2015 IVR-180A (H1N1) ANTIGEN (PROPIOLACTONE INACTIVATED), INFLUENZA A VIRUS A/HONG KONG/4801/2014 X-263B (H3N2) ANTIGEN (PROPIOLACTONE INACTIVATED), INFLUENZA B VIRUS B/BRISBANE/46/2015 ANTIGEN (PROPIOLACTONE INACTIVATED), AND INFLUENZA B VIRUS B/PHUKET/3073/2013 BVR-1B ANTIGEN (PROPIOLACTONE INACTIVATED) 0.5 ML: 15; 15; 15; 15 INJECTION, SUSPENSION INTRAMUSCULAR at 10:10

## 2021-10-26 NOTE — PROGRESS NOTES
Patient seen in clinic for warfarin management due to atrial fibrillation with an INR goal of 2.0-3.0. Estimated duration of therapy is indefinite. Patient states compliant all of the time with regimen. No bleeding or thromboembolic side effects noted. No significant med or dietary changes. Patient states her kidney function has declined and her provider is discussing dialysis. She has had increased stress levels regarding her health status. PT/INR done in office per protocol. INR is 5.3 which is supratherapeutic. Warfarin regimen will be held for 2 days, then continue 1.25mg Sat/Mon/Wed and 2.5mg all other days. Will retest in 1 week. Patient understands dosing directions and information discussed. Dosing schedule and follow up appointment given to patient. Progress note routed to referring physicians office. Discussed with patient the Pharmacist Collaborative Practice Agreement. Patient provided verbal and/or electronic (ex. [employer]) consent to participate in the collaborative practice agreement between the pharmacist and referred patient. This is in lieu of paper consent due to COVID-19 precautions and the use of remote/virtual visits.        For Pharmacy Admin Tracking Only     Intervention Detail: Dose Adjustment: 1, reason: Therapy De-escalation and Vaccine Recommended/Administered   Total # of Interventions Recommended: 2   Total # of Interventions Accepted: 2   Time Spent (min): 15

## 2021-11-03 ENCOUNTER — HOSPITAL ENCOUNTER (OUTPATIENT)
Age: 71
Setting detail: SPECIMEN
Discharge: HOME | End: 2021-11-03
Payer: MEDICARE

## 2021-11-03 ENCOUNTER — HOSPITAL ENCOUNTER (OUTPATIENT)
Dept: PHARMACY | Age: 71
Setting detail: THERAPIES SERIES
Discharge: HOME | DRG: 683 | End: 2021-11-03
Payer: MEDICARE

## 2021-11-03 DIAGNOSIS — Z86.718 HISTORY OF DVT (DEEP VEIN THROMBOSIS): ICD-10-CM

## 2021-11-03 DIAGNOSIS — I48.91 ATRIAL FIBRILLATION, UNSPECIFIED TYPE (HCC): Primary | ICD-10-CM

## 2021-11-03 LAB
-: ABNORMAL
ABSOLUTE EOS #: 0.06 K/UL (ref 0–0.44)
ABSOLUTE IMMATURE GRANULOCYTE: 0.03 K/UL (ref 0–0.3)
ABSOLUTE LYMPH #: 0.75 K/UL (ref 1.1–3.7)
ABSOLUTE MONO #: 0.75 K/UL (ref 0.1–1.2)
ALBUMIN SERPL-MCNC: 3.5 G/DL (ref 3.5–5.2)
AMORPHOUS: ABNORMAL
ANION GAP SERPL CALCULATED.3IONS-SCNC: 16 MMOL/L (ref 9–17)
BACTERIA: ABNORMAL
BASOPHILS # BLD: 1 % (ref 0–2)
BASOPHILS ABSOLUTE: 0.04 K/UL (ref 0–0.2)
BILIRUBIN URINE: NEGATIVE
BUN BLDV-MCNC: 9 MG/DL (ref 8–23)
BUN/CREAT BLD: ABNORMAL (ref 9–20)
CALCIUM SERPL-MCNC: 9.5 MG/DL (ref 8.6–10.4)
CASTS UA: ABNORMAL /LPF (ref 0–2)
CHLORIDE BLD-SCNC: 107 MMOL/L (ref 98–107)
CO2: 13 MMOL/L (ref 20–31)
COLOR: YELLOW
COMMENT UA: ABNORMAL
CREAT SERPL-MCNC: 2.89 MG/DL (ref 0.5–0.9)
CRYSTALS, UA: ABNORMAL /HPF
DIFFERENTIAL TYPE: ABNORMAL
EOSINOPHILS RELATIVE PERCENT: 1 % (ref 1–4)
EPITHELIAL CELLS UA: ABNORMAL /HPF (ref 0–5)
GFR AFRICAN AMERICAN: 19 ML/MIN
GFR NON-AFRICAN AMERICAN: 16 ML/MIN
GFR SERPL CREATININE-BSD FRML MDRD: ABNORMAL ML/MIN/{1.73_M2}
GFR SERPL CREATININE-BSD FRML MDRD: ABNORMAL ML/MIN/{1.73_M2}
GLUCOSE BLD-MCNC: 181 MG/DL (ref 70–99)
GLUCOSE URINE: ABNORMAL
HCT VFR BLD CALC: 45.4 % (ref 36.3–47.1)
HEMOGLOBIN: 14.8 G/DL (ref 11.9–15.1)
IMMATURE GRANULOCYTES: 0 %
INR BLD: 7.4
KETONES, URINE: NEGATIVE
LEUKOCYTE ESTERASE, URINE: NEGATIVE
LYMPHOCYTES # BLD: 10 % (ref 24–43)
MAGNESIUM: 2.1 MG/DL (ref 1.6–2.6)
MCH RBC QN AUTO: 33.3 PG (ref 25.2–33.5)
MCHC RBC AUTO-ENTMCNC: 32.6 G/DL (ref 28.4–34.8)
MCV RBC AUTO: 102 FL (ref 82.6–102.9)
MONOCYTES # BLD: 10 % (ref 3–12)
MUCUS: ABNORMAL
NITRITE, URINE: NEGATIVE
NRBC AUTOMATED: 0 PER 100 WBC
OTHER OBSERVATIONS UA: ABNORMAL
PDW BLD-RTO: 14.3 % (ref 11.8–14.4)
PH UA: 6 (ref 5–8)
PHOSPHORUS: 4.1 MG/DL (ref 2.6–4.5)
PLATELET # BLD: 187 K/UL (ref 138–453)
PLATELET ESTIMATE: ABNORMAL
PMV BLD AUTO: 11.6 FL (ref 8.1–13.5)
POTASSIUM SERPL-SCNC: 3.7 MMOL/L (ref 3.7–5.3)
PROTEIN UA: ABNORMAL
PROTHROMBIN TIME: 60.6 SEC (ref 11.5–14.2)
PTH INTACT: 126.3 PG/ML (ref 15–65)
RBC # BLD: 4.45 M/UL (ref 3.95–5.11)
RBC # BLD: ABNORMAL 10*6/UL
RBC UA: ABNORMAL /HPF (ref 0–2)
RENAL EPITHELIAL, UA: ABNORMAL /HPF
SEG NEUTROPHILS: 78 % (ref 36–65)
SEGMENTED NEUTROPHILS ABSOLUTE COUNT: 5.84 K/UL (ref 1.5–8.1)
SODIUM BLD-SCNC: 136 MMOL/L (ref 135–144)
SPECIFIC GRAVITY UA: 1.01 (ref 1–1.03)
TRICHOMONAS: ABNORMAL
TURBIDITY: CLEAR
URINE HGB: ABNORMAL
UROBILINOGEN, URINE: NORMAL
VITAMIN D 25-HYDROXY: 42.3 NG/ML (ref 30–100)
WBC # BLD: 7.5 K/UL (ref 3.5–11.3)
WBC # BLD: ABNORMAL 10*3/UL
WBC UA: ABNORMAL /HPF (ref 0–5)
YEAST: ABNORMAL

## 2021-11-03 PROCEDURE — 82570 ASSAY OF URINE CREATININE: CPT

## 2021-11-03 PROCEDURE — 36415 COLL VENOUS BLD VENIPUNCTURE: CPT

## 2021-11-03 PROCEDURE — 83735 ASSAY OF MAGNESIUM: CPT

## 2021-11-03 PROCEDURE — 82306 VITAMIN D 25 HYDROXY: CPT

## 2021-11-03 PROCEDURE — 85610 PROTHROMBIN TIME: CPT

## 2021-11-03 PROCEDURE — 80048 BASIC METABOLIC PNL TOTAL CA: CPT

## 2021-11-03 PROCEDURE — 82043 UR ALBUMIN QUANTITATIVE: CPT

## 2021-11-03 PROCEDURE — 85025 COMPLETE CBC W/AUTO DIFF WBC: CPT

## 2021-11-03 PROCEDURE — 82040 ASSAY OF SERUM ALBUMIN: CPT

## 2021-11-03 PROCEDURE — 83970 ASSAY OF PARATHORMONE: CPT

## 2021-11-03 PROCEDURE — 81001 URINALYSIS AUTO W/SCOPE: CPT

## 2021-11-03 PROCEDURE — 99212 OFFICE O/P EST SF 10 MIN: CPT

## 2021-11-03 PROCEDURE — 84100 ASSAY OF PHOSPHORUS: CPT

## 2021-11-04 LAB
CREATININE URINE: 41.2 MG/DL (ref 28–217)
MICROALBUMIN/CREAT 24H UR: 141 MG/L
MICROALBUMIN/CREAT UR-RTO: 342 MCG/MG CREAT

## 2021-11-08 ENCOUNTER — HOSPITAL ENCOUNTER (INPATIENT)
Age: 71
LOS: 5 days | Discharge: NURSING, REHAB OR LONG-TERM CARE | DRG: 683 | End: 2021-11-13
Attending: EMERGENCY MEDICINE | Admitting: INTERNAL MEDICINE
Payer: MEDICARE

## 2021-11-08 ENCOUNTER — APPOINTMENT (OUTPATIENT)
Dept: CT IMAGING | Age: 71
DRG: 683 | End: 2021-11-08
Payer: MEDICARE

## 2021-11-08 ENCOUNTER — APPOINTMENT (OUTPATIENT)
Dept: GENERAL RADIOLOGY | Age: 71
DRG: 683 | End: 2021-11-08
Payer: MEDICARE

## 2021-11-08 ENCOUNTER — HOSPITAL ENCOUNTER (OUTPATIENT)
Dept: PHARMACY | Age: 71
Setting detail: THERAPIES SERIES
Discharge: HOME | DRG: 683 | End: 2021-11-08
Payer: MEDICARE

## 2021-11-08 DIAGNOSIS — G89.29 CHRONIC BACK PAIN, UNSPECIFIED BACK LOCATION, UNSPECIFIED BACK PAIN LATERALITY: ICD-10-CM

## 2021-11-08 DIAGNOSIS — I48.91 ATRIAL FIBRILLATION, UNSPECIFIED TYPE (HCC): Primary | ICD-10-CM

## 2021-11-08 DIAGNOSIS — R07.9 CHEST PAIN, UNSPECIFIED TYPE: Primary | ICD-10-CM

## 2021-11-08 DIAGNOSIS — M54.9 CHRONIC BACK PAIN, UNSPECIFIED BACK LOCATION, UNSPECIFIED BACK PAIN LATERALITY: ICD-10-CM

## 2021-11-08 PROBLEM — R77.8 ELEVATED TROPONIN: Status: ACTIVE | Noted: 2021-11-08

## 2021-11-08 PROBLEM — R79.1 SUPRATHERAPEUTIC INR: Status: ACTIVE | Noted: 2021-11-08

## 2021-11-08 PROBLEM — N17.9 AKI (ACUTE KIDNEY INJURY) (HCC): Status: ACTIVE | Noted: 2021-11-08

## 2021-11-08 PROBLEM — R79.89 ELEVATED TROPONIN: Status: ACTIVE | Noted: 2021-11-08

## 2021-11-08 LAB
ABSOLUTE EOS #: <0.03 K/UL (ref 0–0.44)
ABSOLUTE IMMATURE GRANULOCYTE: 0.04 K/UL (ref 0–0.3)
ABSOLUTE LYMPH #: 1.17 K/UL (ref 1.1–3.7)
ABSOLUTE MONO #: 0.78 K/UL (ref 0.1–1.2)
ALBUMIN SERPL-MCNC: 3.6 G/DL (ref 3.5–5.2)
ALBUMIN/GLOBULIN RATIO: ABNORMAL (ref 1–2.5)
ALP BLD-CCNC: 111 U/L (ref 35–104)
ALT SERPL-CCNC: 23 U/L (ref 5–33)
AMYLASE: 30 U/L (ref 28–100)
ANION GAP SERPL CALCULATED.3IONS-SCNC: 18 MMOL/L (ref 9–17)
AST SERPL-CCNC: 37 U/L
BASOPHILS # BLD: 1 % (ref 0–2)
BASOPHILS ABSOLUTE: 0.08 K/UL (ref 0–0.2)
BILIRUB SERPL-MCNC: 1.22 MG/DL (ref 0.3–1.2)
BUN BLDV-MCNC: 12 MG/DL (ref 8–23)
BUN/CREAT BLD: 4 (ref 9–20)
CALCIUM SERPL-MCNC: 9.1 MG/DL (ref 8.6–10.4)
CHLORIDE BLD-SCNC: 107 MMOL/L (ref 98–107)
CO2: 13 MMOL/L (ref 20–31)
CREAT SERPL-MCNC: 3.31 MG/DL (ref 0.5–0.9)
DIFFERENTIAL TYPE: ABNORMAL
EKG ATRIAL RATE: 100 BPM
EKG Q-T INTERVAL: 400 MS
EKG QRS DURATION: 80 MS
EKG QTC CALCULATION (BAZETT): 513 MS
EKG R AXIS: 66 DEGREES
EKG T AXIS: 16 DEGREES
EKG VENTRICULAR RATE: 99 BPM
EOSINOPHILS RELATIVE PERCENT: 0 % (ref 1–4)
GFR AFRICAN AMERICAN: 17 ML/MIN
GFR NON-AFRICAN AMERICAN: 14 ML/MIN
GFR SERPL CREATININE-BSD FRML MDRD: ABNORMAL ML/MIN/{1.73_M2}
GFR SERPL CREATININE-BSD FRML MDRD: ABNORMAL ML/MIN/{1.73_M2}
GLUCOSE BLD-MCNC: 122 MG/DL (ref 65–105)
GLUCOSE BLD-MCNC: 128 MG/DL (ref 70–99)
HCT VFR BLD CALC: 47.1 % (ref 36.3–47.1)
HEMOGLOBIN: 15.5 G/DL (ref 11.9–15.1)
IMMATURE GRANULOCYTES: 1 %
INR BLD: 5.6
LIPASE: 41 U/L (ref 13–60)
LYMPHOCYTES # BLD: 14 % (ref 24–43)
MAGNESIUM: 2.2 MG/DL (ref 1.6–2.6)
MCH RBC QN AUTO: 32.9 PG (ref 25.2–33.5)
MCHC RBC AUTO-ENTMCNC: 32.9 G/DL (ref 28.4–34.8)
MCV RBC AUTO: 100 FL (ref 82.6–102.9)
MONOCYTES # BLD: 9 % (ref 3–12)
NRBC AUTOMATED: 0 PER 100 WBC
PDW BLD-RTO: 14.3 % (ref 11.8–14.4)
PLATELET # BLD: 247 K/UL (ref 138–453)
PLATELET ESTIMATE: ABNORMAL
PMV BLD AUTO: 10.5 FL (ref 8.1–13.5)
POTASSIUM SERPL-SCNC: 3.4 MMOL/L (ref 3.7–5.3)
PROTHROMBIN TIME: 48.8 SEC (ref 11.5–14.2)
RBC # BLD: 4.71 M/UL (ref 3.95–5.11)
RBC # BLD: ABNORMAL 10*6/UL
SEG NEUTROPHILS: 75 % (ref 36–65)
SEGMENTED NEUTROPHILS ABSOLUTE COUNT: 6.19 K/UL (ref 1.5–8.1)
SODIUM BLD-SCNC: 138 MMOL/L (ref 135–144)
TOTAL PROTEIN: 7.1 G/DL (ref 6.4–8.3)
TROPONIN INTERP: ABNORMAL
TROPONIN INTERP: ABNORMAL
TROPONIN T: ABNORMAL NG/ML
TROPONIN T: ABNORMAL NG/ML
TROPONIN, HIGH SENSITIVITY: 69 NG/L (ref 0–14)
TROPONIN, HIGH SENSITIVITY: 71 NG/L (ref 0–14)
WBC # BLD: 8.3 K/UL (ref 3.5–11.3)
WBC # BLD: ABNORMAL 10*3/UL

## 2021-11-08 PROCEDURE — 85610 PROTHROMBIN TIME: CPT

## 2021-11-08 PROCEDURE — 94761 N-INVAS EAR/PLS OXIMETRY MLT: CPT

## 2021-11-08 PROCEDURE — 96374 THER/PROPH/DIAG INJ IV PUSH: CPT

## 2021-11-08 PROCEDURE — 80053 COMPREHEN METABOLIC PANEL: CPT

## 2021-11-08 PROCEDURE — 6370000000 HC RX 637 (ALT 250 FOR IP): Performed by: NURSE PRACTITIONER

## 2021-11-08 PROCEDURE — 6360000002 HC RX W HCPCS: Performed by: EMERGENCY MEDICINE

## 2021-11-08 PROCEDURE — 84484 ASSAY OF TROPONIN QUANT: CPT

## 2021-11-08 PROCEDURE — 6360000002 HC RX W HCPCS: Performed by: NURSE PRACTITIONER

## 2021-11-08 PROCEDURE — 71045 X-RAY EXAM CHEST 1 VIEW: CPT

## 2021-11-08 PROCEDURE — 74176 CT ABD & PELVIS W/O CONTRAST: CPT

## 2021-11-08 PROCEDURE — 99222 1ST HOSP IP/OBS MODERATE 55: CPT | Performed by: INTERNAL MEDICINE

## 2021-11-08 PROCEDURE — 6370000000 HC RX 637 (ALT 250 FOR IP): Performed by: INTERNAL MEDICINE

## 2021-11-08 PROCEDURE — 84300 ASSAY OF URINE SODIUM: CPT

## 2021-11-08 PROCEDURE — 82947 ASSAY GLUCOSE BLOOD QUANT: CPT

## 2021-11-08 PROCEDURE — 82150 ASSAY OF AMYLASE: CPT

## 2021-11-08 PROCEDURE — 96372 THER/PROPH/DIAG INJ SC/IM: CPT

## 2021-11-08 PROCEDURE — 85025 COMPLETE CBC W/AUTO DIFF WBC: CPT

## 2021-11-08 PROCEDURE — 2580000003 HC RX 258: Performed by: NURSE PRACTITIONER

## 2021-11-08 PROCEDURE — 99283 EMERGENCY DEPT VISIT LOW MDM: CPT

## 2021-11-08 PROCEDURE — 94640 AIRWAY INHALATION TREATMENT: CPT

## 2021-11-08 PROCEDURE — 99212 OFFICE O/P EST SF 10 MIN: CPT

## 2021-11-08 PROCEDURE — 1200000000 HC SEMI PRIVATE

## 2021-11-08 PROCEDURE — 93005 ELECTROCARDIOGRAM TRACING: CPT | Performed by: EMERGENCY MEDICINE

## 2021-11-08 PROCEDURE — 83735 ASSAY OF MAGNESIUM: CPT

## 2021-11-08 PROCEDURE — 83690 ASSAY OF LIPASE: CPT

## 2021-11-08 PROCEDURE — 82570 ASSAY OF URINE CREATININE: CPT

## 2021-11-08 RX ORDER — ONDANSETRON 2 MG/ML
4 INJECTION INTRAMUSCULAR; INTRAVENOUS EVERY 6 HOURS PRN
Status: DISCONTINUED | OUTPATIENT
Start: 2021-11-08 | End: 2021-11-08

## 2021-11-08 RX ORDER — LEVOTHYROXINE SODIUM 0.1 MG/1
100 TABLET ORAL DAILY
COMMUNITY
Start: 2021-11-01

## 2021-11-08 RX ORDER — PROMETHAZINE HYDROCHLORIDE 12.5 MG/1
12.5 TABLET ORAL EVERY 6 HOURS PRN
Status: DISCONTINUED | OUTPATIENT
Start: 2021-11-08 | End: 2021-11-10 | Stop reason: ALTCHOICE

## 2021-11-08 RX ORDER — MAGNESIUM SULFATE 1 G/100ML
1000 INJECTION INTRAVENOUS PRN
Status: DISCONTINUED | OUTPATIENT
Start: 2021-11-08 | End: 2021-11-08

## 2021-11-08 RX ORDER — ALBUTEROL SULFATE 90 UG/1
2 AEROSOL, METERED RESPIRATORY (INHALATION) EVERY 6 HOURS PRN
Status: DISCONTINUED | OUTPATIENT
Start: 2021-11-08 | End: 2021-11-13 | Stop reason: HOSPADM

## 2021-11-08 RX ORDER — ACETAMINOPHEN 325 MG/1
650 TABLET ORAL EVERY 6 HOURS PRN
Status: DISCONTINUED | OUTPATIENT
Start: 2021-11-08 | End: 2021-11-13 | Stop reason: HOSPADM

## 2021-11-08 RX ORDER — SODIUM CHLORIDE 9 MG/ML
25 INJECTION, SOLUTION INTRAVENOUS PRN
Status: DISCONTINUED | OUTPATIENT
Start: 2021-11-08 | End: 2021-11-13 | Stop reason: HOSPADM

## 2021-11-08 RX ORDER — AMIODARONE HYDROCHLORIDE 200 MG/1
200 TABLET ORAL
Status: DISCONTINUED | OUTPATIENT
Start: 2021-11-08 | End: 2021-11-13 | Stop reason: HOSPADM

## 2021-11-08 RX ORDER — FAMOTIDINE 10 MG
10 TABLET ORAL DAILY
Status: ON HOLD | COMMUNITY
End: 2022-01-26 | Stop reason: ALTCHOICE

## 2021-11-08 RX ORDER — LEVOTHYROXINE SODIUM 0.1 MG/1
100 TABLET ORAL DAILY
Status: DISCONTINUED | OUTPATIENT
Start: 2021-11-08 | End: 2021-11-13 | Stop reason: HOSPADM

## 2021-11-08 RX ORDER — SODIUM CHLORIDE 9 MG/ML
INJECTION, SOLUTION INTRAVENOUS CONTINUOUS
Status: DISCONTINUED | OUTPATIENT
Start: 2021-11-08 | End: 2021-11-10

## 2021-11-08 RX ORDER — IPRATROPIUM BROMIDE AND ALBUTEROL SULFATE 2.5; .5 MG/3ML; MG/3ML
1 SOLUTION RESPIRATORY (INHALATION) 4 TIMES DAILY
Status: DISCONTINUED | OUTPATIENT
Start: 2021-11-08 | End: 2021-11-08

## 2021-11-08 RX ORDER — POTASSIUM CHLORIDE 7.45 MG/ML
10 INJECTION INTRAVENOUS PRN
Status: DISCONTINUED | OUTPATIENT
Start: 2021-11-08 | End: 2021-11-08

## 2021-11-08 RX ORDER — PROMETHAZINE HYDROCHLORIDE 25 MG/ML
6.25 INJECTION, SOLUTION INTRAMUSCULAR; INTRAVENOUS ONCE
Status: COMPLETED | OUTPATIENT
Start: 2021-11-08 | End: 2021-11-08

## 2021-11-08 RX ORDER — SODIUM CHLORIDE 0.9 % (FLUSH) 0.9 %
10 SYRINGE (ML) INJECTION PRN
Status: DISCONTINUED | OUTPATIENT
Start: 2021-11-08 | End: 2021-11-13 | Stop reason: HOSPADM

## 2021-11-08 RX ORDER — ACETAMINOPHEN 650 MG/1
650 SUPPOSITORY RECTAL EVERY 6 HOURS PRN
Status: DISCONTINUED | OUTPATIENT
Start: 2021-11-08 | End: 2021-11-13 | Stop reason: HOSPADM

## 2021-11-08 RX ORDER — FAMOTIDINE 20 MG/1
10 TABLET, FILM COATED ORAL DAILY
Status: DISCONTINUED | OUTPATIENT
Start: 2021-11-08 | End: 2021-11-13 | Stop reason: HOSPADM

## 2021-11-08 RX ORDER — HYDRALAZINE HYDROCHLORIDE 20 MG/ML
10 INJECTION INTRAMUSCULAR; INTRAVENOUS EVERY 6 HOURS PRN
Status: DISCONTINUED | OUTPATIENT
Start: 2021-11-08 | End: 2021-11-13 | Stop reason: HOSPADM

## 2021-11-08 RX ORDER — ONDANSETRON 4 MG/1
4 TABLET, ORALLY DISINTEGRATING ORAL EVERY 8 HOURS PRN
Status: DISCONTINUED | OUTPATIENT
Start: 2021-11-08 | End: 2021-11-08

## 2021-11-08 RX ORDER — ALBUTEROL SULFATE 2.5 MG/3ML
2.5 SOLUTION RESPIRATORY (INHALATION) EVERY 4 HOURS PRN
Status: DISCONTINUED | OUTPATIENT
Start: 2021-11-08 | End: 2021-11-10

## 2021-11-08 RX ORDER — IPRATROPIUM BROMIDE AND ALBUTEROL SULFATE 2.5; .5 MG/3ML; MG/3ML
1 SOLUTION RESPIRATORY (INHALATION) EVERY 4 HOURS PRN
Status: DISCONTINUED | OUTPATIENT
Start: 2021-11-08 | End: 2021-11-10

## 2021-11-08 RX ORDER — POTASSIUM CHLORIDE 20 MEQ/1
40 TABLET, EXTENDED RELEASE ORAL PRN
Status: DISCONTINUED | OUTPATIENT
Start: 2021-11-08 | End: 2021-11-08

## 2021-11-08 RX ORDER — POTASSIUM CHLORIDE 20 MEQ/1
20 TABLET, EXTENDED RELEASE ORAL 2 TIMES DAILY WITH MEALS
Status: DISCONTINUED | OUTPATIENT
Start: 2021-11-09 | End: 2021-11-10

## 2021-11-08 RX ORDER — POLYETHYLENE GLYCOL 3350 17 G/17G
17 POWDER, FOR SOLUTION ORAL DAILY PRN
Status: DISCONTINUED | OUTPATIENT
Start: 2021-11-08 | End: 2021-11-13 | Stop reason: HOSPADM

## 2021-11-08 RX ORDER — NICOTINE POLACRILEX 4 MG
15 LOZENGE BUCCAL PRN
Status: DISCONTINUED | OUTPATIENT
Start: 2021-11-08 | End: 2021-11-13 | Stop reason: HOSPADM

## 2021-11-08 RX ORDER — DEXTROSE MONOHYDRATE 25 G/50ML
12.5 INJECTION, SOLUTION INTRAVENOUS PRN
Status: DISCONTINUED | OUTPATIENT
Start: 2021-11-08 | End: 2021-11-13 | Stop reason: HOSPADM

## 2021-11-08 RX ORDER — DEXTROSE MONOHYDRATE 50 MG/ML
100 INJECTION, SOLUTION INTRAVENOUS PRN
Status: DISCONTINUED | OUTPATIENT
Start: 2021-11-08 | End: 2021-11-13 | Stop reason: HOSPADM

## 2021-11-08 RX ORDER — ROSUVASTATIN CALCIUM 40 MG/1
40 TABLET, COATED ORAL DAILY
Status: DISCONTINUED | OUTPATIENT
Start: 2021-11-08 | End: 2021-11-13 | Stop reason: HOSPADM

## 2021-11-08 RX ORDER — SODIUM CHLORIDE 0.9 % (FLUSH) 0.9 %
5-40 SYRINGE (ML) INJECTION EVERY 12 HOURS SCHEDULED
Status: DISCONTINUED | OUTPATIENT
Start: 2021-11-08 | End: 2021-11-13 | Stop reason: HOSPADM

## 2021-11-08 RX ORDER — BUDESONIDE AND FORMOTEROL FUMARATE DIHYDRATE 160; 4.5 UG/1; UG/1
2 AEROSOL RESPIRATORY (INHALATION) 2 TIMES DAILY
Status: DISCONTINUED | OUTPATIENT
Start: 2021-11-08 | End: 2021-11-13 | Stop reason: HOSPADM

## 2021-11-08 RX ADMIN — LEVOTHYROXINE SODIUM 100 MCG: 0.1 TABLET ORAL at 20:02

## 2021-11-08 RX ADMIN — PROMETHAZINE HYDROCHLORIDE 12.5 MG: 12.5 TABLET ORAL at 23:49

## 2021-11-08 RX ADMIN — HYDROMORPHONE HYDROCHLORIDE 0.5 MG: 1 INJECTION, SOLUTION INTRAMUSCULAR; INTRAVENOUS; SUBCUTANEOUS at 21:26

## 2021-11-08 RX ADMIN — HYDROMORPHONE HYDROCHLORIDE 0.5 MG: 1 INJECTION, SOLUTION INTRAMUSCULAR; INTRAVENOUS; SUBCUTANEOUS at 14:55

## 2021-11-08 RX ADMIN — AMIODARONE HYDROCHLORIDE 200 MG: 200 TABLET ORAL at 20:02

## 2021-11-08 RX ADMIN — PROMETHAZINE HYDROCHLORIDE 6.25 MG: 25 INJECTION INTRAMUSCULAR; INTRAVENOUS at 16:42

## 2021-11-08 RX ADMIN — PROMETHAZINE HYDROCHLORIDE 6.25 MG: 25 INJECTION INTRAMUSCULAR; INTRAVENOUS at 10:43

## 2021-11-08 RX ADMIN — BUDESONIDE AND FORMOTEROL FUMARATE DIHYDRATE 2 PUFF: 160; 4.5 AEROSOL RESPIRATORY (INHALATION) at 20:38

## 2021-11-08 RX ADMIN — SODIUM CHLORIDE 100 ML/HR: 9 INJECTION, SOLUTION INTRAVENOUS at 20:07

## 2021-11-08 RX ADMIN — FAMOTIDINE 10 MG: 20 TABLET ORAL at 21:26

## 2021-11-08 RX ADMIN — ROSUVASTATIN CALCIUM 40 MG: 40 TABLET, FILM COATED ORAL at 20:02

## 2021-11-08 ASSESSMENT — PAIN DESCRIPTION - LOCATION
LOCATION: ABDOMEN
LOCATION: ABDOMEN

## 2021-11-08 ASSESSMENT — ENCOUNTER SYMPTOMS
ABDOMINAL PAIN: 0
FACIAL SWELLING: 0
WHEEZING: 0
BLOOD IN STOOL: 0
VOMITING: 1
ABDOMINAL PAIN: 1
EYE DISCHARGE: 0
NAUSEA: 1
CHEST TIGHTNESS: 0
BACK PAIN: 0
COUGH: 0
SHORTNESS OF BREATH: 1
SHORTNESS OF BREATH: 0
ABDOMINAL DISTENTION: 0
PHOTOPHOBIA: 0
VOMITING: 0
EYE PAIN: 0

## 2021-11-08 ASSESSMENT — PAIN DESCRIPTION - FREQUENCY: FREQUENCY: INTERMITTENT

## 2021-11-08 ASSESSMENT — PAIN SCALES - GENERAL
PAINLEVEL_OUTOF10: 4
PAINLEVEL_OUTOF10: 6
PAINLEVEL_OUTOF10: 8
PAINLEVEL_OUTOF10: 8

## 2021-11-08 ASSESSMENT — PAIN DESCRIPTION - PAIN TYPE
TYPE: ACUTE PAIN
TYPE: ACUTE PAIN

## 2021-11-08 ASSESSMENT — PAIN DESCRIPTION - DESCRIPTORS: DESCRIPTORS: DULL

## 2021-11-08 ASSESSMENT — PAIN DESCRIPTION - PROGRESSION: CLINICAL_PROGRESSION: GRADUALLY WORSENING

## 2021-11-08 ASSESSMENT — PAIN - FUNCTIONAL ASSESSMENT: PAIN_FUNCTIONAL_ASSESSMENT: PREVENTS OR INTERFERES SOME ACTIVE ACTIVITIES AND ADLS

## 2021-11-08 ASSESSMENT — PAIN DESCRIPTION - DIRECTION: RADIATING_TOWARDS: BACK

## 2021-11-08 ASSESSMENT — PAIN DESCRIPTION - ORIENTATION
ORIENTATION: UPPER
ORIENTATION: UPPER

## 2021-11-08 ASSESSMENT — PAIN DESCRIPTION - ONSET: ONSET: ON-GOING

## 2021-11-08 NOTE — PROGRESS NOTES
Patient seen in clinic for warfarin management due to atrial fibrillation with an INR goal of 2.0-3.0. Estimated duration of therapy is indefinite. Patient states compliant all of the time with regimen. No bleeding or thromboembolic side effects noted. Patient is no longer taking Omeprazole and is now taking Famotidine 10mg QD. She has been eating very poorly and overall feels ill and very weak. She has been trying home remedy for the last 2 weeks but feels the need for evaluation at this point today and will proceed to ED after this visit. PT/INR done in office per protocol. INR is 7.0 which is supratherapeutic. Warfarin regimen will be held for 3 days then continue 1.25mg daily. Will retest in 1 week. Patient understands dosing directions and information discussed. Dosing schedule and follow up appointment given to patient. Progress note routed to referring physicians office. Discussed with patient the Pharmacist Collaborative Practice Agreement. Patient provided verbal and/or electronic (ex. [employer]) consent to participate in the collaborative practice agreement between the pharmacist and referred patient. This is in lieu of paper consent due to COVID-19 precautions and the use of remote/virtual visits.        For Pharmacy Admin Tracking Only     Intervention Detail: Dose Adjustment: 1, reason: Therapy De-escalation   Total # of Interventions Recommended: 1   Total # of Interventions Accepted: 1   Time Spent (min): 15

## 2021-11-08 NOTE — PROGRESS NOTES
The potassium/magnesium sliding scale has been automatically discontinued per [facility] approved policy because the patient has decreased renal function (CrCl<30 ml/min). The patient's current K/Mag levels are currently:    Recent Labs     11/08/21  1017   K 3.4*   MG 2.2       Estimated Creatinine Clearance: 17 mL/min (A) (based on SCr of 3.31 mg/dL (H)). For patients with decreased renal function (below 30ml/min) needing potassium/magnesium supplementation, please order individual bolus doses with appropriate monitoring. Please contact the inpatient pharmacy at [phone] with any concerns. Thank you.     Autumn Tse, [facility]  11/8/2021  6:26 PM

## 2021-11-08 NOTE — PROGRESS NOTES
Transitions of Care Pharmacy Service   Medication Review    The patient's list of current home medications was reviewed. Source(s) of information: patient, Epic, Surescripts refill report      Other Notes · Warfarin is managed by [facility], was instructed today to hold for 3 days for elevated INR but then pt was admitted. · She gets samples of Symbicort           Please feel free to call me with any questions about this encounter. Thank you. Jefry Garcia, [facility]   Transitions of Care Pharmacy Service  Phone:  [phone]  Fax: [phone]      Electronically signed by Jefry Garcia [facility] on 11/8/2021 at 6:46 PM       Prior to Admission medications    Medication Sig       levothyroxine (SYNTHROID) 100 MCG tablet Take 100 mcg by mouth Daily        famotidine (PEPCID) 10 MG tablet Take 10 mg by mouth daily       albuterol-ipratropium (COMBIVENT RESPIMAT)  MCG/ACT AERS inhaler Inhale 1 puff into the lungs every 6 hours       budesonide-formoterol (SYMBICORT) 160-4.5 MCG/ACT AERO Inhale 2 puffs into the lungs every morning        warfarin (COUMADIN) 2.5 MG tablet Take 1.25 mg by mouth daily as directed by [facility].         Polyethylene Glycol 3350 (MIRALAX PO) Take 17 g by mouth daily as needed        amiodarone (CORDARONE) 200 MG tablet Take 200 mg by mouth three times a week TAKES ON MONDAYS, WEDNESDAYS AND FRIDAYS.       ezetimibe (ZETIA) 10 MG tablet Take 10 mg by mouth daily        torsemide (DEMADEX) 20 MG tablet Take 60 mg by mouth as needed Feet/ankle edema        spironolactone (ALDACTONE) 100 MG tablet Take 100 mg by mouth daily        ammonium lactate (LAC-HYDRIN) 12 % lotion Apply topically 2 times daily as needed        rosuvastatin (CRESTOR) 40 MG tablet Take 1 tablet by mouth daily       K Phos Yell-Sod Phos Di & Mono (PHOSPHA 250 NEUTRAL PO) Take 1 tablet by mouth 2 times daily       albuterol sulfate  (90 Base) MCG/ACT inhaler Inhale 2 puffs into the lungs every 6 hours as needed for Wheezing       albuterol (PROVENTIL) (2.5 MG/3ML) 0.083% nebulizer solution Take 3 mLs by nebulization every 4 hours as needed for Wheezing               metoclopramide (REGLAN) 5 MG tablet Take 10 mg by mouth twice daily       folic acid (FOLVITE) 1 MG tablet Take 1 tablet by mouth daily.        Cholecalciferol (VITAMIN D3) 1000 UNITS TABS Take 1,000 Units by mouth Five times weekly Indications: Mon-Fri

## 2021-11-08 NOTE — H&P
[facility]  Office: [address], DO, Chad Adams, DO, Ade Lund, DO, Tristan Boudreaux [facility], DO, Bossman Jeronimo MD, Linh Baugh MD, Kennis Osler, MD, Edith Sampson MD, Sunni Goode MD, Pramod Singleton MD, Makeda Gimenez MD, Hamilton Ni, DO, Nikki Mg, DO, Nicky Edmondson MD,  Tonny Gaffney DO, Lynn Durant MD, Anabelle Molina MD, Timoteo Bertrand MD, Nathalia Beltran MD, Mary Liang MD, Ashok Walker MD, Jesús Burton MD, Maria Del Carmen Reynolds [facility], [facility], CNP, Kelley Vinson, CNP, Louise Sharma, CNS, Andrzej Renteria, CNP, Reza Starr, CNP, Maura Golden, CNP, Efraín Raphael, CNP, Jimbo Zhu CNP, Jonathon Silva PA-C, [place], [facility], Tory Lo, CNP, Kay Mcneal, CNP, Marietta Solitario, CNP, Lydia Duffy, CNP, Ilan Monique, CNP, Sim Briones CNP, Brooklyn King CNP         IN-PATIENT SERVICE  History and Physical  [place] [address]          Name: Delaney Samuel  MRN: [id number]     Acct: [de-identified]  Room: [place] [id number]    Admit Date: 11/8/2021  PCP: Gerard Hamilton MD    Chief Complaint:  Chief Complaint   Patient presents with   Trish Abdirizak Nausea & Vomiting     onset 2.5 weeks    Fatigue    Abdominal Pain    Dizziness        History of Present Illness:  Delaney Samuel is a 66 y.o.  female who presents with Nausea & Vomiting (onset 2.5 weeks), Fatigue, Abdominal Pain, and Dizziness    Patient was admitted thru ER with:  68-year-old female admitted through the emergency room with nausea, vomiting, and abdominal pain    Patient has a known history of gastroparesis which has been well controlled with Reglan  She reports that she was in to see her cardiologist a couple weeks ago who observed \"shakes\".   Dr. Chris Plants DC'd the Reglan  The patient has an exacerbation of her gastroparesis symptoms including cramping, nausea, and food intolerance  The patient reports a 12 pound weight loss  The patient states that she resumed her Reglan with only mild improvement in her GI symptoms admit    Initial database has included:  PZD65ot/dL     CREATININE3.31 High mg/dL  Patient follows with Dr. Tidwell March  Her recent creatinine has been at approximately 2.8    Results for Ivory Monahan (MRN [id number]) as of 11/8/2021 20:12   Ref. Range 9/8/2021 09:37 9/21/2021 10:00 9/29/2021 09:13 11/3/2021 09:38 11/8/2021 10:17   Creatinine Latest Ref Range: 0.50 - 0.90 mg/dL 2.89 (H) 2.80 (H) 2.61 (H) 2.89 (H) 3.31 (H)       Calcium9.1mg/dL   [id number]/L   Potassium3. 4 Low      Fhvdobj40.8 High sec   INR 5.6 High Panic    Her Coumadin is managed by Dr. Jennifer Aguila    Troponin, High Sensitivity 69 High Panic    Results for Ivory Monahan (MRN [id number]) as of 11/8/2021 20:12   Ref. Range 2/11/2019 09:45 2/11/2019 11:57 11/8/2021 11:30 11/8/2021 13:28   Troponin, High Sensitivity Latest Ref Range: 0 - 14 ng/L 19 (H) 14 71 (HH) 69 (HH)       EKG:  Baseline artifact, data interpretation may be affected   Normal Sinus Rhythm   Low voltage QRS   Borderline EKG     PMHx:  Past Medical History:   Diagnosis Date    Anesthesia complication     DIFFICULT TIME WAKING UP    Asthma     INHALER USE DAILY AND AS NEEDED DX AS A CHILD    Atrial fibrillation ([facility] 2003    INTERMITTENT (DR. Jennifer Aguila)    Cerebral artery occlusion with cerebral infarction ([facility] 2006    tia x2. Pt denies history of a stroke (Written 02/04/2020).     Chronic back pain     Chronic kidney disease     chronic kidney disease dr Patricia Howell nephrologist   Stage 3    Cirrhosis ([facility]     Constipation     COPD (chronic obstructive pulmonary disease) ([facility] 2003    INHALER USE DAILY AND AS NEEDED    Dental bridge present     PERMANENT UPPER X 2 BRIDGES UPPER FRONT    Diet-controlled diabetes mellitus (HCC)     Difficult intravenous access     USUALLY USE ULTRASOUND ON ANESTHESIA STARTS VEINS ARE SMALL AND DEEP DO NOT USE HANDS    Diverticulitis     Gastroparesis     GERD (gastroesophageal reflux disease)     ON RX    H/O mastectomy fibrocystic disease.  BILATERAL MASTECTOMY    Hepatitis 2008    hep c, took meds for hep c    History of blood transfusion     2008    History of Coumadin therapy     on coumadin daily    History of DVT (deep vein thrombosis)     Several years ago (Written 02/04/2020)    History of echocardiogram 03/2018    EF 55%    History of stress test 2015    EF > 70%    Hx of blood clots     states has had on multiple occassions    Hyperlipidemia     Hypertension     Hypothyroidism     Impaired mobility     uses walker    Incontinence     wears a pad    Incontinence of bowel     wears depends    Liver disease     hepatitis c-RESOLVED 2008 WITH TREATMENT    Lower extremity neuropathy     Lumbar disc disease     OAB (overactive bladder)     Osteoarthritis     PONV (postoperative nausea and vomiting)     Prolonged emergence from general anesthesia     Rectocele     Rotator cuff tear     right side, needs surgery    Snores     states has been tested for sleep apnea and does not have it    Type II or unspecified type diabetes mellitus without mention of complication, not stated as uncontrolled     last A1C was 5.2 11/27/2019    Use of cane as ambulatory aid     Uses roller walker     Wears glasses     READING        PSHx:  Past Surgical History:   Procedure Laterality Date    APPENDECTOMY  1964    BREAST SURGERY Bilateral 1982    mastectomy, fibrocystic breast, mother had passed from breast cancer    CHOLECYSTECTOMY      CHOLECYSTECTOMY  1976    COLONOSCOPY      EYE SURGERY Left 2006   [address]    PARTIAL    KNEE SURGERY Left 2013    ARTHROSCOPY    NERVE SURGERY N/A 10/23/2020    SACRAL NERVE STIMULATOR IMPLANT STAGE 1 AND STAGE 2, C-ARM performed by Graham Johnson MD at [place] [address] W/CYSTO W/NTRCL RPR N/A 2/12/2018    VAGINAL REPAIR ANTERIOR AND POSTERIOR, SOLYX BLADDER SLING, CYSTO, performed by Cheryl Guerin DO at [address] [place] [address] 2/17/2020    EXCISION PERIVAGINAL CYST performed by Gustabo Felton MD at [facility] Left 2005    ROTATOR CUFF    SHOULDER SURGERY Right 07/08/2019    STIMULATOR SURGERY  10/23/2020    SACRAL NERVE STIMULATOR IMPLANT STAGE 1 AND STAGE 2,     UPPER GASTROINTESTINAL ENDOSCOPY          Home Meds:  Prior to Admission medications    Medication Sig Start Date End Date Taking? Authorizing Provider   levothyroxine (SYNTHROID) 100 MCG tablet Take 100 mcg by mouth Daily  11/1/21  Yes Historical Provider, MD   famotidine (PEPCID) 10 MG tablet Take 10 mg by mouth daily   Yes Historical Provider, MD   albuterol-ipratropium (COMBIVENT RESPIMAT)  MCG/ACT AERS inhaler Inhale 1 puff into the lungs every 6 hours   Yes Historical Provider, MD   budesonide-formoterol (SYMBICORT) 160-4.5 MCG/ACT AERO Inhale 2 puffs into the lungs every morning    Yes Historical Provider, MD   warfarin (COUMADIN) 2.5 MG tablet Take 1.25 mg by mouth daily as directed by [address] [place] [address]. Yes Historical Provider, MD   Polyethylene Glycol 3350 (MIRALAX PO) Take 17 g by mouth daily as needed    Yes Historical Provider, MD   amiodarone (CORDARONE) 200 MG tablet Take 200 mg by mouth three times a week TAKES ON MONDAYS, [address] [facility].  5/31/20  Yes Historical Provider, MD   ezetimibe (ZETIA) 10 MG tablet Take 10 mg by mouth daily  5/31/20  Yes Historical Provider, MD   torsemide (DEMADEX) 20 MG tablet Take 60 mg by mouth as needed Feet/ankle edema    Yes Historical Provider, MD   spironolactone (ALDACTONE) 100 MG tablet Take 100 mg by mouth daily    Yes Historical Provider, MD   ammonium lactate (LAC-HYDRIN) 12 % lotion Apply topically 2 times daily as needed  9/10/19  Yes Historical Provider, MD   rosuvastatin (CRESTOR) 40 MG tablet Take 1 tablet by mouth daily   Yes Historical Provider, MD   K Phos [place]-Sod Phos Di & Mono (PHOSPHA 250 NEUTRAL PO) Take 1 tablet by mouth 2 times daily   Yes Historical Provider, MD   albuterol sulfate  (90 Base) MCG/ACT inhaler Inhale 2 puffs into the lungs every 6 hours as needed for Wheezing   Yes Historical Provider, MD   albuterol (PROVENTIL) (2.5 MG/3ML) 0.083% nebulizer solution Take 3 mLs by nebulization every 4 hours as needed for Wheezing 3/27/18  Yes Milon Faster Retholtz, DO   metoclopramide (REGLAN) 5 MG tablet Take 10 mg by mouth 2 times daily 2tabs am and 2 tabs hs   Yes Historical Provider, MD   folic acid (FOLVITE) 1 MG tablet Take 1 tablet by mouth daily. 11/27/13  Yes Kaushal Nunez MD   Cholecalciferol (VITAMIN D3) 1000 UNITS TABS Take 1,000 Units by mouth Five times weekly Indications: Mon-Fri    Yes Historical Provider, MD         Allergies:   Lyrica [pregabalin], Pcn [penicillins], Nsaids, Zofran [ondansetron], Codeine, Cymbalta [duloxetine hcl], Morphine, Prolia [denosumab], and Seasonal    Social Hx:  Tobacco:    reports that she quit smoking about 13 years ago. Her smoking use included cigarettes. She has a 43.00 pack-year smoking history. She has never used smokeless tobacco.  Alcohol:      reports current alcohol use of about 1.0 standard drink of alcohol per week. Drug Use:  reports no history of drug use. Family Hx; Family History   Problem Relation Age of Onset    Cancer Mother         breast    Heart Disease Father     Cancer Sister         lung    Cancer Maternal Aunt         cervical       ROS:  Review of Systems   Constitutional: Positive for appetite change (Diminished), fatigue and unexpected weight change (The patient has lost 12 pounds in the last couple of weeks). Negative for activity change, chills, diaphoresis and fever. HENT: Negative for congestion and nosebleeds. Eyes: Negative for photophobia and visual disturbance. Respiratory: Positive for shortness of breath (Chronic dyspnea on exertion). Negative for cough and wheezing. Cardiovascular: Positive for leg swelling. Negative for chest pain and palpitations. Gastrointestinal: Positive for abdominal pain (Nonlocalized cramping) and nausea. Negative for abdominal distention, blood in stool and vomiting. Genitourinary: Negative for flank pain and hematuria. Musculoskeletal: Negative for arthralgias and myalgias. Skin: Negative for rash and wound. Neurological: Negative for dizziness and light-headedness. Physical Exam:  Vitals:  BP (!) 152/53   Pulse 107   Temp 98.1 °F (36.7 °C)   Resp 16   Ht 5' 2\" (1.575 m)   Wt 228 lb (103.4 kg)   LMP 1978   SpO2 95%   BMI 41.70 kg/m²   Temp (24hrs), Av.1 °F (36.7 °C), Min:98 °F (36.7 °C), Max:98.1 °F (36.7 °C)    Physical Exam  Vitals reviewed. Constitutional:       General: She is not in acute distress. Appearance: She is not diaphoretic. HENT:      Head: Normocephalic. Nose: Nose normal.   Eyes:      General: No scleral icterus. Conjunctiva/sclera: Conjunctivae normal.   Neck:      Trachea: No tracheal deviation. Cardiovascular:      Rate and Rhythm: Normal rate and regular rhythm. Pulmonary:      Effort: Pulmonary effort is normal. No respiratory distress. Breath sounds: Rhonchi and rales present. No wheezing. Chest:      Chest wall: No tenderness. Abdominal:      General: There is no distension. Palpations: Abdomen is soft. Tenderness: There is no abdominal tenderness. There is guarding. Musculoskeletal:         General: No tenderness. Cervical back: Neck supple. Right lower leg: Edema present. Left lower leg: Edema present. Skin:     General: Skin is warm and dry. Neurological:      Mental Status: She is alert.            Data:  Laboratory Testing:  Recent Results (from the past 24 hour(s))   EKG 12 Lead    Collection Time: 21 10:07 AM   Result Value Ref Range    Ventricular Rate 99 BPM    Atrial Rate 100 BPM    QRS Duration 80 ms    Q-T Interval 400 ms    QTc Calculation (Bazett) 513 ms    R Axis 66 degrees    T Axis 16 degrees CBC Auto Differential    Collection Time: 11/08/21 10:17 AM   Result Value Ref Range    WBC 8.3 3.5 - 11.3 k/uL    RBC 4.71 3.95 - 5.11 m/uL    Hemoglobin 15.5 (H) 11.9 - 15.1 g/dL    Hematocrit 47.1 36.3 - 47.1 %    .0 82.6 - 102.9 fL    MCH 32.9 25.2 - 33.5 pg    MCHC 32.9 28.4 - 34.8 g/dL    RDW 14.3 11.8 - 14.4 %    Platelets 959 781 - 695 k/uL    MPV 10.5 8.1 - 13.5 fL    NRBC Automated 0.0 0.0 per 100 WBC    Differential Type NOT REPORTED     Seg Neutrophils 75 (H) 36 - 65 %    Lymphocytes 14 (L) 24 - 43 %    Monocytes 9 3 - 12 %    Eosinophils % 0 (L) 1 - 4 %    Basophils 1 0 - 2 %    Immature Granulocytes 1 (H) 0 %    Segs Absolute 6.19 1.50 - 8.10 k/uL    Absolute Lymph # 1.17 1.10 - 3.70 k/uL    Absolute Mono # 0.78 0.10 - 1.20 k/uL    Absolute Eos # <0.03 0.00 - 0.44 k/uL    Basophils Absolute 0.08 0.00 - 0.20 k/uL    Absolute Immature Granulocyte 0.04 0.00 - 0.30 k/uL    WBC Morphology NOT REPORTED     RBC Morphology NOT REPORTED     Platelet Estimate NOT REPORTED    Comprehensive Metabolic Panel w/ Reflex to MG    Collection Time: 11/08/21 10:17 AM   Result Value Ref Range    Glucose 128 (H) 70 - 99 mg/dL    BUN 12 8 - 23 mg/dL    CREATININE 3.31 (H) 0.50 - 0.90 mg/dL    Bun/Cre Ratio 4 (L) 9 - 20    Calcium 9.1 8.6 - 10.4 mg/dL    Sodium 138 135 - 144 mmol/L    Potassium 3.4 (L) 3.7 - 5.3 mmol/L    Chloride 107 98 - 107 mmol/L    CO2 13 (L) 20 - 31 mmol/L    Anion Gap 18 (H) 9 - 17 mmol/L    Alkaline Phosphatase 111 (H) 35 - 104 U/L    ALT 23 5 - 33 U/L    AST 37 (H) <32 U/L    Total Bilirubin 1.22 (H) 0.3 - 1.2 mg/dL    Total Protein 7.1 6.4 - 8.3 g/dL    Albumin 3.6 3.5 - 5.2 g/dL    Albumin/Globulin Ratio NOT REPORTED 1.0 - 2.5    GFR Non- 14 (L) >60 mL/min    GFR  17 (L) >60 mL/min    GFR Comment          GFR Staging NOT REPORTED    Amylase    Collection Time: 11/08/21 10:17 AM   Result Value Ref Range    Amylase 30 28 - 100 U/L   Lipase    Collection Time: 11/08/21 10:17 AM   Result Value Ref Range    Lipase 41 13 - 60 U/L   Magnesium    Collection Time: 11/08/21 10:17 AM   Result Value Ref Range    Magnesium 2.2 1.6 - 2.6 mg/dL   Protime-INR    Collection Time: 11/08/21 10:17 AM   Result Value Ref Range    Protime 48.8 (H) 11.5 - 14.2 sec    INR 5.6 (HH)    Troponin    Collection Time: 11/08/21 11:30 AM   Result Value Ref Range    Troponin, High Sensitivity 71 (HH) 0 - 14 ng/L    Troponin T NOT REPORTED <0.03 ng/mL    Troponin Interp NOT REPORTED    Troponin    Collection Time: 11/08/21  1:28 PM   Result Value Ref Range    Troponin, High Sensitivity 69 (HH) 0 - 14 ng/L    Troponin T NOT REPORTED <0.03 ng/mL    Troponin Interp NOT REPORTED        Imaging/Diagnostics:  CT ABDOMEN PELVIS WO CONTRAST Additional Contrast? None    Result Date: 11/8/2021  1. An acute process is not identified. 2. Diverticulosis and mild focal wall or fascial thickening involving the proximal sigmoid colon. The appearance is similar to the 2019 exam and may be the sequela of prior diverticulitis. Consider endoscopy for further evaluation. 3. Cirrhosis of the liver. 4. Status post appendectomy and cholecystectomy. 5. Indeterminate renal lesions, consider follow-up renal ultrasound or renal lesion protocol CT or MRI for further evaluation. Multiple renal cysts. XR CHEST PORTABLE    Result Date: 11/8/2021  No active cardiopulmonary disease.        Assessment:  Primary Problem  KERMIT (acute kidney injury) [facility])    Active Hospital Problems    Diagnosis Date Noted    Chest pain [R07.9] 11/08/2021    KERMIT (acute kidney injury) ([facility] [address] [N17.9] 11/08/2021    Elevated troponin [R77.8] 11/08/2021    Supratherapeutic INR [R79.1] 11/08/2021    Coumadin use [Z79.01] 02/12/2019    Diabetic peripheral neuropathy ([facility] [address] [E11.42] 02/12/2019    Graves' disease [E05.00] 02/11/2019    Mitral valve disease [I05.9] 02/11/2019    Cirrhosis of liver ([facility] [address] [K74.60] 02/11/2019    History of DVT (deep vein thrombosis) [Z86.718]     Gastroparesis [K31.84] 03/21/2018    Chronic hepatitis C ([facility] [B18.2] 02/12/2018    Essential hypertension [I10] 02/11/2018    Hypothyroidism [E03.9] 02/11/2018    Asthma [J45.909]     Atrial fibrillation (HCC) [I48.91]     Type 2 diabetes mellitus with kidney complication, with long-term current use of insulin (HCC) [E11.29, Z79.4]     Stage 4 chronic kidney disease ([facility] [N18.4]     Hyperlipidemia [E78.5]     COPD (chronic obstructive pulmonary disease) (HCC) [J44.9]        Plan:  Admit  Trend trops, ECGs  Non-ST elevation MI versus KERMIT  Gentle hydration  Monitor BUN/creatinine  Renal f/u Dr Ashleigh Hamm for tardive dyskinesia (from Reglan)  Coumadin:  titrate to maintain therapeutic INR   Cardiology f/u Dr Pascual Márquez  Blood Pressure - Monitor and control. dmm  Thyroid replacement  DVT prophylaxis      Patient is admitted as inpatient status because of co-morbidities listed above, severity of signs and symptoms as outlined, requirement for current medical therapies and most importantly because of direct risk to patient if care not provided in a hospital setting. Expected length of stay > 48 hours.      Consultations:   IP CONSULT TO INTERNAL MEDICINE  IP CONSULT TO CARDIOLOGY  IP CONSULT TO PHARMACY  IP CONSULT TO CARDIOLOGY    Electronically signed by Radha Flores DO on 11/8/2021 at 8:15 PM

## 2021-11-08 NOTE — ED NOTES
Pt to er with c/o n/v. Pt states she has hx of gastroparesis. Pt states 3 weeks ago her cardiologist took her off of her reglan d/t having tremors. Pt states 3 days ago she started taking the reglan again. Pt states she has had abdominal pain with n/v even with the reglan. Pt denies recent fever or chills. Pt denies chest pain. Pt a&ox3. Skin warm and dry. Respirations even and non-labored.       Cecily Fenton, RN  11/08/21 [id number]

## 2021-11-08 NOTE — ED PROVIDER NOTES
EMERGENCY DEPARTMENT ENCOUNTER    Pt Name: Alexander Lopes  MRN: [id number]  [place] 1950  Date of evaluation: 11/8/21  CHIEF COMPLAINT       Chief Complaint   Patient presents with    Nausea & Vomiting     onset 2.5 weeks    Fatigue    Abdominal Pain    Dizziness     HISTORY OF PRESENT ILLNESS   HPI   The patient is a 75-year-old female presented to the emergency department secondary to nausea vomiting. Patient complains of a 2-day history of nausea and vomiting with associated abdominal pain. Patient states she has not been able to tolerate any oral she feels that she is dehydrated. She is complaining of abdominal pain localized to the lower quadrant 3 out of 10 on the pain scale. She denies recent antibiotic use or travel outside the country. Patient has a history of diverticulitis. She rectal bleeding. Also said for the last 2 days she has had pressure in her midsternal region of her chest is gotten progressively worse with associated nausea and vomiting no diaphoresis. Patient sees cardiologist is Dr. Richi Jean Baptiste. Patient denies shortness of breath, fevers or chills    REVIEW OF SYSTEMS     Review of Systems   Constitutional: Negative for chills, diaphoresis and fever. HENT: Negative for congestion, ear pain and facial swelling. Eyes: Negative for pain, discharge and visual disturbance. Respiratory: Negative for chest tightness and shortness of breath. Cardiovascular: Positive for chest pain. Negative for palpitations. Gastrointestinal: Positive for nausea and vomiting. Negative for abdominal distention and abdominal pain. Genitourinary: Negative for difficulty urinating and flank pain. Musculoskeletal: Negative for back pain. Skin: Negative for wound. Neurological: Negative for dizziness, light-headedness and headaches.      PASTMEDICAL HISTORY     Past Medical History:   Diagnosis Date    Anesthesia complication     DIFFICULT TIME WAKING UP    Asthma     INHALER USE DAILY AND AS NEEDED DX AS A CHILD    Atrial fibrillation ([facility] [address] 2003    INTERMITTENT (DR. Brian Turner)    Cerebral artery occlusion with cerebral infarction ([facility] [address] 2006    tia x2. Pt denies history of a stroke (Written 02/04/2020).  Chronic back pain     Chronic kidney disease     chronic kidney disease dr Henry Josue nephrologist   Stage 3    Cirrhosis ([facility] [address]     Constipation     COPD (chronic obstructive pulmonary disease) ([facility] [address] 2003    INHALER USE DAILY AND AS NEEDED    Dental bridge present     PERMANENT UPPER X 2 BRIDGES UPPER FRONT    Diet-controlled diabetes mellitus (HCC)     Difficult intravenous access     USUALLY USE ULTRASOUND ON ANESTHESIA STARTS VEINS ARE SMALL AND DEEP DO NOT USE HANDS    Diverticulitis     Gastroparesis     GERD (gastroesophageal reflux disease)     ON RX    H/O mastectomy     fibrocystic disease.  BILATERAL MASTECTOMY    Hepatitis 2008    hep c, took meds for hep c    History of blood transfusion     2008    History of Coumadin therapy     on coumadin daily    History of DVT (deep vein thrombosis)     Several years ago (Written 02/04/2020)    History of echocardiogram 03/2018    EF 55%    History of stress test 2015    EF > 70%    Hx of blood clots     states has had on multiple occassions    Hyperlipidemia     Hypertension     Hypothyroidism     Impaired mobility     uses walker    Incontinence     wears a pad    Incontinence of bowel     wears depends    Liver disease     hepatitis c-RESOLVED 2008 WITH TREATMENT    Lower extremity neuropathy     Lumbar disc disease     OAB (overactive bladder)     Osteoarthritis     PONV (postoperative nausea and vomiting)     Prolonged emergence from general anesthesia     Rectocele     Rotator cuff tear     right side, needs surgery    Snores     states has been tested for sleep apnea and does not have it    Type II or unspecified type diabetes mellitus without mention of complication, not stated as uncontrolled last A1C was 5.2 11/27/2019    Use of cane as ambulatory aid     Uses roller walker     Wears glasses     READING     SURGICAL HISTORY       Past Surgical History:   Procedure Laterality Date    APPENDECTOMY  1964    BREAST SURGERY Bilateral 1982    mastectomy, fibrocystic breast, mother had passed from breast cancer   [address]   [address]    PARTIAL    KNEE SURGERY Left 2013    ARTHROSCOPY    NERVE SURGERY N/A 10/23/2020    SACRAL NERVE STIMULATOR IMPLANT STAGE 1 AND STAGE 2, C-ARM performed by Michael Santana MD at Replaced by [facility] [address] W/CYSTO W/NTRCL RPR N/A 2/12/2018    VAGINAL REPAIR ANTERIOR AND POSTERIOR, SOLYX BLADDER SLING, CYSTO, performed by Jhony Baker DO at [address] [facility] 2/17/2020    EXCISION PERIVAGINAL CYST performed by Zoraida Benavides MD at [address] Left 2005    ROTATOR CUFF    SHOULDER SURGERY Right 07/08/2019    STIMULATOR SURGERY  10/23/2020    SACRAL NERVE STIMULATOR IMPLANT STAGE 1 AND STAGE 2,     UPPER GASTROINTESTINAL ENDOSCOPY       CURRENT MEDICATIONS       Previous Medications    ALBUTEROL (PROVENTIL) (2.5 MG/3ML) 0.083% NEBULIZER SOLUTION    Take 3 mLs by nebulization every 4 hours as needed for Wheezing    ALBUTEROL SULFATE  (90 BASE) MCG/ACT INHALER    Inhale 2 puffs into the lungs every 6 hours as needed for Wheezing    ALBUTEROL-IPRATROPIUM (COMBIVENT RESPIMAT)  MCG/ACT AERS INHALER    Inhale 1 puff into the lungs every 6 hours    AMIODARONE (CORDARONE) 200 MG TABLET    Take 200 mg by mouth three times a week TAKES ON MONDAYS, WEDNESDAYS AND FRIDAYS.     AMMONIUM LACTATE (LAC-HYDRIN) 12 % LOTION    Apply topically 2 times daily as needed     BUDESONIDE-FORMOTEROL (SYMBICORT) 160-4.5 MCG/ACT AERO    Inhale 2 puffs into the lungs every morning     CHOLECALCIFEROL (VITAMIN D3) 1000 UNITS TABS    Take 1,000 Units by mouth Five times weekly Indications: Mon-Fri     EZETIMIBE (ZETIA) 10 MG TABLET    Take 10 mg by mouth daily     FAMOTIDINE (PEPCID) 10 MG TABLET    Take 10 mg by mouth daily    FOLIC ACID (FOLVITE) 1 MG TABLET    Take 1 tablet by mouth daily. K PHOS MONO-SOD PHOS DI & MONO (PHOSPHA 250 NEUTRAL PO)    Take 1 tablet by mouth 2 times daily    LEVOTHYROXINE (SYNTHROID) 100 MCG TABLET    Take 100 mcg by mouth Daily     METOCLOPRAMIDE (REGLAN) 5 MG TABLET    Take 10 mg by mouth 2 times daily 2tabs am and 2 tabs hs    POLYETHYLENE GLYCOL 3350 (MIRALAX PO)    Take 17 g by mouth daily as needed     ROSUVASTATIN (CRESTOR) 40 MG TABLET    Take 1 tablet by mouth daily    SPIRONOLACTONE (ALDACTONE) 100 MG TABLET    Take 100 mg by mouth daily     TORSEMIDE (DEMADEX) 20 MG TABLET    Take 60 mg by mouth as needed Feet/ankle edema     WARFARIN (COUMADIN) 2.5 MG TABLET    Take 1.25 mg by mouth daily as directed by [facility]. ALLERGIES     is allergic to lyrica [pregabalin], pcn [penicillins], nsaids, zofran [ondansetron], codeine, cymbalta [duloxetine hcl], morphine, prolia [denosumab], and seasonal.  FAMILY HISTORY     She indicated that her mother is . She indicated that her father is . She indicated that her sister is . She indicated that her brother is alive. She indicated that her maternal grandmother is . She indicated that her maternal grandfather is . She indicated that her paternal grandmother is . She indicated that her paternal grandfather is . She indicated that her maternal aunt is . SOCIAL HISTORY       Social History     Tobacco Use    Smoking status: Former Smoker     Packs/day: 1.00     Years: 43.00     Pack years: 43.00     Types: Cigarettes     Quit date: 2008     Years since quittin.2    Smokeless tobacco: Never Used   Vaping Use    Vaping Use: Never used   Substance Use Topics    Alcohol use:  Yes 17 (*)     All other components within normal limits   TROPONIN - Abnormal; Notable for the following components:    Troponin, High Sensitivity 71 (*)     All other components within normal limits   TROPONIN - Abnormal; Notable for the following components:    Troponin, High Sensitivity 69 (*)     All other components within normal limits   PROTIME-INR - Abnormal; Notable for the following components:    Protime 48.8 (*)     INR 5.6 (*)     All other components within normal limits   AMYLASE   LIPASE   MAGNESIUM   PROTIME-INR   BASIC METABOLIC PANEL W/ REFLEX TO MG FOR LOW K   CBC       EMERGENCY DEPARTMENTCOURSE:         Vitals:    Vitals:    11/08/21 0930   BP: 127/70   Pulse: 107   Resp: 18   Temp: 98 °F (36.7 °C)   TempSrc: Oral   SpO2: 95%   Weight: 228 lb (103.4 kg)   Height: 5' 2\" (1.575 m)       The patient was given the following medications while in the emergency department:  Orders Placed This Encounter   Medications    promethazine (PHENERGAN) injection 6.25 mg    HYDROmorphone (DILAUDID) injection 0.5 mg    promethazine (PHENERGAN) injection 6.25 mg    albuterol (PROVENTIL) nebulizer solution 2.5 mg     Order Specific Question:   Initiate RT Bronchodilator Protocol     Answer: Yes    albuterol sulfate  (90 Base) MCG/ACT inhaler 2 puff     Order Specific Question:   Initiate RT Bronchodilator Protocol     Answer: Yes    ipratropium-albuterol (DUONEB) nebulizer solution 1 ampule     Order Specific Question:   Initiate RT Bronchodilator Protocol     Answer:    Yes    amiodarone (CORDARONE) tablet 200 mg    budesonide-formoterol (SYMBICORT) 160-4.5 MCG/ACT inhaler 2 puff    famotidine (PEPCID) tablet 10 mg    levothyroxine (SYNTHROID) tablet 100 mcg    rosuvastatin (CRESTOR) tablet 40 mg    sodium chloride flush 0.9 % injection 5-40 mL    sodium chloride flush 0.9 % injection 10 mL    0.9 % sodium chloride infusion    DISCONTD: potassium chloride (KLOR-CON M) extended release tablet

## 2021-11-08 NOTE — ED PROVIDER NOTES
EMERGENCY DEPARTMENT ENCOUNTER    Pt Name: Noah Alston  MRN: [id number]  [place] 1950  Date of evaluation: 11/8/21  CHIEF COMPLAINT       Chief Complaint   Patient presents with    Nausea & Vomiting     onset 2.5 weeks    Fatigue    Abdominal Pain    Dizziness     HISTORY OF PRESENT ILLNESS   HPI    REVIEW OF SYSTEMS     Review of Systems  PASTMEDICAL HISTORY     Past Medical History:   Diagnosis Date    Anesthesia complication     DIFFICULT TIME WAKING UP    Asthma     INHALER USE DAILY AND AS NEEDED DX AS A CHILD    Atrial fibrillation ([address] 2003    INTERMITTENT (DR. Basim Cooper)    Cerebral artery occlusion with cerebral infarction ([address] 2006    tia x2. Pt denies history of a stroke (Written 02/04/2020).  Chronic back pain     Chronic kidney disease     chronic kidney disease dr Caesar Robins nephrologist   Stage 3    [facility]osis ([facility] [address]     Constipation     COPD (chronic obstructive pulmonary disease) ([facility] [address] 2003    INHALER USE DAILY AND AS NEEDED    Dental bridge present     PERMANENT UPPER X 2 BRIDGES UPPER FRONT    Diet-controlled diabetes mellitus (HCC)     Difficult intravenous access     USUALLY USE ULTRASOUND ON ANESTHESIA STARTS VEINS ARE SMALL AND DEEP DO NOT USE HANDS    Diverticulitis     Gastroparesis     GERD (gastroesophageal reflux disease)     ON RX    H/O mastectomy     fibrocystic disease.  BILATERAL MASTECTOMY    Hepatitis 2008    hep c, took meds for hep c    History of blood transfusion     2008    History of Coumadin therapy     on coumadin daily    History of DVT (deep vein thrombosis)     Several years ago (Written 02/04/2020)    History of echocardiogram 03/2018    EF 55%    History of stress test 2015    EF > 70%    Hx of blood clots     states has had on multiple occassions    Hyperlipidemia     Hypertension     Hypothyroidism     Impaired mobility     uses walker    Incontinence     wears a pad    Incontinence of bowel     wears depends    Liver disease hepatitis c-RESOLVED 2008 WITH TREATMENT    Lower extremity neuropathy     Lumbar disc disease     OAB (overactive bladder)     Osteoarthritis     PONV (postoperative nausea and vomiting)     Prolonged emergence from general anesthesia     Rectocele     Rotator cuff tear     right side, needs surgery    Snores     states has been tested for sleep apnea and does not have it    Type II or unspecified type diabetes mellitus without mention of complication, not stated as uncontrolled     last A1C was 5.2 11/27/2019    Use of cane as ambulatory aid     Uses roller walker     Wears glasses     READING     SURGICAL HISTORY       Past Surgical History:   Procedure Laterality Date    APPENDECTOMY  1964    BREAST SURGERY Bilateral 1982    mastectomy, fibrocystic breast, mother had passed from breast cancer   [facility] Left 2006   [address]    PARTIAL    KNEE SURGERY Left 2013    ARTHROSCOPY    NERVE SURGERY N/A 10/23/2020    SACRAL NERVE STIMULATOR IMPLANT STAGE 1 AND STAGE 2, C-ARM performed by Monty Kohli MD at [place] [address] W/CYSTO W/NTRCL RPR N/A 2/12/2018    VAGINAL REPAIR ANTERIOR AND POSTERIOR, SOLYX BLADDER SLING, CYSTO, performed by David Montanez DO at [address] [place] [address] N/A 2/17/2020    EXCISION PERIVAGINAL CYST performed by Syed Espinal MD at [address] [place] [address] Left 2005    ROTATOR CUFF    SHOULDER SURGERY Right 07/08/2019    STIMULATOR SURGERY  10/23/2020    SACRAL NERVE STIMULATOR IMPLANT STAGE 1 AND STAGE 2,     UPPER GASTROINTESTINAL ENDOSCOPY       CURRENT MEDICATIONS       Previous Medications    ALBUTEROL (PROVENTIL) (2.5 MG/3ML) 0.083% NEBULIZER SOLUTION    Take 3 mLs by nebulization every 4 hours as needed for Wheezing    ALBUTEROL SULFATE  (90 BASE) MCG/ACT INHALER    Inhale 2 puffs into the lungs every 6 hours as needed for Wheezing ALBUTEROL-IPRATROPIUM (COMBIVENT RESPIMAT)  MCG/ACT AERS INHALER    Inhale 1 puff into the lungs every 6 hours    AMIODARONE (CORDARONE) 200 MG TABLET    Take 200 mg by mouth three times a week TAKES ON ,  AND . AMMONIUM LACTATE (LAC-HYDRIN) 12 % LOTION    Apply topically 2 times daily as needed     BUDESONIDE-FORMOTEROL (SYMBICORT) 160-4.5 MCG/ACT AERO    Inhale 2 puffs into the lungs 2 times daily    CHOLECALCIFEROL (VITAMIN D3) 1000 UNITS TABS    Take 1,000 Units by mouth Five times weekly Indications: Mon-Fri     EZETIMIBE (ZETIA) 10 MG TABLET    Take 10 mg by mouth daily     FAMOTIDINE (PEPCID) 10 MG TABLET    Take 10 mg by mouth daily    FOLIC ACID (FOLVITE) 1 MG TABLET    Take 1 tablet by mouth daily. K PHOS MONO-SOD PHOS DI & MONO (PHOSPHA 250 NEUTRAL PO)    Take 1 tablet by mouth 2 times daily    LEVOTHYROXINE (SYNTHROID) 100 MCG TABLET        METOCLOPRAMIDE (REGLAN) 5 MG TABLET    Take 10 mg by mouth 4 times daily Indications: 2tabs am and 2 tabs hs     NEBIVOLOL (BYSTOLIC) 5 MG TABLET    Take 2.5 mg by mouth daily On Monday, Wednesday, and Friday    POLYETHYLENE GLYCOL 3350 (MIRALAX PO)    Take by mouth daily    ROSUVASTATIN (CRESTOR) 40 MG TABLET    Take 1 tablet by mouth daily    SPIRONOLACTONE (ALDACTONE) 100 MG TABLET    Take 100 mg by mouth daily     TORSEMIDE (DEMADEX) 20 MG TABLET    Take 60 mg by mouth as needed Feet/ankle edema    VALACYCLOVIR (VALTREX) 1 G TABLET    Take 2,000 mg by mouth daily as needed 10 day course when needed for cold sores. 2,000 MG FOR 3 DAYS PRN    WARFARIN (COUMADIN) 2.5 MG TABLET    Take 1.25-2.5 mg by mouth daily as directed by [facility]. ALLERGIES     is allergic to lyrica [pregabalin], pcn [penicillins], nsaids, zofran [ondansetron], codeine, cymbalta [duloxetine hcl], morphine, prolia [denosumab], and seasonal.  FAMILY HISTORY     She indicated that her mother is .  She indicated that her father is . She indicated that her sister is . She indicated that her brother is alive. She indicated that her maternal grandmother is . She indicated that her maternal grandfather is . She indicated that her paternal grandmother is . She indicated that her paternal grandfather is . She indicated that her maternal aunt is . SOCIAL HISTORY       Social History     Tobacco Use    Smoking status: Former Smoker     Packs/day: 1.00     Years: 43.00     Pack years: 43.00     Types: Cigarettes     Quit date: 2008     Years since quittin.2    Smokeless tobacco: Never Used   Vaping Use    Vaping Use: Never used   Substance Use Topics    Alcohol use: Yes     Alcohol/week: 1.0 standard drink     Types: 1 Glasses of wine per week     Comment: HOLIDAYS    Drug use: No     PHYSICAL EXAM     INITIAL VITALS: /70   Pulse 107   Temp 98 °F (36.7 °C) (Oral)   Resp 18   Ht 5' 2\" (1.575 m)   Wt 228 lb (103.4 kg)   LMP 1978   SpO2 95%   BMI 41.70 kg/m²    Physical Exam    MEDICAL DECISION MAKING:            All patient's question's and concerns were answered prior to disposition and patient and/or family expressed understanding and agreement of treatment plan. CRITICAL CARE:              NIH STROKE SCALE:            PROCEDURES:    Procedures    DIAGNOSTIC RESULTS   EKG:All EKG's are interpreted by the Emergency Department Physician who either signs or Co-signs this chart in the absence of a cardiologist.        RADIOLOGY:All plain film, CT, MRI, and formal ultrasound images (except ED bedside ultrasound) are read by the radiologist, see reports below, unless otherwisenoted in MDM or here. CT ABDOMEN PELVIS W IV CONTRAST Additional Contrast? None    (Results Pending)     LABS: All lab results were reviewed by myself, and all abnormals are listed below.   Labs Reviewed   CBC WITH AUTO DIFFERENTIAL - Abnormal; Notable for the following components: Result Value    Hemoglobin 15.5 (*)     Seg Neutrophils 75 (*)     Lymphocytes 14 (*)     Eosinophils % 0 (*)     Immature Granulocytes 1 (*)     All other components within normal limits   COMPREHENSIVE METABOLIC PANEL W/ REFLEX TO MG FOR LOW K   AMYLASE   LIPASE   TROPONIN       EMERGENCY DEPARTMENTCOURSE:         Vitals:    Vitals:    11/08/21 0930   BP: 127/70   Pulse: 107   Resp: 18   Temp: 98 °F (36.7 °C)   TempSrc: Oral   SpO2: 95%   Weight: 228 lb (103.4 kg)   Height: 5' 2\" (1.575 m)       The patient was given the following medications while in the emergency department:  Orders Placed This Encounter   Medications    promethazine (PHENERGAN) injection 6.25 mg     CONSULTS:  None    FINAL IMPRESSION    No diagnosis found. DISPOSITION/PLAN   DISPOSITION        PATIENT REFERRED TO:  No follow-up provider specified. DISCHARGE MEDICATIONS:  New Prescriptions    No medications on file     Lesa Cheadle, MD  Attending Emergency Physician      The care is provided during an unprecedented national emergency due to the novel coronavirus, COVID 19. This note was created with the assistance of a speech-recognition program. While intending to generate a document that actually reflects the content of the visit, no guarantees can be provided that every mistake has been identified and corrected by editing. Maria E Laguna

## 2021-11-08 NOTE — PROGRESS NOTES
Warfarin Dosing - Pharmacy Consult Note  Consulting Provider: Jhonny Boss cnp  Indication:  Atrial Fibrillation  Warfarin Dose prior to admission: 1.25mg daily [address] [facility]  Concurrent anticoagulants/antiplatelets: yamileth  Significant Drug Interactions: No obvious interactions  Recent Labs     11/08/21  1017   INR 5.6*   HGB 15.5*      LABALBU 3.6     Recent warfarin administrations        No warfarin orders with administrations found. Orders not given:            warfarin (COUMADIN) daily dosing (placeholder)                   Date   INR    Dose  11/8       5.6     Assessment/Plan  (Goal INR: 2 - 3)  No coumadin today. Inr in am.     Active problem list reviewed. INR orders are placed. Chart reviewed for pertinent labs, drug/diet interactions, and past doses. Documentation of patient's clinical condition was reviewed. Pharmacy Dosing:  Pharmacy will continue to follow.

## 2021-11-09 PROBLEM — E44.0 MODERATE MALNUTRITION (HCC): Status: ACTIVE | Noted: 2021-11-09

## 2021-11-09 LAB
-: ABNORMAL
AMORPHOUS: ABNORMAL
ANION GAP SERPL CALCULATED.3IONS-SCNC: 17 MMOL/L (ref 9–17)
BACTERIA: ABNORMAL
BILIRUBIN URINE: NEGATIVE
BUN BLDV-MCNC: 13 MG/DL (ref 8–23)
BUN/CREAT BLD: 4 (ref 9–20)
CALCIUM SERPL-MCNC: 8.5 MG/DL (ref 8.6–10.4)
CASTS UA: ABNORMAL /LPF
CASTS UA: ABNORMAL /LPF
CHLORIDE BLD-SCNC: 106 MMOL/L (ref 98–107)
CO2: 14 MMOL/L (ref 20–31)
COLOR: YELLOW
COMMENT UA: ABNORMAL
CREAT SERPL-MCNC: 3.28 MG/DL (ref 0.5–0.9)
CREATININE URINE: 82.2 MG/DL (ref 28–217)
CRYSTALS, UA: ABNORMAL /HPF
D-DIMER QUANTITATIVE: 0.51 MG/L FEU (ref 0–0.59)
EPITHELIAL CELLS UA: ABNORMAL /HPF (ref 0–5)
GFR AFRICAN AMERICAN: 17 ML/MIN
GFR NON-AFRICAN AMERICAN: 14 ML/MIN
GFR SERPL CREATININE-BSD FRML MDRD: ABNORMAL ML/MIN/{1.73_M2}
GFR SERPL CREATININE-BSD FRML MDRD: ABNORMAL ML/MIN/{1.73_M2}
GLUCOSE BLD-MCNC: 102 MG/DL (ref 65–105)
GLUCOSE BLD-MCNC: 105 MG/DL (ref 70–99)
GLUCOSE BLD-MCNC: 127 MG/DL (ref 65–105)
GLUCOSE BLD-MCNC: 135 MG/DL (ref 65–105)
GLUCOSE BLD-MCNC: 160 MG/DL (ref 65–105)
GLUCOSE URINE: ABNORMAL
HCT VFR BLD CALC: 40.2 % (ref 36.3–47.1)
HEMOGLOBIN: 13.2 G/DL (ref 11.9–15.1)
INR BLD: 6.3
KETONES, URINE: NEGATIVE
LEUKOCYTE ESTERASE, URINE: NEGATIVE
LV EF: 60 %
LVEF MODALITY: NORMAL
MCH RBC QN AUTO: 32.7 PG (ref 25.2–33.5)
MCHC RBC AUTO-ENTMCNC: 32.8 G/DL (ref 28.4–34.8)
MCV RBC AUTO: 99.5 FL (ref 82.6–102.9)
MUCUS: ABNORMAL
NITRITE, URINE: NEGATIVE
NRBC AUTOMATED: ABNORMAL PER 100 WBC
OTHER OBSERVATIONS UA: ABNORMAL
PDW BLD-RTO: 14.5 % (ref 11.8–14.4)
PH UA: 6 (ref 5–8)
PLATELET # BLD: 182 K/UL (ref 138–453)
PMV BLD AUTO: 10.2 FL (ref 8.1–13.5)
POTASSIUM SERPL-SCNC: 3.6 MMOL/L (ref 3.7–5.3)
PROTEIN UA: ABNORMAL
PROTHROMBIN TIME: 53.4 SEC (ref 11.5–14.2)
RBC # BLD: 4.04 M/UL (ref 3.95–5.11)
RBC UA: ABNORMAL /HPF (ref 0–2)
RENAL EPITHELIAL, UA: ABNORMAL /HPF
SODIUM BLD-SCNC: 137 MMOL/L (ref 135–144)
SODIUM,UR: 50 MMOL/L
SPECIFIC GRAVITY UA: 1.01 (ref 1–1.03)
TRICHOMONAS: ABNORMAL
TROPONIN INTERP: ABNORMAL
TROPONIN T: ABNORMAL NG/ML
TROPONIN, HIGH SENSITIVITY: 59 NG/L (ref 0–14)
TURBIDITY: CLEAR
URINE HGB: ABNORMAL
UROBILINOGEN, URINE: NORMAL
WBC # BLD: 7.7 K/UL (ref 3.5–11.3)
WBC UA: ABNORMAL /HPF (ref 0–5)
YEAST: ABNORMAL

## 2021-11-09 PROCEDURE — 99232 SBSQ HOSP IP/OBS MODERATE 35: CPT | Performed by: INTERNAL MEDICINE

## 2021-11-09 PROCEDURE — 81001 URINALYSIS AUTO W/SCOPE: CPT

## 2021-11-09 PROCEDURE — 97535 SELF CARE MNGMENT TRAINING: CPT

## 2021-11-09 PROCEDURE — 85379 FIBRIN DEGRADATION QUANT: CPT

## 2021-11-09 PROCEDURE — 80048 BASIC METABOLIC PNL TOTAL CA: CPT

## 2021-11-09 PROCEDURE — 97163 PT EVAL HIGH COMPLEX 45 MIN: CPT

## 2021-11-09 PROCEDURE — 6360000002 HC RX W HCPCS: Performed by: NURSE PRACTITIONER

## 2021-11-09 PROCEDURE — 6370000000 HC RX 637 (ALT 250 FOR IP): Performed by: INTERNAL MEDICINE

## 2021-11-09 PROCEDURE — 85610 PROTHROMBIN TIME: CPT

## 2021-11-09 PROCEDURE — 97530 THERAPEUTIC ACTIVITIES: CPT

## 2021-11-09 PROCEDURE — 84484 ASSAY OF TROPONIN QUANT: CPT

## 2021-11-09 PROCEDURE — 85027 COMPLETE CBC AUTOMATED: CPT

## 2021-11-09 PROCEDURE — 2580000003 HC RX 258: Performed by: NURSE PRACTITIONER

## 2021-11-09 PROCEDURE — 6370000000 HC RX 637 (ALT 250 FOR IP): Performed by: NURSE PRACTITIONER

## 2021-11-09 PROCEDURE — 93306 TTE W/DOPPLER COMPLETE: CPT

## 2021-11-09 PROCEDURE — 82947 ASSAY GLUCOSE BLOOD QUANT: CPT

## 2021-11-09 PROCEDURE — 87205 SMEAR GRAM STAIN: CPT

## 2021-11-09 PROCEDURE — 1200000000 HC SEMI PRIVATE

## 2021-11-09 PROCEDURE — 94640 AIRWAY INHALATION TREATMENT: CPT

## 2021-11-09 PROCEDURE — 97116 GAIT TRAINING THERAPY: CPT

## 2021-11-09 PROCEDURE — 36415 COLL VENOUS BLD VENIPUNCTURE: CPT

## 2021-11-09 PROCEDURE — 84300 ASSAY OF URINE SODIUM: CPT

## 2021-11-09 PROCEDURE — 97167 OT EVAL HIGH COMPLEX 60 MIN: CPT

## 2021-11-09 PROCEDURE — 97112 NEUROMUSCULAR REEDUCATION: CPT

## 2021-11-09 RX ORDER — MIDODRINE HYDROCHLORIDE 2.5 MG/1
2.5 TABLET ORAL
Status: DISCONTINUED | OUTPATIENT
Start: 2021-11-09 | End: 2021-11-13 | Stop reason: HOSPADM

## 2021-11-09 RX ADMIN — FAMOTIDINE 10 MG: 20 TABLET ORAL at 20:03

## 2021-11-09 RX ADMIN — HYDROMORPHONE HYDROCHLORIDE 0.5 MG: 1 INJECTION, SOLUTION INTRAMUSCULAR; INTRAVENOUS; SUBCUTANEOUS at 01:30

## 2021-11-09 RX ADMIN — MIDODRINE HYDROCHLORIDE 2.5 MG: 2.5 TABLET ORAL at 12:55

## 2021-11-09 RX ADMIN — PROMETHAZINE HYDROCHLORIDE 12.5 MG: 12.5 TABLET ORAL at 06:19

## 2021-11-09 RX ADMIN — ROSUVASTATIN CALCIUM 40 MG: 40 TABLET, FILM COATED ORAL at 20:03

## 2021-11-09 RX ADMIN — HYDROMORPHONE HYDROCHLORIDE 0.5 MG: 1 INJECTION, SOLUTION INTRAMUSCULAR; INTRAVENOUS; SUBCUTANEOUS at 20:03

## 2021-11-09 RX ADMIN — LEVOTHYROXINE SODIUM 100 MCG: 0.1 TABLET ORAL at 06:07

## 2021-11-09 RX ADMIN — HYDROMORPHONE HYDROCHLORIDE 0.5 MG: 1 INJECTION, SOLUTION INTRAMUSCULAR; INTRAVENOUS; SUBCUTANEOUS at 15:11

## 2021-11-09 RX ADMIN — MIDODRINE HYDROCHLORIDE 2.5 MG: 2.5 TABLET ORAL at 17:34

## 2021-11-09 RX ADMIN — POTASSIUM CHLORIDE 20 MEQ: 20 TABLET, EXTENDED RELEASE ORAL at 17:34

## 2021-11-09 RX ADMIN — POTASSIUM CHLORIDE 20 MEQ: 20 TABLET, EXTENDED RELEASE ORAL at 09:57

## 2021-11-09 RX ADMIN — HYDROMORPHONE HYDROCHLORIDE 0.5 MG: 1 INJECTION, SOLUTION INTRAMUSCULAR; INTRAVENOUS; SUBCUTANEOUS at 10:15

## 2021-11-09 RX ADMIN — INSULIN LISPRO 1 UNITS: 100 INJECTION, SOLUTION INTRAVENOUS; SUBCUTANEOUS at 17:34

## 2021-11-09 RX ADMIN — SODIUM CHLORIDE: 9 INJECTION, SOLUTION INTRAVENOUS at 06:35

## 2021-11-09 RX ADMIN — BUDESONIDE AND FORMOTEROL FUMARATE DIHYDRATE 2 PUFF: 160; 4.5 AEROSOL RESPIRATORY (INHALATION) at 19:58

## 2021-11-09 RX ADMIN — HYDROMORPHONE HYDROCHLORIDE 0.5 MG: 1 INJECTION, SOLUTION INTRAMUSCULAR; INTRAVENOUS; SUBCUTANEOUS at 06:10

## 2021-11-09 RX ADMIN — SODIUM CHLORIDE, PRESERVATIVE FREE 10 ML: 5 INJECTION INTRAVENOUS at 09:57

## 2021-11-09 ASSESSMENT — PAIN DESCRIPTION - FREQUENCY
FREQUENCY: INTERMITTENT
FREQUENCY: INTERMITTENT

## 2021-11-09 ASSESSMENT — PAIN DESCRIPTION - PAIN TYPE
TYPE: ACUTE PAIN
TYPE: ACUTE PAIN

## 2021-11-09 ASSESSMENT — PAIN DESCRIPTION - PROGRESSION
CLINICAL_PROGRESSION: GRADUALLY WORSENING
CLINICAL_PROGRESSION: GRADUALLY WORSENING

## 2021-11-09 ASSESSMENT — PAIN DESCRIPTION - ORIENTATION
ORIENTATION: UPPER

## 2021-11-09 ASSESSMENT — PAIN SCALES - GENERAL
PAINLEVEL_OUTOF10: 5
PAINLEVEL_OUTOF10: 8
PAINLEVEL_OUTOF10: 7
PAINLEVEL_OUTOF10: 5

## 2021-11-09 ASSESSMENT — PAIN DESCRIPTION - DIRECTION
RADIATING_TOWARDS: BACK
RADIATING_TOWARDS: BACK

## 2021-11-09 ASSESSMENT — PAIN DESCRIPTION - ONSET
ONSET: AWAKENED FROM SLEEP
ONSET: ON-GOING

## 2021-11-09 ASSESSMENT — PAIN DESCRIPTION - LOCATION
LOCATION: ABDOMEN

## 2021-11-09 ASSESSMENT — PAIN DESCRIPTION - DESCRIPTORS
DESCRIPTORS: DULL;SHARP
DESCRIPTORS: DULL;DISCOMFORT

## 2021-11-09 ASSESSMENT — PAIN - FUNCTIONAL ASSESSMENT
PAIN_FUNCTIONAL_ASSESSMENT: PREVENTS OR INTERFERES SOME ACTIVE ACTIVITIES AND ADLS
PAIN_FUNCTIONAL_ASSESSMENT: PREVENTS OR INTERFERES SOME ACTIVE ACTIVITIES AND ADLS

## 2021-11-09 NOTE — PROGRESS NOTES
Occupational Therapy   Occupational Therapy Initial Assessment  Date: 2021   Patient Name: Rohit Turcios  MRN: [id number]     : 1950    RASHI Steel reports patient is medically stable for therapy treatment this date. Chart reviewed prior to treatment and patient is agreeable for therapy. All lines intact and patient positioned comfortably at end of treatment. All patient needs addressed prior to ending therapy session. Pt currently functioning below baseline. Would suggest additional therapy at time of discharge to maximize long term outcomes and prevent re-admission. Please refer to AM-PAC score for current level of function. Date of Service: 2021    Discharge Recommendations:  Patient would benefit from continued therapy after discharge  OT Equipment Recommendations  Equipment Needed: Yes  Mobility Devices: ADL Assistive Devices  ADL Assistive Devices: Long-handled Sponge; Emergency Alert System    Assessment   Performance deficits / Impairments: Decreased functional mobility ; Decreased safe awareness; Decreased balance; Decreased coordination; Decreased ADL status; Decreased vision/visual deficit; Decreased posture; Decreased endurance; Decreased high-level IADLs; Decreased strength; Decreased fine motor control  Assessment: At this time, pt is a high fall risk and requires assist of 2 staff when up with RW for safety/postural support. Skilled OT POC is recommended to improve overall functional I and safety as able.   Prognosis: Fair  Decision Making: High Complexity  OT Education: OT Role; Transfer Training; Energy Conservation; Plan of Care  Patient Education: safety in function, call light use/fall prevention, pursed lip breathing, proper bed mob tech, postural control, recommendations for continued therapy and benefiits of being up as able, edema mgt tech  REQUIRES OT FOLLOW UP: Yes  Activity Tolerance  Activity Tolerance: Patient limited by fatigue; Patient limited by pain (limited by c/o dizziness)  Activity Tolerance: poor plus  Safety Devices  Safety Devices in place: Yes  Type of devices: Call light within reach; Bed alarm in place; Left in bed; Patient at risk for falls; Gait belt; Nurse notified (RN was requested back to bed; pillow under BLE's and pt was edu to elevate BLE's and complete B ankle AROM/pumping as able to reduce swelling. Pt with good understanding.)           Patient Diagnosis(es): The encounter diagnosis was Chest pain, unspecified type. has a past medical history of Anesthesia complication, Asthma, Atrial fibrillation ([facility] [address] Cerebral artery occlusion with cerebral infarction [facility]), Chronic back pain, Chronic kidney disease, Cirrhosis ([facility] [address] Constipation, COPD (chronic obstructive pulmonary disease) ([facility] [address] Dental bridge present, Diet-controlled diabetes mellitus ([facility] [address] Difficult intravenous access, Diverticulitis, Gastroparesis, GERD (gastroesophageal reflux disease), H/O mastectomy, Hepatitis, History of blood transfusion, History of Coumadin therapy, History of DVT (deep vein thrombosis), History of echocardiogram, History of stress test, Hx of blood clots, Hyperlipidemia, Hypertension, Hypothyroidism, Impaired mobility, Incontinence, Incontinence of bowel, Liver disease, Lower extremity neuropathy, Lumbar disc disease, OAB (overactive bladder), Osteoarthritis, PONV (postoperative nausea and vomiting), Prolonged emergence from general anesthesia, Rectocele, Rotator cuff tear, Snores, Type II or unspecified type diabetes mellitus without mention of complication, not stated as uncontrolled, Use of cane as ambulatory aid, Uses roller walker, and Wears glasses. has a past surgical history that includes Cholecystectomy; Upper gastrointestinal endoscopy; Colonoscopy; pr cmbnd anterpost colporraphy w/cysto w/ntrcl rpr (N/A, 2/12/2018); Appendectomy (1964); Hysterectomy (1978);  Breast surgery (Bilateral, 1982); shoulder surgery (Left, 2005); eye surgery (Left, 2006); knee surgery (Left, 2013); Cholecystectomy (1976); shoulder surgery (Right, 07/08/2019); Rectal surgery (N/A, 2/17/2020); Stimulator Surgery (10/23/2020); and Nerve Surgery (N/A, 10/23/2020). PER H&P:     History of Present Illness:  Madi Nava is a 66 y.o.  female who presents with Nausea & Vomiting (onset 2.5 weeks), Fatigue, Abdominal Pain, and Dizziness     Patient was admitted thru ER with:  29-year-old female admitted through the emergency room with nausea, vomiting, and abdominal pain     Restrictions  Restrictions/Precautions  Restrictions/Precautions: General Precautions, Fall Risk  Position Activity Restriction  Other position/activity restrictions: Up with assist, telemetry, low sodium diet, alarms, pt with c/o dizziness, LUE IV    Subjective   General  Chart Reviewed: Yes  Patient assessed for rehabilitation services?: Yes  Family / Caregiver Present: No  Patient Currently in Pain: Yes  Pre Treatment Pain Screening  Intervention List: Nurse/Physician notified  Comments / Details: Pt c/o 6/10 abd pain and radiates around to back. Social/Functional History  Social/Functional History  Lives With: Alone  Type of Home: Apartment  Home Layout: One level  Home Access: Stairs to enter without rails  Entrance Stairs - Number of Steps: 1  Bathroom Shower/Tub: Tub/Shower unit  Bathroom Toilet: Standard (pt states she can hold support on bench or vanity since bathroom is very small)  Bathroom Equipment: Grab bars in shower, Tub transfer bench  Home Equipment: Sock aid, 4 wheeled walker, [place] [facility], [address], Lift chair, Long-handled shoehorn (pt sleeps in recliner chair)  Receives Help From: Family, Friend(s) (pt states she has 2 supportive children, but her daughter is very ill & son works 12 hr shifts.  Pt states she has supportive friends who are very busy too)  ADL Assistance: Independent (pt states with increased time)  Homemaking Assistance: Needs assistance (pt has monthly cleaning service for heavy cleaning. She states she can do light cleaning & meal prep. 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 Pt states 1st one she passed out completely       Objective   Vision: Impaired  Vision Exceptions: Wears glasses for reading (pt reports she feels like she is floating when she gets dizzy spells; pt states she had growth removed/sx  left eye 2003)  Hearing: Within functional limits    Orientation  Overall Orientation Status: Within Functional Limits  Observation/Palpation  Posture: Fair (with RW)  Observation: BP & pulse  checked seated was 142/72, MAP 87 & pulse 93, standing /88, MAP 91 & pulse 98, supine /75,  MAP 79 & pulse 86/RN in room and informed. Pt with c/o dizziness when up and RN was informed and requested BP readings. Pt states her dizziness is worse when she looks up and down. Edema: BLE's/dry skin  Balance  Sitting Balance: Contact guard assistance  Standing Balance: Moderate assistance (x2 with RW)  Standing Balance  Time: stand kell 1-1.5 mins with RW  Functional Mobility  Functional - Mobility Device: Rolling Walker  Activity:  (pt on toilet upon arrival; toilet and back to bed per RN request)  Assist Level:  Moderate assistance (x2 for safety/balace)  Functional Mobility Comments: MOD verbal instruction/tactile assist for upright posture, RW safety/mgt and staying inside AD and keeping close to body, keeping B eyes open, scanning room, postural control, pacing and awareness/assist with lines to increase overall safety/reduce falls. Toilet Transfers  Toilet - Technique: Ambulating  Equipment Used: Grab bars  Toilet Transfer: 2 Person assistance; Moderate assistance  Toilet Transfers Comments: MAX-MOD verbal instruction/tactile assist for hand placement on grab bar, nose over toes tech, pacing, pursed lip breathing, and awareness/assist with lines to increase overall safety. ADL  Feeding: Setup (pt reports poor appetite)  Grooming: Setup; Minimal assistance  UE Bathing: Setup  LE Bathing: Setup; Maximum assistance; Dependent/Total  UE Dressing: Setup; Moderate assistance (to change hosp gown and sarah clean one and 2nd one as a robe to increase overall modesty)  LE Dressing: Setup; Maximum assistance; Dependent/Total (max assist with B socks; x2 staff for clothing mgt/underwear up)  Toileting: Dependent/Total (pt on toilet up on arrival; pt urinated and was able to complete front hygiene with assist of 2 for safety; x2 staff to stand for underwear up/gown mgt)  Additional Comments: *Pt is DEP when up with RW and 2 staff assist needed for safety/balace and due to dizziness. Tone RUE  RUE Tone: Normotonic  Tone LUE  LUE Tone: Normotonic  Coordination  Movements Are Fluid And Coordinated: No  Coordination and Movement description: Fine motor impairments     Bed mobility  Supine to Sit: Unable to assess (pt up on toilet upon arrival)  Sit to Supine: Moderate assistance; 2 Person assistance (RN requested pt back to bed)  Comment: MOD verbal instruction/tactile assist for hand placement on bed rail, proper log rolling tech, pursed lip breathing and awareness/assist with lines to increase overall safety. Transfers  Stand Step Transfers: Moderate assistance; 2 Person assistance  Sit to stand:  Moderate assistance; 2 Person assistance  Stand to sit: Moderate assistance; 2 Person assistance  Transfer Comments: MAX-MOD verbal instruction/tactile assist for B hand placement pushing from surface seated on as well as reaching back, nose over toes tech, upright posture, keeping B eyes open, RW safety/mgt, pacing, pursed lip breathing, squaring self/AD up to surface prior to sitting and controlled and awareness/assist with lines to increase overall safety. Cognition  Overall Cognitive Status: Exceptions  Arousal/Alertness: Appropriate responses to stimuli  Following Commands: Follows multistep commands with increased time; Follows multistep commands with repitition  Attention Span: Appears intact  Memory: Appears intact  Safety Judgement: Decreased awareness of need for safety; Decreased awareness of need for assistance  Problem Solving: Decreased awareness of errors; Assistance required to identify errors made; Assistance required to correct errors made; Assistance required to implement solutions; Assistance required to generate solutions  Insights: Decreased awareness of deficits  Initiation: Requires cues for some  Sequencing: Does not require cues  Perception  Overall Perceptual Status: WFL     Sensation  Overall Sensation Status: WFL        LUE AROM (degrees)  LUE AROM : WFL  RUE AROM (degrees)  RUE AROM : WFL  LUE Strength  Gross LUE Strength: Exceptions to [facility]  LUE Strength Comment: BUE strength 4/5  RUE Strength  Gross RUE Strength: Exceptions to [address] [place] [address]  Times per week: 4-5x/week 1x/dy as kell  Current Treatment Recommendations: Strengthening, Balance Training, Functional Mobility Training, Safety Education & Training, Positioning, Endurance Training, Neuromuscular Re-education, Patient/Caregiver Education & Training, Equipment Evaluation, Education, & procurement, Self-Care / ADL, Pain Management, Home Management Training, Cognitive/Perceptual Training                                                  AM-PAC Score   12    *Co-treatment with PT warranted secondary to decreased safety and independence requiring 2 skilled therapy professionals to address individual discipline's goals.  OT addressing preparation

## 2021-11-09 NOTE — ACP (ADVANCE CARE PLANNING)
Advance Care Planning     Advance Care Planning Activator (Inpatient)  Conversation Note      Date of ACP Conversation: 11/9/2021     Conversation Conducted with: Patient with Decision Making Capacity    ACP Activator: Shakir Girard RN    {When Decision Maker makes decisions on behalf of the incapacitated patient: Decision Maker is asked to consider and make decisions based on patient values, known preferences, or best interests. Health Care Decision Maker:     Current Designated Health Care Decision Maker:   Basil Groves (son)  Phone: [phone]    Click here to complete Healthcare Decision Makers including section of the Healthcare Decision Maker Relationship (ie \"Primary\")    Care Preferences    Ventilation: \"If you were in your present state of health and suddenly became very ill and were unable to breathe on your own, what would your preference be about the use of a ventilator (breathing machine) if it were available to you? \"      Would the patient desire the use of ventilator (breathing machine)?: yes    \"If your health worsens and it becomes clear that your chance of recovery is unlikely, what would your preference be about the use of a ventilator (breathing machine) if it were available to you? \"     Would the patient desire the use of ventilator (breathing machine)?: Unsure      Resuscitation  \"CPR works best to restart the heart when there is a sudden event, like a heart attack, in someone who is otherwise healthy. Unfortunately, CPR does not typically restart the heart for people who have serious health conditions or who are very sick. \"    \"In the event your heart stopped as a result of an underlying serious health condition, would you want attempts to be made to restart your heart (answer \"yes\" for attempt to resuscitate) or would you prefer a natural death (answer \"no\" for do not attempt to resuscitate)? \" yes       [] Yes   [x] No   Educated Patient / Prisca Weston regarding differences between Advance Directives and portable DNR orders.     Length of ACP Conversation in minutes:  5    Conversation Outcomes:  [x] ACP discussion completed  [] Existing advance directive reviewed with patient; no changes to patient's previously recorded wishes  [] New Advance Directive completed  [] Portable Do Not Rescitate prepared for Provider review and signature  [] POLST/POST/MOLST/MOST prepared for Provider review and signature      Follow-up plan:    [] Schedule follow-up conversation to continue planning  [] Referred individual to Provider for additional questions/concerns   [] Advised patient/agent/surrogate to review completed ACP document and update if needed with changes in condition, patient preferences or care setting    [] This note routed to one or more involved healthcare providers

## 2021-11-09 NOTE — PLAN OF CARE
Problem: Falls - Risk of:  Goal: Will remain free from falls  Description: Will remain free from falls  11/9/2021 1021 by Jessica Kohli RN  Outcome: Ongoing  Pt fall risk, fall band present, falling star, safety alarm activated and in use as needed. Hourly rounding performed. Pt encouraged to use call light. See Aminta Max fall risk assessment.

## 2021-11-09 NOTE — PROGRESS NOTES
Physical Therapy    Facility/Department: [facility]  Initial Assessment    NAME: Emily Cruz  : 1950  MRN: [id number]    Date of Service: 2021    Discharge Recommendations:  Patient would benefit from continued therapy after discharge         Pt presented to ED on 21 secondary to nausea & vomiting. Pt complains of a 2-day history of nausea and vomiting with associated abdominal pain. Pt states she has not been able to tolerate any oral she feels that she is dehydrated. She is complaining of abdominal pain localized to the lower quadrant 3 out of 10 on the pain scale. She denies recent antibiotic use or travel outside the country. Pt has a history of diverticulitis. Also said for the last 2 days she has had pressure in her midsternal region of her chest is gotten progressively worse with associated nausea and vomiting no diaphoresis    Pt admitted for further medical management    RN reports patient is medically stable for therapy treatment this date. Chart reviewed prior to treatment and patient is agreeable for therapy. Assessment   Body structures, Functions, Activity limitations: Decreased functional mobility ; Decreased ADL status; Decreased strength; Decreased safe awareness; Decreased endurance; Decreased balance; Decreased posture  Assessment: Pt with deficits of bed mobility, transfers, ambulation, posture, balance, safety awareness and endurance this session,  & required 2 assist for safe functional mobility, transfers & gait. , & is decline compared to prior level of function. With current deficits, Pt HIGH risk for falls & requires continued PT to maximize independence with functional mobility, balance, safety awareness & activity tolerance. Pt currently functioning below baseline. Would suggest additional therapy at time of discharge to maximize long term outcomes and prevent re-admission. Please refer to AM-PAC score for current level of function.   Prognosis: Good  Decision Making: Medium Complexity  Exam: ROM, MMT, functional mobility, activity tolerance, Balance, & [employer] AM-PAC 6 Clicks Basic Mobility  Clinical Presentation: evolving  PT Education: Goals; PT Role; Plan of Care; Functional Mobility Training; Transfer Training; Pressure Relief; Gait Training; General Safety  Patient Education: Ed pt on functional mobility, safety awareness, importance of being up & OOB to regain strength, & prevention of sedentary complications, pressure relief, circulation ex's,  & optimal breathing techniques  REQUIRES PT FOLLOW UP: Yes  Activity Tolerance  Activity Tolerance: Patient limited by fatigue; Patient limited by endurance       Patient Diagnosis(es): The encounter diagnosis was Chest pain, unspecified type. has a past medical history of Anesthesia complication, Asthma, Atrial fibrillation ([facility] [address] Cerebral artery occlusion with cerebral infarction [facility]), Chronic back pain, Chronic kidney disease, Cirrhosis ([facility] [address] Constipation, COPD (chronic obstructive pulmonary disease) ([facility] [address] Dental bridge present, Diet-controlled diabetes mellitus ([facility] [address] Difficult intravenous access, Diverticulitis, Gastroparesis, GERD (gastroesophageal reflux disease), H/O mastectomy, Hepatitis, History of blood transfusion, History of Coumadin therapy, History of DVT (deep vein thrombosis), History of echocardiogram, History of stress test, Hx of blood clots, Hyperlipidemia, Hypertension, Hypothyroidism, Impaired mobility, Incontinence, Incontinence of bowel, Liver disease, Lower extremity neuropathy, Lumbar disc disease, OAB (overactive bladder), Osteoarthritis, PONV (postoperative nausea and vomiting), Prolonged emergence from general anesthesia, Rectocele, Rotator cuff tear, Snores, Type II or unspecified type diabetes mellitus without mention of complication, not stated as uncontrolled, Use of cane as ambulatory aid, Uses roller walker, and Wears glasses.    has a past surgical history that includes Cholecystectomy; Upper gastrointestinal endoscopy; Colonoscopy; pr cmbnd anterpost colporraphy w/cysto w/ntrcl rpr (N/A, 2/12/2018); Appendectomy (1964); Hysterectomy (1978); Breast surgery (Bilateral, 1982); shoulder surgery (Left, 2005); eye surgery (Left, 2006); knee surgery (Left, 2013); Cholecystectomy (1976); shoulder surgery (Right, 07/08/2019); Rectal surgery (N/A, 2/17/2020); Stimulator Surgery (10/23/2020); and Nerve Surgery (N/A, 10/23/2020). Restrictions  Restrictions/Precautions  Restrictions/Precautions: General Precautions, Fall Risk  Position Activity Restriction  Other position/activity restrictions:  Up with assist, telemetry, low sodium diet, alarms, pt with c/o dizziness, LUE IV  Vision/Hearing  Vision: Impaired  Vision Exceptions: Wears glasses for reading (pt reports floaters when she gets dizzy spells, pt had growth removed left eye 2003)     Subjective  General  Chart Reviewed: Yes  Patient assessed for rehabilitation services?: Yes  Additional Pertinent Hx: TIA x 2, COPD, CKD 3, HTN, DM2  Response To Previous Treatment: Not applicable  General Comment  Comments: RN okays PT  Subjective  Subjective: Pt agreeable to PT  Pain Screening  Patient Currently in Pain: Yes          Orientation  Orientation  Overall Orientation Status: Within Functional Limits  Social/Functional History  Social/Functional History  Lives With: Alone  Type of Home: Apartment  Home Layout: One level  Home Access: Stairs to enter without rails  Entrance Stairs - Number of Steps: 1  Bathroom Shower/Tub: Tub/Shower unit  Bathroom Toilet: Standard (pt can hold support easy to bench or vanity since BR is very small)  Bathroom Equipment: Grab bars in shower, Tub transfer bench  Home Equipment: Sock aid, 4 wheeled walker, Elvia [place], [address], Lift chair, Long-handled shoehorn (pt sleeps in recliner chair)  Receives Help From: Family, Friend(s) (pt has 2 supportive children, but daughter is very ill & Son works 12 hr shifts. She has supportive friends who are very busy too)  ADL Assistance: Independent (with inc time)  Homemaking Assistance: Needs assistance (pt has monthly cleaning service for heavy cleaning. She can do light cleaning & meal prep. 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  Leisure & Hobbies: Pt is scheduled to get knee injections for Juan Miguel knee OA on 11/17  IADL Comments: computer,  TV,  read,  grandchildren  Additional Comments: Pt reports 2 falls on April 23 & August 26 due to dizziness, 1st one she passed out completely  Cognition   Cognition  Overall Cognitive Status: Exceptions  Arousal/Alertness: Appropriate responses to stimuli  Following Commands: Follows multistep commands with increased time; Follows multistep commands with repitition  Attention Span: Appears intact  Memory: Appears intact  Safety Judgement: Decreased awareness of need for safety; Decreased awareness of need for assistance  Problem Solving: Decreased awareness of errors; Assistance required to identify errors made; Assistance required to correct errors made  Insights: Decreased awareness of deficits  Initiation: Does not require cues  Sequencing: Does not require cues    Objective     Observation/Palpation  Posture: Fair (with RW)  Observation: BP & pulse  checked seated was 142/72, MAP 87 & pulse 93, standing /88, MAP 91 & pulse 98, supine BP 1128/75,  MAP 79 & pulse 86. Pt with c/o dizziness when up and RN was informed and requested BP readings. Pt states her dizziness is worse when she looks up and down.   Edema: BLE's/dry skin    AROM RLE (degrees)  RLE AROM: WFL  RLE General AROM: limited knee flexion 2* OA  AROM LLE (degrees)  LLE AROM : WFL  LLE General AROM: limited knee flexion 2* OA  AROM RUE (degrees)  RUE General AROM: see OT assessment  AROM LUE (degrees)  LUE General AROM: see OT assessment  Strength RLE  Comment: 2+/5 hip, 3-/5 knee  Strength LLE  Comment: 2+/5 hip, 3-/5 knee  Strength RUE  Comment: see OT assessment  Strength LUE  Comment: see OT assessment  Tone RLE  RLE Tone: Normotonic  Tone LLE  LLE Tone: Normotonic  Motor Control  Gross Motor?: WFL  Sensation  Overall Sensation Status: WFL (pt denies any paresthesias in hands or feet)  Bed mobility  Sit to Supine: Moderate assistance; 2 Person assistance  Comment: Pt required Mod verbal instruction/tactile assist for UE hand placement on rail and proper log rolling tech + support of LE's to come into Bed , pursed lip breathing,  pacing, assist with line mgt,  with increased time needed all to increase safety & reduce fall risk  Transfers  Sit to Stand: Moderate Assistance; 2 Person Assistance  Stand to sit: Moderate Assistance; 2 Person Assistance  Bed to Chair: Moderate assistance; 2 Person Assistance  Stand Pivot Transfers: Moderate Assistance; 2 Person Assistance  Lateral Transfers: Moderate Assistance; 2 Person Assistance  Comment: Ed to slow down & take time for transitions to make sure she has no feeling of dizzy,  + tactile assist on correct use of upper body for safe sit/stand + to back all way back to surface with walker close before she reaches from arms of walker to chair  Ambulation  Ambulation?: Yes  Ambulation 1  Surface: level tile  Device: Rolling Walker  Assistance:  Moderate assistance; 2 Person assistance  Quality of Gait: step to pattern, safety cues to keep walker close at all times & to amb inside base of walker, pacing, & upright posture, legs weak & shaky  Gait Deviations: Slow Cynthia; Decreased step length; Decreased step height  Distance: 12ft     Balance  Posture: Fair  Sitting - Static: Good  Sitting - Dynamic: Good; -  Standing - Static: Fair; + (R/W)  Standing - Dynamic: Fair (R/W)  Exercises  Comments: Ed circulation ex's, pursed lip breathing techniques, pressure relief, & ex's to move what moves to maintain strength & joint congruency   Pt completed seated ant/post & lateral WS seated & static standing weight shifts & picking feet up off floor at R/walker  to improve core strength & stability   All lines intact, call light within reach, and patient positioned comfortably at end of treatment. All patient needs addressed prior to ending therapy session. Plan   Plan  Times per week: 1-2x/D, 5-6D/week  Current Treatment Recommendations: Strengthening, Balance Training, Functional Mobility Training, Transfer Training, Endurance Training, ADL/Self-care Training, Gait Training, Home Exercise Program, Safety Education & Training, Patient/Caregiver Education & Training  Safety Devices  Type of devices: Bed alarm in place, Gait belt, Patient at risk for falls, Left in bed    G-Code       OutComes Score                                                  AM-PAC Score  AM-PAC Inpatient Mobility Raw Score : 12 (11/09/21 1041)  AM-PAC Inpatient T-Scale Score : 35.33 (11/09/21 1041)  Mobility Inpatient CMS 0-100% Score: 68.66 (11/09/21 1041)  Mobility Inpatient CMS G-Code Modifier : CL (11/09/21 1041)          Goals  Short term goals  Time Frame for Short term goals: 12 visits  Short term goal 1: Inc bed-mobility & transfers to independent to enable pt to safely get in/OOB & chair  Short term goal 2: Inc gait to amb 200ft or > indep w/ RW to enable pt to return to previous level of independence & tolerance of community ambulation  Short term goal 3:  Inc strength to [facility] & standing balance to good with device to facilitate pt independence for performance of ADL's & functional mobility, & reduce fall risk  Short term goal 4: Pt able to tolerate 30-40 min of activity to include 15-20 reps of ex, NMR & functional mobility including 5 minutes of standing with device to facilitate activity tolerance to [facility]  Short term goal 5: Ed pt on home ex's, safety & endurance training, fall prevention, & issue written home program       Therapy Time   Individual Concurrent Group Co-treatment   Time In 0944         Time Out 1045         Minutes 61+10=71           Additional 10 minutes for chart review  Treatment time: 64 minutes      Co-treatment with OT warranted secondary to decreased safety and independence requiring 2 skilled therapy professionals to address individual discipline's goals. PT addressing pre gait trunk strengthening, weight shifting prior to transfers, transfer training and postural control in sitting.            [address], PT

## 2021-11-09 NOTE — CARE COORDINATION
Social work: faxed to [place]. Will need kyaw, Rx and discharge order for snf at discharge.  Maria G etienne

## 2021-11-09 NOTE — PROGRESS NOTES
self resumed Reglan without significant improvement. Upon evaluation she is found to have acute kidney injury. Review of Systems:     Constitutional:  negative for chills, fevers, sweats  Respiratory:  negative for cough, dyspnea on exertion, shortness of breath, wheezing  Cardiovascular:  negative for chest pain, chest pressure/discomfort, lower extremity edema, palpitations  Gastrointestinal:  negative for abdominal pain, constipation, diarrhea, positive for nausea, vomiting  Neurological:  negative for dizziness, headache    Medications: Allergies: Allergies   Allergen Reactions    Lyrica [Pregabalin] Nausea Only and Other (See Comments)     . FACE GETS BRIGHT RED. PAIN AND SEVERE SWELLING IN BILATERAL WRISTS, ANKLES AND KNEES    Pcn [Penicillins] Other (See Comments)     Unknown reaction, states can take Keflex STATES REACTION HAPPENED AS A CHILD AND WAS TOLD IT Was  LIFE THREATENING      Nsaids Other (See Comments)     PT HAS KIDNEY DISEASE AND CAN NOT TAKE NSAIDS      Zofran [Ondansetron] Other (See Comments)     Makes nausea and vomiting worse    Codeine Nausea And Vomiting     +N/V. KEEPS PATIENT AWAKE AND WIRED FOR SEVERAL DAYS.  Cymbalta [Duloxetine Hcl] Diarrhea, Nausea And Vomiting and Other (See Comments)     Intolerance. ABDOMINAL BLOATING    Morphine Anxiety    Prolia [Denosumab] Other (See Comments)     TACHYCARDIA, FLUSHED    Seasonal Other (See Comments)     RUNNY NOSE, WATERY ITCHY EYES, SNEEZING.        Current Meds:   Scheduled Meds:    midodrine  2.5 mg Oral TID     amiodarone  200 mg Oral Once per day on Mon Wed Fri    budesonide-formoterol  2 puff Inhalation BID    famotidine  10 mg Oral Daily    levothyroxine  100 mcg Oral Daily    rosuvastatin  40 mg Oral Daily    sodium chloride flush  5-40 mL IntraVENous 2 times per day    warfarin (COUMADIN) daily dosing (placeholder)   Other RX Placeholder    insulin lispro  0-6 Units SubCUTAneous TID     insulin lispro  0-3 Units SubCUTAneous Nightly    potassium chloride  20 mEq Oral BID WC     Continuous Infusions:    sodium chloride      sodium chloride 100 mL/hr at 11/09/21 0635    dextrose       PRN Meds: albuterol, albuterol sulfate HFA, sodium chloride flush, sodium chloride, polyethylene glycol, acetaminophen **OR** acetaminophen, glucose, dextrose, glucagon (rDNA), dextrose, promethazine, ipratropium-albuterol, HYDROmorphone, hydrALAZINE    Data:     Past Medical History:   has a past medical history of Anesthesia complication, Asthma, Atrial fibrillation ([facility] [address] Cerebral artery occlusion with cerebral infarction ([facility] [address] Chronic back pain, Chronic kidney disease, Cirrhosis ([facility] [address] Constipation, COPD (chronic obstructive pulmonary disease) ([facility] [address] Dental bridge present, Diet-controlled diabetes mellitus ([facility] [address] Difficult intravenous access, Diverticulitis, Gastroparesis, GERD (gastroesophageal reflux disease), H/O mastectomy, Hepatitis, History of blood transfusion, History of Coumadin therapy, History of DVT (deep vein thrombosis), History of echocardiogram, History of stress test, Hx of blood clots, Hyperlipidemia, Hypertension, Hypothyroidism, Impaired mobility, Incontinence, Incontinence of bowel, Liver disease, Lower extremity neuropathy, Lumbar disc disease, OAB (overactive bladder), Osteoarthritis, PONV (postoperative nausea and vomiting), Prolonged emergence from general anesthesia, Rectocele, Rotator cuff tear, Snores, Type II or unspecified type diabetes mellitus without mention of complication, not stated as uncontrolled, Use of cane as ambulatory aid, Uses roller walker, and Wears glasses. Social History:   reports that she quit smoking about 13 years ago. Her smoking use included cigarettes. She has a 43.00 pack-year smoking history. She has never used smokeless tobacco. She reports current alcohol use of about 1.0 standard drink of alcohol per week. She reports that she does not use drugs.      Family History:   Family History   Problem Relation Age of Onset   [facility] Cancer Mother         breast    Heart Disease Father     Cancer Sister         lung    Cancer Maternal Aunt         cervical       Vitals:  BP (!) 134/39   Pulse 80   Temp 97.5 °F (36.4 °C) (Oral)   Resp 16   Ht 5' 2\" (1.575 m)   Wt 228 lb 11.2 oz (103.7 kg)   LMP 1978   SpO2 98%   BMI 41.83 kg/m²   Temp (24hrs), Av.7 °F (36.5 °C), Min:97.3 °F (36.3 °C), Max:98.1 °F (36.7 °C)    Recent Labs     21  2113 21  0623 21  1146   POCGLU 122* 102 127*       I/O (24Hr):     Intake/Output Summary (Last 24 hours) at 2021 1631  Last data filed at 2021 1515  Gross per 24 hour   Intake 2265 ml   Output 2400 ml   Net -135 ml       Labs:  Hematology:  Recent Labs     21  1017 21  0034 21  0627   WBC 8.3  --  7.7   RBC 4.71  --  4.04   HGB 15.5*  --  13.2   HCT 47.1  --  40.2   .0  --  99.5   MCH 32.9  --  32.7   MCHC 32.9  --  32.8   RDW 14.3  --  14.5*     --  182   MPV 10.5  --  10.2   INR 5.6*  --  6.3*   DDIMER  --  0.51  --      Chemistry:  Recent Labs     21  1017 21  1130 21  1328 21  0627     --   --  137   K 3.4*  --   --  3.6*     --   --  106   CO2 13*  --   --  14*   GLUCOSE 128*  --   --  105*   BUN 12  --   --  13   CREATININE 3.31*  --   --  3.28*   MG 2.2  --   --   --    ANIONGAP 18*  --   --  17   LABGLOM 14*  --   --  14*   GFRAA 17*  --   --  17*   CALCIUM 9.1  --   --  8.5*   TROPHS  --  71* 69* 59*     Recent Labs     21  1017 21  2113 21  0623 21  1146   PROT 7.1  --   --   --    LABALBU 3.6  --   --   --    AST 37*  --   --   --    ALT 23  --   --   --    ALKPHOS 111*  --   --   --    BILITOT 1.22*  --   --   --    AMYLASE 30  --   --   --    LIPASE 41  --   --   --    POCGLU  --  122* 102 127*     ABG:  Lab Results   Component Value Date    FIO2 ROOM AIR 2014     Lab Results   Component Value Date/Time SPECIAL NOT REPORTED 10/09/2020 02:57 PM     Lab Results   Component Value Date/Time    CULTURE NO SIGNIFICANT GROWTH 10/09/2020 02:57 PM       Radiology:  CT ABDOMEN PELVIS WO CONTRAST Additional Contrast? None    Result Date: 11/8/2021  1. An acute process is not identified. 2. Diverticulosis and mild focal wall or fascial thickening involving the proximal sigmoid colon. The appearance is similar to the 2019 exam and may be the sequela of prior diverticulitis. Consider endoscopy for further evaluation. 3. Cirrhosis of the liver. 4. Status post appendectomy and cholecystectomy. 5. Indeterminate renal lesions, consider follow-up renal ultrasound or renal lesion protocol CT or MRI for further evaluation. Multiple renal cysts. XR CHEST PORTABLE    Result Date: 11/8/2021  No active cardiopulmonary disease.        Physical Examination:        General appearance:  alert, cooperative and no distress  Mental Status:  oriented to person, place and time and normal affect  Lungs:  clear to auscultation bilaterally, normal effort  Heart:  regular rate and rhythm, no murmur  Abdomen:  soft, nontender, nondistended, normal bowel sounds, no masses, hepatomegaly, splenomegaly  Extremities:  no edema, redness, tenderness in the calves  Skin:  no gross lesions, rashes, induration    Assessment:        Hospital Problems           Last Modified POA    * (Principal) KERMIT (acute kidney injury) ([address] 11/8/2021 Yes    COPD without exacerbation ([address] 11/9/2021 Yes    Atrial fibrillation ([address] 11/8/2021 Yes    Stage 4 chronic kidney disease ([address] 11/8/2021 Yes    Type 2 diabetes mellitus with kidney complication, with long-term current use of insulin ([address] 11/8/2021 Yes    Hyperlipidemia 11/8/2021 Yes    Asthma 11/8/2021 Yes    Essential hypertension 11/8/2021 Yes    Hypothyroidism 11/8/2021 Yes    Chronic hepatitis C ([address] 11/8/2021 Yes    Gastroparesis 11/8/2021 Yes    Cirrhosis of liver ([address] 11/8/2021 Yes    Graves' disease 11/8/2021 Yes    Mitral valve disease 11/8/2021 Yes    History of DVT (deep vein thrombosis) 11/8/2021 Yes    Diabetic peripheral neuropathy ([address] 11/8/2021 Yes    Coumadin use 11/8/2021 Yes    Chest pain 11/8/2021 Yes    Elevated troponin 11/8/2021 Yes    Supratherapeutic INR 11/8/2021 Yes    Moderate malnutrition ([address] 11/9/2021 Yes          Plan:        1. Continue IV hydration  2. Nephrology consultation  3. Oxygen aerosols as ordered  4. Insulin scale  5. Patient has had a steady decline in her renal function since August when her baseline creatinine is 1.62  6. GI DVT prophylaxis  7. PT and OT  8.  Avoid nephrotoxic agents    Annie Morton, DO  11/9/2021  4:31 PM

## 2021-11-09 NOTE — PROGRESS NOTES
Warfarin Dosing - Pharmacy Consult Note  Consulting Provider: Gala Ibarra  Indication:  Atrial Fibrillation  Warfarin Dose prior to admission: 1.25mg daily  Concurrent anticoagulants/antiplatelets: none  Significant Drug Interactions: amiodarone (incr INR),crestor  Recent Labs     11/08/21  1017 11/09/21  0627   INR 5.6* 6.3*   HGB 15.5* 13.2    182   LABALBU 3.6  --      Recent warfarin administrations                     warfarin (COUMADIN) daily dosing (placeholder) ()  Given 11/09/21 0956                   Date   INR    Dose  11/9         6.3        ? Assessment/Plan  (Goal INR: 2 - 3)  Inr = 6.3 today. No coumadin today. Inr in am.     Active problem list reviewed. INR orders are placed. Chart reviewed for pertinent labs, drug/diet interactions, and past doses. Documentation of patient's clinical condition was reviewed. Pharmacy Dosing:  Pharmacy will continue to follow.

## 2021-11-09 NOTE — PROGRESS NOTES
While therapy got patient up to bathroom patient became dizzy and assisted back to bed with walker and x2 assist. Blood pressure sitting was 142/77 and standing was 102/88. Patient states this has been happening. Will monitor.

## 2021-11-09 NOTE — CONSULTS
[facility]  In Patient Cardiology Consult             Date:   11/9/2021  Patient name: Candida Charlton  Date of admission:  11/8/2021  9:58 AM  MRN:   [id number]  YOB: 1950    Reason for Admission: Nausea, vomiting. CHIEF COMPLAINT: Nausea, vomiting. History Obtained From: Patient and chart    HISTORY OF PRESENT ILLNESS:    This is a 78-year-old female presented to the emergency room due to nausea, vomiting, abdominal pain, diarrhea. Apparently been going on for the past 2 days. She had some lower abdominal pain as well. We are consulted due to elevated troponins. She normally follows with Dr. Margarito Ewing. However he does not come to [facility]. She currently denies complaints of chest pain, shortness of breath, orthopnea, PND, lower extremity edema. She was found to be in acute kidney injury on CKD. Dizziness this AM with orthostatic drop in BP. Past Medical History:    Past Medical History:   Diagnosis Date    Anesthesia complication     DIFFICULT TIME WAKING UP    Asthma     INHALER USE DAILY AND AS NEEDED DX AS A CHILD    Atrial fibrillation ([address] 2003    INTERMITTENT (DR. Margarito Ewing)    Cerebral artery occlusion with cerebral infarction ([address] 2006    tia x2. Pt denies history of a stroke (Written 02/04/2020).  Chronic back pain     Chronic kidney disease     chronic kidney disease dr Kobe Roy nephrologist   Stage 3    Cirrhosis ([address]     Constipation     COPD (chronic obstructive pulmonary disease) ([address] 2003    INHALER USE DAILY AND AS NEEDED    Dental bridge present     PERMANENT UPPER X 2 BRIDGES UPPER FRONT    Diet-controlled diabetes mellitus (HCC)     Difficult intravenous access     USUALLY USE ULTRASOUND ON ANESTHESIA STARTS VEINS ARE SMALL AND DEEP DO NOT USE HANDS    Diverticulitis     Gastroparesis     GERD (gastroesophageal reflux disease)     ON RX    H/O mastectomy     fibrocystic disease.  BILATERAL MASTECTOMY    Hepatitis 2008    hep c, took meds for hep c    History of blood transfusion     2008    History of Coumadin therapy     on coumadin daily    History of DVT (deep vein thrombosis)     Several years ago (Written 02/04/2020)    History of echocardiogram 03/2018    EF 55%    History of stress test 2015    EF > 70%    Hx of blood clots     states has had on multiple occassions    Hyperlipidemia     Hypertension     Hypothyroidism     Impaired mobility     uses walker    Incontinence     wears a pad    Incontinence of bowel     wears depends    Liver disease     hepatitis c-RESOLVED 2008 WITH TREATMENT    Lower extremity neuropathy     Lumbar disc disease     OAB (overactive bladder)     Osteoarthritis     PONV (postoperative nausea and vomiting)     Prolonged emergence from general anesthesia     Rectocele     Rotator cuff tear     right side, needs surgery    Snores     states has been tested for sleep apnea and does not have it    Type II or unspecified type diabetes mellitus without mention of complication, not stated as uncontrolled     last A1C was 5.2 11/27/2019    Use of cane as ambulatory aid     Uses roller walker     Wears glasses     READING         Past Surgical History:    Past Surgical History:   Procedure Laterality Date    APPENDECTOMY  1964    BREAST SURGERY Bilateral 1982    mastectomy, fibrocystic breast, mother had passed from breast cancer    CHOLECYSTECTOMY      CHOLECYSTECTOMY  1976    COLONOSCOPY      EYE SURGERY Left 2006   [address]    PARTIAL    KNEE SURGERY Left 2013    ARTHROSCOPY    NERVE SURGERY N/A 10/23/2020    SACRAL NERVE STIMULATOR IMPLANT STAGE 1 AND STAGE 2, C-ARM [facility] W/CYSTO W/NTRCL RPR N/A 2/12/2018    VAGINAL REPAIR ANTERIOR AND POSTERIOR, SOLYX BLADDER SLING, CYSTO, performed by Paul Oviedo DO at [address] N/A 2/17/2020    EXCISION PERIVAGINAL CYST performed by Tessa Royal MD at [facility] Left 2005    ROTATOR CUFF    SHOULDER SURGERY Right 07/08/2019    STIMULATOR SURGERY  10/23/2020    SACRAL NERVE STIMULATOR IMPLANT STAGE 1 AND STAGE 2,     UPPER GASTROINTESTINAL ENDOSCOPY           Home Medications:    Outpatient Medications Marked as Taking for the 11/8/21 encounter [facility] Encounter)   Medication Sig Dispense Refill    levothyroxine (SYNTHROID) 100 MCG tablet Take 100 mcg by mouth Daily       famotidine (PEPCID) 10 MG tablet Take 10 mg by mouth daily      albuterol-ipratropium (COMBIVENT RESPIMAT)  MCG/ACT AERS inhaler Inhale 1 puff into the lungs every 6 hours      budesonide-formoterol (SYMBICORT) 160-4.5 MCG/ACT AERO Inhale 2 puffs into the lungs every morning       warfarin (COUMADIN) 2.5 MG tablet Take 1.25 mg by mouth daily as directed by [facility].  Polyethylene Glycol 3350 (MIRALAX PO) Take 17 g by mouth daily as needed       amiodarone (CORDARONE) 200 MG tablet Take 200 mg by mouth three times a week TAKES ON MONDAYS, WEDNESDAYS AND FRIDAYS.       ezetimibe (ZETIA) 10 MG tablet Take 10 mg by mouth daily       torsemide (DEMADEX) 20 MG tablet Take 60 mg by mouth as needed Feet/ankle edema       spironolactone (ALDACTONE) 100 MG tablet Take 100 mg by mouth daily       ammonium lactate (LAC-HYDRIN) 12 % lotion Apply topically 2 times daily as needed       rosuvastatin (CRESTOR) 40 MG tablet Take 1 tablet by mouth daily      K Phos [place]-Sod Phos Di & Mono (PHOSPHA 250 NEUTRAL PO) Take 1 tablet by mouth 2 times daily      albuterol sulfate  (90 Base) MCG/ACT inhaler Inhale 2 puffs into the lungs every 6 hours as needed for Wheezing      albuterol (PROVENTIL) (2.5 MG/3ML) 0.083% nebulizer solution Take 3 mLs by nebulization every 4 hours as needed for Wheezing 120 each 3    metoclopramide (REGLAN) 5 MG tablet Take 10 mg by mouth 2 times daily 2tabs am and 2 tabs hs      folic acid Organization Meetings: Not on file    Marital Status: Not on file   Intimate Partner Violence:     Fear of Current or Ex-Partner: Not on file    Emotionally Abused: Not on file    Physically Abused: Not on file    Sexually Abused: Not on file   Housing Stability:     Unable to Pay for Housing in the Last Year: Not on file    Number of [place] in the Last Year: Not on file    Unstable Housing in the Last Year: Not on file        Family History:   Family History   Problem Relation Age of Onset    Cancer Mother         breast    Heart Disease Father     Cancer Sister         lung    Cancer Maternal Aunt         cervical       REVIEW OF SYSTEMS:    · Constitutional: there has been no unanticipated weight loss. There's been No change in energy level, No change in activity level. · Eyes: No visual changes or diplopia. No scleral icterus. · ENT: No Headaches, hearing loss or vertigo. No mouth sores or sore throat. · Cardiovascular: As HPI  · Respiratory: As HPI  · Gastrointestinal: No abdominal pain, appetite loss, blood in stools. No change in bowel or bladder habits. · Genitourinary: No dysuria, trouble voiding, or hematuria. · Musculoskeletal:  No gait disturbance, No weakness or joint complaints. · Integumentary: No rash or pruritis. · Neurological: No headache, diplopia, change in muscle strength, numbness or tingling. No change in gait, balance, coordination, mood, affect, memory, mentation, behavior. · Psychiatric: No anxiety, or depression. · Endocrine: No temperature intolerance. No excessive thirst, fluid intake, or urination. No tremor. · Hematologic/Lymphatic: No abnormal bruising or bleeding, blood clots or swollen lymph nodes. · Allergic/Immunologic: No nasal congestion or hives.     PHYSICAL EXAM:    Physical Examination:    /88   Pulse 98   Temp 97.5 °F (36.4 °C) (Oral)   Resp 18   Ht 5' 2\" (1.575 m)   Wt 228 lb 11.2 oz (103.7 kg)   LMP 06/20/1978   SpO2 97%   BMI 11/08/21  1017 11/09/21  0627    137   K 3.4* 3.6*   CO2 13* 14*   BUN 12 13   CREATININE 3.31* 3.28*   LABGLOM 14* 14*   GLUCOSE 128* 105*     BNP: No results for input(s): BNP in the last 72 hours. PT/INR:   Recent Labs     11/08/21  1017 11/09/21  0627   PROTIME 48.8* 53.4*   INR 5.6* 6.3*     APTT:No results for input(s): APTT in the last 72 hours. CARDIAC ENZYMES:  Recent Labs     11/08/21  1130 11/08/21  1328   TROPHS 71* 69*     FASTING LIPID PANEL:  Lab Results   Component Value Date    HDL 76 08/04/2021    TRIG 185 01/03/2018     LIVER PROFILE:  Recent Labs     11/08/21  1017   AST 37*   ALT 23   LABALBU 3.6         IMPRESSION:      - Elevated troponin in the setting of KERMIT on CKD  - Dizziness / orthostasis  - Abdominal pain, N/V/D - per primary team  - Supra therapeutic INR  - PAF- in NSR  - H/o DVT  - HTN  - DM2    RECOMMENDATIONS:  - repeat troponin  - add midodrine 2.5 mg po tid  - check 2d Echo  - Nephro to see  - hold coumadin- goal INR 2-3  - Tx of N/V/abd pain per primary  - if echo low risk, would benefit from stress testing as outpatient with Dr. Kaylin Villeda for further risk stratification     Discussed with patient and nursing. Thank you for allowing me to participate in the care of this patient, please do not hesitate to call if you have any questions. Silver Jeter DO, [facility], [address], [address], [address] [facility]  [email or web address]  [phone]

## 2021-11-09 NOTE — DISCHARGE INSTR - COC
Continuity of Care Form    Patient Name: Delaney Samuel   :  1950  MRN:  [id number]    Admit date:  2021  Discharge date:  2021    Code Status Order: Full Code   Advance Directives:      Admitting Physician:  Niraj Graff DO  PCP: Gerard Hamilton MD    Discharging Nurse: Rangel Aponte   [address] Unit/Room#:   Discharging Unit Phone Number: [phone]    Emergency Contact:   Extended Emergency Contact Information  Primary Emergency Contact: Jalen Starr  Address: Ollie Garay [place] of [address] [place] [address] Phone: [phone]  Work Phone: [phone]  Mobile Phone: [phone]  Relation: Child  Secondary Emergency Contact: Olamide Lagos/Ramón  Address: Duc Rendon of [address] [place] [address] Phone: [phone]  Work Phone: [phone]  Mobile Phone: [phone]  Relation: Child    Past Surgical History:  Past Surgical History:   Procedure Laterality Date    APPENDECTOMY  1964    BREAST SURGERY Bilateral     mastectomy, fibrocystic breast, mother had passed from breast cancer    [facility] Left 2006    [facility] Left     ARTHROSCOPY    NERVE SURGERY N/A 10/23/2020    SACRAL NERVE STIMULATOR IMPLANT STAGE 1 AND STAGE 2, C-ARM performed by Alona Canales MD at [facility] W/CYSTO W/NTRCL RPR N/A 2018    VAGINAL REPAIR ANTERIOR AND POSTERIOR, SOLYX BLADDER SLING, CYSTO, performed by Jarocho Mclean DO at [facility] N/A 2020    EXCISION PERIVAGINAL CYST [facility]     ROTATOR CUFF    SHOULDER SURGERY Right 2019    STIMULATOR SURGERY  10/23/2020    SACRAL NERVE STIMULATOR IMPLANT STAGE 1 AND STAGE 2,     UPPER GASTROINTESTINAL ENDOSCOPY         Immunization History:   Immunization History   Administered Date(s) Administered    COVID-19, J&J, PF, 0.5 mL 03/18/2021    Hepatitis A Adult (Havrix, Vaqta) 11/21/2018    Hepatitis B 03/05/2002, 04/11/2002, 11/27/2002, 11/21/2018, 12/21/2018    Influenza Vaccine, unspecified formulation 11/27/2002    Influenza Virus Vaccine 10/01/2018    Influenza, Rajeev Golas, IM, PF (6 mo and older Fluzone, Flulaval, Fluarix, and 3 yrs and older Afluria) 10/26/2021    Influenza, Triv, 3 Years and older, IM, PF (Afluria 5yrs and older) 11/27/2002    PPD Test 03/05/2002    Pneumococcal Conjugate 13-valent ([place]wood Meres) 01/13/2015, 03/22/2018    Td, unspecified formulation 03/05/2002       Active Problems:  Patient Active Problem List   Diagnosis Code    COPD (chronic obstructive pulmonary disease) ([facility] J44.9    Atrial fibrillation (HCC) I48.91    Stage 4 chronic kidney disease ([facility] N18.4    Type 2 diabetes mellitus with kidney complication, with long-term current use of insulin (HCC) E11.29, Z79.4    Esophageal reflux K21.9    Hyperlipidemia E78.5    Chronic back pain M54.9, G89.29    Osteoarthritis M19.90    Liver disease K76.9    H/O mastectomy Z90.10    Asthma J45.909    Back pain M54.9    Cystocele N81.10    Rectocele N81.6    Urinary incontinence, mixed N39.46    Essential hypertension I10    Hypothyroidism E03.9    Chronic hepatitis C (HCC) B18.2    Sigmoid diverticulitis K57.32    Contact hypersensitivity L23.9    Right upper quadrant pain R10.11    Gastroparesis K31.84    Diverticulitis large intestine w/o perforation or abscess w/bleeding K57.33    Adhesive capsulitis of shoulder M75.00    Cervical spondylosis M47.812    Cirrhosis of liver (HCC) K74.60    Graves' disease E05.00    Mitral valve disease I05.9    Morbid obesity (HCC) E66.01    Venous thrombosis of lower extremity I82.90    Acute cystitis without hematuria N30.00    History of DVT (deep vein thrombosis) Z86.718    Diabetic peripheral neuropathy (HCC) E11.42    Coumadin use Z79.01    Chest pain R07.9    KERMIT (acute kidney injury) ([facility] N17.9    Elevated troponin R77.8    Supratherapeutic INR R79.1       Isolation/Infection:   Isolation            No Isolation          Patient Infection Status       Infection Onset Added Last Indicated Last Indicated By Review Planned Expiration Resolved Resolved By    None active    Resolved    COVID-19 Rule Out 10/19/20 10/19/20 10/19/20 Covid-19 Ambulatory (Ordered)   10/20/20 Rule-Out Test Resulted            Nurse Assessment:  Last Vital Signs: /88   Pulse 98   Temp 97.5 °F (36.4 °C) (Oral)   Resp 18   Ht 5' 2\" (1.575 m)   Wt 228 lb 11.2 oz (103.7 kg)   LMP 06/20/1978   SpO2 97%   BMI 41.83 kg/m²     Last documented pain score (0-10 scale): Pain Level: 7  Last Weight:   Wt Readings from Last 1 Encounters:   11/09/21 228 lb 11.2 oz (103.7 kg)     Mental Status:  oriented and alert-forgetful at times. IV Access:  - None    Nursing Mobility/ADLs:  Walking   Assisted  Transfer  Assisted  Bathing  Assisted  Dressing  Assisted  Toileting  Assisted  Feeding  [address]   Independent  Med Delivery   whole    Wound Care Documentation and Therapy:        Elimination:  Continence: Bowel: Yes  Bladder: Yes  Urinary Catheter: None   Colostomy/Ileostomy/Ileal Conduit: No       Date of Last BM: 11/08/2021 Per Patient Report    Intake/Output Summary (Last 24 hours) at 11/9/2021 1101  Last data filed at 11/9/2021 0748  Gross per 24 hour   Intake 2265 ml   Output 1550 ml   Net 715 ml     I/O last 3 completed shifts: In: 2265 [P.O.:1200; I.V.:1065]  Out: 1300 [Urine:1300]    Safety Concerns:      At Risk for Falls    Impairments/Disabilities:      None    Nutrition Therapy:  Current Nutrition Therapy:   - Oral Diet:  Carb Control 4 carbs/meal (1800kcals/day) and Low Sodium (2gm)    Routes of Feeding: Oral  Liquids: No Restrictions  Daily Fluid Restriction: no  Last Modified Barium Swallow with Video (Video Swallowing Test): not done    Treatments at the Time of Hospital Discharge:   Respiratory Treatments: ***  Oxygen Therapy:  is not on home oxygen therapy. Ventilator:    - No ventilator support    Rehab Therapies: Physical Therapy and Occupational Therapy  Weight Bearing Status/Restrictions: No weight bearing restirctions  Other Medical Equipment (for information only, NOT a DME order):  cane and walker  Other Treatments: Mepilex in place on LUE d/t skin tear. Mepilex on Coccyx d/t bruised and small open area on right side of buttock. Bilateral knee high dhaval hose. Patient's personal belongings (please select all that are sent with patient):  None    RN SIGNATURE:  Electronically signed by Puja Contreras RN on 11/12/21 at 10:47 AM EST    CASE MANAGEMENT/SOCIAL WORK SECTION    Inpatient Status Date: ***    Readmission Risk Assessment Score:  Readmission Risk              Risk of Unplanned Readmission:  18           Discharging to Facility/ Agency   Name: [facility]  Address: [address] [place] [address]  Phone: [phone]  Fax: [phone]     / signature: Electronically signed by Julian Mullen RN on 11/9/21 at 11:01 AM EST    PHYSICIAN SECTION    Prognosis: Fair    Condition at Discharge: Stable    Rehab Potential (if transferring to Rehab): Good    Recommended Labs or Other Treatments After Discharge: PT and OT evaluate and treat. PT and INR on Monday then weekly and as needed. BMP on the 22nd, results to Dr. Stacey Shin    Physician Certification: I certify the above information and transfer of Hetal Mg  is necessary for the continuing treatment of the diagnosis listed and that she requires [facility] for less 30 days.      Update Admission H&P: No change in H&P    PHYSICIAN SIGNATURE:  Electronically signed by Fred Jeff DO on 11/13/21 at 11:13 AM EST

## 2021-11-09 NOTE — PLAN OF CARE
to report to health care provider will improve  Description: Ability to state signs and symptoms to report to health care provider will improve  Outcome: Ongoing  Goal: Ability to manage health-related needs will improve  Description: Ability to manage health-related needs will improve  Outcome: Ongoing     Problem: Physical Regulation:  Goal: Complications related to the disease process, condition or treatment will be avoided or minimized  Description: Complications related to the disease process, condition or treatment will be avoided or minimized  Outcome: Ongoing  Goal: Ability to maintain clinical measurements within normal limits will improve  Description: Ability to maintain clinical measurements within normal limits will improve  Outcome: Ongoing     Problem: Sensory:  Goal: Pain level will decrease  Description: Pain level will decrease  11/9/2021 0439 by Nia Luke RN  Outcome: Ongoing  11/9/2021 0434 by Nia Luke RN  Outcome: Ongoing  Goal: Ability to identify factors that increase the pain will improve  Description: Ability to identify factors that increase the pain will improve  Outcome: Ongoing  Goal: Ability to notify healthcare provider of pain before it becomes unmanageable or unbearable will improve  Description: Ability to notify healthcare provider of pain before it becomes unmanageable or unbearable will improve  Outcome: Ongoing     Problem: Coping:  Goal: Ability to identify and develop effective coping behavior will improve  Description: Ability to identify and develop effective coping behavior will improve  Outcome: Ongoing     Problem: Nutritional:  Goal: Maintenance of adequate nutrition will be supported  Description: Maintenance of adequate nutrition will be supported  Outcome: Ongoing     Problem: Urinary Elimination:  Goal: Ability to achieve and maintain adequate urine output will be supported  Description: Ability to achieve and maintain adequate urine output will be supported  Outcome: Ongoing

## 2021-11-09 NOTE — PROGRESS NOTES
Pt admitted to room per cart in good condition from ED. Oriented to room and surroundings  Bed in lowest position, wheels locked, 2/4 side rails up  Call light in reach, room free of clutter, adequate lighting provided  Denies any further questions at this time  Instructed to call out with any questions/concerns/new onset of pain and/or n/v   White board updated  Continue to monitor with hourly rounding  Bed alarm on/Fall Risk signs in place/Fall risk sticker to wrist band  Non-skid socks on/at bedside  [address] in place for patient/family to view & ask questions.

## 2021-11-09 NOTE — PROGRESS NOTES
Comprehensive Nutrition Assessment    Type and Reason for Visit:  Initial, Positive Nutrition Screen (2-13 lb weight loss, decreased appetite, malnutrition score:2 ; dietitian consult: nausea and vomiting)    Nutrition Recommendations/Plan:   1. Continue ADULT DIET; Regular; 4 carb choices (60 gm/meal); Low Sodium (2 gm)  2. Started Ensure Clear 1x/d  3. Monitor po intakes, ONS acceptance, diet tolerance, weights and labs  4. Provided education on Gastroparesis and diet recommendations    Nutrition Assessment:  Patient admission related to N/V x 2 weeks, KERMIT on CKD, and chest pain. Pt hx: gastroparesis- she was taking Reglan with reflief-  her cardiologist took her off of her reglan d/t having tremors- tried to take Reglan again- was unable to eat/drink anything. She reports 12# wt loss in those two weeks. Today was the first day she was able to tolerate her meals. Had 100% oatmeal this morning and 50% turkey, 100% green beans and bites of mashed potatoes for lunch. Discussed N/V Nutrition Therapy and Gastroparesis recommendations. Discussed ONS intake- she is agreeable to try Ensure Clear 1x/d. She has mild subcutaneous fat loss of orbitals and triceps. She meets criteria for moderate malnutrition. Will continue current diet- add ONS 1x/d. Monitor po intakes, diet tolerance, weights and labs. Malnutrition Assessment:  Malnutrition Status: Moderate malnutrition    Context:  Acute Illness     Findings of the 6 clinical characteristics of malnutrition:  Energy Intake:  1 - 75% or less of estimated energy requirements for 7 or more days  Weight Loss:  1 - 5% over 1 month     Body Fat Loss:  1 - Mild body fat loss Orbital, Triceps   Muscle Mass Loss:  Unable to assess    Fluid Accumulation:  1 - Mild Extremities   Strength:  Not Performed    Estimated Daily Nutrient Needs:  Energy (kcal):  7069-0857 kcal (MSJ 1.2, 1.3);  Weight Used for Energy Requirements:  Current     Protein (g):   gm (0.8-1.0 g/kg); Weight Used for Protein Requirements:  Current        Method Used for Fluid Requirements:  1 ml/kcal      Nutrition Related Findings:  Active bowel sounds. BLE +1 pitting edema. No N/V at this time. Wounds:  None       Current Nutrition Therapies:    ADULT DIET; Regular; 4 carb choices (60 gm/meal); Low Sodium (2 gm)  ADULT ORAL NUTRITION SUPPLEMENT; Breakfast; Clear Liquid Oral Supplement    Anthropometric Measures:  · Height: 5' 2\" (157.5 cm)  · Current Body Weight: 228 lb 11.2 oz (103.7 kg)   · Admission Body Weight: 228 lb (103.4 kg)    · Usual Body Weight: 240 lb (108.9 kg)     · Ideal Body Weight: 110 lbs; % Ideal Body Weight 207.9 %   · BMI: 41.8  · Adjusted Body Weight:  ; No Adjustment   · BMI Categories: Obese Class 3 (BMI 40.0 or greater)       Nutrition Diagnosis:   · Moderate malnutrition, In context of acute illness or injury related to inadequate protein-energy intake as evidenced by intake 26-50%, intake 51-75%, poor intake prior to admission, weight loss greater than or equal to 5% in 1 month, mild loss of subcutaneous fat, nausea, vomiting    Nutrition Interventions:   Food and/or Nutrient Delivery:  Continue Current Diet, Start Oral Nutrition Supplement  Nutrition Education/Counseling:  Education completed   Coordination of Nutrition Care:  Continue to monitor while inpatient    Goals:  PO intake to provide >75% of estimated nutritional needs       Nutrition Monitoring and Evaluation:   Behavioral-Environmental Outcomes:  None Identified   Food/Nutrient Intake Outcomes:  Food and Nutrient Intake, Supplement Intake  Physical Signs/Symptoms Outcomes:  Biochemical Data, Nausea or Vomiting, Fluid Status or Edema, Skin, Weight     Discharge Planning:     Too soon to determine     Irineo Adorno, [address]  Office Number: [phone]

## 2021-11-09 NOTE — CONSULTS
Reason for Consult:  Acute kidney injury. Requesting Physician:  Fred Jeff DO    HISTORY OF PRESENT ILLNESS:    The patient is a 66 y.o. female who has history of CKD stage 3B who normally follows with me for nephrology care, presents with nausea, vomiting, diarrhea, abdominal pain and decreased appetite. Her CT abdomin and pelvis performed without iv contrast showed diverticulosis without any acute lesions. On previous labs her serum creatinines have been between 1.4 to 1.7with eGFR of 30s ml/min. On admission her creatinine was elevated at 3.31 that has improved slightly to 3.28 today. Denies any problems with urination. Denies any recent use of NSAIDs or iv contrast.    Review Of Systems:   Constitutional: No fever, chills, lethargy, weakness or wt loss. HEENT:  No headache, nasal discharge or sore throat. Cardiac:  No chest pain, dyspnea, orthopnea or PND. Chest:              No cough, phlegm or wheezing. Abdomen:  As above. Neuro:   No gross focal weakness, numbness, abnormal movements or seizure                               like activity. Skin:   No rashes or itching. :   No hematuria, pyuria, dysuria or flank pain. Extremities:  No swelling or joint pains. Endocrine: No polyuria, polydypsia, or thyroid problems. Hematology:    No bleeding disorders, bruising or anemia. All other ROS is negative. Past Medical History:   Diagnosis Date    Anesthesia complication     DIFFICULT TIME WAKING UP    Asthma     INHALER USE DAILY AND AS NEEDED DX AS A CHILD    Atrial fibrillation ([address] 2003    INTERMITTENT (DR. Donnice Frankel)    Cerebral artery occlusion with cerebral infarction ([address] 2006    tia x2. Pt denies history of a stroke (Written 02/04/2020).     Chronic back pain     Chronic kidney disease     chronic kidney disease dr Priya Meneses nephrologist   Stage 3    Cirrhosis ([address]     Constipation     COPD (chronic obstructive pulmonary disease) ([address] 2003    INHALER USE DAILY AND AS NEEDED  Dental bridge present     PERMANENT UPPER X 2 BRIDGES UPPER FRONT    Diet-controlled diabetes mellitus (HCC)     Difficult intravenous access     USUALLY USE ULTRASOUND ON ANESTHESIA STARTS VEINS ARE SMALL AND DEEP DO NOT USE HANDS    Diverticulitis     Gastroparesis     GERD (gastroesophageal reflux disease)     ON RX    H/O mastectomy     fibrocystic disease.  BILATERAL MASTECTOMY    Hepatitis 2008    hep c, took meds for hep c    History of blood transfusion     2008    History of Coumadin therapy     on coumadin daily    History of DVT (deep vein thrombosis)     Several years ago (Written 02/04/2020)    History of echocardiogram 03/2018    EF 55%    History of stress test 2015    EF > 70%    Hx of blood clots     states has had on multiple occassions    Hyperlipidemia     Hypertension     Hypothyroidism     Impaired mobility     uses walker    Incontinence     wears a pad    Incontinence of bowel     wears depends    Liver disease     hepatitis c-RESOLVED 2008 WITH TREATMENT    Lower extremity neuropathy     Lumbar disc disease     OAB (overactive bladder)     Osteoarthritis     PONV (postoperative nausea and vomiting)     Prolonged emergence from general anesthesia     Rectocele     Rotator cuff tear     right side, needs surgery    Snores     states has been tested for sleep apnea and does not have it    Type II or unspecified type diabetes mellitus without mention of complication, not stated as uncontrolled     last A1C was 5.2 11/27/2019    Use of cane as ambulatory aid     Uses roller walker     Wears glasses     READING       Past Surgical History:   Procedure Laterality Date    APPENDECTOMY  1964    BREAST SURGERY Bilateral 1982    mastectomy, fibrocystic breast, mother had passed from breast cancer    CHOLECYSTECTOMY      CHOLECYSTECTOMY  1976    COLONOSCOPY      EYE SURGERY Left 2006   [address]    PARTIAL    KNEE SURGERY Left 2013    ARTHROSCOPY    lactate (LAC-HYDRIN) 12 % lotion Apply topically 2 times daily as needed  9/10/19  Yes Historical Provider, MD   rosuvastatin (CRESTOR) 40 MG tablet Take 1 tablet by mouth daily   Yes Historical Provider, MD   K Phos Floyd-Sod Phos Di & Mono (PHOSPHA 250 NEUTRAL PO) Take 1 tablet by mouth 2 times daily   Yes Historical Provider, MD   albuterol sulfate  (90 Base) MCG/ACT inhaler Inhale 2 puffs into the lungs every 6 hours as needed for Wheezing   Yes Historical Provider, MD   albuterol (PROVENTIL) (2.5 MG/3ML) 0.083% nebulizer solution Take 3 mLs by nebulization every 4 hours as needed for Wheezing 3/27/18  Yes Bhanu Brown DO   metoclopramide (REGLAN) 5 MG tablet Take 10 mg by mouth 2 times daily 2tabs am and 2 tabs hs   Yes Historical Provider, MD   folic acid (FOLVITE) 1 MG tablet Take 1 tablet by mouth daily.  11/27/13  Yes Eunice Peralta MD   Cholecalciferol (VITAMIN D3) 1000 UNITS TABS Take 1,000 Units by mouth Five times weekly Indications: Mon-Fri    Yes Historical Provider, MD       Scheduled Meds:   midodrine  2.5 mg Oral TID WC    amiodarone  200 mg Oral Once per day on Mon Wed Fri    budesonide-formoterol  2 puff Inhalation BID    famotidine  10 mg Oral Daily    levothyroxine  100 mcg Oral Daily    rosuvastatin  40 mg Oral Daily    sodium chloride flush  5-40 mL IntraVENous 2 times per day    warfarin (COUMADIN) daily dosing (placeholder)   Other RX Placeholder    insulin lispro  0-6 Units SubCUTAneous TID     insulin lispro  0-3 Units SubCUTAneous Nightly    potassium chloride  20 mEq Oral BID WC     Continuous Infusions:   sodium chloride      sodium chloride 100 mL/hr at 11/09/21 0635    dextrose       PRN Meds:albuterol, albuterol sulfate HFA, sodium chloride flush, sodium chloride, polyethylene glycol, acetaminophen **OR** acetaminophen, glucose, dextrose, glucagon (rDNA), dextrose, promethazine, ipratropium-albuterol, HYDROmorphone, hydrALAZINE    Allergies   Allergen Reactions    Lyrica [Pregabalin] Nausea Only and Other (See Comments)     . FACE GETS BRIGHT RED. PAIN AND SEVERE SWELLING IN BILATERAL WRISTS, ANKLES AND KNEES    Pcn [Penicillins] Other (See Comments)     Unknown reaction, states can take Keflex STATES REACTION HAPPENED AS A CHILD AND WAS TOLD IT Was  LIFE THREATENING      Nsaids Other (See Comments)     PT HAS KIDNEY DISEASE AND CAN NOT TAKE NSAIDS      Zofran [Ondansetron] Other (See Comments)     Makes nausea and vomiting worse    Codeine Nausea And Vomiting     +N/V. KEEPS PATIENT AWAKE AND WIRED FOR SEVERAL DAYS.  Cymbalta [Duloxetine Hcl] Diarrhea, Nausea And Vomiting and Other (See Comments)     Intolerance. ABDOMINAL BLOATING    Morphine Anxiety    Prolia [Denosumab] Other (See Comments)     TACHYCARDIA, FLUSHED    Seasonal Other (See Comments)     RUNNY NOSE, WATERY ITCHY EYES, SNEEZING. Social History     Socioeconomic History    Marital status:      Spouse name: Not on file    Number of children: Not on file    Years of education: Not on file    Highest education level: Not on file   Occupational History    Not on file   Tobacco Use    Smoking status: Former Smoker     Packs/day: 1.00     Years: 43.00     Pack years: 43.00     Types: Cigarettes     Quit date: 2008     Years since quittin.2    Smokeless tobacco: Never Used   Vaping Use    Vaping Use: Never used   Substance and Sexual Activity    Alcohol use:  Yes     Alcohol/week: 1.0 standard drink     Types: 1 Glasses of wine per week     Comment: HOLIDAYS    Drug use: No    Sexual activity: Not on file   Other Topics Concern    Not on file   Social History Narrative    Not on file     Social Determinants of Health     Financial Resource Strain:     Difficulty of Paying Living Expenses: Not on file   Food Insecurity:     Worried About Running Out of Food in the Last Year: Not on file    Rylan of Food in the Last Year: Not on JUST[employer] [place] Needs:     Lack of Transportation (Medical): Not on file    Lack of Transportation (Non-Medical): Not on file   Physical Activity:     Days of Exercise per Week: Not on file    Minutes of Exercise per Session: Not on file   Stress:     Feeling of Stress : Not on file   Social Connections:     Frequency of Communication with Friends and Family: Not on file    Frequency of Social Gatherings with Friends and Family: Not on file    Attends Christianity Services: Not on file    Active Member of [address] [place] [address] or Organizations: Not on file    Attends Club or Organization Meetings: Not on file    Marital Status: Not on file   Intimate Partner Violence:     Fear of Current or Ex-Partner: Not on file    Emotionally Abused: Not on file    Physically Abused: Not on file    Sexually Abused: Not on file   Housing Stability:     Unable to Pay for Housing in the Last Year: Not on file    Number of [place] in the Last Year: Not on file    Unstable Housing in the Last Year: Not on file       Family History   Problem Relation Age of Onset    Cancer Mother         breast    Heart Disease Father     Cancer Sister         lung    Cancer Maternal Aunt         cervical         Physical Exam:  Vitals:    11/09/21 1015 11/09/21 1245 11/09/21 1530 11/09/21 1541   BP: 102/88 (!) 120/54 (!) 134/39    Pulse: 98 78 80    Resp:   16    Temp:   97.5 °F (36.4 °C)    TempSrc:   Oral    SpO2:   98%    Weight:       Height:    5' 2\" (1.575 m)     I/O last 3 completed shifts: In: 2265 [P.O.:1200; I.V.:1065]  Out: 2150 [Urine:2150]    General:  Awake, alert, not in distress. Appears to be stated age. HEENT: Atraumatic, normocephalic. Anicteric sclera. Pink and moist oral mucosa. No carotid bruit. No JVD. Chest: Bilateral air entry, clear to auscultation, no wheezing, rhonchi or rales. Cardiovascular: RRR, S1S2, no murmur, rub or gallop. Has lower extremity edema. Abdomen: Soft, non tender to palpation.  Active bowel sounds x 4 quadrants. Musculoskeletal: Active ROM x 4 extremities. No cyanosis or clubbing. Integumentary: Pink, warm and dry. Free from rash or lesions. Skin turgor normal.  CNS: Oriented to person, place and time. Speech clear. Face symmetrical. No tremor.      Data:  CBC:   Lab Results   Component Value Date    WBC 7.7 11/09/2021    HGB 13.2 11/09/2021    HCT 40.2 11/09/2021    MCV 99.5 11/09/2021     11/09/2021     BMP:    Lab Results   Component Value Date     11/09/2021     11/08/2021     11/03/2021    K 3.6 (L) 11/09/2021    K 3.4 (L) 11/08/2021    K 3.7 11/03/2021     11/09/2021     11/08/2021     11/03/2021    CO2 14 (L) 11/09/2021    CO2 13 (L) 11/08/2021    CO2 13 (L) 11/03/2021    BUN 13 11/09/2021    BUN 12 11/08/2021    BUN 9 11/03/2021    CREATININE 3.28 (H) 11/09/2021    CREATININE 3.31 (H) 11/08/2021    CREATININE 2.89 (H) 11/03/2021    GLUCOSE 105 (H) 11/09/2021    GLUCOSE 128 (H) 11/08/2021    GLUCOSE 181 (H) 11/03/2021     CMP:   Lab Results   Component Value Date     11/09/2021    K 3.6 11/09/2021     11/09/2021    CO2 14 11/09/2021    BUN 13 11/09/2021    CREATININE 3.28 11/09/2021    GLUCOSE 105 11/09/2021    GLUCOSE 123 12/19/2011    CALCIUM 8.5 11/09/2021    PROT 7.1 11/08/2021    LABALBU 3.6 11/08/2021    LABALBU 4.2 12/19/2011    BILITOT 1.22 11/08/2021    ALKPHOS 111 11/08/2021    AST 37 11/08/2021    ALT 23 11/08/2021      Hepatic:   Lab Results   Component Value Date    AST 37 (H) 11/08/2021    AST 30 09/29/2021    AST 37 (H) 08/04/2021    ALT 23 11/08/2021    ALT 20 09/29/2021    ALT 26 08/04/2021    BILITOT 1.22 (H) 11/08/2021    BILITOT 0.72 09/29/2021    BILITOT 1.03 08/04/2021    ALKPHOS 111 (H) 11/08/2021    ALKPHOS 83 09/29/2021    ALKPHOS 82 08/04/2021     BNP:   Lab Results   Component Value Date    BNP NOT REPORTED 08/19/2014    BNP NOT REPORTED 07/16/2014    BNP 30 02/13/2014     Lipids:   Lab Results   Component Value Date    CHOL 190 01/03/2018    HDL 76 08/04/2021     INR:   Lab Results   Component Value Date    INR 6.3 (HH) 11/09/2021    INR 5.6 (HH) 11/08/2021    INR 7.4 () 11/03/2021     PTH: No results found for: PTH  Phosphorus:    Lab Results   Component Value Date    PHOS 4.1 11/03/2021     Ionized Calcium: No results found for: IONCA  Magnesium:   Lab Results   Component Value Date    MG 2.2 11/08/2021     Albumin:   Lab Results   Component Value Date    LABALBU 3.6 11/08/2021    LABALBU 4.2 12/19/2011     Last 3 CK, CKMB, Troponin: @LABRCNT(CKTOTAL:3,CKMB:3,TROPONINI:3)       URINE:)No results found for: John Garcia     Radiology:   Reviewed. Assessment:  Acute kidney injury, appears to be hemodynamically related. Creatinine appears to be plateuing. Underlying CKD stage 3B with baseline GFR of 30s ml/min. Hypokalemia. Cirrhosis. Plan:  Agree with IVF as ordered. On KCl supplementation. Comprehensive urine testing including urinalysis, urine sodium, creatinine (FENa), eosinophils and urine protein and creatinine to quantify any proteinuria ordered. Monitor BP. Avoid hypotension, nephrotoxic drugs, Lovenox, Fleets enema and IV contrast exposure. Follow up chemistries ordered for AM.    Thank you for the consultation. Please do not hesitate to contact us for any further questions/concerns. We will continue to follow along with you. Electronically signed by Caesar Loja MD  on 11/9/2021 at 4:48 PM   [facility].   Ph: [phone]

## 2021-11-09 NOTE — CARE COORDINATION
Social work: [facility] is able to accept patient after 3 night inpatient stay. HENS started.

## 2021-11-09 NOTE — PROGRESS NOTES
CARDIOLOGY    Contacted via answering service last evening, requesting records. Below, please find recent notes. Suggestion for her to undergo out-patient stress testing, unfortunately, not viable. She requires AGGRESSIVE conscious sedation for diagnostic studies due to claustrophobia. PLEASE PURSUE PHARMACOLOGIC \"STRESS\" TEST WITH RADIONUCLIDE MYOCARDIAL PERFUSION IMAGING AFFORDING HER AGGRESSIVE SEDATION TO ALLOW PERFORMANCE OF EXAM    ANOTHER ASSESSMENT OF ECHO MIGHT ALSO BE VALUABLE    [address] Simmons [address] [place] [address], [address]  [phone] fax [phone] cell [phone] [email or web address]   Specializing in Cardiology  [place] [id number] [facility], [place] [phone]  [place] [address] [phone] [email or web address]  Emergency contact irma Wallace [phone] W [phone] Physician:  Shantell An MD, formerly 4/2018 Kwabena Roman-CNP, formerly 5/2015 Maddi Tapia-Nurse Practitioner- Dr Giuliana Chang no longer there, remaining care the same, formerly Jasmina Wahl MD-[facility] -[phone]/[facility] [phone] others: Coleen Moy MD-pain mgmt.-unfavorable interaction, referred to him by Dr Parris Hannah, with whom she has had a much more favorable experience. Bart Platt MD-nephrology [phone] . Will/Kike James-LEONCIO. NOW Gerrisje Orts for GI/hep C, formerly [place]-[facility] for hepatitis C. Cheli Frye [facility] colo-rectal surgeon. Pelvic floor physical therapy. Ludivina Shaw MD-cardiologist ordering physician for 2008 drug stress test [facility] pulm med . Bais-genl surg Allergies/intolerances: PCN. Spironolactone-hives/pruritus 9/2015-WENT AWAY WITH EPLERENONE, BUT TOO EXPENSIVE-WENT BACK ON SPIRONOLACTONE AND HIVES DID NOT RECUR-ACTUALLY WENT AWAY MONTH AFTER CHANGING INSULIN FORMULATION.  Lipitor/atorvastatin myalgia PLUS abnormal LFTs 9/2014-re-challenge 8/2015-tolerated. Similar outcome with Crestor/rosuvastatin 11/2018, withdrawal trial in progress. QVAR triggered asthma. WelChol-multiple GI intolerances. Intolerance of REGLAN/metoclopramide-diarrhea Pantoprazole-nausea. CYMBALTA /duloxetine-diarrhea bad to the point of creat 3.06 2/2019. Lyrica-worsened heart function, breathing, fluid retention. Claustrophobia inducing interventions - CT, MRI, nuclear medicine exams - DECLINES repeat pharmacologic stress exam, unless, essentially,  under anesthesia Pharmacy: Roseanne Sam [phone]  [facility] Sync Program - attempts to refill meds on same schedule Insurance: [address] Medicare for meds  Cardiac: 1st visit 12/7/2012. \"Dr Hinds asked me to see a cardiologist.\" Found me on Internet. HTN, nephrologist managing with a cocktail of agents along the way. Somewhere along the line also told she had mitral valve prolapse, not seen on ECHO 11/2012. NORMAL PERFUSION SCANS AND LV FUNCTION ON 9/2015 [facility] PHARMACOLOGIC STRESS TEST TO AFFORD PRE-OP CLEARANCE FOR BACK SURGERY. 4/9/2019 - Ms BRADSHAW has gotten progressively claustrophobic subsequently, requiring ANESTHESIA for MRI scans, thus, politely, declines to repeat 9/2015 drug stress test given ordeal with scans. ER visit 11/2012, \"racing and pounding heart,\" associated dyspnea. creatinine 1.3. Dobutamine \"stress\" test 11/7/2012 [facility] HR , 93%, /62, stayed there, 20/kg dobutamine. \"Normal left ventricular myocardial perfusion. There is no stress induced perfusion defects. ... normal left ventricular wall motion. ... LVEF>70%. .. normal study. \" Tuesday 11/6/2012 ER triage note reports palpitations/tachycardia after started Procardia. To her credit, Ms Jenna Juan looked up, recognized is typical and common side effect of Procardia.  Has been kept on it, despite complaints of orthostatic hypotension and tachycardia, however dose adjusted down from 90 to 30 mg daily. 2008 experienced atrial fibrillation, treated then by Dr Dorene Tavarez. [facility] stress test 11/2008, HR 80 /72, normal perfusion scans, normal wall motion, LVEF 82%, ECHO 12/2011 technically difficult study, grossly normal wall motion, dilated RA/RV, NO valve pathology. Some pulm HTN, RV/PA 37 based on TR, normal LV filling pattern. Eventually, 12/2008 [facility] discharge summary mentioned \"primary TIA.  Recently diagnosed atrial fibrillation.  Discharged then on Coumadin/warfarin   Non-cardiac: Renal insufficiency, creat 11/2012 1.3, 12/2012 1.8, 3/2013 1.56, 5/2013 1.39 10/2013 [facility]MIT WITH RENAL FAILURE BUN 72/creat 3.05. Recurrent kidney stones. CT with diverticulitis admit 3/21/2018 demonstrating isodense exophytic appearing right kidney lower pole 2 cm, not commented upon prior to that CT scan. That, diabetes, contribute to renal insufficiency. Resultant secondary hyperparathyroidism. Graves/now hypothyroid. COPD-recurrent bronchitis. CXR 11/2012 \"calcified mediastinal and right hilar lymph nodes again noted. Calcified right middle lobe granuloma is re-demonstrated, compared with 7/2011 CXR, 12/2011 CT. GERD. Hep C, possible needle stick exposure as [facility] [id number]. 2008 interferon, theoretically cleared her hepatitis C burden-UNFORTUNATELY DEMONSTRATED TO RECUR WITH 10/2013  [facility]. Diabetes-multiple complications-retinopathy, neuropathy with gastroparesis/GERD, nephropathy. Colonoscopy 10/2014 diverticulosis. Schatzki ring. Hiatal hernia. Gastritis. \"Mild portal hypertensive gastropathy,\" POSSIBLY FURTHER COMPLICATED BY POSSIBLE ESOPHAGEAL VARICES. NO ESOPHAGEAL VARICES ON 2/2018 [facility] EGD - 10/2020 still waiting to hear from [facility] Re: 7/2020 findings - called - never even actually scheduled ([phone] opt #1). Diarrhea from standard doses REGLAN/metoclopramide.  Possible varices bleeding Dx/Rx made more complicated by also suffering from nosebleeds/epistaxis. THOUGH PREGNANT 10 TIMES, ONLY 3 TO TERM-ENDOMETRIOSIS! RESULTANT 5 cm RECTOCELE, PROBABLE CYSTOCELE AS SHE IS ALSO BEING FITTED FOR A PESSARY, WELL RECOGNIZED EXTENSIVE SEVERE SIGMOID DIVERTICULOSIS, INTERNAL HEMORRHOIDS-POOR PREP LIKELY DUE TO THE DYSMOTILITY AS YET ANOTHER DIABETIC COMPLICATION-7/2017 COLONOSCOPY. Complicated GYN/ procedure 1/2018. NO CHANGE IN KIDNEY CYST ON TWO MRIs 6 months apart. Bilateral mastectomy, fibrocystic breast disease, reconstructive implants bilaterally- SEE COMMENTS AT END OF THIS SECTION Re: RELEVANCE! Cholecystectomy. Left rotator cuff. Not on aspirin \"nephrologist wont let me.  COPD. Sleep apnea evaluation [facility] 2011, negative. 2009 [facility] admit for headaches, HTN. From Motrin for the months of headaches, renal insufficiency identified then. Too much Dilaudid, vent course. Had been on Coumadin after 2008 admit, not clear what happened subsequently-\"I just do not remember. \" /2016, [facility] Osteoarthritis, multiple joints, contemplating, LEFT knee scope 7/2013, nothing with right yet-scope did NOT help- Orthovisc helping, BOTH knees warrant replacement. BACK also requires surgery. BACK PAIN, RADIATING AROUND RIGHT SIDE TO FRONT UNPREDICTABLY SINCE 10/2013 [facility] ADMIT. Hysterectomy, still has ovaries. Recurrent UTIs, cysto 8/29/2014 [facility]. Complicated gyn/ procedure 2/2018-marginal benefit. Prophylactic bilateral mastectomy because of fibrocystic breast disease and familial breast cancer concerns, with reconstruction with implants, relevance as a cardiologist is that the implants can cause difficulty with attenuation artifact on myocardial perfusion scanning! Heart attack risk factors: Smoking yes DM yes HTN yes Cholesterol yes Fam hx yes Obese yes Sedentary yes PVD no  Social hx:  1972, only marriage. Son 5, daughter 65, son 12.  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 Stop smoking after >40 yrs. Rare, if any, EtOH. No recreational or IV drug use. No  service. Denise Gonzalez hx: Premature ischemic heart disease  Meds: 8/6/2021, 10/28/2021 [id number] renewal generic Combivent neb torsemide levothyroxine nebivolol omeprazole Phospha neutral warfarin spironolactone folic acid  6/88/5023 from HER list! IF ANYTHING CHANGES, RE-CHECK INR WEEKLY UNTIL STABLE AGAIN! Amiodarone 200 mg - always take with food - Mon-Wed-Fri  ZETIA/ezetimibe 10 mg daily  ---**Levothyroxine 75 mcg  daily  Rosuvastatin 40 mg daily  Valacyclovir 1 g 2 tabs for 3 days as needed, again, not routinely  Omeprazole 20 mg twice daily  Phospha 250 neutral twice daily  Vitamin D3 1000 units 5 days a week  Warfarin 2.5 and 5 mg tablets adjusted to maintain INR 2-3 managed by the [facility] anticoagulation clinic, again assessed monthly, weekly after changes  Spironolactone 295 mg daily  Folic acid 1 mg daily  MiraLAX as needed-usually 4-5 times weekly  Sliding scale NovoLog insulin tid  ---**Albuterol/ipratropium nebulizer 4 times daily  Symbicort twice daily-rinse mouth out with tap water after use 160/4.5     1/22/2021 became even more cost prohibitive, ran out roughly 9/2020, provided office samples I had available of instead Symbicort, to each of the 80/4.5 and 160/4.5, just 1 puff of either once daily, rinse out mouth with tap water after use. If/when unable to provide her with office samples of the combination steroid/long-acting beta agonist products, then go back to using her nebulizer with generic DuoNeb 4 times a day, that can be used up to hourly for an acute exacerbation to avoid hospitalization, with some trepidation given her issues with atrial fibrillation. Ammonium lactate 12% lotion twice daily to dry areas of feet and legs  Insulin syringes, needles, test strips, Accu-Chek monitor  Follow-up: [facility]sm[facility]-Uer[facility]ok[facility]-October ECGs here looking for recurrence of the atrial fibrillation.  Every the creatinine of 2.61. It is interesting that she is not having an issue with hyperkalemia, quite the contrary, needs potassium as evidenced by the moderately high dose of spironolactone, but again, that is \"our Dede, she NEVER does anything Esthela the book. \" Here is another example. The CO2 reflects diabetic renal acidosis, and she needs potassium at the same time. Backwards. It might be the triple acid-base disturbance from the severity of her lung disease admittedly. Alk phos 83 AST 30 albumin 3.9 iron saturation 41% ferritin 163, again amazing at those are high free thyroxine higher end of normal at 1.62 TSH low at only 0.28 so we do need to reduce the thyroid as well. Vitamin D barely within the normal range 38.4 WBC 11.8 hemoglobin 13.9 platelet count 680. Again, it is amazing that her blood count is that good despite her renal failure being that bad. When last checked by the anticoagulation clinic 9/29, INR 2 on warfarin 1.25 mg 3 days a week (half of her 2.5) 2.5 4 days a week, next INR 10/26. Talked about maybe taking advantage of the [employer] donut hole program for Xarelto, she does not qualify. 1). SWITCH $427/3m COMBIVENT INHALER to equivalent with your nebulizer FOUR TIMES DAILY - as is a Medicare DME product instead, is NOT part of the Medicare D [facility] [employer], AT NO COST TO [address] has SOME available NOW, will have full Rx Monday  2). Ms BRADSHAW had only been using the COMBIVENT once daily to defray the expense. NOW THAT IT IS FREE - USE IT FOUR TIMES DAILY UNTIL YOU GET THE NEBS. START THEM WHEN [employer] HAS THEM AVAILABLE FOR YOU MONDAY. OR, IF YOU GET WORSE BEFORE THEN, CALL THEM,  WHAT THEY HAVE, AND USE IT FOUR TIMES DAILY. DEFINITELY more effective as is higher effective dose AND with nebulized particles, gets deeper into lung more effectively. AND, guess what, four times daily more effective than once daily  3).  SYMBICORT - has TWO physician extender. No clue what happened, much less why. Look forward to learning more. Friday, August 6, 2021 - Office visit. ECG. Labs. Meds  8/4/2021 [facility] labs: Glc 136 BUN 14 creatinine 1.62/GFR 31 calcium 9.4 Na 141 K 4.4 CO2 23 alk phos 82 AST 37 albumin 3.7 cholesterol 153 HDL 76 LDL 57 triglyceride 99 WBC 5.9 hgb 13.5  plt 159 INR 2.2. Again, the good news there is that not doing anything was smart, as the ALT came down to 26 and the AST came down to 37. Kidney function about the same. Did not check the BNP heart failure marker again, perhaps, possibly, should have  ECG: Sinus rhythm rate 84, one PVC. Atrial disease minor QRS intrinsicoid deflection delay. Minor repolarization changes. Compared with oldest ECG available in this volume of her paper medical record here, our 4th on her, and obviously that doesnt even include her extensive hospital records, heart rate has increased since 8/2019, PVC new, the repolarization changes are identical over the last 2 years. That is, other than the heart rate, no significant change. 4/16/2021 chest x-ray report, knee x-rays - admittedly, Im disappointed this is the 1st I am looking at them as well, apologies:  Calcified aortic knob, no surprise. Midline trachea. Pleural thickening, no pneumothorax. No pneumonia. Bibasilar atelectasis (hard for her to take a deep breath for obvious reasons). Right mid lung zone granuloma. Arthritic changes in the spine. No mention of pulmonary fibrosis/amiodarone toxicity, good news. So to answer Sandra question about the breathing, it is more likely to be asthma than heart failure and/or amiodarone toxicity. So playing with her home nebulizer and the generic Combivent is appropriate. Right knee x-ray with arthritic findings, moderate narrowing of the medial compartment.  Left knee worse. Consistent with what she had been told in the past.  Without any intervention, breathlessness not any better unfortunately.  She still using the Symbicort, finishing up what she has. The rescue albuterol nebulizer definitely acutely provides her more benefit than the long-acting beta agonist and steroid in the Symbicort. My memory is that in the past we had tried generic Combivent in her nebulizer 4 times a day as it was more affordable, even if it didnt have the steroid in the Symbicort, and clearly we need to do that again, unequivocally. Obviously, [employer] would be a better option, but its $700 an inhaler, not reasonable. Large adult cuff, left upper arm, sitting, blood pressures obtained every 30 seconds: 123/65 - 122/81 - 118/61, heart rate 86, regular  Whole variety of Dedes meds required renewal. Faxes sent here from [facility] for some of them. Will need to renew many of the others. 1). Until your breathing settles back down with the end of fall/beginning of winter, use the generic Combivent ipratropium/albuterol nebulizer 4 times daily. As your breathing improves, can back down on that to twice or 3 times a day, use your judgment. 2). As long as you have the Symbicort, continue it as well because of the inhaled steroid. 3). Im happy with everything else. Provided a written prescription renewals, to you, plus fax them to [address] [place] [address] for the amiodarone, ezetimibe, torsemide, levothyroxine, rosuvastatin, Bystolic, omeprazole, Phospha 250 neutral, warfarin 2.5 mg spironolactone 906 mg folic acid 1 mg as requested  4). Labs and EKG visit in October, our routine  5). Like always, call me if you need me.

## 2021-11-09 NOTE — CARE COORDINATION
Therapy recommending SNF. Pt agreeable to [facility] and [facility] 122 informed.

## 2021-11-09 NOTE — PLAN OF CARE
Nutrition Problem #1: Moderate malnutrition, In context of acute illness or injury  Intervention: Food and/or Nutrient Delivery: Continue Current Diet, Start Oral Nutrition Supplement  Nutritional Goals: PO intake to provide >75% of estimated nutritional needs

## 2021-11-10 LAB
ABSOLUTE EOS #: 0.1 K/UL (ref 0–0.44)
ABSOLUTE IMMATURE GRANULOCYTE: 0.03 K/UL (ref 0–0.3)
ABSOLUTE LYMPH #: 1 K/UL (ref 1.1–3.7)
ABSOLUTE MONO #: 0.68 K/UL (ref 0.1–1.2)
ANION GAP SERPL CALCULATED.3IONS-SCNC: 12 MMOL/L (ref 9–17)
BASOPHILS # BLD: 1 % (ref 0–2)
BASOPHILS ABSOLUTE: 0.04 K/UL (ref 0–0.2)
BUN BLDV-MCNC: 10 MG/DL (ref 8–23)
BUN/CREAT BLD: 3 (ref 9–20)
CALCIUM SERPL-MCNC: 8.5 MG/DL (ref 8.6–10.4)
CHLORIDE BLD-SCNC: 110 MMOL/L (ref 98–107)
CO2: 13 MMOL/L (ref 20–31)
CREAT SERPL-MCNC: 2.96 MG/DL (ref 0.5–0.9)
DIFFERENTIAL TYPE: ABNORMAL
EOSINOPHIL,URINE: NORMAL
EOSINOPHILS RELATIVE PERCENT: 2 % (ref 1–4)
GFR AFRICAN AMERICAN: 19 ML/MIN
GFR NON-AFRICAN AMERICAN: 16 ML/MIN
GFR SERPL CREATININE-BSD FRML MDRD: ABNORMAL ML/MIN/{1.73_M2}
GFR SERPL CREATININE-BSD FRML MDRD: ABNORMAL ML/MIN/{1.73_M2}
GLUCOSE BLD-MCNC: 112 MG/DL (ref 65–105)
GLUCOSE BLD-MCNC: 115 MG/DL (ref 65–105)
GLUCOSE BLD-MCNC: 123 MG/DL (ref 70–99)
GLUCOSE BLD-MCNC: 124 MG/DL (ref 65–105)
GLUCOSE BLD-MCNC: 128 MG/DL (ref 65–105)
HCT VFR BLD CALC: 37.1 % (ref 36.3–47.1)
HEMOGLOBIN: 12.5 G/DL (ref 11.9–15.1)
IMMATURE GRANULOCYTES: 1 %
INR BLD: 6.2
LYMPHOCYTES # BLD: 16 % (ref 24–43)
MAGNESIUM: 2.1 MG/DL (ref 1.6–2.6)
MCH RBC QN AUTO: 33.5 PG (ref 25.2–33.5)
MCHC RBC AUTO-ENTMCNC: 33.7 G/DL (ref 28.4–34.8)
MCV RBC AUTO: 99.5 FL (ref 82.6–102.9)
MONOCYTES # BLD: 11 % (ref 3–12)
NRBC AUTOMATED: 0 PER 100 WBC
PDW BLD-RTO: 14.5 % (ref 11.8–14.4)
PHOSPHORUS: 3.2 MG/DL (ref 2.6–4.5)
PLATELET # BLD: 190 K/UL (ref 138–453)
PLATELET ESTIMATE: ABNORMAL
PMV BLD AUTO: 10.4 FL (ref 8.1–13.5)
POTASSIUM SERPL-SCNC: 4.5 MMOL/L (ref 3.7–5.3)
PROTHROMBIN TIME: 52.9 SEC (ref 11.5–14.2)
RBC # BLD: 3.73 M/UL (ref 3.95–5.11)
RBC # BLD: ABNORMAL 10*6/UL
SEG NEUTROPHILS: 69 % (ref 36–65)
SEGMENTED NEUTROPHILS ABSOLUTE COUNT: 4.44 K/UL (ref 1.5–8.1)
SODIUM BLD-SCNC: 135 MMOL/L (ref 135–144)
SODIUM,UR: 38 MMOL/L
WBC # BLD: 6.3 K/UL (ref 3.5–11.3)
WBC # BLD: ABNORMAL 10*3/UL

## 2021-11-10 PROCEDURE — 94761 N-INVAS EAR/PLS OXIMETRY MLT: CPT

## 2021-11-10 PROCEDURE — 2580000003 HC RX 258: Performed by: NURSE PRACTITIONER

## 2021-11-10 PROCEDURE — 97116 GAIT TRAINING THERAPY: CPT

## 2021-11-10 PROCEDURE — 84100 ASSAY OF PHOSPHORUS: CPT

## 2021-11-10 PROCEDURE — 85610 PROTHROMBIN TIME: CPT

## 2021-11-10 PROCEDURE — 2580000003 HC RX 258: Performed by: INTERNAL MEDICINE

## 2021-11-10 PROCEDURE — 6370000000 HC RX 637 (ALT 250 FOR IP): Performed by: INTERNAL MEDICINE

## 2021-11-10 PROCEDURE — 82947 ASSAY GLUCOSE BLOOD QUANT: CPT

## 2021-11-10 PROCEDURE — 99232 SBSQ HOSP IP/OBS MODERATE 35: CPT | Performed by: INTERNAL MEDICINE

## 2021-11-10 PROCEDURE — 97535 SELF CARE MNGMENT TRAINING: CPT

## 2021-11-10 PROCEDURE — 6370000000 HC RX 637 (ALT 250 FOR IP): Performed by: NURSE PRACTITIONER

## 2021-11-10 PROCEDURE — 80048 BASIC METABOLIC PNL TOTAL CA: CPT

## 2021-11-10 PROCEDURE — 2500000003 HC RX 250 WO HCPCS: Performed by: INTERNAL MEDICINE

## 2021-11-10 PROCEDURE — 6360000002 HC RX W HCPCS: Performed by: NURSE PRACTITIONER

## 2021-11-10 PROCEDURE — 1200000000 HC SEMI PRIVATE

## 2021-11-10 PROCEDURE — 97110 THERAPEUTIC EXERCISES: CPT

## 2021-11-10 PROCEDURE — 36415 COLL VENOUS BLD VENIPUNCTURE: CPT

## 2021-11-10 PROCEDURE — 85025 COMPLETE CBC W/AUTO DIFF WBC: CPT

## 2021-11-10 PROCEDURE — 83735 ASSAY OF MAGNESIUM: CPT

## 2021-11-10 PROCEDURE — 94640 AIRWAY INHALATION TREATMENT: CPT

## 2021-11-10 RX ORDER — METOCLOPRAMIDE 10 MG/1
5 TABLET ORAL 2 TIMES DAILY WITH MEALS
Status: DISCONTINUED | OUTPATIENT
Start: 2021-11-10 | End: 2021-11-13 | Stop reason: HOSPADM

## 2021-11-10 RX ORDER — POTASSIUM CHLORIDE 20 MEQ/1
20 TABLET, EXTENDED RELEASE ORAL
Status: DISCONTINUED | OUTPATIENT
Start: 2021-11-11 | End: 2021-11-10

## 2021-11-10 RX ORDER — SCOLOPAMINE TRANSDERMAL SYSTEM 1 MG/1
1 PATCH, EXTENDED RELEASE TRANSDERMAL
Status: DISCONTINUED | OUTPATIENT
Start: 2021-11-10 | End: 2021-11-13 | Stop reason: HOSPADM

## 2021-11-10 RX ADMIN — AMIODARONE HYDROCHLORIDE 200 MG: 200 TABLET ORAL at 09:24

## 2021-11-10 RX ADMIN — METOCLOPRAMIDE 5 MG: 10 TABLET ORAL at 16:40

## 2021-11-10 RX ADMIN — ROSUVASTATIN CALCIUM 40 MG: 40 TABLET, FILM COATED ORAL at 21:43

## 2021-11-10 RX ADMIN — FAMOTIDINE 10 MG: 20 TABLET ORAL at 21:43

## 2021-11-10 RX ADMIN — BUDESONIDE AND FORMOTEROL FUMARATE DIHYDRATE 2 PUFF: 160; 4.5 AEROSOL RESPIRATORY (INHALATION) at 07:51

## 2021-11-10 RX ADMIN — HYDROMORPHONE HYDROCHLORIDE 0.5 MG: 1 INJECTION, SOLUTION INTRAMUSCULAR; INTRAVENOUS; SUBCUTANEOUS at 06:34

## 2021-11-10 RX ADMIN — HYDROMORPHONE HYDROCHLORIDE 0.5 MG: 1 INJECTION, SOLUTION INTRAMUSCULAR; INTRAVENOUS; SUBCUTANEOUS at 15:34

## 2021-11-10 RX ADMIN — PROMETHAZINE HYDROCHLORIDE 12.5 MG: 12.5 TABLET ORAL at 06:15

## 2021-11-10 RX ADMIN — SODIUM CHLORIDE: 9 INJECTION, SOLUTION INTRAVENOUS at 06:15

## 2021-11-10 RX ADMIN — SODIUM CHLORIDE, PRESERVATIVE FREE 10 ML: 5 INJECTION INTRAVENOUS at 09:24

## 2021-11-10 RX ADMIN — HYDROMORPHONE HYDROCHLORIDE 0.5 MG: 1 INJECTION, SOLUTION INTRAMUSCULAR; INTRAVENOUS; SUBCUTANEOUS at 02:32

## 2021-11-10 RX ADMIN — MIDODRINE HYDROCHLORIDE 2.5 MG: 2.5 TABLET ORAL at 09:24

## 2021-11-10 RX ADMIN — HYDROMORPHONE HYDROCHLORIDE 0.5 MG: 1 INJECTION, SOLUTION INTRAMUSCULAR; INTRAVENOUS; SUBCUTANEOUS at 19:15

## 2021-11-10 RX ADMIN — Medication: at 17:21

## 2021-11-10 RX ADMIN — HYDROMORPHONE HYDROCHLORIDE 0.5 MG: 1 INJECTION, SOLUTION INTRAMUSCULAR; INTRAVENOUS; SUBCUTANEOUS at 11:07

## 2021-11-10 RX ADMIN — LEVOTHYROXINE SODIUM 100 MCG: 0.1 TABLET ORAL at 06:15

## 2021-11-10 RX ADMIN — BUDESONIDE AND FORMOTEROL FUMARATE DIHYDRATE 2 PUFF: 160; 4.5 AEROSOL RESPIRATORY (INHALATION) at 21:50

## 2021-11-10 ASSESSMENT — PAIN SCALES - GENERAL
PAINLEVEL_OUTOF10: 8
PAINLEVEL_OUTOF10: 5
PAINLEVEL_OUTOF10: 8
PAINLEVEL_OUTOF10: 0
PAINLEVEL_OUTOF10: 8
PAINLEVEL_OUTOF10: 3
PAINLEVEL_OUTOF10: 8
PAINLEVEL_OUTOF10: 7

## 2021-11-10 NOTE — PLAN OF CARE
Problem: Falls - Risk of:  Goal: Will remain free from falls  Description: Will remain free from falls  11/10/2021 1423 by Nichelle Chaudhary, RN  Outcome: Ongoing  Note: Patient remains free from falls and injuries. Call light with in reach and patient close to nurse station. Will continue to round hourly and more often as needed. 11/10/2021 0238 by Shania Martinez RN  Outcome: Ongoing  Note: Siderails up x 2  Hourly rounding  Call light in reach  Instructed to call for assist before attempting out of bed. Remains free from falls and accidental injury at this time   Floor free from obstacles  Bed is locked and in lowest position  Adequate lighting provided  Bed alarm on, [place] star and Stay with Me signs posted  Will continue to monitor.

## 2021-11-10 NOTE — PROGRESS NOTES
Physical Therapy  Facility/Department: [facility] MED SURG  Daily Treatment Note  NAME: Carl Mike  : 1950  MRN: [id number]    Date of Service: 11/10/2021    Discharge Recommendations:  Patient would benefit from continued therapy after discharge        Assessment   Body structures, Functions, Activity limitations: Decreased functional mobility ; Decreased ADL status; Decreased strength; Decreased safe awareness; Decreased endurance; Decreased balance; Decreased posture  Assessment: Pt with deficits of bed mobility, transfers, ambulation, posture, balance, safety awareness and endurance this session,  & required 2 assist for safe functional mobility, transfers & gait. , & is decline compared to prior level of function. With current deficits, Pt HIGH risk for falls & requires continued PT to maximize independence with functional mobility, balance, safety awareness & activity tolerance. Pt currently functioning below baseline. Would suggest additional therapy at time of discharge to maximize long term outcomes and prevent re-admission. Please refer to AM-PAC score for current level of function. Activity Tolerance  Activity Tolerance: Patient limited by fatigue; Patient limited by endurance     Patient Diagnosis(es): The encounter diagnosis was Chest pain, unspecified type.      has a past medical history of Anesthesia complication, Asthma, Atrial fibrillation ([facility] [address] Cerebral artery occlusion with cerebral infarction [facility]), Chronic back pain, Chronic kidney disease, Cirrhosis ([facility] [address] Constipation, COPD (chronic obstructive pulmonary disease) ([facility] [address] Dental bridge present, Diet-controlled diabetes mellitus ([facility] [address] Difficult intravenous access, Diverticulitis, Gastroparesis, GERD (gastroesophageal reflux disease), H/O mastectomy, Hepatitis, History of blood transfusion, History of Coumadin therapy, History of DVT (deep vein thrombosis), History of echocardiogram, History of stress test, Hx of blood clots, Hyperlipidemia, Hypertension, Hypothyroidism, Impaired mobility, Incontinence, Incontinence of bowel, Liver disease, Lower extremity neuropathy, Lumbar disc disease, OAB (overactive bladder), Osteoarthritis, PONV (postoperative nausea and vomiting), Prolonged emergence from general anesthesia, Rectocele, Rotator cuff tear, Snores, Type II or unspecified type diabetes mellitus without mention of complication, not stated as uncontrolled, Use of cane as ambulatory aid, Uses roller walker, and Wears glasses. has a past surgical history that includes Cholecystectomy; Upper gastrointestinal endoscopy; Colonoscopy; pr cmbnd anterpost colporraphy w/cysto w/ntrcl rpr (N/A, 2/12/2018); Appendectomy (1964); Hysterectomy (1978); Breast surgery (Bilateral, 1982); shoulder surgery (Left, 2005); eye surgery (Left, 2006); knee surgery (Left, 2013); Cholecystectomy (1976); shoulder surgery (Right, 07/08/2019); Rectal surgery (N/A, 2/17/2020); Stimulator Surgery (10/23/2020); and Nerve Surgery (N/A, 10/23/2020). Restrictions  Restrictions/Precautions  Restrictions/Precautions: General Precautions, Fall Risk  Required Braces or Orthoses?: No  Position Activity Restriction  Other position/activity restrictions:  Up with assist, telemetry, low sodium diet, alarms, pt with c/o dizziness, LUE IV  Subjective   General  Chart Reviewed: Yes  Subjective  Subjective: Pt agreeable to PT  General Comment  Comments: RN okays PT, pt up in bathroom on toilet upon arrival   Pain Screening  Patient Currently in Pain: Denies  Vital Signs  Patient Currently in Pain: Denies       Orientation     Cognition      Objective   Bed mobility  Scooting: Contact guard assistance  Transfers  Sit to Stand: Contact guard assistance  Stand to sit: Contact guard assistance  Stand Pivot Transfers: Contact guard assistance  Ambulation  Ambulation?: Yes  Ambulation 1  Surface: level tile  Device: Rolling Walker  Assistance: Contact guard assistance  Quality of Gait: step to pattern, safety cues to keep walker close at all times & to amb inside base of walker, pacing, & upright posture, legs weak & shaky  Gait Deviations: Decreased step length; Decreased step height  Distance: 35 ft     Balance  Posture: Fair  Sitting - Static: Good  Sitting - Dynamic: Good; -  Standing - Static: Fair; +  Standing - Dynamic: Fair  Exercises  Comments: supine LE AROM x 15 reps, UE medium resistance tband ex x 15 reps         Comment: assisted pt from bathroom for toileting back to bed pt. limited by dizziness                G-Code     OutComes Score                                                     AM-PAC Score  AM-PAC Inpatient Mobility Raw Score : 17 (11/10/21 1604)  AM-PAC Inpatient T-Scale Score : 42.13 (11/10/21 1604)  Mobility Inpatient CMS 0-100% Score: 50.57 (11/10/21 1604)  Mobility Inpatient CMS G-Code Modifier : CK (11/10/21 1604)          Goals  Short term goals  Time Frame for Short term goals: 12 visits  Short term goal 1: Inc bed-mobility & transfers to independent to enable pt to safely get in/OOB & chair  Short term goal 2: Inc gait to amb 200ft or > indep w/ RW to enable pt to return to previous level of independence & tolerance of community ambulation  Short term goal 3:  Inc strength to [address] standing balance to good with device to facilitate pt independence for performance of ADL's & functional mobility, & reduce fall risk  Short term goal 4: Pt able to tolerate 30-40 min of activity to include 15-20 reps of ex, NMR & functional mobility including 5 minutes of standing with device to facilitate activity tolerance to [facility]  Short term goal 5: Ed pt on home ex's, safety & endurance training, fall prevention, & issue written home program    Plan    Plan  Times per week: 1-2x/D, 5-6D/week  Current Treatment Recommendations: Strengthening, Balance Training, Functional Mobility Training, Transfer Training, Endurance Training, ADL/Self-care Training, Gait Training, Home Exercise Program, Safety Education & Training, Patient/Caregiver Education & Training  Safety Devices  Type of devices: Bed alarm in place, Gait belt, Patient at risk for falls, Left in bed     Therapy Time   Individual Concurrent Group Co-treatment   Time In  1520         Time Out  1546         Minutes  26                 [address], PTA

## 2021-11-10 NOTE — PROGRESS NOTES
Reason for Consult:  Acute kidney injury. Requesting Physician:  Alexx Dodge DO    Interval history:  Creatinine improved to 2.96   Feels better in general   Tolerating some p.o. intake     HISTORY OF PRESENT ILLNESS:    The patient is a 66 y.o. female who has history of CKD stage 3B who normally follows with me for nephrology care, presents with nausea, vomiting, diarrhea, abdominal pain and decreased appetite. Her CT abdomin and pelvis performed without iv contrast showed diverticulosis without any acute lesions. On previous labs her serum creatinines have been between 1.4 to 1.7with eGFR of 30s ml/min. On admission her creatinine was elevated at 3.31 that has improved slightly to 3.28 today. Denies any problems with urination. Denies any recent use of NSAIDs or iv contrast.       Prior to Admission medications    Medication Sig Start Date End Date Taking? Authorizing Provider   levothyroxine (SYNTHROID) 100 MCG tablet Take 100 mcg by mouth Daily  11/1/21  Yes Historical Provider, MD   famotidine (PEPCID) 10 MG tablet Take 10 mg by mouth daily   Yes Historical Provider, MD   albuterol-ipratropium (COMBIVENT RESPIMAT)  MCG/ACT AERS inhaler Inhale 1 puff into the lungs every 6 hours   Yes Historical Provider, MD   budesonide-formoterol (SYMBICORT) 160-4.5 MCG/ACT AERO Inhale 2 puffs into the lungs every morning    Yes Historical Provider, MD   warfarin (COUMADIN) 2.5 MG tablet Take 1.25 mg by mouth daily as directed by [facility]. Yes Historical Provider, MD   Polyethylene Glycol 3350 (MIRALAX PO) Take 17 g by mouth daily as needed    Yes Historical Provider, MD   amiodarone (CORDARONE) 200 MG tablet Take 200 mg by mouth three times a week TAKES ON MONDAYS, [id number] [facility].  5/31/20  Yes Historical Provider, MD   ezetimibe (ZETIA) 10 MG tablet Take 10 mg by mouth daily  5/31/20  Yes Historical Provider, MD   torsemide (DEMADEX) 20 MG tablet Take 60 mg by mouth as needed Feet/ankle edema    Yes Historical Provider, MD   spironolactone (ALDACTONE) 100 MG tablet Take 100 mg by mouth daily    Yes Historical Provider, MD   ammonium lactate (LAC-HYDRIN) 12 % lotion Apply topically 2 times daily as needed  9/10/19  Yes Historical Provider, MD   rosuvastatin (CRESTOR) 40 MG tablet Take 1 tablet by mouth daily   Yes Historical Provider, MD   K Phos Cook-Sod Phos Di & Mono (PHOSPHA 250 NEUTRAL PO) Take 1 tablet by mouth 2 times daily   Yes Historical Provider, MD   albuterol sulfate  (90 Base) MCG/ACT inhaler Inhale 2 puffs into the lungs every 6 hours as needed for Wheezing   Yes Historical Provider, MD   albuterol (PROVENTIL) (2.5 MG/3ML) 0.083% nebulizer solution Take 3 mLs by nebulization every 4 hours as needed for Wheezing 3/27/18  Yes Yessenia Brown,    metoclopramide (REGLAN) 5 MG tablet Take 10 mg by mouth 2 times daily 2tabs am and 2 tabs hs   Yes Historical Provider, MD   folic acid (FOLVITE) 1 MG tablet Take 1 tablet by mouth daily.  11/27/13  Yes Ora Dakin, MD   Cholecalciferol (VITAMIN D3) 1000 UNITS TABS Take 1,000 Units by mouth Five times weekly Indications: Mon-Fri    Yes Historical Provider, MD       Scheduled Meds:   metoclopramide  5 mg Oral BID     [START ON 11/11/2021] potassium chloride  20 mEq Oral Daily with breakfast    scopolamine  1 patch TransDERmal Q72H    midodrine  2.5 mg Oral TID     amiodarone  200 mg Oral Once per day on Mon Wed Fri    budesonide-formoterol  2 puff Inhalation BID    famotidine  10 mg Oral Daily    levothyroxine  100 mcg Oral Daily    rosuvastatin  40 mg Oral Daily    sodium chloride flush  5-40 mL IntraVENous 2 times per day    warfarin (COUMADIN) daily dosing (placeholder)   Other RX Placeholder    insulin lispro  0-6 Units SubCUTAneous TID     insulin lispro  0-3 Units SubCUTAneous Nightly     Continuous Infusions:   sodium chloride      sodium chloride 100 mL/hr at 11/10/21 0615    dextrose PRN Meds:albuterol, albuterol sulfate HFA, sodium chloride flush, sodium chloride, polyethylene glycol, acetaminophen **OR** acetaminophen, glucose, dextrose, glucagon (rDNA), dextrose, ipratropium-albuterol, HYDROmorphone, hydrALAZINE    Allergies   Allergen Reactions    Lyrica [Pregabalin] Nausea Only and Other (See Comments)     . FACE GETS BRIGHT RED. PAIN AND SEVERE SWELLING IN BILATERAL WRISTS, ANKLES AND KNEES    Pcn [Penicillins] Other (See Comments)     Unknown reaction, states can take Keflex STATES REACTION HAPPENED AS A CHILD AND WAS TOLD IT Was  LIFE THREATENING      Nsaids Other (See Comments)     PT HAS KIDNEY DISEASE AND CAN NOT TAKE NSAIDS      Zofran [Ondansetron] Other (See Comments)     Makes nausea and vomiting worse    Codeine Nausea And Vomiting     +N/V. KEEPS PATIENT AWAKE AND WIRED FOR SEVERAL DAYS.  Cymbalta [Duloxetine Hcl] Diarrhea, Nausea And Vomiting and Other (See Comments)     Intolerance. ABDOMINAL BLOATING    Morphine Anxiety    Prolia [Denosumab] Other (See Comments)     TACHYCARDIA, FLUSHED    Seasonal Other (See Comments)     RUNNY NOSE, WATERY ITCHY EYES, SNEEZING. Physical Exam:  Vitals:    11/10/21 0437 11/10/21 0754 11/10/21 0757 11/10/21 1141   BP:   (!) 153/38 (!) 140/49   Pulse:   81 75   Resp:   16 16   Temp:   97.3 °F (36.3 °C) 97.2 °F (36.2 °C)   TempSrc:   Oral Oral   SpO2:  96% 99% 95%   Weight: 231 lb 3.2 oz (104.9 kg)      Height:         I/O last 3 completed shifts:  In: -   Out: 2300 [Urine:2300]    General:  Awake, alert, not in distress. Appears to be stated age. HEENT: Atraumatic, normocephalic. Anicteric sclera. Pink and moist oral mucosa. Chest: Bilateral air entry, clear to auscultation, no wheezing, rhonchi or rales. Cardiovascular: RRR, S1S2, no murmur, rub or gallop. Has lower extremity edema. Abdomen: Soft, non tender to palpation. Musculoskeletal:   No cyanosis or clubbing. Integumentary: Pink, warm and dry.  Free from rash or lesions. Skin turgor normal.  CNS: Oriented to person, place and time. Speech clear. Face symmetrical. No tremor.      Data:  CBC:   Lab Results   Component Value Date    WBC 6.3 11/10/2021    HGB 12.5 11/10/2021    HCT 37.1 11/10/2021    MCV 99.5 11/10/2021     11/10/2021     BMP:    Lab Results   Component Value Date     11/10/2021     11/09/2021     11/08/2021    K 4.5 11/10/2021    K 3.6 (L) 11/09/2021    K 3.4 (L) 11/08/2021     (H) 11/10/2021     11/09/2021     11/08/2021    CO2 13 (L) 11/10/2021    CO2 14 (L) 11/09/2021    CO2 13 (L) 11/08/2021    BUN 10 11/10/2021    BUN 13 11/09/2021    BUN 12 11/08/2021    CREATININE 2.96 (H) 11/10/2021    CREATININE 3.28 (H) 11/09/2021    CREATININE 3.31 (H) 11/08/2021    GLUCOSE 123 (H) 11/10/2021    GLUCOSE 105 (H) 11/09/2021    GLUCOSE 128 (H) 11/08/2021     CMP:   Lab Results   Component Value Date     11/10/2021    K 4.5 11/10/2021     11/10/2021    CO2 13 11/10/2021    BUN 10 11/10/2021    CREATININE 2.96 11/10/2021    GLUCOSE 123 11/10/2021    GLUCOSE 123 12/19/2011    CALCIUM 8.5 11/10/2021    PROT 7.1 11/08/2021    LABALBU 3.6 11/08/2021    LABALBU 4.2 12/19/2011    BILITOT 1.22 11/08/2021    ALKPHOS 111 11/08/2021    AST 37 11/08/2021    ALT 23 11/08/2021      Hepatic:   Lab Results   Component Value Date    AST 37 (H) 11/08/2021    AST 30 09/29/2021    AST 37 (H) 08/04/2021    ALT 23 11/08/2021    ALT 20 09/29/2021    ALT 26 08/04/2021    BILITOT 1.22 (H) 11/08/2021    BILITOT 0.72 09/29/2021    BILITOT 1.03 08/04/2021    ALKPHOS 111 (H) 11/08/2021    ALKPHOS 83 09/29/2021    ALKPHOS 82 08/04/2021     BNP:   Lab Results   Component Value Date    BNP NOT REPORTED 08/19/2014    BNP NOT REPORTED 07/16/2014    BNP 30 02/13/2014     Lipids:   Lab Results   Component Value Date    CHOL 190 01/03/2018    HDL 76 08/04/2021     INR:   Lab Results   Component Value Date    INR 6.2 ([facility]) 11/10/2021    INR 6.3 ([place]) 11/09/2021    INR 5.6 ([facility]) 11/08/2021     PTH: No results found for: PTH  Phosphorus:    Lab Results   Component Value Date    PHOS 3.2 11/10/2021     Ionized Calcium: No results found for: IONCA  Magnesium:   Lab Results   Component Value Date    MG 2.1 11/10/2021     Albumin:   Lab Results   Component Value Date    LABALBU 3.6 11/08/2021    LABALBU 4.2 12/19/2011     Last 3 CK, CKMB, Troponin: @LABRCNT(CKTOTAL:3,CKMB:3,TROPONINI:3)       URINE:)No results found for: Romario Chahal     Radiology:   Reviewed. Assessment:  Acute kidney injury, appears to be hemodynamically related. Creatinine appears to be plateuing. Underlying CKD stage 3B with baseline GFR of 30s ml/min. Metabolic acidosis  Hypokalemia. Cirrhosis. Plan:  Change IV fluids to isotonic sodium bicarb solution  We will hold potassium supplements for now  Monitor blood pressure, will set parameters to hold midodrine  Avoid hypotension, nephrotoxic drugs, Lovenox, Fleets enema and IV contrast exposure. Follow up chemistries ordered for AM.  Please do not hesitate to contact us for any further questions/concerns. We will continue to follow along with you. Electronically signed by Oli Prakash MD  on 11/10/2021 at 2:41 PM   [facility].   Ph: [phone]

## 2021-11-10 NOTE — PLAN OF CARE
Problem: Falls - Risk of:  Goal: Will remain free from falls  Description: Will remain free from falls  Outcome: Ongoing  Note: Siderails up x 2  Hourly rounding  Call light in reach  Instructed to call for assist before attempting out of bed. Remains free from falls and accidental injury at this time   Floor free from obstacles  Bed is locked and in lowest position  Adequate lighting provided  Bed alarm on, [place] star and Stay with Me signs posted  Will continue to monitor. Problem: Pain:  Goal: Control of acute pain  Description: Control of acute pain  Outcome: Ongoing  Note: Pain level assessment complete. Patient educated on pain scale and control interventions  Dilaudid PRN pain medication given per patient request  Patient instructed to call out with new onset of pain or unrelieved pain  Will continue to monitor.      Problem: Fluid Volume:  Goal: Maintenance of adequate hydration will improve  Description: Maintenance of adequate hydration will improve  Outcome: Ongoing     Problem: Physical Regulation:  Goal: Ability to maintain clinical measurements within normal limits will improve  Description: Ability to maintain clinical measurements within normal limits will improve  Outcome: Ongoing     Problem: Urinary Elimination:  Goal: Ability to achieve and maintain adequate urine output will be supported  Description: Ability to achieve and maintain adequate urine output will be supported  Outcome: Ongoing

## 2021-11-10 NOTE — PROGRESS NOTES
[facility]   Progress Note                   Date:   11/10/2021  Patient name: Leandro Deluna  Date of admission:  11/8/2021  9:58 AM  MRN:   [id number]  YOB: 1950  PCP: Alphonzo Hashimoto, MD    Reason for Admission: Nausea/ vomiting    Subjective:       Clinical Changes / Abnormalities: Patient still complaining of pain in the right lower quadrant area radiating to the back.   Patient had a CT abdomen done which shows some diverticulosis and mild focal wall or fascial thickening same as 2019 exam.  Indeterminate renal lesions consider follow-up renal ultrasound or renal lesion protocol CT or MRI multiple renal cyst  INR is still on the higher side no signs of any bleeding    Medications:   Scheduled Meds:   metoclopramide  5 mg Oral BID WC    [START ON 11/11/2021] potassium chloride  20 mEq Oral Daily with breakfast    midodrine  2.5 mg Oral TID WC    amiodarone  200 mg Oral Once per day on Mon Wed Fri    budesonide-formoterol  2 puff Inhalation BID    famotidine  10 mg Oral Daily    levothyroxine  100 mcg Oral Daily    rosuvastatin  40 mg Oral Daily    sodium chloride flush  5-40 mL IntraVENous 2 times per day    warfarin (COUMADIN) daily dosing (placeholder)   Other RX Placeholder    insulin lispro  0-6 Units SubCUTAneous TID WC    insulin lispro  0-3 Units SubCUTAneous Nightly     Continuous Infusions:   sodium chloride      sodium chloride 100 mL/hr at 11/10/21 0615    dextrose       CBC:   Recent Labs     11/08/21  1017 11/09/21  0627 11/10/21  0535   WBC 8.3 7.7 6.3   HGB 15.5* 13.2 12.5    182 190     BMP:    Recent Labs     11/08/21  1017 11/09/21  0627 11/10/21  0535    137 135   K 3.4* 3.6* 4.5    106 110*   CO2 13* 14* 13*   BUN 12 13 10   CREATININE 3.31* 3.28* 2.96*   GLUCOSE 128* 105* 123*     Hepatic:   Recent Labs     11/08/21  1017   AST 37*   ALT 23   BILITOT 1.22*   ALKPHOS 111*     Troponin: No results for input(s): TROPONINI in the last 72 hours. BNP: No results for input(s): BNP in the last 72 hours. Lipids: No results for input(s): CHOL, HDL in the last 72 hours.     Invalid input(s): LDLCALCU  INR:   Recent Labs     11/08/21  1017 11/09/21  0627 11/10/21  0535   INR 5.6* 6.3* 6.2*       Objective:   Vitals: BP (!) 153/38   Pulse 81   Temp 97.3 °F (36.3 °C) (Oral)   Resp 16   Ht 5' 2\" (1.575 m)   Wt 231 lb 3.2 oz (104.9 kg)   LMP 06/20/1978   SpO2 99%   BMI 42.29 kg/m²   I/O last 3 completed shifts:  In: -   Out: 2300 [Urine:2300]  I/O this shift:  In: -   Out: 500 [Urine:500]  Scheduled Meds:   metoclopramide  5 mg Oral BID WC    [START ON 11/11/2021] potassium chloride  20 mEq Oral Daily with breakfast    midodrine  2.5 mg Oral TID WC    amiodarone  200 mg Oral Once per day on Mon Wed Fri    budesonide-formoterol  2 puff Inhalation BID    famotidine  10 mg Oral Daily    levothyroxine  100 mcg Oral Daily    rosuvastatin  40 mg Oral Daily    sodium chloride flush  5-40 mL IntraVENous 2 times per day    warfarin (COUMADIN) daily dosing (placeholder)   Other RX Placeholder    insulin lispro  0-6 Units SubCUTAneous TID WC    insulin lispro  0-3 Units SubCUTAneous Nightly     Continuous Infusions:   sodium chloride      sodium chloride 100 mL/hr at 11/10/21 0615    dextrose       PRN Meds:.albuterol, albuterol sulfate HFA, sodium chloride flush, sodium chloride, polyethylene glycol, acetaminophen **OR** acetaminophen, glucose, dextrose, glucagon (rDNA), dextrose, promethazine, ipratropium-albuterol, HYDROmorphone, hydrALAZINE    CONSTITUTIONAL: AOx4, no apparent distress, appears stated age   HEAD: normocephalic, atraumatic   EYES: PERRLA, EOMI   ENT: moist mucous membranes, uvula midline   NECK: symmetric, no midline tenderness to palpation   LUNGS: clear to auscultation bilaterally   CARDIOVASCULAR: regular rate and rhythm, no murmurs, rubs or gallops   ABDOMEN: Soft, non-tender, non-distended with normal active bowel sounds   SKIN: no rash       EK21  Baseline artifact, data interpretation may be affected  Normal Sinus Rhythm  Low voltage QRS  Borderline EKG    ECHO: 21     Summary  Technically difficult study. Left ventricle is normal in size. Mild left ventricular hypertrophy. Global left ventricular systolic function is normal with an estimated  ejection fraction of 60 % . Due to the technical limitations of this study not all wall segments were  visualized. No obvious wall motion abnormalities  Grade I (mild) left ventricular diastolic dysfunction. Mild tricuspid regurgitation. Mild pulmonary hypertension. Estimated right ventricular systolic pressure  is 22RORY. Trivial pericardial effusion. Prominent epicardial fat pad  Pleural effusion seen. Prominent epicardial fat pad     CT abd  Impression   1. An acute process is not identified. 2. Diverticulosis and mild focal wall or fascial thickening involving the   proximal sigmoid colon.  The appearance is similar to the 2019 exam and may   be the sequela of prior diverticulitis.  Consider endoscopy for further   evaluation. 3. Cirrhosis of the liver. 4. Status post appendectomy and cholecystectomy. 5. Indeterminate renal lesions, consider follow-up renal ultrasound or renal   lesion protocol CT or MRI for further evaluation.  Multiple renal cysts.                  Assessment / Acute Cardiac Problems:       Patient Active Problem List:     COPD without exacerbation ([address]     Atrial fibrillation ([address]     Stage 4 chronic kidney disease ([address]     Type 2 diabetes mellitus with kidney complication, with long-term current use of insulin (HCC)     Esophageal reflux     Hyperlipidemia     Chronic back pain     Osteoarthritis     Liver disease     H/O mastectomy     Asthma     Back pain     Cystocele     Rectocele     Urinary incontinence, mixed     Essential hypertension     Hypothyroidism     Chronic hepatitis C (HCC)     Sigmoid diverticulitis     Contact hypersensitivity     Right upper quadrant pain     Gastroparesis     Diverticulitis large intestine w/o perforation or abscess w/bleeding     Adhesive capsulitis of shoulder     Cervical spondylosis     Cirrhosis of liver (HCC)     Graves' disease     Mitral valve disease     Morbid obesity ([address]     Venous thrombosis of lower extremity     Acute cystitis without hematuria     History of DVT (deep vein thrombosis)     Diabetic peripheral neuropathy (HCC)     Coumadin use     Chest pain     KERMIT (acute kidney injury) ([address]     Elevated troponin     Supratherapeutic INR     Moderate malnutrition ([address]      Plan of Treatment:   Admitted with nausea vomiting eating abdominal pain CT abdomen with diverticulosis and indeterminant renal mass multiple cyst need further work-up  Patient echocardiogram within normal limits  History of DVT on Coumadin, Coumadin is on hold INR still on the higher side 6.4 no signs of any active bleeding  Pharmacy can look into interaction of the medication that is keeping the INR up  From cardiac point of view patient can be discharged and follow-up with Dr. Yary Livingston  Further cardiac work-up once current condition improves as an outpatient.   Please call if needed    Florentin Gross MD, [facility] Cardiology  [phone]

## 2021-11-10 NOTE — PROGRESS NOTES
Occupational Therapy  Facility/Department: [facility]  Daily Treatment Note  NAME: Pretty Guardado  : 1950  MRN: [id number]    Date of Service: 11/10/2021    Discharge Recommendations:  Patient would benefit from continued therapy after discharge       Assessment   Performance deficits / Impairments: Decreased functional mobility ; Decreased safe awareness; Decreased balance; Decreased coordination; Decreased ADL status; Decreased vision/visual deficit; Decreased posture; Decreased endurance; Decreased high-level IADLs; Decreased strength; Decreased fine motor control  Assessment: Pt. completed toilet transfer with use of RW with min assist x2 to insure safety to manage lines with IV pole. Pt. required max verbal and tactile cues for hand placement on walker and safety awareness of surroundings. Skilled OT warranted to increase functional strength and mobility to promote I/safety to return to prior living arrangement with assist as needed. Prognosis: Fair  OT Education: OT Role; Transfer Training; Energy Conservation; Plan of Care  Patient Education: Pt. educated on safety awareness of surroundings, proper use of RW and IV pole/line management for safety  REQUIRES OT FOLLOW UP: Yes  Activity Tolerance  Activity Tolerance: Patient Tolerated treatment well; Patient limited by fatigue  Activity Tolerance: fair-  Safety Devices  Safety Devices in place: Yes  Type of devices: Call light within reach; Bed alarm in place; Left in bed; Patient at risk for falls; Gait belt; Nurse notified         Patient Diagnosis(es): The encounter diagnosis was Chest pain, unspecified type.       has a past medical history of Anesthesia complication, Asthma, Atrial fibrillation ([facility] [address] Cerebral artery occlusion with cerebral infarction [facility]), Chronic back pain, Chronic kidney disease, Cirrhosis ([facility] [address] Constipation, COPD (chronic obstructive pulmonary disease) ([facility] [address] Dental bridge present, Diet-controlled diabetes mellitus ([facility] [address] Difficult intravenous access, Diverticulitis, Gastroparesis, GERD (gastroesophageal reflux disease), H/O mastectomy, Hepatitis, History of blood transfusion, History of Coumadin therapy, History of DVT (deep vein thrombosis), History of echocardiogram, History of stress test, Hx of blood clots, Hyperlipidemia, Hypertension, Hypothyroidism, Impaired mobility, Incontinence, Incontinence of bowel, Liver disease, Lower extremity neuropathy, Lumbar disc disease, OAB (overactive bladder), Osteoarthritis, PONV (postoperative nausea and vomiting), Prolonged emergence from general anesthesia, Rectocele, Rotator cuff tear, Snores, Type II or unspecified type diabetes mellitus without mention of complication, not stated as uncontrolled, Use of cane as ambulatory aid, Uses roller walker, and Wears glasses. has a past surgical history that includes Cholecystectomy; Upper gastrointestinal endoscopy; Colonoscopy; pr cmbnd anterpost colporraphy w/cysto w/ntrcl rpr (N/A, 2/12/2018); Appendectomy (1964); Hysterectomy (1978); Breast surgery (Bilateral, 1982); shoulder surgery (Left, 2005); eye surgery (Left, 2006); knee surgery (Left, 2013); Cholecystectomy (1976); shoulder surgery (Right, 07/08/2019); Rectal surgery (N/A, 2/17/2020); Stimulator Surgery (10/23/2020); and Nerve Surgery (N/A, 10/23/2020). Restrictions  Restrictions/Precautions  Restrictions/Precautions: General Precautions, Fall Risk  Required Braces or Orthoses?: No  Position Activity Restriction  Other position/activity restrictions: Up with assist, telemetry, low sodium diet, alarms, pt with c/o dizziness, LUE IV  Subjective   General  Chart Reviewed: Yes  Patient assessed for rehabilitation services?: Yes  Additional Pertinent Hx: Pt. pleasant and agreeable to treatment  Family / Caregiver Present: No      Orientation  Orientation  Overall Orientation Status: Within Functional Limits  Objective    ADL  Toileting: Minimal assistance;  Moderate assistance (min/mod assist for safety and proper use of walker with IV pole management.)  Additional Comments: Pt. required 2 staff to assist with line management and insure safety with use of walker. Pt. c/o dizziness entire transfer and educated on safety awareness throughout task. Balance  Sitting Balance: Contact guard assistance  Standing Balance: Minimal assistance  Standing Balance  Time: 1-2 min  Functional Mobility  Functional - Mobility Device: Rolling Walker  Assist Level: Minimal assistance  Toilet Transfers  Toilet - Technique: Ambulating  Equipment Used: Grab bars  Toilet Transfer: Minimal assistance; 2 Person assistance  Toilet Transfers Comments: Max verbal cues for IV pole management with lines and proper walker tech  Bed mobility  Bridging: Stand by assistance  Sit to Supine: Minimal assistance; 2 Person assistance (with max verbal cues on proper use of walker and safety awareness)  Transfers  Stand Step Transfers: 2 Person assistance; Moderate assistance  Sit to stand: Moderate assistance; 2 Person assistance  Transfer Comments: Max verbal cues on safety awareness and proper placement of hands on walker/squaring self up before sitting on EOB, management of IV pole/aware of surroundings      Cognition  Overall Cognitive Status: Exceptions  Arousal/Alertness: Appropriate responses to stimuli  Following Commands: Follows multistep commands with increased time;  Follows multistep commands with repitition  Attention Span: Appears intact  Memory: Appears intact  Safety Judgement: Decreased awareness of need for safety; Decreased awareness of need for assistance  Problem Solving: Decreased awareness of errors; Assistance required to identify errors made; Assistance required to correct errors made; Assistance required to implement solutions; Assistance required to generate solutions  Insights: Decreased awareness of deficits  Initiation: Requires cues for some  Sequencing: Does not require cues     Perception  Overall Perceptual Status: [facility]     Plan   Plan  Times per week: 4-5x/week 1x/dy as kell  Times per day: Daily  Current Treatment Recommendations: Strengthening, Balance Training, Functional Mobility Training, Safety Education & Training, Positioning, Endurance Training, Neuromuscular Re-education, Patient/Caregiver Education & Training, Equipment Evaluation, Education, & procurement, Self-Care / ADL, Pain Management, Home Management Training, Cognitive/Perceptual Training  Plan Comment: Cont. OT with same POC       AM-PAC Score        AM-PAC Inpatient Daily Activity Raw Score: 15 (11/10/21 1557)  AM-PAC Inpatient ADL T-Scale Score : 34.69 (11/10/21 1557)  ADL Inpatient CMS 0-100% Score: 56.46 (11/10/21 1557)  ADL Inpatient CMS G-Code Modifier : CK (11/10/21 1557)    Goals  Short term goals  Time Frame for Short term goals: by discharge, pt to demo  Short term goal 1: bed mob tasks with use of rail as needed to mod assist of 1. Short term goal 2: ADL transfer and functional mob with AD as needed to mod-min assist of 1  Short term goal 3: UB ADL to set up and LB ADL to mod assist of 1 and use of AD/AE. Short term goal 4: increase BUE strength by a 1/2 grade to assist with ADL tasks and be I with BUE HEP with use of handouts. Short term goal 5: toileting tasks with us eof AD/grab bar as needed to mod assist of 1. Long term goals  Long term goal 1: Pt to stand with CG and AD kell > 7 mins as able to assist with reducing falls in function. Long term goal 2: Pt/caregivers to be I with EC/WS and fall prevention tech, pressure relief, DME/AE recommendations with use of handouts. Patient Goals   Patient goals : Be Independent again and go home! Therapy Time   Individual Concurrent Group Co-treatment   Time In 200         Time Out 0330         Minutes 13              RN reports patient is medically stable for therapy treatment this date. Chart reviewed prior to treatment and patient is agreeable for therapy.   All lines intact and patient positioned comfortably at end of treatment. All patient needs addressed prior to ending therapy session.       Con Francisco, MALISSA

## 2021-11-10 NOTE — PROGRESS NOTES
[facility]  Office: [address], DO, Josefina Orjas, DO, Sarath Angel, DO, Marlyn Solano Ledy, DO, Mariana Butler MD, Sammie Case MD, Doug Warren MD, Teddy Dc MD, Hank Spence MD, Zully Melgar MD, Raymundo Mccallum MD, Kady Luevano, DO, Amy Karimi DO, Chasity Baca MD,  Maria Proctor, DO, Rosy Schirmer, MD, Jcarlos Pete MD, Galina Nguyen MD, Jovana Manrique MD, Linda Tamez MD, Tyrell Peters MD, Velta Bernheim, MD, Shraddha Juarez [facility], CNP, Guillaume Royal, CNP, Vishal Barlow, CNS, David Nicholson, CNP, Deirdre Greene, CNP, Ira Pressley, CNP, Jacqui Duncan, CNP, Shu Morales, CNP, Nir Marshall PA-C, Raman Reddy, [facility], Danielle Perez, CNP, Breanne Perez, CNP, Vani Garcia, CNP, Bonifacio Schafer, CNP, Alyssia Steel, CNP, Oziel Sharpe, [facility], [facility]    Progress Note    11/10/2021    8:17 AM    Name:   Milton Truong  MRN:     [id number]     [place]:      [de-identified]   Room:   2007/2007-02  IP Day:  2  Admit Date:  11/8/2021  9:58 AM    PCP:   Ronaldo Randle MD  Code Status:  Full Code    Subjective:     C/C:   Chief Complaint   Patient presents with   mySkin Nausea & Vomiting     onset 2.5 weeks    Fatigue    Abdominal Pain    Dizziness     Interval History Status: improved. Patient has decreased nausea, no further emesis. Continued lower abdominal pain. No further dizziness, denies any chest pain, shortness of breath, fevers or chills or other acute complaints. Brief History: This is a 49-year-old female that presents with a complaint of nausea, vomiting, fatigue and dizziness. She has longstanding history of gastroparesis and had her Reglan discontinued a few weeks ago by her cardiologist due to \"shakes\" that were observed in the office. Since then she has had poor oral intake with continued nausea and vomiting and a 12 pound weight loss.   She subsequently self resumed Reglan without significant improvement. Upon evaluation she is found to have acute kidney injury. Review of Systems:     Constitutional:  negative for chills, fevers, sweats  Respiratory:  negative for cough, dyspnea on exertion, shortness of breath, wheezing  Cardiovascular:  negative for chest pain, chest pressure/discomfort, lower extremity edema, palpitations  Gastrointestinal:  negative for abdominal pain, constipation, diarrhea, nausea, vomiting  Neurological:  negative for dizziness, headache    Medications: Allergies: Allergies   Allergen Reactions    Lyrica [Pregabalin] Nausea Only and Other (See Comments)     . FACE GETS BRIGHT RED. PAIN AND SEVERE SWELLING IN BILATERAL WRISTS, ANKLES AND KNEES    Pcn [Penicillins] Other (See Comments)     Unknown reaction, states can take Keflex STATES REACTION HAPPENED AS A CHILD AND WAS TOLD IT Was  LIFE THREATENING      Nsaids Other (See Comments)     PT HAS KIDNEY DISEASE AND CAN NOT TAKE NSAIDS      Zofran [Ondansetron] Other (See Comments)     Makes nausea and vomiting worse    Codeine Nausea And Vomiting     +N/V. KEEPS PATIENT AWAKE AND WIRED FOR SEVERAL DAYS.  Cymbalta [Duloxetine Hcl] Diarrhea, Nausea And Vomiting and Other (See Comments)     Intolerance. ABDOMINAL BLOATING    Morphine Anxiety    Prolia [Denosumab] Other (See Comments)     TACHYCARDIA, FLUSHED    Seasonal Other (See Comments)     RUNNY NOSE, WATERY ITCHY EYES, SNEEZING.        Current Meds:   Scheduled Meds:    midodrine  2.5 mg Oral TID     amiodarone  200 mg Oral Once per day on Mon Wed Fri    budesonide-formoterol  2 puff Inhalation BID    famotidine  10 mg Oral Daily    levothyroxine  100 mcg Oral Daily    rosuvastatin  40 mg Oral Daily    sodium chloride flush  5-40 mL IntraVENous 2 times per day    warfarin (COUMADIN) daily dosing (placeholder)   Other RX Placeholder    insulin lispro  0-6 Units SubCUTAneous TID     insulin lispro  0-3 Units SubCUTAneous Nightly    potassium chloride  20 mEq Oral BID WC     Continuous Infusions:    sodium chloride      sodium chloride 100 mL/hr at 11/10/21 0615    dextrose       PRN Meds: albuterol, albuterol sulfate HFA, sodium chloride flush, sodium chloride, polyethylene glycol, acetaminophen **OR** acetaminophen, glucose, dextrose, glucagon (rDNA), dextrose, promethazine, ipratropium-albuterol, HYDROmorphone, hydrALAZINE    Data:     Past Medical History:   has a past medical history of Anesthesia complication, Asthma, Atrial fibrillation ([facility] [address] Cerebral artery occlusion with cerebral infarction ([facility] [address] Chronic back pain, Chronic kidney disease, Cirrhosis ([facility] [address] Constipation, COPD (chronic obstructive pulmonary disease) ([facility] [address] Dental bridge present, Diet-controlled diabetes mellitus ([facility] [address] Difficult intravenous access, Diverticulitis, Gastroparesis, GERD (gastroesophageal reflux disease), H/O mastectomy, Hepatitis, History of blood transfusion, History of Coumadin therapy, History of DVT (deep vein thrombosis), History of echocardiogram, History of stress test, Hx of blood clots, Hyperlipidemia, Hypertension, Hypothyroidism, Impaired mobility, Incontinence, Incontinence of bowel, Liver disease, Lower extremity neuropathy, Lumbar disc disease, OAB (overactive bladder), Osteoarthritis, PONV (postoperative nausea and vomiting), Prolonged emergence from general anesthesia, Rectocele, Rotator cuff tear, Snores, Type II or unspecified type diabetes mellitus without mention of complication, not stated as uncontrolled, Use of cane as ambulatory aid, Uses roller walker, and Wears glasses. Social History:   reports that she quit smoking about 13 years ago. Her smoking use included cigarettes. She has a 43.00 pack-year smoking history. She has never used smokeless tobacco. She reports current alcohol use of about 1.0 standard drink of alcohol per week. She reports that she does not use drugs.      Family History:   Family History   Problem Relation Age of Onset    Cancer Mother         breast    Heart Disease Father     Cancer Sister         lung    Cancer Maternal Aunt         cervical       Vitals:  BP (!) 153/38   Pulse 81   Temp 97.3 °F (36.3 °C) (Oral)   Resp 16   Ht 5' 2\" (1.575 m)   Wt 231 lb 3.2 oz (104.9 kg)   LMP 1978   SpO2 99%   BMI 42.29 kg/m²   Temp (24hrs), Av.7 °F (36.5 °C), Min:97.3 °F (36.3 °C), Max:97.9 °F (36.6 °C)    Recent Labs     21  1146 21  1649 21  2033 11/10/21  0606   POCGLU 127* 160* 135* 112*       I/O (24Hr):     Intake/Output Summary (Last 24 hours) at 11/10/2021 0817  Last data filed at 11/10/2021 0756  Gross per 24 hour   Intake --   Output 2350 ml   Net -2350 ml       Labs:  Hematology:  Recent Labs     21  1017 21  0034 21  0627 11/10/21  0535   WBC 8.3  --  7.7 6.3   RBC 4.71  --  4.04 3.73*   HGB 15.5*  --  13.2 12.5   HCT 47.1  --  40.2 37.1   .0  --  99.5 99.5   MCH 32.9  --  32.7 33.5   MCHC 32.9  --  32.8 33.7   RDW 14.3  --  14.5* 14.5*     --  182 190   MPV 10.5  --  10.2 10.4   INR 5.6*  --  6.3* 6.2*   DDIMER  --  0.51  --   --      Chemistry:  Recent Labs     21  1017 21  1130 21  1328 21  0627 11/10/21  0535     --   --  137 135   K 3.4*  --   --  3.6* 4.5     --   --  106 110*   CO2 13*  --   --  14* 13*   GLUCOSE 128*  --   --  105* 123*   BUN 12  --   --  13 10   CREATININE 3.31*  --   --  3.28* 2.96*   MG 2.2  --   --   --  2.1   ANIONGAP 18*  --   --  17 12   LABGLOM 14*  --   --  14* 16*   GFRAA 17*  --   --  17* 19*   CALCIUM 9.1  --   --  8.5* 8.5*   PHOS  --   --   --   --  3.2   TROPHS  --  71* 69* 59*  --      Recent Labs     21  1017 21  2113 21  0623 21  1146 21  1649 21  2033 11/10/21  0606   PROT 7.1  --   --   --   --   --   --    LABALBU 3.6  --   --   --   --   --   --    AST 37*  --   --   --   --   --   --    ALT 23  --   -- --   --   --   --    ALKPHOS 111*  --   --   --   --   --   --    BILITOT 1.22*  --   --   --   --   --   --    AMYLASE 30  --   --   --   --   --   --    LIPASE 41  --   --   --   --   --   --    POCGLU  --  122* 102 127* 160* 135* 112*     ABG:  Lab Results   Component Value Date    FIO2 ROOM AIR 05/09/2014     Lab Results   Component Value Date/Time    SPECIAL NOT REPORTED 10/09/2020 02:57 PM     Lab Results   Component Value Date/Time    CULTURE NO SIGNIFICANT GROWTH 10/09/2020 02:57 PM       Radiology:  CT ABDOMEN PELVIS WO CONTRAST Additional Contrast? None    Result Date: 11/8/2021  1. An acute process is not identified. 2. Diverticulosis and mild focal wall or fascial thickening involving the proximal sigmoid colon. The appearance is similar to the 2019 exam and may be the sequela of prior diverticulitis. Consider endoscopy for further evaluation. 3. Cirrhosis of the liver. 4. Status post appendectomy and cholecystectomy. 5. Indeterminate renal lesions, consider follow-up renal ultrasound or renal lesion protocol CT or MRI for further evaluation. Multiple renal cysts. XR CHEST PORTABLE    Result Date: 11/8/2021  No active cardiopulmonary disease.        Physical Examination:        General appearance:  alert, cooperative and no distress  Mental Status:  oriented to person, place and time and normal affect  Lungs:  clear to auscultation bilaterally, normal effort  Heart:  regular rate and rhythm, no murmur  Abdomen:  soft, nontender, nondistended, normal bowel sounds, no masses, hepatomegaly, splenomegaly  Extremities:  no edema, redness, tenderness in the calves  Skin:  no gross lesions, rashes, induration    Assessment:        Hospital Problems           Last Modified POA    * (Principal) KERMIT (acute kidney injury) ([facility] [address] 11/8/2021 Yes    COPD without exacerbation ([facility] [address] 11/9/2021 Yes    Atrial fibrillation ([facility] [address] 11/8/2021 Yes    Stage 4 chronic kidney disease ([facility] [address] 11/8/2021 Yes    Type 2 diabetes mellitus with kidney complication, with long-term current use of insulin ([facility] [address] 11/8/2021 Yes    Hyperlipidemia 11/8/2021 Yes    Asthma 11/8/2021 Yes    Essential hypertension 11/8/2021 Yes    Hypothyroidism 11/8/2021 Yes    Chronic hepatitis C ([address] 11/8/2021 Yes    Gastroparesis 11/8/2021 Yes    Cirrhosis of liver ([address] 11/8/2021 Yes    Graves' disease 11/8/2021 Yes    Mitral valve disease 11/8/2021 Yes    History of DVT (deep vein thrombosis) 11/8/2021 Yes    Diabetic peripheral neuropathy ([address] 11/8/2021 Yes    Coumadin use 11/8/2021 Yes    Chest pain 11/8/2021 Yes    Elevated troponin 11/8/2021 Yes    Supratherapeutic INR 11/8/2021 Yes    Moderate malnutrition ([address] 11/9/2021 Yes          Plan:        1. Trend labs, avoid nephrotoxic agents. Reviewed recent medication changes, patient reports no additions to medications since August when her creatinine is 1.6  2. Fluids per nephrology  3. Insulin scale as ordered  4. Resume Reglan at twice daily dosing  5. Scopolamine patch, no further Phenergan due to possible drug interaction with Reglan  6. Hold Coumadin, monitor INR. INR likely elevated due to recent poor nutritional intake  7. GI DVT prophylaxis  8. PT and OT, social service for discharge planning  9. Discharge planning 24 to 48 hours of renal function continues to improve.     Catharine Litten Orlop, DO  11/10/2021  8:17 AM

## 2021-11-11 LAB
ABSOLUTE EOS #: 0.13 K/UL (ref 0–0.44)
ABSOLUTE IMMATURE GRANULOCYTE: 0.03 K/UL (ref 0–0.3)
ABSOLUTE LYMPH #: 0.94 K/UL (ref 1.1–3.7)
ABSOLUTE MONO #: 0.7 K/UL (ref 0.1–1.2)
ANION GAP SERPL CALCULATED.3IONS-SCNC: 16 MMOL/L (ref 9–17)
BASOPHILS # BLD: 1 % (ref 0–2)
BASOPHILS ABSOLUTE: 0.05 K/UL (ref 0–0.2)
BUN BLDV-MCNC: 8 MG/DL (ref 8–23)
BUN/CREAT BLD: 3 (ref 9–20)
CALCIUM SERPL-MCNC: 8.9 MG/DL (ref 8.6–10.4)
CHLORIDE BLD-SCNC: 105 MMOL/L (ref 98–107)
CO2: 15 MMOL/L (ref 20–31)
CREAT SERPL-MCNC: 2.78 MG/DL (ref 0.5–0.9)
DIFFERENTIAL TYPE: ABNORMAL
EOSINOPHILS RELATIVE PERCENT: 2 % (ref 1–4)
GFR AFRICAN AMERICAN: 20 ML/MIN
GFR NON-AFRICAN AMERICAN: 17 ML/MIN
GFR SERPL CREATININE-BSD FRML MDRD: ABNORMAL ML/MIN/{1.73_M2}
GFR SERPL CREATININE-BSD FRML MDRD: ABNORMAL ML/MIN/{1.73_M2}
GLUCOSE BLD-MCNC: 112 MG/DL (ref 70–99)
GLUCOSE BLD-MCNC: 114 MG/DL (ref 65–105)
GLUCOSE BLD-MCNC: 116 MG/DL (ref 65–105)
GLUCOSE BLD-MCNC: 122 MG/DL (ref 65–105)
GLUCOSE BLD-MCNC: 124 MG/DL (ref 65–105)
HCT VFR BLD CALC: 40 % (ref 36.3–47.1)
HEMOGLOBIN: 13.6 G/DL (ref 11.9–15.1)
IMMATURE GRANULOCYTES: 0 %
INR BLD: 3.7
LYMPHOCYTES # BLD: 14 % (ref 24–43)
MAGNESIUM: 1.9 MG/DL (ref 1.6–2.6)
MCH RBC QN AUTO: 33.7 PG (ref 25.2–33.5)
MCHC RBC AUTO-ENTMCNC: 34 G/DL (ref 28.4–34.8)
MCV RBC AUTO: 99 FL (ref 82.6–102.9)
MONOCYTES # BLD: 10 % (ref 3–12)
NRBC AUTOMATED: 0 PER 100 WBC
PDW BLD-RTO: 14.1 % (ref 11.8–14.4)
PHOSPHORUS: 2.4 MG/DL (ref 2.6–4.5)
PLATELET # BLD: 182 K/UL (ref 138–453)
PLATELET ESTIMATE: ABNORMAL
PMV BLD AUTO: 9.8 FL (ref 8.1–13.5)
POTASSIUM SERPL-SCNC: 3.8 MMOL/L (ref 3.7–5.3)
PROTHROMBIN TIME: 35.7 SEC (ref 11.5–14.2)
RBC # BLD: 4.04 M/UL (ref 3.95–5.11)
RBC # BLD: ABNORMAL 10*6/UL
SEG NEUTROPHILS: 73 % (ref 36–65)
SEGMENTED NEUTROPHILS ABSOLUTE COUNT: 4.94 K/UL (ref 1.5–8.1)
SODIUM BLD-SCNC: 136 MMOL/L (ref 135–144)
WBC # BLD: 6.8 K/UL (ref 3.5–11.3)
WBC # BLD: ABNORMAL 10*3/UL

## 2021-11-11 PROCEDURE — 1200000000 HC SEMI PRIVATE

## 2021-11-11 PROCEDURE — 97116 GAIT TRAINING THERAPY: CPT

## 2021-11-11 PROCEDURE — 6370000000 HC RX 637 (ALT 250 FOR IP): Performed by: INTERNAL MEDICINE

## 2021-11-11 PROCEDURE — 99232 SBSQ HOSP IP/OBS MODERATE 35: CPT | Performed by: INTERNAL MEDICINE

## 2021-11-11 PROCEDURE — 84100 ASSAY OF PHOSPHORUS: CPT

## 2021-11-11 PROCEDURE — 6370000000 HC RX 637 (ALT 250 FOR IP): Performed by: NURSE PRACTITIONER

## 2021-11-11 PROCEDURE — 94761 N-INVAS EAR/PLS OXIMETRY MLT: CPT

## 2021-11-11 PROCEDURE — 80048 BASIC METABOLIC PNL TOTAL CA: CPT

## 2021-11-11 PROCEDURE — 2500000003 HC RX 250 WO HCPCS: Performed by: INTERNAL MEDICINE

## 2021-11-11 PROCEDURE — 36415 COLL VENOUS BLD VENIPUNCTURE: CPT

## 2021-11-11 PROCEDURE — 94640 AIRWAY INHALATION TREATMENT: CPT

## 2021-11-11 PROCEDURE — 85610 PROTHROMBIN TIME: CPT

## 2021-11-11 PROCEDURE — 85025 COMPLETE CBC W/AUTO DIFF WBC: CPT

## 2021-11-11 PROCEDURE — 82947 ASSAY GLUCOSE BLOOD QUANT: CPT

## 2021-11-11 PROCEDURE — 2580000003 HC RX 258: Performed by: INTERNAL MEDICINE

## 2021-11-11 PROCEDURE — 83735 ASSAY OF MAGNESIUM: CPT

## 2021-11-11 PROCEDURE — 97110 THERAPEUTIC EXERCISES: CPT

## 2021-11-11 PROCEDURE — 6360000002 HC RX W HCPCS: Performed by: NURSE PRACTITIONER

## 2021-11-11 RX ORDER — HYDROCODONE BITARTRATE AND ACETAMINOPHEN 5; 325 MG/1; MG/1
1 TABLET ORAL EVERY 6 HOURS PRN
Status: DISCONTINUED | OUTPATIENT
Start: 2021-11-11 | End: 2021-11-13 | Stop reason: HOSPADM

## 2021-11-11 RX ADMIN — FAMOTIDINE 10 MG: 20 TABLET ORAL at 20:12

## 2021-11-11 RX ADMIN — HYDROCODONE BITARTRATE AND ACETAMINOPHEN 1 TABLET: 5; 325 TABLET ORAL at 20:12

## 2021-11-11 RX ADMIN — METOCLOPRAMIDE 5 MG: 10 TABLET ORAL at 17:31

## 2021-11-11 RX ADMIN — HYDROMORPHONE HYDROCHLORIDE 0.5 MG: 1 INJECTION, SOLUTION INTRAMUSCULAR; INTRAVENOUS; SUBCUTANEOUS at 21:14

## 2021-11-11 RX ADMIN — Medication: at 10:06

## 2021-11-11 RX ADMIN — BUDESONIDE AND FORMOTEROL FUMARATE DIHYDRATE 2 PUFF: 160; 4.5 AEROSOL RESPIRATORY (INHALATION) at 07:26

## 2021-11-11 RX ADMIN — POLYETHYLENE GLYCOL 3350 17 G: 17 POWDER, FOR SOLUTION ORAL at 09:06

## 2021-11-11 RX ADMIN — HYDROMORPHONE HYDROCHLORIDE 0.5 MG: 1 INJECTION, SOLUTION INTRAMUSCULAR; INTRAVENOUS; SUBCUTANEOUS at 13:30

## 2021-11-11 RX ADMIN — METOCLOPRAMIDE 5 MG: 10 TABLET ORAL at 09:06

## 2021-11-11 RX ADMIN — ROSUVASTATIN CALCIUM 40 MG: 40 TABLET, FILM COATED ORAL at 20:12

## 2021-11-11 RX ADMIN — HYDROCODONE BITARTRATE AND ACETAMINOPHEN 1 TABLET: 5; 325 TABLET ORAL at 10:03

## 2021-11-11 RX ADMIN — LEVOTHYROXINE SODIUM 100 MCG: 0.1 TABLET ORAL at 06:27

## 2021-11-11 RX ADMIN — HYDROMORPHONE HYDROCHLORIDE 0.5 MG: 1 INJECTION, SOLUTION INTRAMUSCULAR; INTRAVENOUS; SUBCUTANEOUS at 02:33

## 2021-11-11 ASSESSMENT — PAIN DESCRIPTION - DIRECTION: RADIATING_TOWARDS: BACK/FLANK

## 2021-11-11 ASSESSMENT — PAIN DESCRIPTION - LOCATION
LOCATION: ABDOMEN
LOCATION: ABDOMEN;FLANK
LOCATION: ABDOMEN;FLANK
LOCATION: ABDOMEN

## 2021-11-11 ASSESSMENT — PAIN DESCRIPTION - FREQUENCY
FREQUENCY: INTERMITTENT
FREQUENCY: INTERMITTENT

## 2021-11-11 ASSESSMENT — PAIN SCALES - GENERAL
PAINLEVEL_OUTOF10: 4
PAINLEVEL_OUTOF10: 3
PAINLEVEL_OUTOF10: 8
PAINLEVEL_OUTOF10: 4
PAINLEVEL_OUTOF10: 8
PAINLEVEL_OUTOF10: 7
PAINLEVEL_OUTOF10: 8
PAINLEVEL_OUTOF10: 7
PAINLEVEL_OUTOF10: 5

## 2021-11-11 ASSESSMENT — PAIN DESCRIPTION - ONSET
ONSET: ON-GOING
ONSET: ON-GOING

## 2021-11-11 ASSESSMENT — PAIN DESCRIPTION - ORIENTATION
ORIENTATION: RIGHT
ORIENTATION: LOWER
ORIENTATION: RIGHT
ORIENTATION: LOWER

## 2021-11-11 ASSESSMENT — PAIN DESCRIPTION - PAIN TYPE
TYPE: ACUTE PAIN

## 2021-11-11 ASSESSMENT — PAIN DESCRIPTION - DESCRIPTORS
DESCRIPTORS: SHARP;ACHING
DESCRIPTORS: SHARP;ACHING

## 2021-11-11 ASSESSMENT — PAIN DESCRIPTION - PROGRESSION
CLINICAL_PROGRESSION: GRADUALLY IMPROVING
CLINICAL_PROGRESSION: GRADUALLY WORSENING

## 2021-11-11 NOTE — PLAN OF CARE
Problem: Falls - Risk of:  Goal: Will remain free from falls  Description: Will remain free from falls  11/11/2021 1708 by Zenaida Abrams RN  Outcome: Ongoing  Note: Siderails up x 2  Hourly rounding  Call light in reach  Instructed to call for assist before attempting out of bed. Remains free from falls and accidental injury at this time   Floor free from obstacles  Bed is locked and in lowest position  Adequate lighting provided  Bed alarm on, Red Falling star and Stay with Me signs posted  Goal: Absence of physical injury  Description: Absence of physical injury  11/11/2021 1708 by Zenaida Abrams RN  Outcome: Ongoing    Problem: Pain:  Goal: Pain level will decrease  Description: Pain level will decrease  11/11/2021 1708 by Zenaida Abrams RN  Outcome: Ongoing  Note: Pain level assessment complete. Patient educated on pain scale and control interventions  PRN pain medication given per patient request-[place] & Dilaudid  Patient instructed to call out with new onset of pain or unrelieved pain    Goal: Control of acute pain  Description: Control of acute pain  11/11/2021 1708 by Zenaida Abrams RN  Outcome: Ongoing  Goal: Control of chronic pain  Description: Control of chronic pain  11/11/2021 1708 by Zenaida Abrams RN  Outcome: Ongoing     Problem: Activity:  Goal: Risk for activity intolerance will decrease  Description: Risk for activity intolerance will decrease  11/11/2021 1708 by Zenaida Abrams RN  Outcome: Ongoing     Problem:  Bowel/Gastric:  Goal: Bowel function will improve  Description: Bowel function will improve  11/11/2021 1708 by Zenaida Abrams RN  Outcome: Ongoing  Goal: Diagnostic test results will improve  Description: Diagnostic test results will improve  11/11/2021 1708 by Zenaida Abrams RN  Outcome: Ongoing  Goal: Occurrences of nausea will decrease  Description: Occurrences of nausea will decrease  11/11/2021 1708 by Zenaida Abrams RN  Outcome: Ongoing  Goal: Occurrences of vomiting will decrease  Description: Occurrences of vomiting will decrease  11/11/2021 1708 by Robert Cruz RN  Outcome: Ongoing     Problem: Fluid Volume:  Goal: Maintenance of adequate hydration will improve  Description: Maintenance of adequate hydration will improve  11/11/2021 1708 by Robert Cruz RN  Outcome: Ongoing  Goal: Will show no signs or symptoms of fluid imbalance  Description: Will show no signs or symptoms of fluid imbalance  11/11/2021 1708 by Robert Cruz RN  Outcome: Ongoing     Problem: Health Behavior:  Goal: Ability to state signs and symptoms to report to health care provider will improve  Description: Ability to state signs and symptoms to report to health care provider will improve  11/11/2021 1708 by Robert Cruz RN  Outcome: Ongoing  Goal: Ability to manage health-related needs will improve  Description: Ability to manage health-related needs will improve  11/11/2021 1708 by Robert Cruz RN  Outcome: Ongoing     Problem: Physical Regulation:  Goal: Complications related to the disease process, condition or treatment will be avoided or minimized  Description: Complications related to the disease process, condition or treatment will be avoided or minimized  11/11/2021 1708 by Robert Cruz RN  Outcome: Ongoing  Goal: Ability to maintain clinical measurements within normal limits will improve  Description: Ability to maintain clinical measurements within normal limits will improve  11/11/2021 1708 by Robert Cruz RN  Outcome: Ongoing     Problem: Sensory:  Goal: Pain level will decrease  Description: Pain level will decrease  11/11/2021 1708 by Robert Cruz RN  Outcome: Ongoing  Note: Pain level assessment complete.    Patient educated on pain scale and control interventions  PRN pain medication given per patient request-[place] & [place]  Patient instructed to call out with new onset of pain or unrelieved pain    Goal: Ability to identify factors that increase the pain will improve  Description: Ability to identify factors that increase the pain will improve  11/11/2021 1708 by Damaris Alcantara RN  Outcome: Ongoing  Goal: Ability to notify healthcare provider of pain before it becomes unmanageable or unbearable will improve  Description: Ability to notify healthcare provider of pain before it becomes unmanageable or unbearable will improve  11/11/2021 1708 by Damaris Alcantara RN  Outcome: Ongoing  Problem: Coping:  Goal: Ability to identify and develop effective coping behavior will improve  Description: Ability to identify and develop effective coping behavior will improve  11/11/2021 1708 by Damaris Alcantara RN  Outcome: Ongoing     Problem: Nutritional:  Goal: Maintenance of adequate nutrition will be supported  Description: Maintenance of adequate nutrition will be supported  11/11/2021 1708 by Damaris Alcantara RN  Outcome: Ongoing     Problem: Urinary Elimination:  Goal: Ability to achieve and maintain adequate urine output will be supported  Description: Ability to achieve and maintain adequate urine output will be supported  11/11/2021 1708 by Damaris Alcantara RN  Outcome: Ongoing     Problem: Nutrition  Goal: Optimal nutrition therapy  11/11/2021 1708 by Damaris Alcantara RN  Outcome: Ongoing

## 2021-11-11 NOTE — CARE COORDINATION
21 [facility] tool uploaded to chart and can be viewed in the media tab, recommending SNF for greatest gains     Gabby Velasquez    1950    PLOF: Patient lives alone in one level apartment w [address]. Has 4WW, can, manual wc, lift chair, sleeps in recliner. Was IND w ambulation, ADLs, and received some assistance w IADLs. Receives some help from family (2 children), but daughter is ill and son works 12 hour shifts. CLOF: CGA w RW 35 ft, MOD A x 2 for transfers, MIN A for grooming, MOD A for UE dressing, MAX for LE ADLs and DEP for toileting. A&O, follows multi step commands w increased time/repetition, Wears reading glasses     [facility] Predict recommends: SNF @ 15 days    DC Planning:  Per CC note, plan is SNF at [facility]. Micaela Browne MSN, [facility] Coordinator     Cell: [phone]

## 2021-11-11 NOTE — FLOWSHEET NOTE
Patient is reclining in the bed. Patient is approachable and calm. Patient engages in conversation and shares that she has sons for support. Patient states that she attends [employer] and does not have a denomination. Patient shares about her concerns and requests a prayer. Writer offers a prayer for healing, peace, and rest. Patient expresses appreciation for the visit and appears to be coping adequately. Spiritual care will follow as needed.        11/10/21 2015   Encounter Summary   Services provided to: Patient   Referral/Consult From: Luis Eduardo9 Israel Richter [address] No   Continue Visiting   (11/10/21)   Complexity of Encounter Moderate   Length of Encounter 15 minutes   Spiritual Assessment Completed Yes   Routine   Type Initial   Assessment Approachable   Intervention Active listening; Explored feelings, thoughts, concerns; Explored coping resources; Nurtured hope; Prayer   Outcome Expressed gratitude

## 2021-11-11 NOTE — PLAN OF CARE
Problem: Falls - Risk of:  Goal: Will remain free from falls  Description: Will remain free from falls  11/11/2021 0344 by Danyelle Ruiz RN  Outcome: Ongoing  Note: Siderails up x 2  Hourly rounding  Call light in reach  Instructed to call for assist before attempting out of bed. Remains free from falls and accidental injury at this time   Floor free from obstacles  Bed is locked and in lowest position  Adequate lighting provided  Bed alarm on, Red Falling star and Stay with Me signs posted         Problem: Falls - Risk of:  Goal: Absence of physical injury  Description: Absence of physical injury  Outcome: Ongoing     Problem: Pain:  Goal: Pain level will decrease  Description: Pain level will decrease  Outcome: Ongoing  Note: Pain level assessment complete. Patient educated on pain scale and control interventions  PRN pain medication given per patient request  Patient instructed to call out with new onset of pain or unrelieved pain       Problem: Pain:  Goal: Control of acute pain  Description: Control of acute pain  Outcome: Ongoing     Problem: Pain:  Goal: Control of chronic pain  Description: Control of chronic pain  Outcome: Ongoing     Problem: Activity:  Goal: Risk for activity intolerance will decrease  Description: Risk for activity intolerance will decrease  Outcome: Ongoing     Problem: Bowel/Gastric:  Goal: Bowel function will improve  Description: Bowel function will improve  Outcome: Ongoing     Problem: Bowel/Gastric:  Goal: Diagnostic test results will improve  Description: Diagnostic test results will improve  Outcome: Ongoing     Problem: Bowel/Gastric:  Goal: Occurrences of nausea will decrease  Description: Occurrences of nausea will decrease  Outcome: Ongoing     Problem:  Bowel/Gastric:  Goal: Occurrences of vomiting will decrease  Description: Occurrences of vomiting will decrease  Outcome: Ongoing     Problem: Fluid Volume:  Goal: Maintenance of adequate hydration will improve  Description: Maintenance of adequate hydration will improve  Outcome: Ongoing     Problem: Fluid Volume:  Goal: Will show no signs or symptoms of fluid imbalance  Description: Will show no signs or symptoms of fluid imbalance  Outcome: Ongoing     Problem: Health Behavior:  Goal: Ability to state signs and symptoms to report to health care provider will improve  Description: Ability to state signs and symptoms to report to health care provider will improve  Outcome: Ongoing     Problem: Health Behavior:  Goal: Ability to manage health-related needs will improve  Description: Ability to manage health-related needs will improve  Outcome: Ongoing     Problem: Physical Regulation:  Goal: Complications related to the disease process, condition or treatment will be avoided or minimized  Description: Complications related to the disease process, condition or treatment will be avoided or minimized  Outcome: Ongoing     Problem: Physical Regulation:  Goal: Ability to maintain clinical measurements within normal limits will improve  Description: Ability to maintain clinical measurements within normal limits will improve  Outcome: Ongoing     Problem: Sensory:  Goal: Pain level will decrease  Description: Pain level will decrease  Outcome: Ongoing  Note: Pain level assessment complete.    Patient educated on pain scale and control interventions  PRN pain medication given per patient request  Patient instructed to call out with new onset of pain or unrelieved pain       Problem: Sensory:  Goal: Ability to identify factors that increase the pain will improve  Description: Ability to identify factors that increase the pain will improve  Outcome: Ongoing     Problem: Sensory:  Goal: Ability to notify healthcare provider of pain before it becomes unmanageable or unbearable will improve  Description: Ability to notify healthcare provider of pain before it becomes unmanageable or unbearable will improve  Outcome: Ongoing Problem: Coping:  Goal: Ability to identify and develop effective coping behavior will improve  Description: Ability to identify and develop effective coping behavior will improve  Outcome: Ongoing     Problem: Nutritional:  Goal: Maintenance of adequate nutrition will be supported  Description: Maintenance of adequate nutrition will be supported  Outcome: Ongoing     Problem: Urinary Elimination:  Goal: Ability to achieve and maintain adequate urine output will be supported  Description: Ability to achieve and maintain adequate urine output will be supported  Outcome: Ongoing     Problem: Nutrition  Goal: Optimal nutrition therapy  Outcome: Ongoing

## 2021-11-11 NOTE — PROGRESS NOTES
Occupational Therapy  Facility/Department: [facility]  Daily Treatment Note  NAME: Ladi Cooper  : 1950  MRN: [id number]    Date of Service: 2021    Discharge Recommendations:  Patient would benefit from continued therapy after discharge  OT Equipment Recommendations  ADL Assistive Devices: Long-handled Sponge; Emergency Alert System    Assessment   Performance deficits / Impairments: Decreased functional mobility ; Decreased safe awareness; Decreased balance; Decreased coordination; Decreased ADL status; Decreased vision/visual deficit; Decreased posture; Decreased endurance; Decreased high-level IADLs; Decreased strength; Decreased fine motor control  Assessment: Pt. agreed to complete UE exercise program while upright in bed to promote functional mobility and strength. Pt. completed multiple exercises in sets of 10 reps with min rest breaks between from fatigue. Pt. displayed good form and appeared to have good knowledge base of exercise program. Pt. displayed increased fatigue and limited endurance for functional tasks. Skilled OT warranted to promote I/safety to return to prior living arrangement with assist as needed. Prognosis: Fair  OT Education: OT Role; Transfer Training; Energy Conservation; Plan of Care; Home Exercise Program  Patient Education: Pt. educated on safety awareness at home and the need to increase mobility for increased functional endurance and strength. REQUIRES OT FOLLOW UP: Yes  Activity Tolerance  Activity Tolerance: Patient limited by fatigue; Patient Tolerated treatment well  Activity Tolerance: fair  Safety Devices  Safety Devices in place: Yes  Type of devices: Call light within reach; Bed alarm in place; Left in bed; Patient at risk for falls; Gait belt; Nurse notified         Patient Diagnosis(es): The encounter diagnosis was Chest pain, unspecified type.       has a past medical history of Anesthesia complication, Asthma, Atrial fibrillation ([facility] [address] Cerebral artery occlusion with cerebral infarction [facility]), Chronic back pain, Chronic kidney disease, Cirrhosis ([facility] [address] Constipation, COPD (chronic obstructive pulmonary disease) ([facility] [address] Dental bridge present, Diet-controlled diabetes mellitus ([facility] [address] Difficult intravenous access, Diverticulitis, Gastroparesis, GERD (gastroesophageal reflux disease), H/O mastectomy, Hepatitis, History of blood transfusion, History of Coumadin therapy, History of DVT (deep vein thrombosis), History of echocardiogram, History of stress test, Hx of blood clots, Hyperlipidemia, Hypertension, Hypothyroidism, Impaired mobility, Incontinence, Incontinence of bowel, Liver disease, Lower extremity neuropathy, Lumbar disc disease, OAB (overactive bladder), Osteoarthritis, PONV (postoperative nausea and vomiting), Prolonged emergence from general anesthesia, Rectocele, Rotator cuff tear, Snores, Type II or unspecified type diabetes mellitus without mention of complication, not stated as uncontrolled, Use of cane as ambulatory aid, Uses roller walker, and Wears glasses. has a past surgical history that includes Cholecystectomy; Upper gastrointestinal endoscopy; Colonoscopy; pr cmbnd anterpost colporraphy w/cysto w/ntrcl rpr (N/A, 2/12/2018); Appendectomy (1964); Hysterectomy (1978); Breast surgery (Bilateral, 1982); shoulder surgery (Left, 2005); eye surgery (Left, 2006); knee surgery (Left, 2013); Cholecystectomy (1976); shoulder surgery (Right, 07/08/2019); Rectal surgery (N/A, 2/17/2020); Stimulator Surgery (10/23/2020); and Nerve Surgery (N/A, 10/23/2020). Restrictions  Restrictions/Precautions  Restrictions/Precautions: General Precautions, Fall Risk  Required Braces or Orthoses?: No  Position Activity Restriction  Other position/activity restrictions:  Up with assist, telemetry, low sodium diet, alarms, pt with c/o dizziness, LUE IV  Subjective   General  Chart Reviewed: Yes  Patient assessed for rehabilitation services?: Yes  Additional Pertinent Hx: Pt. pleasant and agreeable to treatment  Family / Caregiver Present: No      Orientation  Orientation  Overall Orientation Status: Within Functional Limits  Objective    ADL  Additional Comments: ADLS were not completed this treatment however pt is requesting shower tomorrow with two people present d/t dizziness. OT will consult with nursing prior to treatment. Balance  Sitting Balance:  (Pt. positioned upright in bed to complete UE exercises)  Cognition  Overall Cognitive Status: Exceptions  Arousal/Alertness: Appropriate responses to stimuli  Following Commands: Follows multistep commands with increased time; Follows multistep commands with repitition  Attention Span: Appears intact  Memory: Appears intact  Safety Judgement: Decreased awareness of need for safety; Decreased awareness of need for assistance  Problem Solving: Decreased awareness of errors; Assistance required to identify errors made; Assistance required to correct errors made; Assistance required to implement solutions; Assistance required to generate solutions  Insights: Decreased awareness of deficits  Initiation: Requires cues for some  Sequencing: Does not require cues     Perception  Overall Perceptual Status: WFL       Type of ROM/Therapeutic Exercise  Type of ROM/Therapeutic Exercise: AROM  Comment: Pt. completed AROM with use of B UE  to promote functional mobility and strength.   Exercises  Shoulder Flexion: x10  Shoulder Extension: x10  Horizontal ABduction: x10  Horizontal ADduction: x10  Elbow Flexion: x10  Elbow Extension: x10  Wrist Flexion: x10  Wrist Extension: x10  Finger Flexion: x10  Finger Extension: x10      Plan   Plan  Times per week: 4-5x/week 1x/dy as kell  Times per day: Daily  Current Treatment Recommendations: Strengthening, Balance Training, Functional Mobility Training, Safety Education & Training, Positioning, Endurance Training, Neuromuscular Re-education, Patient/Caregiver Education & Training, Equipment Evaluation, Education, & procurement, Self-Care / ADL, Pain Management, Home Management Training, Cognitive/Perceptual Training  Plan Comment: Cont. OT with same POC     AM-PAC Score        AM-PAC Inpatient Daily Activity Raw Score: 15 (11/11/21 1401)  AM-PAC Inpatient ADL T-Scale Score : 34.69 (11/11/21 1401)  ADL Inpatient CMS 0-100% Score: 56.46 (11/11/21 1401)  ADL Inpatient CMS G-Code Modifier : CK (11/11/21 1401)    Goals  Short term goals  Time Frame for Short term goals: by discharge, pt to demo  Short term goal 1: bed mob tasks with use of rail as needed to mod assist of 1. Short term goal 2: ADL transfer and functional mob with AD as needed to mod-min assist of 1  Short term goal 3: UB ADL to set up and LB ADL to mod assist of 1 and use of AD/AE. Short term goal 4: increase BUE strength by a 1/2 grade to assist with ADL tasks and be I with BUE HEP with use of handouts. Short term goal 5: toileting tasks with us eof AD/grab bar as needed to mod assist of 1. Long term goals  Long term goal 1: Pt to stand with CG and AD kell > 7 mins as able to assist with reducing falls in function. Long term goal 2: Pt/caregivers to be I with EC/WS and fall prevention tech, pressure relief, DME/AE recommendations with use of handouts. Patient Goals   Patient goals : Be Independent again and go home! Therapy Time   Individual Concurrent Group Co-treatment   Time In 0130         Time Out 0200         Minutes 27              RN reports patient is medically stable for therapy treatment this date. Chart reviewed prior to treatment and patient is agreeable for therapy. All lines intact and patient positioned comfortably at end of treatment. All patient needs addressed prior to ending therapy session.       MALISSA Harris

## 2021-11-11 NOTE — PROGRESS NOTES
Physical Therapy  Facility/Department: [facility] MED SURG  Daily Treatment Note  NAME: Kiko Beltrán  : 1950  MRN: [id number]    Date of Service: 2021    Discharge Recommendations:  Patient would benefit from continued therapy after discharge        Assessment   Body structures, Functions, Activity limitations: Decreased functional mobility ; Decreased ADL status; Decreased strength; Decreased safe awareness; Decreased endurance; Decreased balance; Decreased posture  Assessment: Pt with deficits of bed mobility, transfers, ambulation, posture, balance, safety awareness and endurance this session,   Pt HIGH risk for falls & requires continued PT to maximize independence with functional mobility, balance, safety awareness & activity tolerance. Pt currently functioning below baseline. Would suggest additional therapy at time of discharge to maximize long term outcomes and prevent re-admission. Please refer to AM-PAC score for current level of function. Activity Tolerance  Activity Tolerance: Patient limited by fatigue; Patient limited by endurance     Patient Diagnosis(es): The encounter diagnosis was Chest pain, unspecified type.      has a past medical history of Anesthesia complication, Asthma, [facility] Cerebral artery occlusion with cerebral infarction [facility]), Chronic back pain, Chronic kidney disease, Cirrhosis ([facility] Constipation, COPD (chronic obstructive pulmonary disease) ([facility] Dental bridge present, Diet-controlled diabetes mellitus ([facility] Difficult intravenous access, Diverticulitis, Gastroparesis, GERD (gastroesophageal reflux disease), H/O mastectomy, Hepatitis, History of blood transfusion, History of Coumadin therapy, History of DVT (deep vein thrombosis), History of echocardiogram, History of stress test, Hx of blood clots, Hyperlipidemia, Hypertension, Hypothyroidism, Impaired mobility, Incontinence, Incontinence of bowel, Liver disease, Lower extremity neuropathy, Lumbar disc disease, OAB (overactive bladder), Osteoarthritis, PONV (postoperative nausea and vomiting), Prolonged emergence from general anesthesia, Rectocele, Rotator cuff tear, Snores, Type II or unspecified type diabetes mellitus without mention of complication, not stated as uncontrolled, Use of cane as ambulatory aid, Uses roller walker, and Wears glasses. has a past surgical history that includes Cholecystectomy; Upper gastrointestinal endoscopy; Colonoscopy; pr cmbnd anterpost colporraphy w/cysto w/ntrcl rpr (N/A, 2/12/2018); Appendectomy (1964); Hysterectomy (1978); Breast surgery (Bilateral, 1982); shoulder surgery (Left, 2005); eye surgery (Left, 2006); knee surgery (Left, 2013); Cholecystectomy (1976); shoulder surgery (Right, 07/08/2019); Rectal surgery (N/A, 2/17/2020); Stimulator Surgery (10/23/2020); and Nerve Surgery (N/A, 10/23/2020). Restrictions  Restrictions/Precautions  Restrictions/Precautions: General Precautions, Fall Risk  Required Braces or Orthoses?: No  Position Activity Restriction  Other position/activity restrictions: Up with assist, telemetry, low sodium diet, alarms, pt with c/o dizziness, LUE IV  Subjective   General  Chart Reviewed:  Yes  Additional Pertinent Hx: TIA x 2, COPD, CKD 3, HTN, DM2  Subjective  Subjective: Pt agreeable to PT  General Comment  Comments: pt reports some pain in lower back and right lower quad strength   Pain Screening  Patient Currently in Pain: Yes  Pain Assessment  Pain Assessment: 0-10  Pain Level: 4  Vital Signs  Patient Currently in Pain: Yes       Orientation     Cognition      Objective   Bed mobility  Scooting: Contact guard assistance  Transfers  Sit to Stand: Contact guard assistance  Stand to sit: Contact guard assistance  Stand Pivot Transfers: Contact guard assistance  Ambulation  Ambulation?: Yes  Ambulation 1  Surface: level tile  Device: Rolling Walker  Assistance: Contact guard assistance  Quality of Gait: step to pattern, safety cues to keep walker close at all times & to amb inside base of walker, pacing, & upright posture, legs weak & shaky  Gait Deviations: Decreased step length; Decreased step height  Distance: 40 ft     Balance  Posture: Fair  Sitting - Static: Good  Sitting - Dynamic: Good; -  Standing - Static: Fair; +  Standing - Dynamic: Fair  Exercises  Comments: seated LE AROM x 15 reps         Comment: pt seemed to hallucinate at times seeing things crawl on floor, assisted pt back to bed at conclusion of treatment session with bed alarm on                 G-Code     OutComes Score                                                     AM-PAC Score  AM-PAC Inpatient Mobility Raw Score : 15 (11/11/21 1628)  AM-PAC Inpatient T-Scale Score : 39.45 (11/11/21 1628)  Mobility Inpatient CMS 0-100% Score: 57.7 (11/11/21 1628)  Mobility Inpatient CMS G-Code Modifier : CK (11/11/21 1628)          Goals  Short term goals  Time Frame for Short term goals: 12 visits  Short term goal 1: Inc bed-mobility & transfers to independent to enable pt to safely get in/OOB & chair  Short term goal 2: Inc gait to amb 200ft or > indep w/ RW to enable pt to return to previous level of independence & tolerance of community ambulation  Short term goal 3:  Inc strength to [address] standing balance to good with device to facilitate pt independence for performance of ADL's & functional mobility, & reduce fall risk  Short term goal 4: Pt able to tolerate 30-40 min of activity to include 15-20 reps of ex, NMR & functional mobility including 5 minutes of standing with device to facilitate activity tolerance to [facility]  Short term goal 5: Ed pt on home ex's, safety & endurance training, fall prevention, & issue written home program    Plan    Plan  Times per week: 1-2x/D, 5-6D/week  Current Treatment Recommendations: Strengthening, Balance Training, Functional Mobility Training, Transfer Training, Endurance Training, ADL/Self-care Training, Gait Training, Home Exercise Program, Safety Education & Training, Patient/Caregiver Education & Training  Safety Devices  Type of devices: Bed alarm in place, Gait belt, Patient at risk for falls, Left in bed     Therapy Time   Individual Concurrent Group Co-treatment   Time In  1519         Time Out  1543         Minutes  24                 [address], [place]

## 2021-11-11 NOTE — PROGRESS NOTES
Reason for Consult:  Acute kidney injury. Requesting Physician:  Maikol Clements DO    Interval history:  Creatinine improved to 2.78  Feels better in general.  Bicarb level improving to 15. Tolerating some p.o. intake. C/o feeling lightheaded with standing everytime. BP has been stable on midodrine. HISTORY OF PRESENT ILLNESS:    The patient is a 66 y.o. female who has history of CKD stage 3B who normally follows with me for nephrology care, presents with nausea, vomiting, diarrhea, abdominal pain and decreased appetite. Her CT abdomin and pelvis performed without iv contrast showed diverticulosis without any acute lesions. On previous labs her serum creatinines have been between 1.4 to 1.7with eGFR of 30s ml/min. On admission her creatinine was elevated at 3.31 that has improved slightly to 3.28 today. Denies any problems with urination. Denies any recent use of NSAIDs or iv contrast.       Prior to Admission medications    Medication Sig Start Date End Date Taking? Authorizing Provider   levothyroxine (SYNTHROID) 100 MCG tablet Take 100 mcg by mouth Daily  11/1/21  Yes Historical Provider, MD   famotidine (PEPCID) 10 MG tablet Take 10 mg by mouth daily   Yes Historical Provider, MD   albuterol-ipratropium (COMBIVENT RESPIMAT)  MCG/ACT AERS inhaler Inhale 1 puff into the lungs every 6 hours   Yes Historical Provider, MD   budesonide-formoterol (SYMBICORT) 160-4.5 MCG/ACT AERO Inhale 2 puffs into the lungs every morning    Yes Historical Provider, MD   warfarin (COUMADIN) 2.5 MG tablet Take 1.25 mg by mouth daily as directed by [address] [place] [address]. Yes Historical Provider, MD   Polyethylene Glycol 3350 (MIRALAX PO) Take 17 g by mouth daily as needed    Yes Historical Provider, MD   amiodarone (CORDARONE) 200 MG tablet Take 200 mg by mouth three times a week TAKES ON MONDAYS, [address] [facility].  5/31/20  Yes Historical Provider, MD   ezetimibe (ZETIA) 10 MG tablet Take 10 mg by mouth daily  5/31/20  Yes Historical Provider, MD   torsemide (DEMADEX) 20 MG tablet Take 60 mg by mouth as needed Feet/ankle edema    Yes Historical Provider, MD   spironolactone (ALDACTONE) 100 MG tablet Take 100 mg by mouth daily    Yes Historical Provider, MD   ammonium lactate (LAC-HYDRIN) 12 % lotion Apply topically 2 times daily as needed  9/10/19  Yes Historical Provider, MD   rosuvastatin (CRESTOR) 40 MG tablet Take 1 tablet by mouth daily   Yes Historical Provider, MD   K Phos Cobb-Sod Phos Di & Mono (PHOSPHA 250 NEUTRAL PO) Take 1 tablet by mouth 2 times daily   Yes Historical Provider, MD   albuterol sulfate  (90 Base) MCG/ACT inhaler Inhale 2 puffs into the lungs every 6 hours as needed for Wheezing   Yes Historical Provider, MD   albuterol (PROVENTIL) (2.5 MG/3ML) 0.083% nebulizer solution Take 3 mLs by nebulization every 4 hours as needed for Wheezing 3/27/18  Yes Derek Brown,    metoclopramide (REGLAN) 5 MG tablet Take 10 mg by mouth 2 times daily 2tabs am and 2 tabs hs   Yes Historical Provider, MD   folic acid (FOLVITE) 1 MG tablet Take 1 tablet by mouth daily.  11/27/13  Yes Celi Faria MD   Cholecalciferol (VITAMIN D3) 1000 UNITS TABS Take 1,000 Units by mouth Five times weekly Indications: Mon-Fri    Yes Historical Provider, MD       Scheduled Meds:   metoclopramide  5 mg Oral BID     scopolamine  1 patch TransDERmal Q72H    midodrine  2.5 mg Oral TID     amiodarone  200 mg Oral Once per day on Mon Wed Fri    budesonide-formoterol  2 puff Inhalation BID    famotidine  10 mg Oral Daily    levothyroxine  100 mcg Oral Daily    rosuvastatin  40 mg Oral Daily    sodium chloride flush  5-40 mL IntraVENous 2 times per day    warfarin (COUMADIN) daily dosing (placeholder)   Other RX Placeholder    insulin lispro  0-6 Units SubCUTAneous TID     insulin lispro  0-3 Units SubCUTAneous Nightly     Continuous Infusions:   sodium bicarbonate infusion 75 mL/hr at 11/11/21 1006    sodium chloride      dextrose       PRN Meds:HYDROcodone 5 mg - acetaminophen, albuterol sulfate HFA, sodium chloride flush, sodium chloride, polyethylene glycol, acetaminophen **OR** acetaminophen, glucose, dextrose, glucagon (rDNA), dextrose, HYDROmorphone, hydrALAZINE    Allergies   Allergen Reactions    Lyrica [Pregabalin] Nausea Only and Other (See Comments)     . FACE GETS BRIGHT RED. PAIN AND SEVERE SWELLING IN BILATERAL WRISTS, ANKLES AND KNEES    Pcn [Penicillins] Other (See Comments)     Unknown reaction, states can take Keflex STATES REACTION HAPPENED AS A CHILD AND WAS TOLD IT Was  LIFE THREATENING      Nsaids Other (See Comments)     PT HAS KIDNEY DISEASE AND CAN NOT TAKE NSAIDS      Zofran [Ondansetron] Other (See Comments)     Makes nausea and vomiting worse    Codeine Nausea And Vomiting     +N/V. KEEPS PATIENT AWAKE AND WIRED FOR SEVERAL DAYS.  Cymbalta [Duloxetine Hcl] Diarrhea, Nausea And Vomiting and Other (See Comments)     Intolerance. ABDOMINAL BLOATING    Morphine Anxiety    Prolia [Denosumab] Other (See Comments)     TACHYCARDIA, FLUSHED    Seasonal Other (See Comments)     RUNNY NOSE, WATERY ITCHY EYES, SNEEZING. Physical Exam:  Vitals:    11/11/21 0727 11/11/21 0907 11/11/21 1029 11/11/21 1255   BP:  (!) 136/46 (!) 128/37 (!) 147/49   Pulse:  82 75 89   Resp:   16 16   Temp:   97.9 °F (36.6 °C)    TempSrc:   Oral    SpO2: 93%  97%    Weight:       Height:         I/O last 3 completed shifts: In: 850 [P.O.:850]  Out: 2850 [Urine:2850]    General:  Awake, alert, not in distress. Appears to be stated age. HEENT: Atraumatic, normocephalic. Anicteric sclera. Pink and moist oral mucosa. Chest: Bilateral air entry, clear to auscultation, no wheezing, rhonchi or rales. Cardiovascular: RRR, S1S2, no murmur, rub or gallop. Has lower extremity edema. Abdomen: Soft, non tender to palpation. Musculoskeletal:   No cyanosis or clubbing.   Integumentary: Pink, warm and dry. Free from rash or lesions. Skin turgor normal.  CNS: Oriented to person, place and time. Speech clear. Face symmetrical. No tremor.      Data:  CBC:   Lab Results   Component Value Date    WBC 6.8 11/11/2021    HGB 13.6 11/11/2021    HCT 40.0 11/11/2021    MCV 99.0 11/11/2021     11/11/2021     BMP:    Lab Results   Component Value Date     11/11/2021     11/10/2021     11/09/2021    K 3.8 11/11/2021    K 4.5 11/10/2021    K 3.6 (L) 11/09/2021     11/11/2021     (H) 11/10/2021     11/09/2021    CO2 15 (L) 11/11/2021    CO2 13 (L) 11/10/2021    CO2 14 (L) 11/09/2021    BUN 8 11/11/2021    BUN 10 11/10/2021    BUN 13 11/09/2021    CREATININE 2.78 (H) 11/11/2021    CREATININE 2.96 (H) 11/10/2021    CREATININE 3.28 (H) 11/09/2021    GLUCOSE 112 (H) 11/11/2021    GLUCOSE 123 (H) 11/10/2021    GLUCOSE 105 (H) 11/09/2021     CMP:   Lab Results   Component Value Date     11/11/2021    K 3.8 11/11/2021     11/11/2021    CO2 15 11/11/2021    BUN 8 11/11/2021    CREATININE 2.78 11/11/2021    GLUCOSE 112 11/11/2021    GLUCOSE 123 12/19/2011    CALCIUM 8.9 11/11/2021    PROT 7.1 11/08/2021    LABALBU 3.6 11/08/2021    LABALBU 4.2 12/19/2011    BILITOT 1.22 11/08/2021    ALKPHOS 111 11/08/2021    AST 37 11/08/2021    ALT 23 11/08/2021      Hepatic:   Lab Results   Component Value Date    AST 37 (H) 11/08/2021    AST 30 09/29/2021    AST 37 (H) 08/04/2021    ALT 23 11/08/2021    ALT 20 09/29/2021    ALT 26 08/04/2021    BILITOT 1.22 (H) 11/08/2021    BILITOT 0.72 09/29/2021    BILITOT 1.03 08/04/2021    ALKPHOS 111 (H) 11/08/2021    ALKPHOS 83 09/29/2021    ALKPHOS 82 08/04/2021     BNP:   Lab Results   Component Value Date    BNP NOT REPORTED 08/19/2014    BNP NOT REPORTED 07/16/2014    BNP 30 02/13/2014     Lipids:   Lab Results   Component Value Date    CHOL 190 01/03/2018    HDL 76 08/04/2021     INR:   Lab Results   Component Value Date    INR 3.7 11/11/2021 INR 6.2 (HH) 11/10/2021    INR 6.3 () 11/09/2021     PTH: No results found for: PTH  Phosphorus:    Lab Results   Component Value Date    PHOS 2.4 11/11/2021     Ionized Calcium: No results found for: IONCA  Magnesium:   Lab Results   Component Value Date    MG 1.9 11/11/2021     Albumin:   Lab Results   Component Value Date    LABALBU 3.6 11/08/2021    LABALBU 4.2 12/19/2011     Last 3 CK, CKMB, Troponin: @LABRCNT(CKTOTAL:3,CKMB:3,TROPONINI:3)       URINE:)No results found for: Olga Mccullough     Radiology:   Reviewed. Assessment:  Acute kidney injury, appears to be hemodynamically related. Creatinine appears to be plateuing. Underlying CKD stage 3B with baseline GFR of 30s ml/min. Metabolic acidosis  Hypokalemia. Cirrhosis. Plan:  Continue bicarb drip. Bicarb levels improving. Creatinine improving. K is stable off supplementation. We will hold potassium supplements for now  BP stable. Check orthostatic BPs now. Will check AM cortisol. May need to use compression stockings if positive orthostatics. Continue midodrine. Avoid hypotension, nephrotoxic drugs, Lovenox, Fleets enema and IV contrast exposure. Follow up chemistries ordered for AM.  Please do not hesitate to contact us for any further questions/concerns. We will continue to follow along with you. Pt seen in collaboration with Dr. Viviana Chakraborty. Electronically signed by JENSEN Santos CNP  on 11/11/2021 at 2:09 PM   [facility].   Ph: [phone]    Physician Addendum  I evaluated the patient with CNP   Agree with above assessment and plan   Cr is slowly improving  Positive Orthostatic hypotension, start compression stocking      Electronically signed by Spenser Lam MD on 11/11/21 5:03 PM

## 2021-11-11 NOTE — PROGRESS NOTES
Warfarin Dosing - Pharmacy Consult Note  Consulting Provider: Della Perez  Indication:  Atrial Fibrillation  Warfarin Dose prior to admission: 1.25mg daily  (currently on hold)  Concurrent anticoagulants/antiplatelets: none  Significant Drug Interactions: amiodarone (incr INR),crestor  - both are home medications. Recent Labs     11/09/21  0627 11/10/21  0535 11/11/21  0658   INR 6.3* 6.2* 3.7   HGB 13.2 12.5 13.6    190 182     Recent warfarin administrations                     warfarin (COUMADIN) daily dosing (placeholder) ()  Given 11/09/21 0956                   Date   INR    Dose  11/9         6.3        ?  11/10       6.2  11/11       3.7    Assessment/Plan  (Goal INR: 2 - 3)  INR 3.7 today. No dose tonight. It is quite possible that the INR will be subtherapeutic tomorrow or maybe for a few doses , as  the acute illness is resolving and several held doses, however I think this is a safer course of action to hold today. Active problem list reviewed. INR orders are placed. Chart reviewed for pertinent labs, drug/diet interactions, and past doses. Documentation of patient's clinical condition was reviewed. Pharmacy Dosing:  Pharmacy will continue to follow.

## 2021-11-11 NOTE — PROGRESS NOTES
[facility]  Office: [address], DO, Ansley Tucker, DO, Giacomo Dry, DO, Lili Javedu Blood, DO, Esha Pierce MD, Gary Bedolla MD, Xavier Graves MD, Lolita Rios MD, Samir Zambrano MD, Yolanda Nguyen MD, Celina Lopez MD, Claudell Orion, DO, Barbara Akhtar, DO, Shaka Varghese MD,  Gene Bennett, DO, Mary Ann Dillard MD, Cathie Espinal MD, Dion Howard MD, Oliver Ramirez MD, Rickey Oneal MD, Mikal Wagner MD, Billy Gautam MD, Bethany Nevarez, [facility], [facility], CNP, Maria G Lancaster, CNP, Harsh Zapata, CNS, Jerry Pate, CNP, Carlos Mcnally, CNP, Stephany Albarado, CNP, Bree Connors, CNP, Juanito Chaudhary, CNP, Melody Farley PA-C, Gladis Stoner, [facility], Liberty Navarro, CNP, Mateo Metz, CNP, Berlinda Boast, CNP, Marlon Méndez, CNP, Monica Limon, CNP, Alicia Corey, CNP, Myra Adams, [facility]    Progress Note    11/11/2021    7:55 AM    Name:   Merced Escoto  MRN:     [id number]     [place]:      [de-identified]   Room:   2007/2007-02   Day:  3  Admit Date:  11/8/2021  9:58 AM    PCP:   Julián Lebron MD  Code Status:  Full Code    Subjective:     C/C:   Chief Complaint   Patient presents with    Nausea & Vomiting     onset 2.5 weeks    Fatigue    Abdominal Pain    Dizziness     Interval History Status: improved. Patient's nausea has improved with the resumption of Reglan twice daily and application of scopolamine patch. Continues to have right lower quadrant abdominal pain. Denies chest pain, shortness of breath, nausea or vomiting, fevers or chills. Brief History:      This is a 75-year-old female that presents with a complaint of nausea, vomiting, fatigue and dizziness. Trena Nguyen has longstanding history of gastroparesis and had her Reglan discontinued a few weeks ago by her cardiologist due to \"shakes\" that were observed in the office. Tanisha Yaneli then she has had poor oral intake with continued nausea and vomiting and a 12 pound weight loss.  She subsequently self resumed Reglan without significant improvement.  Upon evaluation she is found to have acute kidney injury. Review of Systems:     Constitutional:  negative for chills, fevers, sweats  Respiratory:  negative for cough, dyspnea on exertion, shortness of breath, wheezing  Cardiovascular:  negative for chest pain, chest pressure/discomfort, lower extremity edema, palpitations  Gastrointestinal:  negative for abdominal pain, constipation, diarrhea, nausea, vomiting  Neurological:  negative for dizziness, headache    Medications: Allergies: Allergies   Allergen Reactions    Lyrica [Pregabalin] Nausea Only and Other (See Comments)     . FACE GETS BRIGHT RED. PAIN AND SEVERE SWELLING IN BILATERAL WRISTS, ANKLES AND KNEES    Pcn [Penicillins] Other (See Comments)     Unknown reaction, states can take Keflex STATES REACTION HAPPENED AS A CHILD AND WAS TOLD IT Was  LIFE THREATENING      Nsaids Other (See Comments)     PT HAS KIDNEY DISEASE AND CAN NOT TAKE NSAIDS      Zofran [Ondansetron] Other (See Comments)     Makes nausea and vomiting worse    Codeine Nausea And Vomiting     +N/V. KEEPS PATIENT AWAKE AND WIRED FOR SEVERAL DAYS.  Cymbalta [Duloxetine Hcl] Diarrhea, Nausea And Vomiting and Other (See Comments)     Intolerance. ABDOMINAL BLOATING    Morphine Anxiety    Prolia [Denosumab] Other (See Comments)     TACHYCARDIA, FLUSHED    Seasonal Other (See Comments)     RUNNY NOSE, WATERY ITCHY EYES, SNEEZING.        Current Meds:   Scheduled Meds:    metoclopramide  5 mg Oral BID WC    scopolamine  1 patch TransDERmal Q72H    midodrine  2.5 mg Oral TID WC    amiodarone  200 mg Oral Once per day on Mon Wed Fri    budesonide-formoterol  2 puff Inhalation BID    famotidine  10 mg Oral Daily    levothyroxine  100 mcg Oral Daily    rosuvastatin  40 mg Oral Daily    sodium chloride flush  5-40 mL IntraVENous 2 times per day    warfarin (COUMADIN) daily dosing (placeholder)   Other RX Placeholder    insulin lispro  0-6 Units SubCUTAneous TID     insulin lispro  0-3 Units SubCUTAneous Nightly     Continuous Infusions:    sodium bicarbonate infusion 75 mL/hr at 11/10/21 1721    sodium chloride      dextrose       PRN Meds: albuterol sulfate HFA, sodium chloride flush, sodium chloride, polyethylene glycol, acetaminophen **OR** acetaminophen, glucose, dextrose, glucagon (rDNA), dextrose, HYDROmorphone, hydrALAZINE    Data:     Past Medical History:   has a past medical history of Anesthesia complication, Asthma, Atrial fibrillation ([facility] [address] Cerebral artery occlusion with cerebral infarction ([facility] [address] Chronic back pain, Chronic kidney disease, Cirrhosis ([facility] [address] Constipation, COPD (chronic obstructive pulmonary disease) ([facility] [address] Dental bridge present, Diet-controlled diabetes mellitus ([facility] [address] Difficult intravenous access, Diverticulitis, Gastroparesis, GERD (gastroesophageal reflux disease), H/O mastectomy, Hepatitis, History of blood transfusion, History of Coumadin therapy, History of DVT (deep vein thrombosis), History of echocardiogram, History of stress test, Hx of blood clots, Hyperlipidemia, Hypertension, Hypothyroidism, Impaired mobility, Incontinence, Incontinence of bowel, Liver disease, Lower extremity neuropathy, Lumbar disc disease, OAB (overactive bladder), Osteoarthritis, PONV (postoperative nausea and vomiting), Prolonged emergence from general anesthesia, Rectocele, Rotator cuff tear, Snores, Type II or unspecified type diabetes mellitus without mention of complication, not stated as uncontrolled, Use of cane as ambulatory aid, Uses roller walker, and Wears glasses. Social History:   reports that she quit smoking about 13 years ago. Her smoking use included cigarettes. She has a 43.00 pack-year smoking history. She has never used smokeless tobacco. She reports current alcohol use of about 1.0 standard drink of alcohol per week.  She reports that she does not use drugs. Family History:   Family History   Problem Relation Age of Onset    Cancer Mother         breast    Heart Disease Father     Cancer Sister         lung    Cancer Maternal Aunt         cervical       Vitals:  BP (!) 130/39   Pulse 75   Temp 97.3 °F (36.3 °C) (Oral)   Resp 16   Ht 5' 2\" (1.575 m)   Wt 240 lb 9.6 oz (109.1 kg)   LMP 1978   SpO2 93%   BMI 44.01 kg/m²   Temp (24hrs), Av.5 °F (36.4 °C), Min:97.2 °F (36.2 °C), Max:97.9 °F (36.6 °C)    Recent Labs     11/10/21  1201 11/10/21  1746 11/10/21  2005 11/11/21  0637   POCGLU 128* 115* 124* 116*       I/O (24Hr): Intake/Output Summary (Last 24 hours) at 2021 0755  Last data filed at 2021 5143  Gross per 24 hour   Intake 850 ml   Output 2850 ml   Net -2000 ml       Labs:  Hematology:  Recent Labs     21  1017 21  0034 21  0627 11/10/21  0535 21  0658   WBC   < >  --  7.7 6.3 6.8   RBC   < >  --  4.04 3.73* 4.04   HGB   < >  --  13.2 12.5 13.6   HCT   < >  --  40.2 37.1 40.0   MCV   < >  --  99.5 99.5 99.0   MCH   < >  --  32.7 33.5 33.7*   MCHC   < >  --  32.8 33.7 34.0   RDW   < >  --  14.5* 14.5* 14.1   PLT   < >  --  182 190 182   MPV   < >  --  10.2 10.4 9.8   INR   < >  --  6.3* 6.2* 3.7   DDIMER  --  0.51  --   --   --     < > = values in this interval not displayed.      Chemistry:  Recent Labs     21  1017 21  1017 21  1130 21  1328 21  0627 11/10/21  0535 21  0658      < >  --   --  137 135 136   K 3.4*   < >  --   --  3.6* 4.5 3.8      < >  --   --  106 110* 105   CO2 13*   < >  --   --  14* 13* 15*   GLUCOSE 128*   < >  --   --  105* 123* 112*   BUN 12   < >  --   --  13 10 8   CREATININE 3.31*   < >  --   --  3.28* 2.96* 2.78*   MG 2.2  --   --   --   --  2.1 1.9   ANIONGAP 18*   < >  --   --  17 12 16   LABGLOM 14*   < >  --   --  14* 16* 17*   GFRAA 17*   < >  --   --  17* 19* 20*   CALCIUM 9.1   < >  --   --  8.5* 8.5* 8.9 PHOS  --   --   --   --   --  3.2 2.4*   TROPHS  --   --  71* 69* 59*  --   --     < > = values in this interval not displayed. Recent Labs     11/08/21  1017 11/08/21 2113 11/09/21  2033 11/10/21  0606 11/10/21  1201 11/10/21  1746 11/10/21  2005 11/11/21  0637   PROT 7.1  --   --   --   --   --   --   --    LABALBU 3.6  --   --   --   --   --   --   --    AST 37*  --   --   --   --   --   --   --    ALT 23  --   --   --   --   --   --   --    ALKPHOS 111*  --   --   --   --   --   --   --    BILITOT 1.22*  --   --   --   --   --   --   --    AMYLASE 30  --   --   --   --   --   --   --    LIPASE 41  --   --   --   --   --   --   --    POCGLU  --    < > 135* 112* 128* 115* 124* 116*    < > = values in this interval not displayed. ABG:  Lab Results   Component Value Date    FIO2 ROOM AIR 05/09/2014     Lab Results   Component Value Date/Time    SPECIAL NOT REPORTED 10/09/2020 02:57 PM     Lab Results   Component Value Date/Time    CULTURE NO SIGNIFICANT GROWTH 10/09/2020 02:57 PM       Radiology:  CT ABDOMEN PELVIS WO CONTRAST Additional Contrast? None    Result Date: 11/10/2021  1. An acute process is not identified. 2. Diverticulosis and mild focal wall or fascial thickening involving the proximal sigmoid colon. The appearance is similar to the 2019 exam and may be the sequela of prior diverticulitis. Consider endoscopy for further evaluation. 3. Cirrhosis of the liver. 4. Status post appendectomy and cholecystectomy. 5. Indeterminate renal lesions, consider follow-up renal ultrasound or renal lesion protocol CT or MRI for further evaluation. Multiple renal cysts. XR CHEST PORTABLE    Result Date: 11/8/2021  No active cardiopulmonary disease.        Physical Examination:        General appearance:  alert, cooperative and no distress  Mental Status:  oriented to person, place and time and normal affect  Lungs:  clear to auscultation bilaterally, normal effort  Heart:  regular rate and rhythm, no murmur  Abdomen:  soft, nontender, nondistended, normal bowel sounds, no masses, hepatomegaly, splenomegaly  Extremities:  no edema, redness, tenderness in the calves  Skin:  no gross lesions, rashes, induration    Assessment:        Hospital Problems           Last Modified POA    * (Principal) KERMIT (acute kidney injury) ([address] 11/8/2021 Yes    COPD without exacerbation ([address] 11/9/2021 Yes    Atrial fibrillation ([address] 11/8/2021 Yes    Stage 4 chronic kidney disease ([address] 11/8/2021 Yes    Type 2 diabetes mellitus with kidney complication, with long-term current use of insulin ([address] 11/8/2021 Yes    Hyperlipidemia 11/8/2021 Yes    Asthma 11/8/2021 Yes    Essential hypertension 11/8/2021 Yes    Hypothyroidism 11/8/2021 Yes    Chronic hepatitis C ([address] 11/8/2021 Yes    Gastroparesis 11/8/2021 Yes    Cirrhosis of liver ([address] 11/8/2021 Yes    Graves' disease 11/8/2021 Yes    Mitral valve disease 11/8/2021 Yes    History of DVT (deep vein thrombosis) 11/8/2021 Yes    Diabetic peripheral neuropathy ([address] 11/8/2021 Yes    Coumadin use 11/8/2021 Yes    Chest pain 11/8/2021 Yes    Elevated troponin 11/8/2021 Yes    Supratherapeutic INR 11/8/2021 Yes    Moderate malnutrition ([address] 11/9/2021 Yes          Plan:        1. Continue low-dose Reglan and scopolamine patch  2. Insulin scale   3. nephrology evaluation, fluids per nephrology, currently orders bicarb drip  4. GI DVT prophylaxis  5. Monitor and control blood pressure  6. Monitor INR, dose of Coumadin accordingly  7. Continue home statin therapy  8. Cardiac medications as ordered  9. Avoid nephrotoxic agents  10. Nutritional supplements  11. Discharge planning pending renal recovery.   Baseline creatinine in August was 1.6    Annie Mayfield DO  11/11/2021  7:55 AM

## 2021-11-11 NOTE — PROGRESS NOTES
Physician Progress Note      PATIENT:               Elmo Watt  CSN #:                  [id number]  :                       1950  ADMIT DATE:       2021 9:58 AM  DISCH DATE:  RESPONDING  PROVIDER #:        Joseline MINER DO          QUERY TEXT:    Patient admitted with nausea and vomiting. Noted documentation of Acute   Kidney Injury in H&P on 21. In order to support the diagnosis of KERMIT,   please include additional clinical indicators in your documentation. Or   please document if the diagnosis of KERMIT has been ruled out after further study    The medical record reflects the following:  Risk Factors: CKD3, HTN  Clinical Indicators: Creatinine upon admission of 3.31, Creatinine of 2.78 on   21  Treatment: IVF@ 100 mL/hr    Thank you,  Raina Hodgkin, RN    Defined by Kidney Disease Improving Global Outcomes (KDIGO) clinical practice   guideline for acute kidney injury:  -Increase in SCr by greater than or equal to 0.3 mg/dl within 48 hours; or  -Increase or decrease in SCr to greater than or equal to 1.5 times baseline,   which is known or presumed to have occurred within the prior 7 days; or  -Urine volume < 0.5ml/kg/h for 6 hours  Options provided:  -- Acute kidney injury evidenced by, Please document evidence as well as   baseline creatinine, if known. -- Currently resolved acute kidney injury was evidenced by, Please document   evidence as well as baseline creatinine, if known. -- Acute kidney injury ruled out after study  -- Other - I will add my own diagnosis  -- Disagree - Not applicable / Not valid  -- Disagree - Clinically unable to determine / Unknown  -- Refer to Clinical Documentation Reviewer    PROVIDER RESPONSE TEXT:    This patient has an acute kidney injury as evidenced by Rising creatinine from   baseline of 2.6 to a level of 3.3    Query created by:  Maria E Jeffers on 2021 1:09 PM      Electronically signed by:  Calderon Canela DO 2021 1:25 PM

## 2021-11-12 LAB
ABSOLUTE EOS #: 0.12 K/UL (ref 0–0.44)
ABSOLUTE IMMATURE GRANULOCYTE: 0.02 K/UL (ref 0–0.3)
ABSOLUTE LYMPH #: 0.73 K/UL (ref 1.1–3.7)
ABSOLUTE MONO #: 0.72 K/UL (ref 0.1–1.2)
ANION GAP SERPL CALCULATED.3IONS-SCNC: 12 MMOL/L (ref 9–17)
BASOPHILS # BLD: 1 % (ref 0–2)
BASOPHILS ABSOLUTE: 0.05 K/UL (ref 0–0.2)
BUN BLDV-MCNC: 8 MG/DL (ref 8–23)
BUN/CREAT BLD: 3 (ref 9–20)
CALCIUM SERPL-MCNC: 8.8 MG/DL (ref 8.6–10.4)
CHLORIDE BLD-SCNC: 106 MMOL/L (ref 98–107)
CO2: 20 MMOL/L (ref 20–31)
CORTISOL COLLECTION INFO: NORMAL
CORTISOL: 9.4 UG/DL (ref 2.7–18.4)
CREAT SERPL-MCNC: 2.74 MG/DL (ref 0.5–0.9)
DIFFERENTIAL TYPE: ABNORMAL
EOSINOPHILS RELATIVE PERCENT: 2 % (ref 1–4)
GFR AFRICAN AMERICAN: 21 ML/MIN
GFR NON-AFRICAN AMERICAN: 17 ML/MIN
GFR SERPL CREATININE-BSD FRML MDRD: ABNORMAL ML/MIN/{1.73_M2}
GFR SERPL CREATININE-BSD FRML MDRD: ABNORMAL ML/MIN/{1.73_M2}
GLUCOSE BLD-MCNC: 118 MG/DL (ref 65–105)
GLUCOSE BLD-MCNC: 119 MG/DL (ref 70–99)
GLUCOSE BLD-MCNC: 123 MG/DL (ref 65–105)
GLUCOSE BLD-MCNC: 137 MG/DL (ref 65–105)
GLUCOSE BLD-MCNC: 176 MG/DL (ref 65–105)
HCT VFR BLD CALC: 36 % (ref 36.3–47.1)
HEMOGLOBIN: 12.1 G/DL (ref 11.9–15.1)
IMMATURE GRANULOCYTES: 0 %
INR BLD: 2.5
LYMPHOCYTES # BLD: 12 % (ref 24–43)
MAGNESIUM: 1.8 MG/DL (ref 1.6–2.6)
MCH RBC QN AUTO: 32.8 PG (ref 25.2–33.5)
MCHC RBC AUTO-ENTMCNC: 33.6 G/DL (ref 28.4–34.8)
MCV RBC AUTO: 97.6 FL (ref 82.6–102.9)
MONOCYTES # BLD: 12 % (ref 3–12)
NRBC AUTOMATED: 0 PER 100 WBC
PDW BLD-RTO: 14.2 % (ref 11.8–14.4)
PHOSPHORUS: 2.2 MG/DL (ref 2.6–4.5)
PLATELET # BLD: 178 K/UL (ref 138–453)
PLATELET ESTIMATE: ABNORMAL
PMV BLD AUTO: 10.2 FL (ref 8.1–13.5)
POTASSIUM SERPL-SCNC: 3.8 MMOL/L (ref 3.7–5.3)
PROTHROMBIN TIME: 26.6 SEC (ref 11.5–14.2)
RBC # BLD: 3.69 M/UL (ref 3.95–5.11)
RBC # BLD: ABNORMAL 10*6/UL
SEG NEUTROPHILS: 73 % (ref 36–65)
SEGMENTED NEUTROPHILS ABSOLUTE COUNT: 4.26 K/UL (ref 1.5–8.1)
SODIUM BLD-SCNC: 138 MMOL/L (ref 135–144)
WBC # BLD: 5.9 K/UL (ref 3.5–11.3)
WBC # BLD: ABNORMAL 10*3/UL

## 2021-11-12 PROCEDURE — 2580000003 HC RX 258: Performed by: NURSE PRACTITIONER

## 2021-11-12 PROCEDURE — 1200000000 HC SEMI PRIVATE

## 2021-11-12 PROCEDURE — 82533 TOTAL CORTISOL: CPT

## 2021-11-12 PROCEDURE — 97116 GAIT TRAINING THERAPY: CPT

## 2021-11-12 PROCEDURE — 99232 SBSQ HOSP IP/OBS MODERATE 35: CPT | Performed by: INTERNAL MEDICINE

## 2021-11-12 PROCEDURE — 85610 PROTHROMBIN TIME: CPT

## 2021-11-12 PROCEDURE — 6370000000 HC RX 637 (ALT 250 FOR IP): Performed by: NURSE PRACTITIONER

## 2021-11-12 PROCEDURE — 2500000003 HC RX 250 WO HCPCS: Performed by: INTERNAL MEDICINE

## 2021-11-12 PROCEDURE — 80048 BASIC METABOLIC PNL TOTAL CA: CPT

## 2021-11-12 PROCEDURE — 2580000003 HC RX 258: Performed by: INTERNAL MEDICINE

## 2021-11-12 PROCEDURE — 97110 THERAPEUTIC EXERCISES: CPT

## 2021-11-12 PROCEDURE — 6360000002 HC RX W HCPCS: Performed by: NURSE PRACTITIONER

## 2021-11-12 PROCEDURE — 85025 COMPLETE CBC W/AUTO DIFF WBC: CPT

## 2021-11-12 PROCEDURE — 94640 AIRWAY INHALATION TREATMENT: CPT

## 2021-11-12 PROCEDURE — 6370000000 HC RX 637 (ALT 250 FOR IP): Performed by: INTERNAL MEDICINE

## 2021-11-12 PROCEDURE — 36415 COLL VENOUS BLD VENIPUNCTURE: CPT

## 2021-11-12 PROCEDURE — 83735 ASSAY OF MAGNESIUM: CPT

## 2021-11-12 PROCEDURE — 97535 SELF CARE MNGMENT TRAINING: CPT

## 2021-11-12 PROCEDURE — 84100 ASSAY OF PHOSPHORUS: CPT

## 2021-11-12 PROCEDURE — 82947 ASSAY GLUCOSE BLOOD QUANT: CPT

## 2021-11-12 PROCEDURE — 94761 N-INVAS EAR/PLS OXIMETRY MLT: CPT

## 2021-11-12 RX ORDER — WARFARIN SODIUM 2.5 MG/1
1.25 TABLET ORAL
Status: COMPLETED | OUTPATIENT
Start: 2021-11-12 | End: 2021-11-12

## 2021-11-12 RX ADMIN — SODIUM CHLORIDE, PRESERVATIVE FREE 10 ML: 5 INJECTION INTRAVENOUS at 08:18

## 2021-11-12 RX ADMIN — HYDROCODONE BITARTRATE AND ACETAMINOPHEN 1 TABLET: 5; 325 TABLET ORAL at 01:34

## 2021-11-12 RX ADMIN — METOCLOPRAMIDE 5 MG: 10 TABLET ORAL at 08:16

## 2021-11-12 RX ADMIN — HYDROMORPHONE HYDROCHLORIDE 0.5 MG: 1 INJECTION, SOLUTION INTRAMUSCULAR; INTRAVENOUS; SUBCUTANEOUS at 22:36

## 2021-11-12 RX ADMIN — HYDROMORPHONE HYDROCHLORIDE 0.5 MG: 1 INJECTION, SOLUTION INTRAMUSCULAR; INTRAVENOUS; SUBCUTANEOUS at 04:21

## 2021-11-12 RX ADMIN — LEVOTHYROXINE SODIUM 100 MCG: 0.1 TABLET ORAL at 06:10

## 2021-11-12 RX ADMIN — HYDROCODONE BITARTRATE AND ACETAMINOPHEN 1 TABLET: 5; 325 TABLET ORAL at 06:10

## 2021-11-12 RX ADMIN — DIBASIC SODIUM PHOSPHATE, MONOBASIC POTASSIUM PHOSPHATE AND MONOBASIC SODIUM PHOSPHATE 1 TABLET: 852; 155; 130 TABLET ORAL at 10:50

## 2021-11-12 RX ADMIN — METOCLOPRAMIDE 5 MG: 10 TABLET ORAL at 17:09

## 2021-11-12 RX ADMIN — HYDROMORPHONE HYDROCHLORIDE 0.5 MG: 1 INJECTION, SOLUTION INTRAMUSCULAR; INTRAVENOUS; SUBCUTANEOUS at 08:18

## 2021-11-12 RX ADMIN — DIBASIC SODIUM PHOSPHATE, MONOBASIC POTASSIUM PHOSPHATE AND MONOBASIC SODIUM PHOSPHATE 1 TABLET: 852; 155; 130 TABLET ORAL at 21:40

## 2021-11-12 RX ADMIN — BUDESONIDE AND FORMOTEROL FUMARATE DIHYDRATE 2 PUFF: 160; 4.5 AEROSOL RESPIRATORY (INHALATION) at 23:13

## 2021-11-12 RX ADMIN — Medication: at 19:35

## 2021-11-12 RX ADMIN — DEXTROSE MONOHYDRATE 100 ML/HR: 50 INJECTION, SOLUTION INTRAVENOUS at 01:34

## 2021-11-12 RX ADMIN — AMIODARONE HYDROCHLORIDE 200 MG: 200 TABLET ORAL at 08:17

## 2021-11-12 RX ADMIN — BUDESONIDE AND FORMOTEROL FUMARATE DIHYDRATE 2 PUFF: 160; 4.5 AEROSOL RESPIRATORY (INHALATION) at 09:23

## 2021-11-12 RX ADMIN — ROSUVASTATIN CALCIUM 40 MG: 40 TABLET, FILM COATED ORAL at 21:38

## 2021-11-12 RX ADMIN — HYDROCODONE BITARTRATE AND ACETAMINOPHEN 1 TABLET: 5; 325 TABLET ORAL at 17:17

## 2021-11-12 RX ADMIN — WARFARIN SODIUM 1.25 MG: 2.5 TABLET ORAL at 17:08

## 2021-11-12 RX ADMIN — HYDROMORPHONE HYDROCHLORIDE 0.5 MG: 1 INJECTION, SOLUTION INTRAMUSCULAR; INTRAVENOUS; SUBCUTANEOUS at 18:38

## 2021-11-12 RX ADMIN — MIDODRINE HYDROCHLORIDE 2.5 MG: 2.5 TABLET ORAL at 08:28

## 2021-11-12 RX ADMIN — FAMOTIDINE 10 MG: 20 TABLET ORAL at 21:38

## 2021-11-12 RX ADMIN — INSULIN LISPRO 1 UNITS: 100 INJECTION, SOLUTION INTRAVENOUS; SUBCUTANEOUS at 11:29

## 2021-11-12 RX ADMIN — HYDROCODONE BITARTRATE AND ACETAMINOPHEN 1 TABLET: 5; 325 TABLET ORAL at 21:40

## 2021-11-12 ASSESSMENT — PAIN DESCRIPTION - PAIN TYPE
TYPE: CHRONIC PAIN
TYPE: ACUTE PAIN

## 2021-11-12 ASSESSMENT — PAIN SCALES - GENERAL
PAINLEVEL_OUTOF10: 7
PAINLEVEL_OUTOF10: 8
PAINLEVEL_OUTOF10: 8
PAINLEVEL_OUTOF10: 5
PAINLEVEL_OUTOF10: 0
PAINLEVEL_OUTOF10: 8
PAINLEVEL_OUTOF10: 8
PAINLEVEL_OUTOF10: 7
PAINLEVEL_OUTOF10: 6
PAINLEVEL_OUTOF10: 6
PAINLEVEL_OUTOF10: 8

## 2021-11-12 ASSESSMENT — PAIN DESCRIPTION - DESCRIPTORS
DESCRIPTORS: RADIATING;ACHING;CONSTANT
DESCRIPTORS: SHARP
DESCRIPTORS: SHARP

## 2021-11-12 ASSESSMENT — PAIN DESCRIPTION - ORIENTATION
ORIENTATION: RIGHT
ORIENTATION: RIGHT;LOWER
ORIENTATION: RIGHT
ORIENTATION: LOWER

## 2021-11-12 ASSESSMENT — PAIN DESCRIPTION - ONSET: ONSET: ON-GOING

## 2021-11-12 ASSESSMENT — PAIN DESCRIPTION - FREQUENCY
FREQUENCY: INTERMITTENT
FREQUENCY: CONTINUOUS
FREQUENCY: CONTINUOUS

## 2021-11-12 ASSESSMENT — PAIN DESCRIPTION - LOCATION
LOCATION: ABDOMEN
LOCATION: ABDOMEN;FLANK
LOCATION: ABDOMEN
LOCATION: GENERALIZED;ABDOMEN
LOCATION: ABDOMEN;FLANK
LOCATION: ABDOMEN

## 2021-11-12 ASSESSMENT — PAIN DESCRIPTION - PROGRESSION: CLINICAL_PROGRESSION: NOT CHANGED

## 2021-11-12 ASSESSMENT — PAIN - FUNCTIONAL ASSESSMENT
PAIN_FUNCTIONAL_ASSESSMENT: ACTIVITIES ARE NOT PREVENTED
PAIN_FUNCTIONAL_ASSESSMENT: ACTIVITIES ARE NOT PREVENTED

## 2021-11-12 ASSESSMENT — PAIN DESCRIPTION - DIRECTION: RADIATING_TOWARDS: BACK

## 2021-11-12 NOTE — PROGRESS NOTES
Reason for Consult:  Acute kidney injury. Requesting Physician:  Johnanna Shone, DO    Interval history:  Creatinine stable at 2.78  Bicarb level improving to 20. Tolerating some p.o. intake. C/o feeling lightheaded with standing everytime. BP has been stable on midodrine. Feels better in general.    HISTORY OF PRESENT ILLNESS:    The patient is a 66 y.o. female who has history of CKD stage 3B who normally follows with me for nephrology care, presents with nausea, vomiting, diarrhea, abdominal pain and decreased appetite. Her CT abdomin and pelvis performed without iv contrast showed diverticulosis without any acute lesions. On previous labs her serum creatinines have been between 1.4 to 1.7with eGFR of 30s ml/min. On admission her creatinine was elevated at 3.31 that has improved slightly to 3.28 today. Denies any problems with urination. Denies any recent use of NSAIDs or iv contrast.       Prior to Admission medications    Medication Sig Start Date End Date Taking? Authorizing Provider   levothyroxine (SYNTHROID) 100 MCG tablet Take 100 mcg by mouth Daily  11/1/21  Yes Historical Provider, MD   famotidine (PEPCID) 10 MG tablet Take 10 mg by mouth daily   Yes Historical Provider, MD   albuterol-ipratropium (COMBIVENT RESPIMAT)  MCG/ACT AERS inhaler Inhale 1 puff into the lungs every 6 hours   Yes Historical Provider, MD   budesonide-formoterol (SYMBICORT) 160-4.5 MCG/ACT AERO Inhale 2 puffs into the lungs every morning    Yes Historical Provider, MD   warfarin (COUMADIN) 2.5 MG tablet Take 1.25 mg by mouth daily as directed by [address] [place] [address]. Yes Historical Provider, MD   Polyethylene Glycol 3350 (MIRALAX PO) Take 17 g by mouth daily as needed    Yes Historical Provider, MD   amiodarone (CORDARONE) 200 MG tablet Take 200 mg by mouth three times a week TAKES ON MONDAYS, [address] [facility].  5/31/20  Yes Historical Provider, MD   ezetimibe (ZETIA) 10 MG tablet Take 10 mg by mouth daily  5/31/20  Yes Historical Provider, MD   torsemide (DEMADEX) 20 MG tablet Take 60 mg by mouth as needed Feet/ankle edema    Yes Historical Provider, MD   spironolactone (ALDACTONE) 100 MG tablet Take 100 mg by mouth daily    Yes Historical Provider, MD   ammonium lactate (LAC-HYDRIN) 12 % lotion Apply topically 2 times daily as needed  9/10/19  Yes Historical Provider, MD   rosuvastatin (CRESTOR) 40 MG tablet Take 1 tablet by mouth daily   Yes Historical Provider, MD   K Phos [place]-Sod Phos Di & Mono (PHOSPHA 250 NEUTRAL PO) Take 1 tablet by mouth 2 times daily   Yes Historical Provider, MD   albuterol sulfate  (90 Base) MCG/ACT inhaler Inhale 2 puffs into the lungs every 6 hours as needed for Wheezing   Yes Historical Provider, MD   albuterol (PROVENTIL) (2.5 MG/3ML) 0.083% nebulizer solution Take 3 mLs by nebulization every 4 hours as needed for Wheezing 3/27/18  Yes Dania Brown,    metoclopramide (REGLAN) 5 MG tablet Take 10 mg by mouth 2 times daily 2tabs am and 2 tabs hs   Yes Historical Provider, MD   folic acid (FOLVITE) 1 MG tablet Take 1 tablet by mouth daily.  11/27/13  Yes Pauly Arroyo MD   Cholecalciferol (VITAMIN D3) 1000 UNITS TABS Take 1,000 Units by mouth Five times weekly Indications: Mon-Fri    Yes Historical Provider, MD       Scheduled Meds:   warfarin  1.25 mg Oral Once    phosphorus  250 mg Oral BID    metoclopramide  5 mg Oral BID WC    scopolamine  1 patch TransDERmal Q72H    midodrine  2.5 mg Oral TID WC    amiodarone  200 mg Oral Once per day on Mon Wed Fri    budesonide-formoterol  2 puff Inhalation BID    famotidine  10 mg Oral Daily    levothyroxine  100 mcg Oral Daily    rosuvastatin  40 mg Oral Daily    sodium chloride flush  5-40 mL IntraVENous 2 times per day    warfarin (COUMADIN) daily dosing (placeholder)   Other RX Placeholder    insulin lispro  0-6 Units SubCUTAneous TID WC    insulin lispro  0-3 Units SubCUTAneous Nightly Continuous Infusions:   sodium bicarbonate infusion 75 mL/hr at 11/11/21 1006    sodium chloride      dextrose 100 mL/hr (11/12/21 0134)     PRN Meds:HYDROcodone 5 mg - acetaminophen, albuterol sulfate HFA, sodium chloride flush, sodium chloride, polyethylene glycol, acetaminophen **OR** acetaminophen, glucose, dextrose, glucagon (rDNA), dextrose, HYDROmorphone, hydrALAZINE    Allergies   Allergen Reactions    Lyrica [Pregabalin] Nausea Only and Other (See Comments)     . FACE GETS BRIGHT RED. PAIN AND SEVERE SWELLING IN BILATERAL WRISTS, ANKLES AND KNEES    Pcn [Penicillins] Other (See Comments)     Unknown reaction, states can take Keflex STATES REACTION HAPPENED AS A CHILD AND WAS TOLD IT Was  LIFE THREATENING      Nsaids Other (See Comments)     PT HAS KIDNEY DISEASE AND CAN NOT TAKE NSAIDS      Zofran [Ondansetron] Other (See Comments)     Makes nausea and vomiting worse    Codeine Nausea And Vomiting     +N/V. KEEPS PATIENT AWAKE AND WIRED FOR SEVERAL DAYS.  Cymbalta [Duloxetine Hcl] Diarrhea, Nausea And Vomiting and Other (See Comments)     Intolerance. ABDOMINAL BLOATING    Morphine Anxiety    Prolia [Denosumab] Other (See Comments)     TACHYCARDIA, FLUSHED    Seasonal Other (See Comments)     RUNNY NOSE, WATERY ITCHY EYES, SNEEZING. Physical Exam:  Vitals:    11/12/21 0453 11/12/21 0715 11/12/21 0818 11/12/21 0923   BP:   (!) 114/58    Pulse:  85 82    Resp:  16 16 16   Temp:  98.3 °F (36.8 °C) 97.6 °F (36.4 °C)    TempSrc:  Oral Oral    SpO2:    98%   Weight: 242 lb (109.8 kg)      Height:         I/O last 3 completed shifts: In: 4048 [P.O.:450; I.V.:3598]  Out: 750 [Urine:750]    General:  Awake, alert, not in distress. Appears to be stated age. HEENT: Atraumatic, normocephalic. Anicteric sclera. Pink and moist oral mucosa. Chest: Bilateral air entry, clear to auscultation, no wheezing, rhonchi or rales. Cardiovascular: RRR, S1S2, no murmur, rub or gallop.  Has lower extremity edema. Abdomen: Soft, non tender to palpation. Musculoskeletal:   No cyanosis or clubbing. Integumentary: Pink, warm and dry. Free from rash or lesions. Skin turgor normal.  CNS: Oriented to person, place and time. Speech clear. Face symmetrical. No tremor.      Data:  CBC:   Lab Results   Component Value Date    WBC 5.9 11/12/2021    HGB 12.1 11/12/2021    HCT 36.0 (L) 11/12/2021    MCV 97.6 11/12/2021     11/12/2021     BMP:    Lab Results   Component Value Date     11/12/2021     11/11/2021     11/10/2021    K 3.8 11/12/2021    K 3.8 11/11/2021    K 4.5 11/10/2021     11/12/2021     11/11/2021     (H) 11/10/2021    CO2 20 11/12/2021    CO2 15 (L) 11/11/2021    CO2 13 (L) 11/10/2021    BUN 8 11/12/2021    BUN 8 11/11/2021    BUN 10 11/10/2021    CREATININE 2.74 (H) 11/12/2021    CREATININE 2.78 (H) 11/11/2021    CREATININE 2.96 (H) 11/10/2021    GLUCOSE 119 (H) 11/12/2021    GLUCOSE 112 (H) 11/11/2021    GLUCOSE 123 (H) 11/10/2021     CMP:   Lab Results   Component Value Date     11/12/2021    K 3.8 11/12/2021     11/12/2021    CO2 20 11/12/2021    BUN 8 11/12/2021    CREATININE 2.74 11/12/2021    GLUCOSE 119 11/12/2021    GLUCOSE 123 12/19/2011    CALCIUM 8.8 11/12/2021    PROT 7.1 11/08/2021    LABALBU 3.6 11/08/2021    LABALBU 4.2 12/19/2011    BILITOT 1.22 11/08/2021    ALKPHOS 111 11/08/2021    AST 37 11/08/2021    ALT 23 11/08/2021      Hepatic:   Lab Results   Component Value Date    AST 37 (H) 11/08/2021    AST 30 09/29/2021    AST 37 (H) 08/04/2021    ALT 23 11/08/2021    ALT 20 09/29/2021    ALT 26 08/04/2021    BILITOT 1.22 (H) 11/08/2021    BILITOT 0.72 09/29/2021    BILITOT 1.03 08/04/2021    ALKPHOS 111 (H) 11/08/2021    ALKPHOS 83 09/29/2021    ALKPHOS 82 08/04/2021     BNP:   Lab Results   Component Value Date    BNP NOT REPORTED 08/19/2014    BNP NOT REPORTED 07/16/2014    BNP 30 02/13/2014     Lipids:   Lab Results   Component Value Date    CHOL 190 01/03/2018    HDL 76 08/04/2021     INR:   Lab Results   Component Value Date    INR 2.5 11/12/2021    INR 3.7 11/11/2021    INR 6.2 (HH) 11/10/2021     PTH: No results found for: PTH  Phosphorus:    Lab Results   Component Value Date    PHOS 2.2 11/12/2021     Ionized Calcium: No results found for: IONCA  Magnesium:   Lab Results   Component Value Date    MG 1.8 11/12/2021     Albumin:   Lab Results   Component Value Date    LABALBU 3.6 11/08/2021    LABALBU 4.2 12/19/2011     Last 3 CK, CKMB, Troponin: @LABRCNT(CKTOTAL:3,CKMB:3,TROPONINI:3)       URINE:)No results found for: Phelps Terrance     Radiology:   Reviewed. Assessment:  Acute kidney injury, appears to be hemodynamically related. Creatinine is stable. Underlying CKD stage 3B with baseline GFR of 30s ml/min. Metabolic acidosis  Hypokalemia. Hypophosphatemia. Cirrhosis. Plan:  Continue bicarb drip. Bicarb level is improving. Will replete phosphorus orally. BP stable. Will check AM cortisol. Continue compression stockings. Continue oral midodrine. Avoid hypotension, nephrotoxic drugs, Lovenox, Fleets enema and IV contrast exposure. Follow up chemistries ordered for AM.    Please do not hesitate to contact us for any further questions/concerns. We will continue to follow along with you. Electronically signed by Radha Martinez MD  on 11/12/2021 at 10:24 AM   [facility].   Ph: [phone]

## 2021-11-12 NOTE — PROGRESS NOTES
Warfarin Dosing - Pharmacy Consult Note  Consulting Provider: Vincent Romero  Indication:  Atrial Fibrillation  Warfarin Dose prior to admission: 1.25mg daily  (currently on hold)  Concurrent anticoagulants/antiplatelets: none  Significant Drug Interactions: amiodarone (incr INR),crestor  - both are home medications. Recent Labs     11/10/21  0535 11/11/21  0658 11/12/21  0636   INR 6.2* 3.7 2.5   HGB 12.5 13.6 12.1    182 178     Recent warfarin administrations                     warfarin (COUMADIN) daily dosing (placeholder) ()  Given 11/09/21 0956                   Date   INR    Dose  11/9         6.3        ?  11/10       6.2       held  11/11       3.7       held  11/12       2.5    Assessment/Plan  (Goal INR: 2 - 3)  INR 2.5 today. Will restart home dose of 1.25mg tonight. As acute illness continues to resolve will restart warfarin to ensure INR won't continue to drop out of range. Active problem list reviewed. INR orders are placed. Chart reviewed for pertinent labs, drug/diet interactions, and past doses. Documentation of patient's clinical condition was reviewed. Pharmacy Dosing:  Pharmacy will continue to follow.

## 2021-11-12 NOTE — PROGRESS NOTES
Physical Therapy  Facility/Department: [facility]  Daily Treatment Note  NAME: Eduardo Lebron  : 1950  MRN: [id number]    Date of Service: 2021   RN Martha Abdi reports patient is medically stable for therapy treatment this date. Chart reviewed prior to treatment and patient is agreeable for therapy. All lines intact and patient positioned comfortably at end of treatment. All patient needs addressed prior to ending therapy session. Discharge Recommendations:  Pt currently functioning below baseline. Would suggest additional therapy at time of discharge to maximize long term outcomes and prevent re-admission. Please refer to AM-PAC score for current level of function. Patient would benefit from continued therapy after discharge      Assessment   Body structures, Functions, Activity limitations: Decreased functional mobility ; Decreased ADL status; Decreased strength; Decreased safe awareness; Decreased endurance; Decreased balance; Decreased posture  Assessment: Pt tolerated session fair. Pt w/ fair steadiness throughout mobility this date however requiring 2 person assist for safety d/t chronic dizziness and decreased safety awareness. Pt is a HIGH fall risk d/t global weakness, decreased balance, dizziness, and decreased safety awareness. Pt would benefit from continued skilled physical therapy to address these deficits in order to return to PLOF. Prognosis: Good  Decision Making: Medium Complexity  PT Education: Goals; PT Role; Plan of Care; Functional Mobility Training; Transfer Training; Pressure Relief; Gait Training; General Safety  Patient Education: Pt educated on: pursed lip breathing throughout activity, energy conservation, general safety awareness, importance of increased rest time following position changes, importance of continued mobility, and PT POC. Pt verbalized fair understanding. Pt would benefit from continued reinforcement.   REQUIRES PT FOLLOW UP: Yes  Activity Tolerance  Activity Tolerance: Patient limited by endurance; Patient limited by fatigue     Patient Diagnosis(es): The encounter diagnosis was Chest pain, unspecified type. has a past medical history of Anesthesia complication, Asthma, Atrial fibrillation ([address] Cerebral artery occlusion with cerebral infarction [facility]), Chronic back pain, Chronic kidney disease, Cirrhosis ([address] Constipation, COPD (chronic obstructive pulmonary disease) ([address] Dental bridge present, Diet-controlled diabetes mellitus ([address] Difficult intravenous access, Diverticulitis, Gastroparesis, GERD (gastroesophageal reflux disease), H/O mastectomy, Hepatitis, History of blood transfusion, History of Coumadin therapy, History of DVT (deep vein thrombosis), History of echocardiogram, History of stress test, Hx of blood clots, Hyperlipidemia, Hypertension, Hypothyroidism, Impaired mobility, Incontinence, Incontinence of bowel, Liver disease, Lower extremity neuropathy, Lumbar disc disease, OAB (overactive bladder), Osteoarthritis, PONV (postoperative nausea and vomiting), Prolonged emergence from general anesthesia, Rectocele, Rotator cuff tear, Snores, Type II or unspecified type diabetes mellitus without mention of complication, not stated as uncontrolled, Use of cane as ambulatory aid, Uses roller walker, and Wears glasses. has a past surgical history that includes Cholecystectomy; Upper gastrointestinal endoscopy; Colonoscopy; pr cmbnd anterpost colporraphy w/cysto w/ntrcl rpr (N/A, 2/12/2018); Appendectomy (1964); Hysterectomy (1978); Breast surgery (Bilateral, 1982); shoulder surgery (Left, 2005); eye surgery (Left, 2006); knee surgery (Left, 2013); Cholecystectomy (1976); shoulder surgery (Right, 07/08/2019); Rectal surgery (N/A, 2/17/2020); Stimulator Surgery (10/23/2020); and Nerve Surgery (N/A, 10/23/2020).     Restrictions  Restrictions/Precautions  Restrictions/Precautions: General Precautions, Fall Risk  Required Braces or Orthoses?: No  Position Activity Restriction  Other position/activity restrictions: Up with assist, telemetry, low sodium diet, alarms, pt with c/o dizziness, LUE IV  Subjective   General  Response To Previous Treatment: Patient with no complaints from previous session. Family / Caregiver Present: No  Subjective  Subjective: Pt reporting mild dizziness but overall feeling ok. General Comment  Comments: RN and pt agreeable to therapy. Pt supine in bed upon arrival.  Pt pleasant and cooperative throughout. Orientation  Orientation  Overall Orientation Status: Within Functional Limits     Cognition   Cognition  Overall Cognitive Status: Exceptions  Arousal/Alertness: Appropriate responses to stimuli  Following Commands: Follows multistep commands with increased time; Follows multistep commands with repitition  Attention Span: Appears intact  Memory: Appears intact  Safety Judgement: Decreased awareness of need for safety; Decreased awareness of need for assistance  Problem Solving: Decreased awareness of errors; Assistance required to identify errors made; Assistance required to correct errors made; Assistance required to implement solutions; Assistance required to generate solutions  Insights: Decreased awareness of deficits  Initiation: Requires cues for some  Sequencing: Requires cues for some     Objective   Bed mobility  Supine to Sit: Minimal assistance; Moderate assistance  Sit to Supine:  (Not assessed this date. Pt retired to bedside chair upon writer's exit.)  Scooting: Contact guard assistance  Comment: Pt w/ mild bed mobility deficits this date, requiring assist for progression of trunk throughout sup>sit transfer this date. Pt requiring mod verbal cueing for proper sequencing and use of bedrails for UE assist throughout w/ fair return demo.   Upon sitting EOB, pt reporting mild dizziness which subsided w/ ~2 min seated rest.  Pt requiring mod verbal cueing for pursed lip breathing throughout w/ fair return demo. Transfers  Sit to Stand: Minimal Assistance; Moderate Assistance; 2 Person Assistance  Stand to sit: Minimal Assistance; Moderate Assistance; 2 Person Assistance  Bed to Chair: Minimal assistance; Moderate assistance; 2 Person Assistance  Comment: Pt w/ fair steadiness throughout transfers this date, requiring 2 person assist d/t dizziness throughout session. Pt requiring increased time upon standing to gain balance and footing prior to initiating ambulation. Pt requiring min verbal cueing for proper hand placement throughout transfers w/ RW w/ fair return demo. Pt performed 4 STS transfers this session from various surfaces including EOB, toilet, and shower bench. Ambulation  Ambulation?: Yes  More Ambulation?: No  Ambulation 1  Surface: level tile  Device: Rolling Walker  Assistance: Minimal assistance; Moderate assistance; 2 Person assistance  Quality of Gait: step-to pattern, MAX safety cueing, poor endurance  Gait Deviations: Slow Cynthia; Decreased step length; Decreased step height  Distance: 15ft x 3  Comments: Pt ambulating w/ fair steadiness this date, requiring 2 assist for safety d/t dizziness and decreased safety awareness. Pt requiring MAX verbal and tactile cueing to maintain CAITLIN within RW and for progression of RW throughout w/ fair return demo. Stairs/Curb  Stairs?: No     Balance  Posture: Fair  Sitting - Static: Good  Sitting - Dynamic: Good; -  Standing - Static: Fair; +  Standing - Dynamic: Fair; -  Comments: Standing balance assessed w/ RW  Exercises  Straight Leg Raise: x 5  Heelslides: x 5  Hip Flexion: Seated x 5  Knee Long Arc Quad: x 5  Ankle Pumps: x 10  Upper Extremity: Shoulder rows x 5  Comments: Pt w/ poor tolerance to ther ex this date, requiring frequent rest breaks and reporting minimal pain w/ seated hip flexion this date. Pt requiring mod verbal cueing for pacing and proper technique w/ fair return demo.          Comment: Assisted to shower for OT treatment     AM-PAC Score  AM-PAC Inpatient Mobility Raw Score : 15 (11/12/21 1233)  AM-PAC Inpatient T-Scale Score : 39.45 (11/12/21 1233)  Mobility Inpatient CMS 0-100% Score: 57.7 (11/12/21 1233)  Mobility Inpatient CMS G-Code Modifier : CK (11/12/21 1233)       Functional Outcome Measure-   Single Leg Stance Test:  0 sec. (<5 sec.= fall risk)    Goals  Short term goals  Time Frame for Short term goals: 12 visits  Short term goal 1: Inc bed-mobility & transfers to independent to enable pt to safely get in/OOB & chair  Short term goal 2: Inc gait to amb 200ft or > indep w/ RW to enable pt to return to previous level of independence & tolerance of community ambulation  Short term goal 3:  Inc strength to [address] standing balance to good with device to facilitate pt independence for performance of ADL's & functional mobility, & reduce fall risk  Short term goal 4: Pt able to tolerate 30-40 min of activity to include 15-20 reps of ex, NMR & functional mobility including 5 minutes of standing with device to facilitate activity tolerance to [facility]  Short term goal 5: Ed pt on home ex's, safety & endurance training, fall prevention, & issue written home program    Plan    Plan  Times per week: 1-2x/D, 5-6D/week  Current Treatment Recommendations: Strengthening, Balance Training, Functional Mobility Training, Transfer Training, Endurance Training, ADL/Self-care Training, Gait Training, Home Exercise Program, Safety Education & Training, Patient/Caregiver Education & Training  Safety Devices  Type of devices: Call light within reach, Chair alarm in place, Gait belt, Nurse notified, Left in chair (OT staff present in room upon writer's exit)  Restraints  Initially in place: No     Therapy Time   Individual Concurrent Group Co-treatment   Time In 0830         Time Out 0915         Minutes 39              Co-treatment with OT warranted secondary to decreased safety and independence requiring 2 skilled therapy professionals to address individual discipline's goals.     Rose Lafleur, PT

## 2021-11-12 NOTE — PROGRESS NOTES
[facility]  Office: [address], DO, Trevon Levine, DO, Jonny Weinstein, DO, Addison Charleen Blood, DO, Jessica Flynn MD, Tahir Abdi MD, Osmin Griffin MD, Shahla Araujo MD, Lupe Peraza MD, Lizzie Lanier MD, Chelsie Hannah MD, Jerel Blanco, DO, Deana Rouse, DO, Celine Mas MD,  Cezar Medrano DO, Radha Buckley MD, Lisa Garcia MD, Luan Blankenship MD, Nelli Corado MD, Christian Camara MD, Va Marr MD, Maria Esther Samson MD, Ivon Brambila, [facility], [facility] ([place], [place]), [facility], Bela Lee, CNP, Amarilis Shannon, CNS, Valerie Perry, CNP, Jeff Grover, CNP, Karan Kay, CNP, Serafin Woodard, CNP, Trina Lima, CNP, Rell Mera PA-C, Jarret Renteria, [facility], Bronson Aviles, CNP, Otilia Wilson, CNP, Ann Ta, CNP, Brent Armas, CNP, Danilo Verde, CNP, Fiona Hoang, CNP, Sandra Jacinto [facility]    Progress Note    11/12/2021    7:51 AM    Name:   Lazaro Valderrama  MRN:     [id number]     [place]:      [de-identified]   Room:   2007/2007-02  IP Day:  4  Admit Date:  11/8/2021  9:58 AM    PCP:   Jaimee Heath MD  Code Status:  Full Code    Subjective:     C/C:   Chief Complaint   Patient presents with    Nausea & Vomiting     onset 2.5 weeks    Fatigue    Abdominal Pain    Dizziness     Interval History Status: improved. Patient is resting comfortably, no further nausea since medication changes. Abdominal pain improving. Denies chest pain, shortness of breath, fevers or chills or acute complaints. Brief History:      This is a 39-year-old female that presents with a complaint of nausea, vomiting, fatigue and dizziness. Olvin Conteh has longstanding history of gastroparesis and had her Reglan discontinued a few weeks ago by her cardiologist due to \"shakes\" that were observed in the office. Tammy Larkin then she has had poor oral intake with continued nausea and vomiting and a 12 pound weight loss.  She subsequently self resumed Reglan without significant improvement.  Upon evaluation she is found to have acute kidney injury. Review of Systems:     Constitutional:  negative for chills, fevers, sweats  Respiratory:  negative for cough, dyspnea on exertion, shortness of breath, wheezing  Cardiovascular:  negative for chest pain, chest pressure/discomfort, lower extremity edema, palpitations  Gastrointestinal:  negative for abdominal pain, constipation, diarrhea, nausea, vomiting  Neurological:  negative for dizziness, headache    Medications: Allergies: Allergies   Allergen Reactions    Lyrica [Pregabalin] Nausea Only and Other (See Comments)     . FACE GETS BRIGHT RED. PAIN AND SEVERE SWELLING IN BILATERAL WRISTS, ANKLES AND KNEES    Pcn [Penicillins] Other (See Comments)     Unknown reaction, states can take Keflex STATES REACTION HAPPENED AS A CHILD AND WAS TOLD IT Was  LIFE THREATENING      Nsaids Other (See Comments)     PT HAS KIDNEY DISEASE AND CAN NOT TAKE NSAIDS      Zofran [Ondansetron] Other (See Comments)     Makes nausea and vomiting worse    Codeine Nausea And Vomiting     +N/V. KEEPS PATIENT AWAKE AND WIRED FOR SEVERAL DAYS.  Cymbalta [Duloxetine Hcl] Diarrhea, Nausea And Vomiting and Other (See Comments)     Intolerance. ABDOMINAL BLOATING    Morphine Anxiety    Prolia [Denosumab] Other (See Comments)     TACHYCARDIA, FLUSHED    Seasonal Other (See Comments)     RUNNY NOSE, WATERY ITCHY EYES, SNEEZING.        Current Meds:   Scheduled Meds:    metoclopramide  5 mg Oral BID WC    scopolamine  1 patch TransDERmal Q72H    midodrine  2.5 mg Oral TID WC    amiodarone  200 mg Oral Once per day on Mon Wed Fri    budesonide-formoterol  2 puff Inhalation BID    famotidine  10 mg Oral Daily    levothyroxine  100 mcg Oral Daily    rosuvastatin  40 mg Oral Daily    sodium chloride flush  5-40 mL IntraVENous 2 times per day    warfarin (COUMADIN) daily dosing (placeholder)   Other RX Placeholder    insulin lispro 0-6 Units SubCUTAneous TID WC    insulin lispro  0-3 Units SubCUTAneous Nightly     Continuous Infusions:    sodium bicarbonate infusion 75 mL/hr at 11/11/21 1006    sodium chloride      dextrose 100 mL/hr (11/12/21 0134)     PRN Meds: HYDROcodone 5 mg - acetaminophen, albuterol sulfate HFA, sodium chloride flush, sodium chloride, polyethylene glycol, acetaminophen **OR** acetaminophen, glucose, dextrose, glucagon (rDNA), dextrose, HYDROmorphone, hydrALAZINE    Data:     Past Medical History:   has a past medical history of Anesthesia complication, Asthma, Atrial fibrillation ([facility] Cerebral artery occlusion with cerebral infarction ([facility] [facility] Chronic back pain, Chronic kidney disease, Cirrhosis ([facility] [facility] Constipation, COPD (chronic obstructive pulmonary disease) ([facility] [address] [facility] Dental bridge present, Diet-controlled diabetes mellitus ([facility] [address] Difficult intravenous access, Diverticulitis, Gastroparesis, GERD (gastroesophageal reflux disease), H/O mastectomy, Hepatitis, History of blood transfusion, History of Coumadin therapy, History of DVT (deep vein thrombosis), History of echocardiogram, History of stress test, Hx of blood clots, Hyperlipidemia, Hypertension, Hypothyroidism, Impaired mobility, Incontinence, Incontinence of bowel, Liver disease, Lower extremity neuropathy, Lumbar disc disease, OAB (overactive bladder), Osteoarthritis, PONV (postoperative nausea and vomiting), Prolonged emergence from general anesthesia, Rectocele, Rotator cuff tear, Snores, Type II or unspecified type diabetes mellitus without mention of complication, not stated as uncontrolled, Use of cane as ambulatory aid, Uses roller walker, and Wears glasses. Social History:   reports that she quit smoking about 13 years ago. Her smoking use included cigarettes. She has a 43.00 pack-year smoking history. She has never used smokeless tobacco. She reports current alcohol use of about 1.0 standard drink of alcohol per week.  She reports that she does not use drugs. Family History:   Family History   Problem Relation Age of Onset    Cancer Mother         breast    Heart Disease Father     Cancer Sister         lung    Cancer Maternal Aunt         cervical       Vitals:  BP (!) 133/46   Pulse 85   Temp 98.3 °F (36.8 °C) (Oral)   Resp 16   Ht 5' 2\" (1.575 m)   Wt 242 lb (109.8 kg)   LMP 1978   SpO2 98%   BMI 44.26 kg/m²   Temp (24hrs), Av.2 °F (36.8 °C), Min:97.9 °F (36.6 °C), Max:98.6 °F (37 °C)    Recent Labs     21  1110 21  1628 21  0605   POCGLU 124* 114* 122* 123*       I/O (24Hr): Intake/Output Summary (Last 24 hours) at 2021 0751  Last data filed at 2021 5440  Gross per 24 hour   Intake 4048 ml   Output 750 ml   Net 3298 ml       Labs:  Hematology:  Recent Labs     11/10/21  0535 21  0658 21  0636   WBC 6.3 6.8 5.9   RBC 3.73* 4.04 3.69*   HGB 12.5 13.6 12.1   HCT 37.1 40.0 36.0*   MCV 99.5 99.0 97.6   MCH 33.5 33.7* 32.8   MCHC 33.7 34.0 33.6   RDW 14.5* 14.1 14.2    182 178   MPV 10.4 9.8 10.2   INR 6.2* 3.7 2.5     Chemistry:  Recent Labs     11/10/21  0535 21  0658 21  0636    136 138   K 4.5 3.8 3.8   * 105 106   CO2 13* 15* 20   GLUCOSE 123* 112* 119*   BUN 10 8 8   CREATININE 2.96* 2.78* 2.74*   MG 2.1 1.9 1.8   ANIONGAP 12 16 12   LABGLOM 16* 17* 17*   GFRAA 19* 20* 21*   CALCIUM 8.5* 8.9 8.8   PHOS 3.2 2.4* 2.2*     Recent Labs     11/10/21  2005 21  0637 21  1110 21  1628 21  0605   POCGLU 124* 116* 124* 114* 122* 123*     ABG:  Lab Results   Component Value Date    FIO2 ROOM AIR 2014     Lab Results   Component Value Date/Time    SPECIAL NOT REPORTED 10/09/2020 02:57 PM     Lab Results   Component Value Date/Time    CULTURE NO SIGNIFICANT GROWTH 10/09/2020 02:57 PM       Radiology:  CT ABDOMEN PELVIS WO CONTRAST Additional Contrast? None    Result Date: 11/10/2021  1.  An acute process is not identified. 2. Diverticulosis and mild focal wall or fascial thickening involving the proximal sigmoid colon. The appearance is similar to the 2019 exam and may be the sequela of prior diverticulitis. Consider endoscopy for further evaluation. 3. Cirrhosis of the liver. 4. Status post appendectomy and cholecystectomy. 5. Indeterminate renal lesions, consider follow-up renal ultrasound or renal lesion protocol CT or MRI for further evaluation. Multiple renal cysts. XR CHEST PORTABLE    Result Date: 11/8/2021  No active cardiopulmonary disease.        Physical Examination:        General appearance:  alert, cooperative and no distress  Mental Status:  oriented to person, place and time and normal affect  Lungs:  clear to auscultation bilaterally, normal effort  Heart:  regular rate and rhythm, no murmur  Abdomen:  soft, nontender, nondistended, normal bowel sounds, no masses, hepatomegaly, splenomegaly  Extremities:  no edema, redness, tenderness in the calves  Skin:  no gross lesions, rashes, induration    Assessment:        Hospital Problems           Last Modified POA    * (Principal) KERMIT (acute kidney injury) ([address] 11/8/2021 Yes    COPD without exacerbation ([address] 11/9/2021 Yes    Atrial fibrillation ([address] 11/8/2021 Yes    Stage 4 chronic kidney disease ([address] 11/8/2021 Yes    Type 2 diabetes mellitus with kidney complication, with long-term current use of insulin ([address] 11/8/2021 Yes    Hyperlipidemia 11/8/2021 Yes    Asthma 11/8/2021 Yes    Essential hypertension 11/8/2021 Yes    Hypothyroidism 11/8/2021 Yes    Chronic hepatitis C ([address] 11/8/2021 Yes    Gastroparesis 11/8/2021 Yes    Cirrhosis of liver ([address] 11/8/2021 Yes    Graves' disease 11/8/2021 Yes    Mitral valve disease 11/8/2021 Yes    History of DVT (deep vein thrombosis) 11/8/2021 Yes    Diabetic peripheral neuropathy ([address] 11/8/2021 Yes    Coumadin use 11/8/2021 Yes    Chest pain 11/8/2021 Yes    Elevated troponin 11/8/2021 Yes    Supratherapeutic INR 11/8/2021 Yes    Moderate malnutrition ([address] 11/9/2021 Yes          Plan:        1. Continue Reglan and scopolamine patch as ordered  2. Insulin scale  3. Fluids per nephrology  4. Supplement phosphorus  5. Nutritional supplements  6. Monitor INR, adjust Coumadin accordingly  7. Thyroid medications as ordered  8. Oxygen and aerosols as needed  9. Continue present cardiac medications  10.  Discharge planning 24 hours if continues to improve    Carlos Botello DO  11/12/2021  7:51 AM

## 2021-11-12 NOTE — PLAN OF CARE
Problem: Bowel/Gastric:  Goal: Occurrences of nausea will decrease  Description: Occurrences of nausea will decrease  11/12/2021 0918 by Lin Berrios RN  Outcome: Met This Shift  11/11/2021 2315 by Richmond Sorensen RN  Outcome: Ongoing  Goal: Occurrences of vomiting will decrease  Description: Occurrences of vomiting will decrease  11/12/2021 0918 by Lin Berrios RN  Outcome: Met This Shift  11/11/2021 2315 by Richmond Sorensen RN  Outcome: Ongoing     Problem: Falls - Risk of:  Goal: Will remain free from falls  Description: Will remain free from falls  11/12/2021 0918 by Lin Berrios RN  Outcome: Ongoing  11/11/2021 2315 by Richmond Sorensen RN  Outcome: Ongoing  Note: Patient is a fall risk during this admission. Fall risk assessment was performed. Patient is absent of falls. Bed is in the lowest position. Wheels on the bed are locked. Call light and bed side table are within reach. Clutter is removed. Patient was educated to call out when needing assistance or wanting to get out of bed. Patient offered toileting assistance during rounding. Hourly rounds have been performed. Goal: Absence of physical injury  Description: Absence of physical injury  11/12/2021 0918 by Lin Berrios RN  Outcome: Ongoing  11/11/2021 2315 by Richmond Sorensen RN  Outcome: Ongoing     Problem: Pain:  Goal: Pain level will decrease  Description: Pain level will decrease  11/12/2021 0918 by Lin Berrios RN  Outcome: Ongoing  11/11/2021 2315 by Richmond Sorensen RN  Outcome: Ongoing  Note: Pt medicated with pain medication prn. Assessed all pain characteristics including level, type, location, frequency, and onset. Non-pharmacologic interventions offered to pt as well. Pt states pain is tolerable at this time.  Will continue to monitor   Goal: Control of acute pain  Description: Control of acute pain  11/12/2021 0918 by Lin Berrios RN  Outcome: Ongoing  11/11/2021 2315 by Melba Stover RN  Outcome: Ongoing  Goal: Control of chronic pain  Description: Control of chronic pain  11/12/2021 0918 by Memo Goldberg RN  Outcome: Ongoing  11/11/2021 2315 by Melba Stover RN  Outcome: Ongoing     Problem: Activity:  Goal: Risk for activity intolerance will decrease  Description: Risk for activity intolerance will decrease  11/12/2021 0918 by Memo Goldberg RN  Outcome: Ongoing  11/11/2021 2315 by Melba Stover RN  Outcome: Ongoing  Note: Monitor vital signs during and after activity. Plan activity progression with the patient. Encourage alternating periods of activity and rest.     Problem: Bowel/Gastric:  Goal: Bowel function will improve  Description: Bowel function will improve  11/12/2021 0918 by Memo Goldberg RN  Outcome: Ongoing  11/11/2021 2315 by Melba Stover RN  Outcome: Ongoing  Goal: Diagnostic test results will improve  Description: Diagnostic test results will improve  11/12/2021 0918 by Memo Goldberg RN  Outcome: Ongoing  11/11/2021 2315 by Melba Stover RN  Outcome: Ongoing     Problem: Fluid Volume:  Goal: Maintenance of adequate hydration will improve  Description: Maintenance of adequate hydration will improve  11/12/2021 0918 by Memo Goldberg RN  Outcome: Ongoing  11/11/2021 2315 by Melba Stover RN  Outcome: Ongoing  Note: IVF\"s continued as ordered. Patient able to tolerate PO fluids well. Laboratory results continue to be monitored closely.     Goal: Will show no signs or symptoms of fluid imbalance  Description: Will show no signs or symptoms of fluid imbalance  11/12/2021 0918 by Memo Goldberg RN  Outcome: Ongoing  11/11/2021 2315 by Melba Stover RN  Outcome: Ongoing     Problem: Health Behavior:  Goal: Ability to state signs and symptoms to report to health care provider will improve  Description: Ability to state signs and symptoms to report to health care provider will improve  11/12/2021 0918 by Kendell Armas RN  Outcome: Ongoing  11/11/2021 2315 by Daniel Mauro RN  Outcome: Ongoing  Goal: Ability to manage health-related needs will improve  Description: Ability to manage health-related needs will improve  11/12/2021 0918 by Kendell Armas RN  Outcome: Ongoing  11/11/2021 2315 by Daniel Mauro RN  Outcome: Ongoing     Problem: Physical Regulation:  Goal: Complications related to the disease process, condition or treatment will be avoided or minimized  Description: Complications related to the disease process, condition or treatment will be avoided or minimized  11/12/2021 0918 by Kendell Armas RN  Outcome: Ongoing  11/11/2021 2315 by Daniel Mauro RN  Outcome: Ongoing  Goal: Ability to maintain clinical measurements within normal limits will improve  Description: Ability to maintain clinical measurements within normal limits will improve  11/12/2021 0918 by Kendell Armas RN  Outcome: Ongoing  11/11/2021 2315 by Daniel Mauro RN  Outcome: Ongoing     Problem: Sensory:  Goal: Pain level will decrease  Description: Pain level will decrease  11/12/2021 0918 by Kendell Armas RN  Outcome: Ongoing  11/11/2021 2315 by Daniel Mauro RN  Outcome: Ongoing  Note: Pt medicated with pain medication prn. Assessed all pain characteristics including level, type, location, frequency, and onset. Non-pharmacologic interventions offered to pt as well. Pt states pain is tolerable at this time.  Will continue to monitor   Goal: Ability to identify factors that increase the pain will improve  Description: Ability to identify factors that increase the pain will improve  11/12/2021 0918 by Kendell Armas RN  Outcome: Ongoing  11/11/2021 2315 by Daniel Mauro RN  Outcome: Ongoing  Goal: Ability to notify healthcare provider of pain before it becomes unmanageable or unbearable will improve  Description: Ability to notify healthcare provider of pain before it becomes unmanageable or unbearable will improve  11/12/2021 0918 by Alona Medina RN  Outcome: Ongoing  11/11/2021 2315 by Elvis Walters RN  Outcome: Ongoing     Problem: Coping:  Goal: Ability to identify and develop effective coping behavior will improve  Description: Ability to identify and develop effective coping behavior will improve  11/12/2021 0918 by Alona Medina RN  Outcome: Ongoing  11/11/2021 2315 by Elvis Walters RN  Outcome: Ongoing     Problem: Nutritional:  Goal: Maintenance of adequate nutrition will be supported  Description: Maintenance of adequate nutrition will be supported  11/12/2021 0918 by Alona Medina RN  Outcome: Ongoing  11/11/2021 2315 by Elvis Walters RN  Outcome: Ongoing     Problem: Urinary Elimination:  Goal: Ability to achieve and maintain adequate urine output will be supported  Description: Ability to achieve and maintain adequate urine output will be supported  11/12/2021 0918 by Alona Medina RN  Outcome: Ongoing  11/11/2021 2315 by Elvis Walters RN  Outcome: Ongoing     Problem: Nutrition  Goal: Optimal nutrition therapy  11/12/2021 0918 by Alona Medina RN  Outcome: Ongoing  11/11/2021 2315 by Elvis Walters RN  Outcome: Ongoing     Problem: Skin Integrity:  Goal: Will show no infection signs and symptoms  Description: Will show no infection signs and symptoms  Outcome: Ongoing  Goal: Absence of new skin breakdown  Description: Absence of new skin breakdown  Outcome: Ongoing

## 2021-11-12 NOTE — PLAN OF CARE
Problem: Falls - Risk of:  Goal: Will remain free from falls  Description: Will remain free from falls  11/11/2021 2315 by Kirk Sidhu RN  Outcome: Ongoing  Note: Patient is a fall risk during this admission. Fall risk assessment was performed. Patient is absent of falls. Bed is in the lowest position. Wheels on the bed are locked. Call light and bed side table are within reach. Clutter is removed. Patient was educated to call out when needing assistance or wanting to get out of bed. Patient offered toileting assistance during rounding. Hourly rounds have been performed. 11/11/2021 1708 by Zenaida Abrams RN  Outcome: Ongoing  Note: Siderails up x 2  Hourly rounding  Call light in reach  Instructed to call for assist before attempting out of bed. Remains free from falls and accidental injury at this time   Floor free from obstacles  Bed is locked and in lowest position  Adequate lighting provided  Bed alarm on, [place] star and Stay with Me signs posted     Problem: Falls - Risk of:  Goal: Absence of physical injury  Description: Absence of physical injury  11/11/2021 2315 by Kirk Sidhu RN  Outcome: Ongoing     Problem: Pain:  Goal: Pain level will decrease  Description: Pain level will decrease  11/11/2021 2315 by Kirk Sidhu RN  Outcome: Ongoing  Note: Pt medicated with pain medication prn. Assessed all pain characteristics including level, type, location, frequency, and onset. Non-pharmacologic interventions offered to pt as well. Pt states pain is tolerable at this time. Will continue to monitor      Problem: Pain:  Goal: Control of acute pain  Description: Control of acute pain  11/11/2021 2315 by Kirk Sidhu RN  Outcome: Ongoing     Problem:  Activity:  Goal: Risk for activity intolerance will decrease  Description: Risk for activity intolerance will decrease  11/11/2021 2315 by Kirk Sidhu RN  Outcome: Ongoing  Note: Monitor vital signs during and after activity. Plan activity progression with the patient. Encourage alternating periods of activity and rest.     Problem: Bowel/Gastric:  Goal: Bowel function will improve  Description: Bowel function will improve  11/11/2021 2315 by Denver Ping, RN  Outcome: Ongoing     Problem: Bowel/Gastric:  Goal: Diagnostic test results will improve  Description: Diagnostic test results will improve  11/11/2021 2315 by Denver Ping, RN  Outcome: Ongoing     Problem: Bowel/Gastric:  Goal: Occurrences of nausea will decrease  Description: Occurrences of nausea will decrease  11/11/2021 2315 by Denver Ping, RN  Outcome: Ongoing     Problem: Bowel/Gastric:  Goal: Occurrences of vomiting will decrease  Description: Occurrences of vomiting will decrease  11/11/2021 2315 by Denver Ping, RN  Outcome: Ongoing     Problem: Fluid Volume:  Goal: Maintenance of adequate hydration will improve  Description: Maintenance of adequate hydration will improve  11/11/2021 2315 by Denver Ping, RN  Outcome: Ongoing  Note: IVF\"s continued as ordered. Patient able to tolerate PO fluids well. Laboratory results continue to be monitored closely.        Problem: Fluid Volume:  Goal: Will show no signs or symptoms of fluid imbalance  Description: Will show no signs or symptoms of fluid imbalance  11/11/2021 2315 by Denver Ping, RN  Outcome: Ongoing     Problem: Health Behavior:  Goal: Ability to state signs and symptoms to report to health care provider will improve  Description: Ability to state signs and symptoms to report to health care provider will improve  11/11/2021 2315 by Denver Ping, RN  Outcome: Ongoing     Problem: Health Behavior:  Goal: Ability to manage health-related needs will improve  Description: Ability to manage health-related needs will improve  11/11/2021 2315 by Denver Ping, RN  Outcome: Ongoing     Problem: Physical Regulation:  Goal: Complications related to the disease process, condition or treatment will be avoided or minimized  Description: Complications related to the disease process, condition or treatment will be avoided or minimized  11/11/2021 2315 by Marisela Maria RN  Outcome: Ongoing     Problem: Physical Regulation:  Goal: Ability to maintain clinical measurements within normal limits will improve  Description: Ability to maintain clinical measurements within normal limits will improve  11/11/2021 2315 by Marisela Maria RN  Outcome: Ongoing     Problem: Sensory:  Goal: Ability to identify factors that increase the pain will improve  Description: Pain level will decrease  11/11/2021 2315 by Marisela Maria RN  Outcome: Ongoing     Problem: Sensory:  Goal: Ability to notify healthcare provider of pain before it becomes unmanageable or unbearable will improve  11/11/2021 2315 by Marisela Maria RN  Outcome: Ongoing     Problem: Coping:  Goal: Ability to identify and develop effective coping behavior will improve  Description: Ability to identify and develop effective coping behavior will improve  11/11/2021 2315 by Marisela Maria RN  Outcome: Ongoing     Problem: Nutritional:  Goal: Maintenance of adequate nutrition will be supported  Description: Maintenance of adequate nutrition will be supported  11/11/2021 2315 by Marisela Maria RN  Outcome: Ongoing     Problem: Urinary Elimination:  Goal: Ability to achieve and maintain adequate urine output will be supported  Description: Ability to achieve and maintain adequate urine output will be supported  11/11/2021 2315 by Marsiela Maria RN  Outcome: Ongoing  11/11/2021 1708 by Ajay Whitaker RN  Outcome: Ongoing

## 2021-11-12 NOTE — PROGRESS NOTES
Occupational Therapy  Facility/Department: [facility]  Daily Treatment Note  NAME: Hetal Mg  : 1950  MRN: [id number]    Date of Service: 2021    Discharge Recommendations:  Patient would benefit from continued therapy after discharge       Assessment   Performance deficits / Impairments: Decreased functional mobility ; Decreased safe awareness; Decreased balance; Decreased coordination; Decreased ADL status; Decreased vision/visual deficit; Decreased posture; Decreased endurance; Decreased high-level IADLs; Decreased strength; Decreased fine motor control  Assessment: Pt. completed showering task with min assist x2 to insure safety d/t dizziness at times. Pt. completed functional transfer to shower with use of RW and max verbal cues on . Pt. required mod rest breaks throughout task to rest from fatigue. Pt. pleasant and cooperative with all cues. Skilled OT warranted at this time to promote functional ADLS to return pt to prior living arrangement with assist as needed. Prognosis: Fair  OT Education: OT Role; Transfer Training; Energy Conservation; Plan of Care; Home Exercise Program  Patient Education: Pt. educated on safety awareness and home safety to pace and plan task. Pt. appeared attentive and receptive to information. REQUIRES OT FOLLOW UP: Yes  Activity Tolerance  Activity Tolerance: Patient limited by fatigue; Patient Tolerated treatment well  Activity Tolerance: fair  Safety Devices  Safety Devices in place: Yes  Type of devices: All fall risk precautions in place; Call light within reach; Nurse notified; Left in chair; Chair alarm in place; Patient at risk for falls; Gait belt         Patient Diagnosis(es): The encounter diagnosis was Chest pain, unspecified type.       has a past medical history of Anesthesia complication, Asthma, Atrial fibrillation ([facility] [address] Cerebral artery occlusion with cerebral infarction [facility]), Chronic back pain, Chronic kidney disease, Cirrhosis ([facility] [address] Constipation, COPD (chronic obstructive pulmonary disease) ([facility] [address] Dental bridge present, Diet-controlled diabetes mellitus ([facility] [address] Difficult intravenous access, Diverticulitis, Gastroparesis, GERD (gastroesophageal reflux disease), H/O mastectomy, Hepatitis, History of blood transfusion, History of Coumadin therapy, History of DVT (deep vein thrombosis), History of echocardiogram, History of stress test, Hx of blood clots, Hyperlipidemia, Hypertension, Hypothyroidism, Impaired mobility, Incontinence, Incontinence of bowel, Liver disease, Lower extremity neuropathy, Lumbar disc disease, OAB (overactive bladder), Osteoarthritis, PONV (postoperative nausea and vomiting), Prolonged emergence from general anesthesia, Rectocele, Rotator cuff tear, Snores, Type II or unspecified type diabetes mellitus without mention of complication, not stated as uncontrolled, Use of cane as ambulatory aid, Uses roller walker, and Wears glasses. has a past surgical history that includes Cholecystectomy; Upper gastrointestinal endoscopy; Colonoscopy; pr cmbnd anterpost colporraphy w/cysto w/ntrcl rpr (N/A, 2/12/2018); Appendectomy (1964); Hysterectomy (1978); Breast surgery (Bilateral, 1982); shoulder surgery (Left, 2005); eye surgery (Left, 2006); knee surgery (Left, 2013); Cholecystectomy (1976); shoulder surgery (Right, 07/08/2019); Rectal surgery (N/A, 2/17/2020); Stimulator Surgery (10/23/2020); and Nerve Surgery (N/A, 10/23/2020). Restrictions  Restrictions/Precautions  Restrictions/Precautions: General Precautions, Fall Risk  Required Braces or Orthoses?: No  Position Activity Restriction  Other position/activity restrictions:  Up with assist, telemetry, low sodium diet, alarms, pt with c/o dizziness, LUE IV  Subjective   General  Chart Reviewed: Yes  Patient assessed for rehabilitation services?: Yes  Additional Pertinent Hx: Pt. pleasant and agreeable to treatment  Family / Caregiver Present: No Orientation  Orientation  Overall Orientation Status: Within Functional Limits  Objective    ADL  Feeding: Setup  Grooming: Setup; Supervision (to comb hair)  UE Bathing: Setup; Minimal assistance  LE Bathing: Setup; Minimal assistance  UE Dressing: Setup; Minimal assistance (to manage lines)  LE Dressing: Maximum assistance; Dependent/Total (Max/dep to dry legs from shower and sarah dhaval hose)  Toileting: Minimal assistance; Setup (min assist with use of RW with cues for safety)  Additional Comments: Pt. completed showering task with success. Balance  Sitting Balance: Supervision  Standing Balance: Minimal assistance  Standing Balance  Time: 1-2 min  Functional Mobility  Functional - Mobility Device: Rolling Walker  Assist Level: Minimal assistance (x2 at times to insure safety with c/o  dizziness and decreased safety awareness)  Toilet Transfers  Toilet - Technique: Ambulating  Equipment Used: Standard toilet  Toilet Transfer: Minimal assistance  Toilet Transfers Comments: Max verbal cues for IV pole management with lines and proper walker tech  Shower Transfers  Shower - Transfer From: [place] [employer] - Transfer Type: To and From  Shower - Transfer To: Shower seat with back  Shower Transfers: Minimal assistance  Shower Transfers Comments: Max verbal cues required during showering task to insure safety.   Bed mobility  Bridging: Stand by assistance  Supine to Sit: Contact guard assistance (with verbal cues to use side rails to assist with positioning)  Sit to Supine: Unable to assess  Scooting: Contact guard assistance  Transfers  Stand Step Transfers: Minimal assistance; 2 Person assistance (2 assist d/t c/o dizziness and to insure safety)  Sit to stand: Minimal assistance; 2 Person assistance  Stand to sit: Minimal assistance; 2 Person assistance  Transfer Comments: Max verbal cues on safety awareness and proper placement of hands on walker/squaring self up before sitting on EOB, management of IV pole/aware of surroundings           Cognition  Overall Cognitive Status: Exceptions  Arousal/Alertness: Appropriate responses to stimuli  Following Commands: Follows multistep commands with increased time; Follows multistep commands with repitition  Attention Span: Appears intact  Memory: Appears intact  Safety Judgement: Decreased awareness of need for safety; Decreased awareness of need for assistance  Problem Solving: Decreased awareness of errors; Assistance required to identify errors made; Assistance required to correct errors made; Assistance required to implement solutions; Assistance required to generate solutions  Insights: Decreased awareness of deficits  Initiation: Requires cues for some  Sequencing: Requires cues for some  Cognition Comment: Pt. requires constant cues to remember  during task at hand. Perception  Overall Perceptual Status: [facility]     Plan   Plan  Times per week: 4-5x/week 1x/dy as kell  Times per day: Daily  Current Treatment Recommendations: Strengthening, Balance Training, Functional Mobility Training, Safety Education & Training, Positioning, Endurance Training, Neuromuscular Re-education, Patient/Caregiver Education & Training, Equipment Evaluation, Education, & procurement, Self-Care / ADL, Pain Management, Home Management Training, Cognitive/Perceptual Training  Plan Comment: Cont. OT with same POC      AM-PAC Score        AM-PAC Inpatient Daily Activity Raw Score: 14 (11/12/21 1025)  AM-PAC Inpatient ADL T-Scale Score : 33.39 (11/12/21 1025)  ADL Inpatient CMS 0-100% Score: 59.67 (11/12/21 1025)  ADL Inpatient CMS G-Code Modifier : CK (11/12/21 1025)    Goals  Short term goals  Time Frame for Short term goals: by discharge, pt to demo  Short term goal 1: bed mob tasks with use of rail as needed to mod assist of 1.   Short term goal 2: ADL transfer and functional mob with AD as needed to mod-min assist of 1  Short term goal 3: UB ADL to set up and LB ADL to mod assist of 1 and use of AD/AE. Short term goal 4: increase BUE strength by a 1/2 grade to assist with ADL tasks and be I with BUE HEP with use of handouts. Short term goal 5: toileting tasks with us eof AD/grab bar as needed to mod assist of 1. Long term goals  Long term goal 1: Pt to stand with CG and AD kell > 7 mins as able to assist with reducing falls in function. Long term goal 2: Pt/caregivers to be I with EC/WS and fall prevention tech, pressure relief, DME/AE recommendations with use of handouts. Patient Goals   Patient goals : Be Independent again and go home! Therapy Time   Co-treat Concurrent Group Co-treatment   Time In 0830         Time Out 0930         Minutes 61           Co-treatment with PT warranted secondary to decreased safety and independence requiring 2 skilled therapy professionals to address individual discipline's goals. OT addressing \"preparation for ADL transfer\",\"sitting balance for increased ADL performance\",\"sitting/activity tolerance\",\"functional reaching\",\"environmental safety/scanning\",\"fall prevention\",\"functional mobility for ADL transfers\",\"ability to sequence and follow directions\",\"functional UE strength\".        MALISSA Jane

## 2021-11-13 ENCOUNTER — APPOINTMENT (OUTPATIENT)
Dept: GENERAL RADIOLOGY | Age: 71
DRG: 683 | End: 2021-11-13
Payer: MEDICARE

## 2021-11-13 VITALS
OXYGEN SATURATION: 94 % | TEMPERATURE: 97.8 F | RESPIRATION RATE: 16 BRPM | WEIGHT: 242 LBS | HEART RATE: 84 BPM | BODY MASS INDEX: 44.53 KG/M2 | HEIGHT: 62 IN | SYSTOLIC BLOOD PRESSURE: 105 MMHG | DIASTOLIC BLOOD PRESSURE: 46 MMHG

## 2021-11-13 LAB
-: ABNORMAL
ABSOLUTE EOS #: 0.19 K/UL (ref 0–0.44)
ABSOLUTE IMMATURE GRANULOCYTE: 0.02 K/UL (ref 0–0.3)
ABSOLUTE LYMPH #: 1.11 K/UL (ref 1.1–3.7)
ABSOLUTE MONO #: 0.94 K/UL (ref 0.1–1.2)
AMORPHOUS: ABNORMAL
ANION GAP SERPL CALCULATED.3IONS-SCNC: 13 MMOL/L (ref 9–17)
BACTERIA: ABNORMAL
BASOPHILS # BLD: 1 % (ref 0–2)
BASOPHILS ABSOLUTE: 0.06 K/UL (ref 0–0.2)
BILIRUBIN URINE: NEGATIVE
BUN BLDV-MCNC: 11 MG/DL (ref 8–23)
BUN/CREAT BLD: 4 (ref 9–20)
CALCIUM SERPL-MCNC: 8.6 MG/DL (ref 8.6–10.4)
CASTS UA: ABNORMAL /LPF
CHLORIDE BLD-SCNC: 101 MMOL/L (ref 98–107)
CO2: 25 MMOL/L (ref 20–31)
COLOR: YELLOW
COMMENT UA: ABNORMAL
CREAT SERPL-MCNC: 2.83 MG/DL (ref 0.5–0.9)
CRYSTALS, UA: ABNORMAL /HPF
DIFFERENTIAL TYPE: ABNORMAL
EOSINOPHILS RELATIVE PERCENT: 3 % (ref 1–4)
EPITHELIAL CELLS UA: ABNORMAL /HPF (ref 0–5)
GFR AFRICAN AMERICAN: 20 ML/MIN
GFR NON-AFRICAN AMERICAN: 16 ML/MIN
GFR SERPL CREATININE-BSD FRML MDRD: ABNORMAL ML/MIN/{1.73_M2}
GFR SERPL CREATININE-BSD FRML MDRD: ABNORMAL ML/MIN/{1.73_M2}
GLUCOSE BLD-MCNC: 108 MG/DL (ref 65–105)
GLUCOSE BLD-MCNC: 120 MG/DL (ref 70–99)
GLUCOSE BLD-MCNC: 145 MG/DL (ref 65–105)
GLUCOSE URINE: ABNORMAL
HCT VFR BLD CALC: 36.1 % (ref 36.3–47.1)
HEMOGLOBIN: 12.2 G/DL (ref 11.9–15.1)
IMMATURE GRANULOCYTES: 0 %
INR BLD: 2
KETONES, URINE: NEGATIVE
LEUKOCYTE ESTERASE, URINE: NEGATIVE
LYMPHOCYTES # BLD: 17 % (ref 24–43)
MAGNESIUM: 1.8 MG/DL (ref 1.6–2.6)
MCH RBC QN AUTO: 33 PG (ref 25.2–33.5)
MCHC RBC AUTO-ENTMCNC: 33.8 G/DL (ref 28.4–34.8)
MCV RBC AUTO: 97.6 FL (ref 82.6–102.9)
MONOCYTES # BLD: 14 % (ref 3–12)
MUCUS: ABNORMAL
NITRITE, URINE: NEGATIVE
NRBC AUTOMATED: 0 PER 100 WBC
OTHER OBSERVATIONS UA: ABNORMAL
PDW BLD-RTO: 14.2 % (ref 11.8–14.4)
PH UA: 8 (ref 5–8)
PHOSPHORUS: 3.6 MG/DL (ref 2.6–4.5)
PLATELET # BLD: 185 K/UL (ref 138–453)
PLATELET ESTIMATE: ABNORMAL
PMV BLD AUTO: 10.4 FL (ref 8.1–13.5)
POTASSIUM SERPL-SCNC: 3.8 MMOL/L (ref 3.7–5.3)
PROTEIN UA: ABNORMAL
PROTHROMBIN TIME: 22 SEC (ref 11.5–14.2)
RBC # BLD: 3.7 M/UL (ref 3.95–5.11)
RBC # BLD: ABNORMAL 10*6/UL
RBC UA: ABNORMAL /HPF (ref 0–2)
RENAL EPITHELIAL, UA: ABNORMAL /HPF
SARS-COV-2, RAPID: NOT DETECTED
SEG NEUTROPHILS: 65 % (ref 36–65)
SEGMENTED NEUTROPHILS ABSOLUTE COUNT: 4.32 K/UL (ref 1.5–8.1)
SODIUM BLD-SCNC: 139 MMOL/L (ref 135–144)
SPECIFIC GRAVITY UA: 1.01 (ref 1–1.03)
SPECIMEN DESCRIPTION: NORMAL
TRICHOMONAS: ABNORMAL
TURBIDITY: CLEAR
URINE HGB: ABNORMAL
UROBILINOGEN, URINE: NORMAL
WBC # BLD: 6.6 K/UL (ref 3.5–11.3)
WBC # BLD: ABNORMAL 10*3/UL
WBC UA: ABNORMAL /HPF (ref 0–5)
YEAST: ABNORMAL

## 2021-11-13 PROCEDURE — 97116 GAIT TRAINING THERAPY: CPT

## 2021-11-13 PROCEDURE — 6370000000 HC RX 637 (ALT 250 FOR IP): Performed by: INTERNAL MEDICINE

## 2021-11-13 PROCEDURE — 94640 AIRWAY INHALATION TREATMENT: CPT

## 2021-11-13 PROCEDURE — 94760 N-INVAS EAR/PLS OXIMETRY 1: CPT

## 2021-11-13 PROCEDURE — 80048 BASIC METABOLIC PNL TOTAL CA: CPT

## 2021-11-13 PROCEDURE — 85610 PROTHROMBIN TIME: CPT

## 2021-11-13 PROCEDURE — 81001 URINALYSIS AUTO W/SCOPE: CPT

## 2021-11-13 PROCEDURE — 36415 COLL VENOUS BLD VENIPUNCTURE: CPT

## 2021-11-13 PROCEDURE — 87635 SARS-COV-2 COVID-19 AMP PRB: CPT

## 2021-11-13 PROCEDURE — 6360000002 HC RX W HCPCS: Performed by: NURSE PRACTITIONER

## 2021-11-13 PROCEDURE — 97530 THERAPEUTIC ACTIVITIES: CPT

## 2021-11-13 PROCEDURE — 84100 ASSAY OF PHOSPHORUS: CPT

## 2021-11-13 PROCEDURE — 6370000000 HC RX 637 (ALT 250 FOR IP): Performed by: NURSE PRACTITIONER

## 2021-11-13 PROCEDURE — 99232 SBSQ HOSP IP/OBS MODERATE 35: CPT | Performed by: INTERNAL MEDICINE

## 2021-11-13 PROCEDURE — 97110 THERAPEUTIC EXERCISES: CPT

## 2021-11-13 PROCEDURE — 82947 ASSAY GLUCOSE BLOOD QUANT: CPT

## 2021-11-13 PROCEDURE — 85025 COMPLETE CBC W/AUTO DIFF WBC: CPT

## 2021-11-13 PROCEDURE — 83735 ASSAY OF MAGNESIUM: CPT

## 2021-11-13 PROCEDURE — 74018 RADEX ABDOMEN 1 VIEW: CPT

## 2021-11-13 RX ORDER — SENNA PLUS 8.6 MG/1
2 TABLET ORAL NIGHTLY PRN
DISCHARGE
Start: 2021-11-13 | End: 2021-12-13

## 2021-11-13 RX ORDER — WARFARIN SODIUM 2 MG/1
2 TABLET ORAL
Status: DISCONTINUED | OUTPATIENT
Start: 2021-11-13 | End: 2021-11-13 | Stop reason: HOSPADM

## 2021-11-13 RX ORDER — SENNA PLUS 8.6 MG/1
2 TABLET ORAL NIGHTLY PRN
Status: DISCONTINUED | OUTPATIENT
Start: 2021-11-13 | End: 2021-11-13 | Stop reason: HOSPADM

## 2021-11-13 RX ORDER — MIDODRINE HYDROCHLORIDE 2.5 MG/1
2.5 TABLET ORAL
Qty: 90 TABLET | Refills: 3 | Status: ON HOLD | DISCHARGE
Start: 2021-11-13 | End: 2022-01-26

## 2021-11-13 RX ORDER — HYDROCODONE BITARTRATE AND ACETAMINOPHEN 5; 325 MG/1; MG/1
1 TABLET ORAL EVERY 6 HOURS PRN
Qty: 12 TABLET | Refills: 0 | Status: SHIPPED | OUTPATIENT
Start: 2021-11-13 | End: 2021-11-16

## 2021-11-13 RX ORDER — METOCLOPRAMIDE 5 MG/1
5 TABLET ORAL 2 TIMES DAILY
Qty: 120 TABLET | Refills: 3 | Status: ON HOLD | DISCHARGE
Start: 2021-11-13 | End: 2022-01-26

## 2021-11-13 RX ORDER — DOCUSATE SODIUM 100 MG/1
100 CAPSULE, LIQUID FILLED ORAL DAILY
Status: DISCONTINUED | OUTPATIENT
Start: 2021-11-13 | End: 2021-11-13 | Stop reason: HOSPADM

## 2021-11-13 RX ORDER — SCOLOPAMINE TRANSDERMAL SYSTEM 1 MG/1
1 PATCH, EXTENDED RELEASE TRANSDERMAL
Status: ON HOLD | DISCHARGE
Start: 2021-11-13 | End: 2022-01-26

## 2021-11-13 RX ORDER — DOCUSATE SODIUM 100 MG/1
100 CAPSULE, LIQUID FILLED ORAL DAILY
Status: ON HOLD | DISCHARGE
Start: 2021-11-13 | End: 2022-01-26

## 2021-11-13 RX ADMIN — METOCLOPRAMIDE 5 MG: 10 TABLET ORAL at 08:06

## 2021-11-13 RX ADMIN — DIBASIC SODIUM PHOSPHATE, MONOBASIC POTASSIUM PHOSPHATE AND MONOBASIC SODIUM PHOSPHATE 1 TABLET: 852; 155; 130 TABLET ORAL at 08:05

## 2021-11-13 RX ADMIN — MIDODRINE HYDROCHLORIDE 2.5 MG: 2.5 TABLET ORAL at 11:56

## 2021-11-13 RX ADMIN — HYDROMORPHONE HYDROCHLORIDE 0.5 MG: 1 INJECTION, SOLUTION INTRAMUSCULAR; INTRAVENOUS; SUBCUTANEOUS at 04:13

## 2021-11-13 RX ADMIN — HYDROMORPHONE HYDROCHLORIDE 0.5 MG: 1 INJECTION, SOLUTION INTRAMUSCULAR; INTRAVENOUS; SUBCUTANEOUS at 08:47

## 2021-11-13 RX ADMIN — DOCUSATE SODIUM 100 MG: 100 CAPSULE ORAL at 11:56

## 2021-11-13 RX ADMIN — INSULIN LISPRO 1 UNITS: 100 INJECTION, SOLUTION INTRAVENOUS; SUBCUTANEOUS at 11:55

## 2021-11-13 RX ADMIN — HYDROCODONE BITARTRATE AND ACETAMINOPHEN 1 TABLET: 5; 325 TABLET ORAL at 12:41

## 2021-11-13 RX ADMIN — HYDROCODONE BITARTRATE AND ACETAMINOPHEN 1 TABLET: 5; 325 TABLET ORAL at 03:30

## 2021-11-13 RX ADMIN — POLYETHYLENE GLYCOL 3350 17 G: 17 POWDER, FOR SOLUTION ORAL at 10:22

## 2021-11-13 RX ADMIN — BUDESONIDE AND FORMOTEROL FUMARATE DIHYDRATE 2 PUFF: 160; 4.5 AEROSOL RESPIRATORY (INHALATION) at 09:54

## 2021-11-13 RX ADMIN — LEVOTHYROXINE SODIUM 100 MCG: 0.1 TABLET ORAL at 06:25

## 2021-11-13 ASSESSMENT — PAIN DESCRIPTION - DESCRIPTORS
DESCRIPTORS: SHARP;SHOOTING
DESCRIPTORS: SHARP;SHOOTING

## 2021-11-13 ASSESSMENT — PAIN DESCRIPTION - LOCATION
LOCATION: ABDOMEN

## 2021-11-13 ASSESSMENT — PAIN DESCRIPTION - ORIENTATION
ORIENTATION: RIGHT
ORIENTATION: RIGHT;LOWER
ORIENTATION: RIGHT;LOWER

## 2021-11-13 ASSESSMENT — PAIN DESCRIPTION - ONSET
ONSET: ON-GOING
ONSET: ON-GOING

## 2021-11-13 ASSESSMENT — PAIN DESCRIPTION - DIRECTION: RADIATING_TOWARDS: BACK

## 2021-11-13 ASSESSMENT — PAIN SCALES - GENERAL
PAINLEVEL_OUTOF10: 8
PAINLEVEL_OUTOF10: 8
PAINLEVEL_OUTOF10: 6
PAINLEVEL_OUTOF10: 5
PAINLEVEL_OUTOF10: 8
PAINLEVEL_OUTOF10: 8

## 2021-11-13 ASSESSMENT — PAIN DESCRIPTION - FREQUENCY
FREQUENCY: INTERMITTENT
FREQUENCY: INTERMITTENT

## 2021-11-13 ASSESSMENT — PAIN DESCRIPTION - PAIN TYPE: TYPE: ACUTE PAIN

## 2021-11-13 NOTE — PROGRESS NOTES
[facility]  Office: [address], DO, Forest Lemus, DO, Guillermo Mary, DO, Tabithamacy Albarado Blood, DO, Fe Jones MD, Carlos Alberto Velasco MD, Ericka Hannah MD, Gabriella Jefferson MD, Joanne Henderson MD, Souleymane Melgar MD, Ana Cabrera MD, Adry Sanchez, DO, Frank Simmonds, DO, Vishal Silva MD,  Luly Cox DO, Hedy Partida MD, Azeem Ramires MD, Sangeeta Mejia MD, Sander Cain MD, Marciano Bernheim, MD, Anitha Moreno MD, Beto Baldwin MD, Betsey Rossi, [facility], [facility], [facility], Tanya Iniguez, CNP, Mario Monsivais, CNS, Andrea Soria, CNP, Carin Garcia, CNP, Serene Hernandez, CNP, Adilia Andrade, CNP, Abimael Wynn, CNP, Felix Fox PA-C, Arlene Cochran, [facility], China Gerard, CNP, Woodward Brittle, CNP, Denise Novak, CNP, Lillian Huggins, CNP, Sunshine Almonte, CNP, Priscilla Lazo, [facility], [facility]    Progress Note    11/13/2021    8:17 AM    Name:   Hetal Mg  MRN:     [id number]     [place]:      [de-identified]   Room:   2007/2007-02   Day:  5  Admit Date:  11/8/2021  9:58 AM    PCP:   Niranjan Owens MD  Code Status:  Full Code    Subjective:     C/C:   Chief Complaint   Patient presents with    Nausea & Vomiting     onset 2.5 weeks    Fatigue    Abdominal Pain    Dizziness     Interval History Status: improved. Patient's nausea and vomiting has improved, tolerating diet. Waxing and waning right lower quadrant pain. Denies any chest pain, shortness of breath, fevers or chills or acute complaints. Brief History:      This is a 24-year-old female that presents with a complaint of nausea, vomiting, fatigue and dizziness. Nayeli Danielle has longstanding history of gastroparesis and had her Reglan discontinued a few weeks ago by her cardiologist due to \"shakes\" that were observed in the office. Zaira Velasquez then she has had poor oral intake with continued nausea and vomiting and a 12 pound weight loss.  She subsequently self resumed Reglan without significant improvement.  Upon evaluation she is found to have acute kidney injury. Review of Systems:     Constitutional:  negative for chills, fevers, sweats  Respiratory:  negative for cough, dyspnea on exertion, shortness of breath, wheezing  Cardiovascular:  negative for chest pain, chest pressure/discomfort, lower extremity edema, palpitations  Gastrointestinal:  negative for abdominal pain, constipation, diarrhea, nausea, vomiting  Neurological:  negative for dizziness, headache    Medications: Allergies: Allergies   Allergen Reactions    Lyrica [Pregabalin] Nausea Only and Other (See Comments)     . FACE GETS BRIGHT RED. PAIN AND SEVERE SWELLING IN BILATERAL WRISTS, ANKLES AND KNEES    Pcn [Penicillins] Other (See Comments)     Unknown reaction, states can take Keflex STATES REACTION HAPPENED AS A CHILD AND WAS TOLD IT Was  LIFE THREATENING      Nsaids Other (See Comments)     PT HAS KIDNEY DISEASE AND CAN NOT TAKE NSAIDS      Zofran [Ondansetron] Other (See Comments)     Makes nausea and vomiting worse    Codeine Nausea And Vomiting     +N/V. KEEPS PATIENT AWAKE AND WIRED FOR SEVERAL DAYS.  Cymbalta [Duloxetine Hcl] Diarrhea, Nausea And Vomiting and Other (See Comments)     Intolerance. ABDOMINAL BLOATING    Morphine Anxiety    Prolia [Denosumab] Other (See Comments)     TACHYCARDIA, FLUSHED    Seasonal Other (See Comments)     RUNNY NOSE, WATERY ITCHY EYES, SNEEZING.        Current Meds:   Scheduled Meds:    phosphorus  250 mg Oral BID    metoclopramide  5 mg Oral BID WC    scopolamine  1 patch TransDERmal Q72H    midodrine  2.5 mg Oral TID WC    amiodarone  200 mg Oral Once per day on Mon Wed Fri    budesonide-formoterol  2 puff Inhalation BID    famotidine  10 mg Oral Daily    levothyroxine  100 mcg Oral Daily    rosuvastatin  40 mg Oral Daily    sodium chloride flush  5-40 mL IntraVENous 2 times per day    warfarin (COUMADIN) daily dosing (placeholder)   Other RX Placeholder    insulin lispro  0-6 Units SubCUTAneous TID WC    insulin lispro  0-3 Units SubCUTAneous Nightly     Continuous Infusions:    sodium bicarbonate infusion 75 mL/hr at 11/12/21 1935    sodium chloride      dextrose 100 mL/hr (11/12/21 0134)     PRN Meds: HYDROcodone 5 mg - acetaminophen, albuterol sulfate HFA, sodium chloride flush, sodium chloride, polyethylene glycol, acetaminophen **OR** acetaminophen, glucose, dextrose, glucagon (rDNA), dextrose, HYDROmorphone, hydrALAZINE    Data:     Past Medical History:   has a past medical history of Anesthesia complication, Asthma, Atrial fibrillation ([facility] Cerebral artery occlusion with cerebral infarction ([facility] [address] Chronic back pain, Chronic kidney disease, Cirrhosis ([facility] [address] Constipation, COPD (chronic obstructive pulmonary disease) ([facility] [address] Dental bridge present, Diet-controlled diabetes mellitus ([facility] [address] Difficult intravenous access, Diverticulitis, Gastroparesis, GERD (gastroesophageal reflux disease), H/O mastectomy, Hepatitis, History of blood transfusion, History of Coumadin therapy, History of DVT (deep vein thrombosis), History of echocardiogram, History of stress test, Hx of blood clots, Hyperlipidemia, Hypertension, Hypothyroidism, Impaired mobility, Incontinence, Incontinence of bowel, Liver disease, Lower extremity neuropathy, Lumbar disc disease, OAB (overactive bladder), Osteoarthritis, PONV (postoperative nausea and vomiting), Prolonged emergence from general anesthesia, Rectocele, Rotator cuff tear, Snores, Type II or unspecified type diabetes mellitus without mention of complication, not stated as uncontrolled, Use of cane as ambulatory aid, Uses roller walker, and Wears glasses. Social History:   reports that she quit smoking about 13 years ago. Her smoking use included cigarettes. She has a 43.00 pack-year smoking history.  She has never used smokeless tobacco. She reports current alcohol use of about 1.0 standard drink of alcohol per week. She reports that she does not use drugs. Family History:   Family History   Problem Relation Age of Onset    Cancer Mother         breast    Heart Disease Father     Cancer Sister         lung    Cancer Maternal Aunt         cervical       Vitals:  BP (!) 134/46   Pulse 83   Temp 98.1 °F (36.7 °C) (Oral)   Resp 16   Ht 5' 2\" (1.575 m)   Wt 242 lb (109.8 kg)   LMP 1978   SpO2 90%   BMI 44.26 kg/m²   Temp (24hrs), Av °F (36.7 °C), Min:97.6 °F (36.4 °C), Max:98.1 °F (36.7 °C)    Recent Labs     21  1103 21  1654 21  2135 21  0541   POCGLU 176* 118* 137* 108*       I/O (24Hr):     Intake/Output Summary (Last 24 hours) at 2021 0817  Last data filed at 2021 0647  Gross per 24 hour   Intake 2187 ml   Output --   Net 2187 ml       Labs:  Hematology:  Recent Labs     21  0658 21  0636 21  0630   WBC 6.8 5.9 6.6   RBC 4.04 3.69* 3.70*   HGB 13.6 12.1 12.2   HCT 40.0 36.0* 36.1*   MCV 99.0 97.6 97.6   MCH 33.7* 32.8 33.0   MCHC 34.0 33.6 33.8   RDW 14.1 14.2 14.2    178 185   MPV 9.8 10.2 10.4   INR 3.7 2.5 2.0     Chemistry:  Recent Labs     21  0658 21  0636 21  0630    138 139   K 3.8 3.8 3.8    106 101   CO2 15* 20 25   GLUCOSE 112* 119* 120*   BUN 8 8 11   CREATININE 2.78* 2.74* 2.83*   MG 1.9 1.8 1.8   ANIONGAP 16 12 13   LABGLOM 17* 17* 16*   GFRAA 20* 21* 20*   CALCIUM 8.9 8.8 8.6   PHOS 2.4* 2.2* 3.6     Recent Labs     21  2001 21  0605 21  1103 21  1654 21  2135 21  0541   POCGLU 122* 123* 176* 118* 137* 108*     ABG:  Lab Results   Component Value Date    FIO2 ROOM AIR 2014     Lab Results   Component Value Date/Time    SPECIAL NOT REPORTED 10/09/2020 02:57 PM     Lab Results   Component Value Date/Time    CULTURE NO SIGNIFICANT GROWTH 10/09/2020 02:57 PM       Radiology:  CT ABDOMEN PELVIS WO CONTRAST Additional Contrast? 11/8/2021 Yes    Elevated troponin 11/8/2021 Yes    Supratherapeutic INR 11/8/2021 Yes    Moderate malnutrition ([address] 11/9/2021 Yes          Plan:        1. KUB today  2. Fluids per nephrology, acidosis resolved can likely discontinue bicarb drip  3. Coumadin as ordered  4. GI DVT prophylaxis  5. Continue home maintenance medications  6. PT and OT  7. Stool softeners  8.  Discharge plan to skilled facility    Vandana Danielle DO  11/13/2021  8:17 AM

## 2021-11-13 NOTE — PLAN OF CARE
Problem: Falls - Risk of:  Goal: Will remain free from falls  Description: Will remain free from falls  11/13/2021 1231 by Cynthia Olivera RN  Outcome: Ongoing  Note: Siderails up x 2  Hourly rounding  Call light in reach  Instructed to call for assist before attempting out of bed. Remains free from falls and accidental injury at this time   Floor free from obstacles  Bed is locked and in lowest position  Adequate lighting provided  Bed alarm on, Red [place] star and Stay with Me signs posted    Goal: Absence of physical injury  Description: Absence of physical injury  11/13/2021 1231 by Cynthia Olivera RN  Outcome: Ongoing     Problem: Pain:  Goal: Pain level will decrease  Description: Pain level will decrease  11/13/2021 1231 by Cynthia Olivera RN  Outcome: Ongoing  Note: Pain level assessment complete. Patient educated on pain scale and control interventions  PRN pain medication given per patient request-[place] & Dilaudid  Patient instructed to call out with new onset of pain or unrelieved pain   Goal: Control of acute pain  Description: Control of acute pain  11/13/2021 1231 by Cynthia Olivera RN  Outcome: Ongoing  Goal: Control of chronic pain  Description: Control of chronic pain  11/13/2021 1231 by Cynthia Olivera RN  Outcome: Ongoing     Problem: Activity:  Goal: Risk for activity intolerance will decrease  Description: Risk for activity intolerance will decrease  11/13/2021 1231 by Cynthia Olivera RN  Outcome: Ongoing     Problem:  Bowel/Gastric:  Goal: Bowel function will improve  Description: Bowel function will improve  11/13/2021 1231 by Cynthia Olivera RN  Outcome: Ongoing  Goal: Diagnostic test results will improve  Description: Diagnostic test results will improve  11/13/2021 1231 by Cynthia Olivera RN  Outcome: Ongoing  Goal: Occurrences of nausea will decrease  Description: Occurrences of nausea will decrease  11/13/2021 1231 by Cynthia Olivera RN  Outcome: Ongoing  Goal: Occurrences of vomiting will decrease  Description: Occurrences of vomiting will decrease  11/13/2021 1231 by Avis Arreguin RN  Outcome: Ongoing     Problem: Fluid Volume:  Goal: Maintenance of adequate hydration will improve  Description: Maintenance of adequate hydration will improve  11/13/2021 1231 by Avis Arreguin RN  Outcome: Ongoing  Goal: Will show no signs or symptoms of fluid imbalance  Description: Will show no signs or symptoms of fluid imbalance  11/13/2021 1231 by Avis Arreguin RN  Outcome: Ongoing     Problem: Health Behavior:  Goal: Ability to state signs and symptoms to report to health care provider will improve  Description: Ability to state signs and symptoms to report to health care provider will improve  11/13/2021 1231 by Avis Arreguin RN  Outcome: Ongoing  Goal: Ability to manage health-related needs will improve  Description: Ability to manage health-related needs will improve  11/13/2021 1231 by Avis Arreguin RN  Outcome: Ongoing     Problem: Physical Regulation:  Goal: Complications related to the disease process, condition or treatment will be avoided or minimized  Description: Complications related to the disease process, condition or treatment will be avoided or minimized  11/13/2021 1231 by Avis Arreguin RN  Outcome: Ongoing  Goal: Ability to maintain clinical measurements within normal limits will improve  Description: Ability to maintain clinical measurements within normal limits will improve  11/13/2021 1231 by Avis Arreguin RN  Outcome: Ongoing     Problem: Sensory:  Goal: Pain level will decrease  Description: Pain level will decrease  11/13/2021 1231 by Avis Arreguin RN  Outcome: Ongoing  Note: Pain level assessment complete.    Patient educated on pain scale and control interventions  PRN pain medication given per patient request-[place] & Dilaudid  Patient instructed to call out with new onset of pain or unrelieved

## 2021-11-13 NOTE — DISCHARGE SUMMARY
[facility]  Office: [address], DO, Forest Lemus, DO, Guillermo Hernandez, DO, Alexander Villafana, DO, Fe Jones MD, Carlos Alberto Velasco MD, Ericka Hannah MD, Gabriella Jefferson MD, Joanne Henderson MD, Souleymane Melgar MD, Ana Cabrera MD, Adry Sanchez DO, Frank Simmonds, DO, Vishal Silva MD,  Luly Cox DO, Hedy Partida MD, Azeem Ramires MD, Sangeeta Mejia MD, Sander Cain MD, Marciano Bernheim, MD, Anitha Moreno MD, Beto Baldwin MD, Betsey Rossi [facility], [facility], [facility], Tanya Iniguez, CNP, Mario Monsivais, CNS, Andrea Soria, CNP, Carin Garcia, CNP, Serene Hernandez, CNP, Adilia Andrade, CNP, Abimael Wynn, CNP, Felix Fox PA-C, Arlene Cochran, [facility], China Gerard, CNP, Woodward Brittle, CNP, Denise Novak, CNP, Lillian Huggins, CNP, Sunshine Almonte, CNP, Priscilla Lazo, [facility], [facility]    Discharge Summary     Patient ID: Hetal Mg  :  1950   MRN: [id number]     ACCOUNT:  [de-identified]   Patient's PCP: Niranjan Owens MD  Admit Date: 2021   Discharge Date: 2021     Length of Stay: 5  Code Status:  Full Code  Admitting Physician: Fred Jeff DO  Discharge Physician: Fred Jeff DO     Active Discharge Diagnoses:     Hospital Problem Lists:  Principal Problem:    KERMIT (acute kidney injury) [facility])  Active Problems:    COPD without exacerbation ([facility] [address]    Atrial fibrillation ([facility] [address]    Stage [facility]    Type 2 diabetes mellitus with kidney complication, with long-term current use of insulin ([facility] [address]    Hyperlipidemia    Asthma    Essential hypertension    Hypothyroidism    Chronic hepatitis C ([facility] [address]    Gastroparesis    Cirrhosis of liver ([address]    Graves' disease    Mitral valve disease    History of DVT (deep vein thrombosis)    Diabetic peripheral neuropathy (HCC)    Coumadin use    Chest pain    Elevated troponin    Supratherapeutic INR    Moderate malnutrition [facility])  Resolved Problems:    * No resolved hospital problems. *      Admission Condition:  fair     Discharged Condition: stable    Hospital Stay:     Hospital Course:  Delaney Samuel is a 66 y.o. female who was admitted for the management of  KERMIT (acute kidney injury) ([facility] [address] , presented to ER with Nausea & Vomiting (onset 2.5 weeks), Fatigue, Abdominal Pain, and Dizziness    This is a 75-year-old female that presents with a complaint of nausea, vomiting, fatigue and dizziness that been present for several weeks. She recently had her reglan discontinued due to a side effect of tremor. With her underlying gastroparesis she started developing nausea and vomiting shortly after and presented with dizziness and fatigue along with her symptoms. She is found to have acute kidney injury with elevated creatinine up to 3.3 with a baseline of approximately 2.6. She was eval by nephrology and treated with IV fluids and had improvement in her symptoms. Her Reglan was resumed at a lower dose and scopolamine patch was applied for her symptoms of nausea and vomiting with marked improvement. She is tolerating dietary advancement. She had episodes of constipation during her hospital stay and had intermittent right lower quadrant pain associated with this. Colace and Senokot were added as well as continued her home dose of as needed MiraLAX. On the date of the 13th her kidney function was nearing baseline and she is otherwise tolerating dietary advancement was able be discharged to skilled facility for continued rehab.       Significant therapeutic interventions: As above    Significant Diagnostic Studies:   Labs / Micro:  CBC:   Lab Results   Component Value Date    WBC 6.6 11/13/2021    RBC 3.70 11/13/2021    RBC 3.98 12/19/2011    HGB 12.2 11/13/2021    HCT 36.1 11/13/2021    MCV 97.6 11/13/2021    MCH 33.0 11/13/2021    MCHC 33.8 11/13/2021    RDW 14.2 11/13/2021     11/13/2021     12/19/2011     BMP:    Lab Results   Component Value Date    GLUCOSE 120 11/13/2021    GLUCOSE 123 12/19/2011     11/13/2021    K 3.8 11/13/2021     11/13/2021    CO2 25 11/13/2021    ANIONGAP 13 11/13/2021    BUN 11 11/13/2021    CREATININE 2.83 11/13/2021    BUNCRER 4 11/13/2021    CALCIUM 8.6 11/13/2021    LABGLOM 16 11/13/2021    GFRAA 20 11/13/2021    GFR      11/13/2021    GFR NOT REPORTED 11/13/2021        Radiology:  CT ABDOMEN PELVIS WO CONTRAST Additional Contrast? None    Result Date: 11/10/2021  1. An acute process is not identified. 2. Diverticulosis and mild focal wall or fascial thickening involving the proximal sigmoid colon. The appearance is similar to the 2019 exam and may be the sequela of prior diverticulitis. Consider endoscopy for further evaluation. 3. Cirrhosis of the liver. 4. Status post appendectomy and cholecystectomy. 5. Indeterminate renal lesions, consider follow-up renal ultrasound or renal lesion protocol CT or MRI for further evaluation. Multiple renal cysts. XR CHEST PORTABLE    Result Date: 11/8/2021  No active cardiopulmonary disease. Consultations:    Consults:     Final Specialist Recommendations/Findings:   IP CONSULT TO INTERNAL MEDICINE  IP CONSULT TO PHARMACY  IP CONSULT TO CARDIOLOGY  IP CONSULT TO NEPHROLOGY  IP CONSULT TO CARDIOLOGY      The patient was seen and examined on day of discharge and this discharge summary is in conjunction with any daily progress note from day of discharge. Discharge plan:     Disposition: Home    Physician Follow Up:   PCP 1 week  No follow-up provider specified.      Requiring Further Evaluation/Follow Up POST HOSPITALIZATION/Incidental Findings: PT/INR on Monday then weekly and as needed, BMP on the 22nd    Diet: diabetic diet and renal diet    Activity: As tolerated    Instructions to Patient: Take medications as prescribed    Discharge Medications:      Medication List      START taking these medications    docusate sodium 100 MG capsule  Commonly known as: COLACE  Take 1 capsule by mouth daily     HYDROcodone-acetaminophen 5-325 MG per tablet  Commonly known as: NORCO  Take 1 tablet by mouth every 6 hours as needed for Pain for up to 3 days. midodrine 2.5 MG tablet  Commonly known as: PROAMATINE  Take 1 tablet by mouth 3 times daily (with meals)     scopolamine transdermal patch  Commonly known as: TRANSDERM-SCOP  Place 1 patch onto the skin every 72 hours     senna 8.6 MG tablet  Commonly known as: SENOKOT  Take 2 tablets by mouth nightly as needed (constipation)        CHANGE how you take these medications    metoclopramide 5 MG tablet  Commonly known as: REGLAN  Take 1 tablet by mouth 2 times daily 2tabs am and 2 tabs hs  What changed: how much to take        CONTINUE taking these medications    * albuterol (2.5 MG/3ML) 0.083% nebulizer solution  Commonly known as: PROVENTIL  Take 3 mLs by nebulization every 4 hours as needed for Wheezing     * albuterol sulfate  (90 Base) MCG/ACT inhaler     amiodarone 200 MG tablet  Commonly known as: CORDARONE     ammonium lactate 12 % lotion  Commonly known as: LAC-HYDRIN     Combivent Respimat  MCG/ACT Aers inhaler  Generic drug: albuterol-ipratropium     ezetimibe 10 MG tablet  Commonly known as: ZETIA     famotidine 10 MG tablet  Commonly known as: PEPCID     folic acid 1 MG tablet  Commonly known as: FOLVITE  Take 1 tablet by mouth daily. levothyroxine 100 MCG tablet  Commonly known as: SYNTHROID     MIRALAX PO     PHOSPHA 250 NEUTRAL PO     rosuvastatin 40 MG tablet  Commonly known as: CRESTOR     Symbicort 160-4.5 MCG/ACT Aero  Generic drug: budesonide-formoterol     vitamin D3 25 MCG (1000 UT) Tabs tablet  Commonly known as: CHOLECALCIFEROL     warfarin 2.5 MG tablet  Commonly known as: COUMADIN  Take as directed. If you are unsure how to take this medication, talk to your nurse or doctor.          * This list has 2 medication(s) that are the same as other medications prescribed for you. Read the directions carefully, and ask your doctor or other care provider to review them with you. STOP taking these medications    spironolactone 100 MG tablet  Commonly known as: ALDACTONE     torsemide 20 MG tablet  Commonly known as: DEMADEX           Where to Get Your Medications      You can get these medications from any pharmacy    Bring a paper prescription for each of these medications  · HYDROcodone-acetaminophen 5-325 MG per tablet     Information about where to get these medications is not yet available    Ask your nurse or doctor about these medications  · docusate sodium 100 MG capsule  · metoclopramide 5 MG tablet  · midodrine 2.5 MG tablet  · scopolamine transdermal patch  · senna 8.6 MG tablet         No discharge procedures on file. Time Spent on discharge is  28 minutes in patient examination, evaluation, counseling as well as medication reconciliation, prescriptions for required medications, discharge plan and follow up. Electronically signed by   Carmina Velazquez DO  11/13/2021  11:13 AM      Thank you Dr. Julián Lebron MD for the opportunity to be involved in this patient's care.

## 2021-11-13 NOTE — PROGRESS NOTES
Pt discharged to [facility] with belongings via [employer]. Pt denies having any further questions at this time. Norco script sent with patient. Report given to [facility].

## 2021-11-13 NOTE — CARE COORDINATION
Social Work- [facility] will admit today. [facility] to transport at 120. HENS completed. Orders faxed. Nurse to call report to [phone]. Patient is agreeable with dc. Daughter notified.  Isacc Lopez

## 2021-11-13 NOTE — CONSULTS
Warfarin Dosing - Pharmacy Consult Note  Consulting Provider: León Thompson  Indication:  Atrial Fibrillation  Warfarin Dose prior to admission: 1.25mg daily  (currently on hold)  Concurrent anticoagulants/antiplatelets: none  Significant Drug Interactions: amiodarone (incr INR),crestor  - both are home medications. Recent Labs     11/11/21  0658 11/12/21  0636 11/13/21  0630   INR 3.7 2.5 2.0   HGB 13.6 12.1 12.2    178 185     Recent warfarin administrations                     warfarin (COUMADIN) tablet 1.25 mg (mg) 1.25 mg Given 11/12/21 1708                   Date   INR    Dose  11/9         6.3        ?  11/10       6.2       held  11/11       3.7       held  11/12       2.5       1.25  11/13       2.0         Assessment/Plan  (Goal INR: 2 - 3)  INR 2.0 today. Will give bolus of 2 mg tonight and recheck INR in am.  Active problem list reviewed. INR orders are placed. Chart reviewed for pertinent labs, drug/diet interactions, and past doses. Documentation of patient's clinical condition was reviewed. Pharmacy Dosing:  Pharmacy will continue to follow.

## 2021-11-13 NOTE — PROGRESS NOTES
Comprehensive Nutrition Assessment    Type and Reason for Visit:  Reassess    Nutrition Recommendations/Plan: Continue current diet and supplements    Nutrition Assessment:  Patient reports she is eating better, consuming 51-75% of meals and supplements, don't have nausea, vomiting and diarrhea any more. Will continue current diet and supplements. Malnutrition Assessment:  Malnutrition Status: Moderate malnutrition    Context:  Acute Illness     Findings of the 6 clinical characteristics of malnutrition:  Energy Intake:  1 - 75% or less of estimated energy requirements for 7 or more days  Weight Loss:  1 - 5% over 1 month     Body Fat Loss:  1 - Mild body fat loss Orbital, Triceps   Muscle Mass Loss:  Unable to assess    Fluid Accumulation:  1 - Mild Extremities   Strength:  Not Performed    Estimated Daily Nutrient Needs:  Energy (kcal):  1770-5370 kcal (MSJ 1.2, 1.3); Weight Used for Energy Requirements:  Current     Protein (g):   gm (0.8-1.0 g/kg); Weight Used for Protein Requirements:  Current        Fluid (ml/day):   ; Method Used for Fluid Requirements:  1 ml/kcal      Nutrition Related Findings:  Edema: +1 BLE. Bowel sounds active RLQ and LLQ hypoactive RUQ and LUQ. Labs and meds reviewed      Wounds:  None       Current Nutrition Therapies:    ADULT DIET; Regular; 4 carb choices (60 gm/meal); Low Sodium (2 gm)  ADULT ORAL NUTRITION SUPPLEMENT; Breakfast; Clear Liquid Oral Supplement    Anthropometric Measures:  · Height: 5' 2\" (157.5 cm)  · Current Body Weight: 242 lb (109.8 kg) (Doubt gained 14 lbs weight in 4 days.  Likely weighing process or scale variation.)   · Admission Body Weight: 228 lb (103.4 kg)    · Usual Body Weight: 240 lb (108.9 kg)     · Ideal Body Weight: 110 lbs; % Ideal Body Weight 207.9 %   · BMI: 44.3  · Adjusted Body Weight:  ; No Adjustment   · BMI Categories: Obese Class 3 (BMI 40.0 or greater)       Nutrition Diagnosis:   · Moderate malnutrition, In context of acute illness or injury related to inadequate protein-energy intake as evidenced by intake 26-50%, intake 51-75%, poor intake prior to admission, weight loss greater than or equal to 5% in 1 month, mild loss of subcutaneous fat, nausea, vomiting    Nutrition Interventions:   Food and/or Nutrient Delivery:  Continue Current Diet, Continue Oral Nutrition Supplement  Nutrition Education/Counseling:  Education completed   Coordination of Nutrition Care:  Continue to monitor while inpatient    Goals:  PO intake to provide >75% of estimated nutritional needs       Nutrition Monitoring and Evaluation:   Behavioral-Environmental Outcomes:  None Identified   Food/Nutrient Intake Outcomes:  Food and Nutrient Intake, Supplement Intake  Physical Signs/Symptoms Outcomes:  Biochemical Data, Nausea or Vomiting, Fluid Status or Edema, Skin, Weight     Discharge Planning:    Continue current diet       Some areas of assessment may be incomplete due to COVID-19 precautions    Alonzo Molina RD, LD  Office phone [phone]

## 2021-11-13 NOTE — PROGRESS NOTES
Spoke to Dr. Marco Goldberg via telephone. Patient is okay for discharge from nephrology standpoint.

## 2021-11-13 NOTE — PROGRESS NOTES
Physical Therapy  Facility/Department: [facility]  Daily Treatment Note  NAME: Hetal Mg  : 1950  MRN: [id number]    Date of Service: 2021    Discharge Recommendations:  Patient would benefit from continued therapy after discharge     Pt currently functioning below baseline. Would suggest additional therapy at time of discharge to maximize long term outcomes and prevent re-admission. Please refer to AM-PAC score for current level of function. Assessment   Body structures, Functions, Activity limitations: Decreased functional mobility ; Decreased ADL status; Decreased strength; Decreased safe awareness; Decreased endurance; Decreased balance; Decreased posture  Assessment: Patient with decreased aerobic capacity and fatigues easily this date. Patient is progressing towards ambulation goals, however would benefit from continued skilled PT treatment to maximize return to PLOF. Prognosis: Good  Decision Making: Medium Complexity  Clinical Presentation: evolving  PT Education: Goals; PT Role; Plan of Care; Functional Mobility Training; Transfer Training; Pressure Relief; Gait Training; General Safety  Patient Education: Pt educated on: pursed lip breathing throughout activity, energy conservation, general safety awareness, importance of increased rest time following position changes, importance of continued mobility, and PT POC. Pt verbalized fair understanding. Pt would benefit from continued reinforcement. Activity Tolerance  Activity Tolerance: Patient limited by endurance; Patient limited by fatigue     Patient Diagnosis(es): The encounter diagnosis was Chest pain, unspecified type.      has a past medical history of Anesthesia complication, Asthma, Atrial fibrillation ([facility] [address] Cerebral artery occlusion with cerebral infarction [facility]), Chronic back pain, Chronic kidney disease, Cirrhosis ([facility] [address] Constipation, COPD (chronic obstructive pulmonary disease) ([facility] [address] Dental bridge present, Diet-controlled diabetes mellitus ([facility] [address] Difficult intravenous access, Diverticulitis, Gastroparesis, GERD (gastroesophageal reflux disease), H/O mastectomy, Hepatitis, History of blood transfusion, History of Coumadin therapy, History of DVT (deep vein thrombosis), History of echocardiogram, History of stress test, Hx of blood clots, Hyperlipidemia, Hypertension, Hypothyroidism, Impaired mobility, Incontinence, Incontinence of bowel, Liver disease, Lower extremity neuropathy, Lumbar disc disease, OAB (overactive bladder), Osteoarthritis, PONV (postoperative nausea and vomiting), Prolonged emergence from general anesthesia, Rectocele, Rotator cuff tear, Snores, Type II or unspecified type diabetes mellitus without mention of complication, not stated as uncontrolled, Use of cane as ambulatory aid, Uses roller walker, and Wears glasses. has a past surgical history that includes Cholecystectomy; Upper gastrointestinal endoscopy; Colonoscopy; pr cmbnd anterpost colporraphy w/cysto w/ntrcl rpr (N/A, 2/12/2018); Appendectomy (1964); Hysterectomy (1978); Breast surgery (Bilateral, 1982); shoulder surgery (Left, 2005); eye surgery (Left, 2006); knee surgery (Left, 2013); Cholecystectomy (1976); shoulder surgery (Right, 07/08/2019); Rectal surgery (N/A, 2/17/2020); Stimulator Surgery (10/23/2020); and Nerve Surgery (N/A, 10/23/2020). Restrictions  Restrictions/Precautions  Restrictions/Precautions: General Precautions, Fall Risk  Required Braces or Orthoses?: No  Position Activity Restriction  Other position/activity restrictions: Up with assist, telemetry, low sodium diet, alarms, pt with c/o dizziness, LUE IV  Subjective   General  Chart Reviewed: Yes  Additional Pertinent Hx: TIA x 2, COPD, CKD 3, HTN, DM2  Response To Previous Treatment: Patient with no complaints from previous session. Family / Caregiver Present: No  Subjective  Subjective: Pt reporting mild dizziness but overall feeling ok.  Patient states that nursing student told her PT would be giving her a shower this morning. General Comment  Comments: RN and pt agreeable to therapy. Pt up in chair upon arrival.          Orientation  Orientation  Overall Orientation Status: Within Functional Limits  Cognition      Objective   Bed mobility  Bridging: Unable to assess  Comment: Patient up in side chair upon therapists arrival and retires to side chair upon completion of treatment this date. Transfers  Sit to Stand: Minimal Assistance  Stand to sit: Minimal Assistance  Bed to Chair: Unable to assess  Stand Pivot Transfers: Minimal Assistance  Lateral Transfers: Minimal Assistance  Comment: Patient with fair steadiness in transfer activities. Patient requires increased time in stance to acclimate to positional changes. Patient performs 5 sit to stands during treatment with fair return on education to use UE from solid surface and bathroom rails to promote into stance to maximize safety and decrease risk of falls this date. Patient requires doors to be kept open due to fear of enclosed spaces. Patient requires MOD vc's for breathing techniques to maximize endurance and tolerance to increased activity levels this date. Ambulation  Ambulation?: Yes  Ambulation 1  Surface: level tile  Device: Rolling Walker  Assistance: Minimal assistance  Quality of Gait: step-to pattern, MAX safety cueing, poor endurance  Gait Deviations: Slow Cynthia; Decreased step length; Decreased step height  Distance: 15 feet x4  Comments: Patient requires vc's for postitioning with RW as patient allows device to get too far in front of her for safety, patient with fair carry over and demo of proper technique. Neuromuscular Education  NDT Treatment: Gait ; Lower extremity;  Sitting; Standing  Balance  Posture: Fair  Sitting - Static: Good  Sitting - Dynamic: Good; -  Standing - Static: Fair; +  Standing - Dynamic: Fair; -  Comments: Standing balance assessed w/ RW  Exercises  Gluteal Sets: 10 x1  Hip Abduction: 10 x1  Knee Long Arc Quad: 10 x1  Ankle Pumps: 10 x1  Comments: Patient educated and performs seated AZAEL LE exercises this date 1 set x10 reps. Patient takes shower with therapist working across mid line of body performing therapeutic tasks while working on endurance and tolerance to increased level of activitiy to maximze aerobic capacity. Comment: Assisted to shower for endurance and tolerance to activity level this date to maximize aerobic capacity and maximize functional outcomes. AM-PAC Score  AM-PAC Inpatient Mobility Raw Score : 15 (11/13/21 1013)  AM-PAC Inpatient T-Scale Score : 39.45 (11/13/21 1013)  Mobility Inpatient CMS 0-100% Score: 57.7 (11/13/21 1013)  Mobility Inpatient CMS G-Code Modifier : CK (11/13/21 1013)          Goals  Short term goals  Time Frame for Short term goals: 12 visits  Short term goal 1: Inc bed-mobility & transfers to independent to enable pt to safely get in/OOB & chair  Short term goal 2: Inc gait to amb 200ft or > indep w/ RW to enable pt to return to previous level of independence & tolerance of community ambulation  Short term goal 3:  Inc strength to [address] [facility] [address] standing balance to good with device to facilitate pt independence for performance of ADL's & functional mobility, & reduce fall risk  Short term goal 4: Pt able to tolerate 30-40 min of activity to include 15-20 reps of ex, NMR & functional mobility including 5 minutes of standing with device to facilitate activity tolerance to [facility]  Short term goal 5: Ed pt on home ex's, safety & endurance training, fall prevention, & issue written home program    Plan    Plan  Times per week: 1-2x/D, 5-6D/week  Current Treatment Recommendations: Strengthening, Balance Training, Functional Mobility Training, Transfer Training, Endurance Training, ADL/Self-care Training, Gait Training, Home Exercise Program, Safety Education & Training, Patient/Caregiver Education & Training  Safety Devices  Type of

## 2021-11-13 NOTE — PLAN OF CARE
Problem: Falls - Risk of:  Goal: Will remain free from falls  Description: Will remain free from falls  11/12/2021 2308 by Rosa Maria Quinones RN  Outcome: Ongoing  Note: Patient is a fall risk during this admission. Fall risk assessment was performed. Patient is absent of falls. Bed is in the lowest position. Wheels on the bed are locked. Call light and bed side table are within reach. Clutter is removed. Patient was educated to call out when needing assistance or wanting to get out of bed. Patient offered toileting assistance during rounding. Hourly rounds have been performed. Problem: Falls - Risk of:  Goal: Absence of physical injury  Description: Absence of physical injury  11/12/2021 2308 by Rosa Maria Quinones RN  Outcome: Ongoing     Problem: Pain:  Goal: Pain level will decrease  Description: Pain level will decrease  11/12/2021 2308 by Rosa Maria Quinones RN  Outcome: Ongoing  Note: Pt medicated with pain medication prn. Assessed all pain characteristics including level, type, location, frequency, and onset. Non-pharmacologic interventions offered to pt as well. Pt states pain is tolerable at this time. Will continue to monitor      Problem: Pain:  Goal: Control of acute pain  Description: Control of acute pain  11/12/2021 2308 by Rosa Maria Quinones RN  Outcome: Ongoing     Problem: Activity:  Goal: Risk for activity intolerance will decrease  Description: Risk for activity intolerance will decrease  11/12/2021 2308 by Rosa Maria Quinones RN  Outcome: Ongoing     Problem: Bowel/Gastric:  Goal: Bowel function will improve  Description: Bowel function will improve  11/12/2021 2308 by Rosa Maria Quinones RN  Outcome: Ongoing     Problem:  Bowel/Gastric:  Goal: Occurrences of nausea will decrease  Description: Occurrences of nausea will decrease  11/12/2021 2308 by Rosa Maria Quinones RN  Outcome: Ongoing     Problem: Fluid Volume:  Goal: Maintenance of adequate hydration will improve  Description: Maintenance of adequate hydration will improve  11/12/2021 2308 by Nabor Perry RN  Outcome: Ongoing  Note: IVF\"s continued as ordered. Patient able to tolerate PO fluids well. Laboratory results continue to be monitored closely. Problem: Fluid Volume:  Goal: Will show no signs or symptoms of fluid imbalance  Description: Will show no signs or symptoms of fluid imbalance  11/12/2021 2308 by Nabor Perry RN  Outcome: Ongoing     Problem: Health Behavior:  Goal: Ability to state signs and symptoms to report to health care provider will improve  Description: Ability to state signs and symptoms to report to health care provider will improve  11/12/2021 2308 by Nabor Perry RN  Outcome: Ongoing     Problem: Health Behavior:  Goal: Ability to manage health-related needs will improve  Description: Ability to manage health-related needs will improve  11/12/2021 2308 by Nabor Perry RN  Outcome: Ongoing     Problem: Sensory:  Goal: Pain level will decrease  Description: Pain level will decrease  11/12/2021 2308 by Nabor Perry RN  Outcome: Ongoing  Note: Pt medicated with pain medication prn. Assessed all pain characteristics including level, type, location, frequency, and onset. Non-pharmacologic interventions offered to pt as well. Pt states pain is tolerable at this time. Will continue to monitor      Problem: Coping:  Goal: Ability to identify and develop effective coping behavior will improve  Description: Ability to identify and develop effective coping behavior will improve  11/12/2021 2308 by Nabor Perry RN  Outcome: Ongoing  Note: Help the patient and family identify and use effective coping strategies. Assist the patient and family members in setting realistic goals.   Educate the family and patient regarding medication therapy, goals, and expectations. Establish a therapeutic relationship with the patient and family. Discuss changes in roles and responsibilities with the patient and family. Problem: Urinary Elimination:  Goal: Ability to achieve and maintain adequate urine output will be supported  Description: Ability to achieve and maintain adequate urine output will be supported  11/12/2021 2308 by Agustín Olivas RN  Outcome: Ongoing     Problem: Skin Integrity:  Goal: Will show no infection signs and symptoms  Description: Will show no infection signs and symptoms  11/12/2021 2308 by Agustín Olivas RN  Outcome: Ongoing  Note: Skin integrity maintained, no new skin abnormalities assessed.  Appropriate measures remain in place

## 2021-11-13 NOTE — PLAN OF CARE
Nutrition Problem #1: Moderate malnutrition, In context of acute illness or injury  Intervention: Food and/or Nutrient Delivery: Continue Current Diet, Continue Oral Nutrition Supplement  Nutritional Goals: PO intake to provide >75% of estimated nutritional needs

## 2021-11-15 ENCOUNTER — TELEPHONE (OUTPATIENT)
Dept: PHARMACY | Age: 71
End: 2021-11-15

## 2021-11-15 DIAGNOSIS — I48.91 ATRIAL FIBRILLATION, UNSPECIFIED TYPE (HCC): Primary | ICD-10-CM

## 2021-11-15 NOTE — TELEPHONE ENCOUNTER
Patient was discharged from [facility] 11/13 to [facility]. Will schedule in clinic for INR management when discharged home.

## 2021-12-08 PROBLEM — R79.89 ELEVATED TROPONIN: Status: RESOLVED | Noted: 2021-11-08 | Resolved: 2021-12-08

## 2021-12-08 PROBLEM — R77.8 ELEVATED TROPONIN: Status: RESOLVED | Noted: 2021-11-08 | Resolved: 2021-12-08

## 2021-12-21 LAB — INR BLD: 3

## 2021-12-23 ENCOUNTER — TELEPHONE (OUTPATIENT)
Dept: PHARMACY | Age: 71
End: 2021-12-23

## 2021-12-23 NOTE — TELEPHONE ENCOUNTER
Patient recently discharged from [facility] 12/22. Patient called for directions on warfarin. Per [facility] patient was on 2.5mg daily with most recent INR 12/21 being 3.03. Patient unable to make appointment 12/28 or 12/30 due to [place] plans and inability to drive. Will schedule patient for 12/31 @9:30am to ensure INR is appropriate.

## 2021-12-31 ENCOUNTER — TELEPHONE (OUTPATIENT)
Dept: PHARMACY | Age: 71
End: 2021-12-31

## 2021-12-31 DIAGNOSIS — I48.91 ATRIAL FIBRILLATION, UNSPECIFIED TYPE (HCC): Primary | ICD-10-CM

## 2021-12-31 NOTE — TELEPHONE ENCOUNTER
Patient presented for appointment at 9:30 am, registration was not present due to holiday. Patient talked with wound care and called clinic but because I was not back in office, the wound care clinic was confused. While working medication reconciliation, time flew and I completely forgot about patient.  Apologized profusely to patient and rescheduled for Monday 1/3 @9am.

## 2022-01-03 ENCOUNTER — HOSPITAL ENCOUNTER (OUTPATIENT)
Dept: PHARMACY | Age: 72
Setting detail: THERAPIES SERIES
Discharge: HOME | End: 2022-01-03
Payer: MEDICARE

## 2022-01-03 DIAGNOSIS — I48.91 ATRIAL FIBRILLATION, UNSPECIFIED TYPE (HCC): Primary | ICD-10-CM

## 2022-01-03 LAB
INR BLD: 2.8
PROTIME: 33.9 SECONDS

## 2022-01-03 PROCEDURE — 99211 OFF/OP EST MAY X REQ PHY/QHP: CPT

## 2022-01-03 PROCEDURE — 85610 PROTHROMBIN TIME: CPT

## 2022-01-03 NOTE — PROGRESS NOTES
Patient seen in clinic for warfarin management due to atrial fibrillation with an INR goal of 2.0-3.0. Estimated duration of therapy is indefinite. Patient states she has been taking 2.5mg daily since discharge from rehab facility on 12/22/21. No bleeding or thromboembolic side effects noted. No significant med or dietary changes. No significant recent illness or disease state changes. PT/INR done in office per protocol. INR is 2.8 which is therapeutic. Warfarin regimen will be continued at current dose of 2.5mg daily. Will retest in 3 weeks. Patient understands dosing directions and information discussed. Dosing schedule and follow up appointment given to patient. Progress note routed to referring physicians office. Discussed with patient the Pharmacist Collaborative Practice Agreement. Patient provided verbal and/or electronic (ex. [employer]) consent to participate in the collaborative practice agreement between the pharmacist and referred patient. This is in lieu of paper consent due to COVID-19 precautions and the use of remote/virtual visits.        For Pharmacy Admin Tracking Only     Intervention Detail: Dose Adjustment: 1, reason: Therapy Optimization   Total # of Interventions Recommended: 1   Total # of Interventions Accepted: 1   Time Spent (min): 15

## 2022-01-11 ENCOUNTER — HOSPITAL ENCOUNTER (OUTPATIENT)
Age: 72
Discharge: HOME | End: 2022-01-11
Payer: MEDICARE

## 2022-01-11 LAB
ALBUMIN SERPL-MCNC: 4 G/DL (ref 3.5–5.2)
ALBUMIN/GLOBULIN RATIO: 1 (ref 1–2.5)
ALP BLD-CCNC: 118 U/L (ref 35–104)
ALT SERPL-CCNC: 14 U/L (ref 5–33)
ANION GAP SERPL CALCULATED.3IONS-SCNC: 16 MMOL/L (ref 9–17)
AST SERPL-CCNC: 24 U/L
BILIRUB SERPL-MCNC: 0.58 MG/DL (ref 0.3–1.2)
BUN BLDV-MCNC: 18 MG/DL (ref 8–23)
BUN/CREAT BLD: ABNORMAL (ref 9–20)
CALCIUM SERPL-MCNC: 9.5 MG/DL (ref 8.6–10.4)
CHLORIDE BLD-SCNC: 103 MMOL/L (ref 98–107)
CHOLESTEROL, FASTING: 136 MG/DL
CHOLESTEROL/HDL RATIO: 2.7
CO2: 16 MMOL/L (ref 20–31)
CREAT SERPL-MCNC: 2.96 MG/DL (ref 0.5–0.9)
ESTIMATED AVERAGE GLUCOSE: 111 MG/DL
GFR AFRICAN AMERICAN: 19 ML/MIN
GFR NON-AFRICAN AMERICAN: 16 ML/MIN
GFR SERPL CREATININE-BSD FRML MDRD: ABNORMAL ML/MIN/{1.73_M2}
GFR SERPL CREATININE-BSD FRML MDRD: ABNORMAL ML/MIN/{1.73_M2}
GLUCOSE BLD-MCNC: 156 MG/DL (ref 70–99)
HBA1C MFR BLD: 5.5 % (ref 4–6)
HCT VFR BLD CALC: 43.7 % (ref 36.3–47.1)
HDLC SERPL-MCNC: 51 MG/DL
HEMOGLOBIN: 14.2 G/DL (ref 11.9–15.1)
INR BLD: 3.3
LDL CHOLESTEROL: 52 MG/DL (ref 0–130)
MCH RBC QN AUTO: 32.6 PG (ref 25.2–33.5)
MCHC RBC AUTO-ENTMCNC: 32.5 G/DL (ref 28.4–34.8)
MCV RBC AUTO: 100.5 FL (ref 82.6–102.9)
NRBC AUTOMATED: 0 PER 100 WBC
PDW BLD-RTO: 14.6 % (ref 11.8–14.4)
PLATELET # BLD: 306 K/UL (ref 138–453)
PMV BLD AUTO: 11 FL (ref 8.1–13.5)
POTASSIUM SERPL-SCNC: 3.9 MMOL/L (ref 3.7–5.3)
PROTHROMBIN TIME: 32.6 SEC (ref 11.5–14.2)
RBC # BLD: 4.35 M/UL (ref 3.95–5.11)
SODIUM BLD-SCNC: 135 MMOL/L (ref 135–144)
THYROXINE, FREE: 1.78 NG/DL (ref 0.93–1.7)
TOTAL CK: 142 U/L (ref 26–192)
TOTAL PROTEIN: 8.2 G/DL (ref 6.4–8.3)
TRIGLYCERIDE, FASTING: 164 MG/DL
TSH SERPL DL<=0.05 MIU/L-ACNC: 1.09 MIU/L (ref 0.3–5)
VLDLC SERPL CALC-MCNC: ABNORMAL MG/DL (ref 1–30)
WBC # BLD: 9.5 K/UL (ref 3.5–11.3)

## 2022-01-11 PROCEDURE — 80061 LIPID PANEL: CPT

## 2022-01-11 PROCEDURE — 85610 PROTHROMBIN TIME: CPT

## 2022-01-11 PROCEDURE — 36415 COLL VENOUS BLD VENIPUNCTURE: CPT

## 2022-01-11 PROCEDURE — 84439 ASSAY OF FREE THYROXINE: CPT

## 2022-01-11 PROCEDURE — 85027 COMPLETE CBC AUTOMATED: CPT

## 2022-01-11 PROCEDURE — 80053 COMPREHEN METABOLIC PANEL: CPT

## 2022-01-11 PROCEDURE — 82550 ASSAY OF CK (CPK): CPT

## 2022-01-11 PROCEDURE — 83036 HEMOGLOBIN GLYCOSYLATED A1C: CPT

## 2022-01-11 PROCEDURE — 84443 ASSAY THYROID STIM HORMONE: CPT

## 2022-01-24 ENCOUNTER — HOSPITAL ENCOUNTER (OUTPATIENT)
Age: 72
Discharge: HOME | End: 2022-01-24

## 2022-01-24 ENCOUNTER — HOSPITAL ENCOUNTER (OUTPATIENT)
Dept: PHARMACY | Age: 72
Setting detail: THERAPIES SERIES
Discharge: HOME | End: 2022-01-24
Payer: MEDICARE

## 2022-01-24 DIAGNOSIS — I48.91 ATRIAL FIBRILLATION, UNSPECIFIED TYPE (HCC): Primary | ICD-10-CM

## 2022-01-24 DIAGNOSIS — Z86.718 HISTORY OF DVT (DEEP VEIN THROMBOSIS): ICD-10-CM

## 2022-01-24 LAB
ANION GAP SERPL CALCULATED.3IONS-SCNC: 20 MMOL/L (ref 9–17)
BUN BLDV-MCNC: 25 MG/DL (ref 8–23)
BUN/CREAT BLD: ABNORMAL (ref 9–20)
CALCIUM SERPL-MCNC: 9.2 MG/DL (ref 8.6–10.4)
CHLORIDE BLD-SCNC: 103 MMOL/L (ref 98–107)
CO2: 12 MMOL/L (ref 20–31)
CREAT SERPL-MCNC: 3.73 MG/DL (ref 0.5–0.9)
GFR AFRICAN AMERICAN: 14 ML/MIN
GFR NON-AFRICAN AMERICAN: 12 ML/MIN
GFR SERPL CREATININE-BSD FRML MDRD: ABNORMAL ML/MIN/{1.73_M2}
GFR SERPL CREATININE-BSD FRML MDRD: ABNORMAL ML/MIN/{1.73_M2}
GLUCOSE BLD-MCNC: 147 MG/DL (ref 70–99)
INR BLD: 8.5
POTASSIUM SERPL-SCNC: 4.5 MMOL/L (ref 3.7–5.3)
PROTHROMBIN TIME: 66.8 SEC (ref 11.5–14.2)
SODIUM BLD-SCNC: 135 MMOL/L (ref 135–144)

## 2022-01-24 PROCEDURE — 36415 COLL VENOUS BLD VENIPUNCTURE: CPT

## 2022-01-24 PROCEDURE — 80048 BASIC METABOLIC PNL TOTAL CA: CPT

## 2022-01-24 PROCEDURE — 85610 PROTHROMBIN TIME: CPT

## 2022-01-24 PROCEDURE — 99212 OFFICE O/P EST SF 10 MIN: CPT

## 2022-01-25 ENCOUNTER — APPOINTMENT (OUTPATIENT)
Dept: GENERAL RADIOLOGY | Age: 72
DRG: 683 | End: 2022-01-25
Payer: MEDICARE

## 2022-01-25 ENCOUNTER — HOSPITAL ENCOUNTER (INPATIENT)
Age: 72
LOS: 3 days | Discharge: NURSING, REHAB OR LONG-TERM CARE | DRG: 683 | End: 2022-01-28
Attending: EMERGENCY MEDICINE | Admitting: INTERNAL MEDICINE
Payer: MEDICARE

## 2022-01-25 DIAGNOSIS — N17.9 ACUTE RENAL FAILURE SUPERIMPOSED ON CHRONIC KIDNEY DISEASE, UNSPECIFIED CKD STAGE, UNSPECIFIED ACUTE RENAL FAILURE TYPE (HCC): Primary | ICD-10-CM

## 2022-01-25 DIAGNOSIS — R79.1 SUPRATHERAPEUTIC INR: ICD-10-CM

## 2022-01-25 DIAGNOSIS — I48.0 PAROXYSMAL ATRIAL FIBRILLATION (HCC): ICD-10-CM

## 2022-01-25 DIAGNOSIS — I21.4 NSTEMI (NON-ST ELEVATED MYOCARDIAL INFARCTION) (HCC): ICD-10-CM

## 2022-01-25 DIAGNOSIS — Z86.718 HISTORY OF DVT (DEEP VEIN THROMBOSIS): ICD-10-CM

## 2022-01-25 DIAGNOSIS — N18.9 ACUTE RENAL FAILURE SUPERIMPOSED ON CHRONIC KIDNEY DISEASE, UNSPECIFIED CKD STAGE, UNSPECIFIED ACUTE RENAL FAILURE TYPE (HCC): Primary | ICD-10-CM

## 2022-01-25 DIAGNOSIS — R77.8 ELEVATED TROPONIN: ICD-10-CM

## 2022-01-25 PROBLEM — E66.9 OBESITY (BMI 30-39.9): Status: ACTIVE | Noted: 2022-01-25

## 2022-01-25 PROBLEM — M62.82 NON-TRAUMATIC RHABDOMYOLYSIS: Status: ACTIVE | Noted: 2022-01-25

## 2022-01-25 PROBLEM — N18.5 CHRONIC KIDNEY DISEASE, STAGE V (VERY SEVERE) (HCC): Status: ACTIVE | Noted: 2022-01-25

## 2022-01-25 LAB
-: ABNORMAL
ABSOLUTE EOS #: <0.03 K/UL (ref 0–0.44)
ABSOLUTE IMMATURE GRANULOCYTE: 0.05 K/UL (ref 0–0.3)
ABSOLUTE LYMPH #: 0.86 K/UL (ref 1.1–3.7)
ABSOLUTE MONO #: 1.5 K/UL (ref 0.1–1.2)
AMORPHOUS: ABNORMAL
ANION GAP SERPL CALCULATED.3IONS-SCNC: 17 MMOL/L (ref 9–17)
BACTERIA: ABNORMAL
BASOPHILS # BLD: 0 % (ref 0–2)
BASOPHILS ABSOLUTE: 0.05 K/UL (ref 0–0.2)
BILIRUBIN URINE: NEGATIVE
BUN BLDV-MCNC: 29 MG/DL (ref 8–23)
BUN/CREAT BLD: 7 (ref 9–20)
CALCIUM SERPL-MCNC: 9.3 MG/DL (ref 8.6–10.4)
CASTS UA: ABNORMAL /LPF
CHLORIDE BLD-SCNC: 105 MMOL/L (ref 98–107)
CO2: 15 MMOL/L (ref 20–31)
COLOR: ABNORMAL
COMMENT UA: ABNORMAL
CREAT SERPL-MCNC: 4.17 MG/DL (ref 0.5–0.9)
CREATININE URINE: 65.2 MG/DL (ref 28–217)
CRYSTALS, UA: ABNORMAL /HPF
DIFFERENTIAL TYPE: ABNORMAL
EOSINOPHILS RELATIVE PERCENT: 0 % (ref 1–4)
EPITHELIAL CELLS UA: ABNORMAL /HPF (ref 0–5)
GFR AFRICAN AMERICAN: 13 ML/MIN
GFR NON-AFRICAN AMERICAN: 10 ML/MIN
GFR SERPL CREATININE-BSD FRML MDRD: ABNORMAL ML/MIN/{1.73_M2}
GFR SERPL CREATININE-BSD FRML MDRD: ABNORMAL ML/MIN/{1.73_M2}
GLUCOSE BLD-MCNC: 127 MG/DL (ref 70–99)
GLUCOSE URINE: ABNORMAL
HCT VFR BLD CALC: 40.5 % (ref 36.3–47.1)
HEMOGLOBIN: 13.2 G/DL (ref 11.9–15.1)
IMMATURE GRANULOCYTES: 0 %
INR BLD: 9.4
KETONES, URINE: NEGATIVE
LEUKOCYTE ESTERASE, URINE: ABNORMAL
LYMPHOCYTES # BLD: 6 % (ref 24–43)
MCH RBC QN AUTO: 32 PG (ref 25.2–33.5)
MCHC RBC AUTO-ENTMCNC: 32.6 G/DL (ref 28.4–34.8)
MCV RBC AUTO: 98.1 FL (ref 82.6–102.9)
MICROALBUMIN/CREAT 24H UR: 139 MG/L
MICROALBUMIN/CREAT UR-RTO: 213 MCG/MG CREAT
MONOCYTES # BLD: 11 % (ref 3–12)
MUCUS: ABNORMAL
MYOGLOBIN: ABNORMAL NG/ML (ref 25–58)
MYOGLOBIN: ABNORMAL NG/ML (ref 25–58)
NITRITE, URINE: NEGATIVE
NRBC AUTOMATED: 0 PER 100 WBC
OTHER OBSERVATIONS UA: ABNORMAL
PARTIAL THROMBOPLASTIN TIME: 58.7 SEC (ref 23.9–33.8)
PDW BLD-RTO: 14.1 % (ref 11.8–14.4)
PH UA: 6 (ref 5–8)
PLATELET # BLD: 245 K/UL (ref 138–453)
PLATELET ESTIMATE: ABNORMAL
PMV BLD AUTO: 10.3 FL (ref 8.1–13.5)
POTASSIUM SERPL-SCNC: 4.8 MMOL/L (ref 3.7–5.3)
PROTEIN UA: ABNORMAL
PROTHROMBIN TIME: 72.7 SEC (ref 11.5–14.2)
RBC # BLD: 4.13 M/UL (ref 3.95–5.11)
RBC # BLD: ABNORMAL 10*6/UL
RBC UA: ABNORMAL /HPF (ref 0–2)
RENAL EPITHELIAL, UA: ABNORMAL /HPF
SEG NEUTROPHILS: 83 % (ref 36–65)
SEGMENTED NEUTROPHILS ABSOLUTE COUNT: 11.58 K/UL (ref 1.5–8.1)
SODIUM BLD-SCNC: 137 MMOL/L (ref 135–144)
SODIUM,UR: 38 MMOL/L
SPECIFIC GRAVITY UA: 1.02 (ref 1–1.03)
TRICHOMONAS: ABNORMAL
TROPONIN INTERP: ABNORMAL
TROPONIN T: ABNORMAL NG/ML
TROPONIN, HIGH SENSITIVITY: 383 NG/L (ref 0–14)
TROPONIN, HIGH SENSITIVITY: 387 NG/L (ref 0–14)
TROPONIN, HIGH SENSITIVITY: 420 NG/L (ref 0–14)
TURBIDITY: ABNORMAL
URINE HGB: ABNORMAL
UROBILINOGEN, URINE: NORMAL
WBC # BLD: 14 K/UL (ref 3.5–11.3)
WBC # BLD: ABNORMAL 10*3/UL
WBC UA: ABNORMAL /HPF (ref 0–5)
YEAST: ABNORMAL

## 2022-01-25 PROCEDURE — 71045 X-RAY EXAM CHEST 1 VIEW: CPT

## 2022-01-25 PROCEDURE — 85730 THROMBOPLASTIN TIME PARTIAL: CPT

## 2022-01-25 PROCEDURE — 6370000000 HC RX 637 (ALT 250 FOR IP): Performed by: NURSE PRACTITIONER

## 2022-01-25 PROCEDURE — 85610 PROTHROMBIN TIME: CPT

## 2022-01-25 PROCEDURE — 99223 1ST HOSP IP/OBS HIGH 75: CPT | Performed by: INTERNAL MEDICINE

## 2022-01-25 PROCEDURE — 80048 BASIC METABOLIC PNL TOTAL CA: CPT

## 2022-01-25 PROCEDURE — 36415 COLL VENOUS BLD VENIPUNCTURE: CPT

## 2022-01-25 PROCEDURE — 2060000000 HC ICU INTERMEDIATE R&B

## 2022-01-25 PROCEDURE — 82043 UR ALBUMIN QUANTITATIVE: CPT

## 2022-01-25 PROCEDURE — 99283 EMERGENCY DEPT VISIT LOW MDM: CPT

## 2022-01-25 PROCEDURE — 85025 COMPLETE CBC W/AUTO DIFF WBC: CPT

## 2022-01-25 PROCEDURE — 87205 SMEAR GRAM STAIN: CPT

## 2022-01-25 PROCEDURE — 84300 ASSAY OF URINE SODIUM: CPT

## 2022-01-25 PROCEDURE — 83874 ASSAY OF MYOGLOBIN: CPT

## 2022-01-25 PROCEDURE — 93005 ELECTROCARDIOGRAM TRACING: CPT | Performed by: EMERGENCY MEDICINE

## 2022-01-25 PROCEDURE — 82570 ASSAY OF URINE CREATININE: CPT

## 2022-01-25 PROCEDURE — 84484 ASSAY OF TROPONIN QUANT: CPT

## 2022-01-25 PROCEDURE — 81001 URINALYSIS AUTO W/SCOPE: CPT

## 2022-01-25 PROCEDURE — 6370000000 HC RX 637 (ALT 250 FOR IP): Performed by: EMERGENCY MEDICINE

## 2022-01-25 RX ORDER — SCOLOPAMINE TRANSDERMAL SYSTEM 1 MG/1
1 PATCH, EXTENDED RELEASE TRANSDERMAL
Status: DISCONTINUED | OUTPATIENT
Start: 2022-01-25 | End: 2022-01-25 | Stop reason: CLARIF

## 2022-01-25 RX ORDER — ALBUTEROL SULFATE 90 UG/1
2 AEROSOL, METERED RESPIRATORY (INHALATION) 2 TIMES DAILY
Status: DISCONTINUED | OUTPATIENT
Start: 2022-01-25 | End: 2022-01-25

## 2022-01-25 RX ORDER — ALBUTEROL SULFATE 90 UG/1
2 AEROSOL, METERED RESPIRATORY (INHALATION) 2 TIMES DAILY
Status: DISCONTINUED | OUTPATIENT
Start: 2022-01-26 | End: 2022-01-26

## 2022-01-25 RX ORDER — IPRATROPIUM BROMIDE AND ALBUTEROL SULFATE 2.5; .5 MG/3ML; MG/3ML
1 SOLUTION RESPIRATORY (INHALATION) EVERY 4 HOURS PRN
Status: DISCONTINUED | OUTPATIENT
Start: 2022-01-25 | End: 2022-01-25

## 2022-01-25 RX ORDER — ALBUTEROL SULFATE 90 UG/1
2 AEROSOL, METERED RESPIRATORY (INHALATION) EVERY 4 HOURS PRN
Status: DISCONTINUED | OUTPATIENT
Start: 2022-01-25 | End: 2022-01-28 | Stop reason: SDUPTHER

## 2022-01-25 RX ORDER — SODIUM CHLORIDE 0.9 % (FLUSH) 0.9 %
5-40 SYRINGE (ML) INJECTION EVERY 12 HOURS SCHEDULED
Status: DISCONTINUED | OUTPATIENT
Start: 2022-01-25 | End: 2022-01-28 | Stop reason: HOSPADM

## 2022-01-25 RX ORDER — MIDODRINE HYDROCHLORIDE 2.5 MG/1
2.5 TABLET ORAL
Status: DISCONTINUED | OUTPATIENT
Start: 2022-01-26 | End: 2022-01-26

## 2022-01-25 RX ORDER — LEVOTHYROXINE SODIUM 0.1 MG/1
100 TABLET ORAL DAILY
Status: DISCONTINUED | OUTPATIENT
Start: 2022-01-26 | End: 2022-01-28 | Stop reason: HOSPADM

## 2022-01-25 RX ORDER — EZETIMIBE 10 MG/1
10 TABLET ORAL DAILY
Status: DISCONTINUED | OUTPATIENT
Start: 2022-01-26 | End: 2022-01-28 | Stop reason: HOSPADM

## 2022-01-25 RX ORDER — ACETAMINOPHEN 325 MG/1
650 TABLET ORAL EVERY 6 HOURS PRN
Status: DISCONTINUED | OUTPATIENT
Start: 2022-01-25 | End: 2022-01-28 | Stop reason: HOSPADM

## 2022-01-25 RX ORDER — AMIODARONE HYDROCHLORIDE 200 MG/1
200 TABLET ORAL
Status: DISCONTINUED | OUTPATIENT
Start: 2022-01-26 | End: 2022-01-28 | Stop reason: HOSPADM

## 2022-01-25 RX ORDER — ASPIRIN 81 MG/1
324 TABLET, CHEWABLE ORAL ONCE
Status: COMPLETED | OUTPATIENT
Start: 2022-01-25 | End: 2022-01-25

## 2022-01-25 RX ORDER — BUDESONIDE AND FORMOTEROL FUMARATE DIHYDRATE 160; 4.5 UG/1; UG/1
2 AEROSOL RESPIRATORY (INHALATION) EVERY MORNING
Status: DISCONTINUED | OUTPATIENT
Start: 2022-01-26 | End: 2022-01-28 | Stop reason: HOSPADM

## 2022-01-25 RX ORDER — METOCLOPRAMIDE 5 MG/1
5 TABLET ORAL
Status: DISCONTINUED | OUTPATIENT
Start: 2022-01-26 | End: 2022-01-28 | Stop reason: HOSPADM

## 2022-01-25 RX ORDER — SODIUM CHLORIDE 9 MG/ML
INJECTION, SOLUTION INTRAVENOUS CONTINUOUS
Status: DISCONTINUED | OUTPATIENT
Start: 2022-01-25 | End: 2022-01-26

## 2022-01-25 RX ORDER — ROSUVASTATIN CALCIUM 40 MG/1
40 TABLET, COATED ORAL DAILY
Status: DISCONTINUED | OUTPATIENT
Start: 2022-01-25 | End: 2022-01-28 | Stop reason: HOSPADM

## 2022-01-25 RX ORDER — PHYTONADIONE 5 MG/1
5 TABLET ORAL ONCE
Status: COMPLETED | OUTPATIENT
Start: 2022-01-25 | End: 2022-01-26

## 2022-01-25 RX ORDER — SODIUM CHLORIDE 0.9 % (FLUSH) 0.9 %
5-40 SYRINGE (ML) INJECTION PRN
Status: DISCONTINUED | OUTPATIENT
Start: 2022-01-25 | End: 2022-01-28 | Stop reason: HOSPADM

## 2022-01-25 RX ORDER — SODIUM CHLORIDE 9 MG/ML
25 INJECTION, SOLUTION INTRAVENOUS PRN
Status: DISCONTINUED | OUTPATIENT
Start: 2022-01-25 | End: 2022-01-28 | Stop reason: HOSPADM

## 2022-01-25 RX ORDER — ALBUTEROL SULFATE 2.5 MG/3ML
2.5 SOLUTION RESPIRATORY (INHALATION) EVERY 4 HOURS PRN
Status: DISCONTINUED | OUTPATIENT
Start: 2022-01-25 | End: 2022-01-25

## 2022-01-25 RX ORDER — ACETAMINOPHEN 650 MG/1
650 SUPPOSITORY RECTAL EVERY 6 HOURS PRN
Status: DISCONTINUED | OUTPATIENT
Start: 2022-01-25 | End: 2022-01-28 | Stop reason: HOSPADM

## 2022-01-25 RX ADMIN — ASPIRIN 81 MG CHEWABLE TABLET 324 MG: 81 TABLET CHEWABLE at 16:07

## 2022-01-25 RX ADMIN — ROSUVASTATIN 40 MG: 40 TABLET, FILM COATED ORAL at 20:35

## 2022-01-25 ASSESSMENT — ENCOUNTER SYMPTOMS
BLOOD IN STOOL: 0
ABDOMINAL DISTENTION: 0
WHEEZING: 0
EYE REDNESS: 0
DIARRHEA: 0
CONSTIPATION: 0
VOMITING: 0
NAUSEA: 1
FACIAL SWELLING: 0
COUGH: 0
ABDOMINAL PAIN: 0
EYE DISCHARGE: 0
SHORTNESS OF BREATH: 0
PHOTOPHOBIA: 0
COLOR CHANGE: 0

## 2022-01-25 NOTE — ED PROVIDER NOTES
[address] [place] [address] ED  EMERGENCY DEPARTMENT ENCOUNTER      Pt Name: Lesley Merino  MRN: [id number]  [place] 1950  Date of evaluation: 1/25/2022  Provider: Washington Jonas MD    CHIEF COMPLAINT       Chief Complaint   Patient presents with    Fatigue     Sent by renal doc for elevated Cr and GFR; pt c/o nausea and weakness x 1 week     Nausea    Other         HISTORY OF PRESENT ILLNESS  (Location/Symptom, Timing/Onset, Context/Setting, Quality, Duration, Modifying Factors, Severity.)   Lesley Merino is a 66 y.o. female who presents to the emergency department because she was sent by her nephrologist to be admitted. She has had worsening kidney function and she has been quite fatigued. She states over the past month she is got more fatigued and she was having trouble getting up. She does not have any pain or fever. No cough or shortness of breath. Denies any pain whatsoever. Nursing Notes were reviewed. ALLERGIES     Lyrica [pregabalin], Pcn [penicillins], Nsaids, Zofran [ondansetron], Codeine, Cymbalta [duloxetine hcl], Morphine, Prolia [denosumab], and Seasonal    CURRENT MEDICATIONS       Previous Medications    ALBUTEROL (PROVENTIL) (2.5 MG/3ML) 0.083% NEBULIZER SOLUTION    Take 3 mLs by nebulization every 4 hours as needed for Wheezing    ALBUTEROL SULFATE  (90 BASE) MCG/ACT INHALER    Inhale 2 puffs into the lungs every 6 hours as needed for Wheezing    ALBUTEROL-IPRATROPIUM (COMBIVENT RESPIMAT)  MCG/ACT AERS INHALER    Inhale 1 puff into the lungs every 6 hours    AMIODARONE (CORDARONE) 200 MG TABLET    Take 200 mg by mouth three times a week TAKES ON MONDAYS, WEDNESDAYS AND FRIDAYS.     AMMONIUM LACTATE (LAC-HYDRIN) 12 % LOTION    Apply topically 2 times daily as needed     BUDESONIDE-FORMOTEROL (SYMBICORT) 160-4.5 MCG/ACT AERO    Inhale 2 puffs into the lungs every morning     CHOLECALCIFEROL (VITAMIN D3) 1000 UNITS TABS    Take 1,000 Units by mouth Five times weekly Indications: Mon-Fri     DOCUSATE SODIUM (COLACE) 100 MG CAPSULE    Take 1 capsule by mouth daily    EZETIMIBE (ZETIA) 10 MG TABLET    Take 10 mg by mouth daily     FAMOTIDINE (PEPCID) 10 MG TABLET    Take 10 mg by mouth daily    FOLIC ACID (FOLVITE) 1 MG TABLET    Take 1 tablet by mouth daily. K PHOS MONO-SOD PHOS DI & MONO (PHOSPHA 250 NEUTRAL PO)    Take 1 tablet by mouth 2 times daily    LEVOTHYROXINE (SYNTHROID) 100 MCG TABLET    Take 100 mcg by mouth Daily     METOCLOPRAMIDE (REGLAN) 5 MG TABLET    Take 1 tablet by mouth 2 times daily 2tabs am and 2 tabs hs    MIDODRINE (PROAMATINE) 2.5 MG TABLET    Take 1 tablet by mouth 3 times daily (with meals)    POLYETHYLENE GLYCOL 3350 (MIRALAX PO)    Take 17 g by mouth daily as needed     ROSUVASTATIN (CRESTOR) 40 MG TABLET    Take 1 tablet by mouth daily    SCOPOLAMINE (TRANSDERM-SCOP) TRANSDERMAL PATCH    Place 1 patch onto the skin every 72 hours    WARFARIN (COUMADIN) 2.5 MG TABLET    Take 1.25 mg by mouth daily as directed by [facility]. PAST MEDICAL HISTORY         Diagnosis Date    Anesthesia complication     DIFFICULT TIME WAKING UP    Asthma     INHALER USE DAILY AND AS NEEDED DX AS A CHILD    Atrial fibrillation ([address] 2003    INTERMITTENT (DR. Archana Mathew)    Cerebral artery occlusion with cerebral infarction ([address] 2006    tia x2. Pt denies history of a stroke (Written 02/04/2020).     Chronic back pain     Chronic kidney disease     chronic kidney disease dr Sol Patel nephrologist   Stage 3    Cirrhosis ([address]     Constipation     COPD (chronic obstructive pulmonary disease) ([address] 2003    INHALER USE DAILY AND AS NEEDED    Dental bridge present     PERMANENT UPPER X 2 BRIDGES UPPER FRONT    Diet-controlled diabetes mellitus (HCC)     Difficult intravenous access     USUALLY USE ULTRASOUND ON ANESTHESIA STARTS VEINS ARE SMALL AND DEEP DO NOT USE HANDS    Diverticulitis     Gastroparesis     GERD (gastroesophageal reflux disease)     ON RX    H/O mastectomy     fibrocystic disease.  BILATERAL MASTECTOMY    Hepatitis 2008    hep c, took meds for hep c    History of blood transfusion     2008    History of Coumadin therapy     on coumadin daily    History of DVT (deep vein thrombosis)     Several years ago (Written 02/04/2020)    History of echocardiogram 03/2018    EF 55%    History of stress test 2015    EF > 70%    Hx of blood clots     states has had on multiple occassions    Hyperlipidemia     Hypertension     Hypothyroidism     Impaired mobility     uses walker    Incontinence     wears a pad    Incontinence of bowel     wears depends    Liver disease     hepatitis c-RESOLVED 2008 WITH TREATMENT    Lower extremity neuropathy     Lumbar disc disease     OAB (overactive bladder)     Osteoarthritis     PONV (postoperative nausea and vomiting)     Prolonged emergence from general anesthesia     Rectocele     Rotator cuff tear     right side, needs surgery    Snores     states has been tested for sleep apnea and does not have it    Type II or unspecified type diabetes mellitus without mention of complication, not stated as uncontrolled     last A1C was 5.2 11/27/2019    Use of cane as ambulatory aid     Uses roller walker     Wears glasses     READING       SURGICAL HISTORY           Procedure Laterality Date    APPENDECTOMY  1964    BREAST SURGERY Bilateral 1982    mastectomy, fibrocystic breast, mother had passed from breast cancer    CHOLECYSTECTOMY      CHOLECYSTECTOMY  1976    COLONOSCOPY      EYE SURGERY Left 2006   [address]    PARTIAL    KNEE SURGERY Left 2013    ARTHROSCOPY    NERVE SURGERY N/A 10/23/2020    SACRAL NERVE STIMULATOR IMPLANT STAGE 1 AND STAGE 2, C-ARM performed by Iván Reyes MD at [address] [facility]ND ANTERPOST COLPORRAPHY W/CYSTO W/NTRCL RPR N/A 2/12/2018    VAGINAL REPAIR ANTERIOR AND POSTERIOR, SOLYX BLADDER SLING, CYSTO, performed by hSeri Macias DO at noted above the remainder of the review of systems was reviewed and negative. PHYSICAL EXAM    (up to 7 for level 4, 8 or more for level 5)     Vitals:    01/25/22 1319   BP: (!) 148/57   Pulse: 98   Resp: 18   Temp: 97.7 °F (36.5 °C)   TempSrc: Oral   SpO2: 95%   Weight: 217 lb (98.4 kg)   Height: 5' 2\" (1.575 m)       Physical Exam  Constitutional:       General: She is not in acute distress. Appearance: She is well-developed. She is not diaphoretic. HENT:      Head: Normocephalic and atraumatic. Eyes:      General: No scleral icterus. Right eye: No discharge. Left eye: No discharge. Cardiovascular:      Rate and Rhythm: Normal rate and regular rhythm. Pulmonary:      Effort: Pulmonary effort is normal. No respiratory distress. Breath sounds: Normal breath sounds. No stridor. No wheezing or rales. Abdominal:      General: There is no distension. Palpations: Abdomen is soft. Tenderness: There is no abdominal tenderness. Musculoskeletal:         General: Normal range of motion. Cervical back: Neck supple. Right lower leg: Edema present. Left lower leg: Edema present. Lymphadenopathy:      Cervical: No cervical adenopathy. Skin:     General: Skin is warm and dry. Findings: No erythema or rash. Neurological:      Mental Status: She is alert and oriented to person, place, and time. Psychiatric:         Behavior: Behavior normal.             DIAGNOSTIC RESULTS     EKG: All EKG's are interpreted by the Emergency Department Physician who either signs or Co-signs this chart in the absence of a cardiologist.    EKG on my interpretation shows no acute findings, no ST elevation.     RADIOLOGY:   Non-plain film images such as CT, Ultrasound and MRI are read by the radiologist. Plain radiographic images are visualized and preliminarily interpreted by the emergency physician with the below findings:    Interpretation per the Radiologist below, if available at the time of this note:    XR CHEST PORTABLE    Result Date: 1/25/2022  EXAMINATION: ONE XRAY VIEW OF THE CHEST 1/25/2022 2:19 pm COMPARISON: 11/08/2021. HISTORY: ORDERING SYSTEM PROVIDED HISTORY: Chest Pain TECHNOLOGIST PROVIDED HISTORY: Chest Pain Reason for Exam: port ap upright  chest pain FINDINGS: The heart size is within normal limits. The pulmonary vasculature is also within normal limits. No acute infiltrates are seen. The costophrenic angles are sharp bilaterally. No pneumothoraces are noted. There are calcified lymph nodes and granulomas. 1. No active pulmonary disease. LABS:  Labs Reviewed   CBC WITH AUTO DIFFERENTIAL - Abnormal; Notable for the following components:       Result Value    WBC 14.0 (*)     Seg Neutrophils 83 (*)     Lymphocytes 6 (*)     Eosinophils % 0 (*)     Segs Absolute 11.58 (*)     Absolute Lymph # 0.86 (*)     Absolute Mono # 1.50 (*)     All other components within normal limits   BASIC METABOLIC PANEL - Abnormal; Notable for the following components:    Glucose 127 (*)     BUN 29 (*)     CREATININE 4.17 (*)     Bun/Cre Ratio 7 (*)     CO2 15 (*)     GFR Non- 10 (*)     GFR  13 (*)     All other components within normal limits   TROP/MYOGLOBIN - Abnormal; Notable for the following components:    Troponin, High Sensitivity 420 (*)     All other components within normal limits   URINALYSIS   TROP/MYOGLOBIN       All other labs were within normal range or not returned as of this dictation. EMERGENCY DEPARTMENT COURSE and DIFFERENTIAL DIAGNOSIS/MDM:   Vitals:    Vitals:    01/25/22 1319   BP: (!) 148/57   Pulse: 98   Resp: 18   Temp: 97.7 °F (36.5 °C)   TempSrc: Oral   SpO2: 95%   Weight: 217 lb (98.4 kg)   Height: 5' 2\" (1.575 m)       No orders of the defined types were placed in this encounter. Medical Decision Making: Creatinine is elevated above her baseline.  Troponin is also elevated at 420 this is likely not just from her kidney function. She has been having chest pain for a week but there is no ST segment elevation. Repeat troponin is pending but I suspect NSTEMI. CONSULTS:  IP CONSULT TO HOSPITALIST    PROCEDURES:  None    FINAL IMPRESSION      1. Acute renal failure superimposed on chronic kidney disease, unspecified CKD stage, unspecified acute renal failure type (HCC)    2. Elevated troponin    3. NSTEMI (non-ST elevated myocardial infarction) [facility])          DISPOSITION/PLAN   DISPOSITION Decision To Admit 01/25/2022 03:35:27 PM      PATIENT REFERRED TO:   No follow-up provider specified. DISCHARGE MEDICATIONS:     New Prescriptions    No medications on file       The care is provided during an unprecedented national emergency due to the novel coronavirus, COVID-19.     (Please note that portions of this note were completed with a voice recognition program.  Efforts were made to edit the dictations but occasionally words are mis-transcribed.)    Marcello Ceja MD  Attending Emergency Physician           Marcello Ceja MD  01/25/22 6821

## 2022-01-26 PROBLEM — E11.9 DIET-CONTROLLED DIABETES MELLITUS (HCC): Status: ACTIVE | Noted: 2022-01-26

## 2022-01-26 LAB
ALBUMIN SERPL-MCNC: 3.2 G/DL (ref 3.5–5.2)
ALBUMIN/GLOBULIN RATIO: ABNORMAL (ref 1–2.5)
ALP BLD-CCNC: 85 U/L (ref 35–104)
ALT SERPL-CCNC: 62 U/L (ref 5–33)
ANION GAP SERPL CALCULATED.3IONS-SCNC: 15 MMOL/L (ref 9–17)
ANION GAP SERPL CALCULATED.3IONS-SCNC: 15 MMOL/L (ref 9–17)
ANION GAP SERPL CALCULATED.3IONS-SCNC: 17 MMOL/L (ref 9–17)
AST SERPL-CCNC: 216 U/L
BILIRUB SERPL-MCNC: 0.67 MG/DL (ref 0.3–1.2)
BNP INTERPRETATION: ABNORMAL
BUN BLDV-MCNC: 27 MG/DL (ref 8–23)
BUN BLDV-MCNC: 29 MG/DL (ref 8–23)
BUN BLDV-MCNC: 29 MG/DL (ref 8–23)
BUN/CREAT BLD: 7 (ref 9–20)
CALCIUM SERPL-MCNC: 8.8 MG/DL (ref 8.6–10.4)
CALCIUM SERPL-MCNC: 8.8 MG/DL (ref 8.6–10.4)
CALCIUM SERPL-MCNC: 9 MG/DL (ref 8.6–10.4)
CHLORIDE BLD-SCNC: 104 MMOL/L (ref 98–107)
CHLORIDE BLD-SCNC: 105 MMOL/L (ref 98–107)
CHLORIDE BLD-SCNC: 105 MMOL/L (ref 98–107)
CO2: 13 MMOL/L (ref 20–31)
CO2: 14 MMOL/L (ref 20–31)
CO2: 15 MMOL/L (ref 20–31)
CREAT SERPL-MCNC: 4.08 MG/DL (ref 0.5–0.9)
CREAT SERPL-MCNC: 4.11 MG/DL (ref 0.5–0.9)
CREAT SERPL-MCNC: 4.14 MG/DL (ref 0.5–0.9)
EKG ATRIAL RATE: 97 BPM
EKG P AXIS: 50 DEGREES
EKG P-R INTERVAL: 194 MS
EKG Q-T INTERVAL: 360 MS
EKG QRS DURATION: 74 MS
EKG QTC CALCULATION (BAZETT): 457 MS
EKG R AXIS: 67 DEGREES
EKG T AXIS: 32 DEGREES
EKG VENTRICULAR RATE: 97 BPM
EOSINOPHIL,URINE: NORMAL
GFR AFRICAN AMERICAN: 13 ML/MIN
GFR NON-AFRICAN AMERICAN: 11 ML/MIN
GFR SERPL CREATININE-BSD FRML MDRD: ABNORMAL ML/MIN/{1.73_M2}
GLUCOSE BLD-MCNC: 107 MG/DL (ref 70–99)
GLUCOSE BLD-MCNC: 117 MG/DL (ref 70–99)
GLUCOSE BLD-MCNC: 137 MG/DL (ref 65–105)
GLUCOSE BLD-MCNC: 142 MG/DL (ref 65–105)
GLUCOSE BLD-MCNC: 153 MG/DL (ref 65–105)
GLUCOSE BLD-MCNC: 171 MG/DL (ref 70–99)
HCT VFR BLD CALC: 36.1 % (ref 36.3–47.1)
HEMOGLOBIN: 11.7 G/DL (ref 11.9–15.1)
INR BLD: 10.4
MAGNESIUM: 2.1 MG/DL (ref 1.6–2.6)
MCH RBC QN AUTO: 32.2 PG (ref 25.2–33.5)
MCHC RBC AUTO-ENTMCNC: 32.4 G/DL (ref 28.4–34.8)
MCV RBC AUTO: 99.4 FL (ref 82.6–102.9)
MYOGLOBIN: ABNORMAL NG/ML (ref 25–58)
NRBC AUTOMATED: 0 PER 100 WBC
PDW BLD-RTO: 14 % (ref 11.8–14.4)
PHOSPHORUS: 6.5 MG/DL (ref 2.6–4.5)
PLATELET # BLD: 220 K/UL (ref 138–453)
PMV BLD AUTO: 10.4 FL (ref 8.1–13.5)
POTASSIUM SERPL-SCNC: 4.4 MMOL/L (ref 3.7–5.3)
POTASSIUM SERPL-SCNC: 4.4 MMOL/L (ref 3.7–5.3)
POTASSIUM SERPL-SCNC: 4.6 MMOL/L (ref 3.7–5.3)
PRO-BNP: 1242 PG/ML
PROTHROMBIN TIME: 78.1 SEC (ref 11.5–14.2)
RBC # BLD: 3.63 M/UL (ref 3.95–5.11)
SODIUM BLD-SCNC: 134 MMOL/L (ref 135–144)
SODIUM BLD-SCNC: 134 MMOL/L (ref 135–144)
SODIUM BLD-SCNC: 135 MMOL/L (ref 135–144)
TOTAL CK: 9370 U/L (ref 26–192)
TOTAL PROTEIN: 6.7 G/DL (ref 6.4–8.3)
TROPONIN INTERP: ABNORMAL
TROPONIN INTERP: ABNORMAL
TROPONIN T: ABNORMAL NG/ML
TROPONIN T: ABNORMAL NG/ML
TROPONIN, HIGH SENSITIVITY: 381 NG/L (ref 0–14)
TROPONIN, HIGH SENSITIVITY: 387 NG/L (ref 0–14)
WBC # BLD: 11.4 K/UL (ref 3.5–11.3)

## 2022-01-26 PROCEDURE — 97163 PT EVAL HIGH COMPLEX 45 MIN: CPT

## 2022-01-26 PROCEDURE — 85610 PROTHROMBIN TIME: CPT

## 2022-01-26 PROCEDURE — C9113 INJ PANTOPRAZOLE SODIUM, VIA: HCPCS | Performed by: NURSE PRACTITIONER

## 2022-01-26 PROCEDURE — 93308 TTE F-UP OR LMTD: CPT

## 2022-01-26 PROCEDURE — 6370000000 HC RX 637 (ALT 250 FOR IP): Performed by: INTERNAL MEDICINE

## 2022-01-26 PROCEDURE — 83880 ASSAY OF NATRIURETIC PEPTIDE: CPT

## 2022-01-26 PROCEDURE — 97530 THERAPEUTIC ACTIVITIES: CPT

## 2022-01-26 PROCEDURE — 82947 ASSAY GLUCOSE BLOOD QUANT: CPT

## 2022-01-26 PROCEDURE — 6370000000 HC RX 637 (ALT 250 FOR IP): Performed by: NURSE PRACTITIONER

## 2022-01-26 PROCEDURE — 84100 ASSAY OF PHOSPHORUS: CPT

## 2022-01-26 PROCEDURE — 93010 ELECTROCARDIOGRAM REPORT: CPT | Performed by: INTERNAL MEDICINE

## 2022-01-26 PROCEDURE — 2500000003 HC RX 250 WO HCPCS: Performed by: INTERNAL MEDICINE

## 2022-01-26 PROCEDURE — 99233 SBSQ HOSP IP/OBS HIGH 50: CPT | Performed by: INTERNAL MEDICINE

## 2022-01-26 PROCEDURE — 6360000002 HC RX W HCPCS: Performed by: NURSE PRACTITIONER

## 2022-01-26 PROCEDURE — 2580000003 HC RX 258: Performed by: NURSE PRACTITIONER

## 2022-01-26 PROCEDURE — 2580000003 HC RX 258: Performed by: INTERNAL MEDICINE

## 2022-01-26 PROCEDURE — 94640 AIRWAY INHALATION TREATMENT: CPT

## 2022-01-26 PROCEDURE — 83735 ASSAY OF MAGNESIUM: CPT

## 2022-01-26 PROCEDURE — 36415 COLL VENOUS BLD VENIPUNCTURE: CPT

## 2022-01-26 PROCEDURE — 94761 N-INVAS EAR/PLS OXIMETRY MLT: CPT

## 2022-01-26 PROCEDURE — 80048 BASIC METABOLIC PNL TOTAL CA: CPT

## 2022-01-26 PROCEDURE — 85027 COMPLETE CBC AUTOMATED: CPT

## 2022-01-26 PROCEDURE — 86334 IMMUNOFIX E-PHORESIS SERUM: CPT

## 2022-01-26 PROCEDURE — 83874 ASSAY OF MYOGLOBIN: CPT

## 2022-01-26 PROCEDURE — 84484 ASSAY OF TROPONIN QUANT: CPT

## 2022-01-26 PROCEDURE — 82550 ASSAY OF CK (CPK): CPT

## 2022-01-26 PROCEDURE — 80053 COMPREHEN METABOLIC PANEL: CPT

## 2022-01-26 PROCEDURE — 93005 ELECTROCARDIOGRAM TRACING: CPT | Performed by: INTERNAL MEDICINE

## 2022-01-26 PROCEDURE — 2060000000 HC ICU INTERMEDIATE R&B

## 2022-01-26 RX ORDER — BISACODYL 10 MG
10 SUPPOSITORY, RECTAL RECTAL DAILY PRN
Status: DISCONTINUED | OUTPATIENT
Start: 2022-01-26 | End: 2022-01-28 | Stop reason: HOSPADM

## 2022-01-26 RX ORDER — FOLIC ACID 1 MG/1
1 TABLET ORAL DAILY
Status: DISCONTINUED | OUTPATIENT
Start: 2022-01-26 | End: 2022-01-28 | Stop reason: HOSPADM

## 2022-01-26 RX ORDER — ALBUTEROL SULFATE 90 UG/1
2 AEROSOL, METERED RESPIRATORY (INHALATION) 4 TIMES DAILY
Status: DISCONTINUED | OUTPATIENT
Start: 2022-01-26 | End: 2022-01-28 | Stop reason: HOSPADM

## 2022-01-26 RX ORDER — PROCHLORPERAZINE EDISYLATE 5 MG/ML
10 INJECTION INTRAMUSCULAR; INTRAVENOUS ONCE
Status: COMPLETED | OUTPATIENT
Start: 2022-01-26 | End: 2022-01-26

## 2022-01-26 RX ORDER — NICOTINE POLACRILEX 4 MG
15 LOZENGE BUCCAL PRN
Status: DISCONTINUED | OUTPATIENT
Start: 2022-01-26 | End: 2022-01-28 | Stop reason: HOSPADM

## 2022-01-26 RX ORDER — PANTOPRAZOLE SODIUM 40 MG/10ML
40 INJECTION, POWDER, LYOPHILIZED, FOR SOLUTION INTRAVENOUS DAILY
Status: DISCONTINUED | OUTPATIENT
Start: 2022-01-26 | End: 2022-01-26

## 2022-01-26 RX ORDER — PHYTONADIONE 5 MG/1
5 TABLET ORAL ONCE
Status: COMPLETED | OUTPATIENT
Start: 2022-01-26 | End: 2022-01-26

## 2022-01-26 RX ORDER — DEXTROSE MONOHYDRATE 50 MG/ML
100 INJECTION, SOLUTION INTRAVENOUS PRN
Status: DISCONTINUED | OUTPATIENT
Start: 2022-01-26 | End: 2022-01-28 | Stop reason: HOSPADM

## 2022-01-26 RX ORDER — MIDODRINE HYDROCHLORIDE 5 MG/1
5 TABLET ORAL
Status: ON HOLD | COMMUNITY
End: 2022-01-28 | Stop reason: HOSPADM

## 2022-01-26 RX ORDER — MIDODRINE HYDROCHLORIDE 5 MG/1
2.5 TABLET ORAL 3 TIMES DAILY PRN
Status: DISCONTINUED | OUTPATIENT
Start: 2022-01-26 | End: 2022-01-28 | Stop reason: HOSPADM

## 2022-01-26 RX ORDER — ALBUTEROL SULFATE 90 UG/1
2 AEROSOL, METERED RESPIRATORY (INHALATION) EVERY 6 HOURS PRN
Status: DISCONTINUED | OUTPATIENT
Start: 2022-01-26 | End: 2022-01-28 | Stop reason: HOSPADM

## 2022-01-26 RX ORDER — POLYETHYLENE GLYCOL 3350 17 G/17G
17 POWDER, FOR SOLUTION ORAL DAILY PRN
Status: DISCONTINUED | OUTPATIENT
Start: 2022-01-26 | End: 2022-01-28 | Stop reason: HOSPADM

## 2022-01-26 RX ORDER — DOCUSATE SODIUM 100 MG/1
100 CAPSULE, LIQUID FILLED ORAL DAILY
Status: DISCONTINUED | OUTPATIENT
Start: 2022-01-26 | End: 2022-01-28 | Stop reason: HOSPADM

## 2022-01-26 RX ORDER — METOCLOPRAMIDE 5 MG/1
10 TABLET ORAL 2 TIMES DAILY
Status: ON HOLD | COMMUNITY
End: 2022-01-28 | Stop reason: HOSPADM

## 2022-01-26 RX ORDER — OMEPRAZOLE 20 MG/1
20 CAPSULE, DELAYED RELEASE ORAL
COMMUNITY

## 2022-01-26 RX ORDER — DEXTROSE MONOHYDRATE 25 G/50ML
12.5 INJECTION, SOLUTION INTRAVENOUS PRN
Status: DISCONTINUED | OUTPATIENT
Start: 2022-01-26 | End: 2022-01-26 | Stop reason: ALTCHOICE

## 2022-01-26 RX ORDER — CALCIUM ACETATE 667 MG/1
1 CAPSULE ORAL
Status: DISCONTINUED | OUTPATIENT
Start: 2022-01-26 | End: 2022-01-28 | Stop reason: HOSPADM

## 2022-01-26 RX ADMIN — PROCHLORPERAZINE EDISYLATE 10 MG: 5 INJECTION INTRAMUSCULAR; INTRAVENOUS at 20:11

## 2022-01-26 RX ADMIN — FOLIC ACID 1 MG: 1 TABLET ORAL at 12:54

## 2022-01-26 RX ADMIN — AMIODARONE HYDROCHLORIDE 200 MG: 200 TABLET ORAL at 07:40

## 2022-01-26 RX ADMIN — Medication 2 PUFF: at 12:20

## 2022-01-26 RX ADMIN — SODIUM CHLORIDE: 9 INJECTION, SOLUTION INTRAVENOUS at 00:18

## 2022-01-26 RX ADMIN — METOCLOPRAMIDE 5 MG: 5 TABLET ORAL at 16:38

## 2022-01-26 RX ADMIN — BUDESONIDE AND FORMOTEROL FUMARATE DIHYDRATE 2 PUFF: 160; 4.5 AEROSOL RESPIRATORY (INHALATION) at 09:45

## 2022-01-26 RX ADMIN — Medication 2 PUFF: at 16:36

## 2022-01-26 RX ADMIN — PANTOPRAZOLE SODIUM 40 MG: 40 INJECTION, POWDER, FOR SOLUTION INTRAVENOUS at 07:40

## 2022-01-26 RX ADMIN — INSULIN LISPRO 1 UNITS: 100 INJECTION, SOLUTION INTRAVENOUS; SUBCUTANEOUS at 17:17

## 2022-01-26 RX ADMIN — EZETIMIBE 10 MG: 10 TABLET ORAL at 07:40

## 2022-01-26 RX ADMIN — PHYTONADIONE 5 MG: 5 TABLET ORAL at 00:22

## 2022-01-26 RX ADMIN — SODIUM CHLORIDE, PRESERVATIVE FREE 10 ML: 5 INJECTION INTRAVENOUS at 19:25

## 2022-01-26 RX ADMIN — DOCUSATE SODIUM 100 MG: 100 CAPSULE, LIQUID FILLED ORAL at 12:54

## 2022-01-26 RX ADMIN — CALCIUM ACETATE 667 MG: 667 CAPSULE ORAL at 16:38

## 2022-01-26 RX ADMIN — Medication 2 PUFF: at 16:37

## 2022-01-26 RX ADMIN — Medication 2 PUFF: at 19:24

## 2022-01-26 RX ADMIN — SODIUM BICARBONATE: 84 INJECTION, SOLUTION INTRAVENOUS at 16:35

## 2022-01-26 RX ADMIN — Medication 2 PUFF: at 09:45

## 2022-01-26 RX ADMIN — LEVOTHYROXINE SODIUM 100 MCG: 0.1 TABLET ORAL at 06:41

## 2022-01-26 RX ADMIN — METOCLOPRAMIDE 5 MG: 5 TABLET ORAL at 06:41

## 2022-01-26 RX ADMIN — SODIUM CHLORIDE, PRESERVATIVE FREE 10 ML: 5 INJECTION INTRAVENOUS at 07:42

## 2022-01-26 RX ADMIN — PHYTONADIONE 5 MG: 5 TABLET ORAL at 12:54

## 2022-01-26 RX ADMIN — CALCIUM ACETATE 667 MG: 667 CAPSULE ORAL at 12:54

## 2022-01-26 ASSESSMENT — ENCOUNTER SYMPTOMS
ABDOMINAL PAIN: 0
CONSTIPATION: 1
VOMITING: 0
SHORTNESS OF BREATH: 1
NAUSEA: 0
COUGH: 0

## 2022-01-26 ASSESSMENT — PAIN DESCRIPTION - LOCATION: LOCATION: CHEST

## 2022-01-26 ASSESSMENT — PAIN DESCRIPTION - PAIN TYPE: TYPE: ACUTE PAIN

## 2022-01-26 ASSESSMENT — PAIN SCALES - GENERAL: PAINLEVEL_OUTOF10: 5

## 2022-01-26 NOTE — PROGRESS NOTES
Physical Therapy    Facility/Department: [facility]  Initial Assessment    NAME: Tomas Stein  : 1950  MRN: [id number]    Date of Service: 2022    Discharge Recommendations:  Patient would benefit from continued therapy after discharge        Assessment   Body structures, Functions, Activity limitations: Decreased functional mobility ; Decreased strength;Decreased endurance;Decreased balance;Decreased posture  Assessment: Pt limited by general weakness and poor endurance  Prognosis: Good  Decision Making: High Complexity  PT Education: PT Role;Plan of Care;General Safety  Patient Education: LE in bed HEP handout  REQUIRES PT FOLLOW UP: Yes  Activity Tolerance  Activity Tolerance: Patient limited by endurance;Treatment limited secondary to medical complications (free text)       Patient Diagnosis(es): The primary encounter diagnosis was Acute renal failure superimposed on chronic kidney disease, unspecified CKD stage, unspecified acute renal failure type ([facility] Diagnoses of Elevated troponin and NSTEMI (non-ST elevated myocardial infarction) [facility]) were also pertinent to this visit.      has a past medical history of Anesthesia complication, Asthma, Atrial fibrillation ([facility] Cerebral artery occlusion with cerebral infarction [facility]), Chronic back pain, Chronic kidney disease, Cirrhosis ([facility] Constipation, COPD (chronic obstructive pulmonary disease) ([facility] Dental bridge present, Diet-controlled diabetes mellitus ([facility] Difficult intravenous access, Diverticulitis, Gastroparesis, GERD (gastroesophageal reflux disease), H/O mastectomy, Hepatitis, History of blood transfusion, History of Coumadin therapy, History of DVT (deep vein thrombosis), History of echocardiogram, History of stress test, Hx of blood clots, Hyperlipidemia, Hypertension, Hypothyroidism, Impaired mobility, Incontinence, Incontinence of bowel, Liver disease, Lower extremity neuropathy, Lumbar disc disease, OAB (overactive bladder), Osteoarthritis, PONV (postoperative nausea and vomiting), Prolonged emergence from general anesthesia, Rectocele, Rotator cuff tear, Snores, Type II or unspecified type diabetes mellitus without mention of complication, not stated as uncontrolled, Use of cane as ambulatory aid, Uses roller walker, and Wears glasses. has a past surgical history that includes Cholecystectomy; Upper gastrointestinal endoscopy; Colonoscopy; pr cmbnd anterpost colporraphy w/cysto w/ntrcl rpr (N/A, 2/12/2018); Appendectomy (1964); Hysterectomy (1978); Breast surgery (Bilateral, 1982); shoulder surgery (Left, 2005); eye surgery (Left, 2006); knee surgery (Left, 2013); Cholecystectomy (1976); shoulder surgery (Right, 07/08/2019); Rectal surgery (N/A, 2/17/2020); Stimulator Surgery (10/23/2020); and Nerve Surgery (N/A, 10/23/2020).     Restrictions  Restrictions/Precautions  Restrictions/Precautions: General Precautions,Fall Risk  Required Braces or Orthoses?: No  Vision/Hearing  Vision: Impaired  Vision Exceptions: Wears glasses for reading  Hearing: Within functional limits     Subjective  General  Chart Reviewed: Yes  Patient assessed for rehabilitation services?: Yes  Response To Previous Treatment: Not applicable  Family / Caregiver Present: No  Follows Commands: Within Functional Limits  General Comment  Comments: OK for PT per Penelope MARKHAM  Pain Screening  Patient Currently in Pain: Yes  Pain Assessment  Pain Assessment: 0-10  Pain Level: 5 (B knees 7/10)  Pain Type: Acute pain  Pain Location: Chest  Vital Signs  Patient Currently in Pain: Yes       Orientation  Orientation  Overall Orientation Status: Within Normal Limits  Social/Functional History  Social/Functional History  Lives With: Alone  Type of Home: Apartment  Home Layout: One level  Home Access: Stairs to enter without rails  Entrance Stairs - Number of Steps: 1  Bathroom Shower/Tub: Tub/Shower unit  Bathroom Toilet: Standard  Bathroom Equipment: Grab bars in shower  Bathroom Accessibility: Accessible  Home Equipment: 4 wheeled walker,Cane (Sock aid, long handled shoe horn)  ADL Assistance: Independent (Was fully independent prior to 2 weeks ago per pt.)  Homemaking Assistance: Independent  Homemaking Responsibilities: Yes  Ambulation Assistance: Independent (No device in home,4WW outside of home)  Transfer Assistance: Independent  Active : Yes  Occupation: Retired  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  Cognition   Cognition  Overall Cognitive Status: WNL    Objective     Observation/Palpation  Posture: Fair  Observation: Pt in bed when PT arrived    AROM RLE (degrees)  RLE AROM: WNL  AROM LLE (degrees)  LLE AROM : WNL  AROM RUE (degrees)  RUE General AROM: See OT eval for B UE ROM  Strength RLE  Strength RLE: WFL  Comment: B LE's 3+/5 to 4/5, ankles 4+/5  Strength LLE  Strength LLE: WFL  Strength RUE  Comment: See OT eval for B UE MMT  Tone RLE  RLE Tone: Normotonic  Tone LLE  LLE Tone: Normotonic  Motor Control  Gross Motor?: WNL  Sensation  Overall Sensation Status: WNL  Bed mobility  Rolling to Left: Minimal assistance  Rolling to Right: Minimal assistance  Supine to Sit: Maximum assistance  Sit to Supine: Maximum assistance  Scooting: Maximal assistance  Transfers  Sit to Stand: 2 Person Assistance  Stand to sit: Maximum Assistance;2 Person Assistance  Comment: Pt able to tolerate standing <10 seconds, BTB  Ambulation  Ambulation?: No  Stairs/Curb  Stairs?: No     Balance  Posture: Fair  Sitting - Static: Good;-  Sitting - Dynamic: Fair;+  Standing - Static: Poor  Standing - Dynamic: Poor        Plan   Plan  Times per week: 1-2x/day,5-6 days/week  Current Treatment Recommendations: Strengthening,Balance Training,Functional Mobility Training,Transfer Training,Gait Training,Endurance Training (Posture)  Safety Devices  Type of devices: Gait belt,Left in bed,Bed alarm in place,Call light within reach  Restraints  Initially in place: No    G-Code OutComes Score                                                  AM-PAC Score  AM-PAC Inpatient Mobility Raw Score : 10 (01/26/22 1413)  AM-PAC Inpatient T-Scale Score : 32.29 (01/26/22 1413)  Mobility Inpatient CMS 0-100% Score: 76.75 (01/26/22 1413)  Mobility Inpatient CMS G-Code Modifier : CL (01/26/22 1413)          Goals  Short term goals  Time Frame for Short term goals: 12 treatments  Short term goal 1: Independent bed mobility  Short term goal 2: CGA transfers  Short term goal 3: Initiate ambulation  Short term goal 4: Good balance/posture  Short term goal 5:  Tolerate 30 min ther act/ 1/2 to 1 grade strength increase  Patient Goals   Patient goals : Get strength back       Therapy Time   Individual Concurrent Group Co-treatment   Time In 8247 (Treatment time 28 minutes)         Time Out 1120         Minutes Dashawn Rivera, [address] [facility]

## 2022-01-26 NOTE — CONSULTS
[facility]  CONSULT NOTE                  Date:   1/26/2022  Patient name: Radha Rodriguez  Date of admission:  1/25/2022  1:45 PM  MRN:   [id number]  YOB: 1950    Reason for Admission: KERMIT     CHIEF COMPLAINT:   Fatigue     History Obtained From:  Patient and chart review     HISTORY OF PRESENT ILLNESS:    75-year-old female with history of proximal atrial fibrillation on therapeutic anticoagulation with warfarin admitted with progressive fatigue and shortness of breath. Sent to hospital due to progressively worsening renal function. She does have baseline CKD stage III-IV. Creatinine worsened to 4.17, baseline 2-3. Potassium was normal.  Also found to have rhabdomyolysis with myoglobin of 24,676 and CK 9370. As far as cardiac symptoms are concerned she reports intermittent episode of substernal chest discomfort going on over last several months. Also reports dyspnea on exertion which is progressively worsened. Mild lower extremity edema. No orthopnea. No prior history of CAD. Was followed by Ryan Goyal as OP but patient would like to switch care.       Past Medical History:   has a past medical history of Anesthesia complication, Asthma, Atrial fibrillation ([facility] [address] Cerebral artery occlusion with cerebral infarction [facility]), Chronic back pain, Chronic kidney disease, Cirrhosis ([facility] [address] Constipation, COPD (chronic obstructive pulmonary disease) ([facility] [address] Dental bridge present, Diet-controlled diabetes mellitus ([facility] [address] Difficult intravenous access, Diverticulitis, Gastroparesis, GERD (gastroesophageal reflux disease), H/O mastectomy, Hepatitis, History of blood transfusion, History of Coumadin therapy, History of DVT (deep vein thrombosis), History of echocardiogram, History of stress test, Hx of blood clots, Hyperlipidemia, Hypertension, Hypothyroidism, Impaired mobility, Incontinence, Incontinence of bowel, Liver disease, Lower extremity neuropathy, Lumbar disc disease, OAB (overactive bladder), Osteoarthritis, PONV (postoperative nausea and vomiting), Prolonged emergence from general anesthesia, Rectocele, Rotator cuff tear, Snores, Type II or unspecified type diabetes mellitus without mention of complication, not stated as uncontrolled, Use of cane as ambulatory aid, Uses roller walker, and Wears glasses. Past Surgical History:   has a past surgical history that includes Cholecystectomy; Upper gastrointestinal endoscopy; Colonoscopy; pr cmbnd anterpost colporraphy w/cysto w/ntrcl rpr (N/A, 2/12/2018); Appendectomy (1964); Hysterectomy (1978); Breast surgery (Bilateral, 1982); shoulder surgery (Left, 2005); eye surgery (Left, 2006); knee surgery (Left, 2013); Cholecystectomy (1976); shoulder surgery (Right, 07/08/2019); Rectal surgery (N/A, 2/17/2020); Stimulator Surgery (10/23/2020); and Nerve Surgery (N/A, 10/23/2020). Home Medications:    Prior to Admission medications    Medication Sig Start Date End Date Taking? Authorizing Provider   omeprazole (PRILOSEC) 20 MG delayed release capsule Take 20 mg by mouth 2 times daily (before meals)   Yes Historical Provider, MD   midodrine (PROAMATINE) 5 MG tablet Take 5 mg by mouth 3 times daily (with meals)   Yes Historical Provider, MD   metoclopramide (REGLAN) 5 MG tablet Take 10 mg by mouth 2 times daily Takes 2 tabs (=10mg) BID   Yes Historical Provider, MD   levothyroxine (SYNTHROID) 100 MCG tablet Take 100 mcg by mouth Daily  11/1/21  Yes Historical Provider, MD   albuterol-ipratropium (COMBIVENT RESPIMAT)  MCG/ACT AERS inhaler Inhale 1 puff into the lungs every 6 hours   Yes Historical Provider, MD   budesonide-formoterol (SYMBICORT) 160-4.5 MCG/ACT AERO Inhale 2 puffs into the lungs every morning    Yes Historical Provider, MD   warfarin (COUMADIN) 2.5 MG tablet Take 1.25-2.5 mg by mouth daily as directed by [facility].     Yes Historical Provider, MD   Polyethylene Glycol 3350 (MIRALAX PO) Take 17 g by mouth daily as needed    Yes Historical Provider, MD   amiodarone (CORDARONE) 200 MG tablet Take 200 mg by mouth three times a week TAKES ON MONDAYS, [id number] Deb Santana. 5/31/20  Yes Historical Provider, MD   ezetimibe (ZETIA) 10 MG tablet Take 10 mg by mouth daily  5/31/20  Yes Historical Provider, MD   ammonium lactate (LAC-HYDRIN) 12 % lotion Apply topically 2 times daily as needed  9/10/19  Yes Historical Provider, MD   rosuvastatin (CRESTOR) 40 MG tablet Take 1 tablet by mouth daily   Yes Historical Provider, MD   K [place]-Sod Phos Di & Mono (PHOSPHA 250 NEUTRAL PO) Take 1 tablet by mouth 2 times daily   Yes Historical Provider, MD   albuterol sulfate  (90 Base) MCG/ACT inhaler Inhale 2 puffs into the lungs every 6 hours as needed for Wheezing   Yes Historical Provider, MD   albuterol (PROVENTIL) (2.5 MG/3ML) 0.083% nebulizer solution Take 3 mLs by nebulization every 4 hours as needed for Wheezing 3/27/18  Yes Gustabo Brown DO   folic acid (FOLVITE) 1 MG tablet Take 1 tablet by mouth daily. 11/27/13  Yes Nedra Curiel MD   Cholecalciferol (VITAMIN D3) 1000 UNITS TABS Take 1,000 Units by mouth Five times weekly Indications: Mon-Fri    Yes Historical Provider, MD       Allergies:  Lyrica [pregabalin], Pcn [penicillins], Nsaids, Zofran [ondansetron], Codeine, Cymbalta [duloxetine hcl], Morphine, Prolia [denosumab], and Seasonal    Social History:   reports that she quit smoking about 13 years ago. Her smoking use included cigarettes. She has a 43.00 pack-year smoking history. She has never used smokeless tobacco. She reports current alcohol use of about 1.0 standard drink of alcohol per week. She reports that she does not use drugs. Family History:   Negative for early CAD    REVIEW OF SYSTEMS:    · Constitutional: there has been recent decline in her functional capacity over last 6  · Eyes: No visual changes or diplopia. · ENT: No Headaches, hearing loss or vertigo.  No mouth sores or sore throat. · Cardiovascular: +ve chest pain as mentioned above, positive for dyspnea, negative orthopnea, palpitations or pre syncope  · Respiratory: No hx of productive cough, pleuritic chest pain   · Gastrointestinal: No abdominal pain, appetite loss, blood in stools. No change in bowel habits. · Genitourinary: No dysuria, trouble voiding, or hematuria. · Musculoskeletal:  No gait disturbance, No weakness or joint complaints. · Integumentary: No rash or pruritis. · Neurological: No headache, weakness, numbness or tingling. No change in gait, balance, coordination. · Psychiatric: No anxiety, or depression. · Endocrine: No temperature intolerance. No excessive thirst, fluid intake, or urination. No tremor. · Hematologic/Lymphatic: No abnormal bruising or bleeding, blood clots or swollen lymph nodes. · Allergic/Immunologic: No nasal congestion or hives. PHYSICAL EXAM:    Physical Examination:    BP (!) 116/42   Pulse 84   Temp 97.8 °F (36.6 °C) (Oral)   Resp 18   Ht 5' 2\" (1.575 m)   Wt 217 lb 4.8 oz (98.6 kg)   LMP 06/20/1978   SpO2 95%   BMI 39.74 kg/m²    Constitutional and General Appearance: alert, cooperative, in no distress on room  HEENT: PERRL, no cervical lymphadenopathy. Normal oral mucosa  Respiratory:  · Normal excursion and expansion without use of accessory muscles  Resp Auscultation: Good respiratory effort. No for increased work of breathing. On auscultation: clear to auscultation bilaterally with reduced air entry at the bases.   No rales or wheeze   cardiovascular:  · The apical impulse is not displaced  · Heart tones are crisp and normal. regular S1 and S2. Murmurs: None   · Jugular venous pulsation Normal  · Thre is no carotid bruit   · Peripheral pulses are symmetrical and full   Abdomen:  · No masses or tenderness  · Bowel sounds present  Extremities:  ·  No Cyanosis or Clubbing  ·  Lower extremity edema: Trace bilateral leg edema   ·  Skin: Warm and dry  Neurological:  · Not done     DATA:    Diagnostics:      EKG:  NSR,  Anterior infarct           Previous cardiac testing:     TTE 11/9/2021  Technically difficult study. Left ventricle is normal in size. Mild left ventricular hypertrophy. Global left ventricular systolic function is normal with an estimated  ejection fraction of 60 % . Due to the technical limitations of this study not all wall segments were  visualized. No obvious wall motion abnormalities  Grade I (mild) left ventricular diastolic dysfunction. Mild tricuspid regurgitation. Mild pulmonary hypertension. Estimated right ventricular systolic pressure  is 37YRYH. Trivial pericardial effusion. Prominent epicardial fat pad  Pleural effusion seen. Prominent epicardial fat pad         Labs:     CBC:   Recent Labs     01/25/22  1441 01/26/22  0509   WBC 14.0* 11.4*   HGB 13.2 11.7*   HCT 40.5 36.1*    220     BMP:   Recent Labs     01/26/22  0015 01/26/22  0509    134*   K 4.6 4.4   CO2 15* 14*   BUN 29* 27*   CREATININE 4.11* 4.08*   LABGLOM 11* 11*   GLUCOSE 117* 107*     BNP: No results for input(s): BNP in the last 72 hours. PT/INR:   Recent Labs     01/25/22  1441 01/26/22  0509   PROTIME 72.7* 78.1*   INR 9.4* 10.4*     APTT:  Recent Labs     01/25/22  1441   APTT 58.7*     CARDIAC ENZYMES:  Recent Labs     01/26/22  0509   CKTOTAL 9,370*     FASTING LIPID PANEL:  Lab Results   Component Value Date    HDL 51 01/11/2022    TRIG 185 01/03/2018     LIVER PROFILE:  Recent Labs     01/26/22  0509   *   ALT 62*   LABALBU 3.2*         IMPRESSION:      Acute on chronic renal insufficiency  CKD stage III-IV  Rhabdomyolysis  Supratherapeutic INR  Elevated troponins, downward trend, likely type II MI due to above. Underlying CAD not ruled out. Paroxysmal atrial fibrillation, currently normal sinus rhythm  Hypertension  Obesity  Hypothyroid  Chronic hepatitis C       RECOMMENDATIONS:  1. Nephrology evaluation  2.  Continue to hold Coumadin until INR less than 2  3. Low-dose beta-blocker and amiodarone. 4. Avoid statins due to rhabdomyolysis. 5. Limited echocardiogram to reassess LVEF  6. Trend troponins  7. Will eventually need left heart cath given symptoms of intermittent chest pain and risk factors,  once above issues are resolved  8.  Will continue to follow along       Mary Kate Kumar MD, [address] [facility]

## 2022-01-26 NOTE — PROGRESS NOTES
Transitions of Care Pharmacy Service   Medication Review    The patient's list of current home medications has been reviewed. Source(s) of information: patient, Epic, Surescripts refill report ([facility])    Other Notes Sheng filled Fludrocortisone 0.1mg daily and Spironolactone 100mg daily for her this month but she confirmed she is no longer taking these meds per physician instruction  She confirmed she still has her inhalers and nebulizer med at home (appears overdue for refills per pharmacy records but she sometimes gets samples from her physician)  War[facility]in [facility]. She was instructed to hold warfarin on 1/24 x 3 days for elevated INR then alternate 1.25mg and 2.5mg daily until next INR visit on 1/31. Please feel free to call me with any questions about this encounter. Thank you. Roma Gomez, [address] [place] [address]   Transitions [facility] Service  Phone:  [phone]  Fax: [phone]      Electronically signed by Roma Gomez [address] [place] [address] on 1/26/2022 at 10:56 AM           Medications Prior to Admission:   omeprazole (PRILOSEC) 20 MG delayed release capsule, Take 20 mg by mouth 2 times daily (before meals)  midodrine (PROAMATINE) 5 MG tablet, Take 5 mg by mouth 3 times daily (with meals)  metoclopramide (REGLAN) 5 MG tablet, Take 10 mg by mouth 2 times daily Takes 2 tabs (=10mg) BID  levothyroxine (SYNTHROID) 100 MCG tablet, Take 100 mcg by mouth Daily   albuterol-ipratropium (COMBIVENT RESPIMAT)  MCG/ACT AERS inhaler, Inhale 1 puff into the lungs every 6 hours  budesonide-formoterol (SYMBICORT) 160-4.5 MCG/ACT AERO, Inhale 2 puffs into the lungs every morning   warfarin (COUMADIN) 2.5 MG tablet, Take 1.25-2.5 mg by mouth daily as directed by [facility].    Polyethylene Glycol 3350 (MIRALAX PO), Take 17 g by mouth daily as needed   amiodarone (CORDARONE) 200 MG tablet, Take 200 mg by mouth three times a week TAKES ON MONDAYS, WEDNESDAYS AND FRIDAYS.  ezetimibe (ZETIA) 10 MG tablet, Take 10 mg by mouth daily   ammonium lactate (LAC-HYDRIN) 12 % lotion, Apply topically 2 times daily as needed   rosuvastatin (CRESTOR) 40 MG tablet, Take 1 tablet by mouth daily  K Phos [place]-Sod Phos Di & Mono (PHOSPHA 250 NEUTRAL PO), Take 1 tablet by mouth 2 times daily  albuterol sulfate  (90 Base) MCG/ACT inhaler, Inhale 2 puffs into the lungs every 6 hours as needed for Wheezing  albuterol (PROVENTIL) (2.5 MG/3ML) 0.083% nebulizer solution, Take 3 mLs by nebulization every 4 hours as needed for Wheezing  folic acid (FOLVITE) 1 MG tablet, Take 1 tablet by mouth daily.   Cholecalciferol (VITAMIN D3) 1000 UNITS TABS, Take 1,000 Units by mouth Five times weekly Indications: Mon-Fri

## 2022-01-26 NOTE — PROGRESS NOTES
Warfarin Dosing - Pharmacy Consult Note  Consulting Provider: MOISÉS  Indication:  Atrial Fibrillation  Warfarin Dose prior to admission: 2.5mg daily   Concurrent anticoagulants/antiplatelets: none  Significant Drug Interactions: amiodarone (incr INR)  Recent Labs     01/24/22  1005 01/25/22  1441 01/26/22  0509   INR 8.5* 9.4* 10.4*   HGB  --  13.2 11.7*   PLT  --  245 220   LABALBU  --   --  3.2*     Recent warfarin administrations        No warfarin orders with administrations found. Orders not given:            warfarin (COUMADIN) daily dosing (placeholder)                   Date   INR    Dose  1/25        9.4      none  1/26      10.4     5mg Vit K @ 0022 & @ 1254    Assessment/Plan  (Goal INR: 2 - 3)  INR elevated again today. Continue to hold warfarin. Active problem list reviewed. INR orders are placed. Chart reviewed for pertinent labs, drug/diet interactions, and past doses. Documentation of patient's clinical condition was reviewed. Pharmacy Dosing:  Pharmacy will continue to follow.

## 2022-01-26 NOTE — RT PROTOCOL NOTE
RT Inhaler-Nebulizer Bronchodilator Protocol Note    There is a bronchodilator order in the chart from a provider indicating to follow the RT Bronchodilator Protocol and there is an Initiate RT Inhaler-Nebulizer Bronchodilator Protocol order as well (see protocol at bottom of note). CXR Findings:  XR CHEST PORTABLE    Result Date: 1/25/2022  1. No active pulmonary disease. The findings from the last RT Protocol Assessment were as follows:   History Pulmonary Disease: Chronic pulmonary disease  Respiratory Pattern: Regular pattern and RR 12-20 bpm  Breath Sounds: Slightly diminished and/or crackles  Cough: Strong, spontaneous, non-productive  Indication for Bronchodilator Therapy: On home bronchodilators (QID at home)  Bronchodilator Assessment Score: 4    Aerosolized bronchodilator medication orders have been revised according to the RT Inhaler-Nebulizer Bronchodilator Protocol below. Respiratory Therapist to perform RT Therapy Protocol Assessment initially then follow the protocol. Repeat RT Therapy Protocol Assessment PRN for score 0-3 or on second treatment, BID, and PRN for scores above 3. No Indications - adjust the frequency to every 6 hours PRN wheezing or bronchospasm, if no treatments needed after 48 hours then discontinue using Per Protocol order mode. If indication present, adjust the RT bronchodilator orders based on the Bronchodilator Assessment Score as indicated below. Use Inhaler orders unless patient has one or more of the following: on home nebulizer, not able to hold breath for 10 seconds, is not alert and oriented, cannot activate and use MDI correctly, or respiratory rate 25 breaths per minute or more, then use the equivalent nebulizer order(s) with same Frequency and PRN reasons based on the score. If a patient is on this medication at home then do not decrease Frequency below that used at home.     0-3 - enter or revise RT bronchodilator order(s) to equivalent RT Bronchodilator order with Frequency of every 4 hours PRN for wheezing or increased work of breathing using Per Protocol order mode. 4-6 - enter or revise RT Bronchodilator order(s) to two equivalent RT bronchodilator orders with one order with BID Frequency and one order with Frequency of every 4 hours PRN wheezing or increased work of breathing using Per Protocol order mode. 7-10 - enter or revise RT Bronchodilator order(s) to two equivalent RT bronchodilator orders with one order with TID Frequency and one order with Frequency of every 4 hours PRN wheezing or increased work of breathing using Per Protocol order mode. 11-13 - enter or revise RT Bronchodilator order(s) to one equivalent RT bronchodilator order with QID Frequency and an Albuterol order with Frequency of every 4 hours PRN wheezing or increased work of breathing using Per Protocol order mode. Greater than 13 - enter or revise RT Bronchodilator order(s) to one equivalent RT bronchodilator order with every 4 hours Frequency and an Albuterol order with Frequency of every 2 hours PRN wheezing or increased work of breathing using Per Protocol order mode. RT to enter RT Home Evaluation for COPD & MDI Assessment order using Per Protocol order mode.     Electronically signed by Thad Curran RCP on 1/26/2022 at 11:06 AM

## 2022-01-26 NOTE — RT PROTOCOL NOTE
RT Inhaler-Nebulizer Bronchodilator Protocol Note    There is a bronchodilator order in the chart from a provider indicating to follow the RT Bronchodilator Protocol and there is an Initiate RT Inhaler-Nebulizer Bronchodilator Protocol order as well (see protocol at bottom of note). CXR Findings:  XR CHEST PORTABLE    Result Date: 1/25/2022  1. No active pulmonary disease. The findings from the last RT Protocol Assessment were as follows:   History Pulmonary Disease: Chronic pulmonary disease  Respiratory Pattern: Dyspnea on exertion or RR 21-25 bpm  Breath Sounds: Slightly diminished and/or crackles  Cough: Strong, spontaneous, non-productive  Indication for Bronchodilator Therapy:    Bronchodilator Assessment Score: 6    Aerosolized bronchodilator medication orders have been revised according to the RT Inhaler-Nebulizer Bronchodilator Protocol below. Respiratory Therapist to perform RT Therapy Protocol Assessment initially then follow the protocol. Repeat RT Therapy Protocol Assessment PRN for score 0-3 or on second treatment, BID, and PRN for scores above 3. No Indications - adjust the frequency to every 6 hours PRN wheezing or bronchospasm, if no treatments needed after 48 hours then discontinue using Per Protocol order mode. If indication present, adjust the RT bronchodilator orders based on the Bronchodilator Assessment Score as indicated below. Use Inhaler orders unless patient has one or more of the following: on home nebulizer, not able to hold breath for 10 seconds, is not alert and oriented, cannot activate and use MDI correctly, or respiratory rate 25 breaths per minute or more, then use the equivalent nebulizer order(s) with same Frequency and PRN reasons based on the score. If a patient is on this medication at home then do not decrease Frequency below that used at home.     0-3 - enter or revise RT bronchodilator order(s) to equivalent RT Bronchodilator order with Frequency of every 4 hours PRN for wheezing or increased work of breathing using Per Protocol order mode. 4-6 - enter or revise RT Bronchodilator order(s) to two equivalent RT bronchodilator orders with one order with BID Frequency and one order with Frequency of every 4 hours PRN wheezing or increased work of breathing using Per Protocol order mode. 7-10 - enter or revise RT Bronchodilator order(s) to two equivalent RT bronchodilator orders with one order with TID Frequency and one order with Frequency of every 4 hours PRN wheezing or increased work of breathing using Per Protocol order mode. 11-13 - enter or revise RT Bronchodilator order(s) to one equivalent RT bronchodilator order with QID Frequency and an Albuterol order with Frequency of every 4 hours PRN wheezing or increased work of breathing using Per Protocol order mode. Greater than 13 - enter or revise RT Bronchodilator order(s) to one equivalent RT bronchodilator order with every 4 hours Frequency and an Albuterol order with Frequency of every 2 hours PRN wheezing or increased work of breathing using Per Protocol order mode. RT to enter RT Home Evaluation for COPD & MDI Assessment order using Per Protocol order mode.     Electronically signed by Coco Gaspar RCP on 1/25/2022 at 9:29 PM

## 2022-01-26 NOTE — DISCHARGE INSTR - COC
Continuity of Care Form    Patient Name: Kevin Hernandes   :  1950  MRN:  [id number]    Admit date:  2022  Discharge date:  22    Code Status Order: Full Code   Advance Directives:      Admitting Physician:  Ana Galvan DO  PCP: Nilson Ko MD    Discharging Nurse: [facility] Unit/Room#: 2104/2104-01  Discharging Unit Phone Number: [phone]    Emergency Contact:   Extended Emergency Contact Information  Primary Emergency Contact: Jalen Starr  Address: [place] [address] Phone: [phone]  Work Phone: [phone]  Mobile Phone: [phone]  Relation: Child  Secondary Emergency Contact: Olamide Lagos/Ramón  Address: [facility] [address] [facility] Phone: [phone]  Work Phone: [phone]  Mobile Phone: [phone]  Relation: Child    Past Surgical History:  Past Surgical History:   Procedure Laterality Date    APPENDECTOMY  1964    BREAST SURGERY Bilateral     mastectomy, fibrocystic breast, mother had passed from breast cancer    [address] [facility] Left 2006    [address] [facility] Left     ARTHROSCOPY    NERVE SURGERY N/A 10/23/2020    SACRAL NERVE STIMULATOR IMPLANT STAGE 1 AND STAGE 2, C-ARM performed by Enrrique Garcia MD at [address] [place] [address] W/CYSTO W/NTRCL RPR N/A 2018    VAGINAL REPAIR ANTERIOR AND POSTERIOR, SOLYX BLADDER SLING, CYSTO, performed by Chapincito Yin DO at [address] [place], [place] [address] Floor N/A 2020    EXCISION PERIVAGINAL CYST performed by China Hurtado MD at [facility] Left     ROTATOR CUFF    SHOULDER SURGERY Right 2019    STIMULATOR SURGERY  10/23/2020    SACRAL NERVE STIMULATOR IMPLANT STAGE 1 AND STAGE 2,     UPPER GASTROINTESTINAL ENDOSCOPY         Immunization History:   Immunization History   Administered Date(s) Administered    COVID-19, J&J, PF, 0.5 mL 03/18/2021    Hepatitis A Adult (Havrix, Vaqta) 11/21/2018    Hepatitis B 03/05/2002, 04/11/2002, 11/27/2002, 11/21/2018, 12/21/2018    Influenza Vaccine, unspecified formulation 11/27/2002    Influenza Virus Vaccine 10/01/2018    Influenza, Nirav Carls, IM, PF (6 mo and older Fluzone, Flulaval, Fluarix, and 3 yrs and older Afluria) 10/26/2021    Influenza, Triv, 3 Years and older, IM, PF (Afluria 5yrs and older) 11/27/2002    PPD Test 03/05/2002    Pneumococcal Conjugate 13-valent ([place] [facility]) 01/13/2015, 03/22/2018    Td, unspecified formulation 03/05/2002       Active Problems:  Patient Active Problem List   Diagnosis Code    COPD without exacerbation ([facility] J44.9    Atrial fibrillation (HCC) I48.91    Stage 4 chronic kidney disease ([facility] N18.4    Esophageal reflux K21.9    Hyperlipidemia E78.5    Chronic back pain M54.9, G89.29    Osteoarthritis M19.90    Liver disease K76.9    H/O mastectomy Z90.10    Asthma J45.909    Back pain M54.9    Cystocele N81.10    Rectocele N81.6    Urinary incontinence, mixed N39.46    Essential hypertension I10    Hypothyroidism E03.9    Chronic hepatitis C (HCC) B18.2    Sigmoid diverticulitis K57.32    Contact hypersensitivity L23.9    Right upper quadrant pain R10.11    Gastroparesis K31.84    Diverticulitis large intestine w/o perforation or abscess w/bleeding K57.33    Adhesive capsulitis of shoulder M75.00    Cervical spondylosis M47.812    Cirrhosis of liver (HCC) K74.60    Graves' disease E05.00    Mitral valve disease I05.9    Morbid obesity (HCC) E66.01    Venous thrombosis of lower extremity I82.90    Acute cystitis without hematuria N30.00    History of DVT (deep vein thrombosis) Z86.718    Diabetic peripheral neuropathy (HCC) E11.42    Coumadin use Z79.01    Chest pain R07.9    KERMIT (acute kidney injury) ([facility] N17.9    Elevated troponin R77.8    Supratherapeutic INR R79.1    Moderate malnutrition (HCC) E44.0    Acute renal failure superimposed on chronic kidney disease ([facility] N17.9, N18.9    Non-traumatic rhabdomyolysis M62.82    Chronic kidney disease, stage V (very severe) (HCC) N18.5    NSTEMI (non-ST elevated myocardial infarction) ([facility] I21.4    Obesity (BMI 30-39. 9) E66.9    Hyperphosphatemia E83.39    Diet-controlled diabetes mellitus (HCC) E11.9       Isolation/Infection:   Isolation            No Isolation          Patient Infection Status       Infection Onset Added Last Indicated Last Indicated By Review Planned Expiration Resolved Resolved By    None active    Resolved    COVID-19 (Rule Out) 10/19/20 10/19/20 10/19/20 Covid-19 Ambulatory (Ordered)   10/20/20 Rule-Out Test Resulted            Nurse Assessment:  Last Vital Signs: BP (!) 130/47   Pulse 83   Temp 97.9 °F (36.6 °C) (Oral)   Resp 18   Ht 5' 2\" (1.575 m)   Wt 217 lb 4.8 oz (98.6 kg)   LMP 06/20/1978   SpO2 96%   BMI 39.74 kg/m²     Last documented pain score (0-10 scale): Pain Level: 5 (B knees 7/10)  Last Weight:   Wt Readings from Last 1 Encounters:   01/26/22 217 lb 4.8 oz (98.6 kg)     Mental Status:  oriented and alert    IV Access:  - None    Nursing Mobility/ADLs:  Walking   Dependent  Transfer  Dependent  Bathing  Dependent  Dressing  Dependent  Toileting  Dependent  Feeding  Independent  Med Admin  Assisted  Med Delivery   whole    Wound Care Documentation and Therapy:  Wound 01/25/22 Coccyx Purple area on the buttocks and redness around the coccyx (Active)   Wound Etiology Other 01/26/22 1053   Dressing Status Clean;Dry; Intact 01/26/22 1053   Wound Cleansed Not Cleansed 01/26/22 1053   Dressing/Treatment Foam 01/26/22 1053   Wound Assessment Dry;Purple/maroon 01/26/22 1053   Drainage Amount None 01/26/22 1053   Odor None 01/26/22 1053   Number of days: 0        Elimination:  Continence:    Bowel: Yes  Bladder: Yes  Urinary Catheter: None   Colostomy/Ileostomy/Ileal Conduit: No       Date of Last BM: 1/23/22    Intake/Output Summary (Last 24 hours) at 1/26/2022 1610  Last data filed at 1/26/2022 1141  Gross per 24 hour   Intake --   Output 900 ml   Net -900 ml     No intake/output data recorded. Safety Concerns: At Risk for Falls    Impairments/Disabilities:      None    Nutrition Therapy:  Current Nutrition Therapy:   - Oral Diet:  Carb Control 4 carbs/meal (1800kcals/day)    Routes of Feeding: Oral  Liquids: No Restrictions  Daily Fluid Restriction: no  Last Modified Barium Swallow with Video (Video Swallowing Test): not done    Treatments at the Time of Hospital Discharge:   Respiratory Treatments: see orders  Oxygen Therapy:  is not on home oxygen therapy. Ventilator:    - No ventilator support    Rehab Therapies: Physical Therapy and Occupational Therapy  Weight Bearing Status/Restrictions: No weight bearing restirctions  Other Medical Equipment (for information only, NOT a DME order):  wheelchair and walker  Other Treatments:   BMP weekly for 2 weeks starting Feb 3 2022. Fax results to Dr Mal Parada at [phone]    Patient's personal belongings (please select all that are sent with patient):  None    RN SIGNATURE:  Electronically signed by Trixie Blevins RN on 1/28/22 at 2:19 PM EST    CASE MANAGEMENT/SOCIAL WORK SECTION    Inpatient Status Date: ***    Readmission Risk Assessment Score:  Readmission Risk              Risk of Unplanned Readmission:  22           Discharging to Facility/ Agency   Name: [facility]  Address:  Phone:  Fax:    Dialysis Facility (if applicable)   Name:  Address:  Dialysis Schedule:  Phone:  Fax:    / signature: {Esignature:[id number]}    PHYSICIAN SECTION     Prognosis: Fair    Condition at Discharge: Stable    Rehab Potential (if transferring to Rehab):  Fair    Recommended Labs or Other Treatments After Discharge:   Renal evaluation and f/u Dr. Stefanie galvan  HD Access options being evaluated  Correct electrolyte abnormalities  Rhabdomyolysis, trend enzymes  Hold Crestor  Coumadin:  titrate to maintain therapeutic INR

## 2022-01-26 NOTE — ACP (ADVANCE CARE PLANNING)
Advance Care Planning     Advance Care Planning Activator (Inpatient)  Conversation Note      Date of ACP Conversation: 1/26/2022     Conversation Conducted with: Patient with Decision Making Capacity    ACP Activator: Hailey Garrido RN    {When Decision Maker makes decisions on behalf of the incapacitated patient: Decision Maker is asked to consider and make decisions based on patient values, known preferences, or best interests. Health Care Decision Maker:     Current Designated Health Care Decision Maker:   Kobe Dixon (son)  Phone: [phone]    Click here to complete Healthcare Decision Makers including section of the Healthcare Decision Maker Relationship (ie \"Primary\")    Care Preferences    Ventilation: \"If you were in your present state of health and suddenly became very ill and were unable to breathe on your own, what would your preference be about the use of a ventilator (breathing machine) if it were available to you? \"      Would the patient desire the use of ventilator (breathing machine)?: yes    \"If your health worsens and it becomes clear that your chance of recovery is unlikely, what would your preference be about the use of a ventilator (breathing machine) if it were available to you? \"     Would the patient desire the use of ventilator (breathing machine)?: Unsure      Resuscitation  \"CPR works best to restart the heart when there is a sudden event, like a heart attack, in someone who is otherwise healthy. Unfortunately, CPR does not typically restart the heart for people who have serious health conditions or who are very sick. \"    \"In the event your heart stopped as a result of an underlying serious health condition, would you want attempts to be made to restart your heart (answer \"yes\" for attempt to resuscitate) or would you prefer a natural death (answer \"no\" for do not attempt to resuscitate)? \" yes       [] Yes   [x] No   Educated Patient / Decision Maker regarding differences between Advance Directives and portable DNR orders.     Length of ACP Conversation in minutes:  5     Conversation Outcomes:  [x] ACP discussion completed  [] Existing advance directive reviewed with patient; no changes to patient's previously recorded wishes  [] New Advance Directive completed  [] Portable Do Not Rescitate prepared for Provider review and signature  [] POLST/POST/MOLST/MOST prepared for Provider review and signature      Follow-up plan:    [] Schedule follow-up conversation to continue planning  [] Referred individual to Provider for additional questions/concerns   [] Advised patient/agent/surrogate to review completed ACP document and update if needed with changes in condition, patient preferences or care setting    [] This note routed to one or more involved healthcare providers

## 2022-01-26 NOTE — RT PROTOCOL NOTE
RT Inhaler-Nebulizer Bronchodilator Protocol Note    There is a bronchodilator order in the chart from a provider indicating to follow the RT Bronchodilator Protocol and there is an Initiate RT Inhaler-Nebulizer Bronchodilator Protocol order as well (see protocol at bottom of note). CXR Findings:  XR CHEST PORTABLE    Result Date: 1/25/2022  1. No active pulmonary disease. The findings from the last RT Protocol Assessment were as follows:   History Pulmonary Disease: Chronic pulmonary disease  Respiratory Pattern: Dyspnea on exertion or RR 21-25 bpm  Breath Sounds: Slightly diminished and/or crackles  Cough: Strong, spontaneous, non-productive  Indication for Bronchodilator Therapy: On home bronchodilators,Decreased or absent breath sounds  Bronchodilator Assessment Score: 6    Aerosolized bronchodilator medication orders have been revised according to the RT Inhaler-Nebulizer Bronchodilator Protocol below. Respiratory Therapist to perform RT Therapy Protocol Assessment initially then follow the protocol. Repeat RT Therapy Protocol Assessment PRN for score 0-3 or on second treatment, BID, and PRN for scores above 3. No Indications - adjust the frequency to every 6 hours PRN wheezing or bronchospasm, if no treatments needed after 48 hours then discontinue using Per Protocol order mode. If indication present, adjust the RT bronchodilator orders based on the Bronchodilator Assessment Score as indicated below. Use Inhaler orders unless patient has one or more of the following: on home nebulizer, not able to hold breath for 10 seconds, is not alert and oriented, cannot activate and use MDI correctly, or respiratory rate 25 breaths per minute or more, then use the equivalent nebulizer order(s) with same Frequency and PRN reasons based on the score. If a patient is on this medication at home then do not decrease Frequency below that used at home.     0-3 - enter or revise RT bronchodilator order(s) to equivalent RT Bronchodilator order with Frequency of every 4 hours PRN for wheezing or increased work of breathing using Per Protocol order mode. 4-6 - enter or revise RT Bronchodilator order(s) to two equivalent RT bronchodilator orders with one order with BID Frequency and one order with Frequency of every 4 hours PRN wheezing or increased work of breathing using Per Protocol order mode. 7-10 - enter or revise RT Bronchodilator order(s) to two equivalent RT bronchodilator orders with one order with TID Frequency and one order with Frequency of every 4 hours PRN wheezing or increased work of breathing using Per Protocol order mode. 11-13 - enter or revise RT Bronchodilator order(s) to one equivalent RT bronchodilator order with QID Frequency and an Albuterol order with Frequency of every 4 hours PRN wheezing or increased work of breathing using Per Protocol order mode. Greater than 13 - enter or revise RT Bronchodilator order(s) to one equivalent RT bronchodilator order with every 4 hours Frequency and an Albuterol order with Frequency of every 2 hours PRN wheezing or increased work of breathing using Per Protocol order mode. RT to enter RT Home Evaluation for COPD & MDI Assessment order using Per Protocol order mode.     Electronically signed by Erika Ohara RCP on 1/25/2022 at 9:34 PM

## 2022-01-26 NOTE — H&P
[facility]  Office: [address], DO, Lady Gonzales, DO, Nomi Loja, DO, Amanda Villafana, DO, Robert aSunders MD, Bishnu Green MD, Tiffany Juarez MD, Toi Guadarrama MD, Jojo Gayle MD, Viktoria Clements MD, Michela Jean Baptiste MD, Matias Molina, DO, Elena Salazar DO, Jane Barnes MD,  Dev Neil DO, Vertell Moritz, MD, Brunilda Navarro MD, Disha Em MD, Ankush Flores MD, Deidra Del Toro MD, Saul Stock MD, Elier العلي MD, Javi Garcia, [facility], [facility] Stewart, CNP, Kaushal Boggs, CNP, Shannon Pacheco, CNS, Tahir Appl, CNP, Souleymane Murdock, CNP, Micaela Fresh, CNP, Go Walker, CNP, Alexus Guerin, CNP, Donna Yu PA-C, Alok Romero, DNP, Noel Nicole, DNP, Quique Kelin, CNP, Matilde Resendiz, CNP, Dawayne Oppenheim, CNP, Lola Torres, CNP, Catina Garcias, CNP, Arslan Co, CNP         IN-PATIENT SERVICE  History and Physical  [facility] - INPATIENT          Name: Erika Ritter  MRN: [id number]     Acct: [de-identified]  Room: [place] [address]    Admit Date: 1/25/2022  PCP: Corrine Goode MD    Chief Complaint:  Chief Complaint   Patient presents with    Fatigue     Sent by renal doc for elevated Cr and GFR; pt c/o nausea and weakness x 1 week     Nausea    Other        History of Present Illness:  Erika Ritter is a 66 y.o.  female who presents with Fatigue (Sent by renal doc for elevated Cr and GFR; pt c/o nausea and weakness x 1 week ), Nausea, and Other    Patient was admitted thru ER with:  \" Renal failure\"    The patient has had chronic kidney disease  She follows with Dr. Genesis Boyd labs have revealed progressive renal failure  The patient has been admitted for further evaluation and treatment    She does acknowledge that she has not been feeling well  Appetite has been poor and there has been nausea  The patient has been somewhat weak and unstable  She has had a recent fall  Patient has been having intermittent chest pain  She saw her cardiologist last week who felt her symptoms were GI in nature  He initiated fludrocortisone  Since that time has been increasing edema    Initial database has included  Troponin, High Sensitivity 383 High Panic    [id number],676 High      Results for Reza Bailey (MRN [id number]) as of 1/25/2022 20:48   Ref. Range 11/8/2021 11:30 11/8/2021 13:28 11/9/2021 06:27 1/25/2022 14:41 1/25/2022 16:45   Troponin, High Sensitivity Latest Ref Range: 0 - 14 ng/L 71 (HH) 69 (HH) 59 (HH) 420 (HH) 383 (HH)       EKG:  Normal sinus rhythm   Low voltage QRS   Cannot rule out Anterior infarct , age undetermined   Abnormal ECG   When compared with ECG of 08-NOV-2021 10:07,   Previous ECG has undetermined rhythm, needs review   QT has shortened       The patient does acknowledge that her urine has been orange    [id number] High mg/dL     WBC14.0 High        BUN29 High mg/dL   CREATININE4.17 High      Results for Reza Bailey (MRN [id number]) as of 1/25/2022 20:48   Ref. Range 11/12/2021 06:36 11/13/2021 06:30 1/11/2022 11:37 1/24/2022 09:12 1/25/2022 14:41   Creatinine Latest Ref Range: 0.50 - 0.90 mg/dL 2.74 (H) 2.83 (H) 2.96 (H) 3.73 (H) 4.17 (H)   Results for Reza Bailey (MRN [id number]) as of 1/25/2022 20:48   Ref. Range 11/12/2021 06:36 11/13/2021 06:30 1/11/2022 11:37 1/24/2022 09:12 1/25/2022 14:41   GFR Non- Latest Ref Range: >60 mL/min 17 (L) 16 (L) 16 (L) 12 (L) 10 (L)       PMHx:  Past Medical History:   Diagnosis Date    Anesthesia complication     DIFFICULT TIME WAKING UP    Asthma     INHALER USE DAILY AND AS NEEDED DX AS A CHILD    Atrial fibrillation ([facility] 2003    INTERMITTENT (DR. Franco Singh)    Cerebral artery occlusion with cerebral infarction ([address] 2006    tia x2. Pt denies history of a stroke (Written 02/04/2020).     Chronic back pain     Chronic kidney disease     chronic kidney disease dr Pilar Carlos nephrologist   Stage 3    Cirrhosis ([facility]     Constipation     [facility] INHALER USE DAILY AND AS NEEDED    Dental bridge present     PERMANENT UPPER X 2 BRIDGES UPPER FRONT    Diet-controlled diabetes mellitus (HCC)     Difficult intravenous access     USUALLY USE ULTRASOUND ON ANESTHESIA STARTS VEINS ARE SMALL AND DEEP DO NOT USE HANDS    Diverticulitis     Gastroparesis     GERD (gastroesophageal reflux disease)     ON RX    H/O mastectomy     fibrocystic disease.  BILATERAL MASTECTOMY    Hepatitis 2008    hep c, took meds for hep c    History of blood transfusion     2008    History of Coumadin therapy     on coumadin daily    History of DVT (deep vein thrombosis)     Several years ago (Written 02/04/2020)    History of echocardiogram 03/2018    EF 55%    History of stress test 2015    EF > 70%    Hx of blood clots     states has had on multiple occassions    Hyperlipidemia     Hypertension     Hypothyroidism     Impaired mobility     uses walker    Incontinence     wears a pad    Incontinence of bowel     wears depends    Liver disease     hepatitis c-RESOLVED 2008 WITH TREATMENT    Lower extremity neuropathy     Lumbar disc disease     OAB (overactive bladder)     Osteoarthritis     PONV (postoperative nausea and vomiting)     Prolonged emergence from general anesthesia     Rectocele     Rotator cuff tear     right side, needs surgery    Snores     states has been tested for sleep apnea and does not have it    Type II or unspecified type diabetes mellitus without mention of complication, not stated as uncontrolled     last A1C was 5.2 11/27/2019    Use of cane as ambulatory aid     Uses roller walker     Wears glasses     READING        PSHx:  Past Surgical History:   Procedure Laterality Date    APPENDECTOMY  1964    BREAST SURGERY Bilateral 1982    mastectomy, fibrocystic breast, mother had passed from breast cancer    CHOLECYSTECTOMY      CHOLECYSTECTOMY  1976    COLONOSCOPY      EYE SURGERY Left 2006   [address]    PARTIAL    KNEE SURGERY Left 2013    ARTHROSCOPY    NERVE SURGERY N/A 10/23/2020    SACRAL NERVE STIMULATOR IMPLANT STAGE 1 AND STAGE 2, C-ARM performed by Danya Christian MD at [address] W/CYSTO W/NTRCL RPR N/A 2/12/2018    VAGINAL REPAIR ANTERIOR AND POSTERIOR, SOLYX BLADDER SLING, CYSTO, performed by Kelly Zabala DO at [facility] 2/17/2020    EXCISION PERIVAGINAL CYST performed by Kady Rasmussen MD at [facility] [place] Left 2005    [address] [place] [address] [place] Right 07/08/2019    STIMULATOR SURGERY  10/23/2020    SACRAL NERVE STIMULATOR IMPLANT STAGE 1 AND STAGE 2,     UPPER GASTROINTESTINAL ENDOSCOPY          Home Meds:  Prior to Admission medications    Medication Sig Start Date End Date Taking? Authorizing Provider   scopolamine (TRANSDERM-SCOP) transdermal patch Place 1 patch onto the skin every 72 hours 11/13/21   Joni Bolus Orlop, DO   docusate sodium (COLACE) 100 MG capsule Take 1 capsule by mouth daily 11/13/21   Joni Bolus Orlop, DO   metoclopramide (REGLAN) 5 MG tablet Take 1 tablet by mouth 2 times daily 2tabs am and 2 tabs hs 11/13/21   Joni Bolus Orlop, DO   midodrine (PROAMATINE) 2.5 MG tablet Take 1 tablet by mouth 3 times daily (with meals) 11/13/21   Lesle Latrice, DO   levothyroxine (SYNTHROID) 100 MCG tablet Take 100 mcg by mouth Daily  11/1/21   Historical Provider, MD   famotidine (PEPCID) 10 MG tablet Take 10 mg by mouth daily    Historical Provider, MD   albuterol-ipratropium (COMBIVENT RESPIMAT)  MCG/ACT AERS inhaler Inhale 1 puff into the lungs every 6 hours    Historical Provider, MD   budesonide-formoterol (SYMBICORT) 160-4.5 MCG/ACT AERO Inhale 2 puffs into the lungs every morning     Historical Provider, MD   warfarin (COUMADIN) 2.5 MG tablet Take 1.25 mg by mouth daily as directed by [facility].      Historical Provider, MD   Polyethylene Glycol 3350 (MIRALAX PO) Take 17 g by mouth daily as needed     Historical Provider, MD   amiodarone (CORDARONE) 200 MG tablet Take 200 mg by mouth three times a week TAKES ON MONDAYS, [facility]. 5/31/20   Historical Provider, MD   ezetimibe (ZETIA) 10 MG tablet Take 10 mg by mouth daily  5/31/20   Historical Provider, MD   ammonium lactate (LAC-HYDRIN) 12 % lotion Apply topically 2 times daily as needed  9/10/19   Historical Provider, MD   rosuvastatin (CRESTOR) 40 MG tablet Take 1 tablet by mouth daily    Historical Provider, MD   K Phos Dundy-Sod Phos Di & Mono (PHOSPHA 250 NEUTRAL PO) Take 1 tablet by mouth 2 times daily    Historical Provider, MD   albuterol sulfate  (90 Base) MCG/ACT inhaler Inhale 2 puffs into the lungs every 6 hours as needed for Wheezing    Historical Provider, MD   albuterol (PROVENTIL) (2.5 MG/3ML) 0.083% nebulizer solution Take 3 mLs by nebulization every 4 hours as needed for Wheezing 3/27/18   Delia Mock DO   folic acid (FOLVITE) 1 MG tablet Take 1 tablet by mouth daily. 11/27/13   Carlos Mahajan MD   Cholecalciferol (VITAMIN D3) 1000 UNITS TABS Take 1,000 Units by mouth Five times weekly Indications: Mon-Fri     Historical Provider, MD         Allergies:   Lyrica [pregabalin], Pcn [penicillins], Nsaids, Zofran [ondansetron], Codeine, Cymbalta [duloxetine hcl], Morphine, Prolia [denosumab], and Seasonal    Social Hx:  Tobacco:    reports that she quit smoking about 13 years ago. Her smoking use included cigarettes. She has a 43.00 pack-year smoking history. She has never used smokeless tobacco.  Alcohol:      reports current alcohol use of about 1.0 standard drink of alcohol per week. Drug Use:  reports no history of drug use. Family Hx;   Family History   Problem Relation Age of Onset    Cancer Mother         breast    Heart Disease Father     Cancer Sister         lung    Cancer Maternal Aunt         cervical       ROS:  Review of Systems   Constitutional: Positive for activity change (Decreased), appetite change (Decreased) and fatigue. Negative for chills, diaphoresis and fever. HENT: Negative for congestion and nosebleeds. Eyes: Negative for photophobia and visual disturbance. Respiratory: Negative for cough, shortness of breath and wheezing. Cardiovascular: Positive for chest pain (Nonexertional chest ache). Negative for palpitations. Gastrointestinal: Positive for nausea. Negative for abdominal distention, blood in stool and vomiting. Genitourinary: Negative for flank pain and hematuria. She is observing orange urine   Musculoskeletal: Positive for gait problem (She has been unsteady, she has fallen). Negative for arthralgias and myalgias. Skin: Negative for rash and wound. Neurological: Positive for weakness. Negative for dizziness and light-headedness. Physical Exam:  Vitals:  BP (!) 148/57   Pulse 98   Temp 97.7 °F (36.5 °C) (Oral)   Resp 18   Ht 5' 2\" (1.575 m)   Wt 217 lb (98.4 kg)   LMP 1978   SpO2 95%   BMI 39.69 kg/m²   Temp (24hrs), Av.7 °F (36.5 °C), Min:97.7 °F (36.5 °C), Max:97.7 °F (36.5 °C)    Physical Exam  Vitals reviewed. Constitutional:       General: She is not in acute distress. Appearance: She is not diaphoretic. HENT:      Head: Normocephalic. Nose: Nose normal.   Eyes:      General: No scleral icterus. Conjunctiva/sclera: Conjunctivae normal.   Neck:      Trachea: No tracheal deviation. Cardiovascular:      Rate and Rhythm: Normal rate. Rhythm irregular. Pulmonary:      Effort: Pulmonary effort is normal. No respiratory distress. Breath sounds: Normal breath sounds. No wheezing or rales. Chest:      Chest wall: No tenderness. Abdominal:      General: There is no distension. Palpations: Abdomen is soft. Tenderness: There is no abdominal tenderness. Musculoskeletal:         General: No tenderness. Cervical back: Neck supple. Right lower leg: Edema present.       Left lower leg: Edema present. Skin:     General: Skin is warm and dry.            Data:  Laboratory Testing:  Recent Results (from the past 24 hour(s))   EKG 12 Lead    Collection Time: 01/25/22  1:24 PM   Result Value Ref Range    Ventricular Rate 97 BPM    Atrial Rate 97 BPM    P-R Interval 194 ms    QRS Duration 74 ms    Q-T Interval 360 ms    QTc Calculation (Bazett) 457 ms    P Axis 50 degrees    R Axis 67 degrees    T Axis 32 degrees   CBC Auto Differential    Collection Time: 01/25/22  2:41 PM   Result Value Ref Range    WBC 14.0 (H) 3.5 - 11.3 k/uL    RBC 4.13 3.95 - 5.11 m/uL    Hemoglobin 13.2 11.9 - 15.1 g/dL    Hematocrit 40.5 36.3 - 47.1 %    MCV 98.1 82.6 - 102.9 fL    MCH 32.0 25.2 - 33.5 pg    MCHC 32.6 28.4 - 34.8 g/dL    RDW 14.1 11.8 - 14.4 %    Platelets 798 685 - 833 k/uL    MPV 10.3 8.1 - 13.5 fL    NRBC Automated 0.0 0.0 per 100 WBC    Differential Type NOT REPORTED     Seg Neutrophils 83 (H) 36 - 65 %    Lymphocytes 6 (L) 24 - 43 %    Monocytes 11 3 - 12 %    Eosinophils % 0 (L) 1 - 4 %    Basophils 0 0 - 2 %    Immature Granulocytes 0 0 %    Segs Absolute 11.58 (H) 1.50 - 8.10 k/uL    Absolute Lymph # 0.86 (L) 1.10 - 3.70 k/uL    Absolute Mono # 1.50 (H) 0.10 - 1.20 k/uL    Absolute Eos # <0.03 0.00 - 0.44 k/uL    Basophils Absolute 0.05 0.00 - 0.20 k/uL    Absolute Immature Granulocyte 0.05 0.00 - 0.30 k/uL    WBC Morphology NOT REPORTED     RBC Morphology NOT REPORTED     Platelet Estimate NOT REPORTED    Basic Metabolic Panel    Collection Time: 01/25/22  2:41 PM   Result Value Ref Range    Glucose 127 (H) 70 - 99 mg/dL    BUN 29 (H) 8 - 23 mg/dL    CREATININE 4.17 (H) 0.50 - 0.90 mg/dL    Bun/Cre Ratio 7 (L) 9 - 20    Calcium 9.3 8.6 - 10.4 mg/dL    Sodium 137 135 - 144 mmol/L    Potassium 4.8 3.7 - 5.3 mmol/L    Chloride 105 98 - 107 mmol/L    CO2 15 (L) 20 - 31 mmol/L    Anion Gap 17 9 - 17 mmol/L    GFR Non-African American 10 (L) >60 mL/min    GFR  13 (L) >60 mL/min    GFR Comment GFR Staging NOT REPORTED    TROP/MYOGLOBIN    Collection Time: 01/25/22  2:41 PM   Result Value Ref Range    Troponin, High Sensitivity 420 (HH) 0 - 14 ng/L    Troponin T NOT REPORTED <0.03 ng/mL    Troponin Interp NOT REPORTED     Myoglobin 24,676 (H) 25 - 58 ng/mL   Protime-INR    Collection Time: 01/25/22  2:41 PM   Result Value Ref Range    Protime 72.7 (H) 11.5 - 14.2 sec    INR 9.4 (HH)    APTT    Collection Time: 01/25/22  2:41 PM   Result Value Ref Range    PTT 58.7 (H) 23.9 - 33.8 sec   Urinalysis, Routine    Collection Time: 01/25/22  3:40 PM   Result Value Ref Range    Color, UA Orange (A) Yellow    Turbidity UA Cloudy (A) Clear    Glucose, Ur TRACE (A) NEGATIVE    Bilirubin Urine NEGATIVE NEGATIVE    Ketones, Urine NEGATIVE NEGATIVE    Specific Gravity, UA 1.025 1.005 - 1.030    Urine Hgb 3+ (A) NEGATIVE    pH, UA 6.0 5.0 - 8.0    Protein, UA 2+ (A) NEGATIVE    Urobilinogen, Urine Normal Normal    Nitrite, Urine NEGATIVE NEGATIVE    Leukocyte Esterase, Urine TRACE (A) NEGATIVE    Urinalysis Comments NOT REPORTED    Microscopic Urinalysis    Collection Time: 01/25/22  3:40 PM   Result Value Ref Range    -          WBC, UA 5 TO 10 0 - 5 /HPF    RBC, UA 5 TO 10 0 - 2 /HPF    Casts UA NOT REPORTED /LPF    Crystals, UA NOT REPORTED None /HPF    Epithelial Cells UA TOO NUMEROUS TO COUNT 0 - 5 /HPF    Renal Epithelial, UA NOT REPORTED 0 /HPF    Bacteria, UA FEW (A) None    Mucus, UA NOT REPORTED None    Trichomonas, UA NOT REPORTED None    Amorphous, UA NOT REPORTED None    Other Observations UA NOT REPORTED NOT REQ. Yeast, UA NOT REPORTED None   TROP/MYOGLOBIN    Collection Time: 01/25/22  4:45 PM   Result Value Ref Range    Troponin, High Sensitivity 383 (HH) 0 - 14 ng/L    Troponin T NOT REPORTED <0.03 ng/mL    Troponin Interp NOT REPORTED     Myoglobin 21,875 (H) 25 - 58 ng/mL       Imaging/Diagnostics:  XR CHEST PORTABLE    Result Date: 1/25/2022  1. No active pulmonary disease. Assessment:  Primary Problem  Acute renal failure superimposed on chronic kidney disease [facility])    Active Hospital Problems    Diagnosis Date Noted    Acute renal failure superimposed on chronic kidney disease ([facility] [N17.9, N18.9] 01/25/2022    Non-traumatic rhabdomyolysis [M62.82] 01/25/2022    Chronic kidney disease, stage V (very severe) ([facility] [N18.5] 01/25/2022    Obesity (BMI 30-39. 9) [E66.9] 01/25/2022    Supratherapeutic INR [R79.1] 11/08/2021    Elevated troponin [R77.8] 11/08/2021    Coumadin use [Z79.01] 02/12/2019    History of DVT (deep vein thrombosis) [Z86.718]     Essential hypertension [I10] 02/11/2018    Hypothyroidism [E03.9] 02/11/2018    Atrial fibrillation (HCC) [I48.91]     COPD without exacerbation (HCC) [J44.9]     Type 2 diabetes mellitus with kidney complication, with long-term current use of insulin ([facility] [E11.29, Z79.4]        Plan:  Admit  Renal follow-up Dr. Tanner Perez  Rule out non-ST elevation MI versus renal failure  Observe for rhabdomyolysis  Hold Crestor  Coumadin:  titrate to maintain therapeutic INR   Cardiology consult: Patient requesting TCC Dr Hannah Vo  Patient wants fludrocortisone stopped  DVT prophylaxis: On Coumadin     Patient is admitted as inpatient status because of co-morbidities listed above, severity of signs and symptoms as outlined, requirement for current medical therapies and most importantly because of direct risk to patient if care not provided in a hospital setting. Expected length of stay > 48 hours.      Consultations:   IP CONSULT TO HOSPITALIST  IP CONSULT TO NEPHROLOGY  IP CONSULT TO PHARMACY  IP CONSULT TO CARDIOLOGY    Electronically signed by Zion Howell DO on 1/25/2022 at 9:00 PM

## 2022-01-26 NOTE — CONSULTS
Reason for Consult:  Acute kidney injury. Requesting Physician:  Renetta Petty DO    HISTORY OF PRESENT ILLNESS:    The patient is a 66 y.o. with known history of chronic kidney disease, her renal function has been declining over the last year, most recent creatinine from November was 2.8, patient's recent creatinine was found to be elevated and was asked to go to the emergency room for further evaluation, her creatinine has been in the range of 4.1 during the last couple of days. She reported that she has been feeling very weak at home, reported significant decrease in appetite but was associated with nausea. Patient was aware that she will most likely require dialysis at some point, she was interested in hemodialysis  Her labs were also remarkable for significant metabolic acidosis, with a CO2 level of 13  She was also found to have elevated CK level, with initial level of 9370      Review Of Systems:   Constitutional: No fever, chills, lethargy, weakness or wt loss. HEENT:  No headache, nasal discharge or sore throat. Cardiac:  No chest pain, dyspnea, orthopnea or PND. Chest:              No cough, phlegm or wheezing. Abdomen:  No abdominal pain, nausea, vomiting or diarrhea. Neuro:             No gross focal weakness, numbness. No abnormal movements or seizure like activity. Skin:   No rashes or itching. :   No hematuria, pyuria, dysuria or flank pain. Extremities:  With having bilateral lower extremity swelling    Psych:             No depression or anxiety        Past Medical History:   Diagnosis Date    Anesthesia complication     DIFFICULT TIME WAKING UP    Asthma     INHALER USE DAILY AND AS NEEDED DX AS A CHILD    Atrial fibrillation ([address] 2003    INTERMITTENT (DR. Frederick Jordan)    Cerebral artery occlusion with cerebral infarction ([address] 2006    tia x2. Pt denies history of a stroke (Written 02/04/2020).     Chronic back pain     Chronic kidney disease from breast cancer    CHOLECYSTECTOMY      CHOLECYSTECTOMY  1976    COLONOSCOPY      EYE SURGERY Left 2006    HYSTERECTOMY  1978    PARTIAL    KNEE SURGERY Left 2013    ARTHROSCOPY    NERVE SURGERY N/A 10/23/2020    SACRAL NERVE STIMULATOR IMPLANT STAGE 1 AND STAGE 2, C-ARM performed by Aly Roberts MD at [address] W/CYSTO W/NTRCL RPR N/A 2/12/2018    VAGINAL REPAIR ANTERIOR AND POSTERIOR, SOLYX BLADDER SLING, CYSTO, performed by Suzi Caruso DO at [address] N/A 2/17/2020    EXCISION PERIVAGINAL CYST performed by Sindy Gonzalez MD at [address] Camm[address] Left 2005    [address] [place] [address] Cammal Right 07/08/2019    STIMULATOR SURGERY  10/23/2020    SACRAL NERVE STIMULATOR IMPLANT STAGE 1 AND STAGE 2,     UPPER GASTROINTESTINAL ENDOSCOPY         Prior to Admission medications    Medication Sig Start Date End Date Taking? Authorizing Provider   omeprazole (PRILOSEC) 20 MG delayed release capsule Take 20 mg by mouth 2 times daily (before meals)   Yes Historical Provider, MD   midodrine (PROAMATINE) 5 MG tablet Take 5 mg by mouth 3 times daily (with meals)   Yes Historical Provider, MD   metoclopramide (REGLAN) 5 MG tablet Take 10 mg by mouth 2 times daily Takes 2 tabs (=10mg) BID   Yes Historical Provider, MD   levothyroxine (SYNTHROID) 100 MCG tablet Take 100 mcg by mouth Daily  11/1/21  Yes Historical Provider, MD   albuterol-ipratropium (COMBIVENT RESPIMAT)  MCG/ACT AERS inhaler Inhale 1 puff into the lungs every 6 hours   Yes Historical Provider, MD   budesonide-formoterol (SYMBICORT) 160-4.5 MCG/ACT AERO Inhale 2 puffs into the lungs every morning    Yes Historical Provider, MD   warfarin (COUMADIN) 2.5 MG tablet Take 1.25-2.5 mg by mouth daily as directed by [facility].     Yes Historical Provider, MD   Polyethylene Glycol 3350 (MIRALAX PO) Take 17 g by mouth daily as needed    Yes Historical Provider, MD amiodarone (CORDARONE) 200 MG tablet Take 200 mg by mouth three times a week TAKES ON MONDAYS, WEDNESDAYS AND FRIDAYS. 5/31/20  Yes Historical Provider, MD   ezetimibe (ZETIA) 10 MG tablet Take 10 mg by mouth daily  5/31/20  Yes Historical Provider, MD   ammonium lactate (LAC-HYDRIN) 12 % lotion Apply topically 2 times daily as needed  9/10/19  Yes Historical Provider, MD   rosuvastatin (CRESTOR) 40 MG tablet Take 1 tablet by mouth daily   Yes Historical Provider, MD   K Phos [place]-Sod Phos Di & Mono (PHOSPHA 250 NEUTRAL PO) Take 1 tablet by mouth 2 times daily   Yes Historical Provider, MD   albuterol sulfate  (90 Base) MCG/ACT inhaler Inhale 2 puffs into the lungs every 6 hours as needed for Wheezing   Yes Historical Provider, MD   albuterol (PROVENTIL) (2.5 MG/3ML) 0.083% nebulizer solution Take 3 mLs by nebulization every 4 hours as needed for Wheezing 3/27/18  Yes Rhea Brown,    folic acid (FOLVITE) 1 MG tablet Take 1 tablet by mouth daily.  11/27/13  Yes Naida Fermin MD   Cholecalciferol (VITAMIN D3) 1000 UNITS TABS Take 1,000 Units by mouth Five times weekly Indications: Mon-Fri    Yes Historical Provider, MD       Scheduled Meds:   ipratropium  2 puff Inhalation 4x daily    albuterol sulfate HFA  2 puff Inhalation 4x daily    calcium acetate  1 capsule Oral TID     docusate sodium  100 mg Oral Daily    folic acid  1 mg Oral Daily    insulin lispro  0-6 Units SubCUTAneous TID     insulin lispro  0-3 Units SubCUTAneous Nightly    amiodarone  200 mg Oral Once per day on Mon Wed Fri    budesonide-formoterol  2 puff Inhalation QAM    ezetimibe  10 mg Oral Daily    metoclopramide  5 mg Oral BID AC    [Held by provider] rosuvastatin  40 mg Oral Daily    levothyroxine  100 mcg Oral Daily    sodium chloride flush  5-40 mL IntraVENous 2 times per day    warfarin (COUMADIN) daily dosing (placeholder)   Other RX Placeholder     Continuous Infusions:   dextrose      sodium chloride      sodium chloride 50 mL/hr at 22 0018     PRN Meds:midodrine, albuterol sulfate HFA, polyethylene glycol, bisacodyl, glucose, glucagon (rDNA), dextrose, dextrose bolus (hypoglycemia) **OR** dextrose bolus (hypoglycemia), sodium chloride flush, sodium chloride, acetaminophen **OR** acetaminophen, albuterol sulfate HFA    Allergies   Allergen Reactions    Lyrica [Pregabalin] Nausea Only and Other (See Comments)     . FACE GETS BRIGHT RED. PAIN AND SEVERE SWELLING IN BILATERAL WRISTS, ANKLES AND KNEES    Pcn [Penicillins] Other (See Comments)     Unknown reaction, states can take Keflex STATES REACTION HAPPENED AS A CHILD AND WAS TOLD IT Was  LIFE THREATENING      Nsaids Other (See Comments)     PT HAS KIDNEY DISEASE AND CAN NOT TAKE NSAIDS      Zofran [Ondansetron] Other (See Comments)     Makes nausea and vomiting worse    Codeine Nausea And Vomiting     +N/V. KEEPS PATIENT AWAKE AND WIRED FOR SEVERAL DAYS.  Cymbalta [Duloxetine Hcl] Diarrhea, Nausea And Vomiting and Other (See Comments)     Intolerance. ABDOMINAL BLOATING    Morphine Anxiety    Prolia [Denosumab] Other (See Comments)     TACHYCARDIA, FLUSHED    Seasonal Other (See Comments)     RUNNY NOSE, WATERY ITCHY EYES, SNEEZING. Social History     Socioeconomic History    Marital status:      Spouse name: Not on file    Number of children: Not on file    Years of education: Not on file    Highest education level: Not on file   Occupational History    Not on file   Tobacco Use    Smoking status: Former Smoker     Packs/day: 1.00     Years: 43.00     Pack years: 43.00     Types: Cigarettes     Quit date: 2008     Years since quittin.4    Smokeless tobacco: Never Used   Vaping Use    Vaping Use: Never used   Substance and Sexual Activity    Alcohol use:  Yes     Alcohol/week: 1.0 standard drink     Types: 1 Glasses of wine per week     Comment: HOLIDAYS    Drug use: No    Sexual activity: Not on file Other Topics Concern    Not on file   Social History Narrative    Not on file     Social Determinants of Health     Financial Resource Strain:     Difficulty of Paying Living Expenses: Not on file   Food Insecurity:     Worried About Running Out of Food in the Last Year: Not on file    Rylan of Food in the Last Year: Not on file   Transportation Needs:     Lack of Transportation (Medical): Not on file    Lack of Transportation (Non-Medical): Not on file   Physical Activity:     Days of Exercise per Week: Not on file    Minutes of Exercise per Session: Not on file   Stress:     Feeling of Stress : Not on file   Social Connections:     Frequency of Communication with Friends and Family: Not on file    Frequency of Social Gatherings with Friends and Family: Not on file    Attends Oriental orthodox Services: Not on file    Active Member of [address] [place] [address] [employer] or Organizations: Not on file    Attends Club or Organization Meetings: Not on file    Marital Status: Not on file   Intimate Partner Violence:     Fear of Current or Ex-Partner: Not on file    Emotionally Abused: Not on file    Physically Abused: Not on file    Sexually Abused: Not on file   Housing Stability:     Unable to Pay for Housing in the Last Year: Not on file    Number of [place] in the Last Year: Not on file    Unstable Housing in the Last Year: Not on file       Family History   Problem Relation Age of Onset    Cancer Mother         breast    Heart Disease Father     Cancer Sister         lung    Cancer Maternal Aunt         cervical         Physical Exam:  Vitals:    01/26/22 0730 01/26/22 0818 01/26/22 1136 01/26/22 1548   BP: (!) 140/45  (!) 116/42 (!) 130/47   Pulse: 81  84 83   Resp: 18  18 18   Temp: 98.3 °F (36.8 °C)  97.8 °F (36.6 °C) 97.9 °F (36.6 °C)   TempSrc: Oral  Oral Oral   SpO2: 97%  95% 96%   Weight:  217 lb 4.8 oz (98.6 kg)     Height:         No intake/output data recorded. General:  Awake, alert, not in distress. Appears to be stated age. HEENT: Atraumatic, normocephalic. Anicteric sclera. Pink and moist oral mucosa. Neck supple. No JVD. Chest: Bilateral air entry, clear to auscultation, no wheezing, rhonchi or rales. Cardiovascular: RRR, S1S2, no murmur, rub or gallop. Mild bilateral lower extremity edema. Abdomen: Soft, non tender to palpation. Musculoskeletal: No cyanosis or clubbing. Integumentary: Pink, warm and dry. Free from rash or lesions. CNS: Oriented to person, place and time. Speech clear. Face symmetrical. No tremor. Psych:  Answering questions appropriately, appropriate mood and affect  Data:  CBC:   Lab Results   Component Value Date    WBC 11.4 (H) 01/26/2022    HGB 11.7 (L) 01/26/2022    HCT 36.1 (L) 01/26/2022    MCV 99.4 01/26/2022     01/26/2022     BMP:    Lab Results   Component Value Date     (L) 01/26/2022     (L) 01/26/2022     01/26/2022    K 4.4 01/26/2022    K 4.4 01/26/2022    K 4.6 01/26/2022     01/26/2022     01/26/2022     01/26/2022    CO2 13 (L) 01/26/2022    CO2 14 (L) 01/26/2022    CO2 15 (L) 01/26/2022    BUN 29 (H) 01/26/2022    BUN 27 (H) 01/26/2022    BUN 29 (H) 01/26/2022    CREATININE 4.14 (H) 01/26/2022    CREATININE 4.08 (H) 01/26/2022    CREATININE 4.11 (H) 01/26/2022    GLUCOSE 171 (H) 01/26/2022    GLUCOSE 107 (H) 01/26/2022    GLUCOSE 117 (H) 01/26/2022     CMP:   Lab Results   Component Value Date     01/26/2022    K 4.4 01/26/2022     01/26/2022    CO2 13 01/26/2022    BUN 29 01/26/2022    CREATININE 4.14 01/26/2022    GLUCOSE 171 01/26/2022    GLUCOSE 123 12/19/2011    CALCIUM 9.0 01/26/2022    PROT 6.7 01/26/2022    LABALBU 3.2 01/26/2022    LABALBU 4.2 12/19/2011    BILITOT 0.67 01/26/2022    ALKPHOS 85 01/26/2022     01/26/2022    ALT 62 01/26/2022      Hepatic:   Lab Results   Component Value Date     (H) 01/26/2022    AST 24 01/11/2022    AST 37 (H) 11/08/2021    ALT 62 (H) 01/26/2022 ALT 14 01/11/2022    ALT 23 11/08/2021    BILITOT 0.67 01/26/2022    BILITOT 0.58 01/11/2022    BILITOT 1.22 (H) 11/08/2021    ALKPHOS 85 01/26/2022    ALKPHOS 118 (H) 01/11/2022    ALKPHOS 111 (H) 11/08/2021     BNP:   Lab Results   Component Value Date    BNP NOT REPORTED 08/19/2014    BNP NOT REPORTED 07/16/2014    BNP 30 02/13/2014     Lipids:   Lab Results   Component Value Date    CHOL 190 01/03/2018    HDL 51 01/11/2022     INR:   Lab Results   Component Value Date    INR 10.4 (HH) 01/26/2022    INR 9.4 (HH) 01/25/2022    INR 8.5 (HH) 01/24/2022     PTH: No results found for: PTH  Phosphorus:    Lab Results   Component Value Date    PHOS 6.5 01/26/2022     Ionized Calcium: No results found for: IONCA  Magnesium:   Lab Results   Component Value Date    MG 2.1 01/26/2022     Albumin:   Lab Results   Component Value Date    LABALBU 3.2 01/26/2022    LABALBU 4.2 12/19/2011        Radiology:   Chest x-ray  The heart size is within normal limits. The pulmonary vasculature is also   within normal limits. No acute infiltrates are seen. The costophrenic angles   are sharp bilaterally. No pneumothoraces are noted. There are calcified lymph   nodes and granulomas.           Assessment:  1. Chronic kidney disease stage IV with recent worsening of kidney function, she is currently an stage V  2. Rhabdomyolysis  3. Metabolic acidosis  4. Hyperphosphatemia    Plan: We will check renal ultrasound  Check serum protein immunofixation  discussed the issue of starting hemodialysis with the patient and she is agreeable, she is interested in going to [facility] dialysis unit  If renal function does not show any significant improvement within the next 24 to 48 hours we will plan tunneled dialysis catheter placement. We will start arrangements for dialysis chair to save time  Change IV fluids to isotonic sodium bicarb solution  Continue PhosLo    thank you for the consultation.  Please do not hesitate to contact us for any further questions/concerns. We will continue to follow along with you.        Electronically signed by Ivette León MD  on 1/26/2022 at 3:55 PM

## 2022-01-26 NOTE — FLOWSHEET NOTE
Patient is with Doctor and RN. Writer provided a silent prayer of healing, comfort, and rest during the patient stay. Spiritual care will follow up as needed or requested.      01/26/22 9749   Encounter Summary   Services provided to: Patient  (Patient was with Doctor and RN)   Referral/Consult From: Jessy   Continue Visiting   (1/26/2022)   Complexity of Encounter Low   Length of Encounter 15 minutes   Routine   Type Initial   Assessment   (Unavailable )   Intervention Prayer

## 2022-01-26 NOTE — CARE COORDINATION
Case Management Initial Discharge Plan  Jalil Waters,             Met with:patient to discuss discharge plans. Information verified: address, contacts, phone number, , insurance Yes  PCP: Chryl Dance, MD  Date of last visit:     Insurance Provider: Medicare, Medical [place]    Discharge Planning    Living Arrangements:  Alone   Support Systems:  Family Members    Home has 1st story apt  1 stairs to climb to get into front door, 0 stairs to climb to reach second floor  Location of bedroom/bathroom in home  - main floor of apt    Patient able to perform ADL's:Independent    Current Services (outpatient & in home) none  DME equipment: rollator, cane, shower bnech, glucometer  DME provider: N/A    Pharmacy: [facility]    Potential Assistance Needed:  SNF    Patient agreeable to home care: No  [place] of choice provided:  n/a    Prior SNF/Rehab Placement and Facility: [facility]  Agreeable to SNF/Rehab: Yes  [place] of choice provided: yes   Evaluation: yes    Expected Discharge date:  22  Patient expects to be discharged to: Follow Up Appointment: Best Day/ Time: Friday AM    Transportation provider: ambulance  Transportation arrangements needed for discharge: Yes    Readmission Risk              Risk of Unplanned Readmission:  25             Does patient have a readmission risk score greater than 14?: Yes  If yes, follow-up appointment must be made within 7 days of discharge. Goal of Care:       Discharge Plan: Met with patient at bedside. Lives alone in apt. Uses rollator and is at bedside. Sent to ED by Dr. Kenny Mon for elevated creatinine and chest pain. Has history of CKD. On Coumadin for history of afib. Cardiology consulted for elevated Troponin's which are trending down. Due to worsening kidney function pt may need to start hemodialysis if renal function does not improve withn next 24-48 hours.   Will need tunneled dialysis catheter placed. Arrangements for dialysis chair at [facility]  will be started per EDMAR. Pt will be followed by Dr. Deborah Sanchez. Therapy recommending SNF and pt requesting [facility] PP. SW informed. JOSH initiated. Nephrology and cardiology following.                  Electronically signed by Braulio Moreno RN on 1/26/22 at 4:28 PM EST

## 2022-01-26 NOTE — FLOWSHEET NOTE
01/26/22 0730   Vital Signs   BP (!) 140/45   Per Dr Disla Duty, hold AM dose of Midodrine [facility]-

## 2022-01-26 NOTE — PLAN OF CARE
Problem: Falls - Risk of:  Goal: Will remain free from falls  Description: Will remain free from falls  1/26/2022 0953 by Bonny Barrios RN  Outcome: Ongoing     Problem: Skin Integrity:  Goal: Will show no infection signs and symptoms  Description: Will show no infection signs and symptoms  1/26/2022 0953 by Bonny Barrios RN  Outcome: Ongoing     Problem: Bleeding:  Goal: Will show no signs and symptoms of excessive bleeding  Description: Will show no signs and symptoms of excessive bleeding  Outcome: Ongoing

## 2022-01-26 NOTE — PROGRESS NOTES
[facility]  Office: [address], DO, Luisa Cope, DO, Maria Del Carmen Isabela, DO, Gregory Sona Blood, DO, Marylene Freiberg, MD, Jessica Hurtado MD, Ute White MD, Debbie Barnes MD, Lisa Nicholas MD, Lexi Urban MD, Tanisha Alfaro MD, Arvin Moreno, DO, Hailey Sandoval, DO, Mika Leal MD,  Orien Dakin, DO, Mulu Barney MD, Jodi Pate MD, Leighann Martinez MD, Katarina Prieto MD, Jamar Sinclair MD, Adelfo Vasquez MD, Willie Vargas MD, Rusty Howard, [facility], [facility], CNP, Tona Negrete, [facility], Hero Wallace, CNS, Ludivina Hammer, CNP, Venkata Kumar, CNP, Jaci Messina, CNP, Kaylee Mckeon, CNP, Alpesh Brooke, CNP, Otoniel Vela PA-C, Jeanine De La Cruz, [facility], Tila Aldridge, [facility], Janina Holloway, CNP, Chaparro Hernandez, CNP, Ton Plummer, CNP, Fatimah Barrientos, CNP, Nabila Colbert, CNP, Kemi Love, Noreen Hitchcock [address]    Progress Note    1/26/2022    12:35 PM    Name:   Victoriano Daniels  MRN:     [id number]     Acct:      [de-identified]   Room:   [address] [place] [address] Day:  1  Admit Date:  1/25/2022  1:45 PM    PCP:   Master Vaughn MD  Code Status:  Full Code    Subjective:     C/C:   Renal failure  Weakness  Falls    Interval History Status:   Improved  Still quite weak  Constipated  Having intermittent. chest pain    Data Base Updates:  WBC11.4 High k/uL RBC3.63 Low m/uL Ilduttdeyo65.7 Low      ALT62 High U/L   WHU592 High      Fsyqaty810 High mg/dL     BUN27 High mg/dL   CREATININE4.08 High      Phosphorus6.5 High      Troponin, High Sensitivity 387 High Panic      EKG:  Normal sinus rhythm   Low voltage QRS   Cannot rule out Anterior infarct (cited on or before 25-JAN-2022)   Abnormal ECG   When compared with ECG of 25-JAN-2022 13:24,   No significant change was found       Brief History:     Patient presents with    Fatigue       Sent by renal doc for elevated Cr and GFR; pt c/o nausea and weakness x 1 week     Nausea    Other          History of Present Illness:  Freida Lundberg is a 66 y.o.  female who presents with Fatigue (Sent by renal doc for elevated Cr and GFR; pt c/o nausea and weakness x 1 week ), Nausea, and Other     Patient was admitted thru ER with:  \" Renal failure\"     The patient has had chronic kidney disease  She follows with Dr. Reis Genera labs have revealed progressive renal failure  The patient has been admitted for further evaluation and treatment     She does acknowledge that she has not been feeling well  Appetite has been poor and there has been nausea  The patient has been somewhat weak and unstable  She has had a recent fall  Patient has been having intermittent chest pain  She saw her cardiologist last week who felt her symptoms were GI in nature  He initiated fludrocortisone  Since that time has been increasing edema     Initial database has included  Troponin, High Sensitivity 383 High Panic    Tczdnjhes59,676 High       Results for Quincy Snider (MRN [id number]) as of 1/25/2022 20:48    Ref. Range 11/8/2021 11:30 11/8/2021 13:28 11/9/2021 06:27 1/25/2022 14:41 1/25/2022 16:45   Troponin, High Sensitivity Latest Ref Range: 0 - 14 ng/L 71 (HH) 69 (HH) 59 (HH) 420 (HH) 383 (HH)         EKG:  Normal sinus rhythm   Low voltage QRS   Cannot rule out Anterior infarct , age undetermined   Abnormal ECG   When compared with ECG of 08-NOV-2021 10:07,   Previous ECG has undetermined rhythm, needs review   QT has shortened      The patient does acknowledge that her urine has been orange     Zyswkzz403 High mg/dL      WBC14.0 High       BUN29 High mg/dL   CREATININE4.17 High       Results for Quincy Snider (MRN [id number]) as of 1/25/2022 20:48    Ref. Range 11/12/2021 06:36 11/13/2021 06:30 1/11/2022 11:37 1/24/2022 09:12 1/25/2022 14:41   Creatinine Latest Ref Range: 0.50 - 0.90 mg/dL 2.74 (H) 2.83 (H) 2.96 (H) 3.73 (H) 4.17 (H)   Results for Quincy Snider (MRN [id number]) as of 1/25/2022 20:48    Ref.  Range 11/12/2021 06:36 11/13/2021 06:30 1/11/2022 11:37 1/24/2022 09:12 1/25/2022 14:41   GFR Non- Latest Ref Range: >60 mL/min 17 (L) 16 (L) 16 (L) 12 (L) 10 (L)     The initial plan included:  Admit  Renal follow-up Dr. David Olmedo out non-ST elevation MI versus renal failure  Observe for rhabdomyolysis  Hold Crestor  Coumadin:  titrate to maintain therapeutic INR   Cardiology consult: Patient requesting TCC Dr Thelma Morgan  Patient wants fludrocortisone stopped  DVT prophylaxis: On Coumadin    Subsequent database included:  ALT62 High U/L   QHM849 High      Pro-BNP1,242 High        Past Medical History:   has a past medical history of Anesthesia complication, Asthma, Atrial fibrillation ([address] Cerebral artery occlusion with cerebral infarction ([address] Chronic back pain, Chronic kidney disease, Cirrhosis ([address] Constipation, COPD (chronic obstructive pulmonary disease) ([address] Dental bridge present, Diet-controlled diabetes mellitus ([address] Difficult intravenous access, Diverticulitis, Gastroparesis, GERD (gastroesophageal reflux disease), H/O mastectomy, Hepatitis, History of blood transfusion, History of Coumadin therapy, History of DVT (deep vein thrombosis), History of echocardiogram, History of stress test, Hx of blood clots, Hyperlipidemia, Hypertension, Hypothyroidism, Impaired mobility, Incontinence, Incontinence of bowel, Liver disease, Lower extremity neuropathy, Lumbar disc disease, OAB (overactive bladder), Osteoarthritis, PONV (postoperative nausea and vomiting), Prolonged emergence from general anesthesia, Rectocele, Rotator cuff tear, Snores, Type II or unspecified type diabetes mellitus without mention of complication, not stated as uncontrolled, Use of cane as ambulatory aid, Uses roller walker, and Wears glasses. Social History:   reports that she quit smoking about 13 years ago. Her smoking use included cigarettes. She has a 43.00 pack-year smoking history.  She has never used smokeless tobacco. She reports current alcohol use of about 1.0 standard drink of alcohol per week. She reports that she does not use drugs. Family History:   Family History   Problem Relation Age of Onset    Cancer Mother         breast    Heart Disease Father     Cancer Sister         lung    Cancer Maternal Aunt         cervical       Review of Systems:     Review of Systems   Constitutional: Positive for activity change (Reduced), appetite change (Diminished) and fatigue. Respiratory: Positive for shortness of breath (Reports dyspnea on exertion). Negative for cough. Cardiovascular: Positive for chest pain (Nonradiating anterior chest ache, worse with activity) and leg swelling. Negative for palpitations. Gastrointestinal: Positive for constipation. Negative for abdominal pain, nausea and vomiting. Genitourinary: Negative for difficulty urinating, flank pain and hematuria. Musculoskeletal: Positive for gait problem. Negative for arthralgias and myalgias. Patient reports increasing weakness  She has had 2 recent falls at home after which she was unable to get up and had to call EMS   Neurological: Positive for weakness. Physical Examination:        Vitals  BP (!) 116/42   Pulse 84   Temp 97.8 °F (36.6 °C) (Oral)   Resp 18   Ht 5' 2\" (1.575 m)   Wt 217 lb 4.8 oz (98.6 kg)   LMP 1978   SpO2 95%   BMI 39.74 kg/m²   Temp (24hrs), Av.1 °F (36.7 °C), Min:97.7 °F (36.5 °C), Max:98.5 °F (36.9 °C)    No results for input(s): POCGLU in the last 72 hours. Physical Exam  Vitals reviewed. Constitutional:       General: She is not in acute distress. Appearance: She is not diaphoretic. HENT:      Head: Normocephalic. Nose: Nose normal.   Eyes:      General: No scleral icterus. Conjunctiva/sclera: Conjunctivae normal.   Neck:      Trachea: No tracheal deviation. Cardiovascular:      Rate and Rhythm: Normal rate and regular rhythm.    Pulmonary:      Effort: Pulmonary effort is normal. No respiratory distress. Breath sounds: Normal breath sounds. No wheezing or rales. Chest:      Chest wall: No tenderness. Abdominal:      General: There is no distension. Palpations: Abdomen is soft. Tenderness: There is no abdominal tenderness. Musculoskeletal:         General: No tenderness. Cervical back: Neck supple. Skin:     General: Skin is warm and dry. Medications: Allergies: Allergies   Allergen Reactions    Lyrica [Pregabalin] Nausea Only and Other (See Comments)     . FACE GETS BRIGHT RED. PAIN AND SEVERE SWELLING IN BILATERAL WRISTS, ANKLES AND KNEES    Pcn [Penicillins] Other (See Comments)     Unknown reaction, states can take Keflex STATES REACTION HAPPENED AS A CHILD AND WAS TOLD IT Was  LIFE THREATENING      Nsaids Other (See Comments)     PT HAS KIDNEY DISEASE AND CAN NOT TAKE NSAIDS      Zofran [Ondansetron] Other (See Comments)     Makes nausea and vomiting worse    Codeine Nausea And Vomiting     +N/V. KEEPS PATIENT AWAKE AND WIRED FOR SEVERAL DAYS.  Cymbalta [Duloxetine Hcl] Diarrhea, Nausea And Vomiting and Other (See Comments)     Intolerance. ABDOMINAL BLOATING    Morphine Anxiety    Prolia [Denosumab] Other (See Comments)     TACHYCARDIA, FLUSHED    Seasonal Other (See Comments)     RUNNY NOSE, WATERY ITCHY EYES, SNEEZING.        Current Meds:   Scheduled Meds:    ipratropium  2 puff Inhalation 4x daily    albuterol sulfate HFA  2 puff Inhalation 4x daily    phytonadione  5 mg Oral Once    calcium acetate  1 capsule Oral TID WC    docusate sodium  100 mg Oral Daily    folic acid  1 mg Oral Daily    amiodarone  200 mg Oral Once per day on Mon Wed Fri    budesonide-formoterol  2 puff Inhalation QAM    ezetimibe  10 mg Oral Daily    metoclopramide  5 mg Oral BID AC    [Held by provider] rosuvastatin  40 mg Oral Daily    levothyroxine  100 mcg Oral Daily    sodium chloride flush  5-40 mL IntraVENous 2 times per day    warfarin Date/Time    CULTURE NO SIGNIFICANT GROWTH 10/09/2020 02:57 PM       Radiology:  XR CHEST PORTABLE    Result Date: 1/25/2022  1. No active pulmonary disease. Is via green jacket airway ovarium were vascular going I would awaken  via radiology return the door swings \"I do not want all the stuff on that while off    Assessment:        Primary Problem  Acute renal failure superimposed on chronic kidney disease [facility])    Active Hospital Problems    Diagnosis Date Noted    Hyperphosphatemia [E83.39]     Acute renal failure superimposed on chronic kidney disease ([facility] [N17.9, N18.9] 01/25/2022    Non-traumatic rhabdomyolysis [M62.82] 01/25/2022    Chronic kidney disease, stage V (very severe) ([facility] [N18.5] 01/25/2022    Obesity (BMI 30-39. 9) [E66.9] 01/25/2022    Supratherapeutic INR [R79.1] 11/08/2021    Elevated troponin [R77.8] 11/08/2021    Coumadin use [Z79.01] 02/12/2019    History of DVT (deep vein thrombosis) [Z86.718]     Essential hypertension [I10] 02/11/2018    Hypothyroidism [E03.9] 02/11/2018    Atrial fibrillation (HCC) [I48.91]     COPD without exacerbation (HCC) [J44.9]     Type 2 diabetes mellitus with kidney complication, with long-term current use of insulin (HCC) [E11.29, Z79.4]        Plan:        Renal eval and follow-up Dr. Mackenzie Go  Anticipating initiation of dialysis  Correct electrolyte abnormalities  PhosLo  Observe for rhabdomyolysis  Hold Crestor  Coumadin:  titrate to maintain therapeutic INR   Vitamin K re-ordered  Cardiology consult: Patient requesting TCC Dr Chato Sanchez: NSTEMI  Patient wants fludrocortisone stopped  Blood Pressure - Monitor and control  Midodrine as needed  Trend LFTs  DVT prophylaxis: On Coumadin      IP CONSULT TO HOSPITALIST  IP CONSULT TO NEPHROLOGY  IP CONSULT TO PHARMACY  IP CONSULT TO DO Maria Teresa  1/26/2022  12:35 PM

## 2022-01-26 NOTE — PROGRESS NOTES
Warfarin Dosing - Pharmacy Consult Note  Consulting Provider: MOISÉS  Indication:  Atrial Fibrillation  Warfarin Dose prior to admission: 2.5mg daily   Concurrent anticoagulants/antiplatelets: none  Significant Drug Interactions: amiodarone (incr INR)  Recent Labs     01/24/22  1005 01/25/22  1441   INR 8.5* 9.4*   HGB  --  13.2   PLT  --  245     Recent warfarin administrations        No warfarin orders with administrations found. Orders not given:            warfarin (COUMADIN) daily dosing (placeholder)                   Date   INR    Dose  1/25        9.4      none    Assessment/Plan  (Goal INR: 2 - 3)  INR elevated again today. Was elevated yesterday when seen at [facility]. Continue to hold warfarin. Would recommend oral vitamin K. Active problem list reviewed. INR orders are placed. Chart reviewed for pertinent labs, drug/diet interactions, and past doses. Documentation of patient's clinical condition was reviewed. Pharmacy Dosing:  Pharmacy will continue to follow.

## 2022-01-27 ENCOUNTER — APPOINTMENT (OUTPATIENT)
Dept: ULTRASOUND IMAGING | Age: 72
DRG: 683 | End: 2022-01-27
Payer: MEDICARE

## 2022-01-27 ENCOUNTER — TELEPHONE (OUTPATIENT)
Dept: PHARMACY | Age: 72
End: 2022-01-27

## 2022-01-27 DIAGNOSIS — I48.91 ATRIAL FIBRILLATION, UNSPECIFIED TYPE (HCC): Primary | ICD-10-CM

## 2022-01-27 LAB
ALBUMIN SERPL-MCNC: 3.1 G/DL (ref 3.5–5.2)
ALBUMIN/GLOBULIN RATIO: ABNORMAL (ref 1–2.5)
ALP BLD-CCNC: 81 U/L (ref 35–104)
ALT SERPL-CCNC: 66 U/L (ref 5–33)
ANION GAP SERPL CALCULATED.3IONS-SCNC: 14 MMOL/L (ref 9–17)
AST SERPL-CCNC: 192 U/L
BILIRUB SERPL-MCNC: 0.9 MG/DL (ref 0.3–1.2)
BUN BLDV-MCNC: 28 MG/DL (ref 8–23)
CALCIUM SERPL-MCNC: 9 MG/DL (ref 8.6–10.4)
CHLORIDE BLD-SCNC: 106 MMOL/L (ref 98–107)
CO2: 17 MMOL/L (ref 20–31)
CREAT SERPL-MCNC: 3.99 MG/DL (ref 0.5–0.9)
EKG ATRIAL RATE: 80 BPM
EKG P AXIS: 48 DEGREES
EKG P-R INTERVAL: 162 MS
EKG Q-T INTERVAL: 396 MS
EKG QRS DURATION: 80 MS
EKG QTC CALCULATION (BAZETT): 456 MS
EKG R AXIS: 67 DEGREES
EKG T AXIS: 36 DEGREES
EKG VENTRICULAR RATE: 80 BPM
GFR AFRICAN AMERICAN: 13 ML/MIN
GFR NON-AFRICAN AMERICAN: 11 ML/MIN
GFR SERPL CREATININE-BSD FRML MDRD: ABNORMAL ML/MIN/{1.73_M2}
GFR SERPL CREATININE-BSD FRML MDRD: ABNORMAL ML/MIN/{1.73_M2}
GLUCOSE BLD-MCNC: 126 MG/DL (ref 65–105)
GLUCOSE BLD-MCNC: 126 MG/DL (ref 70–99)
GLUCOSE BLD-MCNC: 129 MG/DL (ref 65–105)
GLUCOSE BLD-MCNC: 150 MG/DL (ref 65–105)
GLUCOSE BLD-MCNC: 182 MG/DL (ref 65–105)
INR BLD: 2.4
PHOSPHORUS: 5.8 MG/DL (ref 2.6–4.5)
POTASSIUM SERPL-SCNC: 4.4 MMOL/L (ref 3.7–5.3)
PROTHROMBIN TIME: 25.5 SEC (ref 11.5–14.2)
SODIUM BLD-SCNC: 137 MMOL/L (ref 135–144)
TOTAL CK: 6724 U/L (ref 26–192)
TOTAL PROTEIN: 6.4 G/DL (ref 6.4–8.3)
TROPONIN INTERP: ABNORMAL
TROPONIN T: ABNORMAL NG/ML
TROPONIN, HIGH SENSITIVITY: 404 NG/L (ref 0–14)

## 2022-01-27 PROCEDURE — 84100 ASSAY OF PHOSPHORUS: CPT

## 2022-01-27 PROCEDURE — 2500000003 HC RX 250 WO HCPCS: Performed by: INTERNAL MEDICINE

## 2022-01-27 PROCEDURE — 6370000000 HC RX 637 (ALT 250 FOR IP): Performed by: INTERNAL MEDICINE

## 2022-01-27 PROCEDURE — 84484 ASSAY OF TROPONIN QUANT: CPT

## 2022-01-27 PROCEDURE — 97167 OT EVAL HIGH COMPLEX 60 MIN: CPT

## 2022-01-27 PROCEDURE — 82947 ASSAY GLUCOSE BLOOD QUANT: CPT

## 2022-01-27 PROCEDURE — 99232 SBSQ HOSP IP/OBS MODERATE 35: CPT | Performed by: INTERNAL MEDICINE

## 2022-01-27 PROCEDURE — 85610 PROTHROMBIN TIME: CPT

## 2022-01-27 PROCEDURE — 6370000000 HC RX 637 (ALT 250 FOR IP): Performed by: NURSE PRACTITIONER

## 2022-01-27 PROCEDURE — 80053 COMPREHEN METABOLIC PANEL: CPT

## 2022-01-27 PROCEDURE — 94640 AIRWAY INHALATION TREATMENT: CPT

## 2022-01-27 PROCEDURE — 97530 THERAPEUTIC ACTIVITIES: CPT

## 2022-01-27 PROCEDURE — 2060000000 HC ICU INTERMEDIATE R&B

## 2022-01-27 PROCEDURE — 82550 ASSAY OF CK (CPK): CPT

## 2022-01-27 PROCEDURE — 2580000003 HC RX 258: Performed by: INTERNAL MEDICINE

## 2022-01-27 PROCEDURE — 76770 US EXAM ABDO BACK WALL COMP: CPT

## 2022-01-27 PROCEDURE — 97116 GAIT TRAINING THERAPY: CPT

## 2022-01-27 PROCEDURE — 2580000003 HC RX 258: Performed by: NURSE PRACTITIONER

## 2022-01-27 PROCEDURE — 97535 SELF CARE MNGMENT TRAINING: CPT

## 2022-01-27 PROCEDURE — 36415 COLL VENOUS BLD VENIPUNCTURE: CPT

## 2022-01-27 RX ORDER — PANTOPRAZOLE SODIUM 40 MG/1
40 TABLET, DELAYED RELEASE ORAL
Status: DISCONTINUED | OUTPATIENT
Start: 2022-01-28 | End: 2022-01-28 | Stop reason: HOSPADM

## 2022-01-27 RX ORDER — WARFARIN SODIUM 2.5 MG/1
2.5 TABLET ORAL
Status: COMPLETED | OUTPATIENT
Start: 2022-01-27 | End: 2022-01-27

## 2022-01-27 RX ORDER — CALCIUM CARBONATE 200(500)MG
1000 TABLET,CHEWABLE ORAL 3 TIMES DAILY PRN
Status: DISCONTINUED | OUTPATIENT
Start: 2022-01-27 | End: 2022-01-28 | Stop reason: HOSPADM

## 2022-01-27 RX ADMIN — Medication 1000 MG: at 21:08

## 2022-01-27 RX ADMIN — LEVOTHYROXINE SODIUM 100 MCG: 0.1 TABLET ORAL at 06:01

## 2022-01-27 RX ADMIN — SODIUM CHLORIDE, PRESERVATIVE FREE 10 ML: 5 INJECTION INTRAVENOUS at 20:51

## 2022-01-27 RX ADMIN — CALCIUM ACETATE 667 MG: 667 CAPSULE ORAL at 08:46

## 2022-01-27 RX ADMIN — METOCLOPRAMIDE 5 MG: 5 TABLET ORAL at 15:57

## 2022-01-27 RX ADMIN — INSULIN LISPRO 1 UNITS: 100 INJECTION, SOLUTION INTRAVENOUS; SUBCUTANEOUS at 20:46

## 2022-01-27 RX ADMIN — WARFARIN SODIUM 2.5 MG: 2.5 TABLET ORAL at 17:21

## 2022-01-27 RX ADMIN — FOLIC ACID 1 MG: 1 TABLET ORAL at 08:46

## 2022-01-27 RX ADMIN — Medication 2 PUFF: at 20:49

## 2022-01-27 RX ADMIN — INSULIN LISPRO 1 UNITS: 100 INJECTION, SOLUTION INTRAVENOUS; SUBCUTANEOUS at 16:37

## 2022-01-27 RX ADMIN — CALCIUM ACETATE 667 MG: 667 CAPSULE ORAL at 16:37

## 2022-01-27 RX ADMIN — SODIUM BICARBONATE: 84 INJECTION, SOLUTION INTRAVENOUS at 16:39

## 2022-01-27 RX ADMIN — CALCIUM ACETATE 667 MG: 667 CAPSULE ORAL at 11:13

## 2022-01-27 RX ADMIN — DOCUSATE SODIUM 100 MG: 100 CAPSULE, LIQUID FILLED ORAL at 08:46

## 2022-01-27 RX ADMIN — EZETIMIBE 10 MG: 10 TABLET ORAL at 08:46

## 2022-01-27 RX ADMIN — METOCLOPRAMIDE 5 MG: 5 TABLET ORAL at 06:01

## 2022-01-27 ASSESSMENT — ENCOUNTER SYMPTOMS
VOMITING: 0
SHORTNESS OF BREATH: 1
ABDOMINAL PAIN: 0
CONSTIPATION: 1
NAUSEA: 0
COUGH: 0

## 2022-01-27 NOTE — PLAN OF CARE
Problem: Falls - Risk of:  Goal: Will remain free from falls  Description: Will remain free from falls  1/26/2022 1935 by Mathew Magallon RN  Note: Bed locked in lowest position, call light within reach, side rails up x2, hourly rounding

## 2022-01-27 NOTE — CARE COORDINATION
Social work: Patient accepted at [facility]. HENS started. SW also spoke to admissions/[facility] intake. Available chair time is MWF 2:00PM STARTING 2/2/22. Nephrology to re-evaluate creatinine in the morning to determine immediate dialysis needs. IF HD needs arranged from the hospital, [facility] needs: Hepatitis B Panel, flow sheets, inpatient orders and COVID test to obtain medical and financial clearance. Maureen/[place] made aware of potential dialysis needs.

## 2022-01-27 NOTE — PROGRESS NOTES
Reason for Consult:  Acute kidney injury. Requesting Physician:  Ozzy Christian DO    Interval history:   Creatinine showed mild improvement to 3.99  CO2 level is slowly improving  CK level is down from 9370 to 6724  Feels better in general    HISTORY OF PRESENT ILLNESS:    The patient is a 66 y.o. with known history of chronic kidney disease, her renal function has been declining over the last year, most recent creatinine from November was 2.8, patient's recent creatinine was found to be elevated and was asked to go to the emergency room for further evaluation, her creatinine has been in the range of 4.1 during the last couple of days. She reported that she has been feeling very weak at home, reported significant decrease in appetite but was associated with nausea. Patient was aware that she will most likely require dialysis at some point, she was interested in hemodialysis  Her labs were also remarkable for significant metabolic acidosis, with a CO2 level of 13  She was also found to have elevated CK level, with initial level of 9370         Prior to Admission medications    Medication Sig Start Date End Date Taking?  Authorizing Provider   omeprazole (PRILOSEC) 20 MG delayed release capsule Take 20 mg by mouth 2 times daily (before meals)   Yes Historical Provider, MD   midodrine (PROAMATINE) 5 MG tablet Take 5 mg by mouth 3 times daily (with meals)   Yes Historical Provider, MD   metoclopramide (REGLAN) 5 MG tablet Take 10 mg by mouth 2 times daily Takes 2 tabs (=10mg) BID   Yes Historical Provider, MD   levothyroxine (SYNTHROID) 100 MCG tablet Take 100 mcg by mouth Daily  11/1/21  Yes Historical Provider, MD   albuterol-ipratropium (COMBIVENT RESPIMAT)  MCG/ACT AERS inhaler Inhale 1 puff into the lungs every 6 hours   Yes Historical Provider, MD   budesonide-formoterol (SYMBICORT) 160-4.5 MCG/ACT AERO Inhale 2 puffs into the lungs every morning    Yes Historical Provider, MD   warfarin (COUMADIN) Daily    levothyroxine  100 mcg Oral Daily    sodium chloride flush  5-40 mL IntraVENous 2 times per day    warfarin (COUMADIN) daily dosing (placeholder)   Other RX Placeholder     Continuous Infusions:   dextrose      sodium bicarbonate infusion 50 mL/hr at 01/26/22 1635    sodium chloride       PRN Meds:midodrine, albuterol sulfate HFA, polyethylene glycol, bisacodyl, glucose, glucagon (rDNA), dextrose, dextrose bolus (hypoglycemia) **OR** dextrose bolus (hypoglycemia), sodium chloride flush, sodium chloride, acetaminophen **OR** acetaminophen, albuterol sulfate HFA     Physical Exam:  Vitals:    01/26/22 1856 01/26/22 2257 01/27/22 0400 01/27/22 0730   BP: (!) 124/47 (!) 131/47 (!) 122/46 (!) 128/46   Pulse: 92 85 86 92   Resp: 18 18 18 18   Temp: 97.3 °F (36.3 °C) 98.2 °F (36.8 °C) 97.9 °F (36.6 °C) 98.6 °F (37 °C)   TempSrc: Oral Oral Oral    SpO2: 99% 96% 95% 95%   Weight:       Height:         I/O last 3 completed shifts: In: 934 [I.V.:934]  Out: 2000 [Urine:2000]    General:  Awake, alert, not in distress. Appears to be stated age. HEENT: Atraumatic, normocephalic. Anicteric sclera. Pink and moist oral mucosa. Neck supple. No JVD. Chest: Bilateral air entry, clear to auscultation, no wheezing, rhonchi or rales. Cardiovascular: RRR, S1S2, no murmur, rub or gallop. Mild bilateral lower extremity edema. Abdomen: Soft, non tender to palpation. Musculoskeletal: No cyanosis or clubbing. Integumentary: Pink, warm and dry. Free from rash or lesions. CNS: Oriented to person, place and time. Speech clear. Face symmetrical. No tremor. Psych:  Answering questions appropriately, appropriate mood and affect  Data:  CBC:   Lab Results   Component Value Date    WBC 11.4 (H) 01/26/2022    HGB 11.7 (L) 01/26/2022    HCT 36.1 (L) 01/26/2022    MCV 99.4 01/26/2022     01/26/2022     BMP:    Lab Results   Component Value Date     01/27/2022     (L) 01/26/2022     (L) 01/26/2022    K 4.4 01/27/2022    K 4.4 01/26/2022    K 4.4 01/26/2022     01/27/2022     01/26/2022     01/26/2022    CO2 17 (L) 01/27/2022    CO2 13 (L) 01/26/2022    CO2 14 (L) 01/26/2022    BUN 28 (H) 01/27/2022    BUN 29 (H) 01/26/2022    BUN 27 (H) 01/26/2022    CREATININE 3.99 (H) 01/27/2022    CREATININE 4.14 (H) 01/26/2022    CREATININE 4.08 (H) 01/26/2022    GLUCOSE 126 (H) 01/27/2022    GLUCOSE 171 (H) 01/26/2022    GLUCOSE 107 (H) 01/26/2022     CMP:   Lab Results   Component Value Date     01/27/2022    K 4.4 01/27/2022     01/27/2022    CO2 17 01/27/2022    BUN 28 01/27/2022    CREATININE 3.99 01/27/2022    GLUCOSE 126 01/27/2022    GLUCOSE 123 12/19/2011    CALCIUM 9.0 01/27/2022    PROT 6.4 01/27/2022    LABALBU 3.1 01/27/2022    LABALBU 4.2 12/19/2011    BILITOT 0.90 01/27/2022    ALKPHOS 81 01/27/2022     01/27/2022    ALT 66 01/27/2022      Hepatic:   Lab Results   Component Value Date     (H) 01/27/2022     (H) 01/26/2022    AST 24 01/11/2022    ALT 66 (H) 01/27/2022    ALT 62 (H) 01/26/2022    ALT 14 01/11/2022    BILITOT 0.90 01/27/2022    BILITOT 0.67 01/26/2022    BILITOT 0.58 01/11/2022    ALKPHOS 81 01/27/2022    ALKPHOS 85 01/26/2022    ALKPHOS 118 (H) 01/11/2022     BNP:   Lab Results   Component Value Date    BNP NOT REPORTED 08/19/2014    BNP NOT REPORTED 07/16/2014    BNP 30 02/13/2014     Lipids:   Lab Results   Component Value Date    CHOL 190 01/03/2018    HDL 51 01/11/2022     INR:   Lab Results   Component Value Date    INR 2.4 01/27/2022    INR 10.4 () 01/26/2022    INR 9.4 () 01/25/2022     PTH: No results found for: PTH  Phosphorus:    Lab Results   Component Value Date    PHOS 5.8 01/27/2022     Ionized Calcium: No results found for: IONCA  Magnesium:   Lab Results   Component Value Date    MG 2.1 01/26/2022     Albumin:   Lab Results   Component Value Date    LABALBU 3.1 01/27/2022    LABALBU 4.2 12/19/2011        Radiology:   Chest x-ray  The heart size is within normal limits. The pulmonary vasculature is also   within normal limits. No acute infiltrates are seen. The costophrenic angles   are sharp bilaterally. No pneumothoraces are noted. There are calcified lymph   nodes and granulomas.           Assessment:  1. Chronic kidney disease stage IV with recent worsening of kidney function, this could be secondary to progress of the kidney disease which suggests that she is approaching CKD 5, this could also be acute kidney injury secondary to rhabdomyolysis. 2.  Rhabdomyolysis   3. Metabolic acidosis  4. Hyperphosphatemia    Plan:  Renal ultrasound showed very minimal improvement, will continue IV hydration and reevaluate creatinine tomorrow morning, if renal function continues to improve this will allow time to prepare for dialysis as outpatient, and will allow time for fistula placement. If renal function does not show significant improvement we'll proceed with tunneled dialysis catheter placement tomorrow morning. In the meanwhile the  was asked to start the process for outpatient dialysis chair arrangement in order to save time, request will be submitted for dialysis chair at [employer] unit  Renal ultrasound did not show any acute event  check serum protein immunofixation is pending  Continue isotonic sodium bicarb infusion   continue PhosLo  Case discussed with Dr. Delilah Urrutia    Please do not hesitate to contact us for any further questions/concerns. We will continue to follow along with you.        Electronically signed by Cherie Crooks MD  on 1/27/2022 at 9:11 AM

## 2022-01-27 NOTE — PROGRESS NOTES
Occupational Therapy   Occupational Therapy Initial Assessment  Date: 2022   Patient Name: True Echevarria  MRN: [id number]     : 1950    Date of Service: 2022    Discharge Recommendations:  Patient would benefit from continued therapy after discharge   Pt currently functioning below baseline. Would suggest additional therapy at time of discharge to maximize long term outcomes and prevent re-admission. Please refer to AM-PAC score for current level of function. Assessment   Performance deficits / Impairments: Decreased functional mobility ; Decreased safe awareness;Decreased balance;Decreased ADL status; Decreased cognition;Decreased ROM; Decreased endurance;Decreased strength  Assessment: Pt required 2 staff assist for bed mobility, transfers, and mobility, and is DEP for LB ADLs/toileting. Skilled OT is indicated to increase overall IND and safety with self-care and functional tasks to reduce caregiver burden. Prognosis: Fair  Decision Making: High Complexity  OT Education: OT Role;Transfer Training;Equipment;Plan of Care;Energy Conservation;Home Exercise Program;ADL Adaptive Strategies  Patient Education: OT POC, benefits of OOB activity, call light/fall prevention, transfer tech, safety in function  REQUIRES OT FOLLOW UP: Yes  Activity Tolerance  Activity Tolerance: Patient limited by fatigue  Safety Devices  Safety Devices in place: Yes  Type of devices: All fall risk precautions in place;Nurse notified;Gait belt;Bed alarm in place; Patient at risk for falls; Left in bed;Call light within reach           Patient Diagnosis(es): The primary encounter diagnosis was Acute renal failure superimposed on chronic kidney disease, unspecified CKD stage, unspecified acute renal failure type ([facility] [address] Diagnoses of Elevated troponin and NSTEMI (non-ST elevated myocardial infarction) [facility]) were also pertinent to this visit.      has a past medical history of Anesthesia complication, Asthma, Atrial fibrillation [facility]), Cerebral artery occlusion with cerebral infarction [facility]), Chronic back pain, Chronic kidney disease, Cirrhosis ([facility] [address] Constipation, COPD (chronic obstructive pulmonary disease) ([facility] [address] Dental bridge present, Diet-controlled diabetes mellitus ([facility] [address] Difficult intravenous access, Diverticulitis, Gastroparesis, GERD (gastroesophageal reflux disease), H/O mastectomy, Hepatitis, History of blood transfusion, History of Coumadin therapy, History of DVT (deep vein thrombosis), History of echocardiogram, History of stress test, Hx of blood clots, Hyperlipidemia, Hypertension, Hypothyroidism, Impaired mobility, Incontinence, Incontinence of bowel, Liver disease, Lower extremity neuropathy, Lumbar disc disease, OAB (overactive bladder), Osteoarthritis, PONV (postoperative nausea and vomiting), Prolonged emergence from general anesthesia, Rectocele, Rotator cuff tear, Snores, Type II or unspecified type diabetes mellitus without mention of complication, not stated as uncontrolled, Use of cane as ambulatory aid, Uses roller walker, and Wears glasses. has a past surgical history that includes Cholecystectomy; Upper gastrointestinal endoscopy; Colonoscopy; pr cmbnd anterpost colporraphy w/cysto w/ntrcl rpr (N/A, 2/12/2018); Appendectomy (1964); Hysterectomy (1978); Breast surgery (Bilateral, 1982); shoulder surgery (Left, 2005); eye surgery (Left, 2006); knee surgery (Left, 2013); Cholecystectomy (1976); shoulder surgery (Right, 07/08/2019); Rectal surgery (N/A, 2/17/2020); Stimulator Surgery (10/23/2020); and Nerve Surgery (N/A, 10/23/2020).       Per H&P: Mark Anthony Call is a 66 y.o.  female who presents with Fatigue (Sent by renal doc for elevated Cr and GFR; pt c/o nausea and weakness x 1 week ), Nausea, and Other     Patient was admitted thru ER with:  \" Renal failure\"     The patient has had chronic kidney disease  She follows with Dr. Arpit Rivera labs have revealed progressive renal failure  The patient has been admitted for further evaluation and treatment     Restrictions  Restrictions/Precautions  Restrictions/Precautions: General Precautions,Fall Risk  Required Braces or Orthoses?: No  Position Activity Restriction  Other position/activity restrictions: Up w/ assist, MARLEN carrillo IV    Subjective   General  Chart Reviewed: Yes  Patient assessed for rehabilitation services?: Yes  Family / Caregiver Present: No  Subjective  Subjective: \"They told me therapy would wash me up, I haven't been washed up since Monday\"  General Comment  Comments: Pt is plesant and cooperative for OT eval     Social/Functional History  Social/Functional History  Lives With: Alone  Type of Home: Apartment  Home Layout: One level  Home Access: Stairs to enter without rails  Entrance Stairs - Number of Steps: 1  Bathroom Shower/Tub: Tub/Shower unit  Bathroom Toilet: Standard  Bathroom Equipment: Grab bars in shower  Bathroom Accessibility: Accessible  Home Equipment: 4 wheeled walker,Cane (Sock aid, long handled shoe horn)  ADL Assistance: Independent (Was fully independent prior to 2 weeks ago per pt.)  Homemaking Assistance: Independent  Homemaking Responsibilities: Yes  Ambulation Assistance: Independent (No device in home,4WW outside of home)  Transfer Assistance: Independent  Active : Yes  Occupation: Retired  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  Pt sat EOB ~10-15 min)  Standing Balance: Maximum assistance (x2)  Standing Balance  Time: ~1 min  Activity: static standing EOB and side steps EOB  Functional Mobility  Functional - Mobility Device: Rolling Walker  Activity: Other  Assist Level: Maximum assistance (x2)  Functional Mobility Comments: Pt required MAX encouragement to take 4 side steps toward [facility] for optimal bed positioning. Pt required MAX VC/tactile cues for RW safety/mgmt, upright posture, pursed lip breathing, pacing, assist/awareness of lines, and scanning room environment. ADL  Feeding: Modified independent   Grooming: Stand by assistance;Setup (pt brushed her hair while sitting up in bed)  UE Bathing: Stand by assistance;Setup (pt washed her BUE and chest with SBA and set-up while sitting EOB)  LE Bathing: Dependent/Total;Setup (Pt is DEP to wash BLE and mariana area while laying in bed. Pt stated \"I can't reach, I can't do it\")  UE Dressing: Moderate assistance (to [facility] gow)  LE Dressing: Dependent/Total  Toileting: Dependent/Total (DEP for brief change and pt has a purwick)  Additional Comments: Pt limited d/t dec standing kell/balance, dec shoulder ROM, dec motivation, and overall weakness  Tone RUE  RUE Tone: Normotonic  Tone LUE  LUE Tone: Normotonic  Coordination  Movements Are Fluid And Coordinated: Yes     Bed mobility  Rolling to Left: Maximum assistance  Rolling to Right: Maximum assistance  Supine to Sit: Moderate assistance;2 Person assistance  Sit to Supine: Moderate assistance;2 Person assistance  Scootin Person assistance;Maximal assistance (to boost toward [facility])  Comment: Pt required increased time and effort to complete bed mob tasks. Pt required MAX VCs/tactile cues for initating and sequencing bed mob, pursed lip breathing, pacing and assist with line mgmt. Pt required physical assist with BLE and trunk for supine <>sit.   Transfers  Sit to stand: Maximum assistance;2 Person assistance  Stand to sit: Maximum assistance;2 Person assistance  Transfer Comments: MAX VC/tactile cues for proper hand placement, RW safety/mgmt, upright posture, pacing, controlled sit<>stand, and pursed lip breathing, all to increase safety/reduce fall risk     Cognition  Overall Cognitive Status: Exceptions  Arousal/Alertness: Appropriate responses to stimuli  Following Commands: Follows one step commands with repetition; Follows one step commands with increased time  Attention Span: Attends with cues to redirect  Memory: Decreased recall of recent events  Safety Judgement: Decreased awareness of need for assistance;Decreased awareness of need for safety  Problem Solving: Decreased awareness of errors;Assistance required to identify errors made;Assistance required to implement solutions;Assistance required to generate solutions;Assistance required to correct errors made  Insights: Decreased awareness of deficits  Initiation: Requires cues for some  Sequencing: Requires cues for some        Sensation  Overall Sensation Status: WFL        LUE PROM (degrees)  L Shoulder Flex  0-180: ~80 with pain  LUE AROM (degrees)  LUE General AROM: shoulder ~70, rest WFL  Left Hand AROM (degrees)  Left Hand AROM: WFL  RUE PROM (degrees)  RUE PROM: Exceptions  R Shoulder Flex  0-180: ~100 with pain  RUE AROM (degrees)  RUE General AROM: shoulder ~90, rest WFL  Right Hand AROM (degrees)  Right Hand AROM: WFL  LUE Strength  LUE Strength Comment: shoulder 3-/5, rest 3+/5  RUE Strength  RUE Strength Comment: shoulder 3-/5, rest 3+/5                   Plan   Plan  Times per week: 4-5x per week  Times per day: Daily  Current Treatment Recommendations: Strengthening,Endurance Training,Functional Mobility Training,Safety Education & Training,Positioning,Balance Training,Self-Care / ADL,Equipment Evaluation, Education, & procurement,Patient/Caregiver Education & Training,Cognitive Reorientation      AM-PAC Score        AM-[facility] Inpatient Daily Activity Raw Score: 13 (01/27/22 1339)  AM-PAC Inpatient ADL T-Scale Score : 32.03 (01/27/22 1339)  ADL Inpatient CMS 0-100% Score: 63.03 (01/27/22 1339)  ADL Inpatient

## 2022-01-27 NOTE — PROGRESS NOTES
[facility]  Office: [address], DO, Luisa Anatoliy, DO, Maria Del Carmen Quigley, DO, Gregory Magallanes Blood, DO, Marylene Freiberg, MD, Jessica Hurtado MD, Ute White MD, Debbie Barnes MD, Lisa Nicholas MD, Eddi Rosales MD, Tanisha Alfaro MD, Arvin Moreno, DO, Hailey Sandoval, DO, Mika Leal MD,  Orien Dakin, DO, Mulu Barney MD, Jodi Pate MD, Leighann Martinez MD, Katarina Prieto MD, Jamar Sinclair MD, Adelfo Vasquez MD, Willie Vargas MD, Rusty Howard, [facility], [facility], CNP, Tona Negrete, [facility], Hero Wallace, CNS, Ludivina Hammer, CNP, Venkata Kumar, CNP, Jaci Messina, CNP, Kaylee Mckeon, CNP, Alpesh Brooke, CNP, Otoniel Vela PA-C, Jeanine De La Cruz, [facility], Tila Aldridge, [facility], Janina Holloway, CNP, Chaparro Hernandez, CNP, Ton Plummer, CNP, Fatimah Barrientos, CNP, Nabila Colbert, CNP, Kemi Love, Noreen Hitchcock [address]    Progress Note    1/27/2022    4:35 PM    Name:   Victoriano Daniels  MRN:     [id number]     Acct:      [de-identified]   Room:   [facility] [place] [address] Day:  2  Admit Date:  1/25/2022  1:45 PM    PCP:   Master Vaughn MD  Code Status:  Full Code    Subjective:     C/C:   Renal failure  Weakness  Falls    Interval History Status:   Improved  Still quite weak  Had good response to laxatives  Requesting Rx for dyspepsia    Data Base Updates:  Troponin, High Sensitivity 404 High Panic      Albumin3. 1 Low g/dL   Albumin/Globulin RatioNOT REPORTED   Alkaline Sniwqywmzcr49U/L   ALT66 High U/L   BQY456 High U/L   Total Bilirubin0.90mg/dL   BUN28 High mg/dL   CREATININE3.99 High mg/dL   Calcium9.0mg/dL   Phosphorus5.8 High mg/dL   Uudzsux778 High      Total CK6,724 High      Ncickqp40.5 High sec   INR2. 4         Brief History:     Patient presents with    Fatigue       Sent by renal doc for elevated Cr and GFR; pt c/o nausea and weakness x 1 week     Nausea    Other          History of Present Illness:  Victoriano Daniels is a 66 y.o.  female who presents with Fatigue (Sent by renal doc for elevated Cr and GFR; pt c/o nausea and weakness x 1 week ), Nausea, and Other     Patient was admitted thru ER with:  \" Renal failure\"     The patient has had chronic kidney disease  She follows with Dr. Sharon Smith labs have revealed progressive renal failure  The patient has been admitted for further evaluation and treatment     She does acknowledge that she has not been feeling well  Appetite has been poor and there has been nausea  The patient has been somewhat weak and unstable  She has had a recent fall  Patient has been having intermittent chest pain  She saw her cardiologist last week who felt her symptoms were GI in nature  He initiated fludrocortisone  Since that time has been increasing edema     Initial database has included  Troponin, High Sensitivity 383 High Panic    Emclzykmr03,676 High       Results for Idamae Ripa (MRN [id number]) as of 1/25/2022 20:48    Ref. Range 11/8/2021 11:30 11/8/2021 13:28 11/9/2021 06:27 1/25/2022 14:41 1/25/2022 16:45   Troponin, High Sensitivity Latest Ref Range: 0 - 14 ng/L 71 (HH) 69 (HH) 59 (HH) 420 (HH) 383 (HH)         EKG:  Normal sinus rhythm   Low voltage QRS   Cannot rule out Anterior infarct , age undetermined   Abnormal ECG   When compared with ECG of 08-NOV-2021 10:07,   Previous ECG has undetermined rhythm, needs review   QT has shortened      The patient does acknowledge that her urine has been orange     Mmuoidg551 High mg/dL      WBC14.0 High       BUN29 High mg/dL   CREATININE4.17 High       Results for Idamae Ripa (MRN [id number]) as of 1/25/2022 20:48    Ref. Range 11/12/2021 06:36 11/13/2021 06:30 1/11/2022 11:37 1/24/2022 09:12 1/25/2022 14:41   Creatinine Latest Ref Range: 0.50 - 0.90 mg/dL 2.74 (H) 2.83 (H) 2.96 (H) 3.73 (H) 4.17 (H)   Results for Idamae Ripa (MRN [id number]) as of 1/25/2022 20:48    Ref.  Range 11/12/2021 06:36 11/13/2021 06:30 1/11/2022 11:37 1/24/2022 09:12 1/25/2022 14:41   GFR Non- Latest Ref Range: >60 mL/min 17 (L) 16 (L) 16 (L) 12 (L) 10 (L)     Renal ultrasound revealed:  Impression:    1. Technically limited exam.  No hydronephrosis. 2.  Bilateral renal lesions are noted as seen on CT, limited in evaluation   due to body habitus. If further evaluation is necessary, follow-up with MRI   or contrast-enhanced CT is recommended when clinically appropriate.      The initial plan included:  Admit  Renal follow-up Dr. Navdeep Poole enzymes  Rule out non-ST elevation MI versus renal failure  Observe for rhabdomyolysis  Hold Crestor  Coumadin:  titrate to maintain therapeutic INR   Cardiology consult: Patient requesting TCC Dr Matt Blum  Patient wants fludrocortisone stopped  DVT prophylaxis: On Coumadin    Subsequent database included:  ALT62 High U/L   QNJ494 High      Pro-BNP1,242 High      Enzymes have been elevated  Total IQ6,355 High    Troponin, High Sensitivity 404 High Panic    The patient again reiterates that she has had a couple falls at home  No particular trauma or injury  She had to call the squad help her off the floor      Past Medical History:   has a past medical history of Anesthesia complication, Asthma, Atrial fibrillation ([facility] [address] Cerebral artery occlusion with cerebral infarction [facility]), Chronic back pain, Chronic kidney disease, Cirrhosis ([facility] [address] Constipation, COPD (chronic obstructive pulmonary disease) ([facility] [address] Dental bridge present, Diet-controlled diabetes mellitus ([facility] [address] Difficult intravenous access, Diverticulitis, Gastroparesis, GERD (gastroesophageal reflux disease), H/O mastectomy, Hepatitis, History of blood transfusion, History of Coumadin therapy, History of DVT (deep vein thrombosis), History of echocardiogram, History of stress test, Hx of blood clots, Hyperlipidemia, Hypertension, Hypothyroidism, Impaired mobility, Incontinence, Incontinence of bowel, Liver disease, Lower extremity neuropathy, Lumbar disc disease, OAB (overactive bladder), Osteoarthritis, PONV (postoperative nausea and vomiting), Prolonged emergence from general anesthesia, Rectocele, Rotator cuff tear, Snores, Type II or unspecified type diabetes mellitus without mention of complication, not stated as uncontrolled, Use of cane as ambulatory aid, Uses roller walker, and Wears glasses. Social History:   reports that she quit smoking about 13 years ago. Her smoking use included cigarettes. She has a 43.00 pack-year smoking history. She has never used smokeless tobacco. She reports current alcohol use of about 1.0 standard drink of alcohol per week. She reports that she does not use drugs. Family History:   Family History   Problem Relation Age of Onset    Cancer Mother         breast    Heart Disease Father     Cancer Sister         lung    Cancer Maternal Aunt         cervical       Review of Systems:     Review of Systems   Constitutional: Positive for activity change (Reduced), appetite change (Diminished) and fatigue. Respiratory: Positive for shortness of breath (Reports dyspnea on exertion). Negative for cough. Cardiovascular: Positive for chest pain (improving ) and leg swelling. Negative for palpitations. Gastrointestinal: Positive for constipation (improved ). Negative for abdominal pain, nausea and vomiting. The patient has been having dyspepsia   Genitourinary: Negative for difficulty urinating, flank pain and hematuria. Musculoskeletal: Positive for gait problem. Negative for arthralgias and myalgias. Patient reports increasing weakness  She has had 2 recent falls at home after which she was unable to get up and had to call EMS   Neurological: Positive for weakness and numbness (History neuropathy).        Physical Examination:        Vitals  BP (!) 136/52   Pulse 79   Temp 98.4 °F (36.9 °C) (Oral)   Resp 19   Ht 5' 2\" (1.575 m)   Wt 217 lb 4.8 oz (98.6 kg)   LMP 1978   SpO2 97%   BMI 39.74 kg/m²   Temp (24hrs), Av °F (36.7 °C), Min:97.3 °F (36.3 °C), Max:98.6 °F (37 °C)    Recent Labs     01/26/22  1859 01/27/22  0727 01/27/22  1109 01/27/22  1607   POCGLU 137* 126* 129* 182*       Physical Exam  Vitals reviewed. Constitutional:       General: She is not in acute distress. Appearance: She is not diaphoretic. HENT:      Head: Normocephalic. Nose: Nose normal.   Eyes:      General: No scleral icterus. Conjunctiva/sclera: Conjunctivae normal.   Neck:      Trachea: No tracheal deviation. Cardiovascular:      Rate and Rhythm: Normal rate and regular rhythm. Pulmonary:      Effort: Pulmonary effort is normal. No respiratory distress. Breath sounds: Normal breath sounds. No wheezing or rales. Chest:      Chest wall: No tenderness. Abdominal:      General: There is no distension. Palpations: Abdomen is soft. Tenderness: There is no abdominal tenderness. Musculoskeletal:         General: No tenderness. Cervical back: Neck supple. Skin:     General: Skin is warm and dry. Medications: Allergies: Allergies   Allergen Reactions    Lyrica [Pregabalin] Nausea Only and Other (See Comments)     . FACE GETS BRIGHT RED. PAIN AND SEVERE SWELLING IN BILATERAL WRISTS, ANKLES AND KNEES    Pcn [Penicillins] Other (See Comments)     Unknown reaction, states can take Keflex STATES REACTION HAPPENED AS A CHILD AND WAS TOLD IT Was  LIFE THREATENING      Nsaids Other (See Comments)     PT HAS KIDNEY DISEASE AND CAN NOT TAKE NSAIDS      Zofran [Ondansetron] Other (See Comments)     Makes nausea and vomiting worse    Codeine Nausea And Vomiting     +N/V. KEEPS PATIENT AWAKE AND WIRED FOR SEVERAL DAYS.  Cymbalta [Duloxetine Hcl] Diarrhea, Nausea And Vomiting and Other (See Comments)     Intolerance. ABDOMINAL BLOATING    Morphine Anxiety    Prolia [Denosumab] Other (See Comments)     TACHYCARDIA, FLUSHED    Seasonal Other (See Comments)     RUNNY NOSE, WATERY ITCHY EYES, SNEEZING.        Current Meds: Scheduled Meds:    warfarin  2.5 mg Oral Once    ipratropium  2 puff Inhalation 4x daily    albuterol sulfate HFA  2 puff Inhalation 4x daily    calcium acetate  1 capsule Oral TID     docusate sodium  100 mg Oral Daily    folic acid  1 mg Oral Daily    insulin lispro  0-6 Units SubCUTAneous TID     insulin lispro  0-3 Units SubCUTAneous Nightly    amiodarone  200 mg Oral Once per day on Mon Wed Fri    budesonide-formoterol  2 puff Inhalation QAM    ezetimibe  10 mg Oral Daily    metoclopramide  5 mg Oral BID AC    [Held by provider] rosuvastatin  40 mg Oral Daily    levothyroxine  100 mcg Oral Daily    sodium chloride flush  5-40 mL IntraVENous 2 times per day    warfarin (COUMADIN) daily dosing (placeholder)   Other RX Placeholder     Continuous Infusions:    dextrose      sodium bicarbonate infusion 50 mL/hr at 01/26/22 1635    sodium chloride       PRN Meds: midodrine, albuterol sulfate HFA, polyethylene glycol, bisacodyl, glucose, glucagon (rDNA), dextrose, dextrose bolus (hypoglycemia) **OR** dextrose bolus (hypoglycemia), sodium chloride flush, sodium chloride, acetaminophen **OR** acetaminophen, albuterol sulfate HFA    Data:     I/O (24Hr):     Intake/Output Summary (Last 24 hours) at 1/27/2022 1635  Last data filed at 1/27/2022 1251  Gross per 24 hour   Intake 934 ml   Output 1700 ml   Net -766 ml       Labs:  Hematology:  Recent Labs     01/25/22  1441 01/26/22  0509 01/27/22  0616   WBC 14.0* 11.4*  --    RBC 4.13 3.63*  --    HGB 13.2 11.7*  --    HCT 40.5 36.1*  --    MCV 98.1 99.4  --    MCH 32.0 32.2  --    MCHC 32.6 32.4  --    RDW 14.1 14.0  --     220  --    MPV 10.3 10.4  --    INR 9.4* 10.4* 2.4     Chemistry:  Recent Labs     01/25/22  1441 01/25/22  1441 01/25/22  1645 01/25/22  2122 01/26/22  0509 01/26/22  1336 01/27/22  0616     --   --    < > 134* 134* 137   K 4.8  --   --    < > 4.4 4.4 4.4     --   --    < > 105 104 106   CO2 15*  --   --    < > 14* 13* 17*   GLUCOSE 127*  --   --    < > 107* 171* 126*   BUN 29*  --   --    < > 27* 29* 28*   CREATININE 4.17*  --   --    < > 4.08* 4.14* 3.99*   MG  --   --   --   --  2.1  --   --    ANIONGAP 17  --   --    < > 15 17 14   LABGLOM 10*  --   --    < > 11* 11* 11*   GFRAA 13*  --   --    < > 13* 13* 13*   CALCIUM 9.3  --   --    < > 8.8 9.0 9.0   PHOS  --   --   --   --  6.5*  --  5.8*   PROBNP  --   --   --   --  1,242*  --   --    TROPHS 420*   < > 383*   < > 387* 381* 404*   CKTOTAL  --   --   --   --  9,370*  --  6,724*   MYOGLOBIN 24,676*  --  66,685*  --   --  16,142*  --     < > = values in this interval not displayed. Recent Labs     01/26/22  0509 01/26/22  1259 01/26/22  1604 01/26/22  1859 01/27/22  0616 01/27/22  0727 01/27/22  1109 01/27/22  1607   PROT 6.7  --   --   --  6.4  --   --   --    LABALBU 3.2*  --   --   --  3.1*  --   --   --    *  --   --   --  192*  --   --   --    ALT 62*  --   --   --  66*  --   --   --    ALKPHOS 85  --   --   --  81  --   --   --    BILITOT 0.67  --   --   --  0.90  --   --   --    POCGLU  --  153* 142* 137*  --  126* 129* 182*     ABG:  Lab Results   Component Value Date    FIO2 ROOM AIR 05/09/2014     Lab Results   Component Value Date/Time    SPECIAL NOT REPORTED 10/09/2020 02:57 PM     Lab Results   Component Value Date/Time    CULTURE NO SIGNIFICANT GROWTH 10/09/2020 02:57 PM       Radiology:  XR CHEST PORTABLE    Result Date: 1/25/2022  1. No active pulmonary disease.    Is via green jacket airway ovarium were vascular going I would awaken  via radiology return the door swings \"I do not want all the stuff on that while off    Assessment:        Primary Problem  Acute renal failure superimposed on chronic kidney disease [facility])    Active Hospital Problems    Diagnosis Date Noted    Diet-controlled diabetes mellitus ([facility] [address] [E11.9] 01/26/2022    Hyperphosphatemia [E83.39]     Acute renal failure superimposed on chronic kidney disease ([facility] [address] [N17.9, N18.9] 01/25/2022    Non-traumatic rhabdomyolysis [M62.82] 01/25/2022    [facility] [N18.5] 01/25/2022    Obesity (BMI 30-39. 9) [E66.9] 01/25/2022    Supratherapeutic INR [R79.1] 11/08/2021    Elevated troponin [R77.8] 11/08/2021    Coumadin use [Z79.01] 02/12/2019    Diabetic peripheral neuropathy [facility] [E11.42] 02/12/2019    History of DVT (deep vein thrombosis) [Z86.718]     Essential hypertension [I10] 02/11/2018    Hypothyroidism [E03.9] 02/11/2018    Atrial fibrillation (HCC) [I48.91]     COPD without exacerbation ([facility] [J44.9]        Plan:        Renal evaluation  Discussed with Dr. Durbin Both   Access options being evaluated  Correct electrolyte abnormalities  Observe for rhabdomyolysis  Hold Crestor  Coumadin:  titrate to maintain therapeutic INR   Vitamin K prn  Cardiology consult  Blood Pressure - Monitor and control  Midodrine as needed  Trend LFTs  PT/OT  Fall precautions  DVT prophylaxis: On Coumadin      IP CONSULT TO HOSPITALIST  IP CONSULT TO NEPHROLOGY  IP CONSULT TO PHARMACY  IP CONSULT TO DO Maria Teresa  1/27/2022  4:35 PM

## 2022-01-27 NOTE — CARE COORDINATION
Social work: writer following for potential outpatient dialysis needs. Dr Justine Schumacher preference is [facility]. Referral faxed to intake.

## 2022-01-27 NOTE — PROGRESS NOTES
Physical Therapy  Facility/Department: [facility]  Daily Treatment Note  NAME: Shanelle Márquez  : 1950  MRN: [id number]    Date of Service: 2022   RASHI Hatch reports patient is medically stable for therapy treatment this date. Chart reviewed prior to treatment and patient is agreeable for therapy. All lines intact and patient positioned comfortably at end of treatment. All patient needs addressed prior to ending therapy session. Discharge Recommendations:  Pt currently functioning below baseline. Would suggest additional therapy at time of discharge to maximize long term outcomes and prevent re-admission. Please refer to AM-PAC score for current level of function. Patient would benefit from continued therapy after discharge      Assessment   Body structures, Functions, Activity limitations: Decreased functional mobility ; Decreased strength;Decreased endurance;Decreased balance;Decreased posture;Decreased safe awareness; Increased pain  Assessment: Pt tolerated session fair. Pt demonstrating poor steadiness throughout ambulation and mobility at this time requiring skilled 2 person assist for safety throughout. Pt is a HIGH fall risk d/t hx of frequent falls, decreased balance, decreased endurance, and global weakness. Pt would benefit from continued skilled physical therapy to address these deficits in order to return to PLOF. Prognosis: Good  Decision Making: High Complexity  PT Education: Goals;PT Role;Plan of Care;General Safety; Energy Conservation;Transfer Training;Functional Mobility Training;Gait Training  Patient Education: Pt educated on: purpose of acute PT, importance of continued mobility throughout admission, general safety awareness, PT role, pursed lip breathing, safe transfers and ambulation w/ 4ww, use it or lose it principal, prevention of sedentary complications, fall risk prevention, and PT POC. Pt verbalized fair understanding.   Pt requires continued reinforcement of education. REQUIRES PT FOLLOW UP: Yes  Activity Tolerance  Activity Tolerance: Patient limited by endurance; Patient limited by fatigue  Activity Tolerance: Pt w/ poor standing tolerance this date and fatiguing quickly throughout session requiring frequent rest breaks throughout. Patient Diagnosis(es): The primary encounter diagnosis was Acute renal failure superimposed on chronic kidney disease, unspecified CKD stage, unspecified acute renal failure type ([facility] Diagnoses of Elevated troponin and NSTEMI (non-ST elevated myocardial infarction) [facility]) were also pertinent to this visit. has a past medical history of Anesthesia complication, Asthma, Atrial fibrillation ([facility] Cerebral artery occlusion with cerebral infarction [facility]), Chronic back pain, Chronic kidney disease, Cirrhosis ([facility] Constipation, COPD (chronic obstructive pulmonary disease) ([facility] Dental bridge present, Diet-controlled diabetes mellitus ([facility] Difficult intravenous access, Diverticulitis, Gastroparesis, GERD (gastroesophageal reflux disease), H/O mastectomy, Hepatitis, History of blood transfusion, History of Coumadin therapy, History of DVT (deep vein thrombosis), History of echocardiogram, History of stress test, Hx of blood clots, Hyperlipidemia, Hypertension, Hypothyroidism, Impaired mobility, Incontinence, Incontinence of bowel, Liver disease, Lower extremity neuropathy, Lumbar disc disease, OAB (overactive bladder), Osteoarthritis, PONV (postoperative nausea and vomiting), Prolonged emergence from general anesthesia, Rectocele, Rotator cuff tear, Snores, Type II or unspecified type diabetes mellitus without mention of complication, not stated as uncontrolled, Use of cane as ambulatory aid, Uses roller walker, and Wears glasses. has a past surgical history that includes Cholecystectomy; Upper gastrointestinal endoscopy; Colonoscopy; pr cmbnd anterpost colporraphy w/cysto w/ntrcl rpr (N/A, 2/12/2018);  Appendectomy (1964); Hysterectomy (); Breast surgery (Bilateral, ); shoulder surgery (Left, ); eye surgery (Left, ); knee surgery (Left, ); Cholecystectomy (); shoulder surgery (Right, 2019); Rectal surgery (N/A, 2020); Stimulator Surgery (10/23/2020); and Nerve Surgery (N/A, 10/23/2020). Restrictions  Restrictions/Precautions  Restrictions/Precautions: General Precautions,Fall Risk  Required Braces or Orthoses?: No  Position Activity Restriction  Other position/activity restrictions: Up w/ assist, MARLEN carrillo IV  Subjective   General  Response To Previous Treatment: Patient with no complaints from previous session. Family / Caregiver Present: No  Subjective  Subjective: \"They told me that you would wash me up. \"  General Comment  Comments: RN and pt agreeable to therapy. Pt supine in bed upon arrival.  Pt pleasant and cooperative throughout. Orientation  Orientation  Overall Orientation Status: Within Functional Limits  Cognition   Cognition  Overall Cognitive Status: Exceptions  Arousal/Alertness: Appropriate responses to stimuli  Following Commands: Follows one step commands with repetition; Follows one step commands with increased time  Attention Span: Attends with cues to redirect  Memory: Decreased recall of recent events  Safety Judgement: Decreased awareness of need for assistance;Decreased awareness of need for safety  Problem Solving: Decreased awareness of errors;Assistance required to identify errors made;Assistance required to implement solutions;Assistance required to generate solutions;Assistance required to correct errors made  Insights: Decreased awareness of deficits  Initiation: Requires cues for some  Sequencing: Requires cues for some  Objective   Bed mobility  Rolling to Left: Maximum assistance  Rolling to Right: Maximum assistance  Supine to Sit: Moderate assistance;2 Person assistance  Sit to Supine:  Moderate assistance;2 Person assistance  Scootin Person assistance;Maximal assistance (to boost in bed)  Comment: Pt w/ significant difficulty throughout bed mobility this date. Pt requiring max verbal cueing for initiation, progression, and sequencing of BLE and trunk throughout w/ fair return demo. Pt also requiring max verbal cueing for pursed lip breathing throughout w/ fair return demo. Pt rolled several times in bed this date for pericare and brief change requiring MaxA throughout. Upon sitting EOB, pt requiring CGA-ModA to maintain sitting balance throughout as pt demonstrating mild posterior trunk lean. Pt sat EOB ~10-15 minutes this date. Assist required for safe line mgmt throughout. Transfers  Sit to Stand: Maximum Assistance;2 Person Assistance  Stand to sit: Maximum Assistance;2 Person Assistance  Comment: Pt w/ significant difficulty throughout transfers this date requiring max verbal and tactile cueing for proper hand placement throughout transfers w/ RW w/ poor return demo. Pt also requiring max verbal and tactile cueing for activation of hip extensors upon standing this date as pt demonstrating forward trunk lean w/ poor return demo. Pt w/ poor standing tolerance this date, standing <1 min. Pt demonstrating poor eccentric quad control throughout stand>sit transfers. Ambulation  Ambulation?: Yes  More Ambulation?: No  Ambulation 1  Surface: level tile  Device: Rollator  Assistance: Maximum assistance;2 Person assistance  Quality of Gait: poor steadiness, short shuffling steps  Gait Deviations: Slow Cynthia; Shuffles;Decreased step length;Decreased step height  Distance: 4 steps laterally along EOB  Comments: Pt w/ poor steadiness throughout ambulation this date requiring skilled 2 person assist for safety throughout. Pt requiring max verbal cueing for progression of RW and BLE throughout w/ poor return demo.   Stairs/Curb  Stairs?: No  Neuromuscular Education  NDT Treatment: Sitting;Gait   Balance  Posture: Poor  Sitting - Static: Good;-  Sitting - Dynamic: Fair;+  Standing - Static: Poor;+  Standing - Dynamic: Poor  Comments: Standing balance assessed w/ 4ww  Exercises  Quad Sets: x 5  Heelslides: x 10  Hip Flexion: seated marches x 5  Knee Long Arc Quad: x 10  Ankle Pumps: x 15  Comments: Pt w/ poor tolerance to ther ex this date, requiring max verbal cueing to maintain attention to task and for proper technique throughout. Comment: Provided support for sitting balance/ standing balance while OT assisted w/ ADLs this date, assisted w/ doffing/donning new brief, lorena, and michel. AM-PAC Score  AM-PAC Inpatient Mobility Raw Score : 11 (01/27/22 1254)  AM-PAC Inpatient T-Scale Score : 33.86 (01/27/22 1254)  Mobility Inpatient CMS 0-100% Score: 72.57 (01/27/22 1254)  Mobility Inpatient CMS G-Code Modifier : CL (01/27/22 1254)       Functional Outcome Measure-   Single Leg Stance Test:  0 sec. (<5 sec.= fall risk)    Goals  Short term goals  Time Frame for Short term goals: 12 treatments  Short term goal 1: Independent bed mobility  Short term goal 2: CGA transfers  Short term goal 3: Pt to ambulate at least 50ft w/ RW ModA x 2  Short term goal 4: Good balance/posture  Short term goal 5:  Tolerate 30 min ther act/ 1/2 to 1 grade strength increase  Patient Goals   Patient goals : Get strength back    Plan    Plan  Times per week: 1-2x/day,5-6 days/week  Current Treatment Recommendations: Strengthening,Balance Training,Functional Mobility Training,Transfer Training,Gait Training,Endurance Training,Safety Education & Training,Home Exercise Program,Equipment Evaluation, Education, & procurement,Patient/Caregiver Education & Training,Neuromuscular Re-education,Pain Management (Posture)  Safety Devices  Type of devices: Bed alarm in place,Call light within reach,Gait belt,Nurse notified,Left in bed  Restraints  Initially in place: No     Therapy Time   Individual Concurrent Group Co-treatment   Time In 0914         Time Out 0519 Minutes 43                Co-treatment with OT warranted secondary to decreased safety and independence requiring 2 skilled therapy professionals to address individual discipline's goals.      Grace Jerez, PT

## 2022-01-27 NOTE — PROGRESS NOTES
Patient transferred to room 1024 from [facility]. All belongings brought with patient, telemetry applied, call light within reach. Patient oriented to room and voiced understanding to call out when needing assistance.

## 2022-01-27 NOTE — PROGRESS NOTES
[facility]  INR:   Recent Labs     01/25/22  1441 01/26/22  0509 01/27/22  0616   INR 9.4* 10.4* 2.4       Objective:   Vitals: /77   Pulse 77   Temp 97.7 °F (36.5 °C) (Oral)   Resp 19   Ht 5' 2\" (1.575 m)   Wt 217 lb 4.8 oz (98.6 kg)   LMP 06/20/1978   SpO2 96%   BMI 39.74 kg/m²   General appearance: alert and cooperative with exam  HEENT: Head: Normocephalic, no lesions, without obvious abnormality. Neck: no adenopathy, no carotid bruit, no JVD, supple, symmetrical, trachea midline and thyroid not enlarged, symmetric, no tenderness/mass/nodules  Lungs: clear to auscultation bilaterally  Heart: regular rate and rhythm, S1, S2 normal, no murmur, click, rub or gallop  Abdomen: soft, non-tender; bowel sounds normal; no masses,  no organomegaly  Extremities: extremities normal, atraumatic, no cyanosis or edema      Echo 1/26/2021  Summary  Left ventricle is normal in size  Global left ventricular systolic function is normal  Estimated ejection fraction is 60 % . Trivial pericardial effusion. Assessment / Acute Cardiac Problems/Plan   1. Acute on chronic renal insufficiency. CKD stage III-IV. Followed by nephrology. 2. Elevated troponins, downward trend, Possible type II MI due to above. Underlying CAD not ruled out. Considering her co morbidities and troponin levels and symptoms, she will need coronary angiography which will be high risk considering her advanced kidney disease. Discussed with patient, will wait for nephrology clearance. 3. Paroxysmal atrial fibrillation, currently normal sinus rhythm. On Coumadin. 4. Rhabdomyolysis.    5. Chronic hepatitis C           Thu Mahajan MD, MD  [facility]  [phone]

## 2022-01-27 NOTE — TELEPHONE ENCOUNTER
Patient LVM cancelling INR appt scheduled for 1/31 as she is currently admitted and will likely be discharging to rehab facility. She will call to reschedule once she is back home.

## 2022-01-27 NOTE — PROGRESS NOTES
Warfarin Dosing - Pharmacy Consult Note  Consulting Provider: MOISÉS  Indication:  Atrial Fibrillation  Warfarin Dose prior to admission: 2.5mg daily   Concurrent anticoagulants/antiplatelets: none  Significant Drug Interactions: amiodarone (incr INR), levothyroxine   Recent Labs     01/25/22  1441 01/26/22  0509 01/27/22  0616   INR 9.4* 10.4* 2.4   HGB 13.2 11.7*  --     220  --    LABALBU  --  3.2* 3.1*     Recent warfarin administrations        No warfarin orders with administrations found. Orders not given:            warfarin (COUMADIN) daily dosing (placeholder)                   Date   INR    Dose  1/25        9.4      none  1/26      10.4     5mg Vit K @ 0022 & @ 1254  1/27       2.4  Assessment/Plan  (Goal INR: 2 - 3)   Coumadin 2.5mg today. Inr in am.   Active problem list reviewed. INR orders are placed. Chart reviewed for pertinent labs, drug/diet interactions, and past doses. Documentation of patient's clinical condition was reviewed. Pharmacy Dosing:  Pharmacy will continue to follow. Michelle Sommer.  aCrly Vences

## 2022-01-28 VITALS
BODY MASS INDEX: 40.77 KG/M2 | HEIGHT: 62 IN | RESPIRATION RATE: 16 BRPM | SYSTOLIC BLOOD PRESSURE: 132 MMHG | WEIGHT: 221.56 LBS | HEART RATE: 108 BPM | TEMPERATURE: 98.4 F | OXYGEN SATURATION: 93 % | DIASTOLIC BLOOD PRESSURE: 53 MMHG

## 2022-01-28 LAB
ALBUMIN SERPL-MCNC: 2.8 G/DL (ref 3.5–5.2)
ALBUMIN/GLOBULIN RATIO: ABNORMAL (ref 1–2.5)
ALP BLD-CCNC: 75 U/L (ref 35–104)
ALT SERPL-CCNC: 69 U/L (ref 5–33)
ANION GAP SERPL CALCULATED.3IONS-SCNC: 15 MMOL/L (ref 9–17)
AST SERPL-CCNC: 181 U/L
BILIRUB SERPL-MCNC: 1.04 MG/DL (ref 0.3–1.2)
BUN BLDV-MCNC: 28 MG/DL (ref 8–23)
CALCIUM SERPL-MCNC: 9.3 MG/DL (ref 8.6–10.4)
CHLORIDE BLD-SCNC: 100 MMOL/L (ref 98–107)
CO2: 19 MMOL/L (ref 20–31)
CREAT SERPL-MCNC: 3.92 MG/DL (ref 0.5–0.9)
GFR AFRICAN AMERICAN: 14 ML/MIN
GFR NON-AFRICAN AMERICAN: 11 ML/MIN
GFR SERPL CREATININE-BSD FRML MDRD: ABNORMAL ML/MIN/{1.73_M2}
GFR SERPL CREATININE-BSD FRML MDRD: ABNORMAL ML/MIN/{1.73_M2}
GLUCOSE BLD-MCNC: 124 MG/DL (ref 65–105)
GLUCOSE BLD-MCNC: 135 MG/DL (ref 70–99)
GLUCOSE BLD-MCNC: 155 MG/DL (ref 65–105)
HCT VFR BLD CALC: 33.4 % (ref 36.3–47.1)
HEMOGLOBIN: 11.2 G/DL (ref 11.9–15.1)
INR BLD: 1.8
LACTIC ACID, SEPSIS WHOLE BLOOD: ABNORMAL MMOL/L (ref 0.5–1.9)
LACTIC ACID, SEPSIS: 2.2 MMOL/L (ref 0.5–1.9)
MCH RBC QN AUTO: 32.6 PG (ref 25.2–33.5)
MCHC RBC AUTO-ENTMCNC: 33.5 G/DL (ref 28.4–34.8)
MCV RBC AUTO: 97.1 FL (ref 82.6–102.9)
NRBC AUTOMATED: 0 PER 100 WBC
PATHOLOGIST: NORMAL
PDW BLD-RTO: 13.6 % (ref 11.8–14.4)
PHOSPHORUS: 3.7 MG/DL (ref 2.6–4.5)
PLATELET # BLD: 199 K/UL (ref 138–453)
PMV BLD AUTO: 10.3 FL (ref 8.1–13.5)
POTASSIUM SERPL-SCNC: 3.8 MMOL/L (ref 3.7–5.3)
PROTHROMBIN TIME: 20.2 SEC (ref 11.5–14.2)
RBC # BLD: 3.44 M/UL (ref 3.95–5.11)
SARS-COV-2, RAPID: NOT DETECTED
SERUM IFX INTERP: NORMAL
SODIUM BLD-SCNC: 134 MMOL/L (ref 135–144)
SPECIMEN DESCRIPTION: NORMAL
TOTAL CK: 4765 U/L (ref 26–192)
TOTAL PROTEIN: 6.2 G/DL (ref 6.4–8.3)
WBC # BLD: 11.5 K/UL (ref 3.5–11.3)

## 2022-01-28 PROCEDURE — 85610 PROTHROMBIN TIME: CPT

## 2022-01-28 PROCEDURE — 83605 ASSAY OF LACTIC ACID: CPT

## 2022-01-28 PROCEDURE — 82550 ASSAY OF CK (CPK): CPT

## 2022-01-28 PROCEDURE — 82947 ASSAY GLUCOSE BLOOD QUANT: CPT

## 2022-01-28 PROCEDURE — 6370000000 HC RX 637 (ALT 250 FOR IP): Performed by: NURSE PRACTITIONER

## 2022-01-28 PROCEDURE — 84100 ASSAY OF PHOSPHORUS: CPT

## 2022-01-28 PROCEDURE — 36415 COLL VENOUS BLD VENIPUNCTURE: CPT

## 2022-01-28 PROCEDURE — 6370000000 HC RX 637 (ALT 250 FOR IP): Performed by: INTERNAL MEDICINE

## 2022-01-28 PROCEDURE — 2500000003 HC RX 250 WO HCPCS: Performed by: INTERNAL MEDICINE

## 2022-01-28 PROCEDURE — 80053 COMPREHEN METABOLIC PANEL: CPT

## 2022-01-28 PROCEDURE — 87635 SARS-COV-2 COVID-19 AMP PRB: CPT

## 2022-01-28 PROCEDURE — 87040 BLOOD CULTURE FOR BACTERIA: CPT

## 2022-01-28 PROCEDURE — 99239 HOSP IP/OBS DSCHRG MGMT >30: CPT | Performed by: INTERNAL MEDICINE

## 2022-01-28 PROCEDURE — 85027 COMPLETE CBC AUTOMATED: CPT

## 2022-01-28 PROCEDURE — 94640 AIRWAY INHALATION TREATMENT: CPT

## 2022-01-28 RX ORDER — WARFARIN SODIUM 2.5 MG/1
2.5 TABLET ORAL
Status: DISCONTINUED | OUTPATIENT
Start: 2022-01-28 | End: 2022-01-28 | Stop reason: HOSPADM

## 2022-01-28 RX ORDER — SODIUM BICARBONATE 650 MG/1
650 TABLET ORAL 2 TIMES DAILY
Qty: 60 TABLET | Refills: 3 | DISCHARGE
Start: 2022-01-28

## 2022-01-28 RX ORDER — DOCUSATE SODIUM 100 MG/1
100 CAPSULE, LIQUID FILLED ORAL DAILY
DISCHARGE
Start: 2022-01-28

## 2022-01-28 RX ORDER — BISACODYL 10 MG
10 SUPPOSITORY, RECTAL RECTAL DAILY PRN
DISCHARGE
Start: 2022-01-28 | End: 2022-02-27

## 2022-01-28 RX ORDER — MIDODRINE HYDROCHLORIDE 2.5 MG/1
2.5 TABLET ORAL 3 TIMES DAILY PRN
Qty: 90 TABLET | Refills: 3 | DISCHARGE
Start: 2022-01-28

## 2022-01-28 RX ORDER — METOCLOPRAMIDE 5 MG/1
5 TABLET ORAL 2 TIMES DAILY
Qty: 120 TABLET | Refills: 3 | DISCHARGE
Start: 2022-01-28

## 2022-01-28 RX ORDER — CALCIUM ACETATE 667 MG/1
1 CAPSULE ORAL
Qty: 180 CAPSULE | DISCHARGE
Start: 2022-01-28

## 2022-01-28 RX ADMIN — AMIODARONE HYDROCHLORIDE 200 MG: 200 TABLET ORAL at 10:17

## 2022-01-28 RX ADMIN — PANTOPRAZOLE SODIUM 40 MG: 40 TABLET, DELAYED RELEASE ORAL at 06:22

## 2022-01-28 RX ADMIN — INSULIN LISPRO 2 UNITS: 100 INJECTION, SOLUTION INTRAVENOUS; SUBCUTANEOUS at 12:54

## 2022-01-28 RX ADMIN — Medication 2 PUFF: at 07:34

## 2022-01-28 RX ADMIN — CALCIUM ACETATE 667 MG: 667 CAPSULE ORAL at 13:05

## 2022-01-28 RX ADMIN — EZETIMIBE 10 MG: 10 TABLET ORAL at 10:17

## 2022-01-28 RX ADMIN — Medication 2 PUFF: at 14:23

## 2022-01-28 RX ADMIN — BUDESONIDE AND FORMOTEROL FUMARATE DIHYDRATE 2 PUFF: 160; 4.5 AEROSOL RESPIRATORY (INHALATION) at 07:34

## 2022-01-28 RX ADMIN — ACETAMINOPHEN 650 MG: 325 TABLET ORAL at 04:43

## 2022-01-28 RX ADMIN — BISACODYL 10 MG: 10 SUPPOSITORY RECTAL at 10:20

## 2022-01-28 RX ADMIN — Medication 2 PUFF: at 11:00

## 2022-01-28 RX ADMIN — Medication 2 PUFF: at 10:59

## 2022-01-28 RX ADMIN — CALCIUM ACETATE 667 MG: 667 CAPSULE ORAL at 10:18

## 2022-01-28 RX ADMIN — Medication 2 PUFF: at 14:22

## 2022-01-28 RX ADMIN — FOLIC ACID 1 MG: 1 TABLET ORAL at 10:17

## 2022-01-28 ASSESSMENT — ENCOUNTER SYMPTOMS
SHORTNESS OF BREATH: 1
NAUSEA: 0
VOMITING: 0
ABDOMINAL PAIN: 0
COUGH: 0
CONSTIPATION: 1

## 2022-01-28 ASSESSMENT — PAIN SCALES - GENERAL: PAINLEVEL_OUTOF10: 8

## 2022-01-28 NOTE — PLAN OF CARE
Problem: Falls - Risk of:  Goal: Will remain free from falls  Description: Will remain free from falls  Outcome: Ongoing     Problem: Falls - Risk of:  Goal: Absence of physical injury  Description: Absence of physical injury  Outcome: Ongoing     Problem: Skin Integrity:  Goal: Will show no infection signs and symptoms  Description: Will show no infection signs and symptoms  Outcome: Ongoing     Problem: Skin Integrity:  Goal: Absence of new skin breakdown  Description: Absence of new skin breakdown  Outcome: Ongoing     Problem: Infection:  Goal: Will remain free from infection  Description: Will remain free from infection  Outcome: Ongoing  Note: Proper PPE and handwashing used     Problem: Safety:  Goal: Free from accidental physical injury  Description: Free from accidental physical injury  Outcome: Ongoing     Problem: Safety:  Goal: Free from intentional harm  Description: Free from intentional harm  Outcome: Ongoing     Problem: Daily Care:  Goal: Daily care needs are met  Description: Daily care needs are met  Outcome: Ongoing     Problem: Pain:  Goal: Patient's pain/discomfort is manageable  Description: Patient's pain/discomfort is manageable  Outcome: Ongoing     Problem: Pain:  Goal: Pain level will decrease  Description: Pain level will decrease  Outcome: Ongoing     Problem: Pain:  Goal: Control of acute pain  Description: Control of acute pain  Outcome: Ongoing     Problem: Pain:  Goal: Control of chronic pain  Description: Control of chronic pain  Outcome: Ongoing     Problem: Skin Integrity:  Goal: Skin integrity will stabilize  Description: Skin integrity will stabilize  Outcome: Ongoing     Problem: Discharge Planning:  Goal: Patients continuum of care needs are met  Description: Patients continuum of care needs are met  Outcome: Ongoing     Problem: Serum Glucose Level - Abnormal:  Goal: Ability to maintain appropriate glucose levels will improve  Description: Ability to maintain appropriate glucose levels will improve  Outcome: Ongoing     Problem: Sensory Perception - Impaired:  Goal: Ability to maintain a stable neurologic state will improve  Description: Ability to maintain a stable neurologic state will improve  Outcome: Ongoing     Problem: Bleeding:  Goal: Will show no signs and symptoms of excessive bleeding  Description: Will show no signs and symptoms of excessive bleeding  Outcome: Ongoing

## 2022-01-28 NOTE — CARE COORDINATION
Social Work-[facility] will admit today. [facility] will transport at 315. Orders faxed. Nurse to call report to [phone]. HENS completed. Notified patient and family. They are agreeable with dc plans.  Gavin Bond

## 2022-01-28 NOTE — RT PROTOCOL NOTE
RT Inhaler-Nebulizer Bronchodilator Protocol Note    There is a bronchodilator order in the chart from a provider indicating to follow the RT Bronchodilator Protocol and there is an Initiate RT Inhaler-Nebulizer Bronchodilator Protocol order as well (see protocol at bottom of note). CXR Findings:  No results found. The findings from the last RT Protocol Assessment were as follows:   History Pulmonary Disease: Chronic pulmonary disease  Respiratory Pattern: Regular pattern and RR 12-20 bpm  Breath Sounds: Intermittent or unilateral wheezes  Cough: Strong, spontaneous, non-productive  Indication for Bronchodilator Therapy: On home bronchodilators (QID at home)  Bronchodilator Assessment Score: 6    Aerosolized bronchodilator medication orders have been revised according to the RT Inhaler-Nebulizer Bronchodilator Protocol below. Respiratory Therapist to perform RT Therapy Protocol Assessment initially then follow the protocol. Repeat RT Therapy Protocol Assessment PRN for score 0-3 or on second treatment, BID, and PRN for scores above 3. No Indications - adjust the frequency to every 6 hours PRN wheezing or bronchospasm, if no treatments needed after 48 hours then discontinue using Per Protocol order mode. If indication present, adjust the RT bronchodilator orders based on the Bronchodilator Assessment Score as indicated below. Use Inhaler orders unless patient has one or more of the following: on home nebulizer, not able to hold breath for 10 seconds, is not alert and oriented, cannot activate and use MDI correctly, or respiratory rate 25 breaths per minute or more, then use the equivalent nebulizer order(s) with same Frequency and PRN reasons based on the score. If a patient is on this medication at home then do not decrease Frequency below that used at home.     0-3 - enter or revise RT bronchodilator order(s) to equivalent RT Bronchodilator order with Frequency of every 4 hours PRN for wheezing or increased work of breathing using Per Protocol order mode. 4-6 - enter or revise RT Bronchodilator order(s) to two equivalent RT bronchodilator orders with one order with BID Frequency and one order with Frequency of every 4 hours PRN wheezing or increased work of breathing using Per Protocol order mode. 7-10 - enter or revise RT Bronchodilator order(s) to two equivalent RT bronchodilator orders with one order with TID Frequency and one order with Frequency of every 4 hours PRN wheezing or increased work of breathing using Per Protocol order mode. 11-13 - enter or revise RT Bronchodilator order(s) to one equivalent RT bronchodilator order with QID Frequency and an Albuterol order with Frequency of every 4 hours PRN wheezing or increased work of breathing using Per Protocol order mode. Greater than 13 - enter or revise RT Bronchodilator order(s) to one equivalent RT bronchodilator order with every 4 hours Frequency and an Albuterol order with Frequency of every 2 hours PRN wheezing or increased work of breathing using Per Protocol order mode. RT to enter RT Home Evaluation for COPD & MDI Assessment order using Per Protocol order mode.     Electronically signed by Meek Warren RCP on 1/27/2022 at 9:01 PM

## 2022-01-28 NOTE — CARE COORDINATION
Social Work-Updated [facility] [employer] that patient may be ready for dc today.  Chris Peres

## 2022-01-28 NOTE — DISCHARGE SUMMARY
[facility]  Office: [address], DO, Claribel Fangress, DO, Doroteo Sandovalon, DO, Zheng Late Blood, DO, Felecia Augustine MD, Arelis Avalos MD, Edith Teran MD, Humaira Lau MD, Prisca Grider MD, Ofelia Phoenix MD, Suzi Smith MD, Mao Esparza, DO, Marisela Ferreira, DO, Lucretia Iraheta MD,  Dalila Mosquera, DO, Uriel Diaz MD, Alida Mena MD, Lan Fernandez MD, Candie Tobin MD, Kaity Sales MD, Mina Barron, CNP, [facility] Stewart, CNP, Elvis Hu, CNP, Tuan Orozco, CNS, Alfredo Osgood, CNP, Amy aGrcia, CNP, Crystal Sanchez, CNP, Cuco Noland, CNP, Shaw Oscar, CNP, Aftab Mojica PA-C, Carol Berry, DNP, Hallie Osorio, DNP, Linda Javier, CNP, Riana Reina, CNP, Horacio Mcclure, CNP, Raghav Mednoza, CNP, Armond Giron, CNP, Thong Mathew, [address]    Discharge Summary    Patient ID: Jesus Flores  :  1950   MRN: [id number]     ACCOUNT:  [de-identified]   Patient's PCP: Pacheco Black MD  Admit Date: 2022   Discharge Date: 2022    Discharge Physician: Disha Bolanos DO     The patient was seen and examined on day of discharge and this discharge summary is in conjunction with any daily progress note from day of discharge.     Active Discharge Diagnoses:     Primary Problem  Acute renal failure superimposed on chronic kidney disease [facility])      Hospital Problems  Active Hospital Problems    Diagnosis Date Noted    Type 2 diabetes mellitus with diabetic nephropathy, with long-term current use of insulin (HCC) [E11.21, Z79.4]     Type 2 diabetes mellitus with stage 5 chronic kidney disease not on chronic dialysis, with long-term current use of insulin (HCC) [E11.22, N18.5, Z79.4]     Diet-controlled diabetes mellitus ([facility] [E11.9] 01/26/2022    Hyperphosphatemia [E83.39]     Acute renal failure superimposed on chronic kidney disease ([facility] [N17.9, N18.9] 01/25/2022    Non-traumatic rhabdomyolysis [M62.82] 01/25/2022    Chronic kidney disease, stage V (very severe) ([facility] [N18.5] 01/25/2022    Obesity (BMI 30-39. 9) [E66.9] 01/25/2022    Supratherapeutic INR [R79.1] 11/08/2021    Elevated troponin [R77.8] 11/08/2021    Coumadin use [Z79.01] 02/12/2019    Diabetic peripheral neuropathy ([facility] [E11.42] 02/12/2019    History of DVT (deep vein thrombosis) [Z86.718]     Essential hypertension [I10] 02/11/2018    Hypothyroidism [E03.9] 02/11/2018    Atrial fibrillation ([facility] [I48.91]     COPD without exacerbation ([facility] [J44.9]          Hospital Course:     Brief History          Patient presents with    Fatigue       Sent by renal doc for elevated Cr and GFR; pt c/o nausea and weakness x 1 week     Nausea    Other          History of Present Illness:  Diana Copeland a 66 y. o.  female who presents with Fatigue (Sent by renal doc for elevated Cr and GFR; pt c/o nausea and weakness x 1 week ), Nausea, and Other     Patient was admitted thru ER with:  \" Renal failure\"     The patient has had chronic kidney disease  She follows with Dr. Justin Crowder labs have revealed progressive renal failure  The patient has been admitted for further evaluation and treatment     She does acknowledge that she has not been feeling well  Appetite has been poor and there has been nausea  The patient has been somewhat weak and unstable  She has had a recent fall  Patient has been having intermittent chest pain  She saw her cardiologist last week who felt her symptoms were GI in nature  He initiated fludrocortisone  Since that time has been increasing edema     Initial database has included  Troponin, High Sensitivity 383 High Panic    Xcxlliter55,676 High       Results for Toni Fraire (MRN [id number]) as of 1/25/2022 20:48    Ref.  Range 11/8/2021 11:30 11/8/2021 13:28 11/9/2021 06:27 1/25/2022 14:41 1/25/2022 16:45   Troponin, High Sensitivity Latest Ref Range: 0 - 14 ng/L 71 (HH) 69 (HH) 59 (HH) 420 (HH) 383 (HH)         EKG:  Normal sinus rhythm   Low voltage QRS   Cannot rule out Anterior infarct , age undetermined   Abnormal ECG   When compared with ECG of 08-NOV-2021 10:07,   Previous ECG has undetermined rhythm, needs review   QT has shortened      The patient does acknowledge that her urine has been orange     Gyvpshe934 High mg/dL      WBC14.0 High       BUN29 High mg/dL   CREATININE4.17 High       Results for Adams Peraza (MRN [id number]) as of 1/25/2022 20:48    Ref. Range 11/12/2021 06:36 11/13/2021 06:30 1/11/2022 11:37 1/24/2022 09:12 1/25/2022 14:41   Creatinine Latest Ref Range: 0.50 - 0.90 mg/dL 2.74 (H) 2.83 (H) 2.96 (H) 3.73 (H) 4.17 (H)   Results for Adams Peraza (MRN [id number]) as of 1/25/2022 20:48    Ref. Range 11/12/2021 06:36 11/13/2021 06:30 1/11/2022 11:37 1/24/2022 09:12 1/25/2022 14:41   GFR Non- Latest Ref Range: >60 mL/min 17 (L) 16 (L) 16 (L) 12 (L) 10 (L)      Renal ultrasound revealed:  Impression:    1.  Technically limited exam.  No hydronephrosis.    2.  Bilateral renal lesions are noted as seen on CT, limited in evaluation   due to body habitus.  If further evaluation is necessary, follow-up with MRI   or contrast-enhanced CT is recommended when clinically appropriate.      The initial plan included:  Admit  Renal follow-up Dr. Lorna Lima out non-ST elevation MI versus renal failure  Observe for rhabdomyolysis  Hold Crestor  Coumadin:  titrate to maintain therapeutic INR   Cardiology consult: Patient requesting TCC Dr Liz Cruz  Patient wants fludrocortisone stopped  DVT prophylaxis: On Coumadin     Subsequent database included:  ALT62 High U/L   JZL994 High       Pro-BNP1,242 High       Enzymes have been elevated  Total CK6,724 High    Troponin, High Sensitivity 404 High Panic  The patient again reiterates that she has had a couple falls at home  No particular trauma or injury  She had to call the squad help her off the floor     The patient's condition was stabilized  Discharge planning initiated        Discharge plan:     Renal evaluation and f/u Dr. Gabe galvan  HD Access options being evaluated  Correct electrolyte abnormalities  Rhabdomyolysis, trend enzymes  Hold Crestor  Coumadin:  titrate to maintain therapeutic INR   F/U Coumadin Clinic upon DC  Cardiology eval & f/u as scheduled   Blood Pressure - Monitor and control  Midodrine as needed  Trend LFTs  PT/OT  Fall precautions  DC planning   The patient is planning on placement  Med rec done  JOSH done  Will discharge when arrangements complete and ok with other services. Follow-up with PCP in one week, Giulia Escamilla MD  Notify PCP of discharge   DCP 37 min+    Discharge Procedure Orders   Protime-INR   Standing Status: Standing Number of Occurrences: 20 Standing Exp. Date: 01/28/23     Order Specific Question Answer Comments   Daily Coumadin Dose?  Titrated to maintain INR between 2 and 3        Significant Diagnostic Studies:     Labs / Micro:  Labs:  Hematology:  Recent Labs     01/26/22  0509 01/27/22  0616 01/28/22  0559   WBC 11.4*  --  11.5*   RBC 3.63*  --  3.44*   HGB 11.7*  --  11.2*   HCT 36.1*  --  33.4*   MCV 99.4  --  97.1   MCH 32.2  --  32.6   MCHC 32.4  --  33.5   RDW 14.0  --  13.6     --  199   MPV 10.4  --  10.3   INR 10.4* 2.4 1.8     Chemistry:  Recent Labs     01/26/22  0509 01/26/22  0509 01/26/22  1336 01/27/22  0616 01/28/22  0559   *   < > 134* 137 134*   K 4.4   < > 4.4 4.4 3.8      < > 104 106 100   CO2 14*   < > 13* 17* 19*   GLUCOSE 107*   < > 171* 126* 135*   BUN 27*   < > 29* 28* 28*   CREATININE 4.08*   < > 4.14* 3.99* 3.92*   MG 2.1  --   --   --   --    ANIONGAP 15   < > 17 14 15   LABGLOM 11*   < > 11* 11* 11*   GFRAA 13*   < > 13* 13* 14*   CALCIUM 8.8   < > 9.0 9.0 9.3 PHOS 6.5*  --   --  5.8* 3.7   PROBNP 1,242*  --   --   --   --    TROPHS 387*  --  381* 404*  --    CKTOTAL 9,370*  --   --  6,724* 4,765*   MYOGLOBIN  --   --  16,142*  --   --     < > = values in this interval not displayed. Recent Labs     01/26/22  0509 01/26/22  1259 01/26/22  1859 01/27/22  0616 01/27/22  0727 01/27/22  1109 01/27/22  1607 01/27/22  2001 01/28/22  0559 01/28/22  0739 01/28/22  1141   PROT 6.7  --   --  6.4  --   --   --   --  6.2*  --   --    LABALBU 3.2*  --   --  3.1*  --   --   --   --  2.8*  --   --    *  --   --  192*  --   --   --   --  181*  --   --    ALT 62*  --   --  66*  --   --   --   --  69*  --   --    ALKPHOS 85  --   --  81  --   --   --   --  75  --   --    BILITOT 0.67  --   --  0.90  --   --   --   --  1.04  --   --    POCGLU  --    < >   < >  --  126* 129* 182* 150*  --  124* 155*    < > = values in this interval not displayed. Radiology:    Echocardiogram Limited 2D    Result Date: 1/26/2022  [facility] Transthoracic Echocardiography Report (TTE)  Patient Name [facility]      Date of Study             01/26/2022               France VELEZ   Date of      1950  Gender                    Female  Birth   Age          66 year(s)  Race                         Room Number  2104        Height:                   62 inch, 157.48 cm   Corporate ID [id number]    Weight:                   217 pounds, 98.4 kg  #   Patient Acct [de-identified]   BSA:         1.98 m^2     BMI:       39.69 kg/m^2  #   MR #         [id number]     Iva Florez   Accession #  [id number]  Interpreting Physician    [address] [place] [address]   Fellow                   Referring Nurse                           Practitioner   Interpreting             Referring Physician       Nav Calloway MD  Fellow  Type of Study   TTE procedure:2D Echocardiogram, Limited Echo.   Procedure Date Date: 01/26/2022 Start: 02:18 PM Study Location: [address] [place] [address] Technical Quality: Adequate visualization Indications:Elevated troponin. History / Tech. Comments: Procedure explained to patient. Patient Status: Inpatient Height: 62 inches Weight: 217 pounds BSA: 1.98 m^2 BMI: 39.69 kg/m^2 CONCLUSIONS Summary Left ventricle is normal in size Global left ventricular systolic function is normal Estimated ejection fraction is 60 % . Trivial pericardial effusion. Signature ----------------------------------------------------------------------------  Electronically signed by Alexandria Marshall on 01/26/2022  02:46 PM ---------------------------------------------------------------------------- ----------------------------------------------------------------------------  Electronically signed by Lavelle BanuelosInterpreting physician) on  01/26/2022 05:33 PM ---------------------------------------------------------------------------- FINDINGS Left Atrium Left atrium is normal in size. Left Ventricle Left ventricle is normal in size Global left ventricular systolic function is normal Estimated ejection fraction is 60 % . Right Atrium Right atrium is normal in size. Right Ventricle Normal right ventricular size and function. Mitral Valve Mitral annular calcification is seen. Aortic Valve Aortic valve is trileaflet. Tricuspid Valve Normal tricuspid valve structure and function. Pulmonic Valve The pulmonic valve is normal in structure. Pericardial Effusion Trivial pericardial effusion. Miscellaneous Normal aortic root dimension. US RENAL COMPLETE    Result Date: 1/27/2022  EXAMINATION: RETROPERITONEAL ULTRASOUND OF THE KIDNEYS AND URINARY BLADDER 1/27/2022 COMPARISON: Noncontrast CT abdomen and pelvis 11/08/2021.   Renal ultrasound 03/04/2020 HISTORY: ORDERING SYSTEM PROVIDED HISTORY: Worsening of CKD TECHNOLOGIST PROVIDED HISTORY: Worsening of CKD FINDINGS: Kidneys: Technically limited exam. The right kidney measures 11.7 cm in length and the left kidney measures 11.2 cm in length. Renal cortical thickness measures 9 mm and 12 mm respectively. Limited parenchymal detail kidneys demonstrate normal cortical echogenicity. No evidence for hydronephrosis. Bilateral renal lesions are noted measuring up to 3.6 cm on the left and 3.2 cm on the right. The largest lesion in the right kidney was previously characterized as a cyst on prior ultrasound. The smaller exophytic lesion arising laterally from the lower pole of the left kidney and the left renal lesion or not demonstrated on prior ultrasound and remain indeterminate. Bladder: Bladder volume estimated at 304 mL. 1.  Technically limited exam.  No hydronephrosis. 2.  Bilateral renal lesions are noted as seen on CT, limited in evaluation due to body habitus. If further evaluation is necessary, follow-up with MRI or contrast-enhanced CT is recommended when clinically appropriate. RECOMMENDATIONS: Unavailable     XR CHEST PORTABLE    Result Date: 1/25/2022  EXAMINATION: ONE XRAY VIEW OF THE CHEST 1/25/2022 2:19 pm COMPARISON: 11/08/2021. HISTORY: ORDERING SYSTEM PROVIDED HISTORY: Chest Pain TECHNOLOGIST PROVIDED HISTORY: Chest Pain Reason for Exam: port ap upright  chest pain FINDINGS: The heart size is within normal limits. The pulmonary vasculature is also within normal limits. No acute infiltrates are seen. The costophrenic angles are sharp bilaterally. No pneumothoraces are noted. There are calcified lymph nodes and granulomas. 1. No active pulmonary disease.          Consultations:    Consults:     Final Specialist Recommendations/Findings:   IP CONSULT TO HOSPITALIST  IP CONSULT TO NEPHROLOGY  IP CONSULT TO PHARMACY  IP CONSULT TO CARDIOLOGY        Discharged Condition:    Stable     Disposition: skilled nursing facility     Physician Follow Up:   MD Kaylee Montejo [facility]  [phone]    On 2/16/2022  Appt Feb 16 at 9:40am    Bao Grace MD  [facility] 98, [address]

## 2022-01-28 NOTE — PROGRESS NOTES
Writer spoke with nephrology regarding patient's AM labs. Per Dr. Nancy Ruelas, no plans for HD catheter placement or dialysis at this time. Patient may follow up oupatient with labs, okay for discharge from their perspective. Patient updated, will pass along information.

## 2022-01-28 NOTE — PROGRESS NOTES
[facility]   Progress Note                   Date:   1/28/2022  Patient name: Jamel Mills  Date of admission:  1/25/2022  1:45 PM  MRN:   [id number]  YOB: 1950  PCP: Mckenzie Mccray MD    Reason for Admission:  SOB. Subjective:       Clinical Changes / Abnormalities: Continue to complain of chest pain with atypical features and some SOB, no dizziness or syncope.        Medications:   Scheduled Meds:   pantoprazole  40 mg Oral QAM AC    ipratropium  2 puff Inhalation 4x daily    albuterol sulfate HFA  2 puff Inhalation 4x daily    calcium acetate  1 capsule Oral TID WC    docusate sodium  100 mg Oral Daily    folic acid  1 mg Oral Daily    insulin lispro  0-6 Units SubCUTAneous TID WC    insulin lispro  0-3 Units SubCUTAneous Nightly    amiodarone  200 mg Oral Once per day on Mon Wed Fri    budesonide-formoterol  2 puff Inhalation QAM    ezetimibe  10 mg Oral Daily    metoclopramide  5 mg Oral BID AC    [Held by provider] rosuvastatin  40 mg Oral Daily    levothyroxine  100 mcg Oral Daily    sodium chloride flush  5-40 mL IntraVENous 2 times per day    warfarin (COUMADIN) daily dosing (placeholder)   Other RX Placeholder     Continuous Infusions:   dextrose      sodium bicarbonate infusion 50 mL/hr at 01/27/22 1639    sodium chloride       CBC:   Recent Labs     01/25/22  1441 01/26/22  0509 01/28/22  0559   WBC 14.0* 11.4* 11.5*   HGB 13.2 11.7* 11.2*    220 199     BMP:    Recent Labs     01/26/22  1336 01/27/22  0616 01/28/22  0559   * 137 134*   K 4.4 4.4 3.8    106 100   CO2 13* 17* 19*   BUN 29* 28* 28*   CREATININE 4.14* 3.99* 3.92*   GLUCOSE 171* 126* 135*     Hepatic:   Recent Labs     01/26/22  0509 01/27/22  0616 01/28/22  0559   * 192* 181*   ALT 62* 66* 69*   BILITOT 0.67 0.90 1.04   ALKPHOS 85 81 75     INR:   Recent Labs     01/26/22  0509 01/27/22  0616 01/28/22  0559   INR 10.4* 2.4 1.8       Objective:   Vitals: BP (!) 130/50   Pulse 91   Temp 98.2 °F (36.8 °C) (Oral)   Resp 18   Ht 5' 2\" (1.575 m)   Wt 221 lb 9 oz (100.5 kg)   LMP 06/20/1978   SpO2 92%   BMI 40.52 kg/m²   General appearance: alert and cooperative with exam  HEENT: Head: Normocephalic, no lesions, without obvious abnormality. Neck: no adenopathy, no carotid bruit, no JVD, supple, symmetrical, trachea midline and thyroid not enlarged, symmetric, no tenderness/mass/nodules  Lungs: clear to auscultation bilaterally  Heart: regular rate and rhythm, S1, S2 normal, no murmur, click, rub or gallop  Abdomen: soft, non-tender; bowel sounds normal; no masses,  no organomegaly  Extremities: extremities normal, atraumatic, no cyanosis or edema      Echo 1/26/2021    Left ventricle is normal in size  Global left ventricular systolic function is normal  Estimated ejection fraction is 60 % . Trivial pericardial effusion. Assessment / Acute Cardiac Problems/Plan   1. Acute on chronic renal insufficiency. CKD stage III-IV. Followed by nephrology. 2. Elevated troponins, downward trend, Possible type II MI due to above. Underlying CAD not ruled out. Considering her co morbidities and troponin levels and symptoms, she will need coronary angiography which will be high risk considering her advanced kidney disease. Discussed with patient, will wait for nephrology clearance. 3. Paroxysmal atrial fibrillation, currently normal sinus rhythm. On Coumadin. 4. Rhabdomyolysis.    5. Chronic hepatitis C       Electronically signed by Latha Treviño MD on 1/28/2022 at 9:52 [facility]  [phone]

## 2022-01-28 NOTE — PROGRESS NOTES
Warfarin Dosing - Pharmacy Consult Note  Consulting Provider: MOISÉS  Indication:  Atrial Fibrillation  Warfarin Dose prior to admission: 2.5mg daily   Concurrent anticoagulants/antiplatelets: none  Significant Drug Interactions: amiodarone (incr INR)  Recent Labs     01/25/22  1441 01/25/22  1441 01/26/22  0509 01/27/22  0616 01/28/22  0559   INR 9.4*   < > 10.4* 2.4 1.8   HGB 13.2  --  11.7*  --  11.2*     --  220  --  199   LABALBU  --   --  3.2* 3.1* 2.8*    < > = values in this interval not displayed. Recent warfarin administrations                     warfarin (COUMADIN) tablet 2.5 mg (mg) 2.5 mg Given 01/27/22 1721                   Date   INR    Dose  1/28        1.8     2.5mg     Assessment/Plan  (Goal INR: 2 - 3)  INR OK today for administration of warfarin. Will give 2.5mg dose. Active problem list reviewed. INR orders are placed. Chart reviewed for pertinent labs, drug/diet interactions, and past doses. Documentation of patient's clinical condition was reviewed. Pharmacy Dosing:  Pharmacy will continue to follow.

## 2022-01-28 NOTE — PROGRESS NOTES
Patient being transported by squad to [facility]. IV and telemetry removed, all belongings packed and with patient. All discharge paperwork faxed to facility. Report called in to nurse Maribel Matthews. Patient updated daughter-in-law. See chart for further detail.

## 2022-01-28 NOTE — PROGRESS NOTES
Reason for Consult:  Acute kidney injury. Requesting Physician:  Ozzy Christian DO    Interval history:   Creatinine showed mild improvement to 3.92  CO2 level is slowly improving to 19 from 17. Feels better today. HISTORY OF PRESENT ILLNESS:    The patient is a 66 y.o. with known history of chronic kidney disease, her renal function has been declining over the last year, most recent creatinine from November was 2.8, patient's recent creatinine was found to be elevated and was asked to go to the emergency room for further evaluation, her creatinine has been in the range of 4.1 during the last couple of days. She reported that she has been feeling very weak at home, reported significant decrease in appetite but was associated with nausea. Patient was aware that she will most likely require dialysis at some point, she was interested in hemodialysis  Her labs were also remarkable for significant metabolic acidosis, with a CO2 level of 13  She was also found to have elevated CK level, with initial level of 9370         Prior to Admission medications    Medication Sig Start Date End Date Taking?  Authorizing Provider   omeprazole (PRILOSEC) 20 MG delayed release capsule Take 20 mg by mouth 2 times daily (before meals)   Yes Historical Provider, MD   midodrine (PROAMATINE) 5 MG tablet Take 5 mg by mouth 3 times daily (with meals)   Yes Historical Provider, MD   metoclopramide (REGLAN) 5 MG tablet Take 10 mg by mouth 2 times daily Takes 2 tabs (=10mg) BID   Yes Historical Provider, MD   levothyroxine (SYNTHROID) 100 MCG tablet Take 100 mcg by mouth Daily  11/1/21  Yes Historical Provider, MD   albuterol-ipratropium (COMBIVENT RESPIMAT)  MCG/ACT AERS inhaler Inhale 1 puff into the lungs every 6 hours   Yes Historical Provider, MD   budesonide-formoterol (SYMBICORT) 160-4.5 MCG/ACT AERO Inhale 2 puffs into the lungs every morning    Yes Historical Provider, MD   warfarin (COUMADIN) 2.5 MG tablet Take 1. 25-2.5 mg by mouth daily as directed by [facility]. Yes Historical Provider, MD   Polyethylene Glycol 3350 (MIRALAX PO) Take 17 g by mouth daily as needed    Yes Historical Provider, MD   amiodarone (CORDARONE) 200 MG tablet Take 200 mg by mouth three times a week TAKES ON MONDAYS, [id number] Deb Santana. 5/31/20  Yes Historical Provider, MD   ezetimibe (ZETIA) 10 MG tablet Take 10 mg by mouth daily  5/31/20  Yes Historical Provider, MD   ammonium lactate (LAC-HYDRIN) 12 % lotion Apply topically 2 times daily as needed  9/10/19  Yes Historical Provider, MD   rosuvastatin (CRESTOR) 40 MG tablet Take 1 tablet by mouth daily   Yes Historical Provider, MD   K Phos [place]-Sod Phos Di & Mono (PHOSPHA 250 NEUTRAL PO) Take 1 tablet by mouth 2 times daily   Yes Historical Provider, MD   albuterol sulfate  (90 Base) MCG/ACT inhaler Inhale 2 puffs into the lungs every 6 hours as needed for Wheezing   Yes Historical Provider, MD   albuterol (PROVENTIL) (2.5 MG/3ML) 0.083% nebulizer solution Take 3 mLs by nebulization every 4 hours as needed for Wheezing 3/27/18  Yes Brianna Brown,    folic acid (FOLVITE) 1 MG tablet Take 1 tablet by mouth daily.  11/27/13  Yes Aleta Hernandez MD   Cholecalciferol (VITAMIN D3) 1000 UNITS TABS Take 1,000 Units by mouth Five times weekly Indications: Mon-Fri    Yes Historical Provider, MD       Scheduled Meds:   pantoprazole  40 mg Oral QAM AC    ipratropium  2 puff Inhalation 4x daily    albuterol sulfate HFA  2 puff Inhalation 4x daily    calcium acetate  1 capsule Oral TID WC    docusate sodium  100 mg Oral Daily    folic acid  1 mg Oral Daily    insulin lispro  0-6 Units SubCUTAneous TID WC    insulin lispro  0-3 Units SubCUTAneous Nightly    amiodarone  200 mg Oral Once per day on Mon Wed Fri    budesonide-formoterol  2 puff Inhalation QAM    ezetimibe  10 mg Oral Daily    metoclopramide  5 mg Oral BID AC    [Held by provider] rosuvastatin 40 mg Oral Daily    levothyroxine  100 mcg Oral Daily    sodium chloride flush  5-40 mL IntraVENous 2 times per day    warfarin (COUMADIN) daily dosing (placeholder)   Other RX Placeholder     Continuous Infusions:   dextrose      sodium bicarbonate infusion 50 mL/hr at 01/27/22 1639    sodium chloride       PRN Meds:calcium carbonate, midodrine, albuterol sulfate HFA, polyethylene glycol, bisacodyl, glucose, glucagon (rDNA), dextrose, dextrose bolus (hypoglycemia) **OR** dextrose bolus (hypoglycemia), sodium chloride flush, sodium chloride, acetaminophen **OR** acetaminophen, albuterol sulfate HFA     Physical Exam:  Vitals:    01/28/22 0057 01/28/22 0452 01/28/22 0615 01/28/22 0738   BP: (!) 116/50 (!) 139/58  (!) 130/50   Pulse: 86 93  91   Resp: 16 18  18   Temp: 99 °F (37.2 °C) 98.2 °F (36.8 °C)  98.2 °F (36.8 °C)   TempSrc: Oral Oral  Oral   SpO2: 90% 92%  92%   Weight:   221 lb 9 oz (100.5 kg)    Height:         I/O last 3 completed shifts:  In: -   Out: 1620 [Urine:1620]    General:  Awake, alert, not in distress. Appears to be stated age. HEENT: Atraumatic, normocephalic. Anicteric sclera. Pink and moist oral mucosa. Neck supple. No JVD. Chest: Bilateral air entry, clear to auscultation, no wheezing, rhonchi or rales. Cardiovascular: RRR, S1S2, no murmur, rub or gallop. Mild bilateral lower extremity edema. Abdomen: Soft, non tender to palpation. Musculoskeletal: No cyanosis or clubbing. Integumentary: Pink, warm and dry. Free from rash or lesions. CNS: Oriented to person, place and time. Speech clear. Face symmetrical. No tremor. Psych:  Answering questions appropriately, appropriate mood and affect  Data:  CBC:   Lab Results   Component Value Date    WBC 11.5 (H) 01/28/2022    HGB 11.2 (L) 01/28/2022    HCT 33.4 (L) 01/28/2022    MCV 97.1 01/28/2022     01/28/2022     BMP:    Lab Results   Component Value Date     (L) 01/28/2022     01/27/2022     (L) 01/26/2022    K 3.8 01/28/2022    K 4.4 01/27/2022    K 4.4 01/26/2022     01/28/2022     01/27/2022     01/26/2022    CO2 19 (L) 01/28/2022    CO2 17 (L) 01/27/2022    CO2 13 (L) 01/26/2022    BUN 28 (H) 01/28/2022    BUN 28 (H) 01/27/2022    BUN 29 (H) 01/26/2022    CREATININE 3.92 (H) 01/28/2022    CREATININE 3.99 (H) 01/27/2022    CREATININE 4.14 (H) 01/26/2022    GLUCOSE 135 (H) 01/28/2022    GLUCOSE 126 (H) 01/27/2022    GLUCOSE 171 (H) 01/26/2022     CMP:   Lab Results   Component Value Date     01/28/2022    K 3.8 01/28/2022     01/28/2022    CO2 19 01/28/2022    BUN 28 01/28/2022    CREATININE 3.92 01/28/2022    GLUCOSE 135 01/28/2022    GLUCOSE 123 12/19/2011    CALCIUM 9.3 01/28/2022    PROT 6.2 01/28/2022    LABALBU 2.8 01/28/2022    LABALBU 4.2 12/19/2011    BILITOT 1.04 01/28/2022    ALKPHOS 75 01/28/2022     01/28/2022    ALT 69 01/28/2022      Hepatic:   Lab Results   Component Value Date     (H) 01/28/2022     (H) 01/27/2022     (H) 01/26/2022    ALT 69 (H) 01/28/2022    ALT 66 (H) 01/27/2022    ALT 62 (H) 01/26/2022    BILITOT 1.04 01/28/2022    BILITOT 0.90 01/27/2022    BILITOT 0.67 01/26/2022    ALKPHOS 75 01/28/2022    ALKPHOS 81 01/27/2022    ALKPHOS 85 01/26/2022     BNP:   Lab Results   Component Value Date    BNP NOT REPORTED 08/19/2014    BNP NOT REPORTED 07/16/2014    BNP 30 02/13/2014     Lipids:   Lab Results   Component Value Date    CHOL 190 01/03/2018    HDL 51 01/11/2022     INR:   Lab Results   Component Value Date    INR 1.8 01/28/2022    INR 2.4 01/27/2022    INR 10.4 () 01/26/2022     PTH: No results found for: PTH  Phosphorus:    Lab Results   Component Value Date    PHOS 3.7 01/28/2022     Ionized Calcium: No results found for: IONCA  Magnesium:   Lab Results   Component Value Date    MG 2.1 01/26/2022     Albumin:   Lab Results   Component Value Date    LABALBU 2.8 01/28/2022    LABALBU 4.2 12/19/2011 Radiology:   Chest x-ray  The heart size is within normal limits. The pulmonary vasculature is also   within normal limits. No acute infiltrates are seen. The costophrenic angles   are sharp bilaterally. No pneumothoraces are noted. There are calcified lymph   nodes and granulomas.           Assessment:  1. Chronic kidney disease stage IV with recent worsening of kidney function, this could be secondary to progress of the kidney disease which suggests that she is approaching CKD 5, this could also be acute kidney injury secondary to rhabdomyolysis. 2.  Rhabdomyolysis   3. Metabolic acidosis  4. Hyperphosphatemia    Plan:  Renal ultrasound showed very minimal improvement, renal function continues to improve. She will not need inpatient HD treatment. We will plan for dialysis as outpatient, and will plan for access to be placed outpatient. the  was asked to start the process for outpatient dialysis chair arrangement in order to save time, request will be submitted for dialysis chair at [facility]  Renal ultrasound did not show any acute event  serum protein immunofixation is pending. Will recommend cardiac cath not be completed until renal function back to baseline or that patient has received an outpatient HD treatment. Will start Sodium Bicarbonate 650 mg PO BID as outpatient. Continue PhosLo. OK for discharge from a nephrology standpoint. Follow up in 2 - 4 weeks with nephrology. Weekly BMPs  Will refer to Dr. Raymond Carlton for vascular access. Discussed with patient, nursing and Dr. Danisha White. Please do not hesitate to contact us for any further questions/concerns. We will continue to follow along with you. Patient seen in collaboration with Dr. Марина Arthur.     Electronically signed by JENSEN De Dios CNP  on 1/28/2022 at 9:33 AM     Physician Addendum  I evaluated the patient with CNP   Agree with above assessment and plan   Renal function showed very slow improvement  Patient has no uremic symptoms  Okay for discharge with close follow-up  Check BMP weekly for the next couple of weeks and arrange for follow-up with nephrology in 2 to 4 weeks  We will start arrangements for fistula placement as outpatient  We recommend holding off any procedures that would involve IV contrast infusion till kidney function is back to baseline or till dialysis is started as long as there is no emergency  We will continue to follow     Electronically signed by Luana Ugarte MD on 01/28/22 11:38 AM

## 2022-01-28 NOTE — PROGRESS NOTES
Physical Therapy  DATE: 2022    NAME: Mark Anthony Sheikh  MRN: [id number]   : 1950    Patient not seen this date for Physical Therapy due to:      [] Cancel by RN or physician due to:    [] Hemodialysis    [] Critical Lab Value Level     [] Blood transfusion in progress    [] Acute or unstable cardiovascular status   _MAP < 55 or more than >115  _HR < 40 or > 130    [] Acute or unstable pulmonary status   -FiO2 > 60%   _RR < 5 or >40    _O2 sats < 85%    [] Strict Bedrest    [] Off Unit for surgery or procedure    [] Off Unit for testing       [] Pending imaging to R/O fracture    [] Refusal by Patient      [x] Other Patient just received Suppository will check back later if time allows. [] PT being discontinued at this time. Patient independent. No further needs. [] PT being discontinued at this time as the patient has been transferred to hospice care. No further needs.       Mu Richard, PTA

## 2022-01-28 NOTE — PROGRESS NOTES
[facility]  Office: [address], DO, Eva Walter, DO, Renae Levine, DO, Jose Puckett Ledy, DO, David Arzola MD, Celeste Watkins MD, Efra Nguyen MD, Marcial Iyer MD, Elizabet Styles MD, Amaya Ma MD, Carmela Guadalupe MD, Joselyn Espino, DO, Sameer Carcamo DO, Bassam Michelle MD,  Inga Cervantes DO, Lizzy Bills MD, Gurvinder Aviles MD, Diego Crowder MD, Ari Browning MD, Sabrina Beltran MD, Diya Salas MD, Brad Bazzi MD, [facility], [facility], [facility], [place]), CNP, Wilfrid Breaux, CNP, Alexsandra Geronimo, CNS, Kvng Chapin, CNP, João Shen, CNP, Ashu Luciano, CNP, Peter El, CNP, Beth Fox, CNP, Kaila Whitney PA-C, Lokesh Mcpherson, [facility], Sydnie Alexis, [facility], Carroll Cortes, CNP, Afsaneh Mcgill, CNP, Bari Burciaga, CNP, Nivia Garcia, CNP, Mily Garcia, CNP, Noreen Covarrubias 148    Progress Note    1/28/2022    10:50 AM    Name:   Lesley Merino  MRN:     [id number]     Acct:      [de-identified]   Room:   [address] [place] [address] Day:  3  Admit Date:  1/25/2022  1:45 PM    PCP:   Cosmo Tomlinson MD  Code Status:  Full Code    Subjective:     C/C:   Renal failure  Weakness  Falls    Interval History Status:   Improved  Still weak  Doing ok with diet  Has agreed to [facility]    Data Base Updates:  WBC11.5 High k/uL RBC3.44 Low m/uL Wrrsauxbyg38.2 Low      INR1. 48 [address]. 8 Low g/dL   Albumin/Globulin RatioNOT REPORTED   Alkaline Vjeofavfsmi52O/L   ALT69 High U/L HKI248 High U/L   Total Bilirubin1.04mg/dL     BUN28 High mg/dL   CREATININE3.92 High mg/dL     Calcium9.3mg/dL   Phosphorus3.7mg/dL   Gpgiibs907 High mg/dL   Total Protein6. 2 Low g/dL   Eqsoko062 Low mmol/L   Potassium3.8mmol/L   Zccxvtde354rfdp/L     CO2 19 Low mmol/L   Anion Gap15     Total CK4,765 High        Brief History:     Patient presents with    Fatigue       Sent by renal doc for elevated Cr and GFR; pt c/o nausea and weakness x 1 week     Nausea    Other        History of Present Illness:  Manas Mckay is a 66 y.o.  female who presents with Fatigue (Sent by renal doc for elevated Cr and GFR; pt c/o nausea and weakness x 1 week ), Nausea, and Other     Patient was admitted thru ER with:  \" Renal failure\"     The patient has had chronic kidney disease  She follows with Dr. Dima Smith labs have revealed progressive renal failure  The patient has been admitted for further evaluation and treatment     She does acknowledge that she has not been feeling well  Appetite has been poor and there has been nausea  The patient has been somewhat weak and unstable  She has had a recent fall  Patient has been having intermittent chest pain  She saw her cardiologist last week who felt her symptoms were GI in nature  He initiated fludrocortisone  Since that time has been increasing edema     Initial database has included  Troponin, High Sensitivity 383 High Panic    Ogdhpykmp79,676 High       Results for Bessy Vale (MRN [id number]) as of 1/25/2022 20:48    Ref. Range 11/8/2021 11:30 11/8/2021 13:28 11/9/2021 06:27 1/25/2022 14:41 1/25/2022 16:45   Troponin, High Sensitivity Latest Ref Range: 0 - 14 ng/L 71 (HH) 69 (HH) 59 (HH) 420 (HH) 383 (HH)         EKG:  Normal sinus rhythm   Low voltage QRS   Cannot rule out Anterior infarct , age undetermined   Abnormal ECG   When compared with ECG of 08-NOV-2021 10:07,   Previous ECG has undetermined rhythm, needs review   QT has shortened      The patient does acknowledge that her urine has been orange     Vfghqta412 High mg/dL      WBC14.0 High       BUN29 High mg/dL   CREATININE4.17 High       Results for Bessy Pap (MRN [id number]) as of 1/25/2022 20:48    Ref. Range 11/12/2021 06:36 11/13/2021 06:30 1/11/2022 11:37 1/24/2022 09:12 1/25/2022 14:41   Creatinine Latest Ref Range: 0.50 - 0.90 mg/dL 2.74 (H) 2.83 (H) 2.96 (H) 3.73 (H) 4.17 (H)   Results for Bessy Pap (MRN [id number]) as of 1/25/2022 20:48    Ref.  Range 11/12/2021 06:36 11/13/2021 06:30 1/11/2022 11:37 1/24/2022 09:12 1/25/2022 14:41   GFR Non- Latest Ref Range: >60 mL/min 17 (L) 16 (L) 16 (L) 12 (L) 10 (L)     Renal ultrasound revealed:  Impression:    1. Technically limited exam.  No hydronephrosis. 2.  Bilateral renal lesions are noted as seen on CT, limited in evaluation   due to body habitus. If further evaluation is necessary, follow-up with MRI   or contrast-enhanced CT is recommended when clinically appropriate.      The initial plan included:  Admit  Renal follow-up Dr. Wolf Sarah enzymes  Rule out non-ST elevation MI versus renal failure  Observe for rhabdomyolysis  Hold Crestor  Coumadin:  titrate to maintain therapeutic INR   Cardiology consult: Patient requesting TCC Dr Bradly Young  Patient wants fludrocortisone stopped  DVT prophylaxis: On Coumadin    Subsequent database included:  ALT62 High U/L   KZW493 High      Pro-BNP1,242 High      Enzymes have been elevated  Total MA4,519 High    Troponin, High Sensitivity 404 High Panic    The patient again reiterates that she has had a couple falls at home  No particular trauma or injury  She had to call the squad help her off the floor    The patient's condition was stabilized  Discharge planning initiated    Past Medical History:   has a past medical history of Anesthesia complication, Asthma, Atrial fibrillation ([facility] Cerebral artery occlusion with cerebral infarction [facility]), Chronic back pain, Chronic kidney disease, Cirrhosis ([facility] Constipation, COPD (chronic obstructive pulmonary disease) ([facility] Dental bridge present, Diet-controlled diabetes mellitus ([facility] Difficult intravenous access, Diverticulitis, Gastroparesis, GERD (gastroesophageal reflux disease), H/O mastectomy, Hepatitis, History of blood transfusion, History of Coumadin therapy, History of DVT (deep vein thrombosis), History of echocardiogram, History of stress test, Hx of blood clots, Hyperlipidemia, Hypertension, Hypothyroidism, Impaired mobility, Incontinence, Incontinence of bowel, Liver disease, Lower extremity neuropathy, Lumbar disc disease, OAB (overactive bladder), Osteoarthritis, PONV (postoperative nausea and vomiting), Prolonged emergence from general anesthesia, Rectocele, Rotator cuff tear, Snores, Type II or unspecified type diabetes mellitus without mention of complication, not stated as uncontrolled, Use of cane as ambulatory aid, Uses roller walker, and Wears glasses. Social History:   reports that she quit smoking about 13 years ago. Her smoking use included cigarettes. She has a 43.00 pack-year smoking history. She has never used smokeless tobacco. She reports current alcohol use of about 1.0 standard drink of alcohol per week. She reports that she does not use drugs. Family History:   Family History   Problem Relation Age of Onset    Cancer Mother         breast    Heart Disease Father     Cancer Sister         lung    Cancer Maternal Aunt         cervical       Review of Systems:     Review of Systems   Constitutional: Positive for activity change (Reduced), appetite change (Diminished) and fatigue. Respiratory: Positive for shortness of breath (Reports dyspnea on exertion). Negative for cough. Cardiovascular: Positive for leg swelling. Negative for chest pain and palpitations. Gastrointestinal: Positive for constipation (improved ). Negative for abdominal pain, nausea and vomiting. The patient has been having dyspepsia   Genitourinary: Negative for difficulty urinating, flank pain and hematuria. Musculoskeletal: Positive for gait problem. Negative for arthralgias and myalgias. Patient reports increasing weakness  She has had 2 recent falls at home after which she was unable to get up and had to call EMS   Neurological: Positive for weakness and numbness (History neuropathy).        Physical Examination:        Vitals  BP (!) 130/50   Pulse 91   Temp 98.2 °F (36.8 °C) (Oral)   Resp 18   Ht 5' 2\" (1.575 m)   Wt 221 lb 9 oz (100.5 kg)   LMP 1978   SpO2 92%   BMI 40.52 kg/m²   Temp (24hrs), Av.2 °F (36.8 °C), Min:97.5 °F (36.4 °C), Max:99 °F (37.2 °C)    Recent Labs     22  1109 22  1607 22  0739   POCGLU 129* 182* 150* 124*       Physical Exam  Vitals reviewed. Constitutional:       General: She is not in acute distress. Appearance: She is not diaphoretic. HENT:      Head: Normocephalic. Nose: Nose normal.   Eyes:      General: No scleral icterus. Conjunctiva/sclera: Conjunctivae normal.   Neck:      Trachea: No tracheal deviation. Cardiovascular:      Rate and Rhythm: Normal rate and regular rhythm. Pulmonary:      Effort: Pulmonary effort is normal. No respiratory distress. Breath sounds: Normal breath sounds. No wheezing or rales. Chest:      Chest wall: No tenderness. Abdominal:      General: There is no distension. Palpations: Abdomen is soft. Tenderness: There is no abdominal tenderness. Musculoskeletal:         General: No tenderness. Cervical back: Neck supple. Skin:     General: Skin is warm and dry. Medications: Allergies: Allergies   Allergen Reactions    Lyrica [Pregabalin] Nausea Only and Other (See Comments)     . FACE GETS BRIGHT RED. PAIN AND SEVERE SWELLING IN BILATERAL WRISTS, ANKLES AND KNEES    Pcn [Penicillins] Other (See Comments)     Unknown reaction, states can take Keflex STATES REACTION HAPPENED AS A CHILD AND WAS TOLD IT Was  LIFE THREATENING      Nsaids Other (See Comments)     PT HAS KIDNEY DISEASE AND CAN NOT TAKE NSAIDS      Zofran [Ondansetron] Other (See Comments)     Makes nausea and vomiting worse    Codeine Nausea And Vomiting     +N/V. KEEPS PATIENT AWAKE AND WIRED FOR SEVERAL DAYS.  Cymbalta [Duloxetine Hcl] Diarrhea, Nausea And Vomiting and Other (See Comments)     Intolerance.  ABDOMINAL BLOATING    Morphine Anxiety    Prolia [Denosumab] Other (See 9.4*   < > 10.4* 2.4 1.8    < > = values in this interval not displayed. Chemistry:  Recent Labs     01/25/22  1441 01/25/22  1441 01/25/22  1645 01/25/22  2122 01/26/22  0509 01/26/22  0509 01/26/22  1336 01/27/22  0616 01/28/22  0559     --   --    < > 134*   < > 134* 137 134*   K 4.8  --   --    < > 4.4   < > 4.4 4.4 3.8     --   --    < > 105   < > 104 106 100   CO2 15*  --   --    < > 14*   < > 13* 17* 19*   GLUCOSE 127*  --   --    < > 107*   < > 171* 126* 135*   BUN 29*  --   --    < > 27*   < > 29* 28* 28*   CREATININE 4.17*  --   --    < > 4.08*   < > 4.14* 3.99* 3.92*   MG  --   --   --   --  2.1  --   --   --   --    ANIONGAP 17  --   --    < > 15   < > 17 14 15   LABGLOM 10*  --   --    < > 11*   < > 11* 11* 11*   GFRAA 13*  --   --    < > 13*   < > 13* 13* 14*   CALCIUM 9.3  --   --    < > 8.8   < > 9.0 9.0 9.3   PHOS  --   --   --   --  6.5*  --   --  5.8* 3.7   PROBNP  --   --   --   --  1,242*  --   --   --   --    TROPHS 420*   < > 383*   < > 387*  --  381* 404*  --    CKTOTAL  --   --   --   --  9,370*  --   --  6,724* 4,765*   MYOGLOBIN 24,676*  --  21,875*  --   --   --  16,142*  --   --     < > = values in this interval not displayed. Recent Labs     01/26/22  0509 01/26/22  1259 01/26/22  1859 01/27/22  0616 01/27/22  0727 01/27/22  1109 01/27/22  1607 01/27/22 2001 01/28/22  0559 01/28/22  0739   PROT 6.7  --   --  6.4  --   --   --   --  6.2*  --    LABALBU 3.2*  --   --  3.1*  --   --   --   --  2.8*  --    *  --   --  192*  --   --   --   --  181*  --    ALT 62*  --   --  66*  --   --   --   --  69*  --    ALKPHOS 85  --   --  81  --   --   --   --  75  --    BILITOT 0.67  --   --  0.90  --   --   --   --  1.04  --    POCGLU  --    < > 137*  --  126* 129* 182* 150*  --  124*    < > = values in this interval not displayed.      ABG:  Lab Results   Component Value Date    FIO2 ROOM AIR 05/09/2014     Lab Results   Component Value Date/Time    SPECIAL LFA,10ML 01/28/2022 05:54 AM     Lab Results   Component Value Date/Time    CULTURE NO GROWTH <24 HRS 01/28/2022 05:54 AM       Radiology:  XR CHEST PORTABLE    Result Date: 1/25/2022  1. No active pulmonary disease. Is via green jacket airway ovarium were vascular going I would awaken  via radiology return the door swings \"I do not want all the stuff on that while off    Assessment:        Primary Problem  Acute renal failure superimposed on chronic kidney disease ([facility]    Active Hospital Problems    Diagnosis Date Noted    Type 2 diabetes mellitus with diabetic nephropathy, with long-term current use of insulin (HCC) [E11.21, Z79.4]     Type 2 diabetes mellitus with stage 5 chronic kidney disease not on chronic dialysis, with long-term current use of insulin (HCC) [E11.22, N18.5, Z79.4]     Diet-controlled diabetes mellitus ([facility] [E11.9] 01/26/2022    Hyperphosphatemia [E83.39]     Acute renal failure superimposed on chronic kidney disease ([facility] [N17.9, N18.9] 01/25/2022    Non-traumatic rhabdomyolysis [M62.82] 01/25/2022    Chronic kidney disease, stage V (very severe) ([facility] [N18.5] 01/25/2022    Obesity (BMI 30-39. 9) [E66.9] 01/25/2022    Supratherapeutic INR [R79.1] 11/08/2021    Elevated troponin [R77.8] 11/08/2021    Coumadin use [Z79.01] 02/12/2019    Diabetic peripheral neuropathy ([facility] [E11.42] 02/12/2019    History of DVT (deep vein thrombosis) [Z86.718]     Essential hypertension [I10] 02/11/2018    Hypothyroidism [E03.9] 02/11/2018    Atrial fibrillation (HCC) [I48.91]     COPD without exacerbation ([facility] [J44.9]        Plan:        Renal evaluation and f/u Dr. Hermelindo Golden al  HD Access options being evaluated  Correct electrolyte abnormalities  Rhabdomyolysis, trend enzymes  Hold Crestor  Coumadin:  titrate to maintain therapeutic INR   F/U Coumadin Clinic upon DC  Cardiology eval & f/u as scheduled   Blood Pressure - Monitor and control  Midodrine as needed  Trend LFTs  PT/OT  Fall precautions  DC planning   The patient is planning on placement  Med rec done  JOSH done  Will discharge when arrangements complete and ok with other services.   Follow-up with PCP in one week, Radha Bustamante MD  Notify PCP of discharge   DCP 37 min+    IP CONSULT TO HOSPITALIST  IP CONSULT TO NEPHROLOGY  IP CONSULT TO PHARMACY  IP CONSULT TO DO Maria Teresa  1/28/2022  10:50 AM

## 2022-01-28 NOTE — PROGRESS NOTES
Occupational Therapy    DATE: 2022    NAME: Haily Galvez  MRN: [id number]   : 1950    Patient not seen this date for Occupational Therapy due to:      [] Cancel by RN or physician due to:    [] Hemodialysis    [] Critical Lab Value Level     [] Blood transfusion in progress    [] Acute or unstable cardiovascular status   _MAP < 55 or more than >115  _HR < 40 or > 130    [] Acute or unstable pulmonary status   -FiO2 > 60%   _RR < 5 or >40    _O2 sats < 85%    [] Strict Bedrest    [] Off Unit for surgery or procedure    [] Off Unit for testing       [] Pending imaging to R/O fracture    [x] Refusal by Patient: pt reported, \"I just got a suppository and I can't get up! \", pt asked therapy to come back later. OT will check back as able.       [] Other      Neisha LaCourse OTR/L

## 2022-01-28 NOTE — CARE COORDINATION
Discharge Planning    Informed by nurse Jorden Rowe that pt will not need outpatient dialysis. [facility] informed. Entered order on JOSH for BMP weekly starting Feb 3 2022 for 2 weeks. Follow up appt made with Dr Dannielle Boone for Feb 16 at [facility] with pt's agreement at [place] office.

## 2022-02-02 LAB
CULTURE: NORMAL
CULTURE: NORMAL
Lab: NORMAL
Lab: NORMAL
SPECIMEN DESCRIPTION: NORMAL
SPECIMEN DESCRIPTION: NORMAL

## 2022-02-08 NOTE — PROGRESS NOTES
Physician Progress Note      PATIENT:               Kary MILLER #:                  [id number]  :                       1950  ADMIT DATE:       2022 1:45 PM  [place] DATE:        2022 3:39 PM  RESPONDING  PROVIDER #:        Verena CURIEL DO          QUERY TEXT:    Patient admitted with rhabdomyolysis, CKD 5. Noted documentation of Acute   Kidney Injury in H&P  and subsequent documentation including DS . In   order to support the diagnosis of KERMIT, please include additional clinical   indicators in your documentation. Or please document if the diagnosis of KERMIT   has been ruled out after further study    The medical record reflects the following:  Risk Factors: CKD, Rhabdomyolysis, poor oral intake  Clinical Indicators: The patient was admitted with a creatinine of 4.17 and   this fell to 3.92 after IV fluids. Baseline is 2-3 with recent SCr of 2.8 in   November. Treatment: nephrology consult, Change IV fluids to isotonic sodium bicarb   solution    Defined by Kidney Disease Improving Global Outcomes (KDIGO) clinical practice   guideline for acute kidney injury:  -Increase in SCr by greater than or equal to 0.3 mg/dl within 48 hours; or  -Increase or decrease in SCr to greater than or equal to 1.5 times baseline,   which is known or presumed to have occurred within the prior 7 days; or  -Urine volume < 0.5ml/kg/h for 6 hours  Options provided:  -- Acute kidney injury evidenced by, Please document evidence as well as   baseline creatinine, if known.   -- Acute kidney injury ruled out after study  -- Other - I will add my own diagnosis  -- Disagree - Not applicable / Not valid  -- Disagree - Clinically unable to determine / Unknown  -- Refer to Clinical Documentation Reviewer    PROVIDER RESPONSE TEXT:    This patient has an acute kidney injury as evidenced by As clearly documented   in the notes the patient's Cr fernando from his baseline of 2.7 to a high of 4.1    Query created by: Louise Avery on 2/4/2022 8:28 AM      Electronically signed by:  Sunny Goins DO 2/8/2022 7:23 AM

## 2022-02-24 PROBLEM — R77.8 ELEVATED TROPONIN: Status: RESOLVED | Noted: 2021-11-08 | Resolved: 2022-02-24

## 2022-02-24 PROBLEM — R79.89 ELEVATED TROPONIN: Status: RESOLVED | Noted: 2021-11-08 | Resolved: 2022-02-24

## 2022-04-05 ENCOUNTER — HOSPITAL ENCOUNTER (OUTPATIENT)
Age: 72
Setting detail: SPECIMEN
Discharge: HOME | End: 2022-04-05
Payer: COMMERCIAL

## 2022-04-05 LAB
ALBUMIN SERPL-MCNC: 2.8 G/DL (ref 3.5–5.2)
ALBUMIN/GLOBULIN RATIO: 0.7 (ref 1–2.5)
ALP BLD-CCNC: 113 U/L (ref 35–104)
ALT SERPL-CCNC: 10 U/L (ref 5–33)
ANION GAP SERPL CALCULATED.3IONS-SCNC: 13 MMOL/L (ref 9–17)
AST SERPL-CCNC: 23 U/L
BILIRUB SERPL-MCNC: 0.88 MG/DL (ref 0.3–1.2)
BUN BLDV-MCNC: 17 MG/DL (ref 8–23)
CALCIUM SERPL-MCNC: 8.9 MG/DL (ref 8.6–10.4)
CHLORIDE BLD-SCNC: 93 MMOL/L (ref 98–107)
CO2: 34 MMOL/L (ref 20–31)
CREAT SERPL-MCNC: 2.05 MG/DL (ref 0.5–0.9)
GFR AFRICAN AMERICAN: 29 ML/MIN
GFR NON-AFRICAN AMERICAN: 24 ML/MIN
GFR SERPL CREATININE-BSD FRML MDRD: ABNORMAL ML/MIN/{1.73_M2}
GLUCOSE BLD-MCNC: 125 MG/DL (ref 70–99)
HCT VFR BLD CALC: 35.9 % (ref 36.3–47.1)
HEMOGLOBIN: 11.6 G/DL (ref 11.9–15.1)
MCH RBC QN AUTO: 32 PG (ref 25.2–33.5)
MCHC RBC AUTO-ENTMCNC: 32.3 G/DL (ref 28.4–34.8)
MCV RBC AUTO: 99.2 FL (ref 82.6–102.9)
NRBC AUTOMATED: 0 PER 100 WBC
PDW BLD-RTO: 14.6 % (ref 11.8–14.4)
PLATELET # BLD: 311 K/UL (ref 138–453)
PMV BLD AUTO: 11.1 FL (ref 8.1–13.5)
POTASSIUM SERPL-SCNC: 2.8 MMOL/L (ref 3.7–5.3)
RBC # BLD: 3.62 M/UL (ref 3.95–5.11)
SODIUM BLD-SCNC: 140 MMOL/L (ref 135–144)
TOTAL PROTEIN: 7.1 G/DL (ref 6.4–8.3)
WBC # BLD: 14.2 K/UL (ref 3.5–11.3)

## 2022-04-05 PROCEDURE — 36415 COLL VENOUS BLD VENIPUNCTURE: CPT

## 2022-04-05 PROCEDURE — P9603 ONE-WAY ALLOW PRORATED MILES: HCPCS

## 2022-04-05 PROCEDURE — 80053 COMPREHEN METABOLIC PANEL: CPT

## 2022-04-05 PROCEDURE — 85027 COMPLETE CBC AUTOMATED: CPT

## 2022-04-06 ENCOUNTER — HOSPITAL ENCOUNTER (OUTPATIENT)
Age: 72
Setting detail: SPECIMEN
Discharge: HOME | End: 2022-04-06

## 2022-04-06 LAB — POTASSIUM SERPL-SCNC: 2.8 MMOL/L (ref 3.7–5.3)

## 2022-04-06 PROCEDURE — P9603 ONE-WAY ALLOW PRORATED MILES: HCPCS

## 2022-04-06 PROCEDURE — 36415 COLL VENOUS BLD VENIPUNCTURE: CPT

## 2022-04-06 PROCEDURE — 84132 ASSAY OF SERUM POTASSIUM: CPT

## 2022-04-07 ENCOUNTER — HOSPITAL ENCOUNTER (OUTPATIENT)
Age: 72
Setting detail: SPECIMEN
Discharge: HOME | End: 2022-04-07

## 2022-04-07 LAB
ANION GAP SERPL CALCULATED.3IONS-SCNC: 14 MMOL/L (ref 9–17)
BUN BLDV-MCNC: 20 MG/DL (ref 8–23)
CALCIUM SERPL-MCNC: 8.8 MG/DL (ref 8.6–10.4)
CHLORIDE BLD-SCNC: 96 MMOL/L (ref 98–107)
CO2: 31 MMOL/L (ref 20–31)
CREAT SERPL-MCNC: 2.58 MG/DL (ref 0.5–0.9)
GFR AFRICAN AMERICAN: 22 ML/MIN
GFR NON-AFRICAN AMERICAN: 18 ML/MIN
GFR SERPL CREATININE-BSD FRML MDRD: ABNORMAL ML/MIN/{1.73_M2}
GLUCOSE BLD-MCNC: 120 MG/DL (ref 70–99)
INR BLD: 2.6
MAGNESIUM: 2 MG/DL (ref 1.6–2.6)
PARTIAL THROMBOPLASTIN TIME: 40.2 SEC (ref 23.9–33.8)
PHOSPHORUS: 2 MG/DL (ref 2.6–4.5)
POTASSIUM SERPL-SCNC: 3.6 MMOL/L (ref 3.7–5.3)
PROTHROMBIN TIME: 28.1 SEC (ref 11.5–14.2)
SODIUM BLD-SCNC: 141 MMOL/L (ref 135–144)

## 2022-04-07 PROCEDURE — 84100 ASSAY OF PHOSPHORUS: CPT

## 2022-04-07 PROCEDURE — 83735 ASSAY OF MAGNESIUM: CPT

## 2022-04-07 PROCEDURE — P9603 ONE-WAY ALLOW PRORATED MILES: HCPCS

## 2022-04-07 PROCEDURE — 85610 PROTHROMBIN TIME: CPT

## 2022-04-07 PROCEDURE — 85730 THROMBOPLASTIN TIME PARTIAL: CPT

## 2022-04-07 PROCEDURE — 80048 BASIC METABOLIC PNL TOTAL CA: CPT

## 2022-04-07 PROCEDURE — 36415 COLL VENOUS BLD VENIPUNCTURE: CPT

## 2022-04-07 NOTE — CARE COORDINATION
Addended by: GONZALO CHOU on: 4/7/2022 01:46 PM     Modules accepted: Orders     Case Management Initial Discharge Plan  Milton Truong,             Met with:patient to discuss discharge plans. Information verified: address, contacts, phone number, , insurance Yes  PCP: Ronaldo Randle MD  Date of last visit: 3 weeks ago    Insurance Provider: Medicare, [employer] [place]    Discharge Planning    Living Arrangements:  Alone   Support Systems:  Children, Friends/Neighbors    Home has 1st story apt  1 stairs to climb to get into front door, 0 stairs to climb to reach second floor  Location of bedroom/bathroom in home  - main floor    Patient able to perform ADL's:Independent    Current Services (outpatient & in home) none  DME equipment: rollator, cane, shower chair, nebulizer, W/C  DME provider: N/A    Pharmacy: [facility]      Potential Assistance Needed:  SNF vs [place] [id number]    Patient agreeable to home care: Yes  Freedom of choice provided:  yes    Prior SNF/Rehab Placement and Facility: [facility]  Agreeable to SNF/Rehab: Yes  [place] of choice provided: yes   Evaluation: yes    Expected Discharge date:  21  Patient expects to be discharged to: Follow Up Appointment: Best Day/ Time: Monday PM    Transportation provider: GRACIELA  Transportation arrangements needed for discharge: TBD    Readmission Risk              Risk of Unplanned Readmission:  18             Does patient have a readmission risk score greater than 14?: Yes  If yes, follow-up appointment must be made within 7 days of discharge. Goal of Care:       Discharge Plan: Met with patient at bedside. Lives alone in apt, independent and drives. Has been having nausea, vomiting, dizziness and weakness for past 2 weeks. Was at [address] Management yesterday for INR and recommended going to ED. INR 6.3 this AM.  Follows with Dr. Arely Rosario for atrial fib - on coumadin. Cardiology and nephrology consulted. Therapy pending and pt agreeable to [facility] depending on recommendations.   ARI works at [address] [place] [address] and pt has been there in the past.  JOSH initiated and continue to follow plan of care.                 Electronically signed by Sandeep Wilhelm RN on 11/9/21 at 9:31 AM EST

## 2022-04-08 ENCOUNTER — HOSPITAL ENCOUNTER (OUTPATIENT)
Age: 72
Setting detail: SPECIMEN
Discharge: HOME | End: 2022-04-08

## 2022-04-08 LAB
ANION GAP SERPL CALCULATED.3IONS-SCNC: 13 MMOL/L (ref 9–17)
BUN BLDV-MCNC: 19 MG/DL (ref 8–23)
CALCIUM SERPL-MCNC: 8.8 MG/DL (ref 8.6–10.4)
CHLORIDE BLD-SCNC: 94 MMOL/L (ref 98–107)
CO2: 30 MMOL/L (ref 20–31)
CREAT SERPL-MCNC: 2.36 MG/DL (ref 0.5–0.9)
GFR AFRICAN AMERICAN: 24 ML/MIN
GFR NON-AFRICAN AMERICAN: 20 ML/MIN
GFR SERPL CREATININE-BSD FRML MDRD: ABNORMAL ML/MIN/{1.73_M2}
GLUCOSE BLD-MCNC: 109 MG/DL (ref 70–99)
HCT VFR BLD CALC: 28.7 % (ref 36.3–47.1)
HEMOGLOBIN: 9.3 G/DL (ref 11.9–15.1)
MAGNESIUM: 2 MG/DL (ref 1.6–2.6)
MCH RBC QN AUTO: 32.3 PG (ref 25.2–33.5)
MCHC RBC AUTO-ENTMCNC: 32.4 G/DL (ref 28.4–34.8)
MCV RBC AUTO: 99.7 FL (ref 82.6–102.9)
NRBC AUTOMATED: 0 PER 100 WBC
PDW BLD-RTO: 14.6 % (ref 11.8–14.4)
PHOSPHORUS: 2.4 MG/DL (ref 2.6–4.5)
PLATELET # BLD: 236 K/UL (ref 138–453)
PMV BLD AUTO: 10.4 FL (ref 8.1–13.5)
POTASSIUM SERPL-SCNC: 3.3 MMOL/L (ref 3.7–5.3)
RBC # BLD: 2.88 M/UL (ref 3.95–5.11)
SODIUM BLD-SCNC: 137 MMOL/L (ref 135–144)
WBC # BLD: 8.6 K/UL (ref 3.5–11.3)

## 2022-04-08 PROCEDURE — 83735 ASSAY OF MAGNESIUM: CPT

## 2022-04-08 PROCEDURE — 84100 ASSAY OF PHOSPHORUS: CPT

## 2022-04-08 PROCEDURE — 36415 COLL VENOUS BLD VENIPUNCTURE: CPT

## 2022-04-08 PROCEDURE — 85027 COMPLETE CBC AUTOMATED: CPT

## 2022-04-08 PROCEDURE — 80048 BASIC METABOLIC PNL TOTAL CA: CPT

## 2022-04-08 PROCEDURE — P9603 ONE-WAY ALLOW PRORATED MILES: HCPCS

## 2022-04-26 ENCOUNTER — HOSPITAL ENCOUNTER (OUTPATIENT)
Age: 72
Setting detail: SPECIMEN
Discharge: HOME | End: 2022-04-26

## 2022-04-26 LAB
ABSOLUTE EOS #: 0.32 K/UL (ref 0–0.44)
ABSOLUTE IMMATURE GRANULOCYTE: <0.03 K/UL (ref 0–0.3)
ABSOLUTE LYMPH #: 1.72 K/UL (ref 1.1–3.7)
ABSOLUTE MONO #: 0.67 K/UL (ref 0.1–1.2)
ALBUMIN SERPL-MCNC: 3.2 G/DL (ref 3.5–5.2)
ALBUMIN/GLOBULIN RATIO: 0.7 (ref 1–2.5)
ALP BLD-CCNC: 102 U/L (ref 35–104)
ALT SERPL-CCNC: 13 U/L (ref 5–33)
ANION GAP SERPL CALCULATED.3IONS-SCNC: 16 MMOL/L (ref 9–17)
AST SERPL-CCNC: 25 U/L
BASOPHILS # BLD: 1 % (ref 0–2)
BASOPHILS ABSOLUTE: 0.09 K/UL (ref 0–0.2)
BILIRUB SERPL-MCNC: 0.65 MG/DL (ref 0.3–1.2)
BUN BLDV-MCNC: 24 MG/DL (ref 8–23)
CALCIUM SERPL-MCNC: 9.4 MG/DL (ref 8.6–10.4)
CHLORIDE BLD-SCNC: 97 MMOL/L (ref 98–107)
CO2: 24 MMOL/L (ref 20–31)
CREAT SERPL-MCNC: 2.69 MG/DL (ref 0.5–0.9)
EOSINOPHILS RELATIVE PERCENT: 5 % (ref 1–4)
GFR AFRICAN AMERICAN: 21 ML/MIN
GFR NON-AFRICAN AMERICAN: 17 ML/MIN
GFR SERPL CREATININE-BSD FRML MDRD: ABNORMAL ML/MIN/{1.73_M2}
GLUCOSE BLD-MCNC: 101 MG/DL (ref 70–99)
HCT VFR BLD CALC: 29 % (ref 36.3–47.1)
HEMOGLOBIN: 9.8 G/DL (ref 11.9–15.1)
IMMATURE GRANULOCYTES: 0 %
LYMPHOCYTES # BLD: 24 % (ref 24–43)
MCH RBC QN AUTO: 32.2 PG (ref 25.2–33.5)
MCHC RBC AUTO-ENTMCNC: 33.8 G/DL (ref 28.4–34.8)
MCV RBC AUTO: 95.4 FL (ref 82.6–102.9)
MONOCYTES # BLD: 9 % (ref 3–12)
NRBC AUTOMATED: 0 PER 100 WBC
PDW BLD-RTO: 14.2 % (ref 11.8–14.4)
PLATELET # BLD: 305 K/UL (ref 138–453)
PMV BLD AUTO: 10.4 FL (ref 8.1–13.5)
POTASSIUM SERPL-SCNC: 3.8 MMOL/L (ref 3.7–5.3)
RBC # BLD: 3.04 M/UL (ref 3.95–5.11)
SEG NEUTROPHILS: 61 % (ref 36–65)
SEGMENTED NEUTROPHILS ABSOLUTE COUNT: 4.34 K/UL (ref 1.5–8.1)
SODIUM BLD-SCNC: 137 MMOL/L (ref 135–144)
TOTAL PROTEIN: 7.6 G/DL (ref 6.4–8.3)
WBC # BLD: 7.2 K/UL (ref 3.5–11.3)

## 2022-04-26 PROCEDURE — 36415 COLL VENOUS BLD VENIPUNCTURE: CPT

## 2022-04-26 PROCEDURE — P9603 ONE-WAY ALLOW PRORATED MILES: HCPCS

## 2022-04-26 PROCEDURE — 85025 COMPLETE CBC W/AUTO DIFF WBC: CPT

## 2022-04-26 PROCEDURE — 80053 COMPREHEN METABOLIC PANEL: CPT

## 2022-05-03 ENCOUNTER — HOSPITAL ENCOUNTER (OUTPATIENT)
Age: 72
Setting detail: SPECIMEN
Discharge: HOME | End: 2022-05-03

## 2022-05-03 LAB
ABSOLUTE EOS #: 0.39 K/UL (ref 0–0.44)
ABSOLUTE IMMATURE GRANULOCYTE: 0.03 K/UL (ref 0–0.3)
ABSOLUTE LYMPH #: 2.14 K/UL (ref 1.1–3.7)
ABSOLUTE MONO #: 0.85 K/UL (ref 0.1–1.2)
ALBUMIN SERPL-MCNC: 3.5 G/DL (ref 3.5–5.2)
ALP BLD-CCNC: 106 U/L (ref 35–104)
ALT SERPL-CCNC: 11 U/L (ref 5–33)
ANION GAP SERPL CALCULATED.3IONS-SCNC: 19 MMOL/L (ref 9–17)
AST SERPL-CCNC: 30 U/L
BASOPHILS # BLD: 1 % (ref 0–2)
BASOPHILS ABSOLUTE: 0.09 K/UL (ref 0–0.2)
BILIRUB SERPL-MCNC: 0.61 MG/DL (ref 0.3–1.2)
BILIRUBIN URINE: NEGATIVE
BUN BLDV-MCNC: 41 MG/DL (ref 8–23)
BUN/CREAT BLD: 11 (ref 9–20)
CALCIUM SERPL-MCNC: 9.4 MG/DL (ref 8.6–10.4)
CHLORIDE BLD-SCNC: 95 MMOL/L (ref 98–107)
CO2: 20 MMOL/L (ref 20–31)
COLOR: YELLOW
COMMENT UA: NORMAL
CREAT SERPL-MCNC: 3.88 MG/DL (ref 0.5–0.9)
EOSINOPHILS RELATIVE PERCENT: 5 % (ref 1–4)
GFR AFRICAN AMERICAN: 14 ML/MIN
GFR NON-AFRICAN AMERICAN: 11 ML/MIN
GFR SERPL CREATININE-BSD FRML MDRD: ABNORMAL ML/MIN/{1.73_M2}
GLUCOSE BLD-MCNC: 105 MG/DL (ref 70–99)
GLUCOSE URINE: NEGATIVE
HCT VFR BLD CALC: 33 % (ref 36.3–47.1)
HEMOGLOBIN: 10.8 G/DL (ref 11.9–15.1)
IMMATURE GRANULOCYTES: 0 %
KETONES, URINE: NEGATIVE
LEUKOCYTE ESTERASE, URINE: NEGATIVE
LYMPHOCYTES # BLD: 25 % (ref 24–43)
MCH RBC QN AUTO: 31.2 PG (ref 25.2–33.5)
MCHC RBC AUTO-ENTMCNC: 32.7 G/DL (ref 28.4–34.8)
MCV RBC AUTO: 95.4 FL (ref 82.6–102.9)
MONOCYTES # BLD: 10 % (ref 3–12)
NITRITE, URINE: NEGATIVE
NRBC AUTOMATED: 0 PER 100 WBC
PDW BLD-RTO: 14.1 % (ref 11.8–14.4)
PH UA: 6 (ref 5–8)
PLATELET # BLD: 315 K/UL (ref 138–453)
PMV BLD AUTO: 10.7 FL (ref 8.1–13.5)
POTASSIUM SERPL-SCNC: 4.9 MMOL/L (ref 3.7–5.3)
PROTEIN UA: NEGATIVE
RBC # BLD: 3.46 M/UL (ref 3.95–5.11)
SEG NEUTROPHILS: 59 % (ref 36–65)
SEGMENTED NEUTROPHILS ABSOLUTE COUNT: 5.03 K/UL (ref 1.5–8.1)
SODIUM BLD-SCNC: 134 MMOL/L (ref 135–144)
SPECIFIC GRAVITY UA: 1.01 (ref 1–1.03)
TOTAL PROTEIN: 8.1 G/DL (ref 6.4–8.3)
TURBIDITY: CLEAR
URINE HGB: NEGATIVE
UROBILINOGEN, URINE: NORMAL
WBC # BLD: 8.5 K/UL (ref 3.5–11.3)

## 2022-05-03 PROCEDURE — 85025 COMPLETE CBC W/AUTO DIFF WBC: CPT

## 2022-05-03 PROCEDURE — P9603 ONE-WAY ALLOW PRORATED MILES: HCPCS

## 2022-05-03 PROCEDURE — 87086 URINE CULTURE/COLONY COUNT: CPT

## 2022-05-03 PROCEDURE — 36415 COLL VENOUS BLD VENIPUNCTURE: CPT

## 2022-05-03 PROCEDURE — 87077 CULTURE AEROBIC IDENTIFY: CPT

## 2022-05-03 PROCEDURE — 87186 SC STD MICRODIL/AGAR DIL: CPT

## 2022-05-03 PROCEDURE — 81003 URINALYSIS AUTO W/O SCOPE: CPT

## 2022-05-03 PROCEDURE — 80053 COMPREHEN METABOLIC PANEL: CPT

## 2022-05-05 ENCOUNTER — HOSPITAL ENCOUNTER (OUTPATIENT)
Age: 72
Setting detail: SPECIMEN
Discharge: HOME | End: 2022-05-05

## 2022-05-05 LAB
ANION GAP SERPL CALCULATED.3IONS-SCNC: 14 MMOL/L (ref 9–17)
BUN BLDV-MCNC: 46 MG/DL (ref 8–23)
BUN/CREAT BLD: 11 (ref 9–20)
CALCIUM SERPL-MCNC: 9.3 MG/DL (ref 8.6–10.4)
CHLORIDE BLD-SCNC: 99 MMOL/L (ref 98–107)
CO2: 22 MMOL/L (ref 20–31)
CREAT SERPL-MCNC: 4.29 MG/DL (ref 0.5–0.9)
CULTURE: ABNORMAL
GFR AFRICAN AMERICAN: 12 ML/MIN
GFR NON-AFRICAN AMERICAN: 10 ML/MIN
GFR SERPL CREATININE-BSD FRML MDRD: ABNORMAL ML/MIN/{1.73_M2}
GLUCOSE BLD-MCNC: 111 MG/DL (ref 70–99)
Lab: ABNORMAL
MYOGLOBIN: 161 NG/ML (ref 25–58)
POTASSIUM SERPL-SCNC: 4.3 MMOL/L (ref 3.7–5.3)
SODIUM BLD-SCNC: 135 MMOL/L (ref 135–144)
SPECIMEN DESCRIPTION: ABNORMAL
TOTAL CK: 64 U/L (ref 26–192)

## 2022-05-05 PROCEDURE — 80048 BASIC METABOLIC PNL TOTAL CA: CPT

## 2022-05-05 PROCEDURE — P9603 ONE-WAY ALLOW PRORATED MILES: HCPCS

## 2022-05-05 PROCEDURE — 83874 ASSAY OF MYOGLOBIN: CPT

## 2022-05-05 PROCEDURE — 36415 COLL VENOUS BLD VENIPUNCTURE: CPT

## 2022-05-05 PROCEDURE — 82550 ASSAY OF CK (CPK): CPT

## 2022-05-06 ENCOUNTER — HOSPITAL ENCOUNTER (OUTPATIENT)
Age: 72
Setting detail: SPECIMEN
Discharge: HOME | End: 2022-05-06

## 2022-05-06 LAB
ANION GAP SERPL CALCULATED.3IONS-SCNC: 15 MMOL/L (ref 9–17)
BUN BLDV-MCNC: 43 MG/DL (ref 8–23)
BUN/CREAT BLD: 11 (ref 9–20)
CALCIUM SERPL-MCNC: 9 MG/DL (ref 8.6–10.4)
CHLORIDE BLD-SCNC: 99 MMOL/L (ref 98–107)
CO2: 20 MMOL/L (ref 20–31)
CREAT SERPL-MCNC: 3.84 MG/DL (ref 0.5–0.9)
GFR AFRICAN AMERICAN: 14 ML/MIN
GFR NON-AFRICAN AMERICAN: 12 ML/MIN
GFR SERPL CREATININE-BSD FRML MDRD: ABNORMAL ML/MIN/{1.73_M2}
GLUCOSE BLD-MCNC: 108 MG/DL (ref 70–99)
POTASSIUM SERPL-SCNC: 4.1 MMOL/L (ref 3.7–5.3)
SODIUM BLD-SCNC: 134 MMOL/L (ref 135–144)

## 2022-05-06 PROCEDURE — P9603 ONE-WAY ALLOW PRORATED MILES: HCPCS

## 2022-05-06 PROCEDURE — 80048 BASIC METABOLIC PNL TOTAL CA: CPT

## 2022-05-06 PROCEDURE — 36415 COLL VENOUS BLD VENIPUNCTURE: CPT

## 2022-05-07 ENCOUNTER — HOSPITAL ENCOUNTER (OUTPATIENT)
Age: 72
Setting detail: SPECIMEN
Discharge: HOME | End: 2022-05-07

## 2022-05-07 LAB
ANION GAP SERPL CALCULATED.3IONS-SCNC: 14 MMOL/L (ref 9–17)
BUN BLDV-MCNC: 34 MG/DL (ref 8–23)
BUN/CREAT BLD: 12 (ref 9–20)
CALCIUM SERPL-MCNC: 8.8 MG/DL (ref 8.6–10.4)
CHLORIDE BLD-SCNC: 105 MMOL/L (ref 98–107)
CO2: 18 MMOL/L (ref 20–31)
CREAT SERPL-MCNC: 2.74 MG/DL (ref 0.5–0.9)
GFR AFRICAN AMERICAN: 21 ML/MIN
GFR NON-AFRICAN AMERICAN: 17 ML/MIN
GFR SERPL CREATININE-BSD FRML MDRD: ABNORMAL ML/MIN/{1.73_M2}
GLUCOSE BLD-MCNC: 111 MG/DL (ref 70–99)
POTASSIUM SERPL-SCNC: 4 MMOL/L (ref 3.7–5.3)
SODIUM BLD-SCNC: 137 MMOL/L (ref 135–144)

## 2022-05-07 PROCEDURE — 36415 COLL VENOUS BLD VENIPUNCTURE: CPT

## 2022-05-07 PROCEDURE — P9603 ONE-WAY ALLOW PRORATED MILES: HCPCS

## 2022-05-07 PROCEDURE — 80048 BASIC METABOLIC PNL TOTAL CA: CPT

## 2022-05-09 ENCOUNTER — HOSPITAL ENCOUNTER (OUTPATIENT)
Age: 72
Setting detail: SPECIMEN
Discharge: HOME | End: 2022-05-09

## 2022-05-09 LAB
ANION GAP SERPL CALCULATED.3IONS-SCNC: 12 MMOL/L (ref 9–17)
BUN BLDV-MCNC: 26 MG/DL (ref 8–23)
BUN/CREAT BLD: 12 (ref 9–20)
CALCIUM SERPL-MCNC: 9.2 MG/DL (ref 8.6–10.4)
CHLORIDE BLD-SCNC: 105 MMOL/L (ref 98–107)
CO2: 18 MMOL/L (ref 20–31)
CREAT SERPL-MCNC: 2.17 MG/DL (ref 0.5–0.9)
GFR AFRICAN AMERICAN: 27 ML/MIN
GFR NON-AFRICAN AMERICAN: 22 ML/MIN
GFR SERPL CREATININE-BSD FRML MDRD: ABNORMAL ML/MIN/{1.73_M2}
GLUCOSE BLD-MCNC: 125 MG/DL (ref 70–99)
POTASSIUM SERPL-SCNC: 3.8 MMOL/L (ref 3.7–5.3)
SODIUM BLD-SCNC: 135 MMOL/L (ref 135–144)

## 2022-05-09 PROCEDURE — 36415 COLL VENOUS BLD VENIPUNCTURE: CPT

## 2022-05-09 PROCEDURE — P9603 ONE-WAY ALLOW PRORATED MILES: HCPCS

## 2022-05-09 PROCEDURE — 80048 BASIC METABOLIC PNL TOTAL CA: CPT

## 2022-05-10 ENCOUNTER — TELEPHONE (OUTPATIENT)
Dept: PHARMACY | Age: 72
End: 2022-05-10

## 2022-05-10 ENCOUNTER — HOSPITAL ENCOUNTER (OUTPATIENT)
Age: 72
Setting detail: SPECIMEN
Discharge: HOME | End: 2022-05-10

## 2022-05-10 LAB
ABSOLUTE EOS #: 0.51 K/UL (ref 0–0.44)
ABSOLUTE IMMATURE GRANULOCYTE: 0.03 K/UL (ref 0–0.3)
ABSOLUTE LYMPH #: 1.74 K/UL (ref 1.1–3.7)
ABSOLUTE MONO #: 0.76 K/UL (ref 0.1–1.2)
ALBUMIN SERPL-MCNC: 2.9 G/DL (ref 3.5–5.2)
ALP BLD-CCNC: 108 U/L (ref 35–104)
ALT SERPL-CCNC: 9 U/L (ref 5–33)
ANION GAP SERPL CALCULATED.3IONS-SCNC: 13 MMOL/L (ref 9–17)
AST SERPL-CCNC: 27 U/L
BASOPHILS # BLD: 1 % (ref 0–2)
BASOPHILS ABSOLUTE: 0.07 K/UL (ref 0–0.2)
BILIRUB SERPL-MCNC: 0.57 MG/DL (ref 0.3–1.2)
BUN BLDV-MCNC: 26 MG/DL (ref 8–23)
BUN/CREAT BLD: 13 (ref 9–20)
CALCIUM SERPL-MCNC: 9.5 MG/DL (ref 8.6–10.4)
CHLORIDE BLD-SCNC: 109 MMOL/L (ref 98–107)
CO2: 15 MMOL/L (ref 20–31)
CREAT SERPL-MCNC: 1.96 MG/DL (ref 0.5–0.9)
EOSINOPHILS RELATIVE PERCENT: 6 % (ref 1–4)
GFR AFRICAN AMERICAN: 30 ML/MIN
GFR NON-AFRICAN AMERICAN: 25 ML/MIN
GFR SERPL CREATININE-BSD FRML MDRD: ABNORMAL ML/MIN/{1.73_M2}
GLUCOSE BLD-MCNC: 118 MG/DL (ref 70–99)
HCT VFR BLD CALC: 32.1 % (ref 36.3–47.1)
HEMOGLOBIN: 10.6 G/DL (ref 11.9–15.1)
IMMATURE GRANULOCYTES: 0 %
LYMPHOCYTES # BLD: 20 % (ref 24–43)
MCH RBC QN AUTO: 31.9 PG (ref 25.2–33.5)
MCHC RBC AUTO-ENTMCNC: 33 G/DL (ref 28.4–34.8)
MCV RBC AUTO: 96.7 FL (ref 82.6–102.9)
MONOCYTES # BLD: 9 % (ref 3–12)
NRBC AUTOMATED: 0 PER 100 WBC
PDW BLD-RTO: 14.3 % (ref 11.8–14.4)
PLATELET # BLD: 214 K/UL (ref 138–453)
PMV BLD AUTO: 11 FL (ref 8.1–13.5)
POTASSIUM SERPL-SCNC: 4.8 MMOL/L (ref 3.7–5.3)
RBC # BLD: 3.32 M/UL (ref 3.95–5.11)
SEG NEUTROPHILS: 64 % (ref 36–65)
SEGMENTED NEUTROPHILS ABSOLUTE COUNT: 5.58 K/UL (ref 1.5–8.1)
SODIUM BLD-SCNC: 137 MMOL/L (ref 135–144)
TOTAL PROTEIN: 7.1 G/DL (ref 6.4–8.3)
WBC # BLD: 8.7 K/UL (ref 3.5–11.3)

## 2022-05-10 PROCEDURE — 85025 COMPLETE CBC W/AUTO DIFF WBC: CPT

## 2022-05-10 PROCEDURE — 80053 COMPREHEN METABOLIC PANEL: CPT

## 2022-05-10 PROCEDURE — P9603 ONE-WAY ALLOW PRORATED MILES: HCPCS

## 2022-05-10 PROCEDURE — 36415 COLL VENOUS BLD VENIPUNCTURE: CPT

## 2022-05-10 NOTE — TELEPHONE ENCOUNTER
Kendell Azar from [facility] facility called to notify us that Dameon Galindo will be getting discharged this week from the facility to go home with homecare; she wanted to confirm we are the clinic managing her warfarin. We have not seen her since the end of January as she has been in the hospital or a facility since that time. I requested a copy of her discharge med list to us so we can update her chart; once we receive the discharge summary we can contact the patient to schedule an INR appt either in clinic or temporarily via homecare.

## 2022-05-16 ENCOUNTER — TELEPHONE (OUTPATIENT)
Dept: PHARMACY | Age: 72
End: 2022-05-16

## 2022-05-16 DIAGNOSIS — I48.91 ATRIAL FIBRILLATION, UNSPECIFIED TYPE (HCC): Primary | ICD-10-CM

## 2022-05-16 LAB
INR BLD: 3.2
PROTIME: 37.4 SECONDS

## 2022-05-16 NOTE — TELEPHONE ENCOUNTER
Received VM on Friday from [facility] with [facility] letting us know she was unable to draw INR Thurs 5/12 as ordered and phlebotomist is not available Friday 5/13 so she will have someone draw patient Mon 5/16.

## 2022-05-17 ENCOUNTER — HOSPITAL ENCOUNTER (OUTPATIENT)
Dept: PHARMACY | Age: 72
Setting detail: THERAPIES SERIES
Discharge: HOME | End: 2022-05-17
Payer: MEDICARE

## 2022-05-17 DIAGNOSIS — I48.91 ATRIAL FIBRILLATION, UNSPECIFIED TYPE (HCC): Primary | ICD-10-CM

## 2022-05-17 PROCEDURE — 99212 OFFICE O/P EST SF 10 MIN: CPT

## 2022-05-17 NOTE — PROGRESS NOTES
Home care provider, [facility], faxed INR to clinic this morning. I spoke to patient who states compliant all of the time with regimenof 2.5mg daily since leaving [facility] on 5/10/22. See below for dosing she received at [facility] if needed. No bleeding or thromboembolic side effects noted. Patient was on IV antibiotics until about 5 days prior to discharge from rehab. She states her appetite is good but she is eating less veggies than usual. No significant recent illness or disease state changes. PT/INR done yesterday in patient's home by home care provider. INR is 3.2 which is just above goal.     Warfarin regimen will be decreased to 1.25mg tues and 2.5mg all other days. Will have next INR drawn in 5/23/22. Home nurse given dosing directions and follow up appointment. Progress note routed to referring physicians office.     For Pharmacy Admin Tracking Only     Intervention Detail: Dose Adjustment: 1, reason: Therapy De-escalation   Total # of Interventions Recommended: 1   Total # of Interventions Accepted: 1   Time Spent (min): 15

## 2022-05-23 LAB
INR BLD: 5.3
PROTIME: 62.5 SECONDS

## 2022-05-24 ENCOUNTER — HOSPITAL ENCOUNTER (OUTPATIENT)
Dept: PHARMACY | Age: 72
Discharge: HOME | End: 2022-05-24

## 2022-05-24 DIAGNOSIS — I48.91 ATRIAL FIBRILLATION, UNSPECIFIED TYPE (HCC): Primary | ICD-10-CM

## 2022-05-24 PROCEDURE — 99212 OFFICE O/P EST SF 10 MIN: CPT

## 2022-05-24 NOTE — PROGRESS NOTES
Received results from [facility] home healthcare. I called and spoke to patient who states  compliant all of the time with regimen. No bleeding or thromboembolic side effects noted. No significant med or dietary changes. No significant recent illness or disease state changes. PT/INR done in patient's home by home care provider. INR is 5.3 which is supratherapeutic. Warfarin regimen will be held for 2 doses then resume with lower dose 2.5mg alternating with 1.25mg every other day. Will have next INR drawn in 1 week. Home nurse given dosing directions and follow up appointment. Progress note routed to referring physicians office.     For Pharmacy Admin Tracking Only     Intervention Detail: Dose Adjustment: 1, reason: Therapy De-escalation   Total # of Interventions Recommended: 1   Total # of Interventions Accepted: 1   Time Spent (min): 15

## 2022-06-01 ENCOUNTER — APPOINTMENT (OUTPATIENT)
Dept: PHARMACY | Age: 72
End: 2022-06-01
Payer: MEDICARE

## 2022-06-02 ENCOUNTER — TELEPHONE (OUTPATIENT)
Dept: PHARMACY | Age: 72
End: 2022-06-02

## 2022-06-02 DIAGNOSIS — I48.91 ATRIAL FIBRILLATION, UNSPECIFIED TYPE (HCC): Primary | ICD-10-CM

## 2022-06-02 NOTE — TELEPHONE ENCOUNTER
Left message for [facility] home health care concerning patient's INR result. Asked them to call back and/or fax over the results. Will move appointment to 6/3 to follow up again tomorrow.

## 2022-06-03 ENCOUNTER — HOSPITAL ENCOUNTER (OUTPATIENT)
Dept: PHARMACY | Age: 72
Setting detail: THERAPIES SERIES
Discharge: HOME | End: 2022-06-03
Payer: MEDICARE

## 2022-06-03 ENCOUNTER — HOSPITAL ENCOUNTER (OUTPATIENT)
Age: 72
Setting detail: SPECIMEN
Discharge: HOME | End: 2022-06-03
Payer: MEDICARE

## 2022-06-03 DIAGNOSIS — I48.91 ATRIAL FIBRILLATION, UNSPECIFIED TYPE (HCC): Primary | ICD-10-CM

## 2022-06-03 LAB
INR BLD: 3.2
PROTHROMBIN TIME: 32.9 SEC (ref 11.5–14.2)

## 2022-06-03 PROCEDURE — 99212 OFFICE O/P EST SF 10 MIN: CPT

## 2022-06-03 PROCEDURE — 85610 PROTHROMBIN TIME: CPT

## 2022-06-03 NOTE — PROGRESS NOTES
Home care provider, Lorene, had INR processed in [facility] lab so results were viewable in [facility] I called and spoke directly to karen who states she has been compliant all of the time with regimen. No bleeding or thromboembolic side effects noted. No significant med or dietary changes. No significant recent illness or disease state changes. PT/INR done in patient's home by home care provider. INR is 3.2 which is just above goal.     Warfarin regimen will be continued with 1.25mg x1 then 2.5mg Mon/Wed/Fri and 1.25mg all other days . Will have next INR drawn in the home on Thursday 6/9/22. Home nurse given dosing directions and follow up appointment via [address] [place] to [phone]. Progress note routed to referring physicians office.     For Pharmacy Admin Tracking Only     Intervention Detail: Dose Adjustment: 1, reason: Therapy De-escalation   Total # of Interventions Recommended: 1   Total # of Interventions Accepted: 1   Time Spent (min): 15

## 2022-06-03 NOTE — TELEPHONE ENCOUNTER
Called and spoke with Adelina Gitelman at [facility] who states she has the order to draw INR 5/31 and is unclear why this was not done. I voiced concern as the previous INR on 5/24 was critical at 5.3. She states the nurse is going today to draw. Advised to process at a [facility] so results will be viewable in [address]

## 2022-06-09 ENCOUNTER — HOSPITAL ENCOUNTER (OUTPATIENT)
Dept: PHARMACY | Age: 72
Setting detail: THERAPIES SERIES
Discharge: HOME | End: 2022-06-09
Payer: MEDICARE

## 2022-06-09 ENCOUNTER — TELEPHONE (OUTPATIENT)
Dept: PHARMACY | Age: 72
End: 2022-06-09

## 2022-06-09 DIAGNOSIS — I48.91 ATRIAL FIBRILLATION, UNSPECIFIED TYPE (HCC): Primary | ICD-10-CM

## 2022-06-09 LAB
INR BLD: 2.5
PROTIME: 29.5 SECONDS

## 2022-06-09 NOTE — PROGRESS NOTES
Home care provider, Lorene, had INR processed in [facility] lab which was viewable in [facility]. Patient states she is  compliant all of the time with regimen. No bleeding or thromboembolic side effects noted. No significant med or dietary changes. No significant recent illness or disease state changes. PT/INR done in patient's home by home care provider. INR is 2.5 which is therapeutic. Warfarin regimen will be continued at current dose 2.5mg MWF and 1.25mg all other days. .  Will have next INR drawn in 1 week. Home nurse given dosing directions and follow up appointment via [facility] to [phone]. Progress note routed to referring physicians office.     For Pharmacy Admin Tracking Only     Intervention Detail:    Total # of Interventions Recommended: 0   Total # of Interventions Accepted: 0   Time Spent (min): 15

## 2022-06-09 NOTE — TELEPHONE ENCOUNTER
Nurse stated INR result was drawn today 6/9 but has not yet resulted. Results to be faxed to our anticoagulation clinic. Will follow up with patient when results arrive.

## 2022-06-16 ENCOUNTER — APPOINTMENT (OUTPATIENT)
Dept: PHARMACY | Age: 72
End: 2022-06-16
Payer: MEDICARE

## 2022-06-17 ENCOUNTER — HOSPITAL ENCOUNTER (OUTPATIENT)
Dept: PHARMACY | Age: 72
Setting detail: THERAPIES SERIES
Discharge: HOME | End: 2022-06-17
Payer: MEDICARE

## 2022-06-17 DIAGNOSIS — I48.91 ATRIAL FIBRILLATION, UNSPECIFIED TYPE (HCC): Primary | ICD-10-CM

## 2022-06-17 LAB
INR BLD: 3.1
PROTIME: 36 SECONDS

## 2022-06-21 NOTE — PROGRESS NOTES
Homecare agency states the order for INR to be drawn 6/16 was in the computer but somehow did not get drawn. Nurse confirms they will draw today 6/17. Results were faxed to clinic and I called and spoke directly to patient. She states she has been compliant all of the time with regimen. No bleeding or thromboembolic side effects noted. No significant med or dietary changes. No significant recent illness or disease state changes. I inquired about patient returning to clinic for INR management but she is unable to get into the car yet. PT/INR done in patient's home by home care provider. INR is 3.1 which is just above goal.     Warfarin regimen will be continued with 2.5mg Mon/Wed/Fri and 1.25mg all other days. Will have next INR drawn on Monday June 27th. Home nurse given dosing directions and follow up appointment. Progress note routed to referring physicians office.     For Pharmacy Admin Tracking Only     Intervention Detail:    Total # of Interventions Recommended: 0   Total # of Interventions Accepted: 0   Time Spent (min): 15

## 2022-06-27 ENCOUNTER — APPOINTMENT (OUTPATIENT)
Dept: PHARMACY | Age: 72
End: 2022-06-27
Payer: MEDICARE

## 2022-06-27 ENCOUNTER — HOSPITAL ENCOUNTER (OUTPATIENT)
Dept: PHARMACY | Age: 72
Setting detail: THERAPIES SERIES
Discharge: HOME | End: 2022-06-27

## 2022-06-27 DIAGNOSIS — I48.91 ATRIAL FIBRILLATION, UNSPECIFIED TYPE (HCC): Primary | ICD-10-CM

## 2022-06-27 LAB — INR BLD: 5.3

## 2022-06-27 PROCEDURE — 99212 OFFICE O/P EST SF 10 MIN: CPT

## 2022-06-27 NOTE — PROGRESS NOTES
Home care provider notified this clinic of today's INR result    Called and spoke with patient, she states compliant all of the time with regimen. No bleeding or thromboembolic side effects noted. No significant med or dietary changes. No significant recent illness or disease state changes. PT/INR done in patient's home by home care provider. INR is 5.3 which is supratherapeutic for no identifiable reason. Warfarin regimen will be held for 2 days on 6/28 and 6/29 (she already took today's dose), then resume usual regimen of 2.5mg Mon/Wed/Fri, 1.25mg all other days for now. Her recent INRs have been trending up, and I advised the patient that we will likely decrease her weekly target next week after we get her next INR result. Will have next INR drawn in 1 week on Tues 7/5/22 via homecare. Home nurse given dosing directions and follow up appointment via fax to [facility] homecare. Progress note routed to referring physicians office.     For Pharmacy Admin Tracking Only     Intervention Detail: Dose Adjustment: 1, reason: Therapy De-escalation   Total # of Interventions Recommended: 1   Total # of Interventions Accepted: 1   Time Spent (min): 15

## 2022-07-05 ENCOUNTER — HOSPITAL ENCOUNTER (OUTPATIENT)
Dept: PHARMACY | Age: 72
Setting detail: THERAPIES SERIES
Discharge: HOME | End: 2022-07-05
Payer: MEDICARE

## 2022-07-05 DIAGNOSIS — I48.91 ATRIAL FIBRILLATION, UNSPECIFIED TYPE (HCC): Primary | ICD-10-CM

## 2022-07-05 LAB
INR BLD: 2.8
PROTIME: 32.3 SECONDS

## 2022-07-05 PROCEDURE — 99212 OFFICE O/P EST SF 10 MIN: CPT

## 2022-07-05 NOTE — PROGRESS NOTES
Home care providerLorene, faxed INR result to clinic. I called and spoke with patient who states compliant all of the time with regimen. No bleeding or thromboembolic side effects noted. No significant med or dietary changes. No significant recent illness or disease state changes. PT/INR done in patient's home by home care provider. INR is 2.8 which is therapeutic. Warfarin regimen will be decreased to 2.5mg mon/fri and 1.25mg all other days. Will have next INR drawn in 1 week. Orders faxed to homecare    Home nurse given dosing directions and follow up appointment. Progress note routed to referring physicians office.     For Pharmacy Admin Tracking Only     Intervention Detail: Dose Adjustment: 1, reason: Therapy De-escalation   Total # of Interventions Recommended: 1   Total # of Interventions Accepted: 1   Time Spent (min): 15

## 2022-07-12 LAB
INR BLD: 1.9
PROTIME: 21.3 SECONDS

## 2022-07-13 ENCOUNTER — HOSPITAL ENCOUNTER (OUTPATIENT)
Dept: PHARMACY | Age: 72
Discharge: HOME | End: 2022-07-13

## 2022-07-13 DIAGNOSIS — I48.91 ATRIAL FIBRILLATION, UNSPECIFIED TYPE (HCC): Primary | ICD-10-CM

## 2022-07-13 PROCEDURE — 99211 OFF/OP EST MAY X REQ PHY/QHP: CPT

## 2022-07-26 LAB
INR BLD: 1.7
PROTIME: 19.2 SECONDS

## 2022-07-27 ENCOUNTER — HOSPITAL ENCOUNTER (OUTPATIENT)
Dept: PHARMACY | Age: 72
Setting detail: THERAPIES SERIES
Discharge: HOME | End: 2022-07-27

## 2022-07-27 DIAGNOSIS — I48.91 ATRIAL FIBRILLATION, UNSPECIFIED TYPE (HCC): Primary | ICD-10-CM

## 2022-07-27 PROCEDURE — 99212 OFFICE O/P EST SF 10 MIN: CPT

## 2022-07-27 NOTE — PROGRESS NOTES
Home care provider, Lorene [facility], faxed in INR result from yesterday 7/26. Called patient, she states compliant all of the time with regimen. No bleeding or thromboembolic side effects noted. No significant med or dietary changes. No significant recent illness or disease state changes. PT/INR done in patient's home by home care provider. INR is 1.7 which is subtherapeutic. Warfarin regimen will be increased to 2.5mg Mon/Wed/Fri, 1.25mg all other days. Will have next INR drawn in 1 week. Homecare given dosing directions and follow up appointment via fax. Progress note routed to referring physicians office.     For Pharmacy Admin Tracking Only    Intervention Detail: Dose Adjustment: 1, reason: Therapy Optimization  Total # of Interventions Recommended: 1  Total # of Interventions Accepted: 1  Time Spent (min): 15

## 2022-08-02 ENCOUNTER — HOSPITAL ENCOUNTER (OUTPATIENT)
Dept: PHARMACY | Age: 72
Discharge: HOME | End: 2022-08-02

## 2022-08-02 DIAGNOSIS — I48.91 ATRIAL FIBRILLATION, UNSPECIFIED TYPE (HCC): Primary | ICD-10-CM

## 2022-08-02 LAB
INR BLD: 2.2
PROTIME: 25.3 SECONDS

## 2022-08-02 PROCEDURE — 99211 OFF/OP EST MAY X REQ PHY/QHP: CPT

## 2022-08-02 NOTE — PROGRESS NOTES
Home care provider, Lorene [facility], faxed in today's INR result. Called patient, she states compliant all of the time with regimen. No bleeding or thromboembolic side effects noted. No significant med or dietary changes. No significant recent illness or disease state changes. PT/INR done in patient's home by home care provider. INR is 2.2 which is therapeutic. Warfarin regimen will be continued at current dose 2.5mg Mon/Wed/Fri, 1.25mg all other days. Will have next INR drawn in 1 week on Tuesday 8/9/22. Homecare agency given dosing directions and follow up appointment via fax. Progress note routed to referring physicians office.     For Pharmacy Admin Tracking Only    Intervention Detail:   Total # of Interventions Recommended: 0  Total # of Interventions Accepted: 0  Time Spent (min): 15

## 2022-08-10 ENCOUNTER — HOSPITAL ENCOUNTER (OUTPATIENT)
Age: 72
Setting detail: SPECIMEN
Discharge: HOME | End: 2022-08-10
Payer: MEDICARE

## 2022-08-10 ENCOUNTER — HOSPITAL ENCOUNTER (OUTPATIENT)
Age: 72
Setting detail: SPECIMEN
End: 2022-08-10
Payer: MEDICARE

## 2022-08-10 ENCOUNTER — HOSPITAL ENCOUNTER (OUTPATIENT)
Dept: PHARMACY | Age: 72
Setting detail: THERAPIES SERIES
Discharge: HOME | End: 2022-08-10
Payer: MEDICARE

## 2022-08-10 DIAGNOSIS — I48.91 ATRIAL FIBRILLATION, UNSPECIFIED TYPE (HCC): Primary | ICD-10-CM

## 2022-08-10 LAB
INR BLD: 1.8
PROTHROMBIN TIME: 21.1 SEC (ref 11.5–14.2)

## 2022-08-10 PROCEDURE — 99212 OFFICE O/P EST SF 10 MIN: CPT

## 2022-08-10 PROCEDURE — 85610 PROTHROMBIN TIME: CPT

## 2022-08-10 RX ORDER — LEVOTHYROXINE SODIUM 112 UG/1
TABLET ORAL
COMMUNITY
Start: 2022-05-08

## 2022-08-10 NOTE — PROGRESS NOTES
Home care provider, Lorene, faxed INR result. Called and spoke with patient who states she has been compliant all of the time with regimen. No bleeding or thromboembolic side effects noted. No significant med or dietary changes. No significant recent illness or disease state changes. PT/INR done in patient's home by home care provider. INR is 1.8 which is just below goal.     Warfarin regimen will be increased to 1.25mg Sun/Tue/Thur and 2.5mg all other days in effort to keep INR more mid-range. Will have next INR drawn Tuesday 8/16/22. Home nurse given dosing directions and follow up appointment. Progress note routed to referring physicians office.     For Pharmacy Admin Tracking Only    Intervention Detail: Dose Adjustment: 1, reason: Therapy Optimization  Total # of Interventions Recommended: 1  Total # of Interventions Accepted: 1  Time Spent (min): 15

## 2022-08-16 LAB
INR BLD: 2.2
PROTIME: 22.4 SECONDS

## 2022-08-17 ENCOUNTER — HOSPITAL ENCOUNTER (OUTPATIENT)
Dept: PHARMACY | Age: 72
Setting detail: THERAPIES SERIES
Discharge: HOME | End: 2022-08-17
Payer: MEDICARE

## 2022-08-17 DIAGNOSIS — I48.91 ATRIAL FIBRILLATION, UNSPECIFIED TYPE (HCC): Primary | ICD-10-CM

## 2022-08-17 PROCEDURE — 99211 OFF/OP EST MAY X REQ PHY/QHP: CPT

## 2022-08-17 NOTE — PROGRESS NOTES
Home care provider, Tesfaye Mo, faxed results to clinic. Called and spoke with patient who states she has been compliant all of the time with regimen. No bleeding or thromboembolic side effects noted. No significant med or dietary changes. No significant recent illness or disease state changes. PT/INR done in patient's home by home care provider. INR is 2.2 which is therapeutic. Warfarin regimen will be continued at current dose of 1.25mg Sun/Tues/Thur and 2.mg all other days. Will have next INR drawn in 2 weeks on Tuesday 8/30. Patient states she anticipates homecare ending in September but she is still unable to get into her car as of today. I gave her info on contacting [employer] for possible transportation option through their senior program.    Home nurse given dosing directions and follow up appointment. Progress note routed to referring physicians office.     For Pharmacy Admin Tracking Only    Intervention Detail:   Total # of Interventions Recommended: 0  Total # of Interventions Accepted: 0  Time Spent (min): 15

## 2022-08-30 LAB
INR BLD: 2.1
PROTIME: 24.4 SECONDS

## 2022-08-31 ENCOUNTER — HOSPITAL ENCOUNTER (OUTPATIENT)
Dept: PHARMACY | Age: 72
Setting detail: THERAPIES SERIES
Discharge: HOME | End: 2022-08-31
Payer: MEDICARE

## 2022-08-31 DIAGNOSIS — I48.91 ATRIAL FIBRILLATION, UNSPECIFIED TYPE (HCC): Primary | ICD-10-CM

## 2022-08-31 PROCEDURE — 99211 OFF/OP EST MAY X REQ PHY/QHP: CPT

## 2022-08-31 NOTE — PROGRESS NOTES
Home care provider, Melany Ashley, faxed results to clinic. Patient states compliant all of the time with regimen. No bleeding or thromboembolic side effects noted. No significant med or dietary changes. No significant recent illness or disease state changes. PT/INR done in patient's home by home care provider. INR is 2.1 which is therapeutic. Warfarin regimen will be continued at current dose of 1.25mg sun/tues/thurs and 2.5mg all other days. Will have next INR drawn in 3 weeks on 9/21. Home nurse given dosing directions and follow up appointment. Progress note routed to referring physicians office.     For Pharmacy Admin Tracking Only    Intervention Detail:   Total # of Interventions Recommended: 0  Total # of Interventions Accepted: 0  Time Spent (min): 15

## 2022-09-21 LAB
INR BLD: 2.6
PROTIME: 29.7 SECONDS

## 2022-09-22 ENCOUNTER — HOSPITAL ENCOUNTER (OUTPATIENT)
Dept: PHARMACY | Age: 72
Setting detail: THERAPIES SERIES
Discharge: HOME | End: 2022-09-22
Payer: MEDICARE

## 2022-09-22 DIAGNOSIS — I48.91 ATRIAL FIBRILLATION, UNSPECIFIED TYPE (HCC): Primary | ICD-10-CM

## 2022-09-22 PROCEDURE — 99211 OFF/OP EST MAY X REQ PHY/QHP: CPT

## 2022-09-22 NOTE — PROGRESS NOTES
Home care provider, Lorene, faxed result to clinic. Called  and spoke with patient directly who states she has been compliant all of the time with regimen. No bleeding or thromboembolic side effects noted. No significant med or dietary changes. No significant recent illness or disease state changes. PT/INR done in patient's home by home care provider. INR is 2.6 which is therapeutic. Warfarin regimen will be continued at current dose of 1.25 mg Sun/Tues. Thurs and 2.5 mg all other days. Will have next INR drawn in 3 weeks on 10/12. Home nurse given dosing directions and follow up appointment. Progress note routed to referring physicians office.     For Pharmacy Admin Tracking Only    Intervention Detail:   Total # of Interventions Recommended: 0  Total # of Interventions Accepted: 0  Time Spent (min): 15

## 2022-10-12 LAB
INR BLD: 2.6
PROTIME: 29.7 SECONDS

## 2022-10-13 ENCOUNTER — HOSPITAL ENCOUNTER (OUTPATIENT)
Dept: PHARMACY | Age: 72
Setting detail: THERAPIES SERIES
Discharge: HOME | End: 2022-10-13
Payer: MEDICARE

## 2022-10-13 DIAGNOSIS — I48.91 ATRIAL FIBRILLATION, UNSPECIFIED TYPE (HCC): Primary | ICD-10-CM

## 2022-10-13 PROCEDURE — 99211 OFF/OP EST MAY X REQ PHY/QHP: CPT

## 2022-10-13 NOTE — PROGRESS NOTES
Home care provider, Lorene faxed clinic result. Called and spoke with patient who states she has been  compliant all of the time with regimen. No bleeding or thromboembolic side effects noted. No significant med or dietary changes. No significant recent illness or disease state changes. PT/INR done in patient's home by home care provider. INR is 2.6 which is therapeutic. Warfarin regimen will be continued at current dose of 1.25mg Sun/Tue/Thur and 2.5mg all other days. Will have next INR drawn in 3 weeks on 11/2/22. I attempted to schedule next INR in clinic as we typically are not able to manage homecare longer than 3 months (it has been about 5 months at this point). Patient states she is not feeling well and still very weak and unable to get into her car to drive. She does not have other transportation available currently. Will allow next draw and need patient to continue to explore options to return to clinic. Home nurse given dosing directions and follow up appointment. Progress note routed to referring physicians office.     For Pharmacy Admin Tracking Only    Intervention Detail:   Total # of Interventions Recommended: 0  Total # of Interventions Accepted: 0  Time Spent (min): 15

## 2022-11-07 ENCOUNTER — HOSPITAL ENCOUNTER (OUTPATIENT)
Age: 72
Setting detail: SPECIMEN
Discharge: HOME | End: 2022-11-07
Payer: MEDICARE

## 2022-11-07 LAB
INR BLD: 3.1
PROTHROMBIN TIME: 31.7 SEC (ref 11.8–14.6)

## 2022-11-07 PROCEDURE — 85610 PROTHROMBIN TIME: CPT

## 2022-11-07 PROCEDURE — 36415 COLL VENOUS BLD VENIPUNCTURE: CPT

## 2022-11-08 ENCOUNTER — HOSPITAL ENCOUNTER (OUTPATIENT)
Dept: PHARMACY | Age: 72
Setting detail: THERAPIES SERIES
Discharge: HOME | End: 2022-11-08

## 2022-11-08 DIAGNOSIS — I48.91 ATRIAL FIBRILLATION, UNSPECIFIED TYPE (HCC): Primary | ICD-10-CM

## 2022-11-08 NOTE — PROGRESS NOTES
Home care provider, Lorene, faxed in INR result collected 11/7/22. Called patient, she states compliant all of the time with regimen. No bleeding or thromboembolic side effects noted. No significant med or dietary changes. No significant recent illness or disease state changes. PT/INR done in patient's home by home care provider. INR is 3.1 which is just above goal.     Warfarin regimen will be continued at current dose 1.25mg Sun/Tue/Thurs, 2.5mg all other days. .  Will have next INR drawn in 4 weeks on 12/6/22. Homecare given dosing directions and follow up appointment via fax to [phone]. Progress note routed to referring physicians office.        For Pharmacy Admin Tracking Only    Intervention Detail:   Total # of Interventions Recommended: 0  Total # of Interventions Accepted: 0  Time Spent (min): 15

## 2022-12-06 ENCOUNTER — HOSPITAL ENCOUNTER (OUTPATIENT)
Age: 72
Setting detail: SPECIMEN
Discharge: HOME | End: 2022-12-06
Payer: MEDICARE

## 2022-12-06 ENCOUNTER — HOSPITAL ENCOUNTER (OUTPATIENT)
Dept: PHARMACY | Age: 72
Setting detail: THERAPIES SERIES
Discharge: HOME | End: 2022-12-06

## 2022-12-06 DIAGNOSIS — I48.91 ATRIAL FIBRILLATION, UNSPECIFIED TYPE (HCC): Primary | ICD-10-CM

## 2022-12-06 LAB
INR BLD: 3.1
PROTHROMBIN TIME: 32 SEC (ref 11.5–14.2)

## 2022-12-06 PROCEDURE — 85610 PROTHROMBIN TIME: CPT

## 2022-12-06 NOTE — PROGRESS NOTES
Received INR result via fax from lab. Called patient, she states compliant all of the time with regimen. No bleeding or thromboembolic side effects noted. No significant med or dietary changes. No significant recent illness or disease state changes. PT/INR done in patient's home by home care provider. INR is 3.1 which is just above goal.     Warfarin regimen will be decreased to 2.5mg Mon/Wed/Fri, 1.25mg all other days as her last INR was also just above goal.  Will have next INR drawn in 2 weeks. [facility] given dosing directions and follow up appointment via fax to [phone]. Progress note routed to referring physicians office.     For Pharmacy Admin Tracking Only    Intervention Detail: Dose Adjustment: 1, reason: Therapy De-escalation  Total # of Interventions Recommended: 1  Total # of Interventions Accepted: 1  Time Spent (min): 15

## 2022-12-20 ENCOUNTER — HOSPITAL ENCOUNTER (OUTPATIENT)
Dept: PHARMACY | Age: 72
Discharge: HOME | End: 2022-12-20

## 2022-12-20 DIAGNOSIS — I48.91 ATRIAL FIBRILLATION, UNSPECIFIED TYPE (HCC): Primary | ICD-10-CM

## 2022-12-20 LAB
INR BLD: 2.5
PROTIME: 28.5 SECONDS

## 2022-12-20 NOTE — PROGRESS NOTES
[facility]. Called patient, she states compliant all of the time with regimen. No bleeding or thromboembolic side effects noted. No significant med or dietary changes. No significant recent illness or disease state changes. PT/INR done in patient's home by home care provider. INR is 2.5 which is therapeutic. Warfarin regimen will be continued at current dose 2.5mg Mon/Wed/Fri, 1.25mg all other days. Will have next INR drawn in 4 weeks in clinic; she states she is working on getting her car's registration updated so she can drive to the appt. Home nurse given dosing directions and follow up appointment. Progress note routed to referring physicians office.     For Pharmacy Admin Tracking Only    Intervention Detail:   Total # of Interventions Recommended: 0  Total # of Interventions Accepted: 0  Time Spent (min): 15

## 2023-01-17 ENCOUNTER — HOSPITAL ENCOUNTER (OUTPATIENT)
Dept: PHARMACY | Age: 73
Setting detail: THERAPIES SERIES
Discharge: HOME | End: 2023-01-17
Payer: MEDICARE

## 2023-01-17 DIAGNOSIS — I48.91 ATRIAL FIBRILLATION, UNSPECIFIED TYPE (HCC): Primary | ICD-10-CM

## 2023-01-17 LAB
INR BLD: 2.3
PROTIME: 27.5 SECONDS

## 2023-01-17 PROCEDURE — 85610 PROTHROMBIN TIME: CPT

## 2023-01-17 PROCEDURE — 99211 OFF/OP EST MAY X REQ PHY/QHP: CPT

## 2023-01-17 NOTE — PROGRESS NOTES
Patient seen in clinic for warfarin management due to atrial fibrillation with an INR goal of 2.0-3.0. Estimated duration of therapy is indefinite. Patient states compliant all of the time with regimen. No bleeding or thromboembolic side effects noted. No significant med or dietary changes. No significant recent illness or disease state changes. PT/INR done in office per protocol. INR is 2.3 which is therapeutic. Warfarin regimen will be continued at current dose 2.5 mg Mon/Wed/Fri, 1.25 mg all other days. Will retest in 4 weeks. Patient understands dosing directions and information discussed. Dosing schedule and follow up appointment given to patient. Progress note routed to referring physicians office. Discussed with patient the Pharmacist Collaborative Practice Agreement. Patient provided verbal and/or electronic (ex. [employer]) consent to participate in the collaborative practice agreement between the pharmacist and referred patient. This is in lieu of paper consent due to COVID-19 precautions and the use of remote/virtual visits.        For Pharmacy Admin Tracking Only    Intervention Detail:   Total # of Interventions Recommended: 0  Total # of Interventions Accepted: 0  Time Spent (min): 15

## 2023-02-14 ENCOUNTER — HOSPITAL ENCOUNTER (OUTPATIENT)
Dept: PHARMACY | Age: 73
Setting detail: THERAPIES SERIES
Discharge: HOME | End: 2023-02-14
Payer: MEDICARE

## 2023-02-14 DIAGNOSIS — I48.91 ATRIAL FIBRILLATION, UNSPECIFIED TYPE (HCC): Primary | ICD-10-CM

## 2023-02-14 LAB
INR BLD: 1.9
PROTIME: 22.9 SECONDS

## 2023-02-14 PROCEDURE — 99211 OFF/OP EST MAY X REQ PHY/QHP: CPT

## 2023-02-14 PROCEDURE — 85610 PROTHROMBIN TIME: CPT

## 2023-02-14 NOTE — PROGRESS NOTES
Patient seen in clinic for warfarin management due to atrial fibrillation with an INR goal of 2.0-3.0. Estimated duration of therapy is indefinite. Patient states compliant all of the time with regimen. No bleeding or thromboembolic side effects noted. No significant med changes. No significant recent illness or disease state changes. PT/INR done in office per protocol. INR is 1.9 which is just below goal. Patient states she recently ate some servings of spinach which most likely influenced the lower INR. Warfarin regimen will be continued at current dose 2.5 mg every Mon, Wed, Fri; 1.25 mg all other days. Will retest in 4 weeks. Patient understands dosing directions and information discussed. Dosing schedule and follow up appointment given to patient. Progress note routed to referring physicians office. Discussed with patient the Pharmacist Collaborative Practice Agreement. Patient provided verbal and/or electronic (ex. [facility]) consent to participate in the collaborative practice agreement between the pharmacist and referred patient. This is in lieu of paper consent due to COVID-19 precautions and the use of remote/virtual visits.        For Pharmacy Admin Tracking Only    Total # of Interventions Recommended: 0  Total # of Interventions Accepted: 0  Time Spent (min): 15

## 2023-02-18 ENCOUNTER — HOSPITAL ENCOUNTER (OUTPATIENT)
Age: 73
End: 2023-02-18
Payer: MEDICARE

## 2023-02-18 ENCOUNTER — HOSPITAL ENCOUNTER (OUTPATIENT)
Dept: GENERAL RADIOLOGY | Age: 73
End: 2023-02-18
Payer: MEDICARE

## 2023-02-18 ENCOUNTER — HOSPITAL ENCOUNTER (OUTPATIENT)
Age: 73
Discharge: HOME | End: 2023-02-18
Payer: MEDICARE

## 2023-02-18 DIAGNOSIS — J84.10 PULMONARY FIBROSIS (HCC): ICD-10-CM

## 2023-02-18 LAB
BNP SERPL-MCNC: 603 PG/ML
CHOLEST SERPL-MCNC: 128 MG/DL
CHOLESTEROL/HDL RATIO: 2.5
CK SERPL-CCNC: 39 U/L (ref 26–192)
HCT VFR BLD AUTO: 38.1 % (ref 36.3–47.1)
HDLC SERPL-MCNC: 51 MG/DL
HGB BLD-MCNC: 12.1 G/DL (ref 11.9–15.1)
INR PPP: 1.9
IRON SATURATION: 55 % (ref 20–55)
IRON SERPL-MCNC: 131 UG/DL (ref 37–145)
LDLC SERPL CALC-MCNC: 59 MG/DL (ref 0–130)
MAGNESIUM SERPL-MCNC: 2.1 MG/DL (ref 1.6–2.6)
MCH RBC QN AUTO: 33.3 PG (ref 25.2–33.5)
MCHC RBC AUTO-ENTMCNC: 31.8 G/DL (ref 28.4–34.8)
MCV RBC AUTO: 105 FL (ref 82.6–102.9)
NRBC AUTOMATED: 0 PER 100 WBC
PDW BLD-RTO: 16.1 % (ref 11.8–14.4)
PHOSPHATE SERPL-MCNC: 4.1 MG/DL (ref 2.6–4.5)
PLATELET # BLD AUTO: 260 K/UL (ref 138–453)
PMV BLD AUTO: 11.3 FL (ref 8.1–13.5)
PROTHROMBIN TIME: 22 SEC (ref 11.5–14.2)
RBC # BLD: 3.63 M/UL (ref 3.95–5.11)
T4 FREE SERPL-MCNC: 1.58 NG/DL (ref 0.93–1.7)
TIBC SERPL-MCNC: 240 UG/DL (ref 250–450)
TRIGL SERPL-MCNC: 89 MG/DL
TSH SERPL-ACNC: 7.33 UIU/ML (ref 0.3–5)
UNSATURATED IRON BINDING CAPACITY: 109 UG/DL (ref 112–347)
VIT B12 SERPL-MCNC: 1156 PG/ML (ref 232–1245)
WBC # BLD AUTO: 7.2 K/UL (ref 3.5–11.3)

## 2023-02-18 PROCEDURE — 84443 ASSAY THYROID STIM HORMONE: CPT

## 2023-02-18 PROCEDURE — 36415 COLL VENOUS BLD VENIPUNCTURE: CPT

## 2023-02-18 PROCEDURE — 80061 LIPID PANEL: CPT

## 2023-02-18 PROCEDURE — 82550 ASSAY OF CK (CPK): CPT

## 2023-02-18 PROCEDURE — 85027 COMPLETE CBC AUTOMATED: CPT

## 2023-02-18 PROCEDURE — 83735 ASSAY OF MAGNESIUM: CPT

## 2023-02-18 PROCEDURE — 71046 X-RAY EXAM CHEST 2 VIEWS: CPT

## 2023-02-18 PROCEDURE — 83036 HEMOGLOBIN GLYCOSYLATED A1C: CPT

## 2023-02-18 PROCEDURE — 83550 IRON BINDING TEST: CPT

## 2023-02-18 PROCEDURE — 85610 PROTHROMBIN TIME: CPT

## 2023-02-18 PROCEDURE — 83540 ASSAY OF IRON: CPT

## 2023-02-18 PROCEDURE — 83880 ASSAY OF NATRIURETIC PEPTIDE: CPT

## 2023-02-18 PROCEDURE — 84439 ASSAY OF FREE THYROXINE: CPT

## 2023-02-18 PROCEDURE — 82607 VITAMIN B-12: CPT

## 2023-02-18 PROCEDURE — 84100 ASSAY OF PHOSPHORUS: CPT

## 2023-02-19 LAB
EST. AVERAGE GLUCOSE BLD GHB EST-MCNC: 100 MG/DL
HBA1C MFR BLD: 5.1 % (ref 4–6)

## 2023-03-14 ENCOUNTER — HOSPITAL ENCOUNTER (OUTPATIENT)
Dept: PHARMACY | Age: 73
Setting detail: THERAPIES SERIES
Discharge: HOME | End: 2023-03-14
Payer: MEDICARE

## 2023-03-14 ENCOUNTER — HOSPITAL ENCOUNTER (OUTPATIENT)
Age: 73
Discharge: HOME | End: 2023-03-14
Payer: MEDICARE

## 2023-03-14 DIAGNOSIS — I48.91 ATRIAL FIBRILLATION, UNSPECIFIED TYPE (HCC): Primary | ICD-10-CM

## 2023-03-14 LAB
ANION GAP SERPL CALCULATED.3IONS-SCNC: 14 MMOL/L (ref 9–17)
BUN SERPL-MCNC: 16 MG/DL (ref 8–23)
CALCIUM SERPL-MCNC: 8.8 MG/DL (ref 8.6–10.4)
CHLORIDE SERPL-SCNC: 107 MMOL/L (ref 98–107)
CO2 SERPL-SCNC: 19 MMOL/L (ref 20–31)
CREAT SERPL-MCNC: 1.82 MG/DL (ref 0.5–0.9)
GFR SERPL CREATININE-BSD FRML MDRD: 29 ML/MIN/1.73M2
GLUCOSE SERPL-MCNC: 99 MG/DL (ref 70–99)
INR BLD: 1.5
POTASSIUM SERPL-SCNC: 4.3 MMOL/L (ref 3.7–5.3)
PROTIME: 17.7 SECONDS
SODIUM SERPL-SCNC: 140 MMOL/L (ref 135–144)

## 2023-03-14 PROCEDURE — 99212 OFFICE O/P EST SF 10 MIN: CPT

## 2023-03-14 PROCEDURE — 85610 PROTHROMBIN TIME: CPT

## 2023-03-14 PROCEDURE — 80048 BASIC METABOLIC PNL TOTAL CA: CPT

## 2023-03-14 PROCEDURE — 36415 COLL VENOUS BLD VENIPUNCTURE: CPT

## 2023-03-14 RX ORDER — HYDROXYZINE HYDROCHLORIDE 25 MG/1
25 TABLET, FILM COATED ORAL 3 TIMES DAILY PRN
COMMUNITY

## 2023-03-14 RX ORDER — TORSEMIDE 20 MG/1
20-60 TABLET ORAL DAILY PRN
COMMUNITY

## 2023-03-14 RX ORDER — METOCLOPRAMIDE 5 MG/1
5 TABLET ORAL 4 TIMES DAILY
COMMUNITY

## 2023-03-14 RX ORDER — ROSUVASTATIN CALCIUM 40 MG/1
40 TABLET, COATED ORAL EVERY EVENING
COMMUNITY

## 2023-03-14 NOTE — PROGRESS NOTES
Patient seen in clinic for warfarin management due to atrial fibrillation with an INR goal of 2.0-3.0. Estimated duration of therapy is indefinite. Patient states compliant all of the time with regimen. No bleeding or thromboembolic side effects noted. No significant dietary changes. No significant recent illness or disease state changes. She states she had recently run out of her meds (except for Amiodarone and warfarin) but now has new prescriptions and will be able to resume her usual meds within the next 1-2 days. She reports she did not miss any doses of amiodarone or warfarin. PT/INR done in office per protocol. INR is 1.5 which is subtherapeutic possibly from not taking her other medications as usual.     Warfarin regimen will be boosted with 2.5mg today, then resume usual regimen of 2.5mg Mon/Wed/Fri, 1.25mg all other days as she is generally stable and therapeutic with this regimen. Recommend retesting INR in 2 weeks to assess. She states that Dr Janna Schumacher has now deemed her homebound and is getting her established with homecare within the next few days. Homecare agency will draw INRs at home and he will be managing the results moving forward. I requested that she call us with the name of her homecare agency once she knows it so that we can document it in her chart and ensure a smooth transition of her warfarin management. Patient understands dosing directions and information discussed. Dosing schedule and follow up appointment given to patient. Progress note routed to referring physicians office. Discussed with patient the Pharmacist Collaborative Practice Agreement. Patient provided verbal and/or electronic (ex. [employer]) consent to participate in the collaborative practice agreement between the pharmacist and referred patient. This is in lieu of paper consent due to COVID-19 precautions and the use of remote/virtual visits.        For Pharmacy Admin Tracking Only    Intervention Detail: Dose Adjustment: 1, reason: Therapy Optimization  Total # of Interventions Recommended: 1  Total # of Interventions Accepted: 1  Time Spent (min): 15

## 2023-03-28 ENCOUNTER — APPOINTMENT (OUTPATIENT)
Dept: PHARMACY | Age: 73
End: 2023-03-28
Payer: MEDICARE

## 2023-03-28 ENCOUNTER — TELEPHONE (OUTPATIENT)
Dept: PHARMACY | Age: 73
End: 2023-03-28

## 2023-04-07 ENCOUNTER — HOSPITAL ENCOUNTER (OUTPATIENT)
Age: 73
Setting detail: SPECIMEN
Discharge: HOME | End: 2023-04-07

## 2023-04-07 LAB
BNP SERPL-MCNC: 727 PG/ML
INR PPP: 2
PROTHROMBIN TIME: 22.7 SEC (ref 11.7–14.9)

## 2023-04-19 ENCOUNTER — HOSPITAL ENCOUNTER (OUTPATIENT)
Age: 73
Setting detail: SPECIMEN
Discharge: HOME | End: 2023-04-19

## 2023-04-19 LAB
INR PPP: 2.4
PROTHROMBIN TIME: 26.3 SEC (ref 11.7–14.9)

## 2023-04-26 ENCOUNTER — HOSPITAL ENCOUNTER (OUTPATIENT)
Age: 73
Setting detail: SPECIMEN
Discharge: HOME | End: 2023-04-26

## 2023-04-26 LAB
INR PPP: 3.1
PROTHROMBIN TIME: 32.2 SEC (ref 11.7–14.9)

## 2023-05-03 ENCOUNTER — HOSPITAL ENCOUNTER (OUTPATIENT)
Age: 73
Setting detail: SPECIMEN
Discharge: HOME | End: 2023-05-03

## 2023-05-03 LAB
INR PPP: 3.5
PROTHROMBIN TIME: 34.9 SEC (ref 11.7–14.9)

## 2023-05-10 ENCOUNTER — HOSPITAL ENCOUNTER (OUTPATIENT)
Age: 73
Setting detail: SPECIMEN
Discharge: HOME | End: 2023-05-10

## 2023-05-10 LAB
INR PPP: 3
PROTHROMBIN TIME: 31.3 SEC (ref 11.7–14.9)

## 2023-05-17 ENCOUNTER — HOSPITAL ENCOUNTER (OUTPATIENT)
Age: 73
Setting detail: SPECIMEN
Discharge: HOME | End: 2023-05-17

## 2023-05-17 LAB
INR PPP: 3.5
PROTHROMBIN TIME: 35 SEC (ref 11.7–14.9)

## 2023-05-24 ENCOUNTER — HOSPITAL ENCOUNTER (OUTPATIENT)
Age: 73
Setting detail: SPECIMEN
Discharge: HOME | End: 2023-05-24

## 2023-05-24 LAB
INR PPP: 3.5
PROTHROMBIN TIME: 35.2 SEC (ref 11.7–14.9)

## 2023-05-31 ENCOUNTER — HOSPITAL ENCOUNTER (OUTPATIENT)
Age: 73
Setting detail: SPECIMEN
Discharge: HOME | End: 2023-05-31

## 2023-05-31 LAB
INR PPP: 4.1
PROTHROMBIN TIME: 39.4 SEC (ref 11.7–14.9)

## 2023-09-24 ENCOUNTER — HOSPITAL ENCOUNTER (OUTPATIENT)
Age: 73
Setting detail: SPECIMEN
Discharge: HOME | End: 2023-09-24
Payer: MEDICARE

## 2023-09-24 LAB
ALBUMIN SERPL-MCNC: 2.9 G/DL (ref 3.5–5.2)
ALP SERPL-CCNC: 134 U/L (ref 35–104)
ALT SERPL-CCNC: 14 U/L (ref 5–33)
ANION GAP SERPL CALCULATED.3IONS-SCNC: 10 MMOL/L (ref 9–17)
AST SERPL-CCNC: 23 U/L
BILIRUB SERPL-MCNC: 0.5 MG/DL (ref 0.3–1.2)
BUN SERPL-MCNC: 35 MG/DL (ref 8–23)
BUN/CREAT SERPL: 15 (ref 9–20)
CALCIUM SERPL-MCNC: 9 MG/DL (ref 8.6–10.4)
CHLORIDE SERPL-SCNC: 103 MMOL/L (ref 98–107)
CO2 SERPL-SCNC: 25 MMOL/L (ref 20–31)
CREAT SERPL-MCNC: 2.3 MG/DL (ref 0.5–0.9)
GFR SERPL CREATININE-BSD FRML MDRD: 22 ML/MIN/1.73M2
GLUCOSE SERPL-MCNC: 121 MG/DL (ref 70–99)
POTASSIUM SERPL-SCNC: 3.8 MMOL/L (ref 3.7–5.3)
PROT SERPL-MCNC: 7.4 G/DL (ref 6.4–8.3)
SODIUM SERPL-SCNC: 138 MMOL/L (ref 135–144)

## 2023-09-24 PROCEDURE — P9603 ONE-WAY ALLOW PRORATED MILES: HCPCS

## 2023-09-24 PROCEDURE — 36415 COLL VENOUS BLD VENIPUNCTURE: CPT

## 2023-09-24 PROCEDURE — 80053 COMPREHEN METABOLIC PANEL: CPT

## 2023-09-25 ENCOUNTER — HOSPITAL ENCOUNTER (OUTPATIENT)
Age: 73
Setting detail: SPECIMEN
Discharge: HOME | End: 2023-09-25

## 2023-09-25 LAB
ALBUMIN SERPL-MCNC: 2.7 G/DL (ref 3.5–5.2)
ALP SERPL-CCNC: 120 U/L (ref 35–104)
ALT SERPL-CCNC: 13 U/L (ref 5–33)
ANION GAP SERPL CALCULATED.3IONS-SCNC: 9 MMOL/L (ref 9–17)
AST SERPL-CCNC: 22 U/L
BILIRUB SERPL-MCNC: 0.4 MG/DL (ref 0.3–1.2)
BUN SERPL-MCNC: 33 MG/DL (ref 8–23)
BUN/CREAT SERPL: 13 (ref 9–20)
CALCIUM SERPL-MCNC: 8.6 MG/DL (ref 8.6–10.4)
CHLORIDE SERPL-SCNC: 104 MMOL/L (ref 98–107)
CO2 SERPL-SCNC: 23 MMOL/L (ref 20–31)
CREAT SERPL-MCNC: 2.5 MG/DL (ref 0.5–0.9)
ERYTHROCYTE [DISTWIDTH] IN BLOOD BY AUTOMATED COUNT: 16.5 % (ref 11.8–14.4)
GFR SERPL CREATININE-BSD FRML MDRD: 20 ML/MIN/1.73M2
GLUCOSE SERPL-MCNC: 116 MG/DL (ref 70–99)
HCT VFR BLD AUTO: 28.4 % (ref 36.3–47.1)
HGB BLD-MCNC: 9.2 G/DL (ref 11.9–15.1)
MAGNESIUM SERPL-MCNC: 2.3 MG/DL (ref 1.6–2.6)
MCH RBC QN AUTO: 31.3 PG (ref 25.2–33.5)
MCHC RBC AUTO-ENTMCNC: 32.4 G/DL (ref 28.4–34.8)
MCV RBC AUTO: 96.6 FL (ref 82.6–102.9)
NRBC BLD-RTO: 0 PER 100 WBC
PHOSPHATE SERPL-MCNC: 4.3 MG/DL (ref 2.6–4.5)
PLATELET # BLD AUTO: 238 K/UL (ref 138–453)
PMV BLD AUTO: 9.8 FL (ref 8.1–13.5)
POTASSIUM SERPL-SCNC: 4.1 MMOL/L (ref 3.7–5.3)
PROT SERPL-MCNC: 6.6 G/DL (ref 6.4–8.3)
RBC # BLD AUTO: 2.94 M/UL (ref 3.95–5.11)
SODIUM SERPL-SCNC: 136 MMOL/L (ref 135–144)
WBC OTHER # BLD: 5.9 K/UL (ref 3.5–11.3)

## 2023-09-25 PROCEDURE — 36415 COLL VENOUS BLD VENIPUNCTURE: CPT

## 2023-09-25 PROCEDURE — 85027 COMPLETE CBC AUTOMATED: CPT

## 2023-09-25 PROCEDURE — 83735 ASSAY OF MAGNESIUM: CPT

## 2023-09-25 PROCEDURE — P9603 ONE-WAY ALLOW PRORATED MILES: HCPCS

## 2023-09-25 PROCEDURE — 84100 ASSAY OF PHOSPHORUS: CPT

## 2023-09-25 PROCEDURE — 80053 COMPREHEN METABOLIC PANEL: CPT

## 2023-09-27 ENCOUNTER — HOSPITAL ENCOUNTER (OUTPATIENT)
Age: 73
Setting detail: SPECIMEN
Discharge: HOME | End: 2023-09-27

## 2023-09-27 LAB
ANION GAP SERPL CALCULATED.3IONS-SCNC: 11 MMOL/L (ref 9–17)
BASOPHILS # BLD: 0.1 K/UL (ref 0–0.2)
BASOPHILS NFR BLD: 2 % (ref 0–2)
BUN SERPL-MCNC: 35 MG/DL (ref 8–23)
BUN/CREAT SERPL: 14 (ref 9–20)
CALCIUM SERPL-MCNC: 9 MG/DL (ref 8.6–10.4)
CHLORIDE SERPL-SCNC: 103 MMOL/L (ref 98–107)
CO2 SERPL-SCNC: 24 MMOL/L (ref 20–31)
CREAT SERPL-MCNC: 2.5 MG/DL (ref 0.5–0.9)
EOSINOPHIL # BLD: 0.56 K/UL (ref 0–0.44)
EOSINOPHILS RELATIVE PERCENT: 9 % (ref 1–4)
ERYTHROCYTE [DISTWIDTH] IN BLOOD BY AUTOMATED COUNT: 16.5 % (ref 11.8–14.4)
GFR SERPL CREATININE-BSD FRML MDRD: 20 ML/MIN/1.73M2
GLUCOSE SERPL-MCNC: 104 MG/DL (ref 70–99)
HCT VFR BLD AUTO: 30.6 % (ref 36.3–47.1)
HGB BLD-MCNC: 9.8 G/DL (ref 11.9–15.1)
IMM GRANULOCYTES # BLD AUTO: 0.02 K/UL (ref 0–0.3)
IMM GRANULOCYTES NFR BLD: 0 %
LYMPHOCYTES # BLD: 17 % (ref 24–43)
LYMPHOCYTES NFR BLD: 1.13 K/UL (ref 1.1–3.7)
MCH RBC QN AUTO: 31.5 PG (ref 25.2–33.5)
MCHC RBC AUTO-ENTMCNC: 32 G/DL (ref 28.4–34.8)
MCV RBC AUTO: 98.4 FL (ref 82.6–102.9)
MONOCYTES NFR BLD: 0.79 K/UL (ref 0.1–1.2)
MONOCYTES NFR BLD: 12 % (ref 3–12)
NEUTROPHILS NFR BLD: 60 % (ref 36–65)
NEUTS SEG NFR BLD: 3.98 K/UL (ref 1.5–8.1)
NRBC BLD-RTO: 0 PER 100 WBC
PLATELET # BLD AUTO: 262 K/UL (ref 138–453)
PMV BLD AUTO: 10 FL (ref 8.1–13.5)
POTASSIUM SERPL-SCNC: 4.2 MMOL/L (ref 3.7–5.3)
RBC # BLD AUTO: 3.11 M/UL (ref 3.95–5.11)
RBC # BLD: ABNORMAL 10*6/UL
SODIUM SERPL-SCNC: 138 MMOL/L (ref 135–144)
WBC OTHER # BLD: 6.6 K/UL (ref 3.5–11.3)

## 2023-09-27 PROCEDURE — 80048 BASIC METABOLIC PNL TOTAL CA: CPT

## 2023-09-27 PROCEDURE — P9603 ONE-WAY ALLOW PRORATED MILES: HCPCS

## 2023-09-27 PROCEDURE — 36415 COLL VENOUS BLD VENIPUNCTURE: CPT

## 2023-09-27 PROCEDURE — 85025 COMPLETE CBC W/AUTO DIFF WBC: CPT

## 2023-09-30 ENCOUNTER — HOSPITAL ENCOUNTER (OUTPATIENT)
Age: 73
Setting detail: SPECIMEN
Discharge: HOME | End: 2023-09-30

## 2023-09-30 PROCEDURE — 81001 URINALYSIS AUTO W/SCOPE: CPT

## 2023-09-30 PROCEDURE — 87086 URINE CULTURE/COLONY COUNT: CPT

## 2023-10-01 LAB
BACTERIA URNS QL MICRO: ABNORMAL
BILIRUB UR QL STRIP: NEGATIVE
CLARITY UR: CLEAR
COLOR UR: YELLOW
EPI CELLS #/AREA URNS HPF: ABNORMAL /HPF (ref 0–5)
GLUCOSE UR STRIP-MCNC: NEGATIVE MG/DL
HGB UR QL STRIP.AUTO: NEGATIVE
KETONES UR STRIP-MCNC: NEGATIVE MG/DL
LEUKOCYTE ESTERASE UR QL STRIP: ABNORMAL
NITRITE UR QL STRIP: NEGATIVE
PH UR STRIP: 6 [PH] (ref 5–8)
PROT UR STRIP-MCNC: ABNORMAL MG/DL
RBC #/AREA URNS HPF: ABNORMAL /HPF (ref 0–2)
SP GR UR STRIP: 1.02 (ref 1–1.03)
UROBILINOGEN UR STRIP-ACNC: NORMAL EU/DL (ref 0–1)
WBC #/AREA URNS HPF: ABNORMAL /HPF (ref 0–5)

## 2023-10-02 ENCOUNTER — HOSPITAL ENCOUNTER (OUTPATIENT)
Age: 73
Setting detail: SPECIMEN
Discharge: HOME | End: 2023-10-02

## 2023-10-02 LAB
ALBUMIN SERPL-MCNC: 3.1 G/DL (ref 3.5–5.2)
ALP SERPL-CCNC: 130 U/L (ref 35–104)
ALT SERPL-CCNC: 14 U/L (ref 5–33)
ANION GAP SERPL CALCULATED.3IONS-SCNC: 9 MMOL/L (ref 9–17)
AST SERPL-CCNC: 29 U/L
BILIRUB SERPL-MCNC: 0.5 MG/DL (ref 0.3–1.2)
BUN SERPL-MCNC: 35 MG/DL (ref 8–23)
BUN/CREAT SERPL: 16 (ref 9–20)
CALCIUM SERPL-MCNC: 8.9 MG/DL (ref 8.6–10.4)
CHLORIDE SERPL-SCNC: 100 MMOL/L (ref 98–107)
CO2 SERPL-SCNC: 27 MMOL/L (ref 20–31)
CREAT SERPL-MCNC: 2.2 MG/DL (ref 0.5–0.9)
ERYTHROCYTE [DISTWIDTH] IN BLOOD BY AUTOMATED COUNT: 15.9 % (ref 11.8–14.4)
GFR SERPL CREATININE-BSD FRML MDRD: 23 ML/MIN/1.73M2
GLUCOSE SERPL-MCNC: 101 MG/DL (ref 70–99)
HCT VFR BLD AUTO: 31.6 % (ref 36.3–47.1)
HGB BLD-MCNC: 10.1 G/DL (ref 11.9–15.1)
MCH RBC QN AUTO: 31.2 PG (ref 25.2–33.5)
MCHC RBC AUTO-ENTMCNC: 32 G/DL (ref 28.4–34.8)
MCV RBC AUTO: 97.5 FL (ref 82.6–102.9)
NRBC BLD-RTO: 0 PER 100 WBC
PLATELET # BLD AUTO: 258 K/UL (ref 138–453)
PMV BLD AUTO: 10.1 FL (ref 8.1–13.5)
POTASSIUM SERPL-SCNC: 4.1 MMOL/L (ref 3.7–5.3)
PROT SERPL-MCNC: 7.9 G/DL (ref 6.4–8.3)
RBC # BLD AUTO: 3.24 M/UL (ref 3.95–5.11)
SODIUM SERPL-SCNC: 136 MMOL/L (ref 135–144)
WBC OTHER # BLD: 7.1 K/UL (ref 3.5–11.3)

## 2023-10-02 PROCEDURE — P9603 ONE-WAY ALLOW PRORATED MILES: HCPCS

## 2023-10-02 PROCEDURE — 85027 COMPLETE CBC AUTOMATED: CPT

## 2023-10-02 PROCEDURE — 36415 COLL VENOUS BLD VENIPUNCTURE: CPT

## 2023-10-02 PROCEDURE — 80053 COMPREHEN METABOLIC PANEL: CPT

## 2023-10-03 LAB
MICROORGANISM SPEC CULT: ABNORMAL
SPECIMEN DESCRIPTION: ABNORMAL

## 2023-10-12 ENCOUNTER — HOSPITAL ENCOUNTER (OUTPATIENT)
Age: 73
Setting detail: SPECIMEN
Discharge: HOME | End: 2023-10-12

## 2023-10-12 LAB
ALBUMIN SERPL-MCNC: 2.8 G/DL (ref 3.5–5.2)
ALP SERPL-CCNC: 138 U/L (ref 35–104)
ALT SERPL-CCNC: 16 U/L (ref 5–33)
ANION GAP SERPL CALCULATED.3IONS-SCNC: 10 MMOL/L (ref 9–17)
AST SERPL-CCNC: 27 U/L
BILIRUB SERPL-MCNC: 0.6 MG/DL (ref 0.3–1.2)
BUN SERPL-MCNC: 36 MG/DL (ref 8–23)
BUN/CREAT SERPL: 16 (ref 9–20)
CALCIUM SERPL-MCNC: 8.9 MG/DL (ref 8.6–10.4)
CHLORIDE SERPL-SCNC: 99 MMOL/L (ref 98–107)
CO2 SERPL-SCNC: 25 MMOL/L (ref 20–31)
CREAT SERPL-MCNC: 2.2 MG/DL (ref 0.5–0.9)
ERYTHROCYTE [DISTWIDTH] IN BLOOD BY AUTOMATED COUNT: 15 % (ref 11.8–14.4)
GFR SERPL CREATININE-BSD FRML MDRD: 23 ML/MIN/1.73M2
GLUCOSE SERPL-MCNC: 119 MG/DL (ref 70–99)
HCT VFR BLD AUTO: 32 % (ref 36.3–47.1)
HGB BLD-MCNC: 10.5 G/DL (ref 11.9–15.1)
MCH RBC QN AUTO: 31.4 PG (ref 25.2–33.5)
MCHC RBC AUTO-ENTMCNC: 32.8 G/DL (ref 28.4–34.8)
MCV RBC AUTO: 95.8 FL (ref 82.6–102.9)
NRBC BLD-RTO: 0 PER 100 WBC
PLATELET # BLD AUTO: 248 K/UL (ref 138–453)
PMV BLD AUTO: 9.8 FL (ref 8.1–13.5)
POTASSIUM SERPL-SCNC: 4.2 MMOL/L (ref 3.7–5.3)
PROT SERPL-MCNC: 7.6 G/DL (ref 6.4–8.3)
RBC # BLD AUTO: 3.34 M/UL (ref 3.95–5.11)
SODIUM SERPL-SCNC: 134 MMOL/L (ref 135–144)
WBC OTHER # BLD: 6.6 K/UL (ref 3.5–11.3)

## 2023-10-12 PROCEDURE — 85027 COMPLETE CBC AUTOMATED: CPT

## 2023-10-12 PROCEDURE — 36415 COLL VENOUS BLD VENIPUNCTURE: CPT

## 2023-10-12 PROCEDURE — P9603 ONE-WAY ALLOW PRORATED MILES: HCPCS

## 2023-10-12 PROCEDURE — 80053 COMPREHEN METABOLIC PANEL: CPT

## 2023-10-13 ENCOUNTER — HOSPITAL ENCOUNTER (OUTPATIENT)
Age: 73
Setting detail: SPECIMEN
Discharge: HOME | End: 2023-10-13

## 2023-10-13 LAB
ANION GAP SERPL CALCULATED.3IONS-SCNC: 10 MMOL/L (ref 9–17)
BUN SERPL-MCNC: 33 MG/DL (ref 8–23)
BUN/CREAT SERPL: 15 (ref 9–20)
CALCIUM SERPL-MCNC: 9 MG/DL (ref 8.6–10.4)
CHLORIDE SERPL-SCNC: 99 MMOL/L (ref 98–107)
CO2 SERPL-SCNC: 24 MMOL/L (ref 20–31)
CREAT SERPL-MCNC: 2.2 MG/DL (ref 0.5–0.9)
ERYTHROCYTE [DISTWIDTH] IN BLOOD BY AUTOMATED COUNT: 15.5 % (ref 11.8–14.4)
GFR SERPL CREATININE-BSD FRML MDRD: 23 ML/MIN/1.73M2
GLUCOSE SERPL-MCNC: 88 MG/DL (ref 70–99)
HCT VFR BLD AUTO: 32 % (ref 36.3–47.1)
HGB BLD-MCNC: 10.4 G/DL (ref 11.9–15.1)
MCH RBC QN AUTO: 31.5 PG (ref 25.2–33.5)
MCHC RBC AUTO-ENTMCNC: 32.5 G/DL (ref 28.4–34.8)
MCV RBC AUTO: 97 FL (ref 82.6–102.9)
NRBC BLD-RTO: 0 PER 100 WBC
PLATELET # BLD AUTO: 273 K/UL (ref 138–453)
PMV BLD AUTO: 10.2 FL (ref 8.1–13.5)
POTASSIUM SERPL-SCNC: 4.2 MMOL/L (ref 3.7–5.3)
RBC # BLD AUTO: 3.3 M/UL (ref 3.95–5.11)
SODIUM SERPL-SCNC: 133 MMOL/L (ref 135–144)
WBC OTHER # BLD: 7.4 K/UL (ref 3.5–11.3)

## 2023-10-13 PROCEDURE — P9603 ONE-WAY ALLOW PRORATED MILES: HCPCS

## 2023-10-13 PROCEDURE — 85027 COMPLETE CBC AUTOMATED: CPT

## 2023-10-13 PROCEDURE — 36415 COLL VENOUS BLD VENIPUNCTURE: CPT

## 2023-10-13 PROCEDURE — 80048 BASIC METABOLIC PNL TOTAL CA: CPT

## 2023-10-17 ENCOUNTER — HOSPITAL ENCOUNTER (OUTPATIENT)
Age: 73
Setting detail: SPECIMEN
Discharge: HOME | End: 2023-10-17

## 2023-10-17 LAB
ANION GAP SERPL CALCULATED.3IONS-SCNC: 13 MMOL/L (ref 9–17)
BUN SERPL-MCNC: 42 MG/DL (ref 8–23)
BUN/CREAT SERPL: 18 (ref 9–20)
CALCIUM SERPL-MCNC: 8.4 MG/DL (ref 8.6–10.4)
CHLORIDE SERPL-SCNC: 92 MMOL/L (ref 98–107)
CO2 SERPL-SCNC: 24 MMOL/L (ref 20–31)
CREAT SERPL-MCNC: 2.3 MG/DL (ref 0.5–0.9)
ERYTHROCYTE [DISTWIDTH] IN BLOOD BY AUTOMATED COUNT: 15.1 % (ref 11.8–14.4)
GFR SERPL CREATININE-BSD FRML MDRD: 22 ML/MIN/1.73M2
GLUCOSE SERPL-MCNC: 98 MG/DL (ref 70–99)
HCT VFR BLD AUTO: 28.1 % (ref 36.3–47.1)
HGB BLD-MCNC: 9.4 G/DL (ref 11.9–15.1)
MCH RBC QN AUTO: 32 PG (ref 25.2–33.5)
MCHC RBC AUTO-ENTMCNC: 33.5 G/DL (ref 28.4–34.8)
MCV RBC AUTO: 95.6 FL (ref 82.6–102.9)
NRBC BLD-RTO: 0 PER 100 WBC
PLATELET # BLD AUTO: 256 K/UL (ref 138–453)
PMV BLD AUTO: 10 FL (ref 8.1–13.5)
POTASSIUM SERPL-SCNC: 4 MMOL/L (ref 3.7–5.3)
RBC # BLD AUTO: 2.94 M/UL (ref 3.95–5.11)
SODIUM SERPL-SCNC: 129 MMOL/L (ref 135–144)
WBC OTHER # BLD: 5.9 K/UL (ref 3.5–11.3)

## 2023-10-17 PROCEDURE — P9603 ONE-WAY ALLOW PRORATED MILES: HCPCS

## 2023-10-17 PROCEDURE — 36415 COLL VENOUS BLD VENIPUNCTURE: CPT

## 2023-10-17 PROCEDURE — 85027 COMPLETE CBC AUTOMATED: CPT

## 2023-10-17 PROCEDURE — 80048 BASIC METABOLIC PNL TOTAL CA: CPT

## 2023-10-20 ENCOUNTER — HOSPITAL ENCOUNTER (OUTPATIENT)
Age: 73
Setting detail: SPECIMEN
Discharge: HOME | End: 2023-10-20

## 2023-10-20 LAB
ANION GAP SERPL CALCULATED.3IONS-SCNC: 13 MMOL/L (ref 9–17)
BUN SERPL-MCNC: 35 MG/DL (ref 8–23)
BUN/CREAT SERPL: 16 (ref 9–20)
CALCIUM SERPL-MCNC: 8.6 MG/DL (ref 8.6–10.4)
CHLORIDE SERPL-SCNC: 97 MMOL/L (ref 98–107)
CO2 SERPL-SCNC: 26 MMOL/L (ref 20–31)
CREAT SERPL-MCNC: 2.2 MG/DL (ref 0.5–0.9)
ERYTHROCYTE [DISTWIDTH] IN BLOOD BY AUTOMATED COUNT: 15.6 % (ref 11.8–14.4)
GFR SERPL CREATININE-BSD FRML MDRD: 23 ML/MIN/1.73M2
GLUCOSE SERPL-MCNC: 101 MG/DL (ref 70–99)
HCT VFR BLD AUTO: 28.9 % (ref 36.3–47.1)
HGB BLD-MCNC: 9.6 G/DL (ref 11.9–15.1)
MCH RBC QN AUTO: 31.9 PG (ref 25.2–33.5)
MCHC RBC AUTO-ENTMCNC: 33.2 G/DL (ref 28.4–34.8)
MCV RBC AUTO: 96 FL (ref 82.6–102.9)
NRBC BLD-RTO: 0 PER 100 WBC
PLATELET # BLD AUTO: 290 K/UL (ref 138–453)
PMV BLD AUTO: 9.3 FL (ref 8.1–13.5)
POTASSIUM SERPL-SCNC: 3.7 MMOL/L (ref 3.7–5.3)
RBC # BLD AUTO: 3.01 M/UL (ref 3.95–5.11)
SODIUM SERPL-SCNC: 136 MMOL/L (ref 135–144)
WBC OTHER # BLD: 6.3 K/UL (ref 3.5–11.3)

## 2023-10-20 PROCEDURE — 80048 BASIC METABOLIC PNL TOTAL CA: CPT

## 2023-10-20 PROCEDURE — 36415 COLL VENOUS BLD VENIPUNCTURE: CPT

## 2023-10-20 PROCEDURE — 85027 COMPLETE CBC AUTOMATED: CPT

## 2023-10-20 PROCEDURE — P9603 ONE-WAY ALLOW PRORATED MILES: HCPCS

## 2023-10-24 ENCOUNTER — HOSPITAL ENCOUNTER (OUTPATIENT)
Age: 73
Setting detail: SPECIMEN
Discharge: HOME | End: 2023-10-24

## 2023-10-24 LAB
ANION GAP SERPL CALCULATED.3IONS-SCNC: 12 MMOL/L (ref 9–17)
BUN SERPL-MCNC: 27 MG/DL (ref 8–23)
BUN/CREAT SERPL: 11 (ref 9–20)
CALCIUM SERPL-MCNC: 8.7 MG/DL (ref 8.6–10.4)
CHLORIDE SERPL-SCNC: 97 MMOL/L (ref 98–107)
CO2 SERPL-SCNC: 26 MMOL/L (ref 20–31)
CREAT SERPL-MCNC: 2.4 MG/DL (ref 0.5–0.9)
ERYTHROCYTE [DISTWIDTH] IN BLOOD BY AUTOMATED COUNT: 15.7 % (ref 11.8–14.4)
GFR SERPL CREATININE-BSD FRML MDRD: 21 ML/MIN/1.73M2
GLUCOSE SERPL-MCNC: 103 MG/DL (ref 70–99)
HCT VFR BLD AUTO: 29.9 % (ref 36.3–47.1)
HGB BLD-MCNC: 9.7 G/DL (ref 11.9–15.1)
MCH RBC QN AUTO: 31.7 PG (ref 25.2–33.5)
MCHC RBC AUTO-ENTMCNC: 32.4 G/DL (ref 28.4–34.8)
MCV RBC AUTO: 97.7 FL (ref 82.6–102.9)
NRBC BLD-RTO: 0 PER 100 WBC
PLATELET # BLD AUTO: 276 K/UL (ref 138–453)
PMV BLD AUTO: 9.4 FL (ref 8.1–13.5)
POTASSIUM SERPL-SCNC: 4.1 MMOL/L (ref 3.7–5.3)
RBC # BLD AUTO: 3.06 M/UL (ref 3.95–5.11)
SODIUM SERPL-SCNC: 135 MMOL/L (ref 135–144)
WBC OTHER # BLD: 6.9 K/UL (ref 3.5–11.3)

## 2023-10-24 PROCEDURE — 80048 BASIC METABOLIC PNL TOTAL CA: CPT

## 2023-10-24 PROCEDURE — 85027 COMPLETE CBC AUTOMATED: CPT

## 2023-10-24 PROCEDURE — 36415 COLL VENOUS BLD VENIPUNCTURE: CPT

## 2023-10-24 PROCEDURE — P9603 ONE-WAY ALLOW PRORATED MILES: HCPCS

## 2023-10-27 ENCOUNTER — HOSPITAL ENCOUNTER (OUTPATIENT)
Age: 73
Setting detail: SPECIMEN
Discharge: HOME | End: 2023-10-27

## 2023-10-27 LAB
ANION GAP SERPL CALCULATED.3IONS-SCNC: 11 MMOL/L (ref 9–17)
BUN SERPL-MCNC: 27 MG/DL (ref 8–23)
BUN/CREAT SERPL: 12 (ref 9–20)
CALCIUM SERPL-MCNC: 8.6 MG/DL (ref 8.6–10.4)
CHLORIDE SERPL-SCNC: 99 MMOL/L (ref 98–107)
CO2 SERPL-SCNC: 26 MMOL/L (ref 20–31)
CREAT SERPL-MCNC: 2.2 MG/DL (ref 0.5–0.9)
ERYTHROCYTE [DISTWIDTH] IN BLOOD BY AUTOMATED COUNT: 15.4 % (ref 11.8–14.4)
GFR SERPL CREATININE-BSD FRML MDRD: 23 ML/MIN/1.73M2
GLUCOSE SERPL-MCNC: 101 MG/DL (ref 70–99)
HCT VFR BLD AUTO: 29.4 % (ref 36.3–47.1)
HGB BLD-MCNC: 9.6 G/DL (ref 11.9–15.1)
MCH RBC QN AUTO: 31.4 PG (ref 25.2–33.5)
MCHC RBC AUTO-ENTMCNC: 32.7 G/DL (ref 28.4–34.8)
MCV RBC AUTO: 96.1 FL (ref 82.6–102.9)
NRBC BLD-RTO: 0 PER 100 WBC
PLATELET # BLD AUTO: 252 K/UL (ref 138–453)
PMV BLD AUTO: 9.5 FL (ref 8.1–13.5)
POTASSIUM SERPL-SCNC: 3.8 MMOL/L (ref 3.7–5.3)
RBC # BLD AUTO: 3.06 M/UL (ref 3.95–5.11)
SODIUM SERPL-SCNC: 136 MMOL/L (ref 135–144)
WBC OTHER # BLD: 5.7 K/UL (ref 3.5–11.3)

## 2023-10-27 PROCEDURE — 80048 BASIC METABOLIC PNL TOTAL CA: CPT

## 2023-10-27 PROCEDURE — 85027 COMPLETE CBC AUTOMATED: CPT

## 2023-10-27 PROCEDURE — 36415 COLL VENOUS BLD VENIPUNCTURE: CPT

## 2023-10-27 PROCEDURE — P9603 ONE-WAY ALLOW PRORATED MILES: HCPCS

## 2023-10-31 ENCOUNTER — HOSPITAL ENCOUNTER (OUTPATIENT)
Age: 73
Setting detail: SPECIMEN
Discharge: HOME | End: 2023-10-31

## 2023-10-31 LAB
ANION GAP SERPL CALCULATED.3IONS-SCNC: 9 MMOL/L (ref 9–17)
BUN SERPL-MCNC: 19 MG/DL (ref 8–23)
BUN/CREAT SERPL: 10 (ref 9–20)
CALCIUM SERPL-MCNC: 8.5 MG/DL (ref 8.6–10.4)
CHLORIDE SERPL-SCNC: 101 MMOL/L (ref 98–107)
CO2 SERPL-SCNC: 28 MMOL/L (ref 20–31)
CREAT SERPL-MCNC: 1.9 MG/DL (ref 0.5–0.9)
ERYTHROCYTE [DISTWIDTH] IN BLOOD BY AUTOMATED COUNT: 15.4 % (ref 11.8–14.4)
GFR SERPL CREATININE-BSD FRML MDRD: 27 ML/MIN/1.73M2
GLUCOSE SERPL-MCNC: 90 MG/DL (ref 70–99)
HCT VFR BLD AUTO: 30.9 % (ref 36.3–47.1)
HGB BLD-MCNC: 10 G/DL (ref 11.9–15.1)
MCH RBC QN AUTO: 31.3 PG (ref 25.2–33.5)
MCHC RBC AUTO-ENTMCNC: 32.4 G/DL (ref 28.4–34.8)
MCV RBC AUTO: 96.9 FL (ref 82.6–102.9)
NRBC BLD-RTO: 0 PER 100 WBC
PLATELET # BLD AUTO: 269 K/UL (ref 138–453)
PMV BLD AUTO: 9.6 FL (ref 8.1–13.5)
POTASSIUM SERPL-SCNC: 3.7 MMOL/L (ref 3.7–5.3)
RBC # BLD AUTO: 3.19 M/UL (ref 3.95–5.11)
SODIUM SERPL-SCNC: 138 MMOL/L (ref 135–144)
WBC OTHER # BLD: 5.2 K/UL (ref 3.5–11.3)

## 2023-10-31 PROCEDURE — P9603 ONE-WAY ALLOW PRORATED MILES: HCPCS

## 2023-10-31 PROCEDURE — 36415 COLL VENOUS BLD VENIPUNCTURE: CPT

## 2023-10-31 PROCEDURE — 85027 COMPLETE CBC AUTOMATED: CPT

## 2023-10-31 PROCEDURE — 80048 BASIC METABOLIC PNL TOTAL CA: CPT

## 2023-11-01 ENCOUNTER — HOSPITAL ENCOUNTER (OUTPATIENT)
Age: 73
Setting detail: SPECIMEN
Discharge: HOME | End: 2023-11-01

## 2023-11-01 LAB — BNP SERPL-MCNC: 474 PG/ML

## 2023-11-01 PROCEDURE — P9603 ONE-WAY ALLOW PRORATED MILES: HCPCS

## 2023-11-01 PROCEDURE — 36415 COLL VENOUS BLD VENIPUNCTURE: CPT

## 2023-11-01 PROCEDURE — 83880 ASSAY OF NATRIURETIC PEPTIDE: CPT

## 2023-11-24 ENCOUNTER — HOSPITAL ENCOUNTER (OUTPATIENT)
Age: 73
Setting detail: SPECIMEN
Discharge: HOME | End: 2023-11-24

## 2023-11-24 LAB
ALBUMIN SERPL-MCNC: 2.7 G/DL (ref 3.5–5.2)
ALP SERPL-CCNC: 129 U/L (ref 35–104)
ALT SERPL-CCNC: <5 U/L (ref 5–33)
ANION GAP SERPL CALCULATED.3IONS-SCNC: 11 MMOL/L (ref 9–17)
AST SERPL-CCNC: 21 U/L
BASOPHILS # BLD: 0.07 K/UL (ref 0–0.2)
BASOPHILS NFR BLD: 1 % (ref 0–2)
BILIRUB SERPL-MCNC: 0.4 MG/DL (ref 0.3–1.2)
BUN SERPL-MCNC: 18 MG/DL (ref 8–23)
BUN/CREAT SERPL: 9 (ref 9–20)
CALCIUM SERPL-MCNC: 8.6 MG/DL (ref 8.6–10.4)
CHLORIDE SERPL-SCNC: 101 MMOL/L (ref 98–107)
CO2 SERPL-SCNC: 25 MMOL/L (ref 20–31)
CREAT SERPL-MCNC: 2.1 MG/DL (ref 0.5–0.9)
EOSINOPHIL # BLD: 0.5 K/UL (ref 0–0.44)
EOSINOPHILS RELATIVE PERCENT: 7 % (ref 1–4)
ERYTHROCYTE [DISTWIDTH] IN BLOOD BY AUTOMATED COUNT: 16.1 % (ref 11.8–14.4)
GFR SERPL CREATININE-BSD FRML MDRD: 24 ML/MIN/1.73M2
GLUCOSE SERPL-MCNC: 118 MG/DL (ref 70–99)
HCT VFR BLD AUTO: 29.8 % (ref 36.3–47.1)
HGB BLD-MCNC: 9.8 G/DL (ref 11.9–15.1)
IMM GRANULOCYTES # BLD AUTO: 0.02 K/UL (ref 0–0.3)
IMM GRANULOCYTES NFR BLD: 0 %
LYMPHOCYTES NFR BLD: 1.63 K/UL (ref 1.1–3.7)
LYMPHOCYTES RELATIVE PERCENT: 23 % (ref 24–43)
MCH RBC QN AUTO: 32 PG (ref 25.2–33.5)
MCHC RBC AUTO-ENTMCNC: 32.9 G/DL (ref 28.4–34.8)
MCV RBC AUTO: 97.4 FL (ref 82.6–102.9)
MONOCYTES NFR BLD: 0.91 K/UL (ref 0.1–1.2)
MONOCYTES NFR BLD: 13 % (ref 3–12)
NEUTROPHILS NFR BLD: 56 % (ref 36–65)
NEUTS SEG NFR BLD: 3.95 K/UL (ref 1.5–8.1)
NRBC BLD-RTO: 0 PER 100 WBC
PLATELET # BLD AUTO: 302 K/UL (ref 138–453)
PMV BLD AUTO: 9.6 FL (ref 8.1–13.5)
POTASSIUM SERPL-SCNC: 3.8 MMOL/L (ref 3.7–5.3)
PROT SERPL-MCNC: 6.5 G/DL (ref 6.4–8.3)
RBC # BLD AUTO: 3.06 M/UL (ref 3.95–5.11)
RBC # BLD: ABNORMAL 10*6/UL
SODIUM SERPL-SCNC: 137 MMOL/L (ref 135–144)
WBC OTHER # BLD: 7.1 K/UL (ref 3.5–11.3)

## 2023-11-24 PROCEDURE — 36415 COLL VENOUS BLD VENIPUNCTURE: CPT

## 2023-11-24 PROCEDURE — 85025 COMPLETE CBC W/AUTO DIFF WBC: CPT

## 2023-11-24 PROCEDURE — P9603 ONE-WAY ALLOW PRORATED MILES: HCPCS

## 2023-11-24 PROCEDURE — 80053 COMPREHEN METABOLIC PANEL: CPT

## 2023-11-28 ENCOUNTER — HOSPITAL ENCOUNTER (OUTPATIENT)
Age: 73
Setting detail: SPECIMEN
Discharge: HOME | End: 2023-11-28

## 2023-11-28 LAB
ALBUMIN SERPL-MCNC: 2.9 G/DL (ref 3.5–5.2)
ALP SERPL-CCNC: 137 U/L (ref 35–104)
ALT SERPL-CCNC: <5 U/L (ref 5–33)
ANION GAP SERPL CALCULATED.3IONS-SCNC: 11 MMOL/L (ref 9–17)
AST SERPL-CCNC: 25 U/L
BASOPHILS # BLD: 0.09 K/UL (ref 0–0.2)
BASOPHILS NFR BLD: 2 % (ref 0–2)
BILIRUB SERPL-MCNC: 0.5 MG/DL (ref 0.3–1.2)
BUN SERPL-MCNC: 13 MG/DL (ref 8–23)
BUN/CREAT SERPL: 7 (ref 9–20)
CALCIUM SERPL-MCNC: 9.1 MG/DL (ref 8.6–10.4)
CHLORIDE SERPL-SCNC: 103 MMOL/L (ref 98–107)
CO2 SERPL-SCNC: 25 MMOL/L (ref 20–31)
CREAT SERPL-MCNC: 1.8 MG/DL (ref 0.5–0.9)
EOSINOPHIL # BLD: 0.27 K/UL (ref 0–0.44)
EOSINOPHILS RELATIVE PERCENT: 5 % (ref 1–4)
ERYTHROCYTE [DISTWIDTH] IN BLOOD BY AUTOMATED COUNT: 15.9 % (ref 11.8–14.4)
GFR SERPL CREATININE-BSD FRML MDRD: 29 ML/MIN/1.73M2
GLUCOSE SERPL-MCNC: 103 MG/DL (ref 70–99)
HCT VFR BLD AUTO: 31.4 % (ref 36.3–47.1)
HGB BLD-MCNC: 10.2 G/DL (ref 11.9–15.1)
IMM GRANULOCYTES # BLD AUTO: 0.01 K/UL (ref 0–0.3)
IMM GRANULOCYTES NFR BLD: 0 %
LYMPHOCYTES NFR BLD: 1.49 K/UL (ref 1.1–3.7)
LYMPHOCYTES RELATIVE PERCENT: 28 % (ref 24–43)
MCH RBC QN AUTO: 31.7 PG (ref 25.2–33.5)
MCHC RBC AUTO-ENTMCNC: 32.5 G/DL (ref 28.4–34.8)
MCV RBC AUTO: 97.5 FL (ref 82.6–102.9)
MONOCYTES NFR BLD: 0.62 K/UL (ref 0.1–1.2)
MONOCYTES NFR BLD: 12 % (ref 3–12)
NEUTROPHILS NFR BLD: 53 % (ref 36–65)
NEUTS SEG NFR BLD: 2.78 K/UL (ref 1.5–8.1)
NRBC BLD-RTO: 0 PER 100 WBC
PLATELET # BLD AUTO: 290 K/UL (ref 138–453)
PMV BLD AUTO: 9.9 FL (ref 8.1–13.5)
POTASSIUM SERPL-SCNC: 4 MMOL/L (ref 3.7–5.3)
PROT SERPL-MCNC: 7 G/DL (ref 6.4–8.3)
RBC # BLD AUTO: 3.22 M/UL (ref 3.95–5.11)
RBC # BLD: ABNORMAL 10*6/UL
SODIUM SERPL-SCNC: 139 MMOL/L (ref 135–144)
WBC OTHER # BLD: 5.3 K/UL (ref 3.5–11.3)

## 2023-11-28 PROCEDURE — 85025 COMPLETE CBC W/AUTO DIFF WBC: CPT

## 2023-11-28 PROCEDURE — P9603 ONE-WAY ALLOW PRORATED MILES: HCPCS

## 2023-11-28 PROCEDURE — 36415 COLL VENOUS BLD VENIPUNCTURE: CPT

## 2023-11-28 PROCEDURE — 80053 COMPREHEN METABOLIC PANEL: CPT

## 2023-12-01 ENCOUNTER — HOSPITAL ENCOUNTER (OUTPATIENT)
Age: 73
Setting detail: SPECIMEN
Discharge: HOME | End: 2023-12-01

## 2023-12-01 LAB
ALBUMIN SERPL-MCNC: 2.7 G/DL (ref 3.5–5.2)
ALP SERPL-CCNC: 125 U/L (ref 35–104)
ALT SERPL-CCNC: 7 U/L (ref 5–33)
ANION GAP SERPL CALCULATED.3IONS-SCNC: 9 MMOL/L (ref 9–17)
AST SERPL-CCNC: 21 U/L
BASOPHILS # BLD: 0.07 K/UL (ref 0–0.2)
BASOPHILS NFR BLD: 1 % (ref 0–2)
BILIRUB SERPL-MCNC: 0.5 MG/DL (ref 0.3–1.2)
BUN SERPL-MCNC: 13 MG/DL (ref 8–23)
BUN/CREAT SERPL: 7 (ref 9–20)
CALCIUM SERPL-MCNC: 8.8 MG/DL (ref 8.6–10.4)
CHLORIDE SERPL-SCNC: 100 MMOL/L (ref 98–107)
CO2 SERPL-SCNC: 26 MMOL/L (ref 20–31)
CREAT SERPL-MCNC: 1.9 MG/DL (ref 0.5–0.9)
EOSINOPHIL # BLD: 0.28 K/UL (ref 0–0.44)
EOSINOPHILS RELATIVE PERCENT: 5 % (ref 1–4)
ERYTHROCYTE [DISTWIDTH] IN BLOOD BY AUTOMATED COUNT: 15.5 % (ref 11.8–14.4)
GFR SERPL CREATININE-BSD FRML MDRD: 27 ML/MIN/1.73M2
GLUCOSE SERPL-MCNC: 134 MG/DL (ref 70–99)
HCT VFR BLD AUTO: 27.5 % (ref 36.3–47.1)
HGB BLD-MCNC: 9.1 G/DL (ref 11.9–15.1)
IMM GRANULOCYTES # BLD AUTO: 0.02 K/UL (ref 0–0.3)
IMM GRANULOCYTES NFR BLD: 0 %
LYMPHOCYTES NFR BLD: 1.61 K/UL (ref 1.1–3.7)
LYMPHOCYTES RELATIVE PERCENT: 31 % (ref 24–43)
MCH RBC QN AUTO: 31.8 PG (ref 25.2–33.5)
MCHC RBC AUTO-ENTMCNC: 33.1 G/DL (ref 28.4–34.8)
MCV RBC AUTO: 96.2 FL (ref 82.6–102.9)
MONOCYTES NFR BLD: 0.69 K/UL (ref 0.1–1.2)
MONOCYTES NFR BLD: 13 % (ref 3–12)
NEUTROPHILS NFR BLD: 50 % (ref 36–65)
NEUTS SEG NFR BLD: 2.54 K/UL (ref 1.5–8.1)
NRBC BLD-RTO: 0 PER 100 WBC
PLATELET # BLD AUTO: 242 K/UL (ref 138–453)
PMV BLD AUTO: 9.7 FL (ref 8.1–13.5)
POTASSIUM SERPL-SCNC: 4.1 MMOL/L (ref 3.7–5.3)
PROT SERPL-MCNC: 6.4 G/DL (ref 6.4–8.3)
RBC # BLD AUTO: 2.86 M/UL (ref 3.95–5.11)
RBC # BLD: ABNORMAL 10*6/UL
SODIUM SERPL-SCNC: 135 MMOL/L (ref 135–144)
WBC OTHER # BLD: 5.2 K/UL (ref 3.5–11.3)

## 2023-12-01 PROCEDURE — P9603 ONE-WAY ALLOW PRORATED MILES: HCPCS

## 2023-12-01 PROCEDURE — 80053 COMPREHEN METABOLIC PANEL: CPT

## 2023-12-01 PROCEDURE — 85025 COMPLETE CBC W/AUTO DIFF WBC: CPT

## 2023-12-01 PROCEDURE — 36415 COLL VENOUS BLD VENIPUNCTURE: CPT

## 2023-12-05 ENCOUNTER — HOSPITAL ENCOUNTER (OUTPATIENT)
Age: 73
Setting detail: SPECIMEN
Discharge: HOME | End: 2023-12-05

## 2023-12-05 LAB
ALBUMIN SERPL-MCNC: 2.8 G/DL (ref 3.5–5.2)
ALP SERPL-CCNC: 118 U/L (ref 35–104)
ALT SERPL-CCNC: <5 U/L (ref 5–33)
ANION GAP SERPL CALCULATED.3IONS-SCNC: 11 MMOL/L (ref 9–17)
AST SERPL-CCNC: 20 U/L
BASOPHILS # BLD: 0.06 K/UL (ref 0–0.2)
BASOPHILS NFR BLD: 1 % (ref 0–2)
BILIRUB SERPL-MCNC: 0.5 MG/DL (ref 0.3–1.2)
BUN SERPL-MCNC: 17 MG/DL (ref 8–23)
BUN/CREAT SERPL: 9 (ref 9–20)
CALCIUM SERPL-MCNC: 9 MG/DL (ref 8.6–10.4)
CHLORIDE SERPL-SCNC: 100 MMOL/L (ref 98–107)
CO2 SERPL-SCNC: 26 MMOL/L (ref 20–31)
CREAT SERPL-MCNC: 2 MG/DL (ref 0.5–0.9)
EOSINOPHIL # BLD: 0.22 K/UL (ref 0–0.44)
EOSINOPHILS RELATIVE PERCENT: 5 % (ref 1–4)
ERYTHROCYTE [DISTWIDTH] IN BLOOD BY AUTOMATED COUNT: 15.3 % (ref 11.8–14.4)
GFR SERPL CREATININE-BSD FRML MDRD: 26 ML/MIN/1.73M2
GLUCOSE SERPL-MCNC: 98 MG/DL (ref 70–99)
HCT VFR BLD AUTO: 29.6 % (ref 36.3–47.1)
HGB BLD-MCNC: 9.4 G/DL (ref 11.9–15.1)
IMM GRANULOCYTES # BLD AUTO: 0.01 K/UL (ref 0–0.3)
IMM GRANULOCYTES NFR BLD: 0 %
LYMPHOCYTES NFR BLD: 1.5 K/UL (ref 1.1–3.7)
LYMPHOCYTES RELATIVE PERCENT: 31 % (ref 24–43)
MCH RBC QN AUTO: 31.3 PG (ref 25.2–33.5)
MCHC RBC AUTO-ENTMCNC: 31.8 G/DL (ref 28.4–34.8)
MCV RBC AUTO: 98.7 FL (ref 82.6–102.9)
MONOCYTES NFR BLD: 0.7 K/UL (ref 0.1–1.2)
MONOCYTES NFR BLD: 14 % (ref 3–12)
NEUTROPHILS NFR BLD: 49 % (ref 36–65)
NEUTS SEG NFR BLD: 2.38 K/UL (ref 1.5–8.1)
NRBC BLD-RTO: 0 PER 100 WBC
PLATELET # BLD AUTO: 246 K/UL (ref 138–453)
PMV BLD AUTO: 9.9 FL (ref 8.1–13.5)
POTASSIUM SERPL-SCNC: 4 MMOL/L (ref 3.7–5.3)
PROT SERPL-MCNC: 6.6 G/DL (ref 6.4–8.3)
RBC # BLD AUTO: 3 M/UL (ref 3.95–5.11)
RBC # BLD: ABNORMAL 10*6/UL
SODIUM SERPL-SCNC: 137 MMOL/L (ref 135–144)
WBC OTHER # BLD: 4.9 K/UL (ref 3.5–11.3)

## 2023-12-05 PROCEDURE — 80053 COMPREHEN METABOLIC PANEL: CPT

## 2023-12-05 PROCEDURE — 36415 COLL VENOUS BLD VENIPUNCTURE: CPT

## 2023-12-05 PROCEDURE — P9603 ONE-WAY ALLOW PRORATED MILES: HCPCS

## 2023-12-05 PROCEDURE — 85025 COMPLETE CBC W/AUTO DIFF WBC: CPT

## 2023-12-27 ENCOUNTER — HOSPITAL ENCOUNTER (OUTPATIENT)
Facility: CLINIC | Age: 73
Setting detail: SPECIMEN
Discharge: HOME | End: 2023-12-27
Payer: MEDICARE

## 2023-12-27 LAB
BILIRUB UR QL STRIP: NEGATIVE
CLARITY UR: CLEAR
COLOR UR: YELLOW
COMMENT: NORMAL
GLUCOSE UR STRIP-MCNC: NEGATIVE MG/DL
HGB UR QL STRIP.AUTO: NEGATIVE
KETONES UR STRIP-MCNC: NEGATIVE MG/DL
LEUKOCYTE ESTERASE UR QL STRIP: NEGATIVE
NITRITE UR QL STRIP: NEGATIVE
PH UR STRIP: 6.5 [PH] (ref 5–8)
PROT UR STRIP-MCNC: NEGATIVE MG/DL
SP GR UR STRIP: 1.01 (ref 1–1.03)
UROBILINOGEN UR STRIP-ACNC: NORMAL EU/DL (ref 0–1)

## 2023-12-27 PROCEDURE — 81003 URINALYSIS AUTO W/O SCOPE: CPT

## 2023-12-27 PROCEDURE — 87186 SC STD MICRODIL/AGAR DIL: CPT

## 2023-12-27 PROCEDURE — 87077 CULTURE AEROBIC IDENTIFY: CPT

## 2023-12-27 PROCEDURE — 87086 URINE CULTURE/COLONY COUNT: CPT

## 2023-12-30 LAB
MICROORGANISM SPEC CULT: ABNORMAL
MICROORGANISM SPEC CULT: ABNORMAL
SPECIMEN DESCRIPTION: ABNORMAL

## 2024-02-01 ENCOUNTER — HOSPITAL ENCOUNTER (OUTPATIENT)
Age: 74
Setting detail: SPECIMEN
Discharge: HOME | End: 2024-02-01

## 2024-02-01 LAB
ALBUMIN SERPL-MCNC: 3.2 G/DL (ref 3.5–5.2)
ALP SERPL-CCNC: 113 U/L (ref 35–104)
ALT SERPL-CCNC: 6 U/L (ref 5–33)
ANION GAP SERPL CALCULATED.3IONS-SCNC: 10 MMOL/L (ref 9–17)
AST SERPL-CCNC: 14 U/L
BILIRUB SERPL-MCNC: 0.6 MG/DL (ref 0.3–1.2)
BUN SERPL-MCNC: 35 MG/DL (ref 8–23)
BUN/CREAT SERPL: 18 (ref 9–20)
CALCIUM SERPL-MCNC: 9.2 MG/DL (ref 8.6–10.4)
CHLORIDE SERPL-SCNC: 96 MMOL/L (ref 98–107)
CO2 SERPL-SCNC: 31 MMOL/L (ref 20–31)
CREAT SERPL-MCNC: 2 MG/DL (ref 0.5–0.9)
ERYTHROCYTE [DISTWIDTH] IN BLOOD BY AUTOMATED COUNT: 15.5 % (ref 11.8–14.4)
GFR SERPL CREATININE-BSD FRML MDRD: 26 ML/MIN/1.73M2
GLUCOSE SERPL-MCNC: 87 MG/DL (ref 70–99)
HCT VFR BLD AUTO: 33.2 % (ref 36.3–47.1)
HGB BLD-MCNC: 10.7 G/DL (ref 11.9–15.1)
MAGNESIUM SERPL-MCNC: 2.3 MG/DL (ref 1.6–2.6)
MCH RBC QN AUTO: 30.6 PG (ref 25.2–33.5)
MCHC RBC AUTO-ENTMCNC: 32.2 G/DL (ref 28.4–34.8)
MCV RBC AUTO: 94.9 FL (ref 82.6–102.9)
NRBC BLD-RTO: 0 PER 100 WBC
PHOSPHATE SERPL-MCNC: 3.1 MG/DL (ref 2.6–4.5)
PLATELET # BLD AUTO: 230 K/UL (ref 138–453)
PMV BLD AUTO: 10 FL (ref 8.1–13.5)
POTASSIUM SERPL-SCNC: 4.1 MMOL/L (ref 3.7–5.3)
PROT SERPL-MCNC: 7.2 G/DL (ref 6.4–8.3)
RBC # BLD AUTO: 3.5 M/UL (ref 3.95–5.11)
SODIUM SERPL-SCNC: 137 MMOL/L (ref 135–144)
WBC OTHER # BLD: 7.6 K/UL (ref 3.5–11.3)

## 2024-02-01 PROCEDURE — P9603 ONE-WAY ALLOW PRORATED MILES: HCPCS

## 2024-02-01 PROCEDURE — 83735 ASSAY OF MAGNESIUM: CPT

## 2024-02-01 PROCEDURE — 84100 ASSAY OF PHOSPHORUS: CPT

## 2024-02-01 PROCEDURE — 85027 COMPLETE CBC AUTOMATED: CPT

## 2024-02-01 PROCEDURE — 36415 COLL VENOUS BLD VENIPUNCTURE: CPT

## 2024-02-01 PROCEDURE — 80053 COMPREHEN METABOLIC PANEL: CPT

## 2024-02-02 ENCOUNTER — HOSPITAL ENCOUNTER (OUTPATIENT)
Age: 74
Setting detail: SPECIMEN
Discharge: HOME | End: 2024-02-02

## 2024-02-02 LAB
ALBUMIN SERPL-MCNC: 3 G/DL (ref 3.5–5.2)
ALP SERPL-CCNC: 114 U/L (ref 35–104)
ALT SERPL-CCNC: 6 U/L (ref 5–33)
ANION GAP SERPL CALCULATED.3IONS-SCNC: 14 MMOL/L (ref 9–17)
AST SERPL-CCNC: 19 U/L
BILIRUB SERPL-MCNC: 0.7 MG/DL (ref 0.3–1.2)
BUN SERPL-MCNC: 37 MG/DL (ref 8–23)
BUN/CREAT SERPL: 18 (ref 9–20)
CALCIUM SERPL-MCNC: 9.3 MG/DL (ref 8.6–10.4)
CHLORIDE SERPL-SCNC: 96 MMOL/L (ref 98–107)
CO2 SERPL-SCNC: 25 MMOL/L (ref 20–31)
CREAT SERPL-MCNC: 2.1 MG/DL (ref 0.5–0.9)
ERYTHROCYTE [DISTWIDTH] IN BLOOD BY AUTOMATED COUNT: 15.8 % (ref 11.8–14.4)
GFR SERPL CREATININE-BSD FRML MDRD: 24 ML/MIN/1.73M2
GLUCOSE SERPL-MCNC: 86 MG/DL (ref 70–99)
HCT VFR BLD AUTO: 34.8 % (ref 36.3–47.1)
HGB BLD-MCNC: 11 G/DL (ref 11.9–15.1)
MCH RBC QN AUTO: 31.2 PG (ref 25.2–33.5)
MCHC RBC AUTO-ENTMCNC: 31.6 G/DL (ref 28.4–34.8)
MCV RBC AUTO: 98.6 FL (ref 82.6–102.9)
NRBC BLD-RTO: 0 PER 100 WBC
PLATELET # BLD AUTO: 209 K/UL (ref 138–453)
PMV BLD AUTO: 10.4 FL (ref 8.1–13.5)
POTASSIUM SERPL-SCNC: 4.2 MMOL/L (ref 3.7–5.3)
PROT SERPL-MCNC: 7.3 G/DL (ref 6.4–8.3)
RBC # BLD AUTO: 3.53 M/UL (ref 3.95–5.11)
SODIUM SERPL-SCNC: 135 MMOL/L (ref 135–144)
WBC OTHER # BLD: 8.3 K/UL (ref 3.5–11.3)

## 2024-02-02 PROCEDURE — 36415 COLL VENOUS BLD VENIPUNCTURE: CPT

## 2024-02-02 PROCEDURE — 80053 COMPREHEN METABOLIC PANEL: CPT

## 2024-02-02 PROCEDURE — P9603 ONE-WAY ALLOW PRORATED MILES: HCPCS

## 2024-02-02 PROCEDURE — 85027 COMPLETE CBC AUTOMATED: CPT

## 2024-02-06 ENCOUNTER — HOSPITAL ENCOUNTER (OUTPATIENT)
Age: 74
Setting detail: SPECIMEN
Discharge: HOME | End: 2024-02-06

## 2024-02-06 LAB
ALBUMIN SERPL-MCNC: 3.2 G/DL (ref 3.5–5.2)
ALP SERPL-CCNC: 103 U/L (ref 35–104)
ALT SERPL-CCNC: <5 U/L (ref 5–33)
ANION GAP SERPL CALCULATED.3IONS-SCNC: 12 MMOL/L (ref 9–17)
AST SERPL-CCNC: 14 U/L
BILIRUB SERPL-MCNC: 0.5 MG/DL (ref 0.3–1.2)
BUN SERPL-MCNC: 40 MG/DL (ref 8–23)
BUN/CREAT SERPL: 19 (ref 9–20)
CALCIUM SERPL-MCNC: 9.4 MG/DL (ref 8.6–10.4)
CHLORIDE SERPL-SCNC: 98 MMOL/L (ref 98–107)
CO2 SERPL-SCNC: 26 MMOL/L (ref 20–31)
CREAT SERPL-MCNC: 2.1 MG/DL (ref 0.5–0.9)
ERYTHROCYTE [DISTWIDTH] IN BLOOD BY AUTOMATED COUNT: 16.1 % (ref 11.8–14.4)
GFR SERPL CREATININE-BSD FRML MDRD: 24 ML/MIN/1.73M2
GLUCOSE SERPL-MCNC: 98 MG/DL (ref 70–99)
HCT VFR BLD AUTO: 32.5 % (ref 36.3–47.1)
HGB BLD-MCNC: 10.5 G/DL (ref 11.9–15.1)
MCH RBC QN AUTO: 31.7 PG (ref 25.2–33.5)
MCHC RBC AUTO-ENTMCNC: 32.3 G/DL (ref 28.4–34.8)
MCV RBC AUTO: 98.2 FL (ref 82.6–102.9)
NRBC BLD-RTO: 0 PER 100 WBC
PLATELET # BLD AUTO: 241 K/UL (ref 138–453)
PMV BLD AUTO: 10.2 FL (ref 8.1–13.5)
POTASSIUM SERPL-SCNC: 3.9 MMOL/L (ref 3.7–5.3)
PROT SERPL-MCNC: 7.1 G/DL (ref 6.4–8.3)
RBC # BLD AUTO: 3.31 M/UL (ref 3.95–5.11)
SODIUM SERPL-SCNC: 136 MMOL/L (ref 135–144)
WBC OTHER # BLD: 6.5 K/UL (ref 3.5–11.3)

## 2024-02-06 PROCEDURE — 85027 COMPLETE CBC AUTOMATED: CPT

## 2024-02-06 PROCEDURE — 36415 COLL VENOUS BLD VENIPUNCTURE: CPT

## 2024-02-06 PROCEDURE — P9603 ONE-WAY ALLOW PRORATED MILES: HCPCS

## 2024-02-06 PROCEDURE — 80053 COMPREHEN METABOLIC PANEL: CPT

## 2024-02-09 ENCOUNTER — HOSPITAL ENCOUNTER (OUTPATIENT)
Age: 74
Setting detail: SPECIMEN
Discharge: HOME | End: 2024-02-09

## 2024-02-09 LAB
ALBUMIN SERPL-MCNC: 3.3 G/DL (ref 3.5–5.2)
ALP SERPL-CCNC: 105 U/L (ref 35–104)
ALT SERPL-CCNC: 6 U/L (ref 5–33)
ANION GAP SERPL CALCULATED.3IONS-SCNC: 14 MMOL/L (ref 9–17)
AST SERPL-CCNC: 18 U/L
BILIRUB SERPL-MCNC: 0.7 MG/DL (ref 0.3–1.2)
BUN SERPL-MCNC: 34 MG/DL (ref 8–23)
BUN/CREAT SERPL: 15 (ref 9–20)
CALCIUM SERPL-MCNC: 9.6 MG/DL (ref 8.6–10.4)
CHLORIDE SERPL-SCNC: 97 MMOL/L (ref 98–107)
CO2 SERPL-SCNC: 25 MMOL/L (ref 20–31)
CREAT SERPL-MCNC: 2.2 MG/DL (ref 0.5–0.9)
ERYTHROCYTE [DISTWIDTH] IN BLOOD BY AUTOMATED COUNT: 15.9 % (ref 11.8–14.4)
GFR SERPL CREATININE-BSD FRML MDRD: 23 ML/MIN/1.73M2
GLUCOSE SERPL-MCNC: 105 MG/DL (ref 70–99)
HCT VFR BLD AUTO: 34.4 % (ref 36.3–47.1)
HGB BLD-MCNC: 11.1 G/DL (ref 11.9–15.1)
MCH RBC QN AUTO: 31.4 PG (ref 25.2–33.5)
MCHC RBC AUTO-ENTMCNC: 32.3 G/DL (ref 28.4–34.8)
MCV RBC AUTO: 97.2 FL (ref 82.6–102.9)
NRBC BLD-RTO: 0 PER 100 WBC
PLATELET # BLD AUTO: 258 K/UL (ref 138–453)
PMV BLD AUTO: 10.5 FL (ref 8.1–13.5)
POTASSIUM SERPL-SCNC: 4.2 MMOL/L (ref 3.7–5.3)
PROT SERPL-MCNC: 7.5 G/DL (ref 6.4–8.3)
RBC # BLD AUTO: 3.54 M/UL (ref 3.95–5.11)
SODIUM SERPL-SCNC: 136 MMOL/L (ref 135–144)
WBC OTHER # BLD: 6 K/UL (ref 3.5–11.3)

## 2024-02-09 PROCEDURE — P9603 ONE-WAY ALLOW PRORATED MILES: HCPCS

## 2024-02-09 PROCEDURE — 80053 COMPREHEN METABOLIC PANEL: CPT

## 2024-02-09 PROCEDURE — 36415 COLL VENOUS BLD VENIPUNCTURE: CPT

## 2024-02-09 PROCEDURE — 85027 COMPLETE CBC AUTOMATED: CPT

## 2024-02-13 ENCOUNTER — HOSPITAL ENCOUNTER (OUTPATIENT)
Age: 74
Setting detail: SPECIMEN
Discharge: HOME | End: 2024-02-13

## 2024-02-13 LAB
ALBUMIN SERPL-MCNC: 3.2 G/DL (ref 3.5–5.2)
ALP SERPL-CCNC: 97 U/L (ref 35–104)
ALT SERPL-CCNC: <5 U/L (ref 5–33)
ANION GAP SERPL CALCULATED.3IONS-SCNC: 15 MMOL/L (ref 9–17)
AST SERPL-CCNC: 11 U/L
BILIRUB SERPL-MCNC: 0.7 MG/DL (ref 0.3–1.2)
BUN SERPL-MCNC: 33 MG/DL (ref 8–23)
BUN/CREAT SERPL: 15 (ref 9–20)
CALCIUM SERPL-MCNC: 9.7 MG/DL (ref 8.6–10.4)
CHLORIDE SERPL-SCNC: 98 MMOL/L (ref 98–107)
CO2 SERPL-SCNC: 24 MMOL/L (ref 20–31)
CREAT SERPL-MCNC: 2.2 MG/DL (ref 0.5–0.9)
ERYTHROCYTE [DISTWIDTH] IN BLOOD BY AUTOMATED COUNT: 15.7 % (ref 11.8–14.4)
GFR SERPL CREATININE-BSD FRML MDRD: 23 ML/MIN/1.73M2
GLUCOSE SERPL-MCNC: 141 MG/DL (ref 70–99)
HCT VFR BLD AUTO: 34.6 % (ref 36.3–47.1)
HGB BLD-MCNC: 11.1 G/DL (ref 11.9–15.1)
MCH RBC QN AUTO: 31.5 PG (ref 25.2–33.5)
MCHC RBC AUTO-ENTMCNC: 32.1 G/DL (ref 28.4–34.8)
MCV RBC AUTO: 98.3 FL (ref 82.6–102.9)
PLATELET # BLD AUTO: 280 K/UL (ref 138–453)
PMV BLD AUTO: 10.5 FL (ref 8.1–13.5)
POTASSIUM SERPL-SCNC: 3.8 MMOL/L (ref 3.7–5.3)
PROT SERPL-MCNC: 7.4 G/DL (ref 6.4–8.3)
RBC # BLD AUTO: 3.52 M/UL (ref 3.95–5.11)
SODIUM SERPL-SCNC: 137 MMOL/L (ref 135–144)
WBC OTHER # BLD: 5.9 K/UL (ref 3.5–11.3)

## 2024-02-13 PROCEDURE — 80053 COMPREHEN METABOLIC PANEL: CPT

## 2024-02-13 PROCEDURE — 85027 COMPLETE CBC AUTOMATED: CPT

## 2024-02-13 PROCEDURE — P9603 ONE-WAY ALLOW PRORATED MILES: HCPCS

## 2024-02-13 PROCEDURE — 36415 COLL VENOUS BLD VENIPUNCTURE: CPT

## 2024-02-16 ENCOUNTER — HOSPITAL ENCOUNTER (OUTPATIENT)
Age: 74
Setting detail: SPECIMEN
Discharge: HOME | End: 2024-02-16

## 2024-02-16 LAB
ALBUMIN SERPL-MCNC: 3.1 G/DL (ref 3.5–5.2)
ALP SERPL-CCNC: 89 U/L (ref 35–104)
ALT SERPL-CCNC: 5 U/L (ref 5–33)
ANION GAP SERPL CALCULATED.3IONS-SCNC: 12 MMOL/L (ref 9–17)
AST SERPL-CCNC: 14 U/L
BILIRUB SERPL-MCNC: 0.6 MG/DL (ref 0.3–1.2)
BUN SERPL-MCNC: 33 MG/DL (ref 8–23)
BUN/CREAT SERPL: 16 (ref 9–20)
CALCIUM SERPL-MCNC: 9.5 MG/DL (ref 8.6–10.4)
CHLORIDE SERPL-SCNC: 101 MMOL/L (ref 98–107)
CO2 SERPL-SCNC: 25 MMOL/L (ref 20–31)
CREAT SERPL-MCNC: 2.1 MG/DL (ref 0.5–0.9)
ERYTHROCYTE [DISTWIDTH] IN BLOOD BY AUTOMATED COUNT: 15.9 % (ref 11.8–14.4)
GFR SERPL CREATININE-BSD FRML MDRD: 24 ML/MIN/1.73M2
GLUCOSE SERPL-MCNC: 94 MG/DL (ref 70–99)
HCT VFR BLD AUTO: 32.1 % (ref 36.3–47.1)
HGB BLD-MCNC: 10.3 G/DL (ref 11.9–15.1)
MCH RBC QN AUTO: 31.6 PG (ref 25.2–33.5)
MCHC RBC AUTO-ENTMCNC: 32.1 G/DL (ref 28.4–34.8)
MCV RBC AUTO: 98.5 FL (ref 82.6–102.9)
NRBC BLD-RTO: 0 PER 100 WBC
PLATELET # BLD AUTO: 274 K/UL (ref 138–453)
PMV BLD AUTO: 10.3 FL (ref 8.1–13.5)
POTASSIUM SERPL-SCNC: 3.9 MMOL/L (ref 3.7–5.3)
PROT SERPL-MCNC: 7.1 G/DL (ref 6.4–8.3)
RBC # BLD AUTO: 3.26 M/UL (ref 3.95–5.11)
SODIUM SERPL-SCNC: 138 MMOL/L (ref 135–144)
WBC OTHER # BLD: 5.6 K/UL (ref 3.5–11.3)

## 2024-02-16 PROCEDURE — 36415 COLL VENOUS BLD VENIPUNCTURE: CPT

## 2024-02-16 PROCEDURE — P9603 ONE-WAY ALLOW PRORATED MILES: HCPCS

## 2024-02-16 PROCEDURE — 85027 COMPLETE CBC AUTOMATED: CPT

## 2024-02-16 PROCEDURE — 80053 COMPREHEN METABOLIC PANEL: CPT

## 2024-05-18 ENCOUNTER — HOSPITAL ENCOUNTER (OUTPATIENT)
Age: 74
Setting detail: SPECIMEN
Discharge: HOME | End: 2024-05-18

## 2024-05-18 LAB
ALBUMIN SERPL-MCNC: 3.6 G/DL (ref 3.5–5.2)
ALP SERPL-CCNC: 104 U/L (ref 35–104)
ALT SERPL-CCNC: 26 U/L (ref 5–33)
ANION GAP SERPL CALCULATED.3IONS-SCNC: 16 MMOL/L (ref 9–17)
AST SERPL-CCNC: 16 U/L
BASOPHILS # BLD: 0 K/UL (ref 0–0.2)
BASOPHILS NFR BLD: 0 % (ref 0–2)
BILIRUB SERPL-MCNC: 0.5 MG/DL (ref 0.3–1.2)
BUN SERPL-MCNC: 43 MG/DL (ref 8–23)
BUN/CREAT SERPL: 24 (ref 9–20)
CALCIUM SERPL-MCNC: 9.4 MG/DL (ref 8.6–10.4)
CHLORIDE SERPL-SCNC: 97 MMOL/L (ref 98–107)
CO2 SERPL-SCNC: 23 MMOL/L (ref 20–31)
CREAT SERPL-MCNC: 1.8 MG/DL (ref 0.5–0.9)
EOSINOPHIL # BLD: 0 K/UL (ref 0–0.44)
EOSINOPHILS RELATIVE PERCENT: 0 % (ref 1–4)
ERYTHROCYTE [DISTWIDTH] IN BLOOD BY AUTOMATED COUNT: 14 % (ref 11.8–14.4)
GFR, ESTIMATED: 29 ML/MIN/1.73M2
GLUCOSE SERPL-MCNC: 129 MG/DL (ref 70–99)
HCT VFR BLD AUTO: 42.4 % (ref 36.3–47.1)
HGB BLD-MCNC: 14.1 G/DL (ref 11.9–15.1)
IMM GRANULOCYTES # BLD AUTO: 0.61 K/UL (ref 0–0.3)
IMM GRANULOCYTES NFR BLD: 2 %
LYMPHOCYTES NFR BLD: 1.83 K/UL (ref 1.1–3.7)
LYMPHOCYTES RELATIVE PERCENT: 6 % (ref 24–43)
MCH RBC QN AUTO: 32.7 PG (ref 25.2–33.5)
MCHC RBC AUTO-ENTMCNC: 33.3 G/DL (ref 28.4–34.8)
MCV RBC AUTO: 98.4 FL (ref 82.6–102.9)
MONOCYTES NFR BLD: 1.83 K/UL (ref 0.1–1.2)
MONOCYTES NFR BLD: 6 % (ref 3–12)
NEUTROPHILS NFR BLD: 86 % (ref 36–65)
NEUTS SEG NFR BLD: 26.23 K/UL (ref 1.5–8.1)
NRBC BLD-RTO: 0 PER 100 WBC
PLATELET # BLD AUTO: 500 K/UL (ref 138–453)
PMV BLD AUTO: 9.6 FL (ref 8.1–13.5)
POTASSIUM SERPL-SCNC: 3.9 MMOL/L (ref 3.7–5.3)
PROT SERPL-MCNC: 7.9 G/DL (ref 6.4–8.3)
RBC # BLD AUTO: 4.31 M/UL (ref 3.95–5.11)
SODIUM SERPL-SCNC: 136 MMOL/L (ref 135–144)
WBC OTHER # BLD: 30.5 K/UL (ref 3.5–11.3)

## 2024-05-18 PROCEDURE — P9603 ONE-WAY ALLOW PRORATED MILES: HCPCS

## 2024-05-18 PROCEDURE — 36415 COLL VENOUS BLD VENIPUNCTURE: CPT

## 2024-05-18 PROCEDURE — 80053 COMPREHEN METABOLIC PANEL: CPT

## 2024-05-18 PROCEDURE — 85025 COMPLETE CBC W/AUTO DIFF WBC: CPT

## 2024-05-21 ENCOUNTER — HOSPITAL ENCOUNTER (OUTPATIENT)
Age: 74
Setting detail: SPECIMEN
Discharge: HOME | End: 2024-05-21

## 2024-05-22 ENCOUNTER — HOSPITAL ENCOUNTER (OUTPATIENT)
Age: 74
Setting detail: SPECIMEN
Discharge: HOME | End: 2024-05-22

## 2024-05-22 LAB
ALBUMIN SERPL-MCNC: 3.2 G/DL (ref 3.5–5.2)
ALP SERPL-CCNC: 104 U/L (ref 35–104)
ALT SERPL-CCNC: 21 U/L (ref 5–33)
ANION GAP SERPL CALCULATED.3IONS-SCNC: 14 MMOL/L (ref 9–17)
AST SERPL-CCNC: 16 U/L
BASOPHILS # BLD: 0 K/UL (ref 0–0.2)
BASOPHILS NFR BLD: 0 %
BILIRUB SERPL-MCNC: 0.6 MG/DL (ref 0.3–1.2)
BUN SERPL-MCNC: 50 MG/DL (ref 8–23)
BUN/CREAT SERPL: 25 (ref 9–20)
CALCIUM SERPL-MCNC: 9.2 MG/DL (ref 8.6–10.4)
CHLORIDE SERPL-SCNC: 99 MMOL/L (ref 98–107)
CO2 SERPL-SCNC: 21 MMOL/L (ref 20–31)
CREAT SERPL-MCNC: 2 MG/DL (ref 0.5–0.9)
EOSINOPHIL # BLD: 0 K/UL (ref 0–0.4)
EOSINOPHILS RELATIVE PERCENT: 0 % (ref 1–4)
ERYTHROCYTE [DISTWIDTH] IN BLOOD BY AUTOMATED COUNT: 14.8 % (ref 11.8–14.4)
GFR, ESTIMATED: 26 ML/MIN/1.73M2
GLUCOSE SERPL-MCNC: 169 MG/DL (ref 70–99)
HCT VFR BLD AUTO: 37.1 % (ref 36.3–47.1)
HGB BLD-MCNC: 11.5 G/DL (ref 11.9–15.1)
IMM GRANULOCYTES # BLD AUTO: 0.16 K/UL (ref 0–0.3)
IMM GRANULOCYTES NFR BLD: 1 %
LYMPHOCYTES NFR BLD: 0.48 K/UL (ref 1–4.8)
LYMPHOCYTES RELATIVE PERCENT: 3 % (ref 24–44)
MCH RBC QN AUTO: 33 PG (ref 25.2–33.5)
MCHC RBC AUTO-ENTMCNC: 31 G/DL (ref 28.4–34.8)
MCV RBC AUTO: 106.6 FL (ref 82.6–102.9)
MONOCYTES NFR BLD: 0.81 K/UL (ref 0.2–0.8)
MONOCYTES NFR BLD: 5 % (ref 1–7)
NEUTROPHILS NFR BLD: 91 % (ref 36–66)
NEUTS SEG NFR BLD: 14.65 K/UL (ref 1.8–7.7)
NRBC BLD-RTO: 0 PER 100 WBC
PLATELET # BLD AUTO: 242 K/UL (ref 138–453)
PMV BLD AUTO: 9.9 FL (ref 8.1–13.5)
POTASSIUM SERPL-SCNC: 4.5 MMOL/L (ref 3.7–5.3)
PROT SERPL-MCNC: 6.6 G/DL (ref 6.4–8.3)
RBC # BLD AUTO: 3.48 M/UL (ref 3.95–5.11)
SODIUM SERPL-SCNC: 134 MMOL/L (ref 135–144)
WBC OTHER # BLD: 16.1 K/UL (ref 3.5–11.3)

## 2024-05-22 PROCEDURE — 80053 COMPREHEN METABOLIC PANEL: CPT

## 2024-05-22 PROCEDURE — 36415 COLL VENOUS BLD VENIPUNCTURE: CPT

## 2024-05-22 PROCEDURE — P9603 ONE-WAY ALLOW PRORATED MILES: HCPCS

## 2024-05-22 PROCEDURE — 85025 COMPLETE CBC W/AUTO DIFF WBC: CPT

## 2024-05-24 ENCOUNTER — HOSPITAL ENCOUNTER (OUTPATIENT)
Age: 74
Setting detail: SPECIMEN
Discharge: HOME | End: 2024-05-24

## 2024-05-24 LAB
ANION GAP SERPL CALCULATED.3IONS-SCNC: 15 MMOL/L (ref 9–17)
BASOPHILS # BLD: <0.03 K/UL (ref 0–0.2)
BASOPHILS NFR BLD: 0 % (ref 0–2)
BUN SERPL-MCNC: 43 MG/DL (ref 8–23)
BUN/CREAT SERPL: 23 (ref 9–20)
CALCIUM SERPL-MCNC: 9.5 MG/DL (ref 8.6–10.4)
CHLORIDE SERPL-SCNC: 98 MMOL/L (ref 98–107)
CO2 SERPL-SCNC: 22 MMOL/L (ref 20–31)
CREAT SERPL-MCNC: 1.9 MG/DL (ref 0.5–0.9)
EOSINOPHIL # BLD: 0.05 K/UL (ref 0–0.44)
EOSINOPHILS RELATIVE PERCENT: 0 % (ref 1–4)
ERYTHROCYTE [DISTWIDTH] IN BLOOD BY AUTOMATED COUNT: 14.7 % (ref 11.8–14.4)
GFR, ESTIMATED: 27 ML/MIN/1.73M2
GLUCOSE SERPL-MCNC: 102 MG/DL (ref 70–99)
HCT VFR BLD AUTO: 35 % (ref 36.3–47.1)
HGB BLD-MCNC: 11.4 G/DL (ref 11.9–15.1)
IMM GRANULOCYTES # BLD AUTO: 0.1 K/UL (ref 0–0.3)
IMM GRANULOCYTES NFR BLD: 1 %
LYMPHOCYTES NFR BLD: 0.92 K/UL (ref 1.1–3.7)
LYMPHOCYTES RELATIVE PERCENT: 7 % (ref 24–43)
MCH RBC QN AUTO: 32.6 PG (ref 25.2–33.5)
MCHC RBC AUTO-ENTMCNC: 32.6 G/DL (ref 28.4–34.8)
MCV RBC AUTO: 100 FL (ref 82.6–102.9)
MONOCYTES NFR BLD: 0.88 K/UL (ref 0.1–1.2)
MONOCYTES NFR BLD: 7 % (ref 3–12)
NEUTROPHILS NFR BLD: 85 % (ref 36–65)
NEUTS SEG NFR BLD: 11.13 K/UL (ref 1.5–8.1)
NRBC BLD-RTO: 0 PER 100 WBC
PLATELET # BLD AUTO: 245 K/UL (ref 138–453)
PMV BLD AUTO: 9.8 FL (ref 8.1–13.5)
POTASSIUM SERPL-SCNC: 4.5 MMOL/L (ref 3.7–5.3)
RBC # BLD AUTO: 3.5 M/UL (ref 3.95–5.11)
RBC # BLD: ABNORMAL 10*6/UL
SODIUM SERPL-SCNC: 135 MMOL/L (ref 135–144)
WBC OTHER # BLD: 13.1 K/UL (ref 3.5–11.3)

## 2024-05-24 PROCEDURE — 36415 COLL VENOUS BLD VENIPUNCTURE: CPT

## 2024-05-24 PROCEDURE — 80048 BASIC METABOLIC PNL TOTAL CA: CPT

## 2024-05-24 PROCEDURE — 85025 COMPLETE CBC W/AUTO DIFF WBC: CPT

## 2024-05-28 ENCOUNTER — HOSPITAL ENCOUNTER (OUTPATIENT)
Age: 74
Setting detail: SPECIMEN
Discharge: HOME | End: 2024-05-28

## 2024-06-05 ENCOUNTER — HOSPITAL ENCOUNTER (OUTPATIENT)
Age: 74
Setting detail: SPECIMEN
Discharge: HOME | End: 2024-06-05

## 2024-06-05 LAB
ALBUMIN SERPL-MCNC: 2.2 G/DL (ref 3.5–5.2)
ALP SERPL-CCNC: 135 U/L (ref 35–104)
ALT SERPL-CCNC: 14 U/L (ref 5–33)
ANION GAP SERPL CALCULATED.3IONS-SCNC: 11 MMOL/L (ref 9–17)
AST SERPL-CCNC: 14 U/L
BASOPHILS # BLD: 0.04 K/UL (ref 0–0.2)
BASOPHILS NFR BLD: 1 % (ref 0–2)
BILIRUB SERPL-MCNC: 1.1 MG/DL (ref 0.3–1.2)
BUN SERPL-MCNC: 15 MG/DL (ref 8–23)
BUN/CREAT SERPL: 12 (ref 9–20)
CALCIUM SERPL-MCNC: 7.7 MG/DL (ref 8.6–10.4)
CHLORIDE SERPL-SCNC: 103 MMOL/L (ref 98–107)
CO2 SERPL-SCNC: 16 MMOL/L (ref 20–31)
CREAT SERPL-MCNC: 1.3 MG/DL (ref 0.5–0.9)
EOSINOPHIL # BLD: 0.06 K/UL (ref 0–0.44)
EOSINOPHILS RELATIVE PERCENT: 1 % (ref 1–4)
ERYTHROCYTE [DISTWIDTH] IN BLOOD BY AUTOMATED COUNT: 15.9 % (ref 11.8–14.4)
GFR, ESTIMATED: 43 ML/MIN/1.73M2
GLUCOSE SERPL-MCNC: 93 MG/DL (ref 70–99)
HCT VFR BLD AUTO: 33.4 % (ref 36.3–47.1)
HGB BLD-MCNC: 10.6 G/DL (ref 11.9–15.1)
IMM GRANULOCYTES # BLD AUTO: 0.05 K/UL (ref 0–0.3)
IMM GRANULOCYTES NFR BLD: 1 %
LYMPHOCYTES NFR BLD: 1.16 K/UL (ref 1.1–3.7)
LYMPHOCYTES RELATIVE PERCENT: 18 % (ref 24–43)
MAGNESIUM SERPL-MCNC: 2.1 MG/DL (ref 1.6–2.6)
MCH RBC QN AUTO: 32.7 PG (ref 25.2–33.5)
MCHC RBC AUTO-ENTMCNC: 31.7 G/DL (ref 28.4–34.8)
MCV RBC AUTO: 103.1 FL (ref 82.6–102.9)
MONOCYTES NFR BLD: 0.84 K/UL (ref 0.1–1.2)
MONOCYTES NFR BLD: 13 % (ref 3–12)
NEUTROPHILS NFR BLD: 66 % (ref 36–65)
NEUTS SEG NFR BLD: 4.22 K/UL (ref 1.5–8.1)
NRBC BLD-RTO: 0 PER 100 WBC
PHOSPHATE SERPL-MCNC: 2.4 MG/DL (ref 2.6–4.5)
PLATELET # BLD AUTO: 134 K/UL (ref 138–453)
PMV BLD AUTO: 10.2 FL (ref 8.1–13.5)
POTASSIUM SERPL-SCNC: 4.4 MMOL/L (ref 3.7–5.3)
PROT SERPL-MCNC: 5.2 G/DL (ref 6.4–8.3)
RBC # BLD AUTO: 3.24 M/UL (ref 3.95–5.11)
RBC # BLD: ABNORMAL 10*6/UL
RBC # BLD: ABNORMAL 10*6/UL
SODIUM SERPL-SCNC: 130 MMOL/L (ref 135–144)
WBC OTHER # BLD: 6.4 K/UL (ref 3.5–11.3)

## 2024-06-05 PROCEDURE — 36415 COLL VENOUS BLD VENIPUNCTURE: CPT

## 2024-06-05 PROCEDURE — P9603 ONE-WAY ALLOW PRORATED MILES: HCPCS

## 2024-06-05 PROCEDURE — 80053 COMPREHEN METABOLIC PANEL: CPT

## 2024-06-05 PROCEDURE — 85025 COMPLETE CBC W/AUTO DIFF WBC: CPT

## 2024-06-05 PROCEDURE — 83735 ASSAY OF MAGNESIUM: CPT

## 2024-06-05 PROCEDURE — 84100 ASSAY OF PHOSPHORUS: CPT

## 2024-06-07 ENCOUNTER — HOSPITAL ENCOUNTER (OUTPATIENT)
Age: 74
Setting detail: SPECIMEN
Discharge: HOME | End: 2024-06-07

## 2024-06-07 LAB
ALBUMIN SERPL-MCNC: 2.6 G/DL (ref 3.5–5.2)
ALP SERPL-CCNC: 158 U/L (ref 35–104)
ALT SERPL-CCNC: 14 U/L (ref 5–33)
ANION GAP SERPL CALCULATED.3IONS-SCNC: 10 MMOL/L (ref 9–17)
AST SERPL-CCNC: 14 U/L
BASOPHILS # BLD: 0.05 K/UL (ref 0–0.2)
BASOPHILS NFR BLD: 1 %
BILIRUB SERPL-MCNC: 1 MG/DL (ref 0.3–1.2)
BUN SERPL-MCNC: 18 MG/DL (ref 8–23)
BUN/CREAT SERPL: 11 (ref 9–20)
CALCIUM SERPL-MCNC: 8.7 MG/DL (ref 8.6–10.4)
CHLORIDE SERPL-SCNC: 104 MMOL/L (ref 98–107)
CO2 SERPL-SCNC: 20 MMOL/L (ref 20–31)
CREAT SERPL-MCNC: 1.6 MG/DL (ref 0.5–0.9)
EOSINOPHIL # BLD: 0.05 K/UL (ref 0–0.4)
EOSINOPHILS RELATIVE PERCENT: 1 % (ref 1–4)
ERYTHROCYTE [DISTWIDTH] IN BLOOD BY AUTOMATED COUNT: 16.6 % (ref 11.8–14.4)
GFR, ESTIMATED: 33 ML/MIN/1.73M2
GLUCOSE SERPL-MCNC: 143 MG/DL (ref 70–99)
HCT VFR BLD AUTO: 37.7 % (ref 36.3–47.1)
HGB BLD-MCNC: 12 G/DL (ref 11.9–15.1)
IMM GRANULOCYTES # BLD AUTO: 0.05 K/UL (ref 0–0.3)
IMM GRANULOCYTES NFR BLD: 1 %
LYMPHOCYTES NFR BLD: 0.66 K/UL (ref 1–4.8)
LYMPHOCYTES RELATIVE PERCENT: 13 % (ref 24–44)
MAGNESIUM SERPL-MCNC: 2.2 MG/DL (ref 1.6–2.6)
MCH RBC QN AUTO: 32.3 PG (ref 25.2–33.5)
MCHC RBC AUTO-ENTMCNC: 31.8 G/DL (ref 28.4–34.8)
MCV RBC AUTO: 101.6 FL (ref 82.6–102.9)
MONOCYTES NFR BLD: 0.46 K/UL (ref 0.2–0.8)
MONOCYTES NFR BLD: 9 % (ref 1–7)
NEUTROPHILS NFR BLD: 75 % (ref 36–66)
NEUTS SEG NFR BLD: 3.83 K/UL (ref 1.8–7.7)
NRBC BLD-RTO: 0 PER 100 WBC
PLATELET # BLD AUTO: 158 K/UL (ref 138–453)
PMV BLD AUTO: 9.7 FL (ref 8.1–13.5)
POTASSIUM SERPL-SCNC: 5 MMOL/L (ref 3.7–5.3)
PROT SERPL-MCNC: 5.9 G/DL (ref 6.4–8.3)
RBC # BLD AUTO: 3.71 M/UL (ref 3.95–5.11)
SODIUM SERPL-SCNC: 134 MMOL/L (ref 135–144)
WBC OTHER # BLD: 5.1 K/UL (ref 3.5–11.3)

## 2024-06-07 PROCEDURE — 85025 COMPLETE CBC W/AUTO DIFF WBC: CPT

## 2024-06-07 PROCEDURE — 83735 ASSAY OF MAGNESIUM: CPT

## 2024-06-07 PROCEDURE — P9603 ONE-WAY ALLOW PRORATED MILES: HCPCS

## 2024-06-07 PROCEDURE — 36415 COLL VENOUS BLD VENIPUNCTURE: CPT

## 2024-06-07 PROCEDURE — 80053 COMPREHEN METABOLIC PANEL: CPT

## 2024-06-11 ENCOUNTER — HOSPITAL ENCOUNTER (OUTPATIENT)
Age: 74
Setting detail: SPECIMEN
Discharge: HOME | End: 2024-06-11

## 2024-06-11 LAB
BASOPHILS # BLD: <0.03 K/UL (ref 0–0.2)
BASOPHILS NFR BLD: 0 % (ref 0–2)
EOSINOPHIL # BLD: 0.04 K/UL (ref 0–0.44)
EOSINOPHILS RELATIVE PERCENT: 1 % (ref 1–4)
ERYTHROCYTE [DISTWIDTH] IN BLOOD BY AUTOMATED COUNT: 16.3 % (ref 11.8–14.4)
HCT VFR BLD AUTO: 31.2 % (ref 36.3–47.1)
HGB BLD-MCNC: 10.1 G/DL (ref 11.9–15.1)
IMM GRANULOCYTES # BLD AUTO: 0.06 K/UL (ref 0–0.3)
IMM GRANULOCYTES NFR BLD: 1 %
LYMPHOCYTES NFR BLD: 1.63 K/UL (ref 1.1–3.7)
LYMPHOCYTES RELATIVE PERCENT: 30 % (ref 24–43)
MCH RBC QN AUTO: 32.2 PG (ref 25.2–33.5)
MCHC RBC AUTO-ENTMCNC: 32.4 G/DL (ref 28.4–34.8)
MCV RBC AUTO: 99.4 FL (ref 82.6–102.9)
MONOCYTES NFR BLD: 0.81 K/UL (ref 0.1–1.2)
MONOCYTES NFR BLD: 15 % (ref 3–12)
NEUTROPHILS NFR BLD: 53 % (ref 36–65)
NEUTS SEG NFR BLD: 2.93 K/UL (ref 1.5–8.1)
PLATELET # BLD AUTO: 113 K/UL (ref 138–453)
PMV BLD AUTO: 9.9 FL (ref 8.1–13.5)
RBC # BLD: ABNORMAL 10*6/UL
WBC OTHER # BLD: 5.5 K/UL (ref 3.5–11.3)

## 2024-06-11 PROCEDURE — P9603 ONE-WAY ALLOW PRORATED MILES: HCPCS

## 2024-06-11 PROCEDURE — 36415 COLL VENOUS BLD VENIPUNCTURE: CPT

## 2024-06-11 PROCEDURE — 85025 COMPLETE CBC W/AUTO DIFF WBC: CPT

## 2024-06-12 ENCOUNTER — HOSPITAL ENCOUNTER (OUTPATIENT)
Age: 74
Setting detail: SPECIMEN
Discharge: HOME | End: 2024-06-12

## 2024-06-12 LAB
ALBUMIN SERPL-MCNC: 2.2 G/DL (ref 3.5–5.2)
ALP SERPL-CCNC: 135 U/L (ref 35–104)
ALT SERPL-CCNC: 12 U/L (ref 5–33)
ANION GAP SERPL CALCULATED.3IONS-SCNC: 10 MMOL/L (ref 9–17)
AST SERPL-CCNC: 15 U/L
BILIRUB SERPL-MCNC: 0.7 MG/DL (ref 0.3–1.2)
BUN SERPL-MCNC: 17 MG/DL (ref 8–23)
BUN/CREAT SERPL: 9 (ref 9–20)
CALCIUM SERPL-MCNC: 8.6 MG/DL (ref 8.6–10.4)
CHLORIDE SERPL-SCNC: 103 MMOL/L (ref 98–107)
CO2 SERPL-SCNC: 21 MMOL/L (ref 20–31)
CREAT SERPL-MCNC: 1.8 MG/DL (ref 0.5–0.9)
ERYTHROCYTE [DISTWIDTH] IN BLOOD BY AUTOMATED COUNT: 16.7 % (ref 11.8–14.4)
GFR, ESTIMATED: 29 ML/MIN/1.73M2
GLUCOSE SERPL-MCNC: 85 MG/DL (ref 70–99)
HCT VFR BLD AUTO: 30.3 % (ref 36.3–47.1)
HGB BLD-MCNC: 9.6 G/DL (ref 11.9–15.1)
MAGNESIUM SERPL-MCNC: 1.9 MG/DL (ref 1.6–2.6)
MCH RBC QN AUTO: 32.2 PG (ref 25.2–33.5)
MCHC RBC AUTO-ENTMCNC: 31.7 G/DL (ref 28.4–34.8)
MCV RBC AUTO: 101.7 FL (ref 82.6–102.9)
NRBC BLD-RTO: 0 PER 100 WBC
PLATELET # BLD AUTO: 155 K/UL (ref 138–453)
PMV BLD AUTO: 9.6 FL (ref 8.1–13.5)
POTASSIUM SERPL-SCNC: 5.1 MMOL/L (ref 3.7–5.3)
PROT SERPL-MCNC: 5.1 G/DL (ref 6.4–8.3)
RBC # BLD AUTO: 2.98 M/UL (ref 3.95–5.11)
SODIUM SERPL-SCNC: 134 MMOL/L (ref 135–144)
WBC OTHER # BLD: 5 K/UL (ref 3.5–11.3)

## 2024-06-12 PROCEDURE — 85027 COMPLETE CBC AUTOMATED: CPT

## 2024-06-12 PROCEDURE — P9603 ONE-WAY ALLOW PRORATED MILES: HCPCS

## 2024-06-12 PROCEDURE — 80053 COMPREHEN METABOLIC PANEL: CPT

## 2024-06-12 PROCEDURE — 36415 COLL VENOUS BLD VENIPUNCTURE: CPT

## 2024-06-12 PROCEDURE — 83735 ASSAY OF MAGNESIUM: CPT

## 2024-06-14 ENCOUNTER — HOSPITAL ENCOUNTER (OUTPATIENT)
Age: 74
Setting detail: SPECIMEN
Discharge: HOME | End: 2024-06-14

## 2024-07-05 ENCOUNTER — HOSPITAL ENCOUNTER (OUTPATIENT)
Age: 74
Setting detail: SPECIMEN
Discharge: HOME | End: 2024-07-05

## 2024-07-05 LAB
ALBUMIN SERPL-MCNC: 2.8 G/DL (ref 3.5–5.2)
ALP SERPL-CCNC: 78 U/L (ref 35–104)
ALT SERPL-CCNC: <5 U/L (ref 5–33)
ANION GAP SERPL CALCULATED.3IONS-SCNC: 12 MMOL/L (ref 9–17)
AST SERPL-CCNC: 12 U/L
BILIRUB SERPL-MCNC: 0.6 MG/DL (ref 0.3–1.2)
BUN SERPL-MCNC: 23 MG/DL (ref 8–23)
BUN/CREAT SERPL: 12 (ref 9–20)
CALCIUM SERPL-MCNC: 9.1 MG/DL (ref 8.6–10.4)
CHLORIDE SERPL-SCNC: 104 MMOL/L (ref 98–107)
CO2 SERPL-SCNC: 19 MMOL/L (ref 20–31)
CREAT SERPL-MCNC: 2 MG/DL (ref 0.5–0.9)
ERYTHROCYTE [DISTWIDTH] IN BLOOD BY AUTOMATED COUNT: 16.8 % (ref 11.8–14.4)
GFR, ESTIMATED: 26 ML/MIN/1.73M2
GLUCOSE SERPL-MCNC: 79 MG/DL (ref 70–99)
HCT VFR BLD AUTO: 33.5 % (ref 36.3–47.1)
HGB BLD-MCNC: 10.3 G/DL (ref 11.9–15.1)
MCH RBC QN AUTO: 31.9 PG (ref 25.2–33.5)
MCHC RBC AUTO-ENTMCNC: 30.7 G/DL (ref 28.4–34.8)
MCV RBC AUTO: 103.7 FL (ref 82.6–102.9)
NRBC BLD-RTO: 0 PER 100 WBC
PLATELET # BLD AUTO: 315 K/UL (ref 138–453)
PMV BLD AUTO: 9.8 FL (ref 8.1–13.5)
POTASSIUM SERPL-SCNC: 5 MMOL/L (ref 3.7–5.3)
PROT SERPL-MCNC: 5.7 G/DL (ref 6.4–8.3)
RBC # BLD AUTO: 3.23 M/UL (ref 3.95–5.11)
SODIUM SERPL-SCNC: 135 MMOL/L (ref 135–144)
WBC OTHER # BLD: 10.8 K/UL (ref 3.5–11.3)

## 2024-07-05 PROCEDURE — 85027 COMPLETE CBC AUTOMATED: CPT

## 2024-07-05 PROCEDURE — 80053 COMPREHEN METABOLIC PANEL: CPT

## 2024-07-05 PROCEDURE — 36415 COLL VENOUS BLD VENIPUNCTURE: CPT

## 2024-07-05 PROCEDURE — P9603 ONE-WAY ALLOW PRORATED MILES: HCPCS

## 2024-07-15 ENCOUNTER — HOSPITAL ENCOUNTER (OUTPATIENT)
Age: 74
Setting detail: SPECIMEN
Discharge: HOME | End: 2024-07-15

## 2024-07-16 ENCOUNTER — HOSPITAL ENCOUNTER (OUTPATIENT)
Age: 74
Setting detail: SPECIMEN
Discharge: HOME | End: 2024-07-16

## 2024-07-16 LAB
AMMONIA PLAS-SCNC: 20 UMOL/L (ref 11–51)
ANION GAP SERPL CALCULATED.3IONS-SCNC: 16 MMOL/L (ref 9–17)
BUN SERPL-MCNC: 22 MG/DL (ref 8–23)
BUN/CREAT SERPL: 10 (ref 9–20)
CALCIUM SERPL-MCNC: 10.2 MG/DL (ref 8.6–10.4)
CHLORIDE SERPL-SCNC: 97 MMOL/L (ref 98–107)
CO2 SERPL-SCNC: 20 MMOL/L (ref 20–31)
CREAT SERPL-MCNC: 2.1 MG/DL (ref 0.5–0.9)
ERYTHROCYTE [DISTWIDTH] IN BLOOD BY AUTOMATED COUNT: 17.3 % (ref 11.8–14.4)
EST. AVERAGE GLUCOSE BLD GHB EST-MCNC: 88 MG/DL
GFR, ESTIMATED: 24 ML/MIN/1.73M2
GLUCOSE SERPL-MCNC: 88 MG/DL (ref 70–99)
HBA1C MFR BLD: 4.7 % (ref 4–6)
HCT VFR BLD AUTO: 42 % (ref 36.3–47.1)
HGB BLD-MCNC: 13.3 G/DL (ref 11.9–15.1)
MCH RBC QN AUTO: 31.7 PG (ref 25.2–33.5)
MCHC RBC AUTO-ENTMCNC: 31.7 G/DL (ref 28.4–34.8)
MCV RBC AUTO: 100 FL (ref 82.6–102.9)
NRBC BLD-RTO: 0 PER 100 WBC
PLATELET # BLD AUTO: 254 K/UL (ref 138–453)
PMV BLD AUTO: 10.3 FL (ref 8.1–13.5)
POTASSIUM SERPL-SCNC: 4.1 MMOL/L (ref 3.7–5.3)
RBC # BLD AUTO: 4.2 M/UL (ref 3.95–5.11)
SODIUM SERPL-SCNC: 133 MMOL/L (ref 135–144)
TSH SERPL DL<=0.05 MIU/L-ACNC: 8.92 UIU/ML (ref 0.3–5)
WBC OTHER # BLD: 9.8 K/UL (ref 3.5–11.3)

## 2024-07-16 PROCEDURE — 83036 HEMOGLOBIN GLYCOSYLATED A1C: CPT

## 2024-07-16 PROCEDURE — P9603 ONE-WAY ALLOW PRORATED MILES: HCPCS

## 2024-07-16 PROCEDURE — 80048 BASIC METABOLIC PNL TOTAL CA: CPT

## 2024-07-16 PROCEDURE — 36415 COLL VENOUS BLD VENIPUNCTURE: CPT

## 2024-07-16 PROCEDURE — 85027 COMPLETE CBC AUTOMATED: CPT

## 2024-07-16 PROCEDURE — 84443 ASSAY THYROID STIM HORMONE: CPT

## 2024-07-16 PROCEDURE — 82140 ASSAY OF AMMONIA: CPT

## 2024-07-29 ENCOUNTER — HOSPITAL ENCOUNTER (OUTPATIENT)
Age: 74
Setting detail: SPECIMEN
Discharge: HOME | End: 2024-07-29

## 2024-07-29 LAB
ANION GAP SERPL CALCULATED.3IONS-SCNC: 9 MMOL/L (ref 9–17)
BUN SERPL-MCNC: 21 MG/DL (ref 8–23)
BUN/CREAT SERPL: 10 (ref 9–20)
CALCIUM SERPL-MCNC: 8.5 MG/DL (ref 8.6–10.4)
CHLORIDE SERPL-SCNC: 103 MMOL/L (ref 98–107)
CO2 SERPL-SCNC: 23 MMOL/L (ref 20–31)
CREAT SERPL-MCNC: 2.1 MG/DL (ref 0.5–0.9)
GFR, ESTIMATED: 24 ML/MIN/1.73M2
GLUCOSE SERPL-MCNC: 88 MG/DL (ref 70–99)
POTASSIUM SERPL-SCNC: 4.8 MMOL/L (ref 3.7–5.3)
SODIUM SERPL-SCNC: 135 MMOL/L (ref 135–144)

## 2024-07-29 PROCEDURE — 36415 COLL VENOUS BLD VENIPUNCTURE: CPT

## 2024-07-29 PROCEDURE — 80048 BASIC METABOLIC PNL TOTAL CA: CPT

## 2024-07-29 PROCEDURE — P9603 ONE-WAY ALLOW PRORATED MILES: HCPCS

## 2024-08-12 ENCOUNTER — HOSPITAL ENCOUNTER (OUTPATIENT)
Age: 74
Setting detail: SPECIMEN
Discharge: HOME | End: 2024-08-12

## 2024-08-12 LAB
25(OH)D3 SERPL-MCNC: 34.3 NG/ML (ref 30–100)
ALBUMIN SERPL-MCNC: 2.1 G/DL (ref 3.5–5.2)
ANION GAP SERPL CALCULATED.3IONS-SCNC: 10 MMOL/L (ref 9–17)
BASOPHILS # BLD: 0.06 K/UL (ref 0–0.2)
BASOPHILS NFR BLD: 1 % (ref 0–2)
BUN SERPL-MCNC: 37 MG/DL (ref 8–23)
BUN/CREAT SERPL: 25 (ref 9–20)
CALCIUM SERPL-MCNC: 8.3 MG/DL (ref 8.6–10.4)
CHLORIDE SERPL-SCNC: 107 MMOL/L (ref 98–107)
CO2 SERPL-SCNC: 19 MMOL/L (ref 20–31)
CREAT SERPL-MCNC: 1.5 MG/DL (ref 0.5–0.9)
EOSINOPHIL # BLD: 0.17 K/UL (ref 0–0.44)
EOSINOPHILS RELATIVE PERCENT: 2 % (ref 1–4)
ERYTHROCYTE [DISTWIDTH] IN BLOOD BY AUTOMATED COUNT: 15.9 % (ref 11.8–14.4)
GFR, ESTIMATED: 36 ML/MIN/1.73M2
GLUCOSE SERPL-MCNC: 104 MG/DL (ref 70–99)
HCT VFR BLD AUTO: 29 % (ref 36.3–47.1)
HGB BLD-MCNC: 9 G/DL (ref 11.9–15.1)
IMM GRANULOCYTES # BLD AUTO: 0.04 K/UL (ref 0–0.3)
IMM GRANULOCYTES NFR BLD: 1 %
LYMPHOCYTES NFR BLD: 1.64 K/UL (ref 1.1–3.7)
LYMPHOCYTES RELATIVE PERCENT: 21 % (ref 24–43)
MAGNESIUM SERPL-MCNC: 2.3 MG/DL (ref 1.6–2.6)
MCH RBC QN AUTO: 31.7 PG (ref 25.2–33.5)
MCHC RBC AUTO-ENTMCNC: 31 G/DL (ref 28.4–34.8)
MCV RBC AUTO: 102.1 FL (ref 82.6–102.9)
MONOCYTES NFR BLD: 1.12 K/UL (ref 0.1–1.2)
MONOCYTES NFR BLD: 15 % (ref 3–12)
NEUTROPHILS NFR BLD: 60 % (ref 36–65)
NEUTS SEG NFR BLD: 4.72 K/UL (ref 1.5–8.1)
NRBC BLD-RTO: 0 PER 100 WBC
PHOSPHATE SERPL-MCNC: 2.7 MG/DL (ref 2.6–4.5)
PLATELET # BLD AUTO: 359 K/UL (ref 138–453)
PMV BLD AUTO: 9.1 FL (ref 8.1–13.5)
POTASSIUM SERPL-SCNC: 4.8 MMOL/L (ref 3.7–5.3)
PTH-INTACT SERPL-MCNC: 50 PG/ML (ref 15–65)
RBC # BLD AUTO: 2.84 M/UL (ref 3.95–5.11)
RBC # BLD: ABNORMAL 10*6/UL
SODIUM SERPL-SCNC: 136 MMOL/L (ref 135–144)
WBC OTHER # BLD: 7.8 K/UL (ref 3.5–11.3)

## 2024-08-12 PROCEDURE — 83970 ASSAY OF PARATHORMONE: CPT

## 2024-08-12 PROCEDURE — 84100 ASSAY OF PHOSPHORUS: CPT

## 2024-08-12 PROCEDURE — 36415 COLL VENOUS BLD VENIPUNCTURE: CPT

## 2024-08-12 PROCEDURE — P9603 ONE-WAY ALLOW PRORATED MILES: HCPCS

## 2024-08-12 PROCEDURE — 80048 BASIC METABOLIC PNL TOTAL CA: CPT

## 2024-08-12 PROCEDURE — 82306 VITAMIN D 25 HYDROXY: CPT

## 2024-08-12 PROCEDURE — 85025 COMPLETE CBC W/AUTO DIFF WBC: CPT

## 2024-08-12 PROCEDURE — 83735 ASSAY OF MAGNESIUM: CPT

## 2024-08-12 PROCEDURE — 82040 ASSAY OF SERUM ALBUMIN: CPT

## 2024-08-28 ENCOUNTER — HOSPITAL ENCOUNTER (OUTPATIENT)
Age: 74
Setting detail: SPECIMEN
Discharge: HOME | End: 2024-08-28

## 2024-08-28 LAB
ALBUMIN SERPL-MCNC: 2.3 G/DL (ref 3.5–5.2)
ALP SERPL-CCNC: 157 U/L (ref 35–104)
ALT SERPL-CCNC: 6 U/L (ref 5–33)
ANION GAP SERPL CALCULATED.3IONS-SCNC: 13 MMOL/L (ref 9–17)
AST SERPL-CCNC: 12 U/L
BILIRUB SERPL-MCNC: 0.5 MG/DL (ref 0.3–1.2)
BUN SERPL-MCNC: 20 MG/DL (ref 8–23)
BUN/CREAT SERPL: 14 (ref 9–20)
CALCIUM SERPL-MCNC: 8.5 MG/DL (ref 8.6–10.4)
CHLORIDE SERPL-SCNC: 101 MMOL/L (ref 98–107)
CO2 SERPL-SCNC: 19 MMOL/L (ref 20–31)
CREAT SERPL-MCNC: 1.4 MG/DL (ref 0.5–0.9)
ERYTHROCYTE [DISTWIDTH] IN BLOOD BY AUTOMATED COUNT: 15.5 % (ref 11.8–14.4)
EST. AVERAGE GLUCOSE BLD GHB EST-MCNC: 103 MG/DL
GFR, ESTIMATED: 39 ML/MIN/1.73M2
GLUCOSE SERPL-MCNC: 120 MG/DL (ref 70–99)
HBA1C MFR BLD: 5.2 % (ref 4–6)
HCT VFR BLD AUTO: 32 % (ref 36.3–47.1)
HGB BLD-MCNC: 10.4 G/DL (ref 11.9–15.1)
MCH RBC QN AUTO: 31.3 PG (ref 25.2–33.5)
MCHC RBC AUTO-ENTMCNC: 32.5 G/DL (ref 28.4–34.8)
MCV RBC AUTO: 96.4 FL (ref 82.6–102.9)
NRBC BLD-RTO: 0 PER 100 WBC
PLATELET # BLD AUTO: 442 K/UL (ref 138–453)
PMV BLD AUTO: 9.4 FL (ref 8.1–13.5)
POTASSIUM SERPL-SCNC: 4 MMOL/L (ref 3.7–5.3)
PROT SERPL-MCNC: 6.4 G/DL (ref 6.4–8.3)
RBC # BLD AUTO: 3.32 M/UL (ref 3.95–5.11)
SODIUM SERPL-SCNC: 133 MMOL/L (ref 135–144)
TSH SERPL DL<=0.05 MIU/L-ACNC: 1.92 UIU/ML (ref 0.3–5)
WBC OTHER # BLD: 12.5 K/UL (ref 3.5–11.3)

## 2024-08-28 PROCEDURE — 85027 COMPLETE CBC AUTOMATED: CPT

## 2024-08-28 PROCEDURE — 80053 COMPREHEN METABOLIC PANEL: CPT

## 2024-08-28 PROCEDURE — P9603 ONE-WAY ALLOW PRORATED MILES: HCPCS

## 2024-08-28 PROCEDURE — 84443 ASSAY THYROID STIM HORMONE: CPT

## 2024-08-28 PROCEDURE — 36415 COLL VENOUS BLD VENIPUNCTURE: CPT

## 2024-08-28 PROCEDURE — 83036 HEMOGLOBIN GLYCOSYLATED A1C: CPT

## 2024-09-03 ENCOUNTER — HOSPITAL ENCOUNTER (OUTPATIENT)
Age: 74
Setting detail: SPECIMEN
Discharge: HOME | End: 2024-09-03

## 2024-09-03 LAB
25(OH)D3 SERPL-MCNC: 29.9 NG/ML (ref 30–100)
ALBUMIN SERPL-MCNC: 2.2 G/DL (ref 3.5–5.2)
ANION GAP SERPL CALCULATED.3IONS-SCNC: 9 MMOL/L (ref 9–17)
BASOPHILS # BLD: 0.13 K/UL (ref 0–0.2)
BASOPHILS NFR BLD: 1 % (ref 0–2)
BUN SERPL-MCNC: 31 MG/DL (ref 8–23)
BUN/CREAT SERPL: 18 (ref 9–20)
CALCIUM SERPL-MCNC: 8.6 MG/DL (ref 8.6–10.4)
CHLORIDE SERPL-SCNC: 97 MMOL/L (ref 98–107)
CO2 SERPL-SCNC: 27 MMOL/L (ref 20–31)
CREAT SERPL-MCNC: 1.7 MG/DL (ref 0.5–0.9)
EOSINOPHIL # BLD: 0.5 K/UL (ref 0–0.44)
EOSINOPHILS RELATIVE PERCENT: 5 % (ref 1–4)
ERYTHROCYTE [DISTWIDTH] IN BLOOD BY AUTOMATED COUNT: 15.4 % (ref 11.8–14.4)
GFR, ESTIMATED: 31 ML/MIN/1.73M2
GLUCOSE SERPL-MCNC: 110 MG/DL (ref 70–99)
HCT VFR BLD AUTO: 27.4 % (ref 36.3–47.1)
HGB BLD-MCNC: 8.7 G/DL (ref 11.9–15.1)
IMM GRANULOCYTES # BLD AUTO: 0.05 K/UL (ref 0–0.3)
IMM GRANULOCYTES NFR BLD: 1 %
LYMPHOCYTES NFR BLD: 1.8 K/UL (ref 1.1–3.7)
LYMPHOCYTES RELATIVE PERCENT: 19 % (ref 24–43)
MAGNESIUM SERPL-MCNC: 2 MG/DL (ref 1.6–2.6)
MCH RBC QN AUTO: 30.4 PG (ref 25.2–33.5)
MCHC RBC AUTO-ENTMCNC: 31.8 G/DL (ref 28.4–34.8)
MCV RBC AUTO: 95.8 FL (ref 82.6–102.9)
MONOCYTES NFR BLD: 1.22 K/UL (ref 0.1–1.2)
MONOCYTES NFR BLD: 13 % (ref 3–12)
NEUTROPHILS NFR BLD: 61 % (ref 36–65)
NEUTS SEG NFR BLD: 5.7 K/UL (ref 1.5–8.1)
NRBC BLD-RTO: 0 PER 100 WBC
PHOSPHATE SERPL-MCNC: 2.9 MG/DL (ref 2.6–4.5)
PLATELET # BLD AUTO: 356 K/UL (ref 138–453)
PMV BLD AUTO: 9.5 FL (ref 8.1–13.5)
POTASSIUM SERPL-SCNC: 4.3 MMOL/L (ref 3.7–5.3)
PTH-INTACT SERPL-MCNC: 54 PG/ML (ref 15–65)
RBC # BLD AUTO: 2.86 M/UL (ref 3.95–5.11)
RBC # BLD: ABNORMAL 10*6/UL
SODIUM SERPL-SCNC: 133 MMOL/L (ref 135–144)
WBC OTHER # BLD: 9.4 K/UL (ref 3.5–11.3)

## 2024-09-03 PROCEDURE — 80048 BASIC METABOLIC PNL TOTAL CA: CPT

## 2024-09-03 PROCEDURE — P9603 ONE-WAY ALLOW PRORATED MILES: HCPCS

## 2024-09-03 PROCEDURE — 82306 VITAMIN D 25 HYDROXY: CPT

## 2024-09-03 PROCEDURE — 82040 ASSAY OF SERUM ALBUMIN: CPT

## 2024-09-03 PROCEDURE — 84100 ASSAY OF PHOSPHORUS: CPT

## 2024-09-03 PROCEDURE — 36415 COLL VENOUS BLD VENIPUNCTURE: CPT

## 2024-09-03 PROCEDURE — 85025 COMPLETE CBC W/AUTO DIFF WBC: CPT

## 2024-09-03 PROCEDURE — 83970 ASSAY OF PARATHORMONE: CPT

## 2024-09-03 PROCEDURE — 83735 ASSAY OF MAGNESIUM: CPT

## 2024-09-12 ENCOUNTER — HOSPITAL ENCOUNTER (OUTPATIENT)
Age: 74
Setting detail: SPECIMEN
Discharge: HOME | End: 2024-09-12

## 2024-09-12 LAB
ANION GAP SERPL CALCULATED.3IONS-SCNC: 13 MMOL/L (ref 9–17)
BUN SERPL-MCNC: 49 MG/DL (ref 8–23)
BUN/CREAT SERPL: 22 (ref 9–20)
CALCIUM SERPL-MCNC: 9.1 MG/DL (ref 8.6–10.4)
CHLORIDE SERPL-SCNC: 98 MMOL/L (ref 98–107)
CO2 SERPL-SCNC: 24 MMOL/L (ref 20–31)
CREAT SERPL-MCNC: 2.2 MG/DL (ref 0.5–0.9)
ERYTHROCYTE [DISTWIDTH] IN BLOOD BY AUTOMATED COUNT: 14.7 % (ref 11.8–14.4)
GFR, ESTIMATED: 23 ML/MIN/1.73M2
GLUCOSE SERPL-MCNC: 119 MG/DL (ref 70–99)
HCT VFR BLD AUTO: 29 % (ref 36.3–47.1)
HGB BLD-MCNC: 9.2 G/DL (ref 11.9–15.1)
MCH RBC QN AUTO: 30.4 PG (ref 25.2–33.5)
MCHC RBC AUTO-ENTMCNC: 31.7 G/DL (ref 28.4–34.8)
MCV RBC AUTO: 95.7 FL (ref 82.6–102.9)
NRBC BLD-RTO: 0 PER 100 WBC
PLATELET # BLD AUTO: 398 K/UL (ref 138–453)
PMV BLD AUTO: 9.5 FL (ref 8.1–13.5)
POTASSIUM SERPL-SCNC: 4.2 MMOL/L (ref 3.7–5.3)
RBC # BLD AUTO: 3.03 M/UL (ref 3.95–5.11)
SODIUM SERPL-SCNC: 135 MMOL/L (ref 135–144)
WBC OTHER # BLD: 9 K/UL (ref 3.5–11.3)

## 2024-09-12 PROCEDURE — 85027 COMPLETE CBC AUTOMATED: CPT

## 2024-09-12 PROCEDURE — P9603 ONE-WAY ALLOW PRORATED MILES: HCPCS

## 2024-09-12 PROCEDURE — 80048 BASIC METABOLIC PNL TOTAL CA: CPT

## 2024-09-12 PROCEDURE — 36415 COLL VENOUS BLD VENIPUNCTURE: CPT

## 2024-10-07 ENCOUNTER — HOSPITAL ENCOUNTER (OUTPATIENT)
Age: 74
Setting detail: SPECIMEN
Discharge: HOME | End: 2024-10-07

## 2024-10-07 LAB
ANION GAP SERPL CALCULATED.3IONS-SCNC: 13 MMOL/L (ref 9–17)
BUN SERPL-MCNC: 64 MG/DL (ref 8–23)
BUN/CREAT SERPL: 26 (ref 9–20)
CALCIUM SERPL-MCNC: 9.5 MG/DL (ref 8.6–10.4)
CHLORIDE SERPL-SCNC: 98 MMOL/L (ref 98–107)
CO2 SERPL-SCNC: 23 MMOL/L (ref 20–31)
CREAT SERPL-MCNC: 2.5 MG/DL (ref 0.5–0.9)
GFR, ESTIMATED: 19 ML/MIN/1.73M2
GLUCOSE SERPL-MCNC: 107 MG/DL (ref 70–99)
POTASSIUM SERPL-SCNC: 5 MMOL/L (ref 3.7–5.3)
SODIUM SERPL-SCNC: 134 MMOL/L (ref 135–144)

## 2024-10-07 PROCEDURE — 36415 COLL VENOUS BLD VENIPUNCTURE: CPT

## 2024-10-07 PROCEDURE — 80048 BASIC METABOLIC PNL TOTAL CA: CPT

## 2024-10-07 PROCEDURE — P9603 ONE-WAY ALLOW PRORATED MILES: HCPCS

## 2024-10-11 ENCOUNTER — HOSPITAL ENCOUNTER (OUTPATIENT)
Age: 74
Setting detail: SPECIMEN
Discharge: HOME | End: 2024-10-11

## 2024-10-11 LAB
ANION GAP SERPL CALCULATED.3IONS-SCNC: 15 MMOL/L (ref 9–17)
BUN SERPL-MCNC: 64 MG/DL (ref 8–23)
BUN/CREAT SERPL: 34 (ref 9–20)
CALCIUM SERPL-MCNC: 9.5 MG/DL (ref 8.6–10.4)
CHLORIDE SERPL-SCNC: 97 MMOL/L (ref 98–107)
CO2 SERPL-SCNC: 21 MMOL/L (ref 20–31)
CREAT SERPL-MCNC: 1.9 MG/DL (ref 0.5–0.9)
GFR, ESTIMATED: 27 ML/MIN/1.73M2
GLUCOSE SERPL-MCNC: 113 MG/DL (ref 70–99)
POTASSIUM SERPL-SCNC: 4.4 MMOL/L (ref 3.7–5.3)
SODIUM SERPL-SCNC: 133 MMOL/L (ref 135–144)

## 2024-10-11 PROCEDURE — P9603 ONE-WAY ALLOW PRORATED MILES: HCPCS

## 2024-10-11 PROCEDURE — 80048 BASIC METABOLIC PNL TOTAL CA: CPT

## 2024-10-11 PROCEDURE — 36415 COLL VENOUS BLD VENIPUNCTURE: CPT

## 2024-10-14 ENCOUNTER — HOSPITAL ENCOUNTER (OUTPATIENT)
Age: 74
Setting detail: SPECIMEN
Discharge: HOME | End: 2024-10-14

## 2024-10-14 LAB
ALBUMIN SERPL-MCNC: 3.2 G/DL (ref 3.5–5.2)
ALP SERPL-CCNC: 194 U/L (ref 35–104)
ALT SERPL-CCNC: 17 U/L (ref 5–33)
ANION GAP SERPL CALCULATED.3IONS-SCNC: 13 MMOL/L (ref 9–17)
AST SERPL-CCNC: 22 U/L
BILIRUB DIRECT SERPL-MCNC: 0.2 MG/DL
BILIRUB INDIRECT SERPL-MCNC: 0.3 MG/DL (ref 0–1)
BILIRUB SERPL-MCNC: 0.5 MG/DL (ref 0.3–1.2)
BUN SERPL-MCNC: 55 MG/DL (ref 8–23)
BUN/CREAT SERPL: 29 (ref 9–20)
CALCIUM SERPL-MCNC: 9.4 MG/DL (ref 8.6–10.4)
CHLORIDE SERPL-SCNC: 99 MMOL/L (ref 98–107)
CO2 SERPL-SCNC: 23 MMOL/L (ref 20–31)
CREAT SERPL-MCNC: 1.9 MG/DL (ref 0.5–0.9)
GFR, ESTIMATED: 27 ML/MIN/1.73M2
GLUCOSE SERPL-MCNC: 126 MG/DL (ref 70–99)
INR PPP: 1.5
POTASSIUM SERPL-SCNC: 4 MMOL/L (ref 3.7–5.3)
PROT SERPL-MCNC: 7.2 G/DL (ref 6.4–8.3)
PROTHROMBIN TIME: 18.4 SEC (ref 11.5–14.2)
SODIUM SERPL-SCNC: 135 MMOL/L (ref 135–144)

## 2024-10-14 PROCEDURE — P9603 ONE-WAY ALLOW PRORATED MILES: HCPCS

## 2024-10-14 PROCEDURE — 36415 COLL VENOUS BLD VENIPUNCTURE: CPT

## 2024-10-14 PROCEDURE — 80048 BASIC METABOLIC PNL TOTAL CA: CPT

## 2024-10-14 PROCEDURE — 85610 PROTHROMBIN TIME: CPT

## 2024-10-14 PROCEDURE — 80076 HEPATIC FUNCTION PANEL: CPT

## 2024-11-04 ENCOUNTER — HOSPITAL ENCOUNTER (OUTPATIENT)
Age: 74
Setting detail: SPECIMEN
Discharge: HOME | End: 2024-11-04
Payer: MEDICARE

## 2024-11-04 LAB
25(OH)D3 SERPL-MCNC: 40.6 NG/ML (ref 30–100)
ALBUMIN SERPL-MCNC: 3.2 G/DL (ref 3.5–5.2)
ANION GAP SERPL CALCULATED.3IONS-SCNC: 13 MMOL/L (ref 9–17)
BACTERIA URNS QL MICRO: ABNORMAL
BASOPHILS # BLD: 0.09 K/UL (ref 0–0.2)
BASOPHILS NFR BLD: 1 % (ref 0–2)
BILIRUB UR QL STRIP: NEGATIVE
BUN SERPL-MCNC: 54 MG/DL (ref 8–23)
BUN/CREAT SERPL: 25 (ref 9–20)
CALCIUM SERPL-MCNC: 9 MG/DL (ref 8.6–10.4)
CHLORIDE SERPL-SCNC: 102 MMOL/L (ref 98–107)
CLARITY UR: ABNORMAL
CO2 SERPL-SCNC: 24 MMOL/L (ref 20–31)
COLOR UR: YELLOW
CREAT SERPL-MCNC: 2.2 MG/DL (ref 0.5–0.9)
CREAT UR-MCNC: 57.6 MG/DL (ref 28–217)
EOSINOPHIL # BLD: 0.34 K/UL (ref 0–0.44)
EOSINOPHILS RELATIVE PERCENT: 4 % (ref 1–4)
EPI CELLS #/AREA URNS HPF: ABNORMAL /HPF (ref 0–5)
ERYTHROCYTE [DISTWIDTH] IN BLOOD BY AUTOMATED COUNT: 16.7 % (ref 11.8–14.4)
GFR, ESTIMATED: 23 ML/MIN/1.73M2
GLUCOSE SERPL-MCNC: 109 MG/DL (ref 70–99)
GLUCOSE UR STRIP-MCNC: NEGATIVE MG/DL
HCT VFR BLD AUTO: 34.7 % (ref 36.3–47.1)
HGB BLD-MCNC: 11.3 G/DL (ref 11.9–15.1)
HGB UR QL STRIP.AUTO: ABNORMAL
IMM GRANULOCYTES # BLD AUTO: 0.04 K/UL (ref 0–0.3)
IMM GRANULOCYTES NFR BLD: 0 %
KETONES UR STRIP-MCNC: NEGATIVE MG/DL
LEUKOCYTE ESTERASE UR QL STRIP: ABNORMAL
LYMPHOCYTES NFR BLD: 1.85 K/UL (ref 1.1–3.7)
LYMPHOCYTES RELATIVE PERCENT: 20 % (ref 24–43)
MAGNESIUM SERPL-MCNC: 2.7 MG/DL (ref 1.6–2.6)
MCH RBC QN AUTO: 31 PG (ref 25.2–33.5)
MCHC RBC AUTO-ENTMCNC: 32.6 G/DL (ref 28.4–34.8)
MCV RBC AUTO: 95.1 FL (ref 82.6–102.9)
MICROALBUMIN UR-MCNC: 18 MG/L (ref 0–20)
MICROALBUMIN/CREAT UR-RTO: 31 MCG/MG CREAT (ref 0–25)
MONOCYTES NFR BLD: 0.91 K/UL (ref 0.1–1.2)
MONOCYTES NFR BLD: 10 % (ref 3–12)
NEUTROPHILS NFR BLD: 65 % (ref 36–65)
NEUTS SEG NFR BLD: 6.01 K/UL (ref 1.5–8.1)
NITRITE UR QL STRIP: NEGATIVE
NRBC BLD-RTO: 0 PER 100 WBC
PH UR STRIP: 7 [PH] (ref 5–8)
PHOSPHATE SERPL-MCNC: 4.3 MG/DL (ref 2.6–4.5)
PLATELET # BLD AUTO: 285 K/UL (ref 138–453)
PMV BLD AUTO: 9.8 FL (ref 8.1–13.5)
POTASSIUM SERPL-SCNC: 4.4 MMOL/L (ref 3.7–5.3)
PROT UR STRIP-MCNC: NEGATIVE MG/DL
PTH-INTACT SERPL-MCNC: 66 PG/ML (ref 15–65)
RBC # BLD AUTO: 3.65 M/UL (ref 3.95–5.11)
RBC # BLD: ABNORMAL 10*6/UL
RBC #/AREA URNS HPF: ABNORMAL /HPF (ref 0–2)
SODIUM SERPL-SCNC: 139 MMOL/L (ref 135–144)
SP GR UR STRIP: 1.01 (ref 1–1.03)
UROBILINOGEN UR STRIP-ACNC: NORMAL EU/DL (ref 0–1)
WBC #/AREA URNS HPF: ABNORMAL /HPF (ref 0–5)
WBC OTHER # BLD: 9.2 K/UL (ref 3.5–11.3)

## 2024-11-04 PROCEDURE — 81001 URINALYSIS AUTO W/SCOPE: CPT

## 2024-11-04 PROCEDURE — 83970 ASSAY OF PARATHORMONE: CPT

## 2024-11-04 PROCEDURE — 82043 UR ALBUMIN QUANTITATIVE: CPT

## 2024-11-04 PROCEDURE — 82570 ASSAY OF URINE CREATININE: CPT

## 2024-11-04 PROCEDURE — 87086 URINE CULTURE/COLONY COUNT: CPT

## 2024-11-04 PROCEDURE — P9603 ONE-WAY ALLOW PRORATED MILES: HCPCS

## 2024-11-04 PROCEDURE — 36415 COLL VENOUS BLD VENIPUNCTURE: CPT

## 2024-11-04 PROCEDURE — 82306 VITAMIN D 25 HYDROXY: CPT

## 2024-11-04 PROCEDURE — 84100 ASSAY OF PHOSPHORUS: CPT

## 2024-11-04 PROCEDURE — 83735 ASSAY OF MAGNESIUM: CPT

## 2024-11-04 PROCEDURE — 80048 BASIC METABOLIC PNL TOTAL CA: CPT

## 2024-11-04 PROCEDURE — 85025 COMPLETE CBC W/AUTO DIFF WBC: CPT

## 2024-11-04 PROCEDURE — 82040 ASSAY OF SERUM ALBUMIN: CPT

## 2024-11-07 LAB
MICROORGANISM SPEC CULT: NORMAL
SPECIMEN DESCRIPTION: NORMAL

## 2024-11-20 ENCOUNTER — HOSPITAL ENCOUNTER (OUTPATIENT)
Age: 74
Setting detail: SPECIMEN
Discharge: HOME | End: 2024-11-20

## 2024-11-20 PROCEDURE — 81001 URINALYSIS AUTO W/SCOPE: CPT

## 2024-11-20 PROCEDURE — 87086 URINE CULTURE/COLONY COUNT: CPT

## 2024-11-20 PROCEDURE — 87077 CULTURE AEROBIC IDENTIFY: CPT

## 2024-11-20 PROCEDURE — 87186 SC STD MICRODIL/AGAR DIL: CPT

## 2024-11-21 ENCOUNTER — HOSPITAL ENCOUNTER (OUTPATIENT)
Age: 74
Setting detail: SPECIMEN
Discharge: HOME | End: 2024-11-21

## 2024-11-21 LAB
25(OH)D3 SERPL-MCNC: 52.7 NG/ML (ref 30–100)
AMMONIA PLAS-SCNC: 88 UMOL/L (ref 11–51)
ANION GAP SERPL CALCULATED.3IONS-SCNC: 16 MMOL/L
BACTERIA URNS QL MICRO: ABNORMAL
BILIRUB UR QL STRIP: NEGATIVE
BUN SERPL-MCNC: 56 MG/DL (ref 8–23)
CALCIUM SERPL-MCNC: 9.8 MG/DL (ref 8.8–10.2)
CASTS #/AREA URNS LPF: ABNORMAL /LPF (ref 0–8)
CHLORIDE SERPL-SCNC: 97 MMOL/L (ref 98–107)
CLARITY UR: ABNORMAL
CO2 SERPL-SCNC: 20 MMOL/L (ref 20–31)
COLOR UR: YELLOW
CREAT SERPL-MCNC: 2.8 MG/DL (ref 0.5–0.9)
EPI CELLS #/AREA URNS HPF: ABNORMAL /HPF (ref 0–5)
ERYTHROCYTE [DISTWIDTH] IN BLOOD BY AUTOMATED COUNT: 14.7 % (ref 11.8–14.4)
GFR, ESTIMATED: 17 ML/MIN/1.73M2
GLUCOSE SERPL-MCNC: 127 MG/DL (ref 82–115)
GLUCOSE UR STRIP-MCNC: NEGATIVE MG/DL
HCT VFR BLD AUTO: 38.3 % (ref 36.3–47.1)
HGB BLD-MCNC: 13.1 G/DL (ref 11.9–15.1)
HGB UR QL STRIP.AUTO: ABNORMAL
KETONES UR STRIP-MCNC: NEGATIVE MG/DL
LEUKOCYTE ESTERASE UR QL STRIP: ABNORMAL
MAGNESIUM SERPL-MCNC: 3 MG/DL (ref 1.6–2.4)
MCH RBC QN AUTO: 31.5 PG (ref 25.2–33.5)
MCHC RBC AUTO-ENTMCNC: 34.2 G/DL (ref 28.4–34.8)
MCV RBC AUTO: 92.1 FL (ref 82.6–102.9)
NITRITE UR QL STRIP: NEGATIVE
NRBC BLD-RTO: 0 PER 100 WBC
PH UR STRIP: 5.5 [PH] (ref 5–8)
PHOSPHATE SERPL-MCNC: 3.9 MG/DL (ref 2.5–4.5)
PLATELET # BLD AUTO: 353 K/UL (ref 138–453)
PMV BLD AUTO: 9.9 FL (ref 8.1–13.5)
POTASSIUM SERPL-SCNC: 4.6 MMOL/L (ref 3.7–5.3)
PROT UR STRIP-MCNC: ABNORMAL MG/DL
PTH-INTACT SERPL-MCNC: 80 PG/ML (ref 15–65)
RBC # BLD AUTO: 4.16 M/UL (ref 3.95–5.11)
RBC #/AREA URNS HPF: ABNORMAL /HPF (ref 0–4)
SODIUM SERPL-SCNC: 133 MMOL/L (ref 136–145)
SP GR UR STRIP: 1.01 (ref 1–1.03)
T4 SERPL-MCNC: 15.6 UG/DL (ref 4.5–11.7)
TSH SERPL DL<=0.05 MIU/L-ACNC: 1.4 UIU/ML (ref 0.27–4.2)
UROBILINOGEN UR STRIP-ACNC: NORMAL EU/DL (ref 0–1)
WBC #/AREA URNS HPF: ABNORMAL /HPF (ref 0–5)
WBC OTHER # BLD: 9.5 K/UL (ref 3.5–11.3)

## 2024-11-21 PROCEDURE — P9603 ONE-WAY ALLOW PRORATED MILES: HCPCS

## 2024-11-21 PROCEDURE — 83735 ASSAY OF MAGNESIUM: CPT

## 2024-11-21 PROCEDURE — 36415 COLL VENOUS BLD VENIPUNCTURE: CPT

## 2024-11-21 PROCEDURE — 84100 ASSAY OF PHOSPHORUS: CPT

## 2024-11-21 PROCEDURE — 80048 BASIC METABOLIC PNL TOTAL CA: CPT

## 2024-11-21 PROCEDURE — 85027 COMPLETE CBC AUTOMATED: CPT

## 2024-11-21 PROCEDURE — 82306 VITAMIN D 25 HYDROXY: CPT

## 2024-11-21 PROCEDURE — 82140 ASSAY OF AMMONIA: CPT

## 2024-11-21 PROCEDURE — 84443 ASSAY THYROID STIM HORMONE: CPT

## 2024-11-21 PROCEDURE — 83970 ASSAY OF PARATHORMONE: CPT

## 2024-11-21 PROCEDURE — 84436 ASSAY OF TOTAL THYROXINE: CPT

## 2024-11-23 LAB
MICROORGANISM SPEC CULT: ABNORMAL
MICROORGANISM SPEC CULT: ABNORMAL
SERVICE CMNT-IMP: ABNORMAL
SPECIMEN DESCRIPTION: ABNORMAL

## 2024-12-02 ENCOUNTER — HOSPITAL ENCOUNTER (OUTPATIENT)
Age: 74
Setting detail: SPECIMEN
Discharge: HOME | End: 2024-12-02
Payer: MEDICARE

## 2024-12-02 LAB
BACTERIA URNS QL MICRO: NORMAL
BILIRUB UR QL STRIP: NEGATIVE
CASTS #/AREA URNS LPF: NORMAL /LPF (ref 0–8)
CLARITY UR: CLEAR
COLOR UR: YELLOW
CREAT UR-MCNC: 51.3 MG/DL (ref 28–217)
EPI CELLS #/AREA URNS HPF: NORMAL /HPF (ref 0–5)
GLUCOSE UR STRIP-MCNC: NEGATIVE MG/DL
HGB UR QL STRIP.AUTO: NEGATIVE
KETONES UR STRIP-MCNC: NEGATIVE MG/DL
LEUKOCYTE ESTERASE UR QL STRIP: ABNORMAL
MICROALBUMIN UR-MCNC: <12 MG/L (ref 0–20)
MICROALBUMIN/CREAT UR-RTO: NORMAL MCG/MG CREAT (ref 0–25)
NITRITE UR QL STRIP: NEGATIVE
PH UR STRIP: 5 [PH] (ref 5–8)
PROT UR STRIP-MCNC: NEGATIVE MG/DL
RBC #/AREA URNS HPF: NORMAL /HPF (ref 0–4)
SP GR UR STRIP: 1.01 (ref 1–1.03)
UROBILINOGEN UR STRIP-ACNC: NORMAL EU/DL (ref 0–1)
WBC #/AREA URNS HPF: NORMAL /HPF (ref 0–5)

## 2024-12-02 PROCEDURE — 81001 URINALYSIS AUTO W/SCOPE: CPT

## 2024-12-02 PROCEDURE — 82043 UR ALBUMIN QUANTITATIVE: CPT

## 2024-12-02 PROCEDURE — 82570 ASSAY OF URINE CREATININE: CPT

## 2024-12-03 ENCOUNTER — HOSPITAL ENCOUNTER (OUTPATIENT)
Age: 74
Setting detail: SPECIMEN
Discharge: HOME | End: 2024-12-03
Payer: MEDICARE

## 2024-12-03 LAB
25(OH)D3 SERPL-MCNC: 37.3 NG/ML (ref 30–100)
ANION GAP SERPL CALCULATED.3IONS-SCNC: 11 MMOL/L (ref 9–16)
BUN SERPL-MCNC: 32 MG/DL (ref 8–23)
CALCIUM SERPL-MCNC: 9.2 MG/DL (ref 8.8–10.2)
CHLORIDE SERPL-SCNC: 103 MMOL/L (ref 98–107)
CO2 SERPL-SCNC: 22 MMOL/L (ref 20–31)
CREAT SERPL-MCNC: 1.9 MG/DL (ref 0.5–0.9)
ERYTHROCYTE [DISTWIDTH] IN BLOOD BY AUTOMATED COUNT: 15.3 % (ref 11.8–14.4)
GFR, ESTIMATED: 27 ML/MIN/1.73M2
GLUCOSE SERPL-MCNC: 104 MG/DL (ref 82–115)
HCT VFR BLD AUTO: 31 % (ref 36.3–47.1)
HGB BLD-MCNC: 10.6 G/DL (ref 11.9–15.1)
MAGNESIUM SERPL-MCNC: 2.6 MG/DL (ref 1.6–2.4)
MCH RBC QN AUTO: 32 PG (ref 25.2–33.5)
MCHC RBC AUTO-ENTMCNC: 34.2 G/DL (ref 28.4–34.8)
MCV RBC AUTO: 93.7 FL (ref 82.6–102.9)
NRBC BLD-RTO: 0 PER 100 WBC
PHOSPHATE SERPL-MCNC: 3.9 MG/DL (ref 2.5–4.5)
PLATELET # BLD AUTO: 229 K/UL (ref 138–453)
PMV BLD AUTO: 9.7 FL (ref 8.1–13.5)
POTASSIUM SERPL-SCNC: 4.2 MMOL/L (ref 3.7–5.3)
PTH-INTACT SERPL-MCNC: 44 PG/ML (ref 15–65)
RBC # BLD AUTO: 3.31 M/UL (ref 3.95–5.11)
SODIUM SERPL-SCNC: 136 MMOL/L (ref 136–145)
WBC OTHER # BLD: 9.9 K/UL (ref 3.5–11.3)

## 2024-12-03 PROCEDURE — P9603 ONE-WAY ALLOW PRORATED MILES: HCPCS

## 2024-12-03 PROCEDURE — 82306 VITAMIN D 25 HYDROXY: CPT

## 2024-12-03 PROCEDURE — 84100 ASSAY OF PHOSPHORUS: CPT

## 2024-12-03 PROCEDURE — 80048 BASIC METABOLIC PNL TOTAL CA: CPT

## 2024-12-03 PROCEDURE — 83970 ASSAY OF PARATHORMONE: CPT

## 2024-12-03 PROCEDURE — 85027 COMPLETE CBC AUTOMATED: CPT

## 2024-12-03 PROCEDURE — 36415 COLL VENOUS BLD VENIPUNCTURE: CPT

## 2024-12-03 PROCEDURE — 83735 ASSAY OF MAGNESIUM: CPT

## 2025-01-20 ENCOUNTER — HOSPITAL ENCOUNTER (OUTPATIENT)
Age: 75
Setting detail: SPECIMEN
Discharge: HOME | End: 2025-01-20
Payer: MEDICARE

## 2025-01-20 LAB
BACTERIA URNS QL MICRO: ABNORMAL
BILIRUB UR QL STRIP: NEGATIVE
CASTS #/AREA URNS LPF: ABNORMAL /LPF
CASTS #/AREA URNS LPF: ABNORMAL /LPF
CLARITY UR: ABNORMAL
COLOR UR: YELLOW
EPI CELLS #/AREA URNS HPF: ABNORMAL /HPF (ref 0–5)
GLUCOSE UR STRIP-MCNC: NEGATIVE MG/DL
HGB UR QL STRIP.AUTO: ABNORMAL
KETONES UR STRIP-MCNC: NEGATIVE MG/DL
LEUKOCYTE ESTERASE UR QL STRIP: ABNORMAL
NITRITE UR QL STRIP: POSITIVE
PH UR STRIP: 5 [PH] (ref 5–8)
PROT UR STRIP-MCNC: NEGATIVE MG/DL
RBC #/AREA URNS HPF: ABNORMAL /HPF (ref 0–2)
SP GR UR STRIP: 1.01 (ref 1–1.03)
UROBILINOGEN UR STRIP-ACNC: NORMAL EU/DL (ref 0–1)
WBC #/AREA URNS HPF: ABNORMAL /HPF (ref 0–5)

## 2025-01-20 PROCEDURE — 81001 URINALYSIS AUTO W/SCOPE: CPT

## 2025-01-20 PROCEDURE — 87186 SC STD MICRODIL/AGAR DIL: CPT

## 2025-01-20 PROCEDURE — 87086 URINE CULTURE/COLONY COUNT: CPT

## 2025-01-20 PROCEDURE — 87088 URINE BACTERIA CULTURE: CPT

## 2025-01-21 ENCOUNTER — HOSPITAL ENCOUNTER (OUTPATIENT)
Age: 75
Setting detail: SPECIMEN
Discharge: HOME | End: 2025-01-21
Payer: MEDICARE

## 2025-01-21 LAB
ANION GAP SERPL CALCULATED.3IONS-SCNC: 12 MMOL/L (ref 9–16)
BUN SERPL-MCNC: 24 MG/DL (ref 8–23)
CALCIUM SERPL-MCNC: 8.6 MG/DL (ref 8.8–10.2)
CHLORIDE SERPL-SCNC: 104 MMOL/L (ref 98–107)
CO2 SERPL-SCNC: 22 MMOL/L (ref 20–31)
CREAT SERPL-MCNC: 1.7 MG/DL (ref 0.5–0.9)
ERYTHROCYTE [DISTWIDTH] IN BLOOD BY AUTOMATED COUNT: 14.4 % (ref 11.8–14.4)
GFR, ESTIMATED: 32 ML/MIN/1.73M2
GLUCOSE SERPL-MCNC: 89 MG/DL (ref 82–115)
HCT VFR BLD AUTO: 38.7 % (ref 36.3–47.1)
HGB BLD-MCNC: 13.5 G/DL (ref 11.9–15.1)
MAGNESIUM SERPL-MCNC: 2.1 MG/DL (ref 1.6–2.4)
MCH RBC QN AUTO: 32.1 PG (ref 25.2–33.5)
MCHC RBC AUTO-ENTMCNC: 34.9 G/DL (ref 28.4–34.8)
MCV RBC AUTO: 92.1 FL (ref 82.6–102.9)
NRBC BLD-RTO: 0 PER 100 WBC
PLATELET # BLD AUTO: 255 K/UL (ref 138–453)
PMV BLD AUTO: 9.8 FL (ref 8.1–13.5)
POTASSIUM SERPL-SCNC: 4.1 MMOL/L (ref 3.7–5.3)
RBC # BLD AUTO: 4.2 M/UL (ref 3.95–5.11)
SODIUM SERPL-SCNC: 137 MMOL/L (ref 136–145)
WBC OTHER # BLD: 7.4 K/UL (ref 3.5–11.3)

## 2025-01-21 PROCEDURE — 83735 ASSAY OF MAGNESIUM: CPT

## 2025-01-21 PROCEDURE — 36415 COLL VENOUS BLD VENIPUNCTURE: CPT

## 2025-01-21 PROCEDURE — 85027 COMPLETE CBC AUTOMATED: CPT

## 2025-01-21 PROCEDURE — 80048 BASIC METABOLIC PNL TOTAL CA: CPT

## 2025-01-21 PROCEDURE — P9603 ONE-WAY ALLOW PRORATED MILES: HCPCS

## 2025-01-22 LAB
MICROORGANISM SPEC CULT: ABNORMAL
SPECIMEN DESCRIPTION: ABNORMAL

## 2025-02-03 ENCOUNTER — HOSPITAL ENCOUNTER (OUTPATIENT)
Age: 75
Setting detail: SPECIMEN
Discharge: HOME | End: 2025-02-03
Payer: MEDICARE

## 2025-02-03 LAB
25(OH)D3 SERPL-MCNC: 35 NG/ML (ref 30–100)
ALBUMIN SERPL-MCNC: 2.6 G/DL (ref 3.5–5.2)
ANION GAP SERPL CALCULATED.3IONS-SCNC: 8 MMOL/L (ref 9–16)
BASOPHILS # BLD: 0.08 K/UL (ref 0–0.2)
BASOPHILS NFR BLD: 1 % (ref 0–2)
BUN SERPL-MCNC: 30 MG/DL (ref 8–23)
CALCIUM SERPL-MCNC: 8.8 MG/DL (ref 8.8–10.2)
CHLORIDE SERPL-SCNC: 103 MMOL/L (ref 98–107)
CO2 SERPL-SCNC: 26 MMOL/L (ref 20–31)
CREAT SERPL-MCNC: 1.9 MG/DL (ref 0.5–0.9)
EOSINOPHIL # BLD: 0.21 K/UL (ref 0–0.44)
EOSINOPHILS RELATIVE PERCENT: 4 % (ref 1–4)
ERYTHROCYTE [DISTWIDTH] IN BLOOD BY AUTOMATED COUNT: 14.1 % (ref 11.8–14.4)
GFR, ESTIMATED: 27 ML/MIN/1.73M2
GLUCOSE SERPL-MCNC: 84 MG/DL (ref 82–115)
HCT VFR BLD AUTO: 32.8 % (ref 36.3–47.1)
HGB BLD-MCNC: 11.1 G/DL (ref 11.9–15.1)
IMM GRANULOCYTES # BLD AUTO: 0.03 K/UL (ref 0–0.3)
IMM GRANULOCYTES NFR BLD: 1 %
LYMPHOCYTES NFR BLD: 1.61 K/UL (ref 1.1–3.7)
LYMPHOCYTES RELATIVE PERCENT: 27 % (ref 24–43)
MAGNESIUM SERPL-MCNC: 2.3 MG/DL (ref 1.6–2.4)
MCH RBC QN AUTO: 32 PG (ref 25.2–33.5)
MCHC RBC AUTO-ENTMCNC: 33.8 G/DL (ref 28.4–34.8)
MCV RBC AUTO: 94.5 FL (ref 82.6–102.9)
MONOCYTES NFR BLD: 0.58 K/UL (ref 0.1–1.2)
MONOCYTES NFR BLD: 10 % (ref 3–12)
NEUTROPHILS NFR BLD: 57 % (ref 36–65)
NEUTS SEG NFR BLD: 3.42 K/UL (ref 1.5–8.1)
NRBC BLD-RTO: 0 PER 100 WBC
PHOSPHATE SERPL-MCNC: 2.9 MG/DL (ref 2.5–4.5)
PLATELET # BLD AUTO: 292 K/UL (ref 138–453)
PMV BLD AUTO: 10.3 FL (ref 8.1–13.5)
POTASSIUM SERPL-SCNC: 4.4 MMOL/L (ref 3.7–5.3)
PTH-INTACT SERPL-MCNC: 47 PG/ML (ref 15–65)
RBC # BLD AUTO: 3.47 M/UL (ref 3.95–5.11)
SODIUM SERPL-SCNC: 137 MMOL/L (ref 136–145)
WBC OTHER # BLD: 5.9 K/UL (ref 3.5–11.3)

## 2025-02-03 PROCEDURE — 80048 BASIC METABOLIC PNL TOTAL CA: CPT

## 2025-02-03 PROCEDURE — 83970 ASSAY OF PARATHORMONE: CPT

## 2025-02-03 PROCEDURE — 84100 ASSAY OF PHOSPHORUS: CPT

## 2025-02-03 PROCEDURE — 36415 COLL VENOUS BLD VENIPUNCTURE: CPT

## 2025-02-03 PROCEDURE — 83735 ASSAY OF MAGNESIUM: CPT

## 2025-02-03 PROCEDURE — 82306 VITAMIN D 25 HYDROXY: CPT

## 2025-02-03 PROCEDURE — 85025 COMPLETE CBC W/AUTO DIFF WBC: CPT

## 2025-02-03 PROCEDURE — 82040 ASSAY OF SERUM ALBUMIN: CPT

## 2025-02-04 ENCOUNTER — HOSPITAL ENCOUNTER (OUTPATIENT)
Age: 75
Setting detail: SPECIMEN
Discharge: HOME | End: 2025-02-04
Payer: MEDICARE

## 2025-02-04 LAB
BACTERIA URNS QL MICRO: ABNORMAL
BILIRUB UR QL STRIP: NEGATIVE
CLARITY UR: ABNORMAL
COLOR UR: YELLOW
EPI CELLS #/AREA URNS HPF: ABNORMAL /HPF (ref 0–5)
GLUCOSE UR STRIP-MCNC: NEGATIVE MG/DL
HGB UR QL STRIP.AUTO: ABNORMAL
KETONES UR STRIP-MCNC: NEGATIVE MG/DL
LEUKOCYTE ESTERASE UR QL STRIP: ABNORMAL
NITRITE UR QL STRIP: NEGATIVE
PH UR STRIP: 6 [PH] (ref 5–8)
PROT UR STRIP-MCNC: NEGATIVE MG/DL
RBC #/AREA URNS HPF: ABNORMAL /HPF (ref 0–2)
SP GR UR STRIP: 1.01 (ref 1–1.03)
UROBILINOGEN UR STRIP-ACNC: NORMAL EU/DL (ref 0–1)
WBC #/AREA URNS HPF: ABNORMAL /HPF (ref 0–5)

## 2025-02-04 PROCEDURE — 87088 URINE BACTERIA CULTURE: CPT

## 2025-02-04 PROCEDURE — 81001 URINALYSIS AUTO W/SCOPE: CPT

## 2025-02-04 PROCEDURE — 87086 URINE CULTURE/COLONY COUNT: CPT

## 2025-02-04 PROCEDURE — 87186 SC STD MICRODIL/AGAR DIL: CPT

## 2025-02-06 LAB
MICROORGANISM SPEC CULT: ABNORMAL
SPECIMEN DESCRIPTION: ABNORMAL

## 2025-02-08 ENCOUNTER — HOSPITAL ENCOUNTER (OUTPATIENT)
Age: 75
Setting detail: SPECIMEN
Discharge: HOME | End: 2025-02-08

## 2025-02-08 LAB
CREAT UR-MCNC: 107 MG/DL (ref 28–217)
MICROALBUMIN UR-MCNC: 21 MG/L (ref 0–20)
MICROALBUMIN/CREAT UR-RTO: 20 MCG/MG CREAT (ref 0–25)

## 2025-02-08 PROCEDURE — 82570 ASSAY OF URINE CREATININE: CPT

## 2025-02-08 PROCEDURE — 82043 UR ALBUMIN QUANTITATIVE: CPT

## 2025-02-18 ENCOUNTER — HOSPITAL ENCOUNTER (OUTPATIENT)
Age: 75
Setting detail: SPECIMEN
Discharge: HOME | End: 2025-02-18

## 2025-03-25 ENCOUNTER — HOSPITAL ENCOUNTER (OUTPATIENT)
Age: 75
Setting detail: SPECIMEN
Discharge: HOME | End: 2025-03-25

## 2025-03-25 PROCEDURE — 87186 SC STD MICRODIL/AGAR DIL: CPT

## 2025-03-25 PROCEDURE — 87086 URINE CULTURE/COLONY COUNT: CPT

## 2025-03-25 PROCEDURE — 81001 URINALYSIS AUTO W/SCOPE: CPT

## 2025-03-25 PROCEDURE — 87088 URINE BACTERIA CULTURE: CPT

## 2025-03-25 PROCEDURE — 86403 PARTICLE AGGLUT ANTBDY SCRN: CPT

## 2025-03-26 LAB
BACTERIA URNS QL MICRO: ABNORMAL
BILIRUB UR QL STRIP: NEGATIVE
CLARITY UR: ABNORMAL
COLOR UR: YELLOW
EPI CELLS #/AREA URNS HPF: ABNORMAL /HPF (ref 0–5)
GLUCOSE UR STRIP-MCNC: NEGATIVE MG/DL
HGB UR QL STRIP.AUTO: ABNORMAL
KETONES UR STRIP-MCNC: NEGATIVE MG/DL
LEUKOCYTE ESTERASE UR QL STRIP: ABNORMAL
NITRITE UR QL STRIP: POSITIVE
PH UR STRIP: 6 [PH] (ref 5–8)
PROT UR STRIP-MCNC: NEGATIVE MG/DL
RBC #/AREA URNS HPF: ABNORMAL /HPF (ref 0–2)
SP GR UR STRIP: 1.02 (ref 1–1.03)
UROBILINOGEN UR STRIP-ACNC: NORMAL EU/DL (ref 0–1)
WBC #/AREA URNS HPF: ABNORMAL /HPF (ref 0–5)

## 2025-03-28 LAB
MICROORGANISM SPEC CULT: ABNORMAL
MICROORGANISM SPEC CULT: ABNORMAL
SPECIMEN DESCRIPTION: ABNORMAL

## 2025-04-01 ENCOUNTER — HOSPITAL ENCOUNTER (OUTPATIENT)
Age: 75
Setting detail: SPECIMEN
Discharge: HOME | End: 2025-04-01
Payer: MEDICARE

## 2025-04-01 LAB
25(OH)D3 SERPL-MCNC: 39.3 NG/ML (ref 30–100)
ALBUMIN SERPL-MCNC: 3 G/DL (ref 3.5–5.2)
ANION GAP SERPL CALCULATED.3IONS-SCNC: 12 MMOL/L (ref 9–16)
BACTERIA URNS QL MICRO: ABNORMAL
BASOPHILS # BLD: 0.06 K/UL (ref 0–0.2)
BASOPHILS NFR BLD: 1 % (ref 0–2)
BILIRUB UR QL STRIP: NEGATIVE
BUN SERPL-MCNC: 27 MG/DL (ref 8–23)
CALCIUM SERPL-MCNC: 8.8 MG/DL (ref 8.8–10.2)
CASTS #/AREA URNS LPF: ABNORMAL /LPF (ref 0–2)
CASTS #/AREA URNS LPF: ABNORMAL /LPF (ref 0–2)
CHLORIDE SERPL-SCNC: 105 MMOL/L (ref 98–107)
CHOLEST SERPL-MCNC: 180 MG/DL (ref 0–199)
CHOLESTEROL/HDL RATIO: 4
CLARITY UR: ABNORMAL
CO2 SERPL-SCNC: 22 MMOL/L (ref 20–31)
COLOR UR: YELLOW
CREAT SERPL-MCNC: 1.6 MG/DL (ref 0.5–0.9)
CREAT UR-MCNC: 93.5 MG/DL (ref 28–217)
EOSINOPHIL # BLD: 0.13 K/UL (ref 0–0.44)
EOSINOPHILS RELATIVE PERCENT: 2 % (ref 1–4)
EPI CELLS #/AREA URNS HPF: ABNORMAL /HPF (ref 0–5)
ERYTHROCYTE [DISTWIDTH] IN BLOOD BY AUTOMATED COUNT: 14.4 % (ref 11.8–14.4)
EST. AVERAGE GLUCOSE BLD GHB EST-MCNC: 94 MG/DL
GFR, ESTIMATED: 34 ML/MIN/1.73M2
GLUCOSE SERPL-MCNC: 93 MG/DL (ref 82–115)
GLUCOSE UR STRIP-MCNC: NEGATIVE MG/DL
HBA1C MFR BLD: 4.9 % (ref 4–6)
HCT VFR BLD AUTO: 35.6 % (ref 36.3–47.1)
HDLC SERPL-MCNC: 45 MG/DL
HGB BLD-MCNC: 11.8 G/DL (ref 11.9–15.1)
HGB UR QL STRIP.AUTO: NEGATIVE
IMM GRANULOCYTES # BLD AUTO: 0.04 K/UL (ref 0–0.3)
IMM GRANULOCYTES NFR BLD: 1 %
KETONES UR STRIP-MCNC: NEGATIVE MG/DL
LDLC SERPL CALC-MCNC: 121 MG/DL (ref 0–100)
LEUKOCYTE ESTERASE UR QL STRIP: ABNORMAL
LYMPHOCYTES NFR BLD: 1.57 K/UL (ref 1.1–3.7)
LYMPHOCYTES RELATIVE PERCENT: 19 % (ref 24–43)
MAGNESIUM SERPL-MCNC: 2.5 MG/DL (ref 1.6–2.4)
MCH RBC QN AUTO: 31.6 PG (ref 25.2–33.5)
MCHC RBC AUTO-ENTMCNC: 33.1 G/DL (ref 28.4–34.8)
MCV RBC AUTO: 95.2 FL (ref 82.6–102.9)
MICROALBUMIN UR-MCNC: <12 MG/L (ref 0–20)
MICROALBUMIN/CREAT UR-RTO: NORMAL MCG/MG CREAT (ref 0–25)
MONOCYTES NFR BLD: 0.98 K/UL (ref 0.1–1.2)
MONOCYTES NFR BLD: 12 % (ref 3–12)
NEUTROPHILS NFR BLD: 65 % (ref 36–65)
NEUTS SEG NFR BLD: 5.4 K/UL (ref 1.5–8.1)
NITRITE UR QL STRIP: NEGATIVE
NRBC BLD-RTO: 0 PER 100 WBC
PH UR STRIP: 5.5 [PH] (ref 5–8)
PLATELET # BLD AUTO: 235 K/UL (ref 138–453)
PMV BLD AUTO: 10.1 FL (ref 8.1–13.5)
POTASSIUM SERPL-SCNC: 4.8 MMOL/L (ref 3.7–5.3)
PROT UR STRIP-MCNC: ABNORMAL MG/DL
PTH-INTACT SERPL-MCNC: 56 PG/ML (ref 17.9–58.6)
RBC # BLD AUTO: 3.74 M/UL (ref 3.95–5.11)
RBC #/AREA URNS HPF: ABNORMAL /HPF (ref 0–2)
SODIUM SERPL-SCNC: 139 MMOL/L (ref 136–145)
SP GR UR STRIP: 1.02 (ref 1–1.03)
TRIGL SERPL-MCNC: 68 MG/DL (ref 0–149)
TSH SERPL DL<=0.05 MIU/L-ACNC: 3.29 UIU/ML (ref 0.27–4.2)
UROBILINOGEN UR STRIP-ACNC: NORMAL EU/DL (ref 0–1)
VLDLC SERPL CALC-MCNC: 14 MG/DL (ref 1–30)
WBC #/AREA URNS HPF: ABNORMAL /HPF (ref 0–5)
WBC OTHER # BLD: 8.2 K/UL (ref 3.5–11.3)

## 2025-04-01 PROCEDURE — 82043 UR ALBUMIN QUANTITATIVE: CPT

## 2025-04-01 PROCEDURE — 84443 ASSAY THYROID STIM HORMONE: CPT

## 2025-04-01 PROCEDURE — 83735 ASSAY OF MAGNESIUM: CPT

## 2025-04-01 PROCEDURE — 82040 ASSAY OF SERUM ALBUMIN: CPT

## 2025-04-01 PROCEDURE — 82570 ASSAY OF URINE CREATININE: CPT

## 2025-04-01 PROCEDURE — 81001 URINALYSIS AUTO W/SCOPE: CPT

## 2025-04-01 PROCEDURE — 36415 COLL VENOUS BLD VENIPUNCTURE: CPT

## 2025-04-01 PROCEDURE — 83970 ASSAY OF PARATHORMONE: CPT

## 2025-04-01 PROCEDURE — 82306 VITAMIN D 25 HYDROXY: CPT

## 2025-04-01 PROCEDURE — 83036 HEMOGLOBIN GLYCOSYLATED A1C: CPT

## 2025-04-01 PROCEDURE — 85025 COMPLETE CBC W/AUTO DIFF WBC: CPT

## 2025-04-01 PROCEDURE — 80061 LIPID PANEL: CPT

## 2025-04-01 PROCEDURE — 80048 BASIC METABOLIC PNL TOTAL CA: CPT

## 2025-05-13 ENCOUNTER — HOSPITAL ENCOUNTER (OUTPATIENT)
Age: 75
Setting detail: SPECIMEN
Discharge: HOME | End: 2025-05-13
Payer: MEDICARE

## 2025-05-13 LAB
AFP SERPL-MCNC: 3.9 UG/L
ALBUMIN SERPL-MCNC: 3 G/DL (ref 3.5–5.2)
ALBUMIN/GLOB SERPL: 0.8 {RATIO} (ref 1–2.5)
ALP SERPL-CCNC: 189 U/L (ref 35–104)
ALT SERPL-CCNC: 19 U/L (ref 10–35)
ANION GAP SERPL CALCULATED.3IONS-SCNC: 12 MMOL/L (ref 9–16)
AST SERPL-CCNC: 25 U/L (ref 10–35)
BASOPHILS # BLD: 0.05 K/UL (ref 0–0.2)
BASOPHILS NFR BLD: 1 % (ref 0–2)
BILIRUB DIRECT SERPL-MCNC: 0.2 MG/DL (ref 0–0.2)
BILIRUB INDIRECT SERPL-MCNC: 0.3 MG/DL
BILIRUB SERPL-MCNC: 0.5 MG/DL (ref 0–1.2)
BUN SERPL-MCNC: 28 MG/DL (ref 8–23)
CALCIUM SERPL-MCNC: 9.1 MG/DL (ref 8.8–10.2)
CHLORIDE SERPL-SCNC: 102 MMOL/L (ref 98–107)
CO2 SERPL-SCNC: 22 MMOL/L (ref 20–31)
CREAT SERPL-MCNC: 1.6 MG/DL (ref 0.5–0.9)
EOSINOPHIL # BLD: 0.18 K/UL (ref 0–0.44)
EOSINOPHILS RELATIVE PERCENT: 3 % (ref 1–4)
ERYTHROCYTE [DISTWIDTH] IN BLOOD BY AUTOMATED COUNT: 13.5 % (ref 11.8–14.4)
GFR, ESTIMATED: 32 ML/MIN/1.73M2
GLUCOSE SERPL-MCNC: 77 MG/DL (ref 82–115)
HCT VFR BLD AUTO: 36.2 % (ref 36.3–47.1)
HGB BLD-MCNC: 12.4 G/DL (ref 11.9–15.1)
IMM GRANULOCYTES # BLD AUTO: 0.03 K/UL (ref 0–0.3)
IMM GRANULOCYTES NFR BLD: 1 %
INR PPP: 1.7
LYMPHOCYTES NFR BLD: 1.86 K/UL (ref 1.1–3.7)
LYMPHOCYTES RELATIVE PERCENT: 28 % (ref 24–43)
MCH RBC QN AUTO: 31.4 PG (ref 25.2–33.5)
MCHC RBC AUTO-ENTMCNC: 34.3 G/DL (ref 28.4–34.8)
MCV RBC AUTO: 91.6 FL (ref 82.6–102.9)
MONOCYTES NFR BLD: 0.57 K/UL (ref 0.1–1.2)
MONOCYTES NFR BLD: 9 % (ref 3–12)
NEUTROPHILS NFR BLD: 58 % (ref 36–65)
NEUTS SEG NFR BLD: 3.97 K/UL (ref 1.5–8.1)
NRBC BLD-RTO: 0 PER 100 WBC
PLATELET # BLD AUTO: 248 K/UL (ref 138–453)
PMV BLD AUTO: 10.1 FL (ref 8.1–13.5)
POTASSIUM SERPL-SCNC: 4.7 MMOL/L (ref 3.7–5.3)
PROT SERPL-MCNC: 6.8 G/DL (ref 6.6–8.7)
PROTHROMBIN TIME: 20.4 SEC (ref 11.5–14.2)
RBC # BLD AUTO: 3.95 M/UL (ref 3.95–5.11)
SODIUM SERPL-SCNC: 136 MMOL/L (ref 136–145)
WBC OTHER # BLD: 6.7 K/UL (ref 3.5–11.3)

## 2025-05-13 PROCEDURE — 36415 COLL VENOUS BLD VENIPUNCTURE: CPT

## 2025-05-13 PROCEDURE — 80076 HEPATIC FUNCTION PANEL: CPT

## 2025-05-13 PROCEDURE — 85610 PROTHROMBIN TIME: CPT

## 2025-05-13 PROCEDURE — 80048 BASIC METABOLIC PNL TOTAL CA: CPT

## 2025-05-13 PROCEDURE — 82105 ALPHA-FETOPROTEIN SERUM: CPT

## 2025-05-13 PROCEDURE — 85025 COMPLETE CBC W/AUTO DIFF WBC: CPT

## 2025-06-02 ENCOUNTER — HOSPITAL ENCOUNTER (OUTPATIENT)
Age: 75
Setting detail: SPECIMEN
Discharge: HOME | End: 2025-06-02
Payer: MEDICARE

## 2025-06-02 LAB
BACTERIA URNS QL MICRO: ABNORMAL
BILIRUB UR QL STRIP: NEGATIVE
CASTS #/AREA URNS LPF: ABNORMAL /LPF (ref 0–8)
CLARITY UR: ABNORMAL
COLOR UR: YELLOW
EPI CELLS #/AREA URNS HPF: ABNORMAL /HPF (ref 0–5)
GLUCOSE UR STRIP-MCNC: NEGATIVE MG/DL
HGB UR QL STRIP.AUTO: ABNORMAL
KETONES UR STRIP-MCNC: NEGATIVE MG/DL
LEUKOCYTE ESTERASE UR QL STRIP: ABNORMAL
NITRITE UR QL STRIP: NEGATIVE
PH UR STRIP: 6 [PH] (ref 5–8)
PROT UR STRIP-MCNC: NEGATIVE MG/DL
RBC #/AREA URNS HPF: ABNORMAL /HPF (ref 0–4)
SP GR UR STRIP: 1.01 (ref 1–1.03)
UROBILINOGEN UR STRIP-ACNC: NORMAL EU/DL (ref 0–1)
WBC #/AREA URNS HPF: ABNORMAL /HPF (ref 0–5)

## 2025-06-02 PROCEDURE — 81001 URINALYSIS AUTO W/SCOPE: CPT

## 2025-06-02 PROCEDURE — 82043 UR ALBUMIN QUANTITATIVE: CPT

## 2025-06-02 PROCEDURE — 82570 ASSAY OF URINE CREATININE: CPT

## 2025-06-03 ENCOUNTER — HOSPITAL ENCOUNTER (OUTPATIENT)
Age: 75
Setting detail: SPECIMEN
Discharge: HOME | End: 2025-06-03
Payer: MEDICARE

## 2025-06-03 LAB
25(OH)D3 SERPL-MCNC: 39.1 NG/ML (ref 30–100)
ANION GAP SERPL CALCULATED.3IONS-SCNC: 11 MMOL/L (ref 9–16)
BUN SERPL-MCNC: 34 MG/DL (ref 8–23)
CALCIUM SERPL-MCNC: 8.7 MG/DL (ref 8.8–10.2)
CHLORIDE SERPL-SCNC: 102 MMOL/L (ref 98–107)
CO2 SERPL-SCNC: 21 MMOL/L (ref 20–31)
CREAT SERPL-MCNC: 1.6 MG/DL (ref 0.5–0.9)
CREAT UR-MCNC: 17.9 MG/DL (ref 28–217)
ERYTHROCYTE [DISTWIDTH] IN BLOOD BY AUTOMATED COUNT: 14.6 % (ref 11.8–14.4)
GFR, ESTIMATED: 33 ML/MIN/1.73M2
GLUCOSE SERPL-MCNC: 80 MG/DL (ref 82–115)
HCT VFR BLD AUTO: 36.4 % (ref 36.3–47.1)
HGB BLD-MCNC: 12.2 G/DL (ref 11.9–15.1)
MAGNESIUM SERPL-MCNC: 2.3 MG/DL (ref 1.6–2.4)
MCH RBC QN AUTO: 31.3 PG (ref 25.2–33.5)
MCHC RBC AUTO-ENTMCNC: 33.5 G/DL (ref 28.4–34.8)
MCV RBC AUTO: 93.3 FL (ref 82.6–102.9)
MICROALBUMIN UR-MCNC: <12 MG/L (ref 0–20)
MICROALBUMIN/CREAT UR-RTO: ABNORMAL MCG/MG CREAT (ref 0–25)
NRBC BLD-RTO: 0 PER 100 WBC
PHOSPHATE SERPL-MCNC: 3.4 MG/DL (ref 2.5–4.5)
PLATELET # BLD AUTO: 201 K/UL (ref 138–453)
PMV BLD AUTO: 10.2 FL (ref 8.1–13.5)
POTASSIUM SERPL-SCNC: 4.7 MMOL/L (ref 3.7–5.3)
PTH-INTACT SERPL-MCNC: 49 PG/ML (ref 17.9–58.6)
RBC # BLD AUTO: 3.9 M/UL (ref 3.95–5.11)
SODIUM SERPL-SCNC: 134 MMOL/L (ref 136–145)
WBC OTHER # BLD: 6.8 K/UL (ref 3.5–11.3)

## 2025-06-03 PROCEDURE — 84100 ASSAY OF PHOSPHORUS: CPT

## 2025-06-03 PROCEDURE — 82306 VITAMIN D 25 HYDROXY: CPT

## 2025-06-03 PROCEDURE — 83735 ASSAY OF MAGNESIUM: CPT

## 2025-06-03 PROCEDURE — 85027 COMPLETE CBC AUTOMATED: CPT

## 2025-06-03 PROCEDURE — 36415 COLL VENOUS BLD VENIPUNCTURE: CPT

## 2025-06-03 PROCEDURE — 80048 BASIC METABOLIC PNL TOTAL CA: CPT

## 2025-06-03 PROCEDURE — 83970 ASSAY OF PARATHORMONE: CPT

## 2025-08-04 ENCOUNTER — HOSPITAL ENCOUNTER (OUTPATIENT)
Age: 75
Setting detail: SPECIMEN
Discharge: HOME | End: 2025-08-04

## 2025-08-04 PROCEDURE — 87181 SC STD AGAR DILUTION PER AGT: CPT

## 2025-08-04 PROCEDURE — 81001 URINALYSIS AUTO W/SCOPE: CPT

## 2025-08-04 PROCEDURE — 87077 CULTURE AEROBIC IDENTIFY: CPT

## 2025-08-04 PROCEDURE — 87186 SC STD MICRODIL/AGAR DIL: CPT

## 2025-08-04 PROCEDURE — 87086 URINE CULTURE/COLONY COUNT: CPT

## 2025-08-05 ENCOUNTER — HOSPITAL ENCOUNTER (OUTPATIENT)
Age: 75
Setting detail: SPECIMEN
Discharge: HOME | End: 2025-08-05

## 2025-08-05 LAB
25(OH)D3 SERPL-MCNC: 42.4 NG/ML (ref 30–100)
ALBUMIN SERPL-MCNC: 3.2 G/DL (ref 3.5–5.2)
ANION GAP SERPL CALCULATED.3IONS-SCNC: 14 MMOL/L (ref 9–16)
BACTERIA URNS QL MICRO: ABNORMAL
BASOPHILS # BLD: 0.07 K/UL (ref 0–0.2)
BASOPHILS NFR BLD: 1 % (ref 0–2)
BILIRUB UR QL STRIP: NEGATIVE
BUN SERPL-MCNC: 34 MG/DL (ref 8–23)
CALCIUM SERPL-MCNC: 9.2 MG/DL (ref 8.8–10.2)
CHLORIDE SERPL-SCNC: 103 MMOL/L (ref 98–107)
CLARITY UR: CLEAR
CO2 SERPL-SCNC: 21 MMOL/L (ref 20–31)
COLOR UR: YELLOW
CREAT SERPL-MCNC: 2 MG/DL (ref 0.5–0.9)
EOSINOPHIL # BLD: 0.18 K/UL (ref 0–0.44)
EOSINOPHILS RELATIVE PERCENT: 3 % (ref 1–4)
EPI CELLS #/AREA URNS HPF: ABNORMAL /HPF (ref 0–5)
ERYTHROCYTE [DISTWIDTH] IN BLOOD BY AUTOMATED COUNT: 14 % (ref 11.8–14.4)
GFR, ESTIMATED: 25 ML/MIN/1.73M2
GLUCOSE SERPL-MCNC: 97 MG/DL (ref 82–115)
GLUCOSE UR STRIP-MCNC: NEGATIVE MG/DL
HCT VFR BLD AUTO: 36.4 % (ref 36.3–47.1)
HGB BLD-MCNC: 12.5 G/DL (ref 11.9–15.1)
HGB UR QL STRIP.AUTO: NEGATIVE
IMM GRANULOCYTES # BLD AUTO: 0.02 K/UL (ref 0–0.3)
IMM GRANULOCYTES NFR BLD: 0 %
KETONES UR STRIP-MCNC: NEGATIVE MG/DL
LEUKOCYTE ESTERASE UR QL STRIP: ABNORMAL
LYMPHOCYTES NFR BLD: 1.89 K/UL (ref 1.1–3.7)
LYMPHOCYTES RELATIVE PERCENT: 28 % (ref 24–43)
MAGNESIUM SERPL-MCNC: 2.3 MG/DL (ref 1.6–2.4)
MCH RBC QN AUTO: 32.1 PG (ref 25.2–33.5)
MCHC RBC AUTO-ENTMCNC: 34.3 G/DL (ref 28.4–34.8)
MCV RBC AUTO: 93.6 FL (ref 82.6–102.9)
MONOCYTES NFR BLD: 0.63 K/UL (ref 0.1–1.2)
MONOCYTES NFR BLD: 9 % (ref 3–12)
NEUTROPHILS NFR BLD: 59 % (ref 36–65)
NEUTS SEG NFR BLD: 3.92 K/UL (ref 1.5–8.1)
NITRITE UR QL STRIP: NEGATIVE
NRBC BLD-RTO: 0 PER 100 WBC
PH UR STRIP: 5.5 [PH] (ref 5–8)
PHOSPHATE SERPL-MCNC: 3 MG/DL (ref 2.5–4.5)
PLATELET # BLD AUTO: 253 K/UL (ref 138–453)
PMV BLD AUTO: 10.2 FL (ref 8.1–13.5)
POTASSIUM SERPL-SCNC: 4.5 MMOL/L (ref 3.7–5.3)
PROT UR STRIP-MCNC: NEGATIVE MG/DL
PTH-INTACT SERPL-MCNC: 51 PG/ML (ref 17.9–58.6)
RBC # BLD AUTO: 3.89 M/UL (ref 3.95–5.11)
RBC #/AREA URNS HPF: ABNORMAL /HPF (ref 0–2)
SODIUM SERPL-SCNC: 138 MMOL/L (ref 136–145)
SP GR UR STRIP: 1.01 (ref 1–1.03)
UROBILINOGEN UR STRIP-ACNC: NORMAL EU/DL (ref 0–1)
WBC #/AREA URNS HPF: ABNORMAL /HPF (ref 0–5)
WBC OTHER # BLD: 6.7 K/UL (ref 3.5–11.3)

## 2025-08-05 PROCEDURE — 83970 ASSAY OF PARATHORMONE: CPT

## 2025-08-05 PROCEDURE — 85025 COMPLETE CBC W/AUTO DIFF WBC: CPT

## 2025-08-05 PROCEDURE — 82040 ASSAY OF SERUM ALBUMIN: CPT

## 2025-08-05 PROCEDURE — 80048 BASIC METABOLIC PNL TOTAL CA: CPT

## 2025-08-05 PROCEDURE — 83735 ASSAY OF MAGNESIUM: CPT

## 2025-08-05 PROCEDURE — 36415 COLL VENOUS BLD VENIPUNCTURE: CPT

## 2025-08-05 PROCEDURE — 84100 ASSAY OF PHOSPHORUS: CPT

## 2025-08-05 PROCEDURE — 82306 VITAMIN D 25 HYDROXY: CPT

## 2025-08-09 LAB
MICROORGANISM SPEC CULT: ABNORMAL
MICROORGANISM SPEC CULT: ABNORMAL
SERVICE CMNT-IMP: ABNORMAL
SPECIMEN DESCRIPTION: ABNORMAL

## (undated) DEVICE — MAGNETIC DRAPE: Brand: DEVON

## (undated) DEVICE — CONTROL SYRINGE LUER-LOCK TIP: Brand: MONOJECT

## (undated) DEVICE — NEUROSTIMULATOR EXT SM LTWT SGL BTTN H2O RESIST WIRELESS

## (undated) DEVICE — CATHETER URETH 18FR BLLN 5CC SIL ALLY W/ SIL HYDRGEL 2 W F

## (undated) DEVICE — GLOVE ORANGE PI 7   MSG9070

## (undated) DEVICE — STRAP RESTRAIN W3.5XL19IN TECLIN STRRP POS LEG DURING LITH

## (undated) DEVICE — PENCIL ES L3M BTTN SWCH HOLSTER W/ BLDE ELECTRD EDGE

## (undated) DEVICE — INTENDED FOR TISSUE SEPARATION, AND OTHER PROCEDURES THAT REQUIRE A SHARP SURGICAL BLADE TO PUNCTURE OR CUT.: Brand: BARD-PARKER ® CARBON RIB-BACK BLADES

## (undated) DEVICE — GLOVE SURG SZ 8 CRM LTX FREE POLYISOPRENE POLYMER BEAD ANTI

## (undated) DEVICE — 3M™ IOBAN™ 2 ANTIMICROBIAL INCISE DRAPE 6650EZ: Brand: IOBAN™ 2

## (undated) DEVICE — ADHESIVE SKIN CLSR 0.7ML TOP DERMBND ADV

## (undated) DEVICE — SUTURE MCRYL SZ 4-0 L18IN ABSRB UD L16MM PC-3 3/8 CIR PRIM Y845G

## (undated) DEVICE — SYRINGE IRRIG 60ML SFT PLIABLE BLB EZ TO GRP 1 HND USE W/

## (undated) DEVICE — SYRINGE, LUER LOCK, 10ML: Brand: MEDLINE

## (undated) DEVICE — YANKAUER,FLEXIBLE HANDLE,REGLR CAPACITY: Brand: MEDLINE INDUSTRIES, INC.

## (undated) DEVICE — KENDALL SCD EXPRESS SLEEVES, KNEE LENGTH, MEDIUM: Brand: KENDALL SCD

## (undated) DEVICE — GLOVE SURG SZ 6 THK91MIL LTX FREE SYN POLYISOPRENE ANTI

## (undated) DEVICE — Z CONVERTED USE 2162213 APPLICATOR PREP 4OZ CHG 4% BACTOSHIELD USE FOR HND WSH AND

## (undated) DEVICE — SUTURE VCRL SZ 3-0 L27IN ABSRB UD L26MM SH 1/2 CIR J416H

## (undated) DEVICE — Z DISCONTINUED BY MEDLINE USE 2271199 TRAY PREP WET 4% CHG SCRB SOL

## (undated) DEVICE — TUBING, SUCTION, 9/32" X 20', STRAIGHT: Brand: MEDLINE INDUSTRIES, INC.

## (undated) DEVICE — COVER OR TBL W40XL90IN ABSRB STD AND GRIPPY BK SAHARA

## (undated) DEVICE — UNDERPANTS MAT L XL SEAMLESS CLR CODE WAISTBAND KNIT

## (undated) DEVICE — SHEET DRAPE FULL 70X100

## (undated) DEVICE — INSTRUMENT POUCH,DOUBLE POCKET: Brand: DEVON

## (undated) DEVICE — 3M™ STERI-STRIP™ COMPOUND BENZOIN TINCTURE 40 BAGS/CARTON 4 CARTONS/CASE C1544: Brand: 3M™ STERI-STRIP™

## (undated) DEVICE — NEEDLE SPNL 22GA L3.5IN BLK HUB S STL REG WALL FIT STYL W/

## (undated) DEVICE — 1000 S-DRAPE TOWEL DRAPE 10/BX: Brand: STERI-DRAPE™

## (undated) DEVICE — 35 ML SYRINGE LUER-LOCK TIP: Brand: MONOJECT

## (undated) DEVICE — SOLUTION SURG PREP POV IOD 7.5% 4 OZ

## (undated) DEVICE — SKIN AFFIX SURG ADHESIVE 72/CS 0.55ML: Brand: MEDLINE

## (undated) DEVICE — STANDARD HYPODERMIC NEEDLE,POLYPROPYLENE HUB: Brand: MONOJECT

## (undated) DEVICE — SHEET, T, LAPAROTOMY, STERILE: Brand: MEDLINE

## (undated) DEVICE — C-ARMOR C-ARM EQUIPMENT COVERS CLEAR STERILE UNIVERSAL FIT 12 PER CASE: Brand: C-ARMOR

## (undated) DEVICE — GLOVE ORANGE PI 7 1/2   MSG9075

## (undated) DEVICE — GLOVE SURG SZ 65 THK91MIL LTX FREE SYN POLYISOPRENE

## (undated) DEVICE — SUTURE PDS II SZ 0 L27IN ABSRB VLT L26MM CT-2 1/2 CIR Z334H

## (undated) DEVICE — MINOR BSIN PK

## (undated) DEVICE — DRAPE C ARM UNIV W41XL74IN CLR PLAS XR VELC CLSR POLY STRP

## (undated) DEVICE — SOLUTION SCRB 4OZ 4% CHG H2O AIDED FOR PREOPERATIVE SKIN

## (undated) DEVICE — GAUZE,SPONGE,FLUFF,6"X6.75",STRL,5/TRAY: Brand: MEDLINE

## (undated) DEVICE — SVMMC GYN MIN PK

## (undated) DEVICE — NEEDLE SPNL 25GA L3.5IN BLU HUB S STL REG WALL FIT STYL W/

## (undated) DEVICE — CONMED ACCESSORY ELECTRODE, NEEDLE ELECTRODE: Brand: CONMED

## (undated) DEVICE — COVER,MAYO STAND,STERILE: Brand: MEDLINE

## (undated) DEVICE — SUTURE VCRL SZ 1 L36IN ABSRB UD L36MM CT-1 1/2 CIR J947H

## (undated) DEVICE — CYSTO/BLADDER IRRIGATION SET, REGULATING CLAMP

## (undated) DEVICE — 1016 S-DRAPE IRRIG POUCH 10/BOX: Brand: STERI-DRAPE™

## (undated) DEVICE — GOWN,AURORA,NONREINFORCED,LARGE: Brand: MEDLINE

## (undated) DEVICE — GARMENT,MEDLINE,DVT,INT,CALF,MED, GEN2: Brand: MEDLINE

## (undated) DEVICE — INTRODUCER NEUROSTIMULATOR LD FOR URIN CTRL INTERSTIM

## (undated) DEVICE — DRAPE,REIN 53X77,STERILE: Brand: MEDLINE

## (undated) DEVICE — TOWEL,OR,DSP,ST,BLUE,DLX,XR,4/PK,20PK/CS: Brand: MEDLINE

## (undated) DEVICE — SUTURE VIC + SH-13-0 27IN  VCP311H

## (undated) DEVICE — KIT HDSET COMM SMRT PRGMR GU PROX US INTERSTIM

## (undated) DEVICE — 12 ML SYRINGE,LUER-LOCK TIP: Brand: MONOJECT

## (undated) DEVICE — TOWEL,OR,DSP,ST,NATURAL,DLX,4/PK,20PK/CS: Brand: MEDLINE

## (undated) DEVICE — SVMMC PEDS/UROLOGY MINOR PACK: Brand: MEDLINE INDUSTRIES, INC.

## (undated) DEVICE — PREP SOL PVP IODINE 4%  4 OZ/BTL

## (undated) DEVICE — SUTURE VCRL SZ 2-0 L27IN ABSRB UD L26MM CT-2 1/2 CIR J269H

## (undated) DEVICE — TAPE,CLOTH/SILK,CURAD,3"X10YD,LF,40/CS: Brand: CURAD

## (undated) DEVICE — Z DISCONTINUED BY MEDLINE USE 2711682 TRAY SKIN PREP DRY W/ PREM GLV

## (undated) DEVICE — HYPODERMIC SAFETY NEEDLE: Brand: MAGELLAN

## (undated) DEVICE — PAD,ABDOMINAL,5"X9",ST,LF,25/BX: Brand: MEDLINE INDUSTRIES, INC.

## (undated) DEVICE — NEEDLE SPNL 22GA BLK HUB S STL REG WALL FIT STYL W/ QNCKE

## (undated) DEVICE — DRAPE CAM W7XL46IN FOR CLS SYS CAM

## (undated) DEVICE — BOOT NERVE STIM EXT WIRELESS ACCSRY PAIN ENS

## (undated) DEVICE — 1840 FOAM BLOCK NEEDLE COUNTER: Brand: DEVON

## (undated) DEVICE — GOWN,AURORA,NONRNF,XL,30/CS: Brand: MEDLINE

## (undated) DEVICE — GLOVE SURG SZ 75 CRM LTX FREE POLYISOPRENE POLYMER BEAD ANTI

## (undated) DEVICE — GLOVE ORANGE PI 8   MSG9080

## (undated) DEVICE — TRAY CATHETER 16FR F INCLUDE BARDX IC COMPLT CARE DRNGE BG

## (undated) DEVICE — DRESSING TRNSPAR W2XL2.75IN FLM SHT SEMIPERMEABLE WIND